# Patient Record
Sex: MALE | Race: WHITE | Employment: OTHER | URBAN - NONMETROPOLITAN AREA
[De-identification: names, ages, dates, MRNs, and addresses within clinical notes are randomized per-mention and may not be internally consistent; named-entity substitution may affect disease eponyms.]

---

## 2018-03-13 ENCOUNTER — HOSPITAL ENCOUNTER (OUTPATIENT)
Dept: OCCUPATIONAL THERAPY | Age: 63
Setting detail: THERAPIES SERIES
Discharge: HOME | End: 2018-03-13
Payer: COMMERCIAL

## 2018-03-13 PROCEDURE — 97165 OT EVAL LOW COMPLEX 30 MIN: CPT

## 2018-03-13 ASSESSMENT — PAIN DESCRIPTION - LOCATION: LOCATION: SHOULDER

## 2018-03-13 ASSESSMENT — PAIN DESCRIPTION - ORIENTATION: ORIENTATION: LEFT

## 2018-03-13 ASSESSMENT — PAIN SCALES - GENERAL: PAINLEVEL_OUTOF10: 7

## 2018-03-13 ASSESSMENT — PAIN DESCRIPTION - PAIN TYPE: TYPE: CHRONIC PAIN

## 2018-03-13 NOTE — PROGRESS NOTES
I certify that I have examined the patient below and determined that Physical Medicine and Rehabilitation service is necessary; that the secondary diagnosis for the provision of rehabilitation services is consistent with identified needs; that service will be furnished on an outpatient basis while the patient is in my care; that I approve the above plan of care for up to 90 days or as specifically noted above and will review it within that time frame or more often if the patients condition requires. Attestation, signature or co-signature of physician indicates approval of certification requirements.    ________________________ ____________ __________  Physician Signature   Date   Time        Time In: 0740  Time Out: 0840  Minutes: 60  Timed Code Treatment Minutes: 0 Minutes     Date: 3/13/2018  Patient Name: Keren Shaw        CSN: [id number]     : 1955  (65 y.o.)  Gender: male   Referring Practitioner: Dr. John Sharif  Diagnosis: Left Shoulder Pain  Treatment Diagnosis: Left shoulder pain  Additional Pertinent Hx: Pt. states he's been having pain in left shoulder since 2017. States there was no injury. Points to pain in anterior shoulder with pain radiating down arm. States the pain is interrupting his sleep. Pt. states he had x-rays but it just showed arthritis. Pt. states he has not had any injections or medications for the shoulder. PMH includes HBP, vertigo, DM, OA, Chiarri Malformation. Pt. had surgery for Chiarri Malformation in     See Medical History Questionnaire for information related to allergies and medications. General:  OT Visit Information  Onset Date: 18  OT Insurance Information: [place] - unlimited visits based on medical necessity. Modalities are covered.  Precert required after eval  Total # of Visits Approved: 1  Total # of Visits to Date: 1  Certification by 4 weeks  Short term goal 1: Pt. will demo independence with HEP for LUE for decreased pain and improved ROM and strength for work and leisure activities  Short term goal 2: Pt. will demo improved PROM left shoulder to flexion= 160, Abduction= 160 and ER= 80 for increased ease with dressing   Short term goal 3: Pt. will demo improved AROM left shoulder to flexion= 145, abduction= 135, IR to thumb tip 4.5 inches above waistline, and ER= 75 for ease with reaching into cupboards and donning shirt  Short term goal 4: Pt. 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 will be able to reach and grasp dishes out of cupboard with LUE with minimal to no pain    OT G-codes  Functional Assessment Tool Used: UE functional scale  Score: 44       Evaluation Complexity: Based on the findings of patient history, examination, clinical presentation, and decision making during this evaluation, this patient is of low complexity.     Ralph Marcano, OTR/L [id number]

## 2018-03-15 ENCOUNTER — HOSPITAL ENCOUNTER (OUTPATIENT)
Dept: OCCUPATIONAL THERAPY | Age: 63
Setting detail: THERAPIES SERIES
Discharge: HOME | End: 2018-03-15
Payer: COMMERCIAL

## 2018-03-15 PROCEDURE — 97035 APP MDLTY 1+ULTRASOUND EA 15: CPT

## 2018-03-15 PROCEDURE — 97140 MANUAL THERAPY 1/> REGIONS: CPT

## 2018-03-15 PROCEDURE — 97110 THERAPEUTIC EXERCISES: CPT

## 2018-03-15 ASSESSMENT — PAIN SCALES - GENERAL: PAINLEVEL_OUTOF10: 6

## 2018-03-15 ASSESSMENT — PAIN DESCRIPTION - ORIENTATION: ORIENTATION: LEFT

## 2018-03-15 ASSESSMENT — PAIN DESCRIPTION - LOCATION: LOCATION: SHOULDER

## 2018-03-20 ENCOUNTER — APPOINTMENT (OUTPATIENT)
Dept: OCCUPATIONAL THERAPY | Age: 63
End: 2018-03-20
Payer: COMMERCIAL

## 2018-03-23 ENCOUNTER — APPOINTMENT (OUTPATIENT)
Dept: OCCUPATIONAL THERAPY | Age: 63
End: 2018-03-23
Payer: COMMERCIAL

## 2018-03-27 ENCOUNTER — HOSPITAL ENCOUNTER (OUTPATIENT)
Dept: OCCUPATIONAL THERAPY | Age: 63
Setting detail: THERAPIES SERIES
Discharge: HOME | End: 2018-03-27
Payer: COMMERCIAL

## 2018-03-29 ENCOUNTER — HOSPITAL ENCOUNTER (OUTPATIENT)
Dept: OCCUPATIONAL THERAPY | Age: 63
Setting detail: THERAPIES SERIES
Discharge: HOME | End: 2018-03-29
Payer: COMMERCIAL

## 2018-03-29 PROCEDURE — 97035 APP MDLTY 1+ULTRASOUND EA 15: CPT

## 2018-03-29 PROCEDURE — 97140 MANUAL THERAPY 1/> REGIONS: CPT

## 2018-03-29 PROCEDURE — 97110 THERAPEUTIC EXERCISES: CPT

## 2018-03-29 ASSESSMENT — PAIN DESCRIPTION - LOCATION: LOCATION: SHOULDER

## 2018-03-29 ASSESSMENT — PAIN DESCRIPTION - ORIENTATION: ORIENTATION: LEFT

## 2018-03-29 ASSESSMENT — PAIN SCALES - GENERAL: PAINLEVEL_OUTOF10: 6

## 2018-03-29 ASSESSMENT — PAIN DESCRIPTION - PAIN TYPE: TYPE: CHRONIC PAIN

## 2018-03-29 NOTE — PROGRESS NOTES
[facility]  OUTPATIENT OCCUPATIONAL THERAPY  Daily Note  [address]    Time In: 0815  Time Out: 0900  Minutes: 45  Timed Code Treatment Minutes: 45 Minutes     Date: 3/29/2018  Patient Name: Emmett Lou        CSN: [id number]   : 1955  (65 y.o.)  Gender: male   Referring Practitioner: Dr. Lizzie Limon  Diagnosis: Left Shoulder Pain  Treatment Diagnosis: Left shoulder pain       General:  OT Visit Information  Onset Date: 18  OT Insurance Information: [place] - unlimited visits based on medical necessity. Modalities are covered. Precert required after eval-Received auth for 6 visits through 18  Total # of Visits Approved: 7  Total # of Visits to Date: 3  Certification Period Expiration Date: 18  Progress Note Counter: 3/7  Comments: No scheduled follow up with doctor       Restrictions/Precautions:  Restrictions/Precautions: General Precautions              Subjective:  Subjective: Pt. states left shoulder remains painful at 6/10 today. Pt. did get script for physical therapy for his neck and back - pt. to schedule today          Pain:  Patient Currently in Pain: Yes  Pain Assessment: 0-10  Pain Level: 6  Pain Type: Chronic pain  Pain Location: Shoulder  Pain Orientation: Left       Objective:     Upper Extremity Function  UE AROM: scapular pinches x10  UE PROM: IASTM and manual soft tissue mobilization to anterior shoulder around supraspinatus insertion and then to upper trap with significant tightness and several knots felt.  Supine GH joint mobs, PROM to left shoulder all planes to tolerance while providing distraction to joint to prevent pinching  UE AAROM: supine dowel for chest press x10, ER x10  UE Stretching: sidelying sleeper stretch                                     Ultrasound  Ultrasound Location: left anteriolateral shoulder  Ultrasound Parameters: 1 MHz, 100%, .8 davis/cm2 x10 minutes  Ultrasound Goals: Pain          Activity

## 2018-04-03 ENCOUNTER — APPOINTMENT (OUTPATIENT)
Dept: OCCUPATIONAL THERAPY | Age: 63
End: 2018-04-03
Payer: MEDICARE

## 2018-04-05 ENCOUNTER — APPOINTMENT (OUTPATIENT)
Dept: PHYSICAL THERAPY | Age: 63
End: 2018-04-05
Payer: MEDICARE

## 2018-04-05 ENCOUNTER — APPOINTMENT (OUTPATIENT)
Dept: OCCUPATIONAL THERAPY | Age: 63
End: 2018-04-05
Payer: MEDICARE

## 2018-04-10 ENCOUNTER — HOSPITAL ENCOUNTER (OUTPATIENT)
Dept: PHYSICAL THERAPY | Age: 63
Setting detail: THERAPIES SERIES
Discharge: HOME | End: 2018-04-10
Payer: MEDICARE

## 2018-04-10 ENCOUNTER — HOSPITAL ENCOUNTER (OUTPATIENT)
Dept: OCCUPATIONAL THERAPY | Age: 63
Setting detail: THERAPIES SERIES
Discharge: HOME | End: 2018-04-10
Payer: MEDICARE

## 2018-04-10 PROCEDURE — 97162 PT EVAL MOD COMPLEX 30 MIN: CPT

## 2018-04-10 PROCEDURE — 97110 THERAPEUTIC EXERCISES: CPT

## 2018-04-10 PROCEDURE — G8991 OTHER PT/OT GOAL STATUS: HCPCS

## 2018-04-10 PROCEDURE — G8990 OTHER PT/OT CURRENT STATUS: HCPCS

## 2018-04-10 PROCEDURE — 97035 APP MDLTY 1+ULTRASOUND EA 15: CPT

## 2018-04-10 PROCEDURE — 97140 MANUAL THERAPY 1/> REGIONS: CPT

## 2018-04-10 ASSESSMENT — PAIN DESCRIPTION - LOCATION
LOCATION: SHOULDER
LOCATION: NECK;SHOULDER

## 2018-04-10 ASSESSMENT — PAIN DESCRIPTION - ORIENTATION
ORIENTATION: LEFT;RIGHT
ORIENTATION: LEFT

## 2018-04-10 ASSESSMENT — PAIN SCALES - GENERAL
PAINLEVEL_OUTOF10: 5
PAINLEVEL_OUTOF10: 6

## 2018-04-10 ASSESSMENT — PAIN DESCRIPTION - PAIN TYPE: TYPE: CHRONIC PAIN

## 2018-04-27 ENCOUNTER — HOSPITAL ENCOUNTER (OUTPATIENT)
Dept: PHYSICAL THERAPY | Age: 63
Setting detail: THERAPIES SERIES
Discharge: HOME | End: 2018-04-27
Payer: MEDICARE

## 2018-04-27 ENCOUNTER — HOSPITAL ENCOUNTER (OUTPATIENT)
Dept: OCCUPATIONAL THERAPY | Age: 63
Setting detail: THERAPIES SERIES
Discharge: HOME | End: 2018-04-27
Payer: MEDICARE

## 2018-04-27 PROCEDURE — 97110 THERAPEUTIC EXERCISES: CPT

## 2018-04-27 PROCEDURE — 97140 MANUAL THERAPY 1/> REGIONS: CPT

## 2018-04-27 ASSESSMENT — PAIN DESCRIPTION - ORIENTATION
ORIENTATION: LEFT
ORIENTATION: LEFT

## 2018-04-27 ASSESSMENT — PAIN DESCRIPTION - PAIN TYPE
TYPE: CHRONIC PAIN
TYPE: CHRONIC PAIN

## 2018-04-27 ASSESSMENT — PAIN SCALES - GENERAL
PAINLEVEL_OUTOF10: 7
PAINLEVEL_OUTOF10: 8

## 2018-04-27 ASSESSMENT — PAIN DESCRIPTION - LOCATION
LOCATION: SHOULDER
LOCATION: SHOULDER;NECK

## 2018-04-29 ENCOUNTER — HOSPITAL ENCOUNTER (EMERGENCY)
Age: 63
Discharge: HOME | End: 2018-04-30
Attending: FAMILY MEDICINE
Payer: MEDICARE

## 2018-04-29 DIAGNOSIS — G89.29 CHRONIC UPPER BACK PAIN: Primary | ICD-10-CM

## 2018-04-29 DIAGNOSIS — M54.9 CHRONIC UPPER BACK PAIN: Primary | ICD-10-CM

## 2018-04-29 LAB
EKG ATRIAL RATE: 70 BPM
EKG P AXIS: 35 DEGREES
EKG P-R INTERVAL: 172 MS
EKG Q-T INTERVAL: 408 MS
EKG QRS DURATION: 114 MS
EKG QTC CALCULATION (BAZETT): 440 MS
EKG R AXIS: -51 DEGREES
EKG T AXIS: 6 DEGREES
EKG VENTRICULAR RATE: 70 BPM

## 2018-04-29 PROCEDURE — 99284 EMERGENCY DEPT VISIT MOD MDM: CPT

## 2018-04-29 PROCEDURE — 93005 ELECTROCARDIOGRAM TRACING: CPT | Performed by: FAMILY MEDICINE

## 2018-04-29 ASSESSMENT — PAIN DESCRIPTION - ORIENTATION: ORIENTATION: LEFT

## 2018-04-29 ASSESSMENT — PAIN SCALES - GENERAL: PAINLEVEL_OUTOF10: 9

## 2018-04-29 ASSESSMENT — PAIN DESCRIPTION - LOCATION: LOCATION: BACK;CHEST;ARM

## 2018-04-29 ASSESSMENT — PAIN DESCRIPTION - DESCRIPTORS: DESCRIPTORS: CONSTANT

## 2018-04-29 ASSESSMENT — PAIN DESCRIPTION - PAIN TYPE: TYPE: ACUTE PAIN

## 2018-04-29 ASSESSMENT — PAIN DESCRIPTION - FREQUENCY: FREQUENCY: CONTINUOUS

## 2018-04-30 ENCOUNTER — APPOINTMENT (OUTPATIENT)
Dept: CT IMAGING | Age: 63
End: 2018-04-30
Payer: MEDICARE

## 2018-04-30 VITALS
TEMPERATURE: 98.5 F | HEART RATE: 66 BPM | DIASTOLIC BLOOD PRESSURE: 95 MMHG | SYSTOLIC BLOOD PRESSURE: 156 MMHG | OXYGEN SATURATION: 99 % | RESPIRATION RATE: 18 BRPM | WEIGHT: 210 LBS | BODY MASS INDEX: 29.29 KG/M2

## 2018-04-30 LAB
ALBUMIN SERPL-MCNC: 3.9 G/DL (ref 3.5–5.1)
ALP BLD-CCNC: 104 U/L (ref 38–126)
ALT SERPL-CCNC: 20 U/L (ref 11–66)
ANION GAP SERPL CALCULATED.3IONS-SCNC: 9 MEQ/L (ref 8–16)
APTT: 29.9 SECONDS (ref 22–38)
AST SERPL-CCNC: 17 U/L (ref 5–40)
BASOPHILS # BLD: 0.3 %
BASOPHILS ABSOLUTE: 0 THOU/MM3 (ref 0–0.1)
BILIRUB SERPL-MCNC: 0.4 MG/DL (ref 0.3–1.2)
BUN BLDV-MCNC: 16 MG/DL (ref 7–22)
CALCIUM SERPL-MCNC: 9 MG/DL (ref 8.5–10.5)
CHLORIDE BLD-SCNC: 99 MEQ/L (ref 98–111)
CO2: 29 MEQ/L (ref 23–33)
CREAT SERPL-MCNC: 1 MG/DL (ref 0.4–1.2)
EOSINOPHIL # BLD: 1.5 %
EOSINOPHILS ABSOLUTE: 0.1 THOU/MM3 (ref 0–0.4)
GFR SERPL CREATININE-BSD FRML MDRD: 76 ML/MIN/1.73M2
GLUCOSE BLD-MCNC: 173 MG/DL (ref 70–108)
HCT VFR BLD CALC: 43.4 % (ref 42–52)
HEMOGLOBIN: 14.9 GM/DL (ref 14–18)
LACTIC ACID: 1.1 MMOL/L (ref 0.5–2.2)
LIPASE: 192.8 U/L (ref 5.6–51.3)
LYMPHOCYTES # BLD: 31 %
LYMPHOCYTES ABSOLUTE: 2.5 THOU/MM3 (ref 1–4.8)
MCH RBC QN AUTO: 30.8 PG (ref 27–31)
MCHC RBC AUTO-ENTMCNC: 34.5 GM/DL (ref 33–37)
MCV RBC AUTO: 89.4 FL (ref 80–94)
MONOCYTES # BLD: 10.1 %
MONOCYTES ABSOLUTE: 0.8 THOU/MM3 (ref 0.4–1.3)
NUCLEATED RED BLOOD CELLS: 0 /100 WBC
OSMOLALITY CALCULATION: 279.1 MOSMOL/KG (ref 275–300)
PDW BLD-RTO: 14.2 % (ref 11.5–14.5)
PLATELET # BLD: 273 THOU/MM3 (ref 130–400)
PMV BLD AUTO: 7.3 FL (ref 7.4–10.4)
POTASSIUM REFLEX MAGNESIUM: 3.8 MEQ/L (ref 3.5–5.2)
PRO-BNP: 85.2 PG/ML (ref 0–900)
RBC # BLD: 4.85 MILL/MM3 (ref 4.7–6.1)
SEG NEUTROPHILS: 57.1 %
SEGMENTED NEUTROPHILS ABSOLUTE COUNT: 4.7 THOU/MM3 (ref 1.8–7.7)
SODIUM BLD-SCNC: 137 MEQ/L (ref 135–145)
TOTAL PROTEIN: 7.5 G/DL (ref 6.1–8)
TROPONIN T: < 0.01 NG/ML
WBC # BLD: 8.2 THOU/MM3 (ref 4.8–10.8)

## 2018-04-30 PROCEDURE — 80053 COMPREHEN METABOLIC PANEL: CPT

## 2018-04-30 PROCEDURE — 83880 ASSAY OF NATRIURETIC PEPTIDE: CPT

## 2018-04-30 PROCEDURE — 6360000002 HC RX W HCPCS: Performed by: FAMILY MEDICINE

## 2018-04-30 PROCEDURE — 2580000003 HC RX 258: Performed by: FAMILY MEDICINE

## 2018-04-30 PROCEDURE — 76376 3D RENDER W/INTRP POSTPROCES: CPT

## 2018-04-30 PROCEDURE — 96374 THER/PROPH/DIAG INJ IV PUSH: CPT

## 2018-04-30 PROCEDURE — 96376 TX/PRO/DX INJ SAME DRUG ADON: CPT

## 2018-04-30 PROCEDURE — 83605 ASSAY OF LACTIC ACID: CPT

## 2018-04-30 PROCEDURE — 84484 ASSAY OF TROPONIN QUANT: CPT

## 2018-04-30 PROCEDURE — 6360000004 HC RX CONTRAST MEDICATION: Performed by: FAMILY MEDICINE

## 2018-04-30 PROCEDURE — 85025 COMPLETE CBC W/AUTO DIFF WBC: CPT

## 2018-04-30 PROCEDURE — 83690 ASSAY OF LIPASE: CPT

## 2018-04-30 PROCEDURE — 71275 CT ANGIOGRAPHY CHEST: CPT

## 2018-04-30 PROCEDURE — 85730 THROMBOPLASTIN TIME PARTIAL: CPT

## 2018-04-30 PROCEDURE — 96375 TX/PRO/DX INJ NEW DRUG ADDON: CPT

## 2018-04-30 PROCEDURE — 36415 COLL VENOUS BLD VENIPUNCTURE: CPT

## 2018-04-30 RX ORDER — 0.9 % SODIUM CHLORIDE 0.9 %
1000 INTRAVENOUS SOLUTION INTRAVENOUS ONCE
Status: DISCONTINUED | OUTPATIENT
Start: 2018-04-30 | End: 2018-04-30 | Stop reason: HOSPADM

## 2018-04-30 RX ORDER — ONDANSETRON 2 MG/ML
4 INJECTION INTRAMUSCULAR; INTRAVENOUS EVERY 30 MIN PRN
Status: COMPLETED | OUTPATIENT
Start: 2018-04-30 | End: 2018-04-30

## 2018-04-30 RX ORDER — FENTANYL CITRATE 50 UG/ML
100 INJECTION, SOLUTION INTRAMUSCULAR; INTRAVENOUS
Status: DISCONTINUED | OUTPATIENT
Start: 2018-04-30 | End: 2018-04-30 | Stop reason: HOSPADM

## 2018-04-30 RX ORDER — OXYCODONE HYDROCHLORIDE AND ACETAMINOPHEN 5; 325 MG/1; MG/1
1-2 TABLET ORAL EVERY 6 HOURS PRN
Qty: 10 TABLET | Refills: 0 | Status: SHIPPED | OUTPATIENT
Start: 2018-04-30 | End: 2018-05-07

## 2018-04-30 RX ORDER — 0.9 % SODIUM CHLORIDE 0.9 %
1000 INTRAVENOUS SOLUTION INTRAVENOUS ONCE
Status: COMPLETED | OUTPATIENT
Start: 2018-04-30 | End: 2018-04-30

## 2018-04-30 RX ORDER — NAPROXEN 500 MG/1
500 TABLET ORAL 2 TIMES DAILY
Qty: 60 TABLET | Refills: 0 | Status: ON HOLD | OUTPATIENT
Start: 2018-04-30 | End: 2018-11-01 | Stop reason: HOSPADM

## 2018-04-30 RX ORDER — CYCLOBENZAPRINE HCL 10 MG
10 TABLET ORAL 3 TIMES DAILY PRN
Qty: 30 TABLET | Refills: 0 | Status: SHIPPED | OUTPATIENT
Start: 2018-04-30 | End: 2018-05-10

## 2018-04-30 RX ADMIN — SODIUM CHLORIDE 1000 ML: 9 INJECTION, SOLUTION INTRAVENOUS at 00:47

## 2018-04-30 RX ADMIN — ONDANSETRON 4 MG: 2 INJECTION INTRAMUSCULAR; INTRAVENOUS at 01:54

## 2018-04-30 RX ADMIN — IOPAMIDOL 80 ML: 755 INJECTION, SOLUTION INTRAVENOUS at 01:25

## 2018-04-30 RX ADMIN — FENTANYL CITRATE 100 MCG: 50 INJECTION, SOLUTION INTRAMUSCULAR; INTRAVENOUS at 00:46

## 2018-04-30 RX ADMIN — ONDANSETRON 4 MG: 2 INJECTION INTRAMUSCULAR; INTRAVENOUS at 00:46

## 2018-04-30 ASSESSMENT — ENCOUNTER SYMPTOMS
BACK PAIN: 1
EYE REDNESS: 0
ABDOMINAL PAIN: 0
SHORTNESS OF BREATH: 0
RHINORRHEA: 0
VOMITING: 0
WHEEZING: 0
EYE DISCHARGE: 0
SORE THROAT: 0
DIARRHEA: 0
COUGH: 0
NAUSEA: 0

## 2018-04-30 ASSESSMENT — PAIN SCALES - GENERAL
PAINLEVEL_OUTOF10: 9
PAINLEVEL_OUTOF10: 3

## 2018-05-10 ENCOUNTER — HOSPITAL ENCOUNTER (OUTPATIENT)
Dept: PHYSICAL THERAPY | Age: 63
Setting detail: THERAPIES SERIES
Discharge: HOME | End: 2018-05-10
Payer: COMMERCIAL

## 2018-05-24 ENCOUNTER — HOSPITAL ENCOUNTER (OUTPATIENT)
Dept: PHYSICAL THERAPY | Age: 63
Setting detail: THERAPIES SERIES
Discharge: HOME | End: 2018-05-24
Payer: COMMERCIAL

## 2018-06-18 ENCOUNTER — HOSPITAL ENCOUNTER (OUTPATIENT)
Age: 63
Discharge: HOME | End: 2018-06-18
Payer: COMMERCIAL

## 2018-06-18 LAB
AVERAGE GLUCOSE: 195 MG/DL (ref 70–126)
CREATININE, URINE: 130.2 MG/DL
HBA1C MFR BLD: 8.5 % (ref 4.4–6.4)
MICROALBUMIN UR-MCNC: 55.85 MG/DL
MICROALBUMIN/CREAT UR-RTO: 429 MG/G (ref 0–30)

## 2018-06-18 PROCEDURE — 36415 COLL VENOUS BLD VENIPUNCTURE: CPT

## 2018-06-18 PROCEDURE — 82043 UR ALBUMIN QUANTITATIVE: CPT

## 2018-06-18 PROCEDURE — 83036 HEMOGLOBIN GLYCOSYLATED A1C: CPT

## 2018-07-09 ENCOUNTER — HOSPITAL ENCOUNTER (OUTPATIENT)
Dept: MRI IMAGING | Age: 63
Discharge: HOME | End: 2018-07-09
Payer: MEDICARE

## 2018-07-09 DIAGNOSIS — M25.512 LEFT SHOULDER PAIN, UNSPECIFIED CHRONICITY: ICD-10-CM

## 2018-07-09 PROCEDURE — 73221 MRI JOINT UPR EXTREM W/O DYE: CPT

## 2018-10-29 ENCOUNTER — APPOINTMENT (OUTPATIENT)
Dept: CT IMAGING | Age: 63
DRG: 176 | End: 2018-10-29
Payer: MEDICARE

## 2018-10-29 ENCOUNTER — HOSPITAL ENCOUNTER (INPATIENT)
Age: 63
LOS: 3 days | Discharge: HOME | DRG: 176 | End: 2018-11-01
Attending: FAMILY MEDICINE | Admitting: HOSPITALIST
Payer: MEDICARE

## 2018-10-29 DIAGNOSIS — I26.92 ACUTE SADDLE PULMONARY EMBOLISM WITHOUT ACUTE COR PULMONALE (HCC): Primary | ICD-10-CM

## 2018-10-29 DIAGNOSIS — I26.99 BILATERAL PULMONARY EMBOLISM (HCC): ICD-10-CM

## 2018-10-29 DIAGNOSIS — I82.4Z2 DVT, LOWER EXTREMITY, DISTAL, ACUTE, LEFT (HCC): ICD-10-CM

## 2018-10-29 PROBLEM — I26.02 ACUTE SADDLE PULMONARY EMBOLISM WITH ACUTE COR PULMONALE (HCC): Status: ACTIVE | Noted: 2018-10-29

## 2018-10-29 LAB
ALBUMIN SERPL-MCNC: 3.6 G/DL (ref 3.5–5.1)
ALP BLD-CCNC: 157 U/L (ref 38–126)
ALT SERPL-CCNC: 28 U/L (ref 11–66)
ANION GAP SERPL CALCULATED.3IONS-SCNC: 19 MEQ/L (ref 8–16)
APTT: 32.2 SECONDS (ref 22–38)
AST SERPL-CCNC: 25 U/L (ref 5–40)
BASOPHILS # BLD: 0.2 %
BASOPHILS ABSOLUTE: 0 THOU/MM3 (ref 0–0.1)
BILIRUB SERPL-MCNC: 0.6 MG/DL (ref 0.3–1.2)
BUN BLDV-MCNC: 14 MG/DL (ref 7–22)
CALCIUM SERPL-MCNC: 9.3 MG/DL (ref 8.5–10.5)
CHLORIDE BLD-SCNC: 96 MEQ/L (ref 98–111)
CO2: 20 MEQ/L (ref 23–33)
CREAT SERPL-MCNC: 0.9 MG/DL (ref 0.4–1.2)
EKG ATRIAL RATE: 118 BPM
EKG P AXIS: 34 DEGREES
EKG P-R INTERVAL: 150 MS
EKG Q-T INTERVAL: 348 MS
EKG QRS DURATION: 100 MS
EKG QTC CALCULATION (BAZETT): 487 MS
EKG R AXIS: -69 DEGREES
EKG T AXIS: 22 DEGREES
EKG VENTRICULAR RATE: 118 BPM
EOSINOPHIL # BLD: 0.1 %
EOSINOPHILS ABSOLUTE: 0 THOU/MM3 (ref 0–0.4)
ERYTHROCYTE [DISTWIDTH] IN BLOOD BY AUTOMATED COUNT: 12.8 % (ref 11.5–14.5)
ERYTHROCYTE [DISTWIDTH] IN BLOOD BY AUTOMATED COUNT: 40.5 FL (ref 35–45)
GFR SERPL CREATININE-BSD FRML MDRD: 85 ML/MIN/1.73M2
GLUCOSE BLD-MCNC: 311 MG/DL (ref 70–108)
HCT VFR BLD CALC: 44.2 % (ref 42–52)
HEMOGLOBIN: 15.3 GM/DL (ref 14–18)
IMMATURE GRANS (ABS): 0.07 THOU/MM3 (ref 0–0.07)
IMMATURE GRANULOCYTES: 0.6 %
INR BLD: 1.06 (ref 0.85–1.13)
LYMPHOCYTES # BLD: 14.9 %
LYMPHOCYTES ABSOLUTE: 1.8 THOU/MM3 (ref 1–4.8)
MCH RBC QN AUTO: 30.2 PG (ref 26–33)
MCHC RBC AUTO-ENTMCNC: 34.6 GM/DL (ref 32.2–35.5)
MCV RBC AUTO: 87.4 FL (ref 80–94)
MONOCYTES # BLD: 6.7 %
MONOCYTES ABSOLUTE: 0.8 THOU/MM3 (ref 0.4–1.3)
NUCLEATED RED BLOOD CELLS: 0 /100 WBC
OSMOLALITY CALCULATION: 282.4 MOSMOL/KG (ref 275–300)
PLATELET # BLD: 219 THOU/MM3 (ref 130–400)
PMV BLD AUTO: 9.6 FL (ref 9.4–12.4)
POTASSIUM REFLEX MAGNESIUM: 4 MEQ/L (ref 3.5–5.2)
PRO-BNP: 972.2 PG/ML (ref 0–900)
RBC # BLD: 5.06 MILL/MM3 (ref 4.7–6.1)
SEG NEUTROPHILS: 77.5 %
SEGMENTED NEUTROPHILS ABSOLUTE COUNT: 9.5 THOU/MM3 (ref 1.8–7.7)
SODIUM BLD-SCNC: 135 MEQ/L (ref 135–145)
TOTAL PROTEIN: 8.3 G/DL (ref 6.1–8)
TROPONIN T: 0.28 NG/ML
TSH SERPL DL<=0.05 MIU/L-ACNC: 1.02 UIU/ML (ref 0.4–4.2)
WBC # BLD: 12.3 THOU/MM3 (ref 4.8–10.8)

## 2018-10-29 PROCEDURE — 99223 1ST HOSP IP/OBS HIGH 75: CPT | Performed by: HOSPITALIST

## 2018-10-29 PROCEDURE — 85730 THROMBOPLASTIN TIME PARTIAL: CPT

## 2018-10-29 PROCEDURE — 6360000004 HC RX CONTRAST MEDICATION: Performed by: FAMILY MEDICINE

## 2018-10-29 PROCEDURE — 93010 ELECTROCARDIOGRAM REPORT: CPT | Performed by: INTERNAL MEDICINE

## 2018-10-29 PROCEDURE — 2709999900 HC NON-CHARGEABLE SUPPLY

## 2018-10-29 PROCEDURE — 36415 COLL VENOUS BLD VENIPUNCTURE: CPT

## 2018-10-29 PROCEDURE — 99285 EMERGENCY DEPT VISIT HI MDM: CPT

## 2018-10-29 PROCEDURE — 96374 THER/PROPH/DIAG INJ IV PUSH: CPT

## 2018-10-29 PROCEDURE — 2580000003 HC RX 258: Performed by: FAMILY MEDICINE

## 2018-10-29 PROCEDURE — 2060000000 HC ICU INTERMEDIATE R&B

## 2018-10-29 PROCEDURE — 71275 CT ANGIOGRAPHY CHEST: CPT

## 2018-10-29 PROCEDURE — 93005 ELECTROCARDIOGRAM TRACING: CPT | Performed by: FAMILY MEDICINE

## 2018-10-29 PROCEDURE — 94760 N-INVAS EAR/PLS OXIMETRY 1: CPT

## 2018-10-29 PROCEDURE — 6360000002 HC RX W HCPCS: Performed by: FAMILY MEDICINE

## 2018-10-29 PROCEDURE — 85610 PROTHROMBIN TIME: CPT

## 2018-10-29 PROCEDURE — 84443 ASSAY THYROID STIM HORMONE: CPT

## 2018-10-29 PROCEDURE — 85025 COMPLETE CBC W/AUTO DIFF WBC: CPT

## 2018-10-29 PROCEDURE — 96375 TX/PRO/DX INJ NEW DRUG ADDON: CPT

## 2018-10-29 PROCEDURE — 6370000000 HC RX 637 (ALT 250 FOR IP): Performed by: FAMILY MEDICINE

## 2018-10-29 PROCEDURE — 84484 ASSAY OF TROPONIN QUANT: CPT

## 2018-10-29 PROCEDURE — 80053 COMPREHEN METABOLIC PANEL: CPT

## 2018-10-29 PROCEDURE — 83880 ASSAY OF NATRIURETIC PEPTIDE: CPT

## 2018-10-29 PROCEDURE — 2580000003 HC RX 258: Performed by: HOSPITALIST

## 2018-10-29 PROCEDURE — 94640 AIRWAY INHALATION TREATMENT: CPT

## 2018-10-29 RX ORDER — 0.9 % SODIUM CHLORIDE 0.9 %
1000 INTRAVENOUS SOLUTION INTRAVENOUS ONCE
Status: COMPLETED | OUTPATIENT
Start: 2018-10-29 | End: 2018-10-29

## 2018-10-29 RX ORDER — HEPARIN SODIUM 1000 [USP'U]/ML
80 INJECTION, SOLUTION INTRAVENOUS; SUBCUTANEOUS PRN
Status: DISCONTINUED | OUTPATIENT
Start: 2018-10-29 | End: 2018-10-31 | Stop reason: ALTCHOICE

## 2018-10-29 RX ORDER — SODIUM CHLORIDE 0.9 % (FLUSH) 0.9 %
10 SYRINGE (ML) INJECTION PRN
Status: DISCONTINUED | OUTPATIENT
Start: 2018-10-29 | End: 2018-11-01 | Stop reason: HOSPADM

## 2018-10-29 RX ORDER — POTASSIUM CHLORIDE 7.45 MG/ML
10 INJECTION INTRAVENOUS PRN
Status: DISCONTINUED | OUTPATIENT
Start: 2018-10-29 | End: 2018-11-01 | Stop reason: HOSPADM

## 2018-10-29 RX ORDER — INSULIN GLARGINE 100 [IU]/ML
10 INJECTION, SOLUTION SUBCUTANEOUS 2 TIMES DAILY
Status: DISCONTINUED | OUTPATIENT
Start: 2018-10-30 | End: 2018-10-30

## 2018-10-29 RX ORDER — POTASSIUM CHLORIDE 20MEQ/15ML
40 LIQUID (ML) ORAL PRN
Status: DISCONTINUED | OUTPATIENT
Start: 2018-10-29 | End: 2018-11-01 | Stop reason: HOSPADM

## 2018-10-29 RX ORDER — HEPARIN SODIUM 10000 [USP'U]/100ML
18 INJECTION, SOLUTION INTRAVENOUS CONTINUOUS
Status: DISCONTINUED | OUTPATIENT
Start: 2018-10-29 | End: 2018-10-31

## 2018-10-29 RX ORDER — PANTOPRAZOLE SODIUM 40 MG/1
40 TABLET, DELAYED RELEASE ORAL
Status: DISCONTINUED | OUTPATIENT
Start: 2018-10-30 | End: 2018-11-01 | Stop reason: HOSPADM

## 2018-10-29 RX ORDER — LISINOPRIL 5 MG/1
5 TABLET ORAL DAILY
Status: DISCONTINUED | OUTPATIENT
Start: 2018-10-30 | End: 2018-11-01 | Stop reason: HOSPADM

## 2018-10-29 RX ORDER — PRAVASTATIN SODIUM 80 MG/1
80 TABLET ORAL DAILY
Status: DISCONTINUED | OUTPATIENT
Start: 2018-10-30 | End: 2018-11-01 | Stop reason: HOSPADM

## 2018-10-29 RX ORDER — ONDANSETRON 2 MG/ML
4 INJECTION INTRAMUSCULAR; INTRAVENOUS EVERY 6 HOURS PRN
Status: DISCONTINUED | OUTPATIENT
Start: 2018-10-29 | End: 2018-11-01 | Stop reason: HOSPADM

## 2018-10-29 RX ORDER — SODIUM CHLORIDE 9 MG/ML
INJECTION, SOLUTION INTRAVENOUS CONTINUOUS
Status: DISCONTINUED | OUTPATIENT
Start: 2018-10-29 | End: 2018-10-29 | Stop reason: ALTCHOICE

## 2018-10-29 RX ORDER — SODIUM CHLORIDE 9 MG/ML
INJECTION, SOLUTION INTRAVENOUS CONTINUOUS
Status: DISCONTINUED | OUTPATIENT
Start: 2018-10-29 | End: 2018-11-01

## 2018-10-29 RX ORDER — IPRATROPIUM BROMIDE AND ALBUTEROL SULFATE 2.5; .5 MG/3ML; MG/3ML
1 SOLUTION RESPIRATORY (INHALATION) ONCE
Status: COMPLETED | OUTPATIENT
Start: 2018-10-29 | End: 2018-10-29

## 2018-10-29 RX ORDER — POTASSIUM CHLORIDE 20 MEQ/1
40 TABLET, EXTENDED RELEASE ORAL PRN
Status: DISCONTINUED | OUTPATIENT
Start: 2018-10-29 | End: 2018-11-01 | Stop reason: HOSPADM

## 2018-10-29 RX ORDER — ASPIRIN 81 MG/1
324 TABLET, CHEWABLE ORAL ONCE
Status: COMPLETED | OUTPATIENT
Start: 2018-10-29 | End: 2018-10-29

## 2018-10-29 RX ORDER — CYCLOBENZAPRINE HCL 10 MG
5 TABLET ORAL 3 TIMES DAILY PRN
Status: DISCONTINUED | OUTPATIENT
Start: 2018-10-29 | End: 2018-11-01 | Stop reason: HOSPADM

## 2018-10-29 RX ORDER — HEPARIN SODIUM 1000 [USP'U]/ML
40 INJECTION, SOLUTION INTRAVENOUS; SUBCUTANEOUS PRN
Status: DISCONTINUED | OUTPATIENT
Start: 2018-10-29 | End: 2018-10-31 | Stop reason: ALTCHOICE

## 2018-10-29 RX ORDER — HEPARIN SODIUM 1000 [USP'U]/ML
80 INJECTION, SOLUTION INTRAVENOUS; SUBCUTANEOUS ONCE
Status: COMPLETED | OUTPATIENT
Start: 2018-10-29 | End: 2018-10-29

## 2018-10-29 RX ORDER — NITROGLYCERIN 0.4 MG/1
0.4 TABLET SUBLINGUAL EVERY 5 MIN PRN
Status: DISCONTINUED | OUTPATIENT
Start: 2018-10-29 | End: 2018-11-01 | Stop reason: HOSPADM

## 2018-10-29 RX ORDER — SODIUM CHLORIDE 0.9 % (FLUSH) 0.9 %
10 SYRINGE (ML) INJECTION EVERY 12 HOURS SCHEDULED
Status: DISCONTINUED | OUTPATIENT
Start: 2018-10-29 | End: 2018-11-01 | Stop reason: HOSPADM

## 2018-10-29 RX ORDER — METHYLPREDNISOLONE SODIUM SUCCINATE 125 MG/2ML
125 INJECTION, POWDER, LYOPHILIZED, FOR SOLUTION INTRAMUSCULAR; INTRAVENOUS ONCE
Status: COMPLETED | OUTPATIENT
Start: 2018-10-29 | End: 2018-10-29

## 2018-10-29 RX ORDER — METOPROLOL SUCCINATE 25 MG/1
25 TABLET, EXTENDED RELEASE ORAL DAILY
Status: DISCONTINUED | OUTPATIENT
Start: 2018-10-30 | End: 2018-11-01 | Stop reason: HOSPADM

## 2018-10-29 RX ORDER — ASPIRIN 81 MG/1
81 TABLET, CHEWABLE ORAL DAILY
Status: DISCONTINUED | OUTPATIENT
Start: 2018-10-30 | End: 2018-11-01 | Stop reason: HOSPADM

## 2018-10-29 RX ADMIN — IPRATROPIUM BROMIDE AND ALBUTEROL SULFATE 1 AMPULE: .5; 3 SOLUTION RESPIRATORY (INHALATION) at 20:47

## 2018-10-29 RX ADMIN — SODIUM CHLORIDE 1000 ML: 9 INJECTION, SOLUTION INTRAVENOUS at 20:34

## 2018-10-29 RX ADMIN — IOPAMIDOL 80 ML: 755 INJECTION, SOLUTION INTRAVENOUS at 21:23

## 2018-10-29 RX ADMIN — HEPARIN SODIUM 7700 UNITS: 1000 INJECTION INTRAVENOUS; SUBCUTANEOUS at 22:18

## 2018-10-29 RX ADMIN — SODIUM CHLORIDE: 9 INJECTION, SOLUTION INTRAVENOUS at 22:24

## 2018-10-29 RX ADMIN — METHYLPREDNISOLONE SODIUM SUCCINATE 125 MG: 125 INJECTION, POWDER, FOR SOLUTION INTRAMUSCULAR; INTRAVENOUS at 20:34

## 2018-10-29 RX ADMIN — HEPARIN SODIUM 18 UNITS/KG/HR: 10000 INJECTION, SOLUTION INTRAVENOUS at 22:18

## 2018-10-29 RX ADMIN — ASPIRIN 81 MG 324 MG: 81 TABLET ORAL at 20:34

## 2018-10-29 RX ADMIN — SODIUM CHLORIDE: 9 INJECTION, SOLUTION INTRAVENOUS at 23:27

## 2018-10-29 ASSESSMENT — ENCOUNTER SYMPTOMS
COUGH: 0
SHORTNESS OF BREATH: 1
VOMITING: 0
GASTROINTESTINAL NEGATIVE: 1
VOICE CHANGE: 0
ABDOMINAL PAIN: 0
CHEST TIGHTNESS: 1
WHEEZING: 0
NAUSEA: 0
TROUBLE SWALLOWING: 0

## 2018-10-29 ASSESSMENT — PAIN DESCRIPTION - LOCATION
LOCATION: CHEST
LOCATION: CHEST

## 2018-10-29 ASSESSMENT — PAIN DESCRIPTION - PAIN TYPE
TYPE: ACUTE PAIN
TYPE: ACUTE PAIN

## 2018-10-29 ASSESSMENT — PAIN DESCRIPTION - PROGRESSION: CLINICAL_PROGRESSION: NOT CHANGED

## 2018-10-29 ASSESSMENT — PAIN DESCRIPTION - DESCRIPTORS
DESCRIPTORS: HEAVINESS
DESCRIPTORS: ACHING

## 2018-10-29 ASSESSMENT — PAIN SCALES - GENERAL
PAINLEVEL_OUTOF10: 5
PAINLEVEL_OUTOF10: 4

## 2018-10-29 ASSESSMENT — PAIN DESCRIPTION - FREQUENCY: FREQUENCY: CONTINUOUS

## 2018-10-29 ASSESSMENT — PAIN DESCRIPTION - ONSET: ONSET: ON-GOING

## 2018-10-29 ASSESSMENT — PAIN DESCRIPTION - ORIENTATION: ORIENTATION: MID

## 2018-10-30 ENCOUNTER — APPOINTMENT (OUTPATIENT)
Dept: INTERVENTIONAL RADIOLOGY/VASCULAR | Age: 63
DRG: 176 | End: 2018-10-30
Payer: MEDICARE

## 2018-10-30 PROBLEM — I26.99 BILATERAL PULMONARY EMBOLISM (HCC): Status: ACTIVE | Noted: 2018-10-29

## 2018-10-30 LAB
ALBUMIN SERPL-MCNC: 3.1 G/DL (ref 3.5–5.1)
ALP BLD-CCNC: 134 U/L (ref 38–126)
ALT SERPL-CCNC: 25 U/L (ref 11–66)
ANION GAP SERPL CALCULATED.3IONS-SCNC: 17 MEQ/L (ref 8–16)
APTT: 135.9 SECONDS (ref 22–38)
APTT: 191.6 SECONDS (ref 22–38)
APTT: 93 SECONDS (ref 22–38)
AST SERPL-CCNC: 20 U/L (ref 5–40)
BILIRUB SERPL-MCNC: 0.5 MG/DL (ref 0.3–1.2)
BUN BLDV-MCNC: 14 MG/DL (ref 7–22)
CALCIUM SERPL-MCNC: 8.8 MG/DL (ref 8.5–10.5)
CHLORIDE BLD-SCNC: 98 MEQ/L (ref 98–111)
CO2: 19 MEQ/L (ref 23–33)
CREAT SERPL-MCNC: 0.9 MG/DL (ref 0.4–1.2)
EKG ATRIAL RATE: 78 BPM
EKG P AXIS: 23 DEGREES
EKG P-R INTERVAL: 164 MS
EKG Q-T INTERVAL: 412 MS
EKG QRS DURATION: 112 MS
EKG QTC CALCULATION (BAZETT): 469 MS
EKG R AXIS: -43 DEGREES
EKG T AXIS: -21 DEGREES
EKG VENTRICULAR RATE: 78 BPM
ERYTHROCYTE [DISTWIDTH] IN BLOOD BY AUTOMATED COUNT: 12.9 % (ref 11.5–14.5)
ERYTHROCYTE [DISTWIDTH] IN BLOOD BY AUTOMATED COUNT: 39.9 FL (ref 35–45)
GFR SERPL CREATININE-BSD FRML MDRD: 85 ML/MIN/1.73M2
GLUCOSE BLD-MCNC: 226 MG/DL (ref 70–108)
GLUCOSE BLD-MCNC: 277 MG/DL (ref 70–108)
GLUCOSE BLD-MCNC: 340 MG/DL (ref 70–108)
GLUCOSE BLD-MCNC: 394 MG/DL (ref 70–108)
GLUCOSE BLD-MCNC: 434 MG/DL (ref 70–108)
GLUCOSE BLD-MCNC: 464 MG/DL (ref 70–108)
HCT VFR BLD CALC: 39.9 % (ref 42–52)
HEMOGLOBIN: 14 GM/DL (ref 14–18)
LV EF: 58 %
LVEF MODALITY: NORMAL
MCH RBC QN AUTO: 30.4 PG (ref 26–33)
MCHC RBC AUTO-ENTMCNC: 35.1 GM/DL (ref 32.2–35.5)
MCV RBC AUTO: 86.6 FL (ref 80–94)
MRSA SCREEN RT-PCR: NEGATIVE
PLATELET # BLD: 194 THOU/MM3 (ref 130–400)
PMV BLD AUTO: 9.8 FL (ref 9.4–12.4)
POTASSIUM REFLEX MAGNESIUM: 4.3 MEQ/L (ref 3.5–5.2)
RBC # BLD: 4.61 MILL/MM3 (ref 4.7–6.1)
SODIUM BLD-SCNC: 134 MEQ/L (ref 135–145)
TOTAL PROTEIN: 7.3 G/DL (ref 6.1–8)
TROPONIN T: 0.05 NG/ML
WBC # BLD: 9.5 THOU/MM3 (ref 4.8–10.8)

## 2018-10-30 PROCEDURE — 6370000000 HC RX 637 (ALT 250 FOR IP): Performed by: HOSPITALIST

## 2018-10-30 PROCEDURE — 2000000000 HC ICU R&B

## 2018-10-30 PROCEDURE — 6370000000 HC RX 637 (ALT 250 FOR IP): Performed by: FAMILY MEDICINE

## 2018-10-30 PROCEDURE — 2580000003 HC RX 258: Performed by: HOSPITALIST

## 2018-10-30 PROCEDURE — 82948 REAGENT STRIP/BLOOD GLUCOSE: CPT

## 2018-10-30 PROCEDURE — 2709999900 HC NON-CHARGEABLE SUPPLY

## 2018-10-30 PROCEDURE — 99233 SBSQ HOSP IP/OBS HIGH 50: CPT | Performed by: FAMILY MEDICINE

## 2018-10-30 PROCEDURE — 36415 COLL VENOUS BLD VENIPUNCTURE: CPT

## 2018-10-30 PROCEDURE — 85027 COMPLETE CBC AUTOMATED: CPT

## 2018-10-30 PROCEDURE — 6360000002 HC RX W HCPCS: Performed by: INTERNAL MEDICINE

## 2018-10-30 PROCEDURE — 87641 MR-STAPH DNA AMP PROBE: CPT

## 2018-10-30 PROCEDURE — 93970 EXTREMITY STUDY: CPT

## 2018-10-30 PROCEDURE — 99232 SBSQ HOSP IP/OBS MODERATE 35: CPT | Performed by: INTERNAL MEDICINE

## 2018-10-30 PROCEDURE — 3E03317 INTRODUCTION OF OTHER THROMBOLYTIC INTO PERIPHERAL VEIN, PERCUTANEOUS APPROACH: ICD-10-PCS | Performed by: HOSPITALIST

## 2018-10-30 PROCEDURE — 93306 TTE W/DOPPLER COMPLETE: CPT

## 2018-10-30 PROCEDURE — 87081 CULTURE SCREEN ONLY: CPT

## 2018-10-30 PROCEDURE — 87147 CULTURE TYPE IMMUNOLOGIC: CPT

## 2018-10-30 PROCEDURE — 93010 ELECTROCARDIOGRAM REPORT: CPT | Performed by: INTERNAL MEDICINE

## 2018-10-30 PROCEDURE — 93005 ELECTROCARDIOGRAM TRACING: CPT | Performed by: FAMILY MEDICINE

## 2018-10-30 PROCEDURE — 80053 COMPREHEN METABOLIC PANEL: CPT

## 2018-10-30 PROCEDURE — 84484 ASSAY OF TROPONIN QUANT: CPT

## 2018-10-30 PROCEDURE — 85730 THROMBOPLASTIN TIME PARTIAL: CPT

## 2018-10-30 RX ORDER — NICOTINE POLACRILEX 4 MG
15 LOZENGE BUCCAL PRN
Status: DISCONTINUED | OUTPATIENT
Start: 2018-10-30 | End: 2018-11-01 | Stop reason: HOSPADM

## 2018-10-30 RX ORDER — INSULIN GLARGINE 100 [IU]/ML
20 INJECTION, SOLUTION SUBCUTANEOUS 2 TIMES DAILY
Status: DISCONTINUED | OUTPATIENT
Start: 2018-10-30 | End: 2018-11-01 | Stop reason: HOSPADM

## 2018-10-30 RX ORDER — DEXTROSE MONOHYDRATE 25 G/50ML
12.5 INJECTION, SOLUTION INTRAVENOUS PRN
Status: DISCONTINUED | OUTPATIENT
Start: 2018-10-30 | End: 2018-11-01 | Stop reason: HOSPADM

## 2018-10-30 RX ORDER — DEXTROSE MONOHYDRATE 50 MG/ML
100 INJECTION, SOLUTION INTRAVENOUS PRN
Status: DISCONTINUED | OUTPATIENT
Start: 2018-10-30 | End: 2018-11-01 | Stop reason: HOSPADM

## 2018-10-30 RX ADMIN — ALTEPLASE 100 MG: KIT at 21:09

## 2018-10-30 RX ADMIN — INSULIN LISPRO 6 UNITS: 100 INJECTION, SOLUTION INTRAVENOUS; SUBCUTANEOUS at 18:23

## 2018-10-30 RX ADMIN — Medication 10 UNITS: at 09:12

## 2018-10-30 RX ADMIN — METOPROLOL SUCCINATE 25 MG: 25 TABLET, FILM COATED, EXTENDED RELEASE ORAL at 09:15

## 2018-10-30 RX ADMIN — PANTOPRAZOLE SODIUM 40 MG: 40 TABLET, DELAYED RELEASE ORAL at 06:14

## 2018-10-30 RX ADMIN — SODIUM CHLORIDE: 9 INJECTION, SOLUTION INTRAVENOUS at 23:59

## 2018-10-30 RX ADMIN — INSULIN GLARGINE 10 UNITS: 100 INJECTION, SOLUTION SUBCUTANEOUS at 00:15

## 2018-10-30 RX ADMIN — PRAVASTATIN SODIUM 80 MG: 80 TABLET ORAL at 21:02

## 2018-10-30 RX ADMIN — ASPIRIN 81 MG CHEWABLE TABLET 81 MG: 81 TABLET CHEWABLE at 09:11

## 2018-10-30 RX ADMIN — INSULIN GLARGINE 20 UNITS: 100 INJECTION, SOLUTION SUBCUTANEOUS at 21:02

## 2018-10-30 RX ADMIN — Medication 10 ML: at 09:17

## 2018-10-30 RX ADMIN — LISINOPRIL 5 MG: 5 TABLET ORAL at 09:10

## 2018-10-30 RX ADMIN — INSULIN LISPRO 5 UNITS: 100 INJECTION, SOLUTION INTRAVENOUS; SUBCUTANEOUS at 20:59

## 2018-10-30 RX ADMIN — SODIUM CHLORIDE: 9 INJECTION, SOLUTION INTRAVENOUS at 12:23

## 2018-10-30 RX ADMIN — Medication 10 ML: at 20:50

## 2018-10-30 RX ADMIN — INSULIN GLARGINE 10 UNITS: 100 INJECTION, SOLUTION SUBCUTANEOUS at 09:12

## 2018-10-30 RX ADMIN — Medication 12 UNITS: at 12:52

## 2018-10-30 ASSESSMENT — PAIN DESCRIPTION - FREQUENCY
FREQUENCY: CONTINUOUS

## 2018-10-30 ASSESSMENT — PAIN DESCRIPTION - PAIN TYPE
TYPE: ACUTE PAIN

## 2018-10-30 ASSESSMENT — PAIN SCALES - GENERAL
PAINLEVEL_OUTOF10: 4
PAINLEVEL_OUTOF10: 4
PAINLEVEL_OUTOF10: 0
PAINLEVEL_OUTOF10: 4
PAINLEVEL_OUTOF10: 4

## 2018-10-30 ASSESSMENT — PAIN DESCRIPTION - DESCRIPTORS
DESCRIPTORS: HEAVINESS

## 2018-10-30 ASSESSMENT — PAIN DESCRIPTION - LOCATION
LOCATION: CHEST

## 2018-10-30 ASSESSMENT — PAIN DESCRIPTION - ONSET
ONSET: ON-GOING

## 2018-10-30 ASSESSMENT — PAIN DESCRIPTION - PROGRESSION: CLINICAL_PROGRESSION: NOT CHANGED

## 2018-10-30 NOTE — CONSULTS
wheezes. No rales. Cardiovascular - Heart sounds are normal.  Regular rhythm normal rate without murmur, gallop or rub. Abdomen - Soft, nontender, nondistended, no masses or organomegaly  Neurologic - Awake, alert, oriented. There are no focal motor or sensory deficits  Extremities - No cyanosis, clubbing or edema. Musculoskeletal: Normal range of motion. Patient exhibits no tenderness. Lymphadenopathy:  No cervical adenopathy. Psychiatric: Patient  has a normal mood and affect. Skin - No bruising or bleeding. Labs  - Old records and notes have been reviewed in [facility]   ABG  No results found for: PH, PO2, PCO2, HCO3, O2SAT  No results found for: IFIO2, MODE, SETTIDVOL, SETPEEP  CBC  Recent Labs      10/29/18   2020  10/30/18   0445   WBC  12.3*  9.5   RBC  5.06  4.61*   HGB  15.3  14.0   HCT  44.2  39.9*   MCV  87.4  86.6   MCH  30.2  30.4   MCHC  34.6  35.1   PLT  219  194   MPV  9.6  9.8      BMP  Recent Labs      10/29/18   2020  10/30/18   0446   NA  135  134*   K  4.0  4.3   CL  96*  98   CO2  20*  19*   BUN  14  14   CREATININE  0.9  0.9   GLUCOSE  311*  434*   CALCIUM  9.3  8.8     LFT  Recent Labs      10/29/18   2020  10/30/18   0446   AST  25  20   ALT  28  25   BILITOT  0.6  0.5   ALKPHOS  157*  134*     TROP  Lab Results   Component Value Date    TROPONINT 0.276 10/29/2018    TROPONINT < 0.010 04/30/2018    TROPONINT < 0.010 05/20/2016     BNP  No results for input(s): BNP in the last 72 hours. Lactic Acid  No results for input(s): LACTA in the last 72 hours.   INR  Recent Labs      10/29/18   2020   INR  1.06     PTT  Recent Labs      10/29/18   2020  10/30/18   0445  10/30/18   1103   APTT  32.2  191.6*  135.9*     Glucose  Recent Labs      10/30/18   0013  10/30/18   0624  10/30/18   1141   POCGLU  340*  394*  464*     UA No results for input(s): SPECGRAV, PHUR, COLORU, CLARITYU, MUCUS, PROTEINU, BLOODU, RBCUA, WBCUA, BACTERIA, NITRU, GLUCOSEU, BILIRUBINUR, UROBILINOGEN, KETUA, LABCAST, LABNATALIE, WILLIAMOS in the last 72 hours. Invalid input(s): CRYSTALS. PFTs   No old studies in Epic. Sleep studies   Hx of sleep apnea. He used to be on BiPAP  Cultures    No old studies in Epic. EKG     Echocardiogram   Right Ventricle   Severly dilated right ventricle. Right ventricle global systolic function is mildly reduced . Right ventricular septum flattened in systole consistent with RV pressure   overload. Left Ventricle   Left ventricle size and systolic function is normal.   Normal left ventricular wall thickness. Ejection fraction is visually estimated at 55-60%. 10/30/2018  Transthoracic Echocardiography Report (TTE)  Conclusions      Summary   Left ventricle size and systolic function is normal.   Normal left ventricular wall thickness. Ejection fraction is visually estimated at 55-60%. Severly dilated right ventricle. Right ventricle global systolic function is mildly reduced . Right ventricular septum flattened in systole consistent with RV pressure   overload. Mild mitral regurgitation is present. Mild tricuspid regurgitation. Pulmonary artery systolic pressure calculated from tricuspid regurgitation   jet is 35-40 mm Hg . IVC is normal in size with <50% inspiratory variation. Signature      ----------------------------------------------------------------   Electronically signed by Carlos Bruce MD (Interpreting   physician) on 10/30/2018 at 05:02 PM   ----------------------------------------------------------------      Radiology      CTA of chest Scans  (See actual reports for details)  10/29/2018  PROCEDURE: CTA CHEST W WO CONTRAST  Pulmonary vasculature: There is adequate opacification of the pulmonary arteries.  There is a partial saddle pulmonary embolism with thrombus straddling the main pulmonary bifurcation and extending into the proximal right main pulmonary artery and more significantly into the left main pulmonary artery and extending into the left

## 2018-10-30 NOTE — H&P
recorded. Radiology:     EKG:  I have reviewed the EKG with the following interpretation: Sinus Tachycardia. No acute ischemic changes    CTA CHEST W WO CONTRAST   Final Result      Bilateral multilobar acute pulmonary emboli with a fairly large clot burden, including a saddle embolism extending into the left main pulmonary artery and left upper and lower lobe pulmonary arteries. The right-sided extension only extends just beyond    the main pulmonary artery bifurcation however there is a second large clot burden in the distal main right pulmonary artery extending into the right upper, middle, and lower lobe segmental pulmonary arteries. No acute pneumonia. Fatty liver with possible cirrhosis. Correlate clinically. Results discussed with Dr. Cindy Sandy on 10/29/2018 at 9:57 PM.      **This report has been created using voice recognition software. It may contain minor errors which are inherent in voice recognition technology. **      Final report electronically signed by Dr. Maldonado Hastings on 10/29/2018 9:59 PM               DVT prophylaxis: [] Lovenox                                 [] SCDs                                 [] SQ Heparin                                 [] Encourage ambulation           [x] Already on Anticoagulation    Code Status: Full Code      Disposition:    [x] Home       [] TCU       [] Rehab       [] Psych       [] SNF       [] [place]       [] Other-    ASSESSMENT:    C/Suman Guadalupe [address] Problems    Diagnosis Date Noted    Acute saddle pulmonary embolism with acute cor pulmonale ([facility] [address] [I26.02] 10/29/2018     Priority: High    GERD with stricture [K21.9, K22.2] 05/21/2016     Priority: High    Type 2 diabetes mellitus ([facility] [address] [E11.9] 05/21/2016     Priority: High    Status post dilatation of esophageal stricture [Z98.890, Z87.19] 08/10/2016       PLAN:    1. Saddle pulmonary embolism - continue heparin drip. IR contacted by ER.   Probable IR intervention in AM.  BP and O2

## 2018-10-30 NOTE — ED PROVIDER NOTES
[facility] EMERGENCY DEPT      CHIEF COMPLAINT       Chief Complaint   Patient presents with    Shortness of Breath    Chest Pain       Nurses Notes reviewed and I agree except as noted in the HPI. HISTORY OF PRESENT ILLNESS    Gerrit Cranker a 59 y.o. male who presents  To the emergency department with complaints of shortness of breath since last night. Patient said that he had to sleep on the couch propped up because anytime  he lays down he feels short of breath. This morning he got worse and through the day it has just progressively gotten worse and not gotten any better. He feels as though his chest is heavy but he doesn't have any chest pain. He doesn't have any associated leg swelling at this time. He has multiple cardiac risk factors including hypertension, hyperlipidemia, diabetes, among others. He denies dizziness palpitations syncope he has no diaphoresis. He has orthopnea. And exertional dyspnea with even slight exertion    At this time he doesn't have any chest pain but he has  chest heaviness. He is saturating well on room air he is not in clinical or respiratory distress. He has no cough or congestion he has no fevers. He has no clinical features of upper respiratory tract infection. He denies headache neck pain or back pain. He has no GI or  symptoms at this time. No neurologic or musculoskeletal or dermatologic symptoms at this time. Clinically looks well in no acute distress but he is slightly tachycardic with an otherwise normal exam.      REVIEW OF SYSTEMS     Review of Systems   Constitutional: Negative. Negative for chills and fever. HENT: Negative. Negative for congestion, trouble swallowing and voice change. Respiratory: Positive for chest tightness and shortness of breath. Negative for cough and wheezing. Cardiovascular: Negative for chest pain, palpitations and leg swelling. Gastrointestinal: Negative.   Negative for abdominal pain, nausea and vomiting. Genitourinary: Negative. Musculoskeletal: Negative. Skin: Negative. Negative for pallor and rash. Neurological: Negative for dizziness and light-headedness. Hematological: Negative. All other systems reviewed and are negative. PASTMEDICAL HISTORY   [unfilled]    SURGICAL HISTORY      has a past surgical history that includes brain surgery; Tonsillectomy; Cholecystectomy; Cardiac surgery; back surgery; Colonoscopy; Endoscopy, colon, diagnostic; fracture surgery; and vascular surgery. CURRENT MEDICATIONS       Previous Medications    ASPIRIN 81 MG TABLET    Take 81 mg by mouth daily    CYCLOBENZAPRINE (FLEXERIL) 10 MG TABLET    Take 0.5 tablets by mouth 3 times daily as needed for Muscle spasms    GLYBURIDE (DIABETA) 5 MG TABLET    Take 5 mg by mouth daily (with breakfast)     INSULIN GLARGINE (LANTUS) 100 UNIT/ML INJECTION VIAL    Inject 10 Units into the skin 2 times daily     LISINOPRIL (PRINIVIL;ZESTRIL) 5 MG TABLET    Take 5 mg by mouth daily    METOPROLOL (TOPROL XL) 25 MG XL TABLET    Take 1 tablet by mouth daily    NAPROXEN (NAPROSYN) 500 MG TABLET    Take 1 tablet by mouth 2 times daily    NITROGLYCERIN (NITROSTAT) 0.4 MG SL TABLET    Place 1 tablet under the tongue every 5 minutes as needed for Chest pain    OMEPRAZOLE (PRILOSEC) 20 MG CAPSULE    Take 40 mg by mouth daily    ONDANSETRON (ZOFRAN) 4 MG TABLET    Take 1 tablet by mouth 2 times daily    PRAVASTATIN (PRAVACHOL) 80 MG TABLET    Take 80 mg by mouth daily    VITAMIN D 1000 UNITS CAPS    Take by mouth       ALLERGIES     is allergic to latex. FAMILY HISTORY     indicated that his mother is . He indicated that his father is . He indicated that his sister is . He indicated that his brother is . family history includes Cancer in his father; Diabetes in his brother, father, mother, and sister;  Heart Disease in his mother; High Blood Pressure in his brother, father, mother, and sister;

## 2018-10-30 NOTE — PROGRESS NOTES
recognition software. It may contain minor errors which are inherent in voice recognition technology. **      Final report electronically signed by Dr. Micki Bahena on 10/29/2018 9:59 PM          Diet: DIET GENERAL; No Added Salt (3-4 GM)    DVT prophylaxis: [] Lovenox                                 [] SCDs                                 [] SQ Heparin                                 [] Encourage ambulation           [x] Already on Anticoagulation     Disposition:    [x] Home       [] TCU       [] Rehab       [] Psych       [] SNF       [] [place]       [] Other-    Code Status: Full Code      Assessment/Plan:    Anticipated Discharge in : 2 days    Active Hospital Problems    Diagnosis Date Noted    GERD with stricture [K21.9, K22.2] 05/21/2016     Priority: High    Type 2 diabetes mellitus ([facility] [address] [E11.9] 05/21/2016     Priority: High    Acute saddle pulmonary embolism with acute cor pulmonale ([facility] [address] [I26.02] 10/29/2018    Status post dilatation of esophageal stricture [F28.230, Z87.19] 08/10/2016     Acute bilateral PE with Saddle embolism   - hemodynamically stable  - on heparin drip  - IR said no indication for intervention  - I ordered echo to see for any cardiac compromise, showed severely dilated RV  - Pulm consulted, TPa per protocol  - BP and O2 saturation stable  - transfer to ICU    - venous duplex scan, bilateral LE    CAD/HTN  - continue metoprolol  - nitro prn  - ASA    DM2   - sliding scale insulin and Lantus    GERD   - PPI            Electronically signed by Clarissa Gutiérrez MD on 10/30/2018 at 2:28 PM

## 2018-10-31 LAB
ANION GAP SERPL CALCULATED.3IONS-SCNC: 12 MEQ/L (ref 8–16)
APTT: 31.5 SECONDS (ref 22–38)
APTT: 37.7 SECONDS (ref 22–38)
APTT: 42.2 SECONDS (ref 22–38)
AVERAGE GLUCOSE: 237 MG/DL (ref 70–126)
BUN BLDV-MCNC: 24 MG/DL (ref 7–22)
CALCIUM SERPL-MCNC: 8.4 MG/DL (ref 8.5–10.5)
CHLORIDE BLD-SCNC: 104 MEQ/L (ref 98–111)
CO2: 23 MEQ/L (ref 23–33)
CREAT SERPL-MCNC: 1.1 MG/DL (ref 0.4–1.2)
EKG ATRIAL RATE: 68 BPM
EKG P AXIS: 78 DEGREES
EKG P-R INTERVAL: 164 MS
EKG Q-T INTERVAL: 430 MS
EKG QRS DURATION: 112 MS
EKG QTC CALCULATION (BAZETT): 457 MS
EKG R AXIS: -41 DEGREES
EKG T AXIS: -21 DEGREES
EKG VENTRICULAR RATE: 68 BPM
ERYTHROCYTE [DISTWIDTH] IN BLOOD BY AUTOMATED COUNT: 13.2 % (ref 11.5–14.5)
ERYTHROCYTE [DISTWIDTH] IN BLOOD BY AUTOMATED COUNT: 42.1 FL (ref 35–45)
GFR SERPL CREATININE-BSD FRML MDRD: 68 ML/MIN/1.73M2
GLUCOSE BLD-MCNC: 119 MG/DL (ref 70–108)
GLUCOSE BLD-MCNC: 129 MG/DL (ref 70–108)
GLUCOSE BLD-MCNC: 144 MG/DL (ref 70–108)
GLUCOSE BLD-MCNC: 217 MG/DL (ref 70–108)
GLUCOSE BLD-MCNC: 222 MG/DL (ref 70–108)
HBA1C MFR BLD: 9.9 % (ref 4.4–6.4)
HCT VFR BLD CALC: 36.6 % (ref 42–52)
HEMOGLOBIN: 12.6 GM/DL (ref 14–18)
MCH RBC QN AUTO: 30.1 PG (ref 26–33)
MCHC RBC AUTO-ENTMCNC: 34.4 GM/DL (ref 32.2–35.5)
MCV RBC AUTO: 87.4 FL (ref 80–94)
PLATELET # BLD: 163 THOU/MM3 (ref 130–400)
PMV BLD AUTO: 9.3 FL (ref 9.4–12.4)
POTASSIUM SERPL-SCNC: 3.5 MEQ/L (ref 3.5–5.2)
RBC # BLD: 4.19 MILL/MM3 (ref 4.7–6.1)
SODIUM BLD-SCNC: 139 MEQ/L (ref 135–145)
TROPONIN T: 0.06 NG/ML
WBC # BLD: 16.3 THOU/MM3 (ref 4.8–10.8)

## 2018-10-31 PROCEDURE — 99233 SBSQ HOSP IP/OBS HIGH 50: CPT | Performed by: INTERNAL MEDICINE

## 2018-10-31 PROCEDURE — 6370000000 HC RX 637 (ALT 250 FOR IP): Performed by: INTERNAL MEDICINE

## 2018-10-31 PROCEDURE — 83036 HEMOGLOBIN GLYCOSYLATED A1C: CPT

## 2018-10-31 PROCEDURE — 6370000000 HC RX 637 (ALT 250 FOR IP): Performed by: HOSPITALIST

## 2018-10-31 PROCEDURE — 82948 REAGENT STRIP/BLOOD GLUCOSE: CPT

## 2018-10-31 PROCEDURE — 6370000000 HC RX 637 (ALT 250 FOR IP): Performed by: FAMILY MEDICINE

## 2018-10-31 PROCEDURE — 36415 COLL VENOUS BLD VENIPUNCTURE: CPT

## 2018-10-31 PROCEDURE — 1200000003 HC TELEMETRY R&B

## 2018-10-31 PROCEDURE — 2700000000 HC OXYGEN THERAPY PER DAY

## 2018-10-31 PROCEDURE — 85027 COMPLETE CBC AUTOMATED: CPT

## 2018-10-31 PROCEDURE — 80048 BASIC METABOLIC PNL TOTAL CA: CPT

## 2018-10-31 PROCEDURE — 2709999900 HC NON-CHARGEABLE SUPPLY

## 2018-10-31 PROCEDURE — 93005 ELECTROCARDIOGRAM TRACING: CPT | Performed by: HOSPITALIST

## 2018-10-31 PROCEDURE — 85730 THROMBOPLASTIN TIME PARTIAL: CPT

## 2018-10-31 PROCEDURE — 84484 ASSAY OF TROPONIN QUANT: CPT

## 2018-10-31 PROCEDURE — 93010 ELECTROCARDIOGRAM REPORT: CPT | Performed by: INTERNAL MEDICINE

## 2018-10-31 RX ADMIN — APIXABAN 10 MG: 5 TABLET, FILM COATED ORAL at 21:59

## 2018-10-31 RX ADMIN — METOPROLOL SUCCINATE 25 MG: 25 TABLET, FILM COATED, EXTENDED RELEASE ORAL at 09:39

## 2018-10-31 RX ADMIN — APIXABAN 10 MG: 5 TABLET, FILM COATED ORAL at 09:39

## 2018-10-31 RX ADMIN — PRAVASTATIN SODIUM 80 MG: 80 TABLET ORAL at 21:59

## 2018-10-31 RX ADMIN — LISINOPRIL 5 MG: 5 TABLET ORAL at 12:02

## 2018-10-31 RX ADMIN — INSULIN GLARGINE 20 UNITS: 100 INJECTION, SOLUTION SUBCUTANEOUS at 21:59

## 2018-10-31 RX ADMIN — INSULIN LISPRO 6 UNITS: 100 INJECTION, SOLUTION INTRAVENOUS; SUBCUTANEOUS at 18:39

## 2018-10-31 RX ADMIN — PANTOPRAZOLE SODIUM 40 MG: 40 TABLET, DELAYED RELEASE ORAL at 06:09

## 2018-10-31 RX ADMIN — INSULIN GLARGINE 20 UNITS: 100 INJECTION, SOLUTION SUBCUTANEOUS at 09:58

## 2018-10-31 RX ADMIN — INSULIN LISPRO 6 UNITS: 100 INJECTION, SOLUTION INTRAVENOUS; SUBCUTANEOUS at 12:03

## 2018-10-31 RX ADMIN — ASPIRIN 81 MG CHEWABLE TABLET 81 MG: 81 TABLET CHEWABLE at 09:38

## 2018-10-31 RX ADMIN — POTASSIUM CHLORIDE 40 MEQ: 20 TABLET, EXTENDED RELEASE ORAL at 06:09

## 2018-10-31 ASSESSMENT — PAIN SCALES - GENERAL
PAINLEVEL_OUTOF10: 0
PAINLEVEL_OUTOF10: 2

## 2018-10-31 NOTE — PROGRESS NOTES
Assessment and Plan:        1. Saddle pulmonary embolism: Status post TPA 10/30/18. Now starting on Eliquis 10 mg twice a day for 7 days then 5 mg twice a day thereafter. 2. Left lower extremity DVT: Status post TPA and now on Eliquis. 3. Elevated cardiac enzymes: Secondary to right ventricular strain and subsequent secondary enzyme leak. Not a primary myocardial infarction. 4. Diabetes mellitus: Very poorly controlled. Subcutaneous insulin. Dietary modification. 5. Hyperlipidemia: Statin therapy. 6. Hypertension: On lisinopril and metoprolol. CC:  Saddle pulmonary emboli  HPI: Patient is a 29-year-old  male with history of Chiari malformation with surgical repair in 2008. He has poorly controlled diabetes mellitus, coronary artery disease, hyperlipidemia, and hypertension. Patient was admitted on 10/29/18 with a one-day history of increasing shortness of breath associated with vague chest discomfort. CT scan chest showed saddle pulmonary embolism. He had elevated troponin associated with pulmonary embolism in addition to right ventricular strain. He initially was treated with heparin. He was transferred to the intensive care unit on 10/30/18 and administered TPA given the severity of the pulmonary embolism. TPA was administered without difficulty, and patient was started on Eliquis 10/31/18. ROS: Patient denies shortness of breath fever or chest pain. No pleurisy. PMH:  Per HPI  SHX:  Lifetime nonsmoker  FHX: History of hypertension heart disease and diabetes. Allergies: No known drug allergies.   Medications:     dextrose      sodium chloride 75 mL/hr at 10/30/18 2978      apixaban  10 mg Oral BID    [START ON 11/7/2018] apixaban  5 mg Oral BID    insulin glargine  20 Units Subcutaneous BID    insulin lispro  0-18 Units Subcutaneous TID     insulin lispro  0-9 Units Subcutaneous Nightly    aspirin  81 mg Oral Daily    lisinopril  5 mg Oral Daily    metoprolol succinate

## 2018-10-31 NOTE — CARE COORDINATION
Plan of care: moved to ICU; updated [facility]   10/31/18  7:00 AM
Appointment: Best Day/ Time: Monday AM (any day, any time)    Discharge Plan: met with client; denied needs as plans home with spouse independently as PTA, has BIPAP but does not always use     Evaluation: no

## 2018-10-31 NOTE — PLAN OF CARE
Problem: Pain:  Goal: Pain level will decrease  Pain level will decrease   Outcome: Ongoing  Pt has denied pain thus far in shift. Will continue to assess and intervene as needed. Problem: Cardiovascular  Goal: No DVT, peripheral vascular complications  Outcome: Ongoing  No evidence of a DVT at this time. Goal: Hemodynamic stability  Outcome: Ongoing  Pt remains hemodynamically stable. Problem: Respiratory  Goal: No pulmonary complications  Outcome: Ongoing  Pt has saddle PE. Received TPA peripherally. Lung sounds clear throughout. No complaint of SOB a this time. Problem: Skin Integrity/Risk  Goal: No skin breakdown during hospitalization  Outcome: Ongoing  Pt has no evidence of skin breakdown thus far in shift. Pt turns self and is encouraged to turn Q 2 hr and prn. Pt has preventative sacral dressing in place. Heels and arms elevated off bed. Problem: Musculor/Skeletal Functional Status  Goal: Absence of falls  Outcome: Ongoing  Pt remains free from falls thus far in shift. Pt bed in lowest position, call light and bedside table within reach. Bed alarm on. Will continue to hourly round. Problem: Discharge Planning:  Goal: Patients continuum of care needs are met  Patients continuum of care needs are met   Outcome: Ongoing  Pt continues to need ICU care. No plan for transfer at this time. Problem: Airway Clearance - Ineffective:  Goal: Ability to maintain a clear airway will improve  Ability to maintain a clear airway will improve  Outcome: Ongoing  Pt on 2L oxygen NC. Pt wears bipap at home at night. No complaints of SOB at this time. Problem: Serum Glucose Level - Abnormal:  Goal: Ability to maintain appropriate glucose levels will improve to within specified parameters  Ability to maintain appropriate glucose levels will improve to within specified parameters  Outcome: Ongoing  Pt has elevated glucose levels being controled with Humalog sliding scale and Lantus.      Comments: Care plan reviewed with patient and daughter. Patient and daughter verbalize understanding of the plan of care and contribute to goal setting.

## 2018-11-01 VITALS
BODY MASS INDEX: 29.75 KG/M2 | DIASTOLIC BLOOD PRESSURE: 73 MMHG | WEIGHT: 212.5 LBS | RESPIRATION RATE: 18 BRPM | OXYGEN SATURATION: 94 % | TEMPERATURE: 98 F | SYSTOLIC BLOOD PRESSURE: 125 MMHG | HEIGHT: 71 IN | HEART RATE: 66 BPM

## 2018-11-01 LAB
APTT: 32.3 SECONDS (ref 22–38)
BASOPHILS # BLD: 0.2 %
BASOPHILS ABSOLUTE: 0 THOU/MM3 (ref 0–0.1)
EOSINOPHIL # BLD: 0.9 %
EOSINOPHILS ABSOLUTE: 0.1 THOU/MM3 (ref 0–0.4)
ERYTHROCYTE [DISTWIDTH] IN BLOOD BY AUTOMATED COUNT: 13.1 % (ref 11.5–14.5)
ERYTHROCYTE [DISTWIDTH] IN BLOOD BY AUTOMATED COUNT: 42.5 FL (ref 35–45)
GLUCOSE BLD-MCNC: 93 MG/DL (ref 70–108)
HCT VFR BLD CALC: 39.3 % (ref 42–52)
HEMOGLOBIN: 13.3 GM/DL (ref 14–18)
IMMATURE GRANS (ABS): 0.05 THOU/MM3 (ref 0–0.07)
IMMATURE GRANULOCYTES: 0.5 %
LYMPHOCYTES # BLD: 28.1 %
LYMPHOCYTES ABSOLUTE: 2.8 THOU/MM3 (ref 1–4.8)
MCH RBC QN AUTO: 30.2 PG (ref 26–33)
MCHC RBC AUTO-ENTMCNC: 33.8 GM/DL (ref 32.2–35.5)
MCV RBC AUTO: 89.1 FL (ref 80–94)
MONOCYTES # BLD: 7.8 %
MONOCYTES ABSOLUTE: 0.8 THOU/MM3 (ref 0.4–1.3)
NUCLEATED RED BLOOD CELLS: 0 /100 WBC
PLATELET # BLD: 191 THOU/MM3 (ref 130–400)
PMV BLD AUTO: 9.5 FL (ref 9.4–12.4)
RBC # BLD: 4.41 MILL/MM3 (ref 4.7–6.1)
SEG NEUTROPHILS: 62.5 %
SEGMENTED NEUTROPHILS ABSOLUTE COUNT: 6.2 THOU/MM3 (ref 1.8–7.7)
VRE CULTURE: NORMAL
WBC # BLD: 9.9 THOU/MM3 (ref 4.8–10.8)

## 2018-11-01 PROCEDURE — 6370000000 HC RX 637 (ALT 250 FOR IP): Performed by: FAMILY MEDICINE

## 2018-11-01 PROCEDURE — 85025 COMPLETE CBC W/AUTO DIFF WBC: CPT

## 2018-11-01 PROCEDURE — 99232 SBSQ HOSP IP/OBS MODERATE 35: CPT | Performed by: INTERNAL MEDICINE

## 2018-11-01 PROCEDURE — 6370000000 HC RX 637 (ALT 250 FOR IP): Performed by: INTERNAL MEDICINE

## 2018-11-01 PROCEDURE — 99238 HOSP IP/OBS DSCHRG MGMT 30/<: CPT | Performed by: FAMILY MEDICINE

## 2018-11-01 PROCEDURE — 6370000000 HC RX 637 (ALT 250 FOR IP): Performed by: HOSPITALIST

## 2018-11-01 PROCEDURE — 85730 THROMBOPLASTIN TIME PARTIAL: CPT

## 2018-11-01 PROCEDURE — 82948 REAGENT STRIP/BLOOD GLUCOSE: CPT

## 2018-11-01 PROCEDURE — 36415 COLL VENOUS BLD VENIPUNCTURE: CPT

## 2018-11-01 RX ADMIN — APIXABAN 10 MG: 5 TABLET, FILM COATED ORAL at 10:04

## 2018-11-01 RX ADMIN — LISINOPRIL 5 MG: 5 TABLET ORAL at 10:04

## 2018-11-01 RX ADMIN — PANTOPRAZOLE SODIUM 40 MG: 40 TABLET, DELAYED RELEASE ORAL at 05:42

## 2018-11-01 RX ADMIN — METOPROLOL SUCCINATE 25 MG: 25 TABLET, FILM COATED, EXTENDED RELEASE ORAL at 10:04

## 2018-11-01 RX ADMIN — INSULIN GLARGINE 20 UNITS: 100 INJECTION, SOLUTION SUBCUTANEOUS at 10:05

## 2018-11-01 RX ADMIN — ASPIRIN 81 MG CHEWABLE TABLET 81 MG: 81 TABLET CHEWABLE at 10:04

## 2018-11-01 ASSESSMENT — PAIN SCALES - GENERAL: PAINLEVEL_OUTOF10: 3

## 2018-11-01 NOTE — PLAN OF CARE
parameters  Ability to maintain appropriate glucose levels will improve to within specified parameters   Outcome: Ongoing  Glucose elevted at times.  Continue to monitor and cover with sliding scale as needed

## 2018-11-01 NOTE — PROGRESS NOTES
[facility]    Patient - Sam Cho   MRN -  [id number]   [place] # - [de-identified]   - 1955      Date of Admission -  10/29/2018  8:11 PM  Date of evaluation -  2018  Room - [address] [place] [address] Day - 3  Consulting - No att. providers found Primary Care Physician - Rosalina Orellana MD     Problem List      Active Hospital Problems    Diagnosis Date Noted    GERD with stricture [K21.9, K22.2] 2016     Priority: High    Type 2 diabetes mellitus ([facility] [E11.9] 2016     Priority: High    Bilateral pulmonary embolism ([facility] [I26.99] 10/29/2018    Status post dilatation of esophageal stricture [Z98.890, Z87.19] 08/10/2016     Reason for Consult    For management of pulmonary embolism  HPI   History Obtained From: Patient and electronic medical record. Sam Cho is a 59 y.o. male  was initially admitted under hospitalist service. Pulmonary medicine was consulted for further management of pulmonary embolism. The patient is a 59 y.o. male who presented to the Emergency room at [facility] with worsening of shortness of breath since yesterday morning. His shortness of breath is associated with vague chest pain especially in the substernal area. He underwent CTA ( CT angiogram of chest) as a part of evaluation of shortness of breath and found to have bilateral pulmonary embolism. He was started on Heparin drip per protocol at therapeutic dose. He denies any recent travel history to long distances by road or flight. He denies any history of pulmonary embolism or DVT in the past . He admits to family history of pulmonary embolism in his 2 sisters and brother with no premature deaths. He admits to history of tobacco smoking for 3 years with 1.5PPD. He denies history of hormonal supplementation. He underwent left shoulder rotator cuff surgery on 18.     He denies any recent history of hemoptysis, hematemesis, hematuria, hematochezia or Fatty liver with possible cirrhosis. Correlate clinically. Results discussed with Dr. Markus Greene on 10/29/2018 at 9:57 PM.           4/30/2018  PROCEDURE: CTA CHEST W WO CONTRAST  1. There is a normal appearance of the thoracic and upper abdominal aorta. 2. This exam is inadequate to evaluate for pulmonary arterial emboli. 3. Platelike atelectasis in both lung bases left side more affected than right. The lungs are otherwise normal-appearing. Venous duplex scan: Oct 31, 2018  PROCEDURE: VL DUP LOWER EXTREMITY VENOUS BILATERAL    Acute occlusive DVT in the left popliteal vein. Remainder of the left lower extremity deep veins are patent and compressible. No evidence of right lower extremity DVT. Assessment   -Acute pulmonary embolism on bilateral side. He had a significant clot burden on CTA of chest. He was found to have RV dysfunction on Echocardiogram S/p tPA. -CAD (coronary artery disease). -Depression. -GERD (gastroesophageal reflux disease). -History of Chiari malformation.  -Central/Obstructive sleep apnea on BiPAP at home  -Hypertension.  -Diabetes mellitus ([address]    Plan   -Will continue Eliquis for a period of minimum 3months.  -Titrate Oxygen to keep Spo2 >90% by O2 supplemention.  -Follow with my ( Dr. Lyric Calero) clinic at [facility] in 3months with chest CTA and Left lower extremity duplex scan to follow DVT to be done  2days before clinic visit to follow pulmonary embolism.  -Schedule follow with Ms. Rehana Cordova, PAC/( Dr. Lyric Calero) sleep clinic at [place] for pulmonary medicine with latest CPAP/BiPAP down load to follow his sleep apnea in 2 to 3 weeks after discharge from hospital.  -Sandie fish educated about my impression and plan. He verbalizes understanding.       Electronically signed by   Richie Eduardo MD on 11/1/2018 at 6:21 PM

## 2018-11-01 NOTE — FLOWSHEET NOTE
10/31/18 1543   Provider Notification   Reason for Communication Evaluate   Provider Name Dr. Ariel Rock   Provider Notification Physician   Method of Communication Secure Message   Response See orders   Notification Time [phone]     3:43 pm  [phone] From: Maury Marx RN [facility] [address] RE: Denise Garcia [facility] - Patient has order for APTT , is no longer receiving Heparin drip, now on Eliquis. Do you want to continue lab draw?   Read 3:46 pm

## 2018-11-01 NOTE — PROGRESS NOTES
Rales/Wheezes/Rhonchi. Cardiovascular: Regular rate and rhythm with normal S1/S2 without murmurs, rubs or gallops. Abdomen: Soft, non-tender, non-distended with normal bowel sounds. Musculoskeletal: passive and active ROM x 4 extremities. Skin: Skin color, texture, turgor normal.  No rashes or lesions. Neurologic:  Neurovascularly intact without any focal sensory/motor deficits. Cranial nerves: II-XII intact, grossly non-focal.  Psychiatric: Alert and oriented, thought content appropriate, normal insight  Capillary Refill: Brisk,< 3 seconds   Peripheral Pulses: +2 palpable, equal bilaterally       Labs:   Recent Labs      10/30/18   0445  10/31/18   0315  11/01/18   0421   WBC  9.5  16.3*  9.9   HGB  14.0  12.6*  13.3*   HCT  39.9*  36.6*  39.3*   PLT  194  163  191     Recent Labs      10/29/18   2020  10/30/18   0446  10/31/18   0315   NA  135  134*  139   K  4.0  4.3  3.5   CL  96*  98  104   CO2  20*  19*  23   BUN  14  14  24*   CREATININE  0.9  0.9  1.1   CALCIUM  9.3  8.8  8.4*     Recent Labs      10/29/18   2020  10/30/18   0446   AST  25  20   ALT  28  25   BILITOT  0.6  0.5   ALKPHOS  157*  134*     Recent Labs      10/29/18   2020   INR  1.06     No results for input(s): Celestine Thomas in the last 72 hours. Urinalysis:      Lab Results   Component Value Date    NITRU NEGATIVE 05/20/2016    WBCUA 0-2 05/20/2016    BACTERIA NONE 05/20/2016    RBCUA 0-2 05/20/2016    BLOODU NEGATIVE 05/20/2016    SPECGRAV 1.025 05/20/2016    GLUCOSEU 500 05/14/2015       Radiology:  VL DUP LOWER EXTREMITY VENOUS BILATERAL   Final Result   Acute occlusive DVT in the left popliteal vein. Remainder of the left lower extremity deep veins are patent and compressible. No evidence of right lower extremity DVT. **This report has been created using voice recognition software. It may contain minor errors which are inherent in voice recognition technology. **       Final report electronically signed by Dr. Andrzej Peraza DVT  - post TPA and on Eliquis    CAD/HTN  - continue metoprolol and Lisinopril  - nitro prn  - ASA    Elevated troponins   - due R ventricular strain and enzyme leak.  Not MI    DM2   - poorly controlled, HgbA1C 9.9  - sliding scale insulin and Lantus    GERD   - PPI    HLD  - statin      Disposition  Likely to be discharged today  Monitor O2 sat on ambulation  Follow-up with primary physician and Pulm in 1-2 weeks        Electronically signed by aRnjan Talavera MD on 11/1/2018 at 7:28 AM

## 2018-11-02 LAB — MRSA SCREEN: NORMAL

## 2018-11-04 NOTE — DISCHARGE SUMMARY
evidence of right heart strain.    IR recommended heparin drip for without acute intervention.    Echo showed  Severly dilated right ventricle.   Right ventricle global systolic function is mildly reduced .   Right ventricular septum flattened in systole consistent with RV pressure   overload.    Pulmonary artery systolic pressure calculated from tricuspid regurgitation   jet is 35-40 mm Hg .     Pulm consulted, TPa protocol started, administered without diffciculty in ICU. He was transferred to a regular floor on 10/31. He was started with Eliquis. Patient noted improvement and denied any new symptoms. He was discharged stable on the third hospital day. Exam:     Vitals:  Vitals:    10/31/18 2201 10/31/18 2313 11/01/18 0400 11/01/18 0945   BP: 131/73 (!) 140/67 (!) 156/79 125/73   Pulse: 65 67 63 66   Resp: 16 16 18 18   Temp: 98.3 °F (36.8 °C) 98.2 °F (36.8 °C) 98.4 °F (36.9 °C) 98 °F (36.7 °C)   TempSrc: Oral Oral Oral Oral   SpO2: 94% 93% 96% 94%   Weight:   212 lb 8 oz (96.4 kg)    Height:         Weight: Weight: 212 lb 8 oz (96.4 kg)     24 hour intake/output:No intake or output data in the 24 hours ending 11/04/18 0001      General appearance: No apparent distress, appears stated age and cooperative. HEENT: Pupils equal, round, and reactive to light. Conjunctivae/corneas clear. Neck: Supple, with full range of motion. No jugular venous distention. Trachea midline. Respiratory:  Normal respiratory effort. Clear to auscultation, bilaterally without Rales/Wheezes/Rhonchi. Cardiovascular: Regular rate and rhythm with normal S1/S2 without murmurs, rubs or gallops. Abdomen: Soft, non-tender, non-distended with normal bowel sounds. Musculoskeletal: passive and active ROM x 4 extremities. Skin: Skin color, texture, turgor normal.  No rashes or lesions. Neurologic:  Neurovascularly intact without any focal sensory/motor deficits.  Cranial nerves: II-XII intact, grossly non-focal.  Psychiatric: Alert and

## 2018-11-05 ENCOUNTER — HOSPITAL ENCOUNTER (OUTPATIENT)
Dept: OCCUPATIONAL THERAPY | Age: 63
Setting detail: THERAPIES SERIES
Discharge: HOME | End: 2018-11-05
Payer: MEDICARE

## 2018-11-05 ENCOUNTER — HOSPITAL ENCOUNTER (OUTPATIENT)
Dept: PHYSICAL THERAPY | Age: 63
Setting detail: THERAPIES SERIES
Discharge: HOME | End: 2018-11-05
Payer: MEDICARE

## 2018-11-05 PROCEDURE — G8985 CARRY GOAL STATUS: HCPCS

## 2018-11-05 PROCEDURE — G8990 OTHER PT/OT CURRENT STATUS: HCPCS

## 2018-11-05 PROCEDURE — 97165 OT EVAL LOW COMPLEX 30 MIN: CPT

## 2018-11-05 PROCEDURE — G8991 OTHER PT/OT GOAL STATUS: HCPCS

## 2018-11-05 PROCEDURE — 97162 PT EVAL MOD COMPLEX 30 MIN: CPT

## 2018-11-05 PROCEDURE — G8984 CARRY CURRENT STATUS: HCPCS

## 2018-11-05 ASSESSMENT — PAIN SCALES - GENERAL
PAINLEVEL_OUTOF10: 5
PAINLEVEL_OUTOF10: 5

## 2018-11-05 ASSESSMENT — PAIN DESCRIPTION - PAIN TYPE
TYPE: ACUTE PAIN
TYPE: CHRONIC PAIN

## 2018-11-05 ASSESSMENT — PAIN DESCRIPTION - LOCATION
LOCATION: SHOULDER
LOCATION: NECK

## 2018-11-05 ASSESSMENT — PAIN DESCRIPTION - ORIENTATION
ORIENTATION: LEFT
ORIENTATION: RIGHT;LEFT

## 2018-11-05 NOTE — FLOWSHEET NOTE
follow up with doctor. Subjective:  has been having pain in neck pain for the last 1.5 years but has had back pain prior to 2010.  was in therapy for his neck within the last year but had some issues attending therapy and was discharged.  the insurance company is refusing an MRI of the neck until paatient goes through therapy.  still following with heart doctor and has no restrictions.  also had recent RC surgery on left and has issues with right shoulder.  needs to try something for his neck pain. Reports insurance company and himself are not happy with the doctor as to why is taking so long to get things taken care of in regards to his spine. Pain:  Patient Currently in Pain: Yes  Pain Assessment: 0-10  Pain Level: 5 (Can increase to 7/10)  Pain Type: Chronic pain  Pain Location: Neck  Pain Orientation: Right, Left  Pain Radiating Towards: radiating pain to sides of neck. States headaches in temples bilat. Denies arm symptoms. 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     Objective  Overall Orientation Status: Within Normal Limits      Observation/Palpation  Posture: Fair  Palpation: Mild to moderate tenderness throughout occipital line, neck and out to shoulders/down to scapula  Observation: L shoulder slightly elevated compared to R, forward head and rounded shoulders  Body Mechanics: hiking with attempts at active flexion and abduction  Edema: None noted  Scar: nicely healed incisions, no tenderness        Cervical: Flex 30 deg, Ext 53 deg, SB L 28 deg, SB R 35 deg, Rot L 60 deg, Rot R 90 deg. ROM RUE: Right arm WFL-WNL throughout  ROM LUE: No testing for left arm due to recent RC surgery              Comment: No strength testing for left arm due to recent RC surgery    Comment: Right hand and elbiw 5/5 then shoulder flex 4/5, abd 4-/5 and scaption 4/5                       Overall Sensation Status: WFL           Exercises  Exercise 1: No treatment this date per insurance. Patient was educated on stretches for neck can do at home. Trialed cervical retractions and had no pain with so educated on doing at home but making sure chin staying level and no production of pain or symptoms. Activity Tolerance:  Activity Tolerance: Patient Tolerated treatment well    Assessment: Body structures, Functions, Activity limitations: Decreased functional mobility , Decreased ADL status, Decreased ROM, Decreased strength  Assessment: Patient with several factors mentioned above that can be addressed by therapy over 8 weeks. Patient will be limited with strength progression due to unable to do strengthening with left arm due to recent surgery. Does have tenderness and tightness throughout neck that can be addressed. Prognosis: Fair  Discharge Recommendations: Continue to assess pending progress    Patient Education:  Patient Education: POC and HEP                   Plan:  Times per week: 2x/week  Plan weeks: 8 weeks  Specific instructions for Next Treatment: DO NOT MOVE LEFT ARM. manual therapy to neck, US or E-stim and heat to neck out to shoulders, stretches, cervical isometrics and strengthening but be careful of left arm. Trialed manual traction and occipital release but caused pain so stopped.   Current Treatment Recommendations: Strengthening, ROM, Functional Mobility Training, Manual Therapy - Soft Tissue Mobilization, Home Exercise Program, Modalities, Pain

## 2018-11-05 NOTE — FLOWSHEET NOTE
** PLEASE SIGN, DATE AND TIME CERTIFICATION BELOW AND RETURN TO [facility] OUTPATIENT REHABILITATION (FAX #: [phone]). ATTEST/CO-SIGN IF ACCESSING VIA INTouch-Writer. THANK YOU.**    I certify that I have examined the patient below and determined that Physical Medicine and Rehabilitation service is necessary and that I approve the established plan of care for up to 90 days or as specifically noted. Attestation, signature or co-signature of physician indicates approval of certification requirements.    ________________________ ____________ __________  Physician Signature   Date   Time        Time In: 1010  Time Out: 1050  Minutes: 40  Timed Code Treatment Minutes: 0 Minutes     Date: 2018  Patient Name: Lakeisha Varma        CSN: [id number]     : 1955  (59 y.o.)  Gender: male   Referring Practitioner: Dr. Sriram Bojorquez. Diagnosis: s/p L shoulder RCR  Treatment Diagnosis: M75.102       Lakeisha Varma  has a past medical history of CAD (coronary artery disease); Depression; Diabetes mellitus ([address] Gastroparesis; GERD (gastroesophageal reflux disease); History of Chiari malformation; Hyperlipidemia; Hypertension; Kidney stone; Migraine; and Movement disorder. Lakeisha Varma  has a past surgical history that includes brain surgery; Tonsillectomy; Cholecystectomy; Cardiac surgery; back surgery; Colonoscopy; Endoscopy, colon, diagnostic; fracture surgery; and vascular surgery. See Medical History Questionnaire for information related to allergies and medications.     General:  OT Visit Information  Onset Date: 18  OT Insurance Information: [employer] Medicare - evaluation only approved  Total # of Visits to Date: 1  Certification Period Expiration Date: 19  Progress Note Counter: 1/10  Comments: back to MD probably early December, patient is not sure  Chart Reviewed: Yes  Patient assessed for Extremity Functional Index  Score: 41  Functional Limitation: Carrying, moving and handling objects  Carrying, Moving and Handling Objects Current Status (): At least 40 percent but less than 60 percent impaired, limited or restricted  Carrying, Moving and Handling Objects Goal Status (): At least 1 percent but less than 20 percent impaired, limited or restricted       Evaluation Complexity: Based on the findings of patient history, examination, clinical presentation, and decision making during this evaluation, this patient is of low complexity.     Randal Sandy OTR/L, [address] [place] [address] [id number]

## 2018-11-15 ENCOUNTER — HOSPITAL ENCOUNTER (OUTPATIENT)
Dept: PHYSICAL THERAPY | Age: 63
Setting detail: THERAPIES SERIES
Discharge: HOME | End: 2018-11-15
Payer: MEDICARE

## 2018-11-15 ENCOUNTER — HOSPITAL ENCOUNTER (OUTPATIENT)
Dept: OCCUPATIONAL THERAPY | Age: 63
Setting detail: THERAPIES SERIES
Discharge: HOME | End: 2018-11-15
Payer: MEDICARE

## 2018-11-15 PROCEDURE — G0283 ELEC STIM OTHER THAN WOUND: HCPCS

## 2018-11-15 PROCEDURE — 97110 THERAPEUTIC EXERCISES: CPT

## 2018-11-15 ASSESSMENT — PAIN DESCRIPTION - PAIN TYPE: TYPE: CHRONIC PAIN

## 2018-11-15 ASSESSMENT — PAIN DESCRIPTION - LOCATION
LOCATION: SHOULDER
LOCATION: NECK

## 2018-11-15 ASSESSMENT — PAIN DESCRIPTION - ORIENTATION
ORIENTATION: LEFT
ORIENTATION: LEFT

## 2018-11-15 ASSESSMENT — PAIN SCALES - GENERAL
PAINLEVEL_OUTOF10: 4
PAINLEVEL_OUTOF10: 4

## 2018-11-20 ENCOUNTER — HOSPITAL ENCOUNTER (OUTPATIENT)
Dept: OCCUPATIONAL THERAPY | Age: 63
Setting detail: THERAPIES SERIES
Discharge: HOME | End: 2018-11-20
Payer: MEDICARE

## 2018-11-20 ENCOUNTER — HOSPITAL ENCOUNTER (OUTPATIENT)
Dept: PHYSICAL THERAPY | Age: 63
Setting detail: THERAPIES SERIES
Discharge: HOME | End: 2018-11-20
Payer: MEDICARE

## 2018-11-20 PROCEDURE — 97110 THERAPEUTIC EXERCISES: CPT

## 2018-11-20 PROCEDURE — G0283 ELEC STIM OTHER THAN WOUND: HCPCS

## 2018-11-20 ASSESSMENT — PAIN DESCRIPTION - ORIENTATION
ORIENTATION: RIGHT
ORIENTATION: LEFT

## 2018-11-20 ASSESSMENT — PAIN DESCRIPTION - PAIN TYPE: TYPE: CHRONIC PAIN

## 2018-11-20 ASSESSMENT — PAIN SCALES - GENERAL
PAINLEVEL_OUTOF10: 3
PAINLEVEL_OUTOF10: 3

## 2018-11-20 ASSESSMENT — PAIN DESCRIPTION - LOCATION
LOCATION: SHOULDER
LOCATION: NECK

## 2018-11-20 NOTE — PROGRESS NOTES
[facility]  OUTPATIENT OCCUPATIONAL THERAPY  Daily Note  [address]    Time In: 0745  Time Out: 0830  Minutes: 45  Timed Code Treatment Minutes: 45 Minutes     Date: 2018  Patient Name: Sarthak Rascon        CSN: [id number]   : 1955  (59 y.o.)  Gender: male   Referring Practitioner: KANA Tompkins Diagnosis: s/p L shoulder RCR          General:  OT Visit Information  Onset Date: 18  OT Insurance Information: [employer] Medicare - evaluation only approved  Total # of Visits to Date: 3  Certification Period Expiration Date: 19  Progress Note Counter: 3/10 for PN  Comments: back to MD probably early December, patient is not sure       Restrictions/Precautions:  Restrictions/Precautions: General Precautions    Position Activity Restriction  Other position/activity restrictions: Had surgery 18. Orders for gentle ROM         Subjective:  Subjective: Patient reports he tolerated his last session well. Pain:  Patient Currently in Pain: Yes  Pain Assessment: 0-10  Pain Level: 3  Pain Location: Shoulder (anterior shoulder pt points to)  Pain Orientation: Left       Objective:     Upper Extremity Function  UE AROM: Seated scapular pinches x10 reps, shoulder depressors x10 reps. UE PROM: Supine ; pendulums ceiling circles both directions to warm up, supine gentle PROM to L shoulder all motions to patient's tolerance. Manual massage to L upper trap and cross friction over anterior shoulder and into bicep to decrease muscle tightness. UE AAROM: Supine dowel joey chest press x15 reps, dowl joey flexion 90 degrees to over head x10 reps, standing dowel IR up the back x10 reps. UE Stretching: Sidelying posterior capsule stretch 10 second hold x5 reps, pulleys to 90 degrees flexion x10 reps. Applied kinesotape Y pattern for left upper trap inhibition . Activity Tolerance:   Additional Comments: Patient tolerated treatment well    Assessment:  Assessment: Patient is progressing towards goals. Patient Education:  Patient Education: Reviewed use and care of kinesotape for left upper trap tightness . Plan:  Times per week: 2x  Plan weeks: 12 weeks  Plan Comment: Continue per established POC.                        Nanda MODI/CLAUDE [place]

## 2018-11-30 ENCOUNTER — HOSPITAL ENCOUNTER (OUTPATIENT)
Dept: OCCUPATIONAL THERAPY | Age: 63
Setting detail: THERAPIES SERIES
Discharge: HOME | End: 2018-11-30
Payer: MEDICARE

## 2018-11-30 ENCOUNTER — HOSPITAL ENCOUNTER (OUTPATIENT)
Dept: PHYSICAL THERAPY | Age: 63
Setting detail: THERAPIES SERIES
Discharge: HOME | End: 2018-11-30
Payer: MEDICARE

## 2018-11-30 PROCEDURE — 97140 MANUAL THERAPY 1/> REGIONS: CPT

## 2018-11-30 PROCEDURE — 97110 THERAPEUTIC EXERCISES: CPT

## 2018-11-30 PROCEDURE — G0283 ELEC STIM OTHER THAN WOUND: HCPCS

## 2018-11-30 ASSESSMENT — PAIN DESCRIPTION - PAIN TYPE: TYPE: CHRONIC PAIN

## 2018-11-30 ASSESSMENT — PAIN DESCRIPTION - LOCATION
LOCATION: SHOULDER
LOCATION: NECK

## 2018-11-30 ASSESSMENT — PAIN DESCRIPTION - ORIENTATION
ORIENTATION: RIGHT;LEFT
ORIENTATION: LEFT

## 2018-11-30 ASSESSMENT — PAIN SCALES - GENERAL
PAINLEVEL_OUTOF10: 4
PAINLEVEL_OUTOF10: 4

## 2018-12-04 ENCOUNTER — HOSPITAL ENCOUNTER (OUTPATIENT)
Dept: PHYSICAL THERAPY | Age: 63
Setting detail: THERAPIES SERIES
Discharge: HOME | End: 2018-12-04
Payer: MEDICARE

## 2018-12-04 ENCOUNTER — HOSPITAL ENCOUNTER (OUTPATIENT)
Dept: OCCUPATIONAL THERAPY | Age: 63
Setting detail: THERAPIES SERIES
Discharge: HOME | End: 2018-12-04
Payer: MEDICARE

## 2018-12-04 PROCEDURE — G0283 ELEC STIM OTHER THAN WOUND: HCPCS

## 2018-12-04 PROCEDURE — 97110 THERAPEUTIC EXERCISES: CPT

## 2018-12-04 PROCEDURE — G8991 OTHER PT/OT GOAL STATUS: HCPCS

## 2018-12-04 PROCEDURE — G8990 OTHER PT/OT CURRENT STATUS: HCPCS

## 2018-12-04 ASSESSMENT — PAIN SCALES - GENERAL
PAINLEVEL_OUTOF10: 3
PAINLEVEL_OUTOF10: 4

## 2018-12-04 ASSESSMENT — PAIN DESCRIPTION - ORIENTATION
ORIENTATION: LEFT
ORIENTATION: LEFT

## 2018-12-04 ASSESSMENT — PAIN DESCRIPTION - LOCATION
LOCATION: NECK
LOCATION: SHOULDER

## 2018-12-04 ASSESSMENT — PAIN DESCRIPTION - PAIN TYPE: TYPE: CHRONIC PAIN

## 2018-12-12 ENCOUNTER — OFFICE VISIT (OUTPATIENT)
Dept: FAMILY MEDICINE CLINIC | Age: 63
End: 2018-12-12
Payer: MEDICARE

## 2018-12-12 VITALS
BODY MASS INDEX: 29.99 KG/M2 | WEIGHT: 214.2 LBS | TEMPERATURE: 97.9 F | HEIGHT: 71 IN | DIASTOLIC BLOOD PRESSURE: 76 MMHG | RESPIRATION RATE: 20 BRPM | SYSTOLIC BLOOD PRESSURE: 122 MMHG | HEART RATE: 64 BPM

## 2018-12-12 DIAGNOSIS — Z98.890 STATUS POST DILATATION OF ESOPHAGEAL STRICTURE: ICD-10-CM

## 2018-12-12 DIAGNOSIS — I26.02 ACUTE SADDLE PULMONARY EMBOLISM WITH ACUTE COR PULMONALE (HCC): ICD-10-CM

## 2018-12-12 DIAGNOSIS — K31.84 GASTROPARESIS: ICD-10-CM

## 2018-12-12 DIAGNOSIS — Z87.19 STATUS POST DILATATION OF ESOPHAGEAL STRICTURE: ICD-10-CM

## 2018-12-12 DIAGNOSIS — E11.65 TYPE 2 DIABETES MELLITUS WITH HYPERGLYCEMIA, WITH LONG-TERM CURRENT USE OF INSULIN (HCC): Primary | ICD-10-CM

## 2018-12-12 DIAGNOSIS — Z51.81 MEDICATION MONITORING ENCOUNTER: ICD-10-CM

## 2018-12-12 DIAGNOSIS — E78.2 MIXED HYPERLIPIDEMIA: ICD-10-CM

## 2018-12-12 DIAGNOSIS — Z79.4 TYPE 2 DIABETES MELLITUS WITH HYPERGLYCEMIA, WITH LONG-TERM CURRENT USE OF INSULIN (HCC): Primary | ICD-10-CM

## 2018-12-12 LAB — HBA1C MFR BLD: 10.1 %

## 2018-12-12 PROCEDURE — 99213 OFFICE O/P EST LOW 20 MIN: CPT | Performed by: FAMILY MEDICINE

## 2018-12-12 PROCEDURE — 83036 HEMOGLOBIN GLYCOSYLATED A1C: CPT | Performed by: FAMILY MEDICINE

## 2018-12-12 RX ORDER — HYDROCODONE BITARTRATE AND ACETAMINOPHEN 5; 325 MG/1; MG/1
1 TABLET ORAL EVERY 6 HOURS PRN
COMMUNITY
End: 2019-03-13 | Stop reason: ALTCHOICE

## 2018-12-12 ASSESSMENT — PATIENT HEALTH QUESTIONNAIRE - PHQ9
2. FEELING DOWN, DEPRESSED OR HOPELESS: 1
SUM OF ALL RESPONSES TO PHQ9 QUESTIONS 1 & 2: 2
SUM OF ALL RESPONSES TO PHQ QUESTIONS 1-9: 2
SUM OF ALL RESPONSES TO PHQ QUESTIONS 1-9: 2
1. LITTLE INTEREST OR PLEASURE IN DOING THINGS: 1

## 2018-12-12 ASSESSMENT — ENCOUNTER SYMPTOMS
COUGH: 0
RESPIRATORY NEGATIVE: 1
ABDOMINAL PAIN: 0
ALLERGIC/IMMUNOLOGIC NEGATIVE: 1
GASTROINTESTINAL NEGATIVE: 1

## 2018-12-12 NOTE — PROGRESS NOTES
 AMB REFERRAL TO DIABETES CLINIC     Referral Priority:   Routine     Referral Type:   Consult for Advice and Opinion     Referral Reason:   Specialty Services Required     Number of Visits Requested:   1    POCT glycosylated hemoglobin (Hb A1C)     Medications Discontinued During This Encounter   Medication Reason    ondansetron (ZOFRAN) 4 MG tablet LIST CLEANUP     Current Outpatient Prescriptions   Medication Sig Dispense Refill    HYDROcodone-acetaminophen (NORCO) 5-325 MG per tablet Take 1 tablet by mouth every 6 hours as needed for Pain. Britta Eaton  apixaban (ELIQUIS) 5 MG TABS tablet Take 1 tablet by mouth 2 times daily 60 tablet 2    nitroGLYCERIN (NITROSTAT) 0.4 MG SL tablet Place 1 tablet under the tongue every 5 minutes as needed for Chest pain 25 tablet 3    metoprolol (TOPROL XL) 25 MG XL tablet Take 1 tablet by mouth daily 30 tablet 3    glyBURIDE (DIABETA) 5 MG tablet Take 5 mg by mouth daily (with breakfast)       aspirin 81 MG tablet Take 81 mg by mouth daily      vitamin D 1000 UNITS CAPS Take by mouth      insulin glargine (LANTUS) 100 UNIT/ML injection vial Inject 10 Units into the skin 2 times daily       lisinopril (PRINIVIL;ZESTRIL) 5 MG tablet Take 5 mg by mouth daily      omeprazole (PRILOSEC) 20 MG capsule Take 40 mg by mouth daily      pravastatin (PRAVACHOL) 80 MG tablet Take 80 mg by mouth daily      cyclobenzaprine (FLEXERIL) 10 MG tablet Take 0.5 tablets by mouth 3 times daily as needed for Muscle spasms 30 tablet 0     No current facility-administered medications for this visit. Referral to diabetic clinic for control of type 2 insulin-dependent diabetes. Continue with current referral patterns in [place] for follow-up of pulmonary embolus and sleep study next year. Continue current dose of omeprazole for treatment of reflux disease and esophagitis. Continue current dose of pravastatin 80 mg nightly for treatment of hyperlipidemia.     Discussed use, benefit, and side

## 2018-12-13 ENCOUNTER — HOSPITAL ENCOUNTER (OUTPATIENT)
Dept: OCCUPATIONAL THERAPY | Age: 63
Setting detail: THERAPIES SERIES
Discharge: HOME | End: 2018-12-13
Payer: MEDICARE

## 2018-12-13 ENCOUNTER — HOSPITAL ENCOUNTER (OUTPATIENT)
Dept: PHYSICAL THERAPY | Age: 63
Setting detail: THERAPIES SERIES
Discharge: HOME | End: 2018-12-13
Payer: MEDICARE

## 2018-12-13 PROCEDURE — G0283 ELEC STIM OTHER THAN WOUND: HCPCS

## 2018-12-13 PROCEDURE — 97110 THERAPEUTIC EXERCISES: CPT

## 2018-12-13 PROCEDURE — 97140 MANUAL THERAPY 1/> REGIONS: CPT

## 2018-12-13 ASSESSMENT — PAIN DESCRIPTION - ORIENTATION
ORIENTATION: LEFT
ORIENTATION: LEFT

## 2018-12-13 ASSESSMENT — PAIN DESCRIPTION - PAIN TYPE: TYPE: CHRONIC PAIN

## 2018-12-13 ASSESSMENT — PAIN SCALES - GENERAL
PAINLEVEL_OUTOF10: 6
PAINLEVEL_OUTOF10: 5

## 2018-12-13 ASSESSMENT — PAIN DESCRIPTION - LOCATION
LOCATION: NECK;SHOULDER
LOCATION: SHOULDER

## 2018-12-13 NOTE — PROGRESS NOTES
[address] [address]  OUTPATIENT OCCUPATIONAL THERAPY  Daily Note  [address]    Time In: 0800  Time Out: 0845  Minutes: 45  Timed Code Treatment Minutes: 45 Minutes     Date: 2018  Patient Name: Isaac Love        CSN: [id number]   : 1955  (59 y.o.)  Gender: male   Referring Practitioner: KANA An Diagnosis: s/p L shoulder RCR          General:  OT Visit Information  Onset Date: 18  OT Insurance Information: [employer] - evaluation only approved; Approval recieved for 12 visits 18 to 18 (pt. only coming 1x/week and scheduling 1 visit at a time due to co-pay)  Total # of Visits Approved: 13  Total # of Visits to Date: 6  Certification Period Expiration Date: 19  Progress Note Counter: 6/10 for PN  Comments: Follow up with Dr. Zack Leal on         Restrictions/Precautions:  Restrictions/Precautions: General Precautions    Position Activity Restriction  Other position/activity restrictions: Had surgery 18. Orders for gentle ROM         Subjective:  Subjective: Patient reports his pain is up today, tolerated last session well. Pain:  Patient Currently in Pain: Yes  Pain Assessment: 0-10  Pain Level: 6  Pain Location: Shoulder  Pain Orientation: Left       Objective:     Upper Extremity Function  UE AROM: Seated scapular pinches x15 reps, backward shoulder circles x15 reps for warm up, supine LUE chest press x10 reps. UE PROM: Pulleys for shoulder flexion to tolerance with LUE supinated 5 second hold x10 reps for warm up, gentle manual massage to L upper trap, anterior shoulder, deltoid, and bicep region - patient relates he did not tolerate hawk  tools when they were used, supine PROM to L shoulder all motions to tolerance with tightness noted in end ranges. UE AAROM: Supine dowel joey flexion full arc of motion x10 reps, supine dowel ER with elbow at side x10 reps, standing IR dowel joey up the back x10 reps.  Lilia Mackey

## 2018-12-21 ENCOUNTER — HOSPITAL ENCOUNTER (OUTPATIENT)
Dept: OCCUPATIONAL THERAPY | Age: 63
Setting detail: THERAPIES SERIES
Discharge: HOME | End: 2018-12-21
Payer: MEDICARE

## 2018-12-21 ENCOUNTER — HOSPITAL ENCOUNTER (OUTPATIENT)
Dept: PHYSICAL THERAPY | Age: 63
Setting detail: THERAPIES SERIES
Discharge: HOME | End: 2018-12-21
Payer: MEDICARE

## 2018-12-21 PROCEDURE — G0283 ELEC STIM OTHER THAN WOUND: HCPCS

## 2018-12-21 PROCEDURE — G8992 OTHER PT/OT  D/C STATUS: HCPCS

## 2018-12-21 PROCEDURE — G8991 OTHER PT/OT GOAL STATUS: HCPCS

## 2018-12-21 PROCEDURE — 97110 THERAPEUTIC EXERCISES: CPT

## 2018-12-21 ASSESSMENT — PAIN SCALES - GENERAL: PAINLEVEL_OUTOF10: 7

## 2018-12-21 ASSESSMENT — PAIN DESCRIPTION - LOCATION: LOCATION: NECK;SHOULDER

## 2018-12-21 ASSESSMENT — PAIN DESCRIPTION - ORIENTATION: ORIENTATION: RIGHT

## 2018-12-21 ASSESSMENT — PAIN DESCRIPTION - PAIN TYPE: TYPE: CHRONIC PAIN

## 2018-12-21 NOTE — PROGRESS NOTES
[id number] THERAPY  Update note  [address]             Date: 2018  Patient Name: Sam Cho        CSN: [id number]   : 1955  (59 y.o.)  Gender: male       Patient authorized visits  18. Requested date extension but have not yet been notified of extension. Patient reports that his right shoulder is hurting pretty bad, so he is not to upset that he cannot see therapy today. Reports that he was considering canceling anyway. Plan to contact patient when auth date extended.       Marzena Borja, MOT, OTR/L [id number]

## 2019-01-04 ENCOUNTER — APPOINTMENT (OUTPATIENT)
Dept: OCCUPATIONAL THERAPY | Age: 64
End: 2019-01-04
Payer: MEDICARE

## 2019-01-07 ENCOUNTER — HOSPITAL ENCOUNTER (OUTPATIENT)
Dept: OCCUPATIONAL THERAPY | Age: 64
Setting detail: THERAPIES SERIES
Discharge: HOME | End: 2019-01-07
Payer: MEDICARE

## 2019-01-07 PROCEDURE — 97110 THERAPEUTIC EXERCISES: CPT

## 2019-01-07 ASSESSMENT — PAIN DESCRIPTION - ORIENTATION: ORIENTATION: LEFT

## 2019-01-07 ASSESSMENT — PAIN DESCRIPTION - LOCATION: LOCATION: SHOULDER

## 2019-01-07 ASSESSMENT — PAIN SCALES - GENERAL: PAINLEVEL_OUTOF10: 3

## 2019-01-09 ENCOUNTER — HOSPITAL ENCOUNTER (OUTPATIENT)
Dept: OCCUPATIONAL THERAPY | Age: 64
Setting detail: THERAPIES SERIES
Discharge: HOME | End: 2019-01-09
Payer: MEDICARE

## 2019-01-09 PROCEDURE — 97110 THERAPEUTIC EXERCISES: CPT

## 2019-01-16 ENCOUNTER — HOSPITAL ENCOUNTER (OUTPATIENT)
Dept: OCCUPATIONAL THERAPY | Age: 64
Setting detail: THERAPIES SERIES
Discharge: HOME | End: 2019-01-16
Payer: MEDICARE

## 2019-01-16 PROCEDURE — 97110 THERAPEUTIC EXERCISES: CPT

## 2019-01-16 PROCEDURE — 97140 MANUAL THERAPY 1/> REGIONS: CPT

## 2019-01-18 ENCOUNTER — HOSPITAL ENCOUNTER (OUTPATIENT)
Dept: OCCUPATIONAL THERAPY | Age: 64
Setting detail: THERAPIES SERIES
Discharge: HOME | End: 2019-01-18
Payer: MEDICARE

## 2019-01-18 PROCEDURE — 97110 THERAPEUTIC EXERCISES: CPT

## 2019-01-18 ASSESSMENT — PAIN DESCRIPTION - ORIENTATION: ORIENTATION: LEFT

## 2019-01-18 ASSESSMENT — PAIN SCALES - GENERAL: PAINLEVEL_OUTOF10: 3

## 2019-01-18 ASSESSMENT — PAIN DESCRIPTION - LOCATION: LOCATION: SHOULDER

## 2019-02-05 ENCOUNTER — HOSPITAL ENCOUNTER (OUTPATIENT)
Dept: INTERVENTIONAL RADIOLOGY/VASCULAR | Age: 64
Discharge: HOME | End: 2019-02-05
Payer: MEDICARE

## 2019-02-05 ENCOUNTER — HOSPITAL ENCOUNTER (OUTPATIENT)
Dept: CT IMAGING | Age: 64
Discharge: HOME | End: 2019-02-05
Payer: MEDICARE

## 2019-02-05 DIAGNOSIS — I82.4Z2 DVT, LOWER EXTREMITY, DISTAL, ACUTE, LEFT (HCC): ICD-10-CM

## 2019-02-05 DIAGNOSIS — I26.99 BILATERAL PULMONARY EMBOLISM (HCC): ICD-10-CM

## 2019-02-05 LAB — POC CREATININE WHOLE BLOOD: 0.9 MG/DL (ref 0.5–1.2)

## 2019-02-05 PROCEDURE — 6360000004 HC RX CONTRAST MEDICATION: Performed by: INTERNAL MEDICINE

## 2019-02-05 PROCEDURE — 93971 EXTREMITY STUDY: CPT

## 2019-02-05 PROCEDURE — 82565 ASSAY OF CREATININE: CPT

## 2019-02-05 PROCEDURE — 71275 CT ANGIOGRAPHY CHEST: CPT

## 2019-02-05 RX ADMIN — IOPAMIDOL 85 ML: 755 INJECTION, SOLUTION INTRAVENOUS at 07:37

## 2019-02-06 ENCOUNTER — OFFICE VISIT (OUTPATIENT)
Dept: PULMONOLOGY | Age: 64
End: 2019-02-06
Payer: MEDICARE

## 2019-02-06 VITALS
WEIGHT: 218.2 LBS | OXYGEN SATURATION: 96 % | TEMPERATURE: 98.8 F | HEIGHT: 71 IN | DIASTOLIC BLOOD PRESSURE: 76 MMHG | SYSTOLIC BLOOD PRESSURE: 134 MMHG | BODY MASS INDEX: 30.55 KG/M2 | HEART RATE: 84 BPM

## 2019-02-06 DIAGNOSIS — Z86.711 PERSONAL HISTORY OF PE (PULMONARY EMBOLISM): ICD-10-CM

## 2019-02-06 DIAGNOSIS — Z82.49 FAMILY HISTORY OF PULMONARY EMBOLISM: ICD-10-CM

## 2019-02-06 DIAGNOSIS — I26.02 ACUTE SADDLE PULMONARY EMBOLISM WITH ACUTE COR PULMONALE (HCC): Primary | ICD-10-CM

## 2019-02-06 DIAGNOSIS — Z86.718 HISTORY OF DEEP VENOUS THROMBOSIS (DVT) OF DISTAL VEIN OF LEFT LOWER EXTREMITY: ICD-10-CM

## 2019-02-06 PROCEDURE — 99214 OFFICE O/P EST MOD 30 MIN: CPT | Performed by: NURSE PRACTITIONER

## 2019-02-06 ASSESSMENT — ENCOUNTER SYMPTOMS
EYES NEGATIVE: 1
DIARRHEA: 0
BACK PAIN: 0
NAUSEA: 0
STRIDOR: 0
SHORTNESS OF BREATH: 0
WHEEZING: 0
CHEST TIGHTNESS: 0
COUGH: 0
ALLERGIC/IMMUNOLOGIC NEGATIVE: 1

## 2019-02-15 RX ORDER — LISINOPRIL 5 MG/1
5 TABLET ORAL DAILY
Qty: 90 TABLET | Refills: 2 | Status: SHIPPED | OUTPATIENT
Start: 2019-02-15 | End: 2020-07-13

## 2019-02-20 ENCOUNTER — HOSPITAL ENCOUNTER (OUTPATIENT)
Dept: PHYSICAL THERAPY | Age: 64
Setting detail: THERAPIES SERIES
Discharge: HOME | End: 2019-02-20
Payer: MEDICARE

## 2019-03-05 ENCOUNTER — HOSPITAL ENCOUNTER (OUTPATIENT)
Age: 64
Discharge: HOME | End: 2019-03-05
Payer: MEDICARE

## 2019-03-05 DIAGNOSIS — Z82.49 FAMILY HISTORY OF PULMONARY EMBOLISM: ICD-10-CM

## 2019-03-05 DIAGNOSIS — I26.02 ACUTE SADDLE PULMONARY EMBOLISM WITH ACUTE COR PULMONALE (HCC): ICD-10-CM

## 2019-03-05 PROCEDURE — 86147 CARDIOLIPIN ANTIBODY EA IG: CPT

## 2019-03-05 PROCEDURE — 81241 F5 GENE: CPT

## 2019-03-05 PROCEDURE — 85300 ANTITHROMBIN III ACTIVITY: CPT

## 2019-03-05 PROCEDURE — 83090 ASSAY OF HOMOCYSTEINE: CPT

## 2019-03-05 PROCEDURE — 85303 CLOT INHIBIT PROT C ACTIVITY: CPT

## 2019-03-05 PROCEDURE — 85306 CLOT INHIBIT PROT S FREE: CPT

## 2019-03-05 PROCEDURE — 81240 F2 GENE: CPT

## 2019-03-05 PROCEDURE — 36415 COLL VENOUS BLD VENIPUNCTURE: CPT

## 2019-03-07 ENCOUNTER — INITIAL CONSULT (OUTPATIENT)
Dept: PULMONOLOGY | Age: 64
End: 2019-03-07
Payer: MEDICARE

## 2019-03-07 VITALS
HEIGHT: 71 IN | BODY MASS INDEX: 31.05 KG/M2 | SYSTOLIC BLOOD PRESSURE: 108 MMHG | DIASTOLIC BLOOD PRESSURE: 78 MMHG | OXYGEN SATURATION: 99 % | WEIGHT: 221.8 LBS | HEART RATE: 80 BPM

## 2019-03-07 DIAGNOSIS — I26.02 ACUTE SADDLE PULMONARY EMBOLISM WITH ACUTE COR PULMONALE (HCC): ICD-10-CM

## 2019-03-07 DIAGNOSIS — E66.9 OBESITY (BMI 30-39.9): ICD-10-CM

## 2019-03-07 DIAGNOSIS — G47.33 OSA (OBSTRUCTIVE SLEEP APNEA): Primary | ICD-10-CM

## 2019-03-07 LAB — HOMOCYSTEINE, TOTAL: 9 UMOL/L

## 2019-03-07 PROCEDURE — 99214 OFFICE O/P EST MOD 30 MIN: CPT | Performed by: PHYSICIAN ASSISTANT

## 2019-03-07 ASSESSMENT — ENCOUNTER SYMPTOMS
BACK PAIN: 0
NAUSEA: 0
SHORTNESS OF BREATH: 0
ALLERGIC/IMMUNOLOGIC NEGATIVE: 1
WHEEZING: 0
CHEST TIGHTNESS: 0
COUGH: 0
EYES NEGATIVE: 1
STRIDOR: 0
DIARRHEA: 0

## 2019-03-08 LAB
ANTITHROMBIN ACTIVITY: 85 % (ref 76–128)
CARDIOLIPIN AB IGM: 5 MPL (ref 0–12)
CARDIOLIPIN ANTIBODY, IGG: 3 GPL (ref 0–14)
PROTEIN C FUNCTIONAL: 126 % (ref 83–168)
PROTEIN S, FUNCTIONAL: 48 % (ref 66–143)

## 2019-03-12 ENCOUNTER — OFFICE VISIT (OUTPATIENT)
Dept: PULMONOLOGY | Age: 64
End: 2019-03-12
Payer: MEDICARE

## 2019-03-12 VITALS
WEIGHT: 216 LBS | RESPIRATION RATE: 17 BRPM | HEIGHT: 71 IN | DIASTOLIC BLOOD PRESSURE: 84 MMHG | OXYGEN SATURATION: 98 % | HEART RATE: 83 BPM | SYSTOLIC BLOOD PRESSURE: 128 MMHG | BODY MASS INDEX: 30.24 KG/M2

## 2019-03-12 DIAGNOSIS — D68.59 PROTEIN S DEFICIENCY (HCC): Primary | ICD-10-CM

## 2019-03-12 DIAGNOSIS — Z86.718 HISTORY OF DEEP VEIN THROMBOSIS (DVT) OF LOWER EXTREMITY: ICD-10-CM

## 2019-03-12 DIAGNOSIS — Z86.711 HISTORY OF PULMONARY EMBOLUS (PE): ICD-10-CM

## 2019-03-12 PROCEDURE — 99214 OFFICE O/P EST MOD 30 MIN: CPT | Performed by: NURSE PRACTITIONER

## 2019-03-12 ASSESSMENT — ENCOUNTER SYMPTOMS
DIARRHEA: 0
ALLERGIC/IMMUNOLOGIC NEGATIVE: 1
SHORTNESS OF BREATH: 0
NAUSEA: 0
STRIDOR: 0
WHEEZING: 0
CHEST TIGHTNESS: 0
COUGH: 0
EYES NEGATIVE: 1
BACK PAIN: 0

## 2019-03-13 ENCOUNTER — OFFICE VISIT (OUTPATIENT)
Dept: FAMILY MEDICINE CLINIC | Age: 64
End: 2019-03-13
Payer: MEDICARE

## 2019-03-13 VITALS
SYSTOLIC BLOOD PRESSURE: 122 MMHG | OXYGEN SATURATION: 98 % | HEART RATE: 88 BPM | BODY MASS INDEX: 30.1 KG/M2 | WEIGHT: 215.8 LBS | DIASTOLIC BLOOD PRESSURE: 62 MMHG | RESPIRATION RATE: 20 BRPM

## 2019-03-13 DIAGNOSIS — E11.65 TYPE 2 DIABETES MELLITUS WITH HYPERGLYCEMIA, WITHOUT LONG-TERM CURRENT USE OF INSULIN (HCC): Primary | ICD-10-CM

## 2019-03-13 DIAGNOSIS — K11.1 SUBMANDIBULAR GLAND HYPERTROPHY: ICD-10-CM

## 2019-03-13 DIAGNOSIS — K44.9 HIATAL HERNIA WITH GERD AND ESOPHAGITIS: ICD-10-CM

## 2019-03-13 DIAGNOSIS — I26.02 ACUTE SADDLE PULMONARY EMBOLISM WITH ACUTE COR PULMONALE (HCC): ICD-10-CM

## 2019-03-13 DIAGNOSIS — K21.00 HIATAL HERNIA WITH GERD AND ESOPHAGITIS: ICD-10-CM

## 2019-03-13 DIAGNOSIS — K31.84 GASTROPARESIS: ICD-10-CM

## 2019-03-13 DIAGNOSIS — Z98.890 STATUS POST DILATATION OF ESOPHAGEAL STRICTURE: ICD-10-CM

## 2019-03-13 DIAGNOSIS — E78.2 MIXED HYPERLIPIDEMIA: ICD-10-CM

## 2019-03-13 DIAGNOSIS — Z51.81 MEDICATION MONITORING ENCOUNTER: ICD-10-CM

## 2019-03-13 DIAGNOSIS — Z87.19 STATUS POST DILATATION OF ESOPHAGEAL STRICTURE: ICD-10-CM

## 2019-03-13 LAB
FACTOR V LEIDEN MUTATION: NORMAL
HBA1C MFR BLD: 9.7 %
PROTHROMBIN G20210A MUTATION: NORMAL

## 2019-03-13 PROCEDURE — 99214 OFFICE O/P EST MOD 30 MIN: CPT | Performed by: FAMILY MEDICINE

## 2019-03-13 PROCEDURE — 83036 HEMOGLOBIN GLYCOSYLATED A1C: CPT | Performed by: FAMILY MEDICINE

## 2019-03-13 RX ORDER — INSULIN GLARGINE 100 [IU]/ML
10 INJECTION, SOLUTION SUBCUTANEOUS 2 TIMES DAILY
Qty: 1 VIAL | Status: CANCELLED | OUTPATIENT
Start: 2019-03-13

## 2019-03-13 RX ORDER — OMEPRAZOLE 40 MG/1
40 CAPSULE, DELAYED RELEASE ORAL DAILY
Qty: 30 CAPSULE | Refills: 5 | Status: SHIPPED
Start: 2019-03-13 | End: 2019-04-01 | Stop reason: SDUPTHER

## 2019-03-13 RX ORDER — SERTRALINE HYDROCHLORIDE 25 MG/1
100 TABLET, FILM COATED ORAL DAILY
COMMUNITY
End: 2021-07-07

## 2019-03-13 ASSESSMENT — PATIENT HEALTH QUESTIONNAIRE - PHQ9
SUM OF ALL RESPONSES TO PHQ QUESTIONS 1-9: 2
SUM OF ALL RESPONSES TO PHQ QUESTIONS 1-9: 2
SUM OF ALL RESPONSES TO PHQ9 QUESTIONS 1 & 2: 2
1. LITTLE INTEREST OR PLEASURE IN DOING THINGS: 1
2. FEELING DOWN, DEPRESSED OR HOPELESS: 1

## 2019-03-13 ASSESSMENT — ENCOUNTER SYMPTOMS
RESPIRATORY NEGATIVE: 1
COUGH: 0
SHORTNESS OF BREATH: 0
WHEEZING: 0

## 2019-03-22 ENCOUNTER — HOSPITAL ENCOUNTER (OUTPATIENT)
Dept: MRI IMAGING | Age: 64
Discharge: HOME | End: 2019-03-22
Payer: MEDICARE

## 2019-03-22 DIAGNOSIS — K11.1 SUBMANDIBULAR GLAND HYPERTROPHY: ICD-10-CM

## 2019-03-22 PROCEDURE — A9579 GAD-BASE MR CONTRAST NOS,1ML: HCPCS | Performed by: FAMILY MEDICINE

## 2019-03-22 PROCEDURE — 6360000004 HC RX CONTRAST MEDICATION: Performed by: FAMILY MEDICINE

## 2019-03-22 PROCEDURE — 70543 MRI ORBT/FAC/NCK W/O &W/DYE: CPT

## 2019-03-22 RX ADMIN — GADOTERIDOL 20 ML: 279.3 INJECTION, SOLUTION INTRAVENOUS at 17:50

## 2019-03-24 DIAGNOSIS — K11.8 MASS OF PAROTID GLAND: Primary | ICD-10-CM

## 2019-03-25 ENCOUNTER — HOSPITAL ENCOUNTER (OUTPATIENT)
Age: 64
Discharge: HOME | End: 2019-03-25
Payer: MEDICARE

## 2019-03-25 DIAGNOSIS — E11.65 TYPE 2 DIABETES MELLITUS WITH HYPERGLYCEMIA, WITHOUT LONG-TERM CURRENT USE OF INSULIN (HCC): ICD-10-CM

## 2019-03-25 DIAGNOSIS — Z51.81 MEDICATION MONITORING ENCOUNTER: ICD-10-CM

## 2019-03-25 DIAGNOSIS — K11.1 SUBMANDIBULAR GLAND HYPERTROPHY: ICD-10-CM

## 2019-03-25 LAB
ANION GAP SERPL CALCULATED.3IONS-SCNC: 13 MEQ/L (ref 8–16)
BUN BLDV-MCNC: 18 MG/DL (ref 7–22)
CALCIUM SERPL-MCNC: 9.1 MG/DL (ref 8.5–10.5)
CHLORIDE BLD-SCNC: 97 MEQ/L (ref 98–111)
CO2: 27 MEQ/L (ref 23–33)
CREAT SERPL-MCNC: 1.1 MG/DL (ref 0.4–1.2)
GFR SERPL CREATININE-BSD FRML MDRD: 68 ML/MIN/1.73M2
GLUCOSE BLD-MCNC: 367 MG/DL (ref 70–108)
POTASSIUM SERPL-SCNC: 4.6 MEQ/L (ref 3.5–5.2)
SODIUM BLD-SCNC: 137 MEQ/L (ref 135–145)

## 2019-03-25 PROCEDURE — 36415 COLL VENOUS BLD VENIPUNCTURE: CPT

## 2019-03-25 PROCEDURE — 80048 BASIC METABOLIC PNL TOTAL CA: CPT

## 2019-03-29 ENCOUNTER — TELEPHONE (OUTPATIENT)
Dept: SLEEP CENTER | Age: 64
End: 2019-03-29

## 2019-03-29 DIAGNOSIS — G47.33 OBSTRUCTIVE SLEEP APNEA: Primary | ICD-10-CM

## 2019-03-29 NOTE — TELEPHONE ENCOUNTER
Zachary Carrasco has been denied by [employer] for the in-lab titration. They recommend APAP. If you are in agreement, please place an order for this. Thanks,    . Migel Poole

## 2019-03-30 ENCOUNTER — APPOINTMENT (OUTPATIENT)
Dept: GENERAL RADIOLOGY | Age: 64
End: 2019-03-30
Payer: MEDICARE

## 2019-03-30 ENCOUNTER — HOSPITAL ENCOUNTER (EMERGENCY)
Age: 64
Discharge: HOME | End: 2019-03-31
Attending: EMERGENCY MEDICINE
Payer: MEDICARE

## 2019-03-30 ENCOUNTER — APPOINTMENT (OUTPATIENT)
Dept: CT IMAGING | Age: 64
End: 2019-03-30
Payer: MEDICARE

## 2019-03-30 DIAGNOSIS — R07.89 ATYPICAL CHEST PAIN: Primary | ICD-10-CM

## 2019-03-30 LAB
ANION GAP SERPL CALCULATED.3IONS-SCNC: 14 MEQ/L (ref 8–16)
APTT: 36.9 SECONDS (ref 22–38)
BASOPHILS # BLD: 0.2 %
BASOPHILS ABSOLUTE: 0 THOU/MM3 (ref 0–0.1)
BUN BLDV-MCNC: 15 MG/DL (ref 7–22)
CALCIUM SERPL-MCNC: 9.2 MG/DL (ref 8.5–10.5)
CHLORIDE BLD-SCNC: 101 MEQ/L (ref 98–111)
CO2: 23 MEQ/L (ref 23–33)
CREAT SERPL-MCNC: 1 MG/DL (ref 0.4–1.2)
EKG ATRIAL RATE: 64 BPM
EKG P AXIS: 25 DEGREES
EKG P-R INTERVAL: 148 MS
EKG Q-T INTERVAL: 426 MS
EKG QRS DURATION: 118 MS
EKG QTC CALCULATION (BAZETT): 439 MS
EKG R AXIS: -59 DEGREES
EKG T AXIS: 23 DEGREES
EKG VENTRICULAR RATE: 64 BPM
EOSINOPHIL # BLD: 0.7 %
EOSINOPHILS ABSOLUTE: 0.1 THOU/MM3 (ref 0–0.4)
ERYTHROCYTE [DISTWIDTH] IN BLOOD BY AUTOMATED COUNT: 13.6 % (ref 11.5–14.5)
ERYTHROCYTE [DISTWIDTH] IN BLOOD BY AUTOMATED COUNT: 44 FL (ref 35–45)
GFR SERPL CREATININE-BSD FRML MDRD: 75 ML/MIN/1.73M2
GLUCOSE BLD-MCNC: 146 MG/DL (ref 70–108)
HCT VFR BLD CALC: 45 % (ref 42–52)
HEMOGLOBIN: 15.6 GM/DL (ref 14–18)
IMMATURE GRANS (ABS): 0.02 THOU/MM3 (ref 0–0.07)
IMMATURE GRANULOCYTES: 0.2 %
INR BLD: 1.24 (ref 0.85–1.13)
LYMPHOCYTES # BLD: 32.3 %
LYMPHOCYTES ABSOLUTE: 2.8 THOU/MM3 (ref 1–4.8)
MCH RBC QN AUTO: 30.6 PG (ref 26–33)
MCHC RBC AUTO-ENTMCNC: 34.7 GM/DL (ref 32.2–35.5)
MCV RBC AUTO: 88.4 FL (ref 80–94)
MONOCYTES # BLD: 9.1 %
MONOCYTES ABSOLUTE: 0.8 THOU/MM3 (ref 0.4–1.3)
NUCLEATED RED BLOOD CELLS: 0 /100 WBC
OSMOLALITY CALCULATION: 279.1 MOSMOL/KG (ref 275–300)
PLATELET # BLD: 278 THOU/MM3 (ref 130–400)
PMV BLD AUTO: 9.2 FL (ref 9.4–12.4)
POTASSIUM REFLEX MAGNESIUM: 3.7 MEQ/L (ref 3.5–5.2)
PRO-BNP: 86.2 PG/ML (ref 0–900)
RBC # BLD: 5.09 MILL/MM3 (ref 4.7–6.1)
SEG NEUTROPHILS: 57.5 %
SEGMENTED NEUTROPHILS ABSOLUTE COUNT: 5 THOU/MM3 (ref 1.8–7.7)
SODIUM BLD-SCNC: 138 MEQ/L (ref 135–145)
TROPONIN T: < 0.01 NG/ML
WBC # BLD: 8.7 THOU/MM3 (ref 4.8–10.8)

## 2019-03-30 PROCEDURE — 84484 ASSAY OF TROPONIN QUANT: CPT

## 2019-03-30 PROCEDURE — 71275 CT ANGIOGRAPHY CHEST: CPT

## 2019-03-30 PROCEDURE — 83880 ASSAY OF NATRIURETIC PEPTIDE: CPT

## 2019-03-30 PROCEDURE — 36415 COLL VENOUS BLD VENIPUNCTURE: CPT

## 2019-03-30 PROCEDURE — 93005 ELECTROCARDIOGRAM TRACING: CPT | Performed by: EMERGENCY MEDICINE

## 2019-03-30 PROCEDURE — 93010 ELECTROCARDIOGRAM REPORT: CPT | Performed by: INTERNAL MEDICINE

## 2019-03-30 PROCEDURE — 85610 PROTHROMBIN TIME: CPT

## 2019-03-30 PROCEDURE — 80048 BASIC METABOLIC PNL TOTAL CA: CPT

## 2019-03-30 PROCEDURE — 85730 THROMBOPLASTIN TIME PARTIAL: CPT

## 2019-03-30 PROCEDURE — 85025 COMPLETE CBC W/AUTO DIFF WBC: CPT

## 2019-03-30 PROCEDURE — 6360000004 HC RX CONTRAST MEDICATION: Performed by: EMERGENCY MEDICINE

## 2019-03-30 PROCEDURE — 71045 X-RAY EXAM CHEST 1 VIEW: CPT

## 2019-03-30 PROCEDURE — 99285 EMERGENCY DEPT VISIT HI MDM: CPT

## 2019-03-30 RX ADMIN — IOPAMIDOL 80 ML: 755 INJECTION, SOLUTION INTRAVENOUS at 22:34

## 2019-03-30 ASSESSMENT — ENCOUNTER SYMPTOMS
CHEST TIGHTNESS: 1
SORE THROAT: 0
NAUSEA: 1
ABDOMINAL PAIN: 0
SHORTNESS OF BREATH: 1
BACK PAIN: 0
VOMITING: 0
DIARRHEA: 0
COUGH: 0
BLOOD IN STOOL: 0
WHEEZING: 0

## 2019-03-30 ASSESSMENT — PAIN DESCRIPTION - LOCATION: LOCATION: CHEST;SHOULDER;NECK

## 2019-03-30 ASSESSMENT — PAIN DESCRIPTION - DESCRIPTORS: DESCRIPTORS: PRESSURE;SHARP

## 2019-03-30 ASSESSMENT — PAIN SCALES - GENERAL
PAINLEVEL_OUTOF10: 8
PAINLEVEL_OUTOF10: 8

## 2019-03-30 ASSESSMENT — PAIN DESCRIPTION - PAIN TYPE: TYPE: ACUTE PAIN

## 2019-03-30 ASSESSMENT — PAIN DESCRIPTION - FREQUENCY: FREQUENCY: CONTINUOUS

## 2019-03-31 VITALS
OXYGEN SATURATION: 98 % | TEMPERATURE: 98 F | WEIGHT: 210 LBS | BODY MASS INDEX: 29.29 KG/M2 | DIASTOLIC BLOOD PRESSURE: 77 MMHG | SYSTOLIC BLOOD PRESSURE: 127 MMHG | RESPIRATION RATE: 17 BRPM | HEART RATE: 62 BPM

## 2019-03-31 ASSESSMENT — HEART SCORE: ECG: 0

## 2019-03-31 NOTE — ED PROVIDER NOTES
[facility]     eMERGENCY dEPARTMENT eNCOUnter         Pt Name: Rosalina Lundberg  MRN: [id number]  [place] 1955  Date of evaluation: 3/30/2019  Provider: Juan Us MD    CHIEF COMPLAINT       Chief Complaint   Patient presents with    Shortness of Breath    Chest Pain       Nurses Notes reviewed and I agree except as noted in the HPI. HISTORY OF PRESENT ILLNESS    Rosalina Lundberg is a 59 y.o. male who presents for evaluation of left-sided chest pain. The pain is sharp aand nonradiating. The pain is 8 on a scale 0-10. He is concerned because he has a history of pulmonary embolus and is on Xarelto. No vomiting and no weakness or sweats. He says he feels a little short of breath and nauseated at times. He says the pain is worse when he presses on it and touches that which she used to relates to musculoskeletal pain. He's had no syncope or palpitations and denies any leg pain or swelling. This HPI was provided by the patient. REVIEW OF SYSTEMS     Review of Systems   Constitutional: Positive for chills. Negative for fever and unexpected weight change. HENT: Negative for congestion, ear pain and sore throat. Eyes: Negative for visual disturbance. Respiratory: Positive for chest tightness and shortness of breath. Negative for cough and wheezing. Cardiovascular: Negative for chest pain, palpitations and leg swelling. Gastrointestinal: Positive for nausea. Negative for abdominal pain, blood in stool, diarrhea and vomiting. Genitourinary: Negative for dysuria and hematuria. Musculoskeletal: Negative for back pain, joint swelling and neck pain. Skin: Negative for rash. Allergic/Immunologic: Negative for environmental allergies. Neurological: Positive for light-headedness. Negative for dizziness, syncope, weakness and headaches. Hematological: Bruises/bleeds easily. Psychiatric/Behavioral: Positive for decreased concentration.  Negative for confusion and dysphoric mood. The patient is not nervous/anxious. All other systems reviewed and are negative. PAST MEDICAL HISTORY    has a past medical history of CAD (coronary artery disease), Depression, Diabetes mellitus ([address] Gastroparesis, GERD (gastroesophageal reflux disease), History of Chiari malformation, Hyperlipidemia, Hypertension, Kidney stone, Migraine, Movement disorder, and Pulmonary emboli ([address] SURGICAL HISTORY      has a past surgical history that includes Tonsillectomy (); back surgery (); Colonoscopy (); Endoscopy, colon, diagnostic (); vascular surgery (); brain surgery (); Cholecystectomy (); fracture surgery (); Cardiac surgery (); and shoulder surgery (Left, 2018). CURRENT MEDICATIONS       Previous Medications    ASPIRIN 81 MG TABLET    Take 81 mg by mouth daily    CYCLOBENZAPRINE (FLEXERIL) 10 MG TABLET    Take 0.5 tablets by mouth 3 times daily as needed for Muscle spasms    GLYBURIDE (DIABETA) 5 MG TABLET    Take 5 mg by mouth daily (with breakfast)     INSULIN GLARGINE (LANTUS SOLOSTAR) 100 UNIT/ML INJECTION PEN    Inject 15 Units into the skin 2 times daily    LISINOPRIL (PRINIVIL;ZESTRIL) 5 MG TABLET    Take 1 tablet by mouth daily    METOPROLOL (TOPROL XL) 25 MG XL TABLET    Take 1 tablet by mouth daily    NITROGLYCERIN (NITROSTAT) 0.4 MG SL TABLET    Place 1 tablet under the tongue every 5 minutes as needed for Chest pain    OMEPRAZOLE (PRILOSEC) 40 MG DELAYED RELEASE CAPSULE    Take 1 capsule by mouth Daily    PRAVASTATIN (PRAVACHOL) 80 MG TABLET    Take 80 mg by mouth daily    RIVAROXABAN (XARELTO) 10 MG TABS TABLET    Take 1 tablet by mouth daily (with breakfast)    SERTRALINE (ZOLOFT) 25 MG TABLET    Take 25 mg by mouth daily    VITAMIN D 1000 UNITS CAPS    Take by mouth       ALLERGIES     is allergic to latex. FAMILY HISTORY     indicated that his mother is . He indicated that his father is .  He indicated that his sister is . He indicated that his brother is . family history includes Cancer in his father; Diabetes in his brother, father, mother, and sister; Heart Disease in his mother; High Blood Pressure in his brother, father, mother, and sister; Kidney Disease in his mother. SOCIAL HISTORY      reports that he quit smoking about 39 years ago. His smoking use included cigarettes. He started smoking about 42 years ago. He has a 4.50 pack-year smoking history. He has never used smokeless tobacco. He reports that he does not drink alcohol or use drugs. PHYSICAL EXAM       ED Triage Vitals [19]   BP Temp Temp Source Pulse Resp SpO2 Height Weight   (!) 159/94 98 °F (36.7 °C) Oral 67 16 97 % -- 210 lb (95.3 kg)      Physical Exam   Constitutional: He is oriented to person, place, and time. Vital signs are normal. He appears well-developed and well-nourished. He is cooperative. Non-toxic appearance. He does not appear ill. HENT:   Head: Normocephalic. Right Ear: External ear normal. No drainage. Left Ear: External ear normal. No drainage. Nose: Nose normal. No epistaxis. Mouth/Throat: Mucous membranes are not dry and not cyanotic. Eyes: Conjunctivae and EOM are normal. Right eye exhibits no discharge. Left eye exhibits no discharge. No scleral icterus. Neck: Trachea normal, normal range of motion and phonation normal. Neck supple. No tracheal deviation present. The patient has a fullness under the left angle of the mandible which is being evaluated by ENT and oncology. Cardiovascular: Normal rate, regular rhythm, normal heart sounds and intact distal pulses. Exam reveals no gallop and no friction rub. No murmur heard. Pulses:       Radial pulses are 2+ on the right side. Pulmonary/Chest: Effort normal and breath sounds normal. No stridor. No respiratory distress. He has no wheezes. He has no rales. He exhibits no tenderness. Abdominal: Soft.  Bowel sounds are normal. He exhibits no distension and no pulsatile midline mass. There is no tenderness. There is no rebound and no guarding. Musculoskeletal: Normal range of motion. He exhibits no edema or tenderness (No calf pain or tenderness. No evidence of DVT). Neurological: He is alert and oriented to person, place, and time. He has normal strength. He displays no tremor. He displays no seizure activity. Coordination normal. GCS eye subscore is 4. GCS verbal subscore is 5. GCS motor subscore is 6. Skin: Skin is warm and dry. Bruising noted. No rash (on exposed surfaced) noted. He is not diaphoretic. No cyanosis or erythema. No pallor. Psychiatric: He has a normal mood and affect. His speech is normal and behavior is normal.   Nursing note and vitals reviewed. DIFFERENTIAL DIAGNOSIS:   Atypical chest pain, chest wall pain, rule out recurrent pulmonary embolus, congestive heart failure, acute coronary syndrome, pleurisy and atelectasis. DIAGNOSTIC RESULTS     EKG: All EKG's are interpreted by the Emergency Department Physician who either signs or Co-signs this chart in the absence of a cardiologist.    EKG interpreted by me shows a normal sinus rhythm with a ventricular rate of 64 bpm.  The VT interval is 148 ms. The QRS duration is on 118 ms. The R axis is -59. No ST elevation MI.    RADIOLOGY: non-plain film images(s) such as CT, Ultrasound and MRI are read by the radiologist.    CTA Chest W WO Contrast   Final Result   1. Negative exam for pulmonary embolus or acute vascular pathology of the thorax. 2. Borderline cardiomegaly. **This report has been created using voice recognition software. It may contain minor errors which are inherent in voice recognition technology. **      Final report electronically signed by Dr. Ariella Kohler on 3/30/2019 11:00 PM      XR CHEST PORTABLE   Final Result   1. Slightly decreased inspiratory volumes compared to prior study.  No active pathology otherwise suspected **This report has been created using voice recognition software. It may contain minor errors which are inherent in voice recognition technology. **      Final report electronically signed by Dr. Juliet Bonilla on 3/30/2019 10:00 PM          [x] Visualized and interpreted by me   [x] Radiologist's Wet Read Report Reviewed   [] Discussed with Radiologist.    Rosa Orosco:   Results for orders placed or performed during the hospital encounter of 36/96/68   Basic Metabolic Panel w/ Reflex to MG   Result Value Ref Range    Sodium 138 135 - 145 meq/L    Potassium reflex Magnesium 3.7 3.5 - 5.2 meq/L    Chloride 101 98 - 111 meq/L    CO2 23 23 - 33 meq/L    Glucose 146 (H) 70 - 108 mg/dL    BUN 15 7 - 22 mg/dL    CREATININE 1.0 0.4 - 1.2 mg/dL    Calcium 9.2 8.5 - 10.5 mg/dL   Brain Natriuretic Peptide   Result Value Ref Range    Pro-BNP 86.2 0.0 - 900.0 pg/mL   CBC Auto Differential   Result Value Ref Range    WBC 8.7 4.8 - 10.8 thou/mm3    RBC 5.09 4.70 - 6.10 mill/mm3    Hemoglobin 15.6 14.0 - 18.0 gm/dl    Hematocrit 45.0 42.0 - 52.0 %    MCV 88.4 80.0 - 94.0 fL    MCH 30.6 26.0 - 33.0 pg    MCHC 34.7 32.2 - 35.5 gm/dl    RDW-CV 13.6 11.5 - 14.5 %    RDW-SD 44.0 35.0 - 45.0 fL    Platelets 238 741 - 405 thou/mm3    MPV 9.2 (L) 9.4 - 12.4 fL    Seg Neutrophils 57.5 %    Lymphocytes 32.3 %    Monocytes 9.1 %    Eosinophils 0.7 %    Basophils 0.2 %    Immature Granulocytes 0.2 %    Segs Absolute 5.0 1.8 - 7.7 thou/mm3    Lymphocytes # 2.8 1.0 - 4.8 thou/mm3    Monocytes # 0.8 0.4 - 1.3 thou/mm3    Eosinophils # 0.1 0.0 - 0.4 thou/mm3    Basophils # 0.0 0.0 - 0.1 thou/mm3    Immature Grans (Abs) 0.02 0.00 - 0.07 thou/mm3    nRBC 0 /100 wbc   Troponin   Result Value Ref Range    Troponin T < 0.010 ng/ml   APTT   Result Value Ref Range    aPTT 36.9 22.0 - 38.0 seconds   Protime-INR   Result Value Ref Range    INR 1.24 (H) 0.85 - 1.13   Anion Gap   Result Value Ref Range    Anion Gap 14.0 8.0 - 16.0 meq/L   Glomerular Filtration Rate, Estimated   Result Value Ref Range    Est, Glom Filt Rate 75 (A) ml/min/1.73m2   Osmolality   Result Value Ref Range    Osmolality Calc 279.1 275.0 - 300 mOsmol/kg   EKG 12 Lead   Result Value Ref Range    Ventricular Rate 64 BPM    Atrial Rate 64 BPM    P-R Interval 148 ms    QRS Duration 118 ms    Q-T Interval 426 ms    QTc Calculation (Bazett) 439 ms    P Axis 25 degrees    R Axis -59 degrees    T Axis 23 degrees       EMERGENCY DEPARTMENT COURSE:   Vitals:    Vitals:    03/30/19 2115 03/30/19 2212   BP: (!) 159/94 127/77   Pulse: 67 59   Resp: 16 16   Temp: 98 °F (36.7 °C)    TempSrc: Oral    SpO2: 97% 96%   Weight: 210 lb (95.3 kg)      Heart Score for chest pain patients  History: Slightly Suspicious  ECG: Normal  Patient Age: > 45 and < 65 years  Risk Factors: > 3 Risk factors or history of atherosclerotic disease*  Troponin: < 1X normal limit  Heart Score Total: 3      Orders Placed This Encounter   Medications    iopamidol (ISOVUE-370) 76 % injection 80 mL       Patient was seen and evaluated emergency department. History physical were completed. Diagnostic labs and imaging studies reviewed. Workup demonstrates no evidence of pulmonary embolus. Labs are reassuring and troponin is negligible. The patient is reevaluated serially and is doing well without further signs or symptoms of concern. His heart score is 3 based on risk factors and age and sex. Discussed options with him and he is comfortable for discharge. Advised him to follow-up closely with Dr. Fatuma Mccarthy and return for any uncontrolled pain shortness of breath/nausea vomiting weakness or any new symptoms that occur. FINAL IMPRESSION      1. Atypical chest pain          DISPOSITION/PLAN   DISPOSITION Decision To Discharge 03/31/2019 12:19:21 AM    I estimate there is LOW risk for PULMONARY EMBOLISM, ACUTE CORONARY SYNDROME, OR THORACIC AORTIC DISSECTION, thus I consider the discharge disposition reasonable.  The patient and/or family

## 2019-03-31 NOTE — ED NOTES
Pt returned from CT in stable condition. Pt resting with eyes closed and lights dimmed for comfort. Call light within reach.      Bart Patel RN  03/31/19 5529

## 2019-03-31 NOTE — ED NOTES
Pt resting on cot with family at bedside. Pt breathing easy and unlabored.       Harper العراقي RN  03/31/19 2616

## 2019-04-01 ENCOUNTER — TELEPHONE (OUTPATIENT)
Dept: FAMILY MEDICINE CLINIC | Age: 64
End: 2019-04-01

## 2019-04-01 RX ORDER — OMEPRAZOLE 40 MG/1
40 CAPSULE, DELAYED RELEASE ORAL DAILY
Qty: 30 CAPSULE | Refills: 5 | Status: SHIPPED | OUTPATIENT
Start: 2019-04-01 | End: 2020-07-24

## 2019-04-02 ENCOUNTER — TELEPHONE (OUTPATIENT)
Dept: SLEEP CENTER | Age: 64
End: 2019-04-02

## 2019-04-02 NOTE — TELEPHONE ENCOUNTER
Spoke with Milton Chow regarding insurance denial of an in lab and approval of an APAP.   Referral made to [facility] per Michael Tolentino request.

## 2019-04-04 ENCOUNTER — HOSPITAL ENCOUNTER (OUTPATIENT)
Dept: INFUSION THERAPY | Age: 64
Discharge: HOME | End: 2019-04-04
Payer: MEDICARE

## 2019-04-04 ENCOUNTER — OFFICE VISIT (OUTPATIENT)
Dept: ONCOLOGY | Age: 64
End: 2019-04-04
Payer: MEDICARE

## 2019-04-04 VITALS
HEIGHT: 71 IN | WEIGHT: 218.6 LBS | OXYGEN SATURATION: 96 % | RESPIRATION RATE: 16 BRPM | SYSTOLIC BLOOD PRESSURE: 123 MMHG | DIASTOLIC BLOOD PRESSURE: 73 MMHG | BODY MASS INDEX: 30.6 KG/M2 | HEART RATE: 64 BPM | TEMPERATURE: 97.4 F

## 2019-04-04 DIAGNOSIS — D68.59 PROTEIN S DEFICIENCY (HCC): ICD-10-CM

## 2019-04-04 DIAGNOSIS — I26.02 ACUTE SADDLE PULMONARY EMBOLISM WITH ACUTE COR PULMONALE (HCC): Primary | ICD-10-CM

## 2019-04-04 PROCEDURE — 99204 OFFICE O/P NEW MOD 45 MIN: CPT | Performed by: INTERNAL MEDICINE

## 2019-04-04 PROCEDURE — 99211 OFF/OP EST MAY X REQ PHY/QHP: CPT

## 2019-04-04 NOTE — PROGRESS NOTES
10/29/2018      Past Surgical History:   Procedure Laterality Date    BACK SURGERY  2008    C1 oates & decompression    BRAIN SURGERY  2008    chiari    CARDIAC SURGERY  2007    heart cath    CHOLECYSTECTOMY  2009    COLONOSCOPY  2017    ENDOSCOPY, COLON, DIAGNOSTIC  2007    FRACTURE SURGERY  1964    right elbow    NASAL SEPTUM SURGERY  2006    SHOULDER SURGERY Left 2018    TONSILLECTOMY  1964    VASCULAR SURGERY  2015    right middle finger artery      Family History   Problem Relation Age of Onset    Kidney Disease Mother     Diabetes Mother     Heart Disease Mother     High Blood Pressure Mother     Diabetes Father     Cancer Father         lung--smoker    High Blood Pressure Father     Diabetes Sister     High Blood Pressure Sister     Diabetes Brother     High Blood Pressure Brother       Social History     Tobacco Use    Smoking status: Former Smoker     Packs/day: 1.50     Years: 3.00     Pack years: 4.50     Types: Cigarettes     Start date: 1976     Last attempt to quit: 10/12/1979     Years since quittin.6    Smokeless tobacco: Never Used   Substance Use Topics    Alcohol use: No      Current Outpatient Medications   Medication Sig Dispense Refill    omeprazole (PRILOSEC) 40 MG delayed release capsule Take 1 capsule by mouth Daily 30 capsule 5    sertraline (ZOLOFT) 25 MG tablet Take 100 mg by mouth daily       insulin glargine (LANTUS SOLOSTAR) 100 UNIT/ML injection pen Inject 15 Units into the skin 2 times daily 10 pen 3    rivaroxaban (XARELTO) 10 MG TABS tablet Take 1 tablet by mouth daily (with breakfast) 30 tablet 5    lisinopril (PRINIVIL;ZESTRIL) 5 MG tablet Take 1 tablet by mouth daily 90 tablet 2    nitroGLYCERIN (NITROSTAT) 0.4 MG SL tablet Place 1 tablet under the tongue every 5 minutes as needed for Chest pain 25 tablet 3    metoprolol (TOPROL XL) 25 MG XL tablet Take 1 tablet by mouth daily 30 tablet 3    glyBURIDE (DIABETA) 5 MG tablet Take 5 mg by mouth daily (with breakfast)       aspirin 81 MG tablet Take 81 mg by mouth daily      vitamin D 1000 UNITS CAPS Take by mouth      pravastatin (PRAVACHOL) 80 MG tablet Take 80 mg by mouth daily      cyclobenzaprine (FLEXERIL) 10 MG tablet Take 0.5 tablets by mouth 3 times daily as needed for Muscle spasms 30 tablet 0     No current facility-administered medications for this visit. Allergies   Allergen Reactions    Latex Rash      Health Maintenance   Topic Date Due    Hepatitis C screen  1955    Diabetic retinal exam  05/18/1966    HIV screen  05/18/1971    DTaP/Tdap/Td vaccine (1 - Tdap) 05/18/1975    Shingles Vaccine (1 of 2) 05/18/2006    Colon cancer screen colonoscopy  05/18/2006    Lipid screen  05/21/2017    A1C test (Diabetic or Prediabetic)  06/13/2019    Diabetic microalbuminuria test  06/18/2019    Flu vaccine (Season Ended) 09/01/2019    Potassium monitoring  03/30/2020    Creatinine monitoring  03/30/2020    Diabetic foot exam  04/24/2020    Pneumococcal 0-64 years Vaccine  Completed        Subjective:   Review of Systems   Constitutional: Positive for activity change and fatigue. Negative for appetite change and fever. HENT: Negative for congestion, dental problem, facial swelling, hearing loss, mouth sores, nosebleeds, sore throat, tinnitus and trouble swallowing. Eyes: Negative for discharge and visual disturbance. Respiratory: Positive for cough, chest tightness and shortness of breath. Negative for wheezing. Cardiovascular: Negative for chest pain, palpitations and leg swelling. Gastrointestinal: Positive for abdominal pain and diarrhea. Negative for abdominal distention, blood in stool, constipation, nausea, rectal pain and vomiting. Endocrine: Negative for cold intolerance, polydipsia and polyuria. Genitourinary: Negative for decreased urine volume, difficulty urinating, dysuria, flank pain, hematuria and urgency.    Musculoskeletal: Positive for arthralgias and back pain. Negative for gait problem, joint swelling, myalgias and neck stiffness. Skin: Negative for color change, rash and wound. Neurological: Positive for weakness. Negative for dizziness, tremors, seizures, speech difficulty, light-headedness, numbness and headaches. Hematological: Negative for adenopathy. Does not bruise/bleed easily. Psychiatric/Behavioral: Negative for confusion and sleep disturbance. The patient is nervous/anxious. Objective:   Physical Exam   Constitutional: He is oriented to person, place, and time. He appears well-developed and well-nourished. No distress. HENT:   Head: Normocephalic. Mouth/Throat: Oropharynx is clear and moist. No oropharyngeal exudate. Eyes: Pupils are equal, round, and reactive to light. EOM are normal. Right eye exhibits no discharge. Left eye exhibits no discharge. No scleral icterus. Neck: Normal range of motion. Neck supple. No JVD present. No tracheal deviation present. No thyromegaly present. Cardiovascular: Normal rate and normal heart sounds. Exam reveals no gallop and no friction rub. No murmur heard. Pulmonary/Chest: Effort normal and breath sounds normal. No stridor. No respiratory distress. He has no wheezes. He has no rales. He exhibits no tenderness. Abdominal: Soft. Bowel sounds are normal. He exhibits no distension and no mass. There is no tenderness. There is no rebound. Musculoskeletal: Normal range of motion. He exhibits no edema. Good range of motion in all four extremities. Lymphadenopathy:     He has no cervical adenopathy. Neurological: He is alert and oriented to person, place, and time. He has normal reflexes. No cranial nerve deficit. He exhibits normal muscle tone. Skin: Skin is warm. No rash noted. No erythema. Psychiatric: He has a normal mood and affect. His behavior is normal. Judgment and thought content normal.   Vitals reviewed.       /73 (Site: Right Upper Arm, Position: Sitting, Cuff Size: Large Adult)   Pulse 64   Temp 97.4 °F (36.3 °C) (Tympanic)   Resp 16   Ht 5' 11\" (1.803 m)   Wt 218 lb 9.6 oz (99.2 kg)   SpO2 96%   BMI 30.49 kg/m²      ECOG status is 0. Imaging studies and labs:   Cta Chest W Wo Contrast  Result Date: 3/30/2019  . Negative exam for pulmonary embolus or acute vascular pathology of the thorax. 2. Borderline cardiomegaly. *    Mri Neck Soft Tissue W Wo Contrast  Result Date: 3/23/2019  PROCEDURE: MRI NECK SOFT TISSUE W WO CONTRAST CLINICAL   There is a heterogeneously enhancing mass extending from the inferior left parotid gland. This is in the area of palpable interest. This is suspicious for a benign or malignant mass. Lab Results   Component Value Date    WBC 8.7 03/30/2019    HGB 15.6 03/30/2019    HCT 45.0 03/30/2019    MCV 88.4 03/30/2019     03/30/2019       Chemistry        Component Value Date/Time     03/30/2019 2127    K 3.7 03/30/2019 2127     03/30/2019 2127    CO2 23 03/30/2019 2127    BUN 15 03/30/2019 2127    CREATININE 1.0 03/30/2019 2127        Component Value Date/Time    CALCIUM 9.2 03/30/2019 2127    ALKPHOS 134 (H) 10/30/2018 0446    AST 20 10/30/2018 0446    ALT 25 10/30/2018 0446    BILITOT 0.5 10/30/2018 0446          Assessment/Plan:   1. History of saddle embolism extending into the left main pulmonary artery associated with right heart strain. The patient was treated with TPA. Currently on Xarelto. The decision to anticoagulate indefinitely is based upon an estimate of the risk of recurrence and bleeding in the context of the patient's values and preferences. The initial assessment should evaluate the baseline risk of recurrence that is associated with the clinical nature of the event provoked or unprovoked, and the presence of risk factors. The patient had unprovoked and large embolism extending into the left main pulmonary artery associated with right heart strain.   His coagulopathy workup revealed that he has  protein S deficiency. Due to unprovoked PE with patient's clinical presentation and protein S deficiency my recommendation is to proceed with an lifetime anticoagulation. Diagnosis Orders   1. Acute saddle pulmonary embolism with acute cor pulmonale (HCC)     2. Protein S deficiency ([address]          Plan:   Return if symptoms worsen or fail to improve. Orders Placed:   No orders of the defined types were placed in this encounter. Medications Prescribed:   No orders of the defined types were placed in this encounter. Discussed use, benefit, and side effectsof prescribed medications. All patient questions answered. Pt voiced understanding. Instructed to continue current medications, diet and exercise. Patient agreed with treatment plan. Follow up as directed.     Electronically signed by Bryce Álvarez MD on 4/4/19 at 2:28 PM

## 2019-04-05 ENCOUNTER — OFFICE VISIT (OUTPATIENT)
Dept: ENT CLINIC | Age: 64
End: 2019-04-05
Payer: MEDICARE

## 2019-04-05 VITALS
BODY MASS INDEX: 30.91 KG/M2 | SYSTOLIC BLOOD PRESSURE: 128 MMHG | RESPIRATION RATE: 14 BRPM | WEIGHT: 220.8 LBS | DIASTOLIC BLOOD PRESSURE: 78 MMHG | HEART RATE: 72 BPM | TEMPERATURE: 97.9 F | HEIGHT: 71 IN

## 2019-04-05 DIAGNOSIS — K11.8 PAROTID MASS: Primary | ICD-10-CM

## 2019-04-05 PROCEDURE — 99203 OFFICE O/P NEW LOW 30 MIN: CPT | Performed by: OTOLARYNGOLOGY

## 2019-04-05 ASSESSMENT — ENCOUNTER SYMPTOMS
SINUS PRESSURE: 0
NAUSEA: 0
SORE THROAT: 0
VOMITING: 0
RHINORRHEA: 0
TROUBLE SWALLOWING: 0
FACIAL SWELLING: 0
VOICE CHANGE: 0
STRIDOR: 0
SINUS PAIN: 0
COUGH: 0
SHORTNESS OF BREATH: 0

## 2019-04-05 NOTE — PROGRESS NOTES
[facility], NOSE AND THROAT  [address] [address]  Dept: [phone]  Dept Fax: [phone]  Loc: [phone]    Kole Spann is a 59 y.o. male who was referred byJuan Stephenson MD for:  Chief Complaint   Patient presents with   [address] [facility] patient here for left parotid mass, referred by Dr. Chino Strong   . HPI:     Kole Spann is a 59 y.o. male who presents today for evaluation of a left parotid mass which he has noted since July 2018. He felt it progressively enlarged but then has stabilized in size for several months. This area has never been infected or tender to palpation. He does have a history of musculoskeletal issues as well as a posterior fossa craniotomy and C1 laminectomy for Arnold-Chiari malformation. He also reports having been diagnosed with a saddle pulmonary embolism 11/2018. He is also diabetic and is on insulin for this. He has a family history of a father with lung cancer. No other lumps or bumps of his neck. No unintentional weight loss. No fevers chills or nausea or vomiting. He had an MRI performed which showed the left parotid mass but no other cervical lymphadenopathy. History:      Allergies   Allergen Reactions    Latex Rash     Current Outpatient Medications   Medication Sig Dispense Refill    omeprazole (PRILOSEC) 40 MG delayed release capsule Take 1 capsule by mouth Daily 30 capsule 5    sertraline (ZOLOFT) 25 MG tablet Take 25 mg by mouth daily      insulin glargine (LANTUS SOLOSTAR) 100 UNIT/ML injection pen Inject 15 Units into the skin 2 times daily 10 pen 3    rivaroxaban (XARELTO) 10 MG TABS tablet Take 1 tablet by mouth daily (with breakfast) 30 tablet 5    lisinopril (PRINIVIL;ZESTRIL) 5 MG tablet Take 1 tablet by mouth daily 90 tablet 2    nitroGLYCERIN (NITROSTAT) 0.4 MG SL tablet Place 1 tablet under the tongue every 5 minutes as needed for Chest pain 25 tablet 3    metoprolol (TOPROL XL) 25 MG XL tablet Take 1 tablet by mouth daily 30 tablet 3    glyBURIDE (DIABETA) 5 MG tablet Take 5 mg by mouth daily (with breakfast)       aspirin 81 MG tablet Take 81 mg by mouth daily      vitamin D 1000 UNITS CAPS Take by mouth      pravastatin (PRAVACHOL) 80 MG tablet Take 80 mg by mouth daily      cyclobenzaprine (FLEXERIL) 10 MG tablet Take 0.5 tablets by mouth 3 times daily as needed for Muscle spasms 30 tablet 0     No current facility-administered medications for this visit.       Past Medical History:   Diagnosis Date    CAD (coronary artery disease)     Depression     Diabetes mellitus ([facility]     Gastroparesis     GERD (gastroesophageal reflux disease)     History of Chiari malformation     Hyperlipidemia     Hypertension     Kidney stone     Migraine     Movement disorder     DDD    Pulmonary emboli ([facility] 10/29/2018      Past Surgical History:   Procedure Laterality Date    BACK SURGERY  2008    C1 oates & decompression    BRAIN SURGERY      chiari    CARDIAC SURGERY      heart cath    CHOLECYSTECTOMY  2009    COLONOSCOPY  2017    ENDOSCOPY, COLON, DIAGNOSTIC     [address]    right elbow    NASAL SEPTUM SURGERY  2006    SHOULDER SURGERY Left 2018    TONSILLECTOMY  1964    VASCULAR SURGERY  2015    right middle finger artery     Family History   Problem Relation Age of Onset    Kidney Disease Mother     Diabetes Mother     Heart Disease Mother     High Blood Pressure Mother     Diabetes Father     Cancer Father         lung--smoker    High Blood Pressure Father     Diabetes Sister     High Blood Pressure Sister     Diabetes Brother     High Blood Pressure Brother      Social History     Tobacco Use    Smoking status: Former Smoker     Packs/day: 1.50     Years: 3.00     Pack years: 4.50     Types: Cigarettes     Start date: 1976     Last attempt to quit: 10/12/1979     Years since quittin.5    Smokeless tobacco: Never Used   Substance Use Topics    Alcohol use: No       Subjective:      Review of Systems   Constitutional: Negative. HENT: Negative for congestion, ear discharge, ear pain, facial swelling, hearing loss, mouth sores, nosebleeds, postnasal drip, rhinorrhea, sinus pressure, sinus pain, sneezing, sore throat, tinnitus, trouble swallowing and voice change. Eyes: Negative for visual disturbance. Respiratory: Negative for cough, shortness of breath and stridor. Cardiovascular: Negative for chest pain. Gastrointestinal: Negative for nausea and vomiting. Musculoskeletal: Negative for neck pain. Skin: Negative for wound. Allergic/Immunologic: Negative for environmental allergies. Neurological: Positive for weakness. Negative for dizziness. All other systems reviewed and are negative. Rest of review of systems are negative, except as noted in HPI. Objective:   /78 (Site: Right Upper Arm)   Pulse 72   Temp 97.9 °F (36.6 °C) (Oral)   Resp 14   Ht 5' 11\" (1.803 m)   Wt 220 lb 12.8 oz (100.2 kg)   BMI 30.80 kg/m²     PHYSICAL EXAM  Constitutional: He is oriented to person, place, and time. He appears well-developed and well-nourished. No distress. HENT:   Head: Normocephalic and atraumatic. Right Ear: External ear normal. Ear canal clear. Tympanic membrane intact. Middle ear aerated. Left Ear: External ear normal. Ear canal clear. Tympanic membrane intact. Middle ear aerated. Nose: External nose normal.  Nasal mucosa normal.  No lesions noted. Mouth/Throat: Fair dentition. Oral cavity mucosa normal, no masses or lesions noted. Oropharynx is clear and moist.   Eyes: Pupils are equal, round, and reactive to light. Conjunctivae and EOM are normal.   Neck: Normal range of motion. Neck supple. No JVD present. No tracheal deviation present. No thyromegaly present. No cervical lymphadenopathy noted.   There is a palpable 2x2cm, non-tender, mobile mass noted in the inferior portion of the parotid. Cardiovascular: Normal rate. Pulmonary/Chest: Effort normal. No stridor or stertor. No respiratory distress. Musculoskeletal: Normal range of motion. He exhibits no edema or Lymphadenopathy. Neurological: He is alert and oriented to person, place, and time. Cranial nerve II-XII grossly intact. Skin: Skin is warm. No erythema. Psychiatric: Normal mood and affect. Behavior is normal.   Vitals reviewed. Data:  All of the past medical history, past surgical history, family history,social history, allergies and current medications were reviewed with the patient. 3/22/19:  MRI  \"A heterogeneously enhancing mass is present extending from the inferior left parotid gland. It measures approximately 2.1 x 2.3 x 2.3 cm. .no LAD noted. \"    Assessment & Plan   Diagnoses and all orders for this visit:     Diagnosis Orders   1. Parotid mass  US Fine Needle Aspiration     78-year-old male with a left inferior parotid mass which she has noted for at least 9 months. We will obtain FNA of this and I will call him with results and see him back in 1 month to discuss further instructions. The findings were explained and his questions were answered. Return in about 1 month (around 5/5/2019) for Discuss results of parotid FNA. Andre Hooper MD    **This report has been created using voice recognition software. It may contain minor errors which are inherent in voice recognition technology. **

## 2019-04-09 ENCOUNTER — OFFICE VISIT (OUTPATIENT)
Dept: FAMILY MEDICINE CLINIC | Age: 64
End: 2019-04-09
Payer: MEDICARE

## 2019-04-09 ENCOUNTER — TELEPHONE (OUTPATIENT)
Dept: PULMONOLOGY | Age: 64
End: 2019-04-09

## 2019-04-09 VITALS
RESPIRATION RATE: 16 BRPM | WEIGHT: 223.2 LBS | SYSTOLIC BLOOD PRESSURE: 126 MMHG | HEIGHT: 71 IN | BODY MASS INDEX: 31.25 KG/M2 | DIASTOLIC BLOOD PRESSURE: 70 MMHG | HEART RATE: 72 BPM

## 2019-04-09 DIAGNOSIS — Z79.4 TYPE 2 DIABETES MELLITUS WITH HYPERGLYCEMIA, WITH LONG-TERM CURRENT USE OF INSULIN (HCC): ICD-10-CM

## 2019-04-09 DIAGNOSIS — E11.65 TYPE 2 DIABETES MELLITUS WITH HYPERGLYCEMIA, WITH LONG-TERM CURRENT USE OF INSULIN (HCC): ICD-10-CM

## 2019-04-09 DIAGNOSIS — Z09 HOSPITAL DISCHARGE FOLLOW-UP: Primary | ICD-10-CM

## 2019-04-09 DIAGNOSIS — K11.8 PAROTID MASS: ICD-10-CM

## 2019-04-09 PROCEDURE — 99214 OFFICE O/P EST MOD 30 MIN: CPT | Performed by: FAMILY MEDICINE

## 2019-04-09 ASSESSMENT — ENCOUNTER SYMPTOMS
TROUBLE SWALLOWING: 0
RESPIRATORY NEGATIVE: 1
GASTROINTESTINAL NEGATIVE: 1
SHORTNESS OF BREATH: 0
COUGH: 0

## 2019-04-09 NOTE — PROGRESS NOTES
Subjective:      Patient ID: Kelly Carrasco is a 59 y.o. male. HPI  Encounter Diagnoses   Name Primary?  Parotid mass    [facility] discharge follow-up Yes    Type 2 diabetes mellitus with hyperglycemia, with long-term current use of insulin ([facility]      In the emergency department for evaluation of chest pain. MI as well as pulmonary embolus were both ruled out and the discomfort has subsided. He has remained physically active and has not been able to duplicate onset of this discomfort. He continues to take his medications as prescribed without difficulty. He is going to be scheduled for a needle biopsy of the left parotid mass next week if it clears with insurance. He states that the ear nose and throat physician is most likely going to do an upper airway exam at the same time and once biopsy results are obtained, removal of the mass will be scheduled. The mass is not interfering with his swallowing or breathing. DM2 is stable without any symptoms of hypoglycemia. He is tracking his home blood pressures and blood sugars and states that they are starting to come down. I will be seeing him again in a week and a half and he is to bring those blood sugars with him for my review and medication adjustment if needed. Lab Results   Component Value Date    LABA1C 9.7 03/13/2019    LABA1C 10.1 12/12/2018    LABA1C 9.9 (H) 10/31/2018     Lab Results   Component Value Date    LABMICR 55.85 06/18/2018    LDLCALC 88 05/21/2016    CREATININE 1.0 03/30/2019     The rest of this patient's conditions are stable. Past medical and surgical hx reviewed.   Past Medical History:   Diagnosis Date    CAD (coronary artery disease)     Depression     Diabetes mellitus ([address]     Gastroparesis     GERD (gastroesophageal reflux disease)     History of Chiari malformation     Hyperlipidemia     Hypertension     Kidney stone     Migraine     Movement disorder     DDD    Pulmonary emboli ([address] 10/29/2018     Past Surgical History:   Procedure Laterality Date    BACK SURGERY  2008    C1 oates & decompression    BRAIN SURGERY  2008    chiari    CARDIAC SURGERY  2007    heart cath    CHOLECYSTECTOMY  2009    COLONOSCOPY  2017    ENDOSCOPY, COLON, DIAGNOSTIC  2007    FRACTURE SURGERY  1964    right elbow    NASAL SEPTUM SURGERY  2006    SHOULDER SURGERY Left 08/16/2018    TONSILLECTOMY  1964    VASCULAR SURGERY  2015    right middle finger artery     Portions of this note were completed with a voice recording program.  Efforts were made to edit the dictations but occasionally words are mis-transcribed. Review of Systems   Constitutional: Negative. HENT: Negative for trouble swallowing. See HPI. Respiratory: Negative. Negative for cough and shortness of breath. Cardiovascular: Negative. Negative for chest pain, palpitations and leg swelling. Gastrointestinal: Negative. Endocrine: Negative for polydipsia, polyphagia and polyuria. Genitourinary: Negative. Neurological: Negative for facial asymmetry. Hematological: Negative. All other systems reviewed and are negative. Objective:   Physical Exam   Constitutional: He is oriented to person, place, and time. He appears well-developed and well-nourished. HENT:   Right Ear: Tympanic membrane, external ear and ear canal normal.   Left Ear: Tympanic membrane, external ear and ear canal normal.   Nose: Nose normal.   Mouth/Throat: Oropharynx is clear and moist.   Neck: Trachea normal. Neck supple. Carotid bruit is not present. No thyroid mass and no thyromegaly present. Cardiovascular: Normal rate, regular rhythm, normal heart sounds and intact distal pulses. Pulmonary/Chest: Effort normal and breath sounds normal.   Musculoskeletal: He exhibits no edema. Neurological: He is alert and oriented to person, place, and time. No cranial nerve deficit. Skin: Skin is warm. Capillary refill takes 2 to 3 seconds.    Psychiatric: He has a normal mood and affect. Nursing note and vitals reviewed. Assessment:       Diagnosis Orders   1. Hospital discharge follow-up     2. Parotid mass     3. Type 2 diabetes mellitus with hyperglycemia, with long-term current use of insulin ([address]             Plan:      No orders of the defined types were placed in this encounter. There are no discontinued medications. Current Outpatient Medications   Medication Sig Dispense Refill    omeprazole (PRILOSEC) 40 MG delayed release capsule Take 1 capsule by mouth Daily 30 capsule 5    sertraline (ZOLOFT) 25 MG tablet Take 25 mg by mouth daily      insulin glargine (LANTUS SOLOSTAR) 100 UNIT/ML injection pen Inject 15 Units into the skin 2 times daily 10 pen 3    rivaroxaban (XARELTO) 10 MG TABS tablet Take 1 tablet by mouth daily (with breakfast) 30 tablet 5    lisinopril (PRINIVIL;ZESTRIL) 5 MG tablet Take 1 tablet by mouth daily 90 tablet 2    nitroGLYCERIN (NITROSTAT) 0.4 MG SL tablet Place 1 tablet under the tongue every 5 minutes as needed for Chest pain 25 tablet 3    metoprolol (TOPROL XL) 25 MG XL tablet Take 1 tablet by mouth daily 30 tablet 3    glyBURIDE (DIABETA) 5 MG tablet Take 5 mg by mouth daily (with breakfast)       aspirin 81 MG tablet Take 81 mg by mouth daily      vitamin D 1000 UNITS CAPS Take by mouth      pravastatin (PRAVACHOL) 80 MG tablet Take 80 mg by mouth daily      cyclobenzaprine (FLEXERIL) 10 MG tablet Take 0.5 tablets by mouth 3 times daily as needed for Muscle spasms 30 tablet 0     No current facility-administered medications for this visit. Continue present medications. Follow thru with ENT for needle bx of left parotid mass. Keep appt with me 4/24/19.           Ryne Gonzalez MD

## 2019-04-09 NOTE — TELEPHONE ENCOUNTER
Patient called the office this afternoon , states he Is scheduled for a procedure April 18th, 2019 and needs clearance for his blood thinners. They have asked the patient to be off of the blood thinners for 5 days prior to the procedure. (US Fine Needle Aspiration)  Please advise. Thank you!

## 2019-04-09 NOTE — PROGRESS NOTES
Visit Information    Have you changed or started any medications since your last visit including any over-the-counter medicines, vitamins, or herbal medicines? no   Are you having any side effects from any of your medications? -  no  Have you stopped taking any of your medications? Is so, why? -  no    Have you seen any other physician or provider since your last visit? Yes - Records Obtained  Have you had any other diagnostic tests since your last visit? Yes - Records Obtained  Have you been seen in the emergency room and/or had an admission to a hospital since we last saw you? Yes - Records Obtained  Have you had your routine dental cleaning in the past 6 months? n/a    Have you activated your [employer] account? If not, what are your barriers?  Yes     Patient Care Team:  Kailee Bull MD as PCP - General (Family Medicine)  Kadie Vance MD as Consulting Physician (Otolaryngology)  Adrian Scherer MD as Consulting Physician (Pulmonology)    Medical History Review  Past Medical, Family, and Social History reviewed and does contribute to the patient presenting condition    Health Maintenance   Topic Date Due    Hepatitis C screen  1955    Diabetic foot exam  05/18/1966    Diabetic retinal exam  05/18/1966    HIV screen  05/18/1971    DTaP/Tdap/Td vaccine (1 - Tdap) 05/18/1975    Shingles Vaccine (1 of 2) 05/18/2006    Colon cancer screen colonoscopy  05/18/2006    Lipid screen  05/21/2017    A1C test (Diabetic or Prediabetic)  06/13/2019    Diabetic microalbuminuria test  06/18/2019    Flu vaccine (Season Ended) 09/01/2019    Potassium monitoring  03/30/2020    Creatinine monitoring  03/30/2020    Pneumococcal 0-64 years Vaccine  Completed

## 2019-04-18 ENCOUNTER — HOSPITAL ENCOUNTER (OUTPATIENT)
Dept: ULTRASOUND IMAGING | Age: 64
Discharge: HOME | End: 2019-04-18
Payer: MEDICARE

## 2019-04-18 DIAGNOSIS — K11.8 PAROTID MASS: ICD-10-CM

## 2019-04-18 PROCEDURE — 88184 FLOWCYTOMETRY/ TC 1 MARKER: CPT

## 2019-04-18 PROCEDURE — 10005 FNA BX W/US GDN 1ST LES: CPT

## 2019-04-18 PROCEDURE — 88177 CYTP FNA EVAL EA ADDL: CPT

## 2019-04-18 PROCEDURE — 88185 FLOWCYTOMETRY/TC ADD-ON: CPT

## 2019-04-18 PROCEDURE — 88172 CYTP DX EVAL FNA 1ST EA SITE: CPT

## 2019-04-18 PROCEDURE — 88173 CYTOPATH EVAL FNA REPORT: CPT

## 2019-04-18 NOTE — PROGRESS NOTES
A US guided FNA left parotid mass was performed without complication. Please see PACS for full report.

## 2019-04-21 LAB — LEUKEMIA/LYMPHOMA PHENOTYPING MISC: NORMAL

## 2019-04-24 ENCOUNTER — OFFICE VISIT (OUTPATIENT)
Dept: FAMILY MEDICINE CLINIC | Age: 64
End: 2019-04-24
Payer: MEDICARE

## 2019-04-24 ENCOUNTER — OFFICE VISIT (OUTPATIENT)
Dept: ENT CLINIC | Age: 64
End: 2019-04-24
Payer: MEDICARE

## 2019-04-24 VITALS
RESPIRATION RATE: 16 BRPM | BODY MASS INDEX: 31.04 KG/M2 | TEMPERATURE: 97.9 F | DIASTOLIC BLOOD PRESSURE: 70 MMHG | HEIGHT: 71 IN | WEIGHT: 221.7 LBS | SYSTOLIC BLOOD PRESSURE: 116 MMHG | HEART RATE: 64 BPM

## 2019-04-24 VITALS
OXYGEN SATURATION: 98 % | SYSTOLIC BLOOD PRESSURE: 118 MMHG | RESPIRATION RATE: 14 BRPM | WEIGHT: 222.2 LBS | HEART RATE: 76 BPM | BODY MASS INDEX: 30.99 KG/M2 | DIASTOLIC BLOOD PRESSURE: 76 MMHG

## 2019-04-24 DIAGNOSIS — K44.9 HIATAL HERNIA WITH GERD AND ESOPHAGITIS: ICD-10-CM

## 2019-04-24 DIAGNOSIS — E78.2 MIXED HYPERLIPIDEMIA: ICD-10-CM

## 2019-04-24 DIAGNOSIS — Z79.4 TYPE 2 DIABETES MELLITUS WITH HYPERGLYCEMIA, WITH LONG-TERM CURRENT USE OF INSULIN (HCC): Primary | ICD-10-CM

## 2019-04-24 DIAGNOSIS — Z51.81 MEDICATION MONITORING ENCOUNTER: ICD-10-CM

## 2019-04-24 DIAGNOSIS — K11.1 SUBMANDIBULAR GLAND HYPERTROPHY: ICD-10-CM

## 2019-04-24 DIAGNOSIS — I26.02 ACUTE SADDLE PULMONARY EMBOLISM WITH ACUTE COR PULMONALE (HCC): ICD-10-CM

## 2019-04-24 DIAGNOSIS — K11.8 PAROTID MASS: Primary | ICD-10-CM

## 2019-04-24 DIAGNOSIS — E11.65 TYPE 2 DIABETES MELLITUS WITH HYPERGLYCEMIA, WITH LONG-TERM CURRENT USE OF INSULIN (HCC): Primary | ICD-10-CM

## 2019-04-24 DIAGNOSIS — K21.00 HIATAL HERNIA WITH GERD AND ESOPHAGITIS: ICD-10-CM

## 2019-04-24 DIAGNOSIS — K11.8 MASS OF PAROTID GLAND: ICD-10-CM

## 2019-04-24 PROCEDURE — 99213 OFFICE O/P EST LOW 20 MIN: CPT | Performed by: OTOLARYNGOLOGY

## 2019-04-24 PROCEDURE — 99214 OFFICE O/P EST MOD 30 MIN: CPT | Performed by: FAMILY MEDICINE

## 2019-04-24 RX ORDER — CLINDAMYCIN HYDROCHLORIDE 300 MG/1
300 CAPSULE ORAL 4 TIMES DAILY
Qty: 28 CAPSULE | Refills: 0 | Status: SHIPPED | OUTPATIENT
Start: 2019-04-24 | End: 2019-05-01

## 2019-04-24 ASSESSMENT — ENCOUNTER SYMPTOMS
GASTROINTESTINAL NEGATIVE: 1
EYES NEGATIVE: 1
SINUS PRESSURE: 0
SHORTNESS OF BREATH: 0
COUGH: 0
TROUBLE SWALLOWING: 0
SINUS PAIN: 0
RHINORRHEA: 0
SHORTNESS OF BREATH: 0
VOMITING: 0
NAUSEA: 0
STRIDOR: 0
COUGH: 0
TROUBLE SWALLOWING: 1
FACIAL SWELLING: 1
WHEEZING: 0
SORE THROAT: 0
VOICE CHANGE: 0

## 2019-04-24 NOTE — PROGRESS NOTES
[address] [facility], NOSE AND THROAT  [address] [address]  Dept: [phone]  Dept Fax: [phone]  Loc: [phone]    Cherri Cleveland is a 59 y.o. male who was referred byNo ref. provider found for:  Chief Complaint   Patient presents with    Other     FU after fna, patient states he can not hear out of left ear and is experiencing pain   . HPI:   INITIAL HPI:  Cherri Cleveland is a 59 y.o. male who presents today for evaluation of a left parotid mass which he has noted since July 2018. He felt it progressively enlarged but then has stabilized in size for several months. This area has never been infected or tender to palpation. He does have a history of musculoskeletal issues as well as a posterior fossa craniotomy and C1 laminectomy for Arnold-Chiari malformation. He also reports having been diagnosed with a saddle pulmonary embolism 11/2018. He is also diabetic and is on insulin for this. He has a family history of a father with lung cancer. No other lumps or bumps of his neck. No unintentional weight loss. No fevers chills or nausea or vomiting. He had an MRI performed which showed the left parotid mass but no other cervical lymphadenopathy. INTERVAL HPI:  Since last visit he had an FNA of his left parotid mass. The cytology showed lymphoid tissue and therefore tissue was sent for flow cytometry and was negative for lymphoma. Since his FNA he reports further enlargement of his parotid mass as well he started to experience some tenderness and pain at that region. No other lumps or bumps of his neck. He has no family history of head and neck cancer or lymphoma. History:      Allergies   Allergen Reactions    Latex Rash     Current Outpatient Medications   Medication Sig Dispense Refill    clindamycin (CLEOCIN) 300 MG capsule Take 1 capsule by mouth 4 times daily for 7 days 28 capsule 0    omeprazole (PRILOSEC) 40 MG delayed release capsule Take 1 capsule by mouth Daily 30 capsule 5    sertraline (ZOLOFT) 25 MG tablet Take 100 mg by mouth daily       insulin glargine (LANTUS SOLOSTAR) 100 UNIT/ML injection pen Inject 15 Units into the skin 2 times daily 10 pen 3    rivaroxaban (XARELTO) 10 MG TABS tablet Take 1 tablet by mouth daily (with breakfast) 30 tablet 5    lisinopril (PRINIVIL;ZESTRIL) 5 MG tablet Take 1 tablet by mouth daily 90 tablet 2    nitroGLYCERIN (NITROSTAT) 0.4 MG SL tablet Place 1 tablet under the tongue every 5 minutes as needed for Chest pain 25 tablet 3    metoprolol (TOPROL XL) 25 MG XL tablet Take 1 tablet by mouth daily 30 tablet 3    glyBURIDE (DIABETA) 5 MG tablet Take 5 mg by mouth daily (with breakfast)       aspirin 81 MG tablet Take 81 mg by mouth daily      vitamin D 1000 UNITS CAPS Take by mouth      pravastatin (PRAVACHOL) 80 MG tablet Take 80 mg by mouth daily      cyclobenzaprine (FLEXERIL) 10 MG tablet Take 0.5 tablets by mouth 3 times daily as needed for Muscle spasms 30 tablet 0     No current facility-administered medications for this visit.       Past Medical History:   Diagnosis Date    CAD (coronary artery disease)     Depression     Gastroparesis     GERD (gastroesophageal reflux disease)     History of Chiari malformation     Hyperlipidemia     Hypertension     Kidney stone     Migraine     Movement disorder     DDD    Pulmonary emboli ([facility] 10/29/2018      Past Surgical History:   Procedure Laterality Date    BACK SURGERY  2008    C1 oates & decompression    BRAIN SURGERY  2008    chiari    CARDIAC SURGERY  2007    heart cath    CHOLECYSTECTOMY  2009    COLONOSCOPY  2017    ENDOSCOPY, COLON, DIAGNOSTIC  2007    FRACTURE SURGERY  1964    right elbow    NASAL SEPTUM SURGERY  2006    SHOULDER SURGERY Left 08/16/2018    TONSILLECTOMY  1964    VASCULAR SURGERY  2015    right middle finger artery     Family History   Problem Relation Age of Onset    Kidney Disease Mother     Diabetes Mother     Heart Disease Mother     High Blood Pressure Mother     Diabetes Father     Cancer Father         lung--smoker    High Blood Pressure Father     Diabetes Sister     High Blood Pressure Sister     Diabetes Brother     High Blood Pressure Brother      Social History     Tobacco Use    Smoking status: Former Smoker     Packs/day: 1.50     Years: 3.00     Pack years: 4.50     Types: Cigarettes     Start date: 1976     Last attempt to quit: 10/12/1979     Years since quittin.5    Smokeless tobacco: Never Used   Substance Use Topics    Alcohol use: No       Subjective:      Review of Systems   Constitutional: Negative. HENT: Positive for facial swelling. Negative for congestion, ear discharge, ear pain, hearing loss, mouth sores, nosebleeds, postnasal drip, rhinorrhea, sinus pressure, sinus pain, sneezing, sore throat, tinnitus, trouble swallowing and voice change. Eyes: Negative for visual disturbance. Respiratory: Negative for cough, shortness of breath and stridor. Cardiovascular: Negative for chest pain. Gastrointestinal: Negative for nausea and vomiting. Musculoskeletal: Negative for neck pain. Skin: Negative for wound. Allergic/Immunologic: Negative for environmental allergies. Neurological: Negative for dizziness. All other systems reviewed and are negative. Rest of review of systems are negative, except as noted in HPI. Objective:   /70 (Site: Right Upper Arm, Position: Sitting, Cuff Size: Medium Adult)   Pulse 64   Temp 97.9 °F (36.6 °C) (Oral)   Resp 16   Ht 5' 10.98\" (1.803 m)   Wt 221 lb 11.2 oz (100.6 kg)   BMI 30.93 kg/m²     PHYSICAL EXAM  Constitutional: He is oriented to person, place, and time. He appears well-developed and well-nourished. No distress. HENT:   Head: Normocephalic and atraumatic. Right Ear: External ear normal. Ear canal clear. Tympanic membrane intact. Middle ear aerated. Left Ear: External ear normal. Ear canal clear. Tympanic membrane intact. Middle ear aerated. Nose: External nose normal.  Nasal mucosa normal.  No lesions noted. Mouth/Throat: Fair dentition. Oral cavity mucosa normal, no masses or lesions noted. Oropharynx is clear and moist.   Eyes: Pupils are equal, round, and reactive to light. Conjunctivae and EOM are normal.   Neck: Normal range of motion. Neck supple. No JVD present. No tracheal deviation present. No thyromegaly present. There is a palpable 2x3cm, mildly tender, mobile mass noted in the inferior portion of the parotid. Compared to last visit, it does appear to be larger, and is now tender. Cardiovascular: Normal rate. Pulmonary/Chest: Effort normal. No stridor or stertor. No respiratory distress. Musculoskeletal: Normal range of motion. He exhibits no edema or Lymphadenopathy. Neurological: He is alert and oriented to person, place, and time. Cranial nerve II-XII grossly intact. Skin: Skin is warm. No erythema. Psychiatric: Normal mood and affect. Behavior is normal.   Vitals reviewed. Data:  All of the past medical history, past surgical history, family history,social history, allergies and current medications were reviewed with the patient. Assessment & Plan   Diagnoses and all orders for this visit:     Diagnosis Orders   1. Parotid mass       70-year-old male with left parotid mass which by FNA 4/18/19 was negative for malignancy (negative flow cytometry) and only showed benign lymph node. Since his FNA this area seemed to have increased in size and is now mildly tender. This could be due to infection/inflammation and I will give him clindamycin for this. I'm still concerned that this could be malignancy especially if this is increasing in size. I will hold off on giving him any steroids for now to prevent masking of the diagnosis for lymphoma.   I will discuss these results with this pathologist.  I will call him for further instructions. .  The findings were explained and his questions were answered. Return if symptoms worsen or fail to improve. Criselda Fung MD    **This report has been created using voice recognition software. It may contain minor errors which are inherent in voice recognition technology. **

## 2019-04-24 NOTE — PROGRESS NOTES
DTaP/Tdap/Td vaccine (1 - Tdap) 05/18/1975    Shingles Vaccine (1 of 2) 05/18/2006    Colon cancer screen colonoscopy  05/18/2006    Lipid screen  05/21/2017    A1C test (Diabetic or Prediabetic)  06/13/2019    Diabetic microalbuminuria test  06/18/2019    Flu vaccine (Season Ended) 09/01/2019    Potassium monitoring  03/30/2020    Creatinine monitoring  03/30/2020    Pneumococcal 0-64 years Vaccine  Completed

## 2019-04-24 NOTE — PROGRESS NOTES
Subjective:      Patient ID: Jeffrey Potts is a 59 y.o. male. HPI  Follow up of chronic conditions. Encounter Diagnoses   Name Primary?  Type 2 diabetes mellitus with hyperglycemia, with long-term current use of insulin (HCC) Yes    Submandibular gland hypertrophy     Mass of parotid gland     Mixed hyperlipidemia     Hiatal hernia with GERD and esophagitis     Acute saddle pulmonary embolism with acute cor pulmonale (HCC)     Medication monitoring encounter      DM2 is stable without any symptoms of hypoglycemia. Hemoglobin A1c will be repeated with his next appointment in 2 months. He has not made any adjustments in his insulin dosage and is still on 15 units morning and evening. Lab Results   Component Value Date    LABA1C 9.7 03/13/2019    LABA1C 10.1 12/12/2018    LABA1C 9.9 (H) 10/31/2018     Lab Results   Component Value Date    LABMICR 55.85 06/18/2018    LDLCALC 88 05/21/2016    CREATININE 1.0 03/30/2019     Bp readings are stable with current medications. BP Readings from Last 3 Encounters:   04/24/19 118/76   04/09/19 126/70   04/05/19 128/78     He continues to tolerate pravastatin 80 mg nightly. He has not developed myalgias or joint discomfort on the medication so far. Cholesterol, Total (mg/dl)   Date Value   05/21/2016 166     HDL (mg/dl)   Date Value   05/21/2016 35     Triglycerides (mg/dl)   Date Value   05/21/2016 215 (H)     Lab Results   Component Value Date    LDLCALC 88 05/21/2016     Left parotid gland has become more tender and firm. He is going to be seeing ENT later this afternoon. Definitive biopsy is going to be set up for him. His breathing is good without any effort. He is status post pulmonary embolus and continues on Xarelto 10 mg daily. He has had no bleeding issues with its use. The rest of this patient's conditions are stable. Past medical and surgical hx reviewed.   Past Medical History:   Diagnosis Date    CAD (coronary artery disease)     Depression     Gastroparesis     GERD (gastroesophageal reflux disease)     History of Chiari malformation     Hyperlipidemia     Hypertension     Kidney stone     Migraine     Movement disorder     DDD    Pulmonary emboli ([facility] [address] 10/29/2018     Past Surgical History:   Procedure Laterality Date    BACK SURGERY  2008    C1 oates & decompression    BRAIN SURGERY  2008    chiari    CARDIAC SURGERY  2007    heart cath    CHOLECYSTECTOMY  2009    COLONOSCOPY  2017    ENDOSCOPY, COLON, DIAGNOSTIC  2007    FRACTURE SURGERY  1964    right elbow    NASAL SEPTUM SURGERY  2006    SHOULDER SURGERY Left 08/16/2018    TONSILLECTOMY  1964    VASCULAR SURGERY  2015    right middle finger artery     Portions of this note were completed with a voice recording program.  Efforts were made to edit the dictations but occasionally words are mis-transcribed. Review of Systems   Constitutional: Negative. HENT: Positive for trouble swallowing. Eyes: Negative. Respiratory: Negative for cough, shortness of breath and wheezing. Cardiovascular: Negative for chest pain, palpitations and leg swelling. Gastrointestinal: Negative. Endocrine: Negative. Negative for polydipsia, polyphagia and polyuria. Genitourinary: Negative. Musculoskeletal: Positive for arthralgias. Allergic/Immunologic: Positive for environmental allergies. Neurological: Negative. Hematological: Negative. Psychiatric/Behavioral: Negative. All other systems reviewed and are negative. Objective:   Physical Exam   Constitutional: He is oriented to person, place, and time. He appears well-developed and well-nourished. HENT:   Right Ear: Tympanic membrane, external ear and ear canal normal.   Left Ear: Tympanic membrane, external ear and ear canal normal.   Nose: Nose normal.   Mouth/Throat: Oropharynx is clear and moist.   Eyes: Pupils are equal, round, and reactive to light.  Conjunctivae are normal.   Neck: No 25 MG tablet Take 100 mg by mouth daily       insulin glargine (LANTUS SOLOSTAR) 100 UNIT/ML injection pen Inject 15 Units into the skin 2 times daily 10 pen 3    rivaroxaban (XARELTO) 10 MG TABS tablet Take 1 tablet by mouth daily (with breakfast) 30 tablet 5    lisinopril (PRINIVIL;ZESTRIL) 5 MG tablet Take 1 tablet by mouth daily 90 tablet 2    nitroGLYCERIN (NITROSTAT) 0.4 MG SL tablet Place 1 tablet under the tongue every 5 minutes as needed for Chest pain 25 tablet 3    metoprolol (TOPROL XL) 25 MG XL tablet Take 1 tablet by mouth daily 30 tablet 3    glyBURIDE (DIABETA) 5 MG tablet Take 5 mg by mouth daily (with breakfast)       aspirin 81 MG tablet Take 81 mg by mouth daily      vitamin D 1000 UNITS CAPS Take by mouth      pravastatin (PRAVACHOL) 80 MG tablet Take 80 mg by mouth daily      cyclobenzaprine (FLEXERIL) 10 MG tablet Take 0.5 tablets by mouth 3 times daily as needed for Muscle spasms 30 tablet 0     No current facility-administered medications for this visit. Continue current medications. Recheck appointment in 2 months. Continue working on bringing blood sugars under better control and weight loss. Discussed use, benefit, and side effects of prescribed medications. Barriers to compliance discussed. All patient questions answered. Pt voiced understanding.           Lester Luz MD

## 2019-04-24 NOTE — PATIENT INSTRUCTIONS
You may receive a survey about your visit with us today. The feedback from our patients helps us identify what is working well and where the service to all patients can be enhanced. Thank you! Continue current medications. Recheck appointment in 2 months. Continue working on bringing blood sugars under better control and weight loss.

## 2019-05-01 ENCOUNTER — TELEPHONE (OUTPATIENT)
Dept: ENT CLINIC | Age: 64
End: 2019-05-01

## 2019-05-01 DIAGNOSIS — K11.8 PAROTID MASS: Primary | ICD-10-CM

## 2019-05-02 ENCOUNTER — TELEPHONE (OUTPATIENT)
Dept: ENT CLINIC | Age: 64
End: 2019-05-02

## 2019-05-02 ENCOUNTER — TELEPHONE (OUTPATIENT)
Dept: FAMILY MEDICINE CLINIC | Age: 64
End: 2019-05-02

## 2019-05-02 DIAGNOSIS — K11.8 MASS OF PAROTID GLAND: Primary | ICD-10-CM

## 2019-05-02 NOTE — TELEPHONE ENCOUNTER
Faxed referrak for Biopsy and Kathy Humphrey scheduled it. Called patient to schedule a follow up and he would not answer phone. After 3 trys the phone went into voice mail so I left message to call and schedule to get results. 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 I called patient on 5/9 and left a message, and called again on 5/10 (which he did not answer).

## 2019-05-02 NOTE — TELEPHONE ENCOUNTER
Pt called office left  stating we made a referral for him to ENT Dr. Bisi Ruff. He is not happy with that office and is requesting a different referral. Pt says several times that office has said if you do not hear from us, give us a call. \"That is typical\". Pt says he never hears from them, so he has to call that office. They fail to communicate with pt and after \"3 strikes\" with this, pt is done and would like a different referral. Please advise.

## 2019-05-02 NOTE — TELEPHONE ENCOUNTER
Referral made to Dr. Mert Pradhan [phone] Tuesday 5/14/19 at 10:45am. Per [facility], pt is scheduled for a core bx of the parotid gland on 5/7/19, they would like a copy of the bx result faxed to them when it comes back. Referral and records faxed to Dr. Mert Pradhan at [phone]. Pt was notified and voiced understanding. That office is located at [address] [place] [address].

## 2019-05-07 ENCOUNTER — HOSPITAL ENCOUNTER (OUTPATIENT)
Dept: ULTRASOUND IMAGING | Age: 64
Discharge: HOME | End: 2019-05-07
Payer: MEDICARE

## 2019-05-07 DIAGNOSIS — K11.8 PAROTID MASS: ICD-10-CM

## 2019-05-07 PROCEDURE — 87070 CULTURE OTHR SPECIMN AEROBIC: CPT

## 2019-05-07 PROCEDURE — 88333 PATH CONSLTJ SURG CYTO XM 1: CPT

## 2019-05-07 PROCEDURE — 2709999900 HC NON-CHARGEABLE SUPPLY

## 2019-05-07 PROCEDURE — 87075 CULTR BACTERIA EXCEPT BLOOD: CPT

## 2019-05-07 PROCEDURE — 88307 TISSUE EXAM BY PATHOLOGIST: CPT

## 2019-05-07 PROCEDURE — 76942 ECHO GUIDE FOR BIOPSY: CPT

## 2019-05-07 PROCEDURE — 87205 SMEAR GRAM STAIN: CPT

## 2019-05-07 PROCEDURE — 88334 PATH CONSLTJ SURG CYTO XM EA: CPT

## 2019-05-07 NOTE — TELEPHONE ENCOUNTER
Per Epic pt has arrived for his fine needle aspiration today. Will await bx results and fax to Dr. Cristina Gonzales office.

## 2019-05-07 NOTE — BRIEF OP NOTE
Brief Postoperative Note    Toya Brown  YOB: 1955  [id number]    Pre-operative Diagnosis: Neck mass    Post-operative Diagnosis: Same    Procedure: US biopsy    Anesthesia: Local    Surgeons/Assistants: JUSTIN Gold DO    Estimated Blood Loss: less than 50     Complications: None    Specimens: Was Obtained: 5 core biopsies    Findings: Full report to follow in PACS. Electronically signed by JUSTIN Sanchez DO on 5/7/2019 at 2:43 PM

## 2019-05-12 LAB
AEROBIC CULTURE: NORMAL
ANAEROBIC CULTURE: NORMAL
GRAM STAIN RESULT: NORMAL

## 2019-05-19 ASSESSMENT — ENCOUNTER SYMPTOMS
BACK PAIN: 1
SHORTNESS OF BREATH: 1
COUGH: 1
FACIAL SWELLING: 0
CONSTIPATION: 0
WHEEZING: 0
TROUBLE SWALLOWING: 0
ABDOMINAL DISTENTION: 0
CHEST TIGHTNESS: 1
BLOOD IN STOOL: 0
NAUSEA: 0
SORE THROAT: 0
ABDOMINAL PAIN: 1
VOMITING: 0
RECTAL PAIN: 0
EYE DISCHARGE: 0
COLOR CHANGE: 0
DIARRHEA: 1

## 2019-06-03 ENCOUNTER — CARE COORDINATION (OUTPATIENT)
Dept: CASE MANAGEMENT | Age: 64
End: 2019-06-03

## 2019-06-04 ENCOUNTER — TELEPHONE (OUTPATIENT)
Dept: PULMONOLOGY | Age: 64
End: 2019-06-04

## 2019-06-04 DIAGNOSIS — G47.33 OBSTRUCTIVE SLEEP APNEA: Primary | ICD-10-CM

## 2019-06-04 NOTE — TELEPHONE ENCOUNTER
Pt was called this morning to confirm 6-5-19 appointment, pt DID NOT cancel his CPAP Titration insurance denied his Titration an order for APAP was sent to [facility] pt states he had not heard anything from [facility] regarding the PAP Machine. I spoke with Ravindra Stover who stated they need the initial consult from 2008 from [place] or insurance will not cover his new machine those records are unattainable per Adalberto Haji she placed an order for a HST to rule out Sleep Apnea so insurance will cover pt's new machine. Pt was notified regarding this information and will wait for the sleep lab to contact him. I advised if he does not hear from them by tomorrow afternoon pt needs to contact us so we can get this rolling for the pt.

## 2019-06-05 ENCOUNTER — TELEPHONE (OUTPATIENT)
Dept: PULMONOLOGY | Age: 64
End: 2019-06-05

## 2019-06-07 ENCOUNTER — OFFICE VISIT (OUTPATIENT)
Dept: ENT CLINIC | Age: 64
End: 2019-06-07
Payer: MEDICARE

## 2019-06-07 VITALS
TEMPERATURE: 97.7 F | WEIGHT: 218 LBS | RESPIRATION RATE: 16 BRPM | SYSTOLIC BLOOD PRESSURE: 134 MMHG | DIASTOLIC BLOOD PRESSURE: 82 MMHG | HEIGHT: 71 IN | HEART RATE: 68 BPM | BODY MASS INDEX: 30.52 KG/M2

## 2019-06-07 DIAGNOSIS — K11.8 PAROTID MASS: Primary | ICD-10-CM

## 2019-06-07 DIAGNOSIS — D11.9 WARTHIN'S TUMOR: ICD-10-CM

## 2019-06-07 DIAGNOSIS — T46.4X5A ACE-INHIBITOR COUGH: ICD-10-CM

## 2019-06-07 DIAGNOSIS — R05.8 ACE-INHIBITOR COUGH: ICD-10-CM

## 2019-06-07 PROCEDURE — 99213 OFFICE O/P EST LOW 20 MIN: CPT | Performed by: OTOLARYNGOLOGY

## 2019-06-07 RX ORDER — AMOXICILLIN AND CLAVULANATE POTASSIUM 875; 125 MG/1; MG/1
1 TABLET, FILM COATED ORAL 2 TIMES DAILY
Qty: 28 TABLET | Refills: 0 | Status: SHIPPED | OUTPATIENT
Start: 2019-06-07 | End: 2019-06-20 | Stop reason: SINTOL

## 2019-06-07 RX ORDER — ACYCLOVIR 800 MG/1
800 TABLET ORAL PRN
COMMUNITY
Start: 2019-05-15 | End: 2019-11-29 | Stop reason: SDUPTHER

## 2019-06-07 ASSESSMENT — ENCOUNTER SYMPTOMS
COUGH: 0
FACIAL SWELLING: 0
CHOKING: 0
DIARRHEA: 0
SINUS PRESSURE: 0
STRIDOR: 0
RHINORRHEA: 0
VOICE CHANGE: 0
COLOR CHANGE: 0
TROUBLE SWALLOWING: 0
SORE THROAT: 0
ABDOMINAL PAIN: 0
CHEST TIGHTNESS: 0
NAUSEA: 0
WHEEZING: 0
SHORTNESS OF BREATH: 0
VOMITING: 0
APNEA: 0

## 2019-06-07 NOTE — PROGRESS NOTES
[address] [facility]  [address] [address]  Dept: [phone]  Dept Fax: [phone]  Loc: [phone]    Errol Angel is a 62 y.o. male who was referred byNo ref. provider found for:  Chief Complaint   Patient presents with    Follow-up     Patient here for Left Parotid Mass, FNA results, seen by Dr. Juhi Qureshi   . HPI:     Errol Angel is a 62 y.o. male who presents today for left parotid mass, FNA Results. He was seen initially by Dr. Juhi Qureshi. Cytology:  FINAL DIAGNOSIS:  Parotid gland, core needle biopsy:   Warthin's tumor. He has a  lump on his cheek about 4 cm. It is a little sore. He took Clindamycin that Dr. Juhi Qureshi had prescribed. He had some nausea with it and had a little diarrhea. He had this issues since this past November. He has 2 dogs. He is usually not around cats. His daughter has a cat and he does not really mess with it. He has not been bitten by a tick or any other bugs. He has not been around any dead rabbits. He has not been out of the country. He has never had MRSA     He had his tonsils and adenoids taken out. His throat has not been bothering him. He is having ear aches. He has balance issues as well. He had a pulmonary embolism recently. He also has Izabela's Syndrome. He had a laminectomy with Dr. Collin Gomez in the past.  He will be having another surgery due to the C4-C7 being open. He is a smoker. History:      Allergies   Allergen Reactions    Latex Rash     Current Outpatient Medications   Medication Sig Dispense Refill    acyclovir (ZOVIRAX) 800 MG tablet Take 800 mg by mouth as needed       amoxicillin-clavulanate (AUGMENTIN) 875-125 MG per tablet Take 1 tablet by mouth 2 times daily for 14 days 28 tablet 0    omeprazole (PRILOSEC) 40 MG delayed release capsule Take 1 capsule by mouth Daily 30 capsule 5    sertraline (ZOLOFT) 25 MG tablet Take 100 mg by mouth daily  insulin glargine (LANTUS SOLOSTAR) 100 UNIT/ML injection pen Inject 15 Units into the skin 2 times daily (Patient taking differently: Inject 20 Units into the skin 2 times daily ) 10 pen 3    rivaroxaban (XARELTO) 10 MG TABS tablet Take 1 tablet by mouth daily (with breakfast) 30 tablet 5    lisinopril (PRINIVIL;ZESTRIL) 5 MG tablet Take 1 tablet by mouth daily 90 tablet 2    nitroGLYCERIN (NITROSTAT) 0.4 MG SL tablet Place 1 tablet under the tongue every 5 minutes as needed for Chest pain 25 tablet 3    metoprolol (TOPROL XL) 25 MG XL tablet Take 1 tablet by mouth daily 30 tablet 3    glyBURIDE (DIABETA) 5 MG tablet Take 5 mg by mouth daily (with breakfast)       aspirin 81 MG tablet Take 81 mg by mouth daily      vitamin D 1000 UNITS CAPS Take by mouth daily       pravastatin (PRAVACHOL) 80 MG tablet Take 80 mg by mouth daily      cyclobenzaprine (FLEXERIL) 10 MG tablet Take 0.5 tablets by mouth 3 times daily as needed for Muscle spasms 30 tablet 0     No current facility-administered medications for this visit.       Past Medical History:   Diagnosis Date    CAD (coronary artery disease)     Depression     Gastroparesis     GERD (gastroesophageal reflux disease)     History of Chiari malformation     Hyperlipidemia     Hypertension     Kidney stone     Migraine     Movement disorder     DDD    Pulmonary emboli ([address] 10/29/2018      Past Surgical History:   Procedure Laterality Date    BACK SURGERY  2008    C1 oates & decompression    BRAIN SURGERY  2008    chiari    CARDIAC SURGERY  2007    heart cath    CHOLECYSTECTOMY  2009    COLONOSCOPY  2017    ENDOSCOPY, COLON, DIAGNOSTIC  2007    FRACTURE SURGERY  1964    right elbow    NASAL SEPTUM SURGERY  2006    SHOULDER SURGERY Left 08/16/2018    TONSILLECTOMY  1964    VASCULAR SURGERY  2015    right middle finger artery     Family History   Problem Relation Age of Onset    Kidney Disease Mother     Diabetes Mother     Heart Disease Mother     High Blood Pressure Mother     Diabetes Father     Cancer Father         lung--smoker    High Blood Pressure Father     Diabetes Sister     High Blood Pressure Sister     Diabetes Brother     High Blood Pressure Brother      Social History     Tobacco Use    Smoking status: Former Smoker     Packs/day: 1.50     Years: 3.00     Pack years: 4.50     Types: Cigarettes     Start date: 1976     Last attempt to quit: 10/12/1979     Years since quittin.6    Smokeless tobacco: Never Used   Substance Use Topics    Alcohol use: No       Subjective:       Review of Systems   Constitutional: Negative for activity change, appetite change, chills, diaphoresis, fatigue, fever and unexpected weight change. HENT: Negative for congestion, dental problem, ear discharge, ear pain, facial swelling, hearing loss, mouth sores, nosebleeds, postnasal drip, rhinorrhea, sinus pressure, sneezing, sore throat, tinnitus, trouble swallowing and voice change. Eyes: Negative for visual disturbance. Respiratory: Negative for apnea, cough, choking, chest tightness, shortness of breath, wheezing and stridor. Cardiovascular: Negative for chest pain, palpitations and leg swelling. Gastrointestinal: Negative for abdominal pain, diarrhea, nausea and vomiting. Endocrine: Negative for cold intolerance, heat intolerance, polydipsia and polyuria. Genitourinary: Negative for dysuria, enuresis and hematuria. Musculoskeletal: Negative for arthralgias, gait problem, neck pain and neck stiffness. Skin: Negative for color change and rash. Allergic/Immunologic: Negative for environmental allergies, food allergies and immunocompromised state. Neurological: Negative for dizziness, syncope, facial asymmetry, speech difficulty, light-headedness and headaches. Hematological: Negative for adenopathy. Does not bruise/bleed easily. Psychiatric/Behavioral: Negative for confusion and sleep disturbance.  The patient

## 2019-06-07 NOTE — LETTER
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 It was painful so I put him on Augmentin. I noticed comments regarding his cough on a recent visit. I hope he got almost all the Augmentin. It was not for his cough. Please see the recommendation I made below    Below are the relevant portions of my assessment and plan of care. Assessment & Plan   Diagnoses and all orders for this visit:     Diagnosis Orders   1. Parotid mass  amoxicillin-clavulanate (AUGMENTIN) 875-125 MG per tablet   2. Warthin's tumor  amoxicillin-clavulanate (AUGMENTIN) 875-125 MG per tablet       The findings were explained and his questions were answered. Options were discussed including ordering a different antibiotic since a large complement of the Warthin's tumor is lymphoid tissue. I will put him on Augmentin for 14 days. He will need to call if having any problems. He agreed. I will see him in a month. Addendum: Reviewing his notes later, when this note was finished, there was a comment about persistent cough. Clearly, being on lisinopril should be considered the most likely cause of the cough that has not responded to the usual measures. I will be able to identify the usual findings on fiberoptic laryngoscopy on his return visit, if he is if he is continued on lisinopril. I would suggest that he check with his primary care physician regarding being switched to something in a different category. Return in about 1 month (around 7/7/2019). If you have questions, please do not hesitate to call me. I look forward to following Jimmy guo along with you.     Sincerely,          Jacob Parker MD

## 2019-06-12 ENCOUNTER — CARE COORDINATION (OUTPATIENT)
Dept: CASE MANAGEMENT | Age: 64
End: 2019-06-12

## 2019-06-12 ENCOUNTER — OFFICE VISIT (OUTPATIENT)
Dept: PULMONOLOGY | Age: 64
End: 2019-06-12
Payer: MEDICARE

## 2019-06-12 VITALS
DIASTOLIC BLOOD PRESSURE: 80 MMHG | WEIGHT: 222 LBS | TEMPERATURE: 97.6 F | SYSTOLIC BLOOD PRESSURE: 118 MMHG | HEART RATE: 70 BPM | OXYGEN SATURATION: 96 % | BODY MASS INDEX: 31.08 KG/M2 | HEIGHT: 71 IN

## 2019-06-12 DIAGNOSIS — Z86.711 PERSONAL HISTORY OF PE (PULMONARY EMBOLISM): ICD-10-CM

## 2019-06-12 DIAGNOSIS — G47.33 OBSTRUCTIVE SLEEP APNEA: ICD-10-CM

## 2019-06-12 DIAGNOSIS — Z86.718 HISTORY OF DEEP VENOUS THROMBOSIS (DVT) OF DISTAL VEIN OF LEFT LOWER EXTREMITY: Primary | ICD-10-CM

## 2019-06-12 DIAGNOSIS — D68.59 PROTEIN S DEFICIENCY (HCC): ICD-10-CM

## 2019-06-12 DIAGNOSIS — Z87.891 PERSONAL HISTORY OF TOBACCO USE: ICD-10-CM

## 2019-06-12 DIAGNOSIS — Z86.69 HISTORY OF CHIARI MALFORMATION: ICD-10-CM

## 2019-06-12 PROBLEM — Z81.2 FAMILY HISTORY OF TOBACCO USE: Status: ACTIVE | Noted: 2019-06-12

## 2019-06-12 PROBLEM — Z81.2 FAMILY HISTORY OF TOBACCO USE: Status: RESOLVED | Noted: 2019-06-12 | Resolved: 2019-06-12

## 2019-06-12 PROCEDURE — 99214 OFFICE O/P EST MOD 30 MIN: CPT | Performed by: NURSE PRACTITIONER

## 2019-06-12 ASSESSMENT — ENCOUNTER SYMPTOMS
TROUBLE SWALLOWING: 0
NAUSEA: 0
DIARRHEA: 0
WHEEZING: 0
CHEST TIGHTNESS: 0
SHORTNESS OF BREATH: 1
VOICE CHANGE: 0
STRIDOR: 0
ALLERGIC/IMMUNOLOGIC NEGATIVE: 1
EYES NEGATIVE: 1
BACK PAIN: 0
COUGH: 0

## 2019-06-12 NOTE — PROGRESS NOTES
[facility]     Patient: Kirill Morrison, 62 y.o.   : 1955    Pt of Dr. Jennifer Nieves MD      Subjective     Chief Complaint   Patient presents with    Follow-up     3 month follow up Protein S Deficiency. Did not see someone for Hematology        HPI  Little Juarez is here for follow up for PE. Diagnosed with Left DVT and saddle PE extending into left main pulmonary artery with associated right heart strain in Oct 2018. Was treated with TPA protocol, has been on PO Xarelto since discharge. Denies any bleeding problems. Denies dark colored/ bloody stools. After 3 months of anticoagulation a coagulopathy workup was completed. This showed positive for Protein S deficiency. His level was 48% with normal ranges from %. His is not an active smoker, quit in , no family history of blood clotting disorders. This was thought to be an unprovoked PE. Saw Dr Susan Morejon MD with hematology- recommend lifetime anticoagulation. 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 \" I just sit and relax when it happens\"   Has hypopneas even during day due to his chiari malformation and inability to tolerate PAP therapy. Is following with Sonam Rodriguez in sleep clinic. History of sleep apnea- was on CPAP, now supposed to be on BiPAP. [place] study [place]  [place] and we can not locate those records. He is scheduled for HST 19.     Past Medical hx   PMH:  Past Medical History:   Diagnosis Date    CAD (coronary artery disease)     Depression     Gastroparesis     GERD (gastroesophageal reflux disease)     History of Chiari malformation     Hyperlipidemia     Hypertension     Kidney stone     Migraine     Movement disorder     DDD    Pulmonary emboli ([address] 10/29/2018 SURGICAL HISTORY:  Past Surgical History:   Procedure Laterality Date    BACK SURGERY  2008    C1 oates & decompression    BRAIN SURGERY  2008    chiari    CARDIAC SURGERY  2007    heart cath    CHOLECYSTECTOMY  2009    COLONOSCOPY  2017    ENDOSCOPY, COLON, DIAGNOSTIC  2007    FRACTURE SURGERY  1964    right elbow    NASAL SEPTUM SURGERY  2006    SHOULDER SURGERY Left 2018    TONSILLECTOMY  1964    VASCULAR SURGERY  2015    right middle finger artery     SOCIAL HISTORY:  Social History     Tobacco Use    Smoking status: Former Smoker     Packs/day: 1.50     Years: 3.00     Pack years: 4.50     Types: Cigarettes     Start date: 1976     Last attempt to quit: 10/12/1979     Years since quittin.6    Smokeless tobacco: Never Used   Substance Use Topics    Alcohol use: No    Drug use: No     ALLERGIES:  Allergies   Allergen Reactions    Latex Rash     FAMILY HISTORY:  Family History   Problem Relation Age of Onset    Kidney Disease Mother     Diabetes Mother     Heart Disease Mother     High Blood Pressure Mother     Diabetes Father     Cancer Father         lung--smoker    High Blood Pressure Father     Diabetes Sister     High Blood Pressure Sister     Diabetes Brother     High Blood Pressure Brother      CURRENT MEDICATIONS:  Current Outpatient Medications   Medication Sig Dispense Refill    acyclovir (ZOVIRAX) 800 MG tablet Take 800 mg by mouth as needed       omeprazole (PRILOSEC) 40 MG delayed release capsule Take 1 capsule by mouth Daily 30 capsule 5    sertraline (ZOLOFT) 25 MG tablet Take 100 mg by mouth daily       insulin glargine (LANTUS SOLOSTAR) 100 UNIT/ML injection pen Inject 15 Units into the skin 2 times daily (Patient taking differently: Inject 20 Units into the skin 2 times daily ) 10 pen 3    rivaroxaban (XARELTO) 10 MG TABS tablet Take 1 tablet by mouth daily (with breakfast) 30 tablet 5    lisinopril (PRINIVIL;ZESTRIL) 5 MG tablet Take 1 tablet by mouth daily 90 tablet 2    nitroGLYCERIN (NITROSTAT) 0.4 MG SL tablet Place 1 tablet under the tongue every 5 minutes as needed for Chest pain 25 tablet 3    metoprolol (TOPROL XL) 25 MG XL tablet Take 1 tablet by mouth daily 30 tablet 3    glyBURIDE (DIABETA) 5 MG tablet Take 5 mg by mouth daily (with breakfast)       aspirin 81 MG tablet Take 81 mg by mouth daily      vitamin D 1000 UNITS CAPS Take by mouth daily       pravastatin (PRAVACHOL) 80 MG tablet Take 80 mg by mouth daily      cyclobenzaprine (FLEXERIL) 10 MG tablet Take 0.5 tablets by mouth 3 times daily as needed for Muscle spasms 30 tablet 0    amoxicillin-clavulanate (AUGMENTIN) 875-125 MG per tablet Take 1 tablet by mouth 2 times daily for 14 days 28 tablet 0     No current facility-administered medications for this visit. ROS   Review of Systems   Constitutional: Positive for fatigue. Negative for activity change, appetite change, chills and fever. HENT: Negative for congestion, postnasal drip, trouble swallowing and voice change. Eyes: Negative. Respiratory: Positive for shortness of breath. Negative for cough, chest tightness, wheezing and stridor. Cardiovascular: Negative for chest pain and leg swelling. Gastrointestinal: Negative for diarrhea and nausea. Endocrine: Negative. Genitourinary: Negative. Musculoskeletal: Positive for arthralgias and neck pain. Negative for back pain. Skin: Negative. Allergic/Immunologic: Negative. Neurological: Positive for dizziness and headaches. Negative for light-headedness. Hematological: Negative. Does not bruise/bleed easily. Psychiatric/Behavioral: Positive for sleep disturbance. All other systems reviewed and are negative.        Physical exam   /80 (Site: Left Upper Arm, Position: Sitting, Cuff Size: Medium Adult)   Pulse 70   Temp 97.6 °F (36.4 °C) (Oral)   Ht 5' 11\" (1.803 m)   Wt 222 lb (100.7 kg)   SpO2 96% Comment: on RA  BMI 30.96 kg/m² Physical Exam   Constitutional: He is oriented to person, place, and time. He appears well-developed and well-nourished. No distress. HENT:   Mouth/Throat: No oropharyngeal exudate. Bilateral hearing aids    Eyes: Conjunctivae are normal. Right eye exhibits no discharge. No scleral icterus. Neck: Neck supple. No JVD present. Cardiovascular: Normal rate and regular rhythm. Exam reveals no friction rub. No murmur heard. Pulmonary/Chest: Effort normal and breath sounds normal. No respiratory distress. He has no wheezes. He has no rales. He exhibits no tenderness. Abdominal: Soft. Musculoskeletal: He exhibits no edema or tenderness. Lymphadenopathy:     He has no cervical adenopathy. Neurological: He is alert and oriented to person, place, and time. Skin: Skin is warm and dry. Capillary refill takes less than 2 seconds. Psychiatric: He has a normal mood and affect. His behavior is normal. Judgment and thought content normal.   Nursing note and vitals reviewed. Test results   Lung Nodule Screening     [] Qualifies    [x]Does not qualify   [] Declined    [] Completed        Assessment      Diagnosis Orders   1. History of deep venous thrombosis (DVT) of distal vein of left lower extremity     2. Personal history of PE (pulmonary embolism)      saddle PE with associated right heart strain OCt 2018- unprovoked    3. Protein S deficiency ([facility] [address]     4. Obstructive sleep apnea     5. History of Chiari malformation     6.  Personal history of tobacco use           Plan   Lifelong anticoagulation as recommended by hematology- will defer to PCP   Keep HST 6/20/19 and follow up with Domitila Fuentes after   Offered PFT to r/o COPD with c/o SOB- declines at this time    Will see Renata BARNETT     Electronically signed by RAFAEL Ruiz CNP on 6/12/2019 at 8:44 AM

## 2019-06-12 NOTE — CARE COORDINATION
Name: Morena Lemus    ### Patient Details  YOB: 1955  MRN: [id number]    ### Encounter Details  Encounter ID: [id number]  Arrival Date: N/A  Discharge Date: N/A    ### Related interaction  CHF-Diabetes High Touch UA (Welcome Call) ([email or web address])    ### Required Interventions and Feedback     [facility] Update         *Patient Status changed in [facility] to[de-identified]     Patient Declined (selected by W on 06/12/2019 12:58 PM EDT)     Call Status         *Call Status:     Other (Provide details below) (edited by W on 06/12/2019 12:56 PM EDT)    Additional Call Status Details[de-identified]     Attempted to contact patient regarding unreached welcome call.   Left message on voicemail with contact information and request for call back.   (edited by W on 06/12/2019 12:57 PM EDT)      Erin Boateng RN  Tele-Health Coordinator

## 2019-06-17 ENCOUNTER — TELEPHONE (OUTPATIENT)
Dept: SLEEP CENTER | Age: 64
End: 2019-06-17

## 2019-06-17 NOTE — TELEPHONE ENCOUNTER
Jerilyn Patterson Ins. Verification and Authorization Specialist sent an email stating that [facility] [employer]'s has denied Itz's HST. Per there Clinical Rationale:  [phone] or [phone]. Member ID # needed. Your doctor told us that you have a condition that courses short periods of not breathing while you sleep. Your doctor ordered a test to recheck your condition. This test is used to see if treatment is still needed if you have had a large weight loss since your last sleep test.  Your doctor did not tell us this is the case for you. For this reason, this test is not medically necessary for you at this time. We used Medicare Local Coverage Determination [id number] Polysomnography and Other Sleep Studies to make this decision.

## 2019-06-17 NOTE — TELEPHONE ENCOUNTER
A PSG was required by insurance in order to get new PAP machine. An APAP was denied because his original PSG was not available. A HST was ordered to re-qualify [facility] to place his PAP. It was also denied. What do they want ? ???

## 2019-06-18 NOTE — TELEPHONE ENCOUNTER
I spoke with peer to peer. They will resubmit the information for HST and likely approve it. Currently they closed the case but are reopening a new case.

## 2019-06-18 NOTE — TELEPHONE ENCOUNTER
Patient called in this morning very frustrated with the whole sleep issue. He stated he understands it is more his insurance and he stated he contacted his  and the \" insurance institution\" regarding his insurance denying everything. He stated that if he dies his family has all the information along with his  to file a wrongful death suit due to the delay in him getting the proper equipment that he needs. I advised him that this was all related to his insurance. He stated he understood it was not the office, but wanted us to know where he stands.

## 2019-06-20 ENCOUNTER — OFFICE VISIT (OUTPATIENT)
Dept: FAMILY MEDICINE CLINIC | Age: 64
End: 2019-06-20
Payer: MEDICARE

## 2019-06-20 VITALS
WEIGHT: 212.9 LBS | SYSTOLIC BLOOD PRESSURE: 130 MMHG | BODY MASS INDEX: 29.69 KG/M2 | DIASTOLIC BLOOD PRESSURE: 76 MMHG | HEART RATE: 100 BPM | TEMPERATURE: 98.3 F | OXYGEN SATURATION: 98 % | RESPIRATION RATE: 15 BRPM

## 2019-06-20 DIAGNOSIS — R05.9 COUGH IN ADULT: Primary | ICD-10-CM

## 2019-06-20 PROCEDURE — 99213 OFFICE O/P EST LOW 20 MIN: CPT | Performed by: FAMILY MEDICINE

## 2019-06-20 ASSESSMENT — ENCOUNTER SYMPTOMS
SHORTNESS OF BREATH: 0
COUGH: 1
WHEEZING: 0

## 2019-06-20 NOTE — PROGRESS NOTES
Subjective:      Patient ID: Joel Boo is a 62 y.o. male. HPI  Encounter Diagnosis   Name Primary?  Cough in adult Yes     Bright Fajardo is here for evaluation of a persisting dry cough for the last 7 to 10 days. He continues to have a lot of postnasal drainage which he believes is triggering most of his cough currently. Any sputum he brings up is clear-colored as well as nasal drainage remains clear. He is just finishing up a course of Augmentin prescribed by his ear nose and throat physician and that is not really changing the course of his current symptoms. Because of his heart issues, I will not have him use over-the-counter pseudoephedrine or phenylephrine. I will have him start using zinc lozenges made by [employer] and attempt to avoid further prescribed medications. Review of Systems   Constitutional: Negative for fatigue and fever. HENT: Positive for congestion and postnasal drip. Respiratory: Positive for cough. Negative for shortness of breath and wheezing. All other systems reviewed and are negative. Objective:   Physical Exam   Constitutional: He appears well-developed and well-nourished. HENT:   Right Ear: Tympanic membrane, external ear and ear canal normal.   Left Ear: Tympanic membrane, external ear and ear canal normal.   Nose: Mucosal edema and rhinorrhea present. Right sinus exhibits no maxillary sinus tenderness and no frontal sinus tenderness. Left sinus exhibits no frontal sinus tenderness. Mouth/Throat: Oropharynx is clear and moist.   Neck: No thyromegaly present. Cardiovascular: Normal rate, regular rhythm and normal heart sounds. Pulmonary/Chest: Effort normal and breath sounds normal. He has no wheezes. He has no rales. Lymphadenopathy:     He has no cervical adenopathy. Nursing note and vitals reviewed. Assessment:       Diagnosis Orders   1.  Cough in adult             Plan:      No orders of the defined types were placed in this

## 2019-07-17 ENCOUNTER — HOSPITAL ENCOUNTER (OUTPATIENT)
Dept: SLEEP CENTER | Age: 64
Discharge: HOME | End: 2019-07-19
Payer: MEDICARE

## 2019-07-17 DIAGNOSIS — G47.33 OBSTRUCTIVE SLEEP APNEA: ICD-10-CM

## 2019-07-17 PROCEDURE — 95806 SLEEP STUDY UNATT&RESP EFFT: CPT

## 2019-07-22 LAB — STATUS: NORMAL

## 2019-07-23 NOTE — PROGRESS NOTES
[address] [place] [address]                               SLEEP STUDY REPORT    PATIENT NAME: Johnathan Dempsey                      :        1955  MED REC NO:   [id number]                           ROOM:  ACCOUNT NO:   [de-identified]                           ADMIT DATE: 2019  PROVIDER:     Jorge Shaw. MD David    DATE OF STUDY:  2019    PORTABLE/HOME SLEEP STUDY REPORT    REFERRING PROVIDER:  Osbaldo Gibson PA-C    The patient's height is 71 inches, weight is 221 pounds with a BMI of  30.8. HISTORY:  The patient is a 71-year-old gentleman, currently following  with Maura Marino PA-C. The patient was evaluated by Maura Marino PA-C, on 2019. The patient was diagnosed with obstructive  sleep apnea in the past that is in  and not able to tolerate PAP  device. The patient was scheduled for portable/home sleep study to  check the current status of sleep apnea, to start therapy with APAP as  requested/recommended by the patient's medical insurance. METHODS:  The patient underwent Type III Portable Monitoring Sleep Study  including the simultaneous recording of oral-nasal airflow, rib and  abdominal respiratory effort, pulse rate, oxygen saturation, left and  right leg movement, and body position. Scoring criteria is consistent  with the current published AASM standards for scoring of apneas and  hypopneas 4A. INTERPRETATION:  This is a portable/home sleep study. The study was  performed on 2019. The study was started at 12:44 a.m. and was  terminated at 07:17 a.m. with a total recording time of 393.1 minutes. RESPIRATORY EVENT ANALYSIS:  Revealed the patient had a total of seven  apneas. Out of seven, five are obstructive and two are central in  nature. The patient also had total of 15 hypopneas, all of them are  obstructive in nature.   The total number of apneas and

## 2019-08-06 ENCOUNTER — TELEPHONE (OUTPATIENT)
Dept: PULMONOLOGY | Age: 64
End: 2019-08-06

## 2019-08-12 ENCOUNTER — OFFICE VISIT (OUTPATIENT)
Dept: PULMONOLOGY | Age: 64
End: 2019-08-12
Payer: MEDICARE

## 2019-08-12 VITALS
HEIGHT: 71 IN | SYSTOLIC BLOOD PRESSURE: 130 MMHG | WEIGHT: 217 LBS | HEART RATE: 64 BPM | BODY MASS INDEX: 30.38 KG/M2 | DIASTOLIC BLOOD PRESSURE: 84 MMHG | OXYGEN SATURATION: 97 %

## 2019-08-12 DIAGNOSIS — G47.10 HYPERSOMNIA: ICD-10-CM

## 2019-08-12 DIAGNOSIS — R06.81 WITNESSED APNEIC SPELLS: ICD-10-CM

## 2019-08-12 DIAGNOSIS — G47.8 POOR SLEEP PATTERN: ICD-10-CM

## 2019-08-12 DIAGNOSIS — G47.33 OBSTRUCTIVE SLEEP APNEA: Primary | ICD-10-CM

## 2019-08-12 DIAGNOSIS — M54.2 NECK PAIN: ICD-10-CM

## 2019-08-12 DIAGNOSIS — R06.83 SNORING: ICD-10-CM

## 2019-08-12 DIAGNOSIS — G47.8 NON-RESTORATIVE SLEEP: ICD-10-CM

## 2019-08-12 PROCEDURE — 99214 OFFICE O/P EST MOD 30 MIN: CPT | Performed by: PHYSICIAN ASSISTANT

## 2019-08-12 ASSESSMENT — ENCOUNTER SYMPTOMS
NAUSEA: 0
STRIDOR: 0
WHEEZING: 0
CHEST TIGHTNESS: 0
EYES NEGATIVE: 1
COUGH: 0
SHORTNESS OF BREATH: 0
DIARRHEA: 0
BACK PAIN: 0
ALLERGIC/IMMUNOLOGIC NEGATIVE: 1

## 2019-08-12 NOTE — PROGRESS NOTES
Social History     Tobacco Use    Smoking status: Former Smoker     Packs/day: 1.50     Years: 3.00     Pack years: 4.50     Types: Cigarettes     Start date: 1976     Last attempt to quit: 10/12/1979     Years since quittin.8    Smokeless tobacco: Never Used   Substance Use Topics    Alcohol use: No    Drug use: No     Family History   Problem Relation Age of Onset    Kidney Disease Mother     Diabetes Mother     Heart Disease Mother     High Blood Pressure Mother     Diabetes Father     Cancer Father         lung--smoker    High Blood Pressure Father     Diabetes Sister     High Blood Pressure Sister     Diabetes Brother     High Blood Pressure Brother      Allergies  Allergies   Allergen Reactions    Latex Rash     Meds  Current Outpatient Medications   Medication Sig Dispense Refill    acyclovir (ZOVIRAX) 800 MG tablet Take 800 mg by mouth as needed       omeprazole (PRILOSEC) 40 MG delayed release capsule Take 1 capsule by mouth Daily 30 capsule 5    sertraline (ZOLOFT) 25 MG tablet Take 100 mg by mouth daily       insulin glargine (LANTUS SOLOSTAR) 100 UNIT/ML injection pen Inject 15 Units into the skin 2 times daily (Patient taking differently: Inject 20 Units into the skin 2 times daily ) 10 pen 3    rivaroxaban (XARELTO) 10 MG TABS tablet Take 1 tablet by mouth daily (with breakfast) 30 tablet 5    lisinopril (PRINIVIL;ZESTRIL) 5 MG tablet Take 1 tablet by mouth daily 90 tablet 2    nitroGLYCERIN (NITROSTAT) 0.4 MG SL tablet Place 1 tablet under the tongue every 5 minutes as needed for Chest pain 25 tablet 3    metoprolol (TOPROL XL) 25 MG XL tablet Take 1 tablet by mouth daily 30 tablet 3    glyBURIDE (DIABETA) 5 MG tablet Take 5 mg by mouth daily (with breakfast)       aspirin 81 MG tablet Take 81 mg by mouth daily      vitamin D 1000 UNITS CAPS Take by mouth daily       pravastatin (PRAVACHOL) 80 MG tablet Take 80 mg by mouth daily      cyclobenzaprine

## 2019-08-20 ENCOUNTER — TELEPHONE (OUTPATIENT)
Dept: SLEEP CENTER | Age: 64
End: 2019-08-20

## 2019-08-26 ENCOUNTER — HOSPITAL ENCOUNTER (OUTPATIENT)
Dept: SLEEP CENTER | Age: 64
Discharge: HOME | End: 2019-08-28
Payer: MEDICARE

## 2019-08-26 DIAGNOSIS — G47.10 HYPERSOMNIA: ICD-10-CM

## 2019-08-26 PROCEDURE — 95806 SLEEP STUDY UNATT&RESP EFFT: CPT

## 2019-08-26 NOTE — PROGRESS NOTES
Dilshad Ortega presents today for a HST instruction and demonstration on unit # 230. Questions were asked and answers given. He was able to return demonstration and verbalized understanding. The sleep center control room phone number was provided incase questions arise during his study. Informed patient to call 911 in case of an emergency. He states he will return the unit tomorrow.

## 2019-08-28 LAB — STATUS: NORMAL

## 2019-09-10 ENCOUNTER — OFFICE VISIT (OUTPATIENT)
Dept: PULMONOLOGY | Age: 64
End: 2019-09-10
Payer: MEDICARE

## 2019-09-10 VITALS
HEIGHT: 71 IN | OXYGEN SATURATION: 97 % | DIASTOLIC BLOOD PRESSURE: 84 MMHG | WEIGHT: 221 LBS | SYSTOLIC BLOOD PRESSURE: 130 MMHG | HEART RATE: 71 BPM | BODY MASS INDEX: 30.94 KG/M2

## 2019-09-10 DIAGNOSIS — G47.31 COMPLEX SLEEP APNEA SYNDROME: Primary | ICD-10-CM

## 2019-09-10 DIAGNOSIS — G47.33 OBSTRUCTIVE SLEEP APNEA: ICD-10-CM

## 2019-09-10 DIAGNOSIS — R06.3 CHEYNE-STOKES BREATHING: ICD-10-CM

## 2019-09-10 PROCEDURE — 99214 OFFICE O/P EST MOD 30 MIN: CPT | Performed by: PHYSICIAN ASSISTANT

## 2019-09-10 ASSESSMENT — ENCOUNTER SYMPTOMS
CHEST TIGHTNESS: 0
NAUSEA: 0
BACK PAIN: 0
DIARRHEA: 0
EYES NEGATIVE: 1
SHORTNESS OF BREATH: 0
STRIDOR: 0
ALLERGIC/IMMUNOLOGIC NEGATIVE: 1
WHEEZING: 0
COUGH: 0

## 2019-10-08 ENCOUNTER — TELEPHONE (OUTPATIENT)
Dept: SLEEP CENTER | Age: 64
End: 2019-10-08

## 2019-10-31 ENCOUNTER — OFFICE VISIT (OUTPATIENT)
Dept: FAMILY MEDICINE CLINIC | Age: 64
End: 2019-10-31
Payer: MEDICARE

## 2019-10-31 VITALS
HEART RATE: 76 BPM | SYSTOLIC BLOOD PRESSURE: 124 MMHG | RESPIRATION RATE: 20 BRPM | WEIGHT: 215 LBS | DIASTOLIC BLOOD PRESSURE: 78 MMHG | BODY MASS INDEX: 30.1 KG/M2 | HEIGHT: 71 IN

## 2019-10-31 DIAGNOSIS — Z00.00 MEDICARE ANNUAL WELLNESS VISIT, INITIAL: Primary | ICD-10-CM

## 2019-10-31 DIAGNOSIS — E11.65 TYPE 2 DIABETES MELLITUS WITH HYPERGLYCEMIA, WITH LONG-TERM CURRENT USE OF INSULIN (HCC): ICD-10-CM

## 2019-10-31 DIAGNOSIS — Z79.4 TYPE 2 DIABETES MELLITUS WITH HYPERGLYCEMIA, WITH LONG-TERM CURRENT USE OF INSULIN (HCC): ICD-10-CM

## 2019-10-31 DIAGNOSIS — Z00.00 ROUTINE GENERAL MEDICAL EXAMINATION AT A HEALTH CARE FACILITY: ICD-10-CM

## 2019-10-31 DIAGNOSIS — Z23 NEED FOR INFLUENZA VACCINATION: ICD-10-CM

## 2019-10-31 PROCEDURE — 90686 IIV4 VACC NO PRSV 0.5 ML IM: CPT | Performed by: FAMILY MEDICINE

## 2019-10-31 PROCEDURE — G0438 PPPS, INITIAL VISIT: HCPCS | Performed by: FAMILY MEDICINE

## 2019-10-31 PROCEDURE — G0008 ADMIN INFLUENZA VIRUS VAC: HCPCS | Performed by: FAMILY MEDICINE

## 2019-10-31 RX ORDER — GLYBURIDE 5 MG/1
5 TABLET ORAL
Qty: 30 TABLET | Refills: 5 | Status: SHIPPED | OUTPATIENT
Start: 2019-10-31 | End: 2020-07-14

## 2019-10-31 ASSESSMENT — LIFESTYLE VARIABLES: HOW OFTEN DO YOU HAVE A DRINK CONTAINING ALCOHOL: 0

## 2019-10-31 ASSESSMENT — PATIENT HEALTH QUESTIONNAIRE - PHQ9
SUM OF ALL RESPONSES TO PHQ QUESTIONS 1-9: 0
SUM OF ALL RESPONSES TO PHQ QUESTIONS 1-9: 0

## 2019-11-01 ENCOUNTER — OFFICE VISIT (OUTPATIENT)
Dept: FAMILY MEDICINE CLINIC | Age: 64
End: 2019-11-01
Payer: MEDICARE

## 2019-11-01 VITALS
WEIGHT: 216.3 LBS | DIASTOLIC BLOOD PRESSURE: 74 MMHG | RESPIRATION RATE: 16 BRPM | HEART RATE: 72 BPM | BODY MASS INDEX: 30.17 KG/M2 | SYSTOLIC BLOOD PRESSURE: 122 MMHG

## 2019-11-01 DIAGNOSIS — E11.65 TYPE 2 DIABETES MELLITUS WITH HYPERGLYCEMIA, WITH LONG-TERM CURRENT USE OF INSULIN (HCC): ICD-10-CM

## 2019-11-01 DIAGNOSIS — I26.02 ACUTE SADDLE PULMONARY EMBOLISM WITH ACUTE COR PULMONALE (HCC): ICD-10-CM

## 2019-11-01 DIAGNOSIS — H81.10 BENIGN PAROXYSMAL POSITIONAL VERTIGO, UNSPECIFIED LATERALITY: ICD-10-CM

## 2019-11-01 DIAGNOSIS — R07.89 CHEST PRESSURE: Primary | ICD-10-CM

## 2019-11-01 DIAGNOSIS — Z79.4 TYPE 2 DIABETES MELLITUS WITH HYPERGLYCEMIA, WITH LONG-TERM CURRENT USE OF INSULIN (HCC): ICD-10-CM

## 2019-11-01 DIAGNOSIS — E78.2 MIXED HYPERLIPIDEMIA: ICD-10-CM

## 2019-11-01 PROCEDURE — 93000 ELECTROCARDIOGRAM COMPLETE: CPT | Performed by: FAMILY MEDICINE

## 2019-11-01 PROCEDURE — 99214 OFFICE O/P EST MOD 30 MIN: CPT | Performed by: FAMILY MEDICINE

## 2019-11-01 ASSESSMENT — ENCOUNTER SYMPTOMS
RESPIRATORY NEGATIVE: 1
SHORTNESS OF BREATH: 0
GASTROINTESTINAL NEGATIVE: 1

## 2019-11-06 ENCOUNTER — HOSPITAL ENCOUNTER (OUTPATIENT)
Dept: INTERVENTIONAL RADIOLOGY/VASCULAR | Age: 64
Discharge: HOME | End: 2019-11-06

## 2019-11-06 DIAGNOSIS — Z13.9 ENCOUNTER FOR SCREENING: ICD-10-CM

## 2019-11-06 PROCEDURE — 9900000021 US VASCULAR SCREENING

## 2019-11-12 ENCOUNTER — HOSPITAL ENCOUNTER (OUTPATIENT)
Dept: SLEEP CENTER | Age: 64
Discharge: HOME | End: 2019-11-14
Payer: MEDICARE

## 2019-11-12 DIAGNOSIS — R06.3 CHEYNE-STOKES BREATHING: ICD-10-CM

## 2019-11-12 DIAGNOSIS — G47.33 OBSTRUCTIVE SLEEP APNEA: ICD-10-CM

## 2019-11-12 DIAGNOSIS — G47.31 COMPLEX SLEEP APNEA SYNDROME: ICD-10-CM

## 2019-11-12 PROCEDURE — 95811 POLYSOM 6/>YRS CPAP 4/> PARM: CPT

## 2019-11-18 LAB — STATUS: NORMAL

## 2019-11-24 DIAGNOSIS — G47.33 OSA (OBSTRUCTIVE SLEEP APNEA): Primary | ICD-10-CM

## 2019-11-25 ENCOUNTER — TELEPHONE (OUTPATIENT)
Dept: SLEEP CENTER | Age: 64
End: 2019-11-25

## 2019-11-29 RX ORDER — ACYCLOVIR 800 MG/1
800 TABLET ORAL PRN
Qty: 30 TABLET | Refills: 1 | Status: SHIPPED | OUTPATIENT
Start: 2019-11-29 | End: 2019-12-29

## 2019-12-09 ENCOUNTER — TELEPHONE (OUTPATIENT)
Dept: PULMONOLOGY | Age: 64
End: 2019-12-09

## 2019-12-09 ENCOUNTER — TELEPHONE (OUTPATIENT)
Dept: FAMILY MEDICINE CLINIC | Age: 64
End: 2019-12-09

## 2019-12-09 DIAGNOSIS — G47.31 CSA (CENTRAL SLEEP APNEA): Primary | ICD-10-CM

## 2019-12-10 ENCOUNTER — TELEPHONE (OUTPATIENT)
Dept: PULMONOLOGY | Age: 64
End: 2019-12-10

## 2020-01-10 ENCOUNTER — OFFICE VISIT (OUTPATIENT)
Dept: FAMILY MEDICINE CLINIC | Age: 65
End: 2020-01-10
Payer: MEDICARE

## 2020-01-10 VITALS
OXYGEN SATURATION: 97 % | SYSTOLIC BLOOD PRESSURE: 118 MMHG | HEART RATE: 72 BPM | RESPIRATION RATE: 16 BRPM | BODY MASS INDEX: 30.31 KG/M2 | DIASTOLIC BLOOD PRESSURE: 70 MMHG | WEIGHT: 217.3 LBS

## 2020-01-10 LAB — HBA1C MFR BLD: 11.8 %

## 2020-01-10 PROCEDURE — 83036 HEMOGLOBIN GLYCOSYLATED A1C: CPT | Performed by: FAMILY MEDICINE

## 2020-01-10 PROCEDURE — 99214 OFFICE O/P EST MOD 30 MIN: CPT | Performed by: FAMILY MEDICINE

## 2020-01-10 RX ORDER — INSULIN LISPRO 100 [IU]/ML
5 INJECTION, SOLUTION INTRAVENOUS; SUBCUTANEOUS
Qty: 10 PEN | Refills: 3 | Status: SHIPPED | OUTPATIENT
Start: 2020-01-10 | End: 2020-07-22 | Stop reason: DRUGHIGH

## 2020-01-10 RX ORDER — ACYCLOVIR 800 MG/1
TABLET ORAL
COMMUNITY
Start: 2020-01-08 | End: 2021-04-01 | Stop reason: ALTCHOICE

## 2020-01-10 RX ORDER — MECLIZINE HYDROCHLORIDE 25 MG/1
25 TABLET ORAL 3 TIMES DAILY PRN
Qty: 90 TABLET | Refills: 1 | Status: SHIPPED | OUTPATIENT
Start: 2020-01-10 | End: 2020-03-10

## 2020-01-10 ASSESSMENT — ENCOUNTER SYMPTOMS
NAUSEA: 1
TROUBLE SWALLOWING: 1
SHORTNESS OF BREATH: 0
ALLERGIC/IMMUNOLOGIC NEGATIVE: 1
EYES NEGATIVE: 1
WHEEZING: 0
COUGH: 0

## 2020-01-10 ASSESSMENT — PATIENT HEALTH QUESTIONNAIRE - PHQ9
2. FEELING DOWN, DEPRESSED OR HOPELESS: 1
1. LITTLE INTEREST OR PLEASURE IN DOING THINGS: 1
SUM OF ALL RESPONSES TO PHQ9 QUESTIONS 1 & 2: 2
SUM OF ALL RESPONSES TO PHQ QUESTIONS 1-9: 2
SUM OF ALL RESPONSES TO PHQ QUESTIONS 1-9: 2

## 2020-01-10 NOTE — PATIENT INSTRUCTIONS
Get labs by the end of next week. Start Lantus 20 units twice daily. Start Humolog 5 units with each meal.  Test glucose prior to each meal.  Continue follow up for BIPAP - 1/15/20. Repeat A1c in 6 weeks.

## 2020-01-10 NOTE — PROGRESS NOTES
Subjective:      Patient ID: Travis Lu is a 62 y.o. male. HPI  Follow up of chronic conditions. Encounter Diagnoses   Name Primary?  Type 2 diabetes mellitus with hyperglycemia, with long-term current use of insulin (HCC) Yes    Mixed hyperlipidemia     Gastroparesis     Benign paroxysmal positional vertigo, unspecified laterality     Mass of parotid gland     Medication monitoring encounter     Fatigue, unspecified type     Class 1 obesity due to excess calories in adult, unspecified BMI, unspecified whether serious comorbidity present     Hyperplastic colonic polyp, unspecified part of colon      DM2 is poorly controlled. His current hemoglobin A1c is 11.8% but he is not routinely checking his blood sugars at home and admits that diet is not the best.  He has been taking 15 units of Lantus morning and evening where previously he was on 20 units twice daily. This will be resumed with breakfast and supper. I will also start him on him along 5 units with his meals also. Lab Results   Component Value Date    LABA1C 11.8 01/10/2020    LABA1C 9.7 03/13/2019    LABA1C 10.1 12/12/2018     Lab Results   Component Value Date    LABMICR 55.85 06/18/2018    LDLCALC 88 05/21/2016    CREATININE 1.0 03/30/2019     Parotid mass interferring with swallowing now mostly due to pain. He cannot undergo surgery for removal of the mass until issues with his BiPAP are settled. He is supposed to get his new BiPAP machine no later than next week and that will be 1 ongoing problem that has been resolved. He continues to have issues with his vertigo. He has not been on meclizine for a long period of time and is willing to resume its use in attempt to decrease the severity and frequency of the vertigo as well as avoiding episodes of nausea. When he gets up in the morning, he tends to \"veer to the right\" but then later in the day, assuming an upright position, he tends to veer to the left.   So far he has not fallen. He is also due for screening colonoscopy. To the best of his remembrance, he has been told he has had benign polyps therefore the assumption is that they are hyperplastic. Referral for screening colonoscopy will be made. He continues to deal with a lot of fatigue and is concerned that he may have a low testosterone level contributing to this sense of fatigue. He admits that some of the fatigue may be related to depression symptoms so I will check testosterone levels prior to adjusting the dose of his sertraline. He has not been on testosterone replacement therapy. The potential pros and cons of its use were discussed with him. There is been no change in his gastroparesis which is been something he is dealt with for a long period of time. He tries to watch his diet and times both his meals and the amount he eats to avoid nausea. Wt Readings from Last 3 Encounters:   01/10/20 217 lb 4.8 oz (98.6 kg)   11/01/19 216 lb 4.8 oz (98.1 kg)   10/31/19 215 lb (97.5 kg)   Body mass index is 30.31 kg/m². The rest of this patient's conditions are stable. Past medical and surgical hx reviewed.   Past Medical History:   Diagnosis Date    CAD (coronary artery disease)     Depression     Gastroparesis     GERD (gastroesophageal reflux disease)     History of Chiari malformation     Hyperlipidemia     Hypertension     Kidney stone     Migraine     Movement disorder     DDD    Pulmonary emboli ([address] 10/29/2018     Past Surgical History:   Procedure Laterality Date    BACK SURGERY  2008    C1 oates & decompression    BRAIN SURGERY  2008    chiari    CARDIAC SURGERY  2007    heart cath    CHOLECYSTECTOMY  2009    COLONOSCOPY  2017    ENDOSCOPY, COLON, DIAGNOSTIC  2007    FRACTURE SURGERY  1964    right elbow    NASAL SEPTUM SURGERY  2006    SHOULDER SURGERY Left 08/16/2018    TONSILLECTOMY  1964    VASCULAR SURGERY  2015    right middle finger artery     Portions of this note were completed with a voice recording program.  Efforts were made to edit the dictations but occasionally words are mis-transcribed. Review of Systems   Constitutional: Positive for fatigue. HENT: Positive for congestion and trouble swallowing. Eyes: Negative. Respiratory: Negative for cough, shortness of breath and wheezing. Cardiovascular: Negative for chest pain, palpitations and leg swelling. Gastrointestinal: Positive for nausea. Endocrine: Negative for polydipsia, polyphagia and polyuria. Genitourinary: Negative. Musculoskeletal: Positive for arthralgias. Skin: Negative. Allergic/Immunologic: Negative. Neurological: Positive for dizziness and light-headedness. Negative for tremors, syncope, weakness and headaches. Hematological: Negative. Psychiatric/Behavioral: Positive for dysphoric mood and sleep disturbance. All other systems reviewed and are negative. Objective:   Physical Exam  Vitals signs and nursing note reviewed. HENT:      Right Ear: Tympanic membrane, ear canal and external ear normal.      Left Ear: Tympanic membrane, ear canal and external ear normal.      Nose: No congestion or rhinorrhea. Mouth/Throat:      Mouth: Mucous membranes are moist.      Pharynx: Oropharynx is clear. Eyes:      Extraocular Movements:      Right eye: No nystagmus. Left eye: No nystagmus. Conjunctiva/sclera: Conjunctivae normal.   Neck:      Musculoskeletal: Muscular tenderness present. Thyroid: No thyroid mass. Cardiovascular:      Rate and Rhythm: Normal rate and regular rhythm. Pulses: Normal pulses. Heart sounds: Normal heart sounds. No murmur. No gallop. Pulmonary:      Effort: Pulmonary effort is normal.      Breath sounds: Normal breath sounds. No wheezing, rhonchi or rales. Abdominal:      General: Bowel sounds are normal. There is no distension. Tenderness: There is no tenderness.    Musculoskeletal:      Right lower leg: No pravastatin (PRAVACHOL) 80 MG tablet Take 80 mg by mouth daily      cyclobenzaprine (FLEXERIL) 10 MG tablet Take 0.5 tablets by mouth 3 times daily as needed for Muscle spasms 30 tablet 0     No current facility-administered medications for this visit. Get labs by the end of next week. Start Lantus 20 units twice daily. Start Humolog 5 units with each meal.  Test glucose prior to each meal.  Continue follow up for BIPAP - 1/15/20. Repeat A1c in 6 weeks. Discussed use, benefit, and side effects of prescribed medications. Barriers to compliance discussed. All patient questions answered. Pt voiced understanding.           Elfego Sanchez MD

## 2020-01-10 NOTE — PROGRESS NOTES
Visit Information    Have you changed or started any medications since your last visit including any over-the-counter medicines, vitamins, or herbal medicines? yes - list updated   Are you having any side effects from any of your medications? -  no  Have you stopped taking any of your medications? Is so, why? -  no    Have you seen any other physician or provider since your last visit? No  Have you had any other diagnostic tests since your last visit? No  Have you been seen in the emergency room and/or had an admission to a hospital since we last saw you? No  Have you had your routine dental cleaning in the past 6 months? no    Have you activated your [employer] account? If not, what are your barriers?  Yes     Patient Care Team:  Sunita Hernandez MD as PCP - General (Family Medicine)  Sunita Hernandez MD as PCP - [facility]  Pat Nieves MD as Consulting Physician (Otolaryngology)  Alyssa Guerrero MD as Consulting Physician (Pulmonology)  Natalia Romero RN as Ambulatory Care Manager    Medical History Review  Past Medical, Family, and Social History reviewed and does not contribute to the patient presenting condition    Health Maintenance   Topic Date Due    Hepatitis C screen  1955    Diabetic retinal exam  05/18/1966    HIV screen  05/18/1971    Colon cancer screen colonoscopy  05/18/2006    Shingles Vaccine (2 of 3) 01/23/2017    Lipid screen  05/21/2017    A1C test (Diabetic or Prediabetic)  06/13/2019    Diabetic microalbuminuria test  06/18/2019    Potassium monitoring  03/30/2020    Creatinine monitoring  03/30/2020    Diabetic foot exam  04/24/2020    Annual Wellness Visit (AWV)  10/30/2020    DTaP/Tdap/Td vaccine (2 - Td) 07/22/2025    Flu vaccine  Completed    Pneumococcal 0-64 years Vaccine  Completed

## 2020-01-16 ENCOUNTER — TELEPHONE (OUTPATIENT)
Dept: FAMILY MEDICINE CLINIC | Age: 65
End: 2020-01-16

## 2020-01-16 ENCOUNTER — OFFICE VISIT (OUTPATIENT)
Dept: FAMILY MEDICINE CLINIC | Age: 65
End: 2020-01-16
Payer: MEDICARE

## 2020-01-16 VITALS
RESPIRATION RATE: 20 BRPM | SYSTOLIC BLOOD PRESSURE: 120 MMHG | BODY MASS INDEX: 29.32 KG/M2 | HEART RATE: 71 BPM | DIASTOLIC BLOOD PRESSURE: 72 MMHG | WEIGHT: 210.2 LBS | TEMPERATURE: 98.7 F | OXYGEN SATURATION: 98 %

## 2020-01-16 PROCEDURE — 99213 OFFICE O/P EST LOW 20 MIN: CPT | Performed by: FAMILY MEDICINE

## 2020-01-16 RX ORDER — PROMETHAZINE HYDROCHLORIDE AND CODEINE PHOSPHATE 6.25; 1 MG/5ML; MG/5ML
5 SYRUP ORAL EVERY 4 HOURS PRN
Qty: 120 ML | Refills: 1 | Status: SHIPPED | OUTPATIENT
Start: 2020-01-16 | End: 2020-01-30

## 2020-01-16 RX ORDER — AZITHROMYCIN 250 MG/1
250 TABLET, FILM COATED ORAL SEE ADMIN INSTRUCTIONS
Qty: 6 TABLET | Refills: 1 | Status: SHIPPED | OUTPATIENT
Start: 2020-01-16 | End: 2020-01-21

## 2020-01-16 RX ORDER — HYDROCODONE POLISTIREX AND CHLORPHENIRAMINE POLISTIREX 10; 8 MG/5ML; MG/5ML
5 SUSPENSION, EXTENDED RELEASE ORAL EVERY 12 HOURS PRN
Qty: 115 ML | Refills: 0 | Status: SHIPPED | OUTPATIENT
Start: 2020-01-16 | End: 2020-01-16

## 2020-01-16 ASSESSMENT — ENCOUNTER SYMPTOMS
COUGH: 1
SHORTNESS OF BREATH: 0
SINUS PRESSURE: 1
WHEEZING: 0

## 2020-01-16 NOTE — PROGRESS NOTES
Assessment:       Diagnosis Orders   1. Tracheobronchitis  hydrocodone-chlorpheniramine (TUSSIONEX PENNKINETIC ER) 10-8 MG/5ML SUER    azithromycin (ZITHROMAX) 250 MG tablet           Plan:      No orders of the defined types were placed in this encounter. There are no discontinued medications. Current Outpatient Medications   Medication Sig Dispense Refill    hydrocodone-chlorpheniramine (TUSSIONEX PENNKINETIC ER) 10-8 MG/5ML SUER Take 5 mLs by mouth every 12 hours as needed (cough) for up to 14 days. 115 mL 0    azithromycin (ZITHROMAX) 250 MG tablet Take 1 tablet by mouth See Admin Instructions for 5 days 500mg on day 1 followed by 250mg on days 2 - 5 6 tablet 1    Ergocalciferol (VITAMIN D2 PO) Vitamin D2 1,250 mcg (50,000 unit) capsule   Take 1 capsule every week by oral route.       acyclovir (ZOVIRAX) 800 MG tablet TAKE 1 TABLET BY MOUTH AS NEEDED (HERPES LABIALIS)      meclizine (ANTIVERT) 25 MG tablet Take 1 tablet by mouth 3 times daily as needed for Dizziness or Nausea 90 tablet 1    insulin glargine (LANTUS SOLOSTAR) 100 UNIT/ML injection pen Inject 20 Units into the skin 2 times daily 15 pen 3    insulin lispro, 1 Unit Dial, (HUMALOG KWIKPEN) 100 UNIT/ML SOPN Inject 5 Units into the skin 3 times daily (before meals) 10 pen 3    glyBURIDE (DIABETA) 5 MG tablet Take 1 tablet by mouth daily (with breakfast) 30 tablet 5    omeprazole (PRILOSEC) 40 MG delayed release capsule Take 1 capsule by mouth Daily 30 capsule 5    sertraline (ZOLOFT) 25 MG tablet Take 100 mg by mouth daily       rivaroxaban (XARELTO) 10 MG TABS tablet Take 1 tablet by mouth daily (with breakfast) 30 tablet 5    lisinopril (PRINIVIL;ZESTRIL) 5 MG tablet Take 1 tablet by mouth daily 90 tablet 2    nitroGLYCERIN (NITROSTAT) 0.4 MG SL tablet Place 1 tablet under the tongue every 5 minutes as needed for Chest pain 25 tablet 3    metoprolol (TOPROL XL) 25 MG XL tablet Take 1 tablet by mouth daily 30 tablet 3    aspirin 81 MG tablet Take 81 mg by mouth daily      vitamin D 1000 UNITS CAPS Take by mouth daily       pravastatin (PRAVACHOL) 80 MG tablet Take 80 mg by mouth daily      cyclobenzaprine (FLEXERIL) 10 MG tablet Take 0.5 tablets by mouth 3 times daily as needed for Muscle spasms 30 tablet 0     No current facility-administered medications for this visit. Start medications. Push fluids. Discussed use, benefit, and side effects of prescribed medications. Barriers to compliance discussed. All patient questions answered. Pt voiced understanding.           Nani Freitas MD

## 2020-01-16 NOTE — PROGRESS NOTES
Visit Information    Have you changed or started any medications since your last visit including any over-the-counter medicines, vitamins, or herbal medicines? no   Are you having any side effects from any of your medications? -  no  Have you stopped taking any of your medications? Is so, why? -  no    Have you seen any other physician or provider since your last visit? No  Have you had any other diagnostic tests since your last visit? No  Have you been seen in the emergency room and/or had an admission to a hospital since we last saw you? No  Have you had your routine dental cleaning in the past 6 months? na    Have you activated your [employer] account? If not, what are your barriers?  Yes     Patient Care Team:  Violetta Perez MD as PCP - General (Family Medicine)  Violetta Perez MD as PCP - [facility]  Natalia Bolton MD as Consulting Physician (Otolaryngology)  Genet Duarte MD as Consulting Physician (Pulmonology)  Jess Forrester RN as Ambulatory Care Manager    Medical History Review  Past Medical, Family, and Social History reviewed and does not contribute to the patient presenting condition    Health Maintenance   Topic Date Due    Hepatitis C screen  1955    Diabetic retinal exam  05/18/1966    HIV screen  05/18/1971    Colon cancer screen colonoscopy  05/18/2006    Shingles Vaccine (2 of 3) 01/23/2017    Lipid screen  05/21/2017    Diabetic microalbuminuria test  06/18/2019    Potassium monitoring  03/30/2020    Creatinine monitoring  03/30/2020    A1C test (Diabetic or Prediabetic)  04/10/2020    Diabetic foot exam  04/24/2020    Annual Wellness Visit (AWV)  10/30/2020    DTaP/Tdap/Td vaccine (2 - Td) 07/22/2025    Flu vaccine  Completed    Pneumococcal 0-64 years Vaccine  Completed

## 2020-01-16 NOTE — TELEPHONE ENCOUNTER
Jayda RUGGIERO called Tussionex is not covered on insurance cost is $65.  Patient requesting medication change. Marcela Speak with [facility] would like a call back with advice.

## 2020-01-22 ENCOUNTER — TELEPHONE (OUTPATIENT)
Dept: PULMONOLOGY | Age: 65
End: 2020-01-22

## 2020-01-22 NOTE — TELEPHONE ENCOUNTER
I called ans spoke with him  He has a new bipap from [facility] and he was told it would be one price and now th bill is almost twice as high. He can not afford it. He is returning PAP to them. I suggested he use his old PAP and we will get a download off it. If he is compliant, he will be able to be cleared for his surgeries. Follow up with me in 4 weeks.

## 2020-01-22 NOTE — TELEPHONE ENCOUNTER
Lobo phoned in and explained that he does not wish to bother you any longer and that he is done with [facility] and is throwing in the towel. He does not want to move forward the machine. He is done.

## 2020-01-22 NOTE — TELEPHONE ENCOUNTER
Patient requested you call him regarding pap. He is unable to afford machine very unhappy with [facility]. He has to take machine back and is wondering about using his old machine. Please advise, thank you.

## 2020-02-12 ENCOUNTER — TELEPHONE (OUTPATIENT)
Dept: PULMONOLOGY | Age: 65
End: 2020-02-12

## 2020-02-12 ENCOUNTER — TELEPHONE (OUTPATIENT)
Dept: FAMILY MEDICINE CLINIC | Age: 65
End: 2020-02-12

## 2020-02-12 NOTE — TELEPHONE ENCOUNTER
I believe any examination by me for clearance will have to wait until we know whether pulmonary will take him off of blood thinners or not. If they are going to rely on insertion of a Andre filter as treatment for the DVT and pulmonary emboli, then appointment with me can be scheduled as soon as the insertion takes place.

## 2020-02-12 NOTE — TELEPHONE ENCOUNTER
I poke with him. He is hoping to have neck surgery but not sure when. He jsut wanted to drop off pre-op papers to fannie filled out when he is ready for surgery. He is going to bring them when he follows up for PAP set up follow up. He will call if he needs follow up sooner.

## 2020-02-12 NOTE — TELEPHONE ENCOUNTER
Buck Choi spoke with Patience Valladares. Patient has several clearances to get through PCP, Pulmonary and ENT. Slime states Pulmonary has patient on blood thinners for his hx of PE, he will also be having a green field filter placed. ENT Dr. Rebecca Mercado will be clearing him for a neck mass present for several years. Ortho asking for PCP to place pre-op testing. No surgery date scheduled yet maybe the end of March. Please review and advise.

## 2020-02-21 ENCOUNTER — CARE COORDINATION (OUTPATIENT)
Dept: CARE COORDINATION | Age: 65
End: 2020-02-21

## 2020-02-21 NOTE — CARE COORDINATION
[facility] care [facility]. No answer. Message left to return call.

## 2020-02-24 ENCOUNTER — CARE COORDINATION (OUTPATIENT)
Dept: CARE COORDINATION | Age: 65
End: 2020-02-24

## 2020-02-24 SDOH — SOCIAL STABILITY: SOCIAL NETWORK: HOW OFTEN DO YOU GET TOGETHER WITH FRIENDS OR RELATIVES?: THREE TIMES A WEEK

## 2020-02-24 SDOH — ECONOMIC STABILITY: FOOD INSECURITY: WITHIN THE PAST 12 MONTHS, YOU WORRIED THAT YOUR FOOD WOULD RUN OUT BEFORE YOU GOT MONEY TO BUY MORE.: NEVER TRUE

## 2020-02-24 SDOH — HEALTH STABILITY: PHYSICAL HEALTH: ON AVERAGE, HOW MANY MINUTES DO YOU ENGAGE IN EXERCISE AT THIS LEVEL?: 20 MIN

## 2020-02-24 SDOH — HEALTH STABILITY: PHYSICAL HEALTH: ON AVERAGE, HOW MANY DAYS PER WEEK DO YOU ENGAGE IN MODERATE TO STRENUOUS EXERCISE (LIKE A BRISK WALK)?: 2 DAYS

## 2020-02-24 SDOH — ECONOMIC STABILITY: FOOD INSECURITY: WITHIN THE PAST 12 MONTHS, THE FOOD YOU BOUGHT JUST DIDN'T LAST AND YOU DIDN'T HAVE MONEY TO GET MORE.: NEVER TRUE

## 2020-02-24 SDOH — SOCIAL STABILITY: SOCIAL NETWORK: ARE YOU MARRIED, WIDOWED, DIVORCED, SEPARATED, NEVER MARRIED, OR LIVING WITH A PARTNER?: LIVING WITH PARTNER

## 2020-02-24 SDOH — ECONOMIC STABILITY: INCOME INSECURITY: HOW HARD IS IT FOR YOU TO PAY FOR THE VERY BASICS LIKE FOOD, HOUSING, MEDICAL CARE, AND HEATING?: NOT VERY HARD

## 2020-02-24 SDOH — HEALTH STABILITY: MENTAL HEALTH
STRESS IS WHEN SOMEONE FEELS TENSE, NERVOUS, ANXIOUS, OR CAN'T SLEEP AT NIGHT BECAUSE THEIR MIND IS TROUBLED. HOW STRESSED ARE YOU?: RATHER MUCH

## 2020-02-24 SDOH — ECONOMIC STABILITY: TRANSPORTATION INSECURITY
IN THE PAST 12 MONTHS, HAS LACK OF TRANSPORTATION KEPT YOU FROM MEETINGS, WORK, OR FROM GETTING THINGS NEEDED FOR DAILY LIVING?: NO

## 2020-02-24 SDOH — SOCIAL STABILITY: SOCIAL NETWORK: IN A TYPICAL WEEK, HOW MANY TIMES DO YOU TALK ON THE PHONE WITH FAMILY, FRIENDS, OR NEIGHBORS?: THREE TIMES A WEEK

## 2020-02-24 SDOH — ECONOMIC STABILITY: TRANSPORTATION INSECURITY
IN THE PAST 12 MONTHS, HAS THE LACK OF TRANSPORTATION KEPT YOU FROM MEDICAL APPOINTMENTS OR FROM GETTING MEDICATIONS?: NO

## 2020-02-24 NOTE — LETTER
2/24/2020    [facility]  [address] [facility] [address]    Dear Peyman Rizo,    I enjoyed speaking with you and wanted to send some additional information. Sophia Wright MD and I will work together to ensure your needs are met and help you achieve your health goals. We are committed to walk with you on this journey and look forward to working with you. Please feel free to contact me with any questions or concerns. I am available by phone or for appointments at the office. You can reach me at [phone].       In good health,     Ric Lu RN

## 2020-02-24 NOTE — CARE COORDINATION
Ambulatory Care Coordination Note  2/24/2020  CM Risk Score: 3  Charlson 10 Year Mortality Risk Score: 23%     ACC: Anna High, RN    Summary Note: Siobhan Tovar was referred to care coordination by the [facility] report. Spoke with Siobhan Tovar today. Introduced self and role. Agreeable to f/u calls. Pt is a DM. Last A1C was 11.8. Pt states that he is aware his DM has not been very controlled over the last few mths. Reports having a lot of health problems making and seeing several specialists. Pt states \"I have let things get out of control and I'm working on getting them back down. \"    DM: checking BS's before meals. Denies episodes of hypoglycemia. Having BS's 200-300 range. Pt recently started exercising again. Has some exercise equip in the home that he has access to. Drinking Ra D at times. Encouraged to avoid sugary drinks unless treating a low BS. Reviewed and educated on DM zone mgmt. Tool sheet mailed. Encouraged to work on making some dietary changes and increasing activity. Pt denies h/o CHF. Has been having cervical issues. Pt reports that it causes him to be unsteady at times. No recent falls. Pt reports that he is very cautious. Following with ortho doctor in [place]. States that he needs to have C5,C6,C7 surgery. Hoping to have done in the next mth. I informed pt that his BS's need to be better controlled for surgery. Working to get cleared by PULM, ENT, and PCP. Waiting to hear back from ortho. Once he does he will contact myself or office to arrange a PCP appt. Plan of care: Welcome letter, right place, right time, right care and DM zone mgmt mailed to pt  Call if needing to arrange appt for preop clearance  Monitor BS's before meals. Increase activity  Has colonoscopy scheduled for next mth and has f/u with pulm for sleep apnea  Reports that he has been wearing his Bipap at least 4 hrs per night.    Diabetes Assessment    Medic Alert ID:  No  Meal Planning:  Avoidance of concentrated sweets   How often do you test your blood sugar?:  Meals   Do you have barriers with adherence to non-pharmacologic self-management interventions? (Nutrition/Exercise/Self-Monitoring):  No   Have you ever had to go to the ED for symptoms of low blood sugar?:  No       Do you have hyperglycemia symptoms?:  No   Do you have hypoglycemia symptoms?:  No   Last Blood Sugar Value:  255   Blood Sugar Monitoring Regimen:  Before Meals   Blood Sugar Trends:  No Change              Ambulatory Care Coordination Assessment    Care Coordination Protocol  Program Enrollment:  Complex Care  Referral from Primary Care Provider:  No  Week 1 - Initial Assessment     Do you have all of your prescriptions and are they filled?:  Yes  Barriers to medication adherence:  None  Are you able to afford your medications?:  Yes  How often do you have trouble taking your medications the way you have been told to take them?:  Sometimes I take them as prescribed. Do you have Home O2 Therapy?:  No      Ability to seek help/take action for Emergent Urgent situations i.e. fire, crime, inclement weather or health crisis. :  Independent  Ability to ambulate to restroom:  Independent  Ability handle personal hygeine needs (bathing/dressing/grooming): Independent  Ability to manage Medications: Independent  Ability to prepare Food Preparation:  Independent  Ability to maintain home (clean home, laundry): Independent  Ability to drive and/or has transportation:  Independent  Ability to do shopping:  Independent  Ability to manage finances:   Independent  Is patient able to live independently?:  Yes     Current Housing:  Private Residence        Per the Fall Risk Screening, did the patient have 2 or more falls or 1 fall with injury in the past year?:  No     Do you use rails/bars?:  No  Do you have a non-slip tub mat?:  Yes     Are you experiencing loss of meaning?:  No  Are you experiencing loss of hope and peace?:  No     Thinking about your patient's physical health needs, are there any symptoms or problems (risk indicators) you are unsure about that require further investigation?:  No identified areas of uncertainly or problems already being investigated   Are the patients physical health problems impacting on their mental well-being?:  No identified areas of concern   Are there any problems with your patients lifestyle behaviors (alcohol, drugs, diet, exercise) that are impacting on physical or mental well-being?:  No identified areas of concern   Do you have any other concerns about your patients mental well-being? How would you rate their severity and impact on the patient?:  No identified areas of concern   How would you rate their home environment in terms of safety and stability (including domestic violence, insecure housing, neighbor harassment)?:  Consistently safe, supportive, stable, no identified problems   How do daily activities impact on the patient's well-being? (include current or anticipated unemployment, work, caregiving, access to transportation or other):  No identified problems or perceived positive benefits   How would you rate their social network (family, work, friends)?:  Good participation with social networks   How would you rate their financial resources (including ability to afford all required medical care)?:  Financially secure, resources adequate, no identified problems   How wells does the patient now understand their health and well-being (symptoms, signs or risk factors) and what they need to do to manage their health?:  Reasonable to good understanding and already engages in managing health or is willing to undertake better management   How well do you think your patient can engage in healthcare discussions?  (Barriers include language, deafness, aphasia, alcohol or drug problems, learning difficulties, concentration):  Clear and open communication, no identified barriers   Do other services need to be tablet Take 1 tablet by mouth 3 times daily as needed for Dizziness or Nausea  Patient not taking: Reported on 2/5/2020 1/10/20 3/10/20  Sharon Oseguera MD   nitroGLYCERIN (NITROSTAT) 0.4 MG SL tablet Place 1 tablet under the tongue every 5 minutes as needed for Chest pain 5/21/16   Ramón Hester MD   cyclobenzaprine (FLEXERIL) 10 MG tablet Take 0.5 tablets by mouth 3 times daily as needed for Muscle spasms  Patient not taking: Reported on 2/24/2020 5/14/15   Danie Arias PA-C       Future Appointments   Date Time Provider Hailey Brown   3/18/2020  [address] [facility]   4/30/2020 11:15 AM Sharon Oseguera MD [address]

## 2020-03-03 ENCOUNTER — CARE COORDINATION (OUTPATIENT)
Dept: CARE COORDINATION | Age: 65
End: 2020-03-03

## 2020-03-12 ENCOUNTER — HOSPITAL ENCOUNTER (OUTPATIENT)
Age: 65
Setting detail: OUTPATIENT SURGERY
Discharge: HOME | End: 2020-03-12
Attending: INTERNAL MEDICINE | Admitting: INTERNAL MEDICINE
Payer: MEDICARE

## 2020-03-12 ENCOUNTER — ANESTHESIA (OUTPATIENT)
Dept: ENDOSCOPY | Age: 65
End: 2020-03-12
Payer: MEDICARE

## 2020-03-12 ENCOUNTER — ANESTHESIA EVENT (OUTPATIENT)
Dept: ENDOSCOPY | Age: 65
End: 2020-03-12
Payer: MEDICARE

## 2020-03-12 VITALS
DIASTOLIC BLOOD PRESSURE: 73 MMHG | OXYGEN SATURATION: 95 % | BODY MASS INDEX: 30.38 KG/M2 | WEIGHT: 217 LBS | TEMPERATURE: 97.8 F | SYSTOLIC BLOOD PRESSURE: 108 MMHG | RESPIRATION RATE: 14 BRPM | HEART RATE: 55 BPM | HEIGHT: 71 IN

## 2020-03-12 VITALS
OXYGEN SATURATION: 98 % | RESPIRATION RATE: 18 BRPM | SYSTOLIC BLOOD PRESSURE: 96 MMHG | DIASTOLIC BLOOD PRESSURE: 65 MMHG

## 2020-03-12 PROCEDURE — 2500000003 HC RX 250 WO HCPCS: Performed by: NURSE ANESTHETIST, CERTIFIED REGISTERED

## 2020-03-12 PROCEDURE — 7100000000 HC PACU RECOVERY - FIRST 15 MIN: Performed by: INTERNAL MEDICINE

## 2020-03-12 PROCEDURE — 88305 TISSUE EXAM BY PATHOLOGIST: CPT

## 2020-03-12 PROCEDURE — 6360000002 HC RX W HCPCS: Performed by: NURSE ANESTHETIST, CERTIFIED REGISTERED

## 2020-03-12 PROCEDURE — 2709999900 HC NON-CHARGEABLE SUPPLY: Performed by: INTERNAL MEDICINE

## 2020-03-12 PROCEDURE — 3700000000 HC ANESTHESIA ATTENDED CARE: Performed by: INTERNAL MEDICINE

## 2020-03-12 PROCEDURE — 3609012400 HC EGD TRANSORAL BIOPSY SINGLE/MULTIPLE: Performed by: INTERNAL MEDICINE

## 2020-03-12 PROCEDURE — 3609010600 HC COLONOSCOPY POLYPECTOMY SNARE/COLD BIOPSY: Performed by: INTERNAL MEDICINE

## 2020-03-12 PROCEDURE — 2580000003 HC RX 258: Performed by: INTERNAL MEDICINE

## 2020-03-12 PROCEDURE — 2720000010 HC SURG SUPPLY STERILE: Performed by: INTERNAL MEDICINE

## 2020-03-12 PROCEDURE — 3609017700 HC EGD DILATION GASTRIC/DUODENAL STRICTURE: Performed by: INTERNAL MEDICINE

## 2020-03-12 PROCEDURE — 3700000001 HC ADD 15 MINUTES (ANESTHESIA): Performed by: INTERNAL MEDICINE

## 2020-03-12 PROCEDURE — 7100000001 HC PACU RECOVERY - ADDTL 15 MIN: Performed by: INTERNAL MEDICINE

## 2020-03-12 RX ORDER — SODIUM CHLORIDE 450 MG/100ML
INJECTION, SOLUTION INTRAVENOUS CONTINUOUS
Status: DISCONTINUED | OUTPATIENT
Start: 2020-03-12 | End: 2020-03-12 | Stop reason: HOSPADM

## 2020-03-12 RX ORDER — PROPOFOL 10 MG/ML
INJECTION, EMULSION INTRAVENOUS PRN
Status: DISCONTINUED | OUTPATIENT
Start: 2020-03-12 | End: 2020-03-12 | Stop reason: SDUPTHER

## 2020-03-12 RX ORDER — LIDOCAINE HYDROCHLORIDE 20 MG/ML
INJECTION, SOLUTION EPIDURAL; INFILTRATION; INTRACAUDAL; PERINEURAL PRN
Status: DISCONTINUED | OUTPATIENT
Start: 2020-03-12 | End: 2020-03-12 | Stop reason: SDUPTHER

## 2020-03-12 RX ADMIN — PROPOFOL 50 MG: 10 INJECTION, EMULSION INTRAVENOUS at 09:20

## 2020-03-12 RX ADMIN — PROPOFOL 50 MG: 10 INJECTION, EMULSION INTRAVENOUS at 09:07

## 2020-03-12 RX ADMIN — PROPOFOL 50 MG: 10 INJECTION, EMULSION INTRAVENOUS at 09:17

## 2020-03-12 RX ADMIN — PROPOFOL 50 MG: 10 INJECTION, EMULSION INTRAVENOUS at 09:12

## 2020-03-12 RX ADMIN — SODIUM CHLORIDE: 4.5 INJECTION, SOLUTION INTRAVENOUS at 08:02

## 2020-03-12 RX ADMIN — PROPOFOL 50 MG: 10 INJECTION, EMULSION INTRAVENOUS at 09:08

## 2020-03-12 RX ADMIN — PROPOFOL 50 MG: 10 INJECTION, EMULSION INTRAVENOUS at 09:30

## 2020-03-12 RX ADMIN — LIDOCAINE HYDROCHLORIDE 100 MG: 20 INJECTION, SOLUTION EPIDURAL; INFILTRATION; INTRACAUDAL; PERINEURAL at 09:07

## 2020-03-12 RX ADMIN — PROPOFOL 50 MG: 10 INJECTION, EMULSION INTRAVENOUS at 09:24

## 2020-03-12 ASSESSMENT — PAIN SCALES - GENERAL: PAINLEVEL_OUTOF10: 0

## 2020-03-12 ASSESSMENT — PAIN - FUNCTIONAL ASSESSMENT: PAIN_FUNCTIONAL_ASSESSMENT: 0-10

## 2020-03-12 NOTE — ANESTHESIA POSTPROCEDURE EVALUATION
Department of Anesthesiology  Postprocedure Note    Patient: Jose Us  MRN: [id number]  YOB: 1955  Date of evaluation: 3/12/2020  Time:  9:37 AM     Procedure Summary     Date:  03/12/20 Room / Location:  [address] [place] [address]    Anesthesia Start:  0901 Anesthesia Stop:  8918    Procedures:       COLONOSCOPY POLYPECTOMY SNARE/COLD BIOPSY (N/A )      EGD ESOPHAGOGASTRODUODENOSCOPY DILATATION (Left Esophagus)      EGD BIOPSY (Left Esophagus) Diagnosis:  (HX OF COLON POLYPS, CHANGE IN BOWEL HABITS, IRREGULAR BOWEL HABITS, ALTERNATING CONSTIPATION AND DIARRHEA)    Surgeon:  Theresa Manning MD Responsible Provider:  Shirley Mao MD    Anesthesia Type:  MAC ASA Status:  3          Anesthesia Type: MAC    Raya Phase I: Raya Score: 10    Raya Phase II:      Last vitals: Reviewed and per EMR flowsheets.        Anesthesia Post Evaluation    Patient location during evaluation: bedside  Patient participation: complete - patient participated  Level of consciousness: awake and alert  Airway patency: patent  Nausea & Vomiting: no nausea and no vomiting  Complications: no  Cardiovascular status: hemodynamically stable  Respiratory status: acceptable, room air and spontaneous ventilation  Hydration status: euvolemic

## 2020-03-12 NOTE — BRIEF OP NOTE
Brief Postoperative Note  ______________________________________________________________    Patient: Dinah Hernandez  YOB: 1955  MRN: [id number]  Date of Procedure: 3/12/2020    Pre-Op Diagnosis: HX OF COLON POLYPS, CHANGE IN BOWEL HABITS, IRREGULAR BOWEL HABITS, ALTERNATING CONSTIPATION AND DIARRHEA and dysphagia .     Post-Op Diagnosis: GERD, esophageal dilation, gastritis , esophageal dilation , polyps and diverticulosis        Procedure(s):  COLONOSCOPY POLYPECTOMY SNARE/COLD BIOPSY  EGD ESOPHAGOGASTRODUODENOSCOPY DILATATION  EGD BIOPSY    Anesthesia: Monitor Anesthesia Care    Surgeon(s):  Joshua Goodman MD    Assistant: Kait Dai RN     Estimated Blood Loss (mL): none    Complications: None    Specimens:   ID Type Source Tests Collected by Time Destination   A : GASTRIC ANTRUM GASTRITIS Tissue Stomach SURGICAL PATHOLOGY Joshua Goodman MD 3/12/2020 0912    B : RECTAL POLYP Tissue Recto sigmoid SURGICAL PATHOLOGY Joshua Goodman MD 3/12/2020 9353        Implants:  * No implants in log *      Drains: * No LDAs found *    Findings:  GERD, esophageal dilation, gastritis , esophageal dilation , polyps and diverticulosis     Joshua Goodman MD  Date: 3/12/2020  Time: 9:37 AM

## 2020-03-12 NOTE — PROGRESS NOTES
4400) pt to PACU   0940) pt family at bedside   46) dr in speaking to family and pt about findings and POC   [place] 30) pt drinking   [address] pt iv d/c'd  1000) AVS printed and gone over with family and patient, questions answered pt verbalizes understanding   1005) pt getting dressed

## 2020-03-12 NOTE — PROGRESS NOTES
Colonoscopy complete. Photos taken. Biopsies taken by Hot snare sent to lab in 1 specimen jar. Patient tolerated well. Colon scope [id number] used.

## 2020-03-12 NOTE — ANESTHESIA PRE PROCEDURE
Department of Anesthesiology  Preprocedure Note       Name:  Isacc Davies   Age:  62 y.o.  :  1955                                          MRN:  [id number]         Date:  3/12/2020      Surgeon: Caridad Minaya):  Ayleen Perry MD    Procedure: COLONOSCOPY DIAGNOSTIC (N/A )  EGD ESOPHAGOGASTRODUODENOSCOPY DILATATION (Left Esophagus)    Medications prior to admission:   Prior to Admission medications    Medication Sig Start Date End Date Taking? Authorizing Provider   Ergocalciferol (VITAMIN D2 PO) Vitamin D2 1,250 mcg (50,000 unit) capsule   Take 1 capsule every week by oral route.     Historical Provider, MD   acyclovir (ZOVIRAX) 800 MG tablet TAKE 1 TABLET BY MOUTH AS NEEDED (HERPES LABIALIS) 20   Historical Provider, MD   insulin glargine (LANTUS SOLOSTAR) 100 UNIT/ML injection pen Inject 20 Units into the skin 2 times daily 1/10/20   Richie Magana MD   insulin lispro, 1 Unit Dial, ([facility]) 100 UNIT/ML SOPN Inject 5 Units into the skin 3 times daily (before meals) 1/10/20 1/4/21  Richie Magana MD   glyBURIDE (DIABETA) 5 MG tablet Take 1 tablet by mouth daily (with breakfast) 10/31/19 4/28/20  Richie Magana MD   omeprazole (PRILOSEC) 40 MG delayed release capsule Take 1 capsule by mouth Daily 19   Richie Magana MD   sertraline (ZOLOFT) 25 MG tablet Take 100 mg by mouth daily     Historical Provider, MD   rivaroxaban (XARELTO) 10 MG TABS tablet Take 1 tablet by mouth daily (with breakfast) 3/12/19   RAFAEL Busby - CNP   lisinopril (PRINIVIL;ZESTRIL) 5 MG tablet Take 1 tablet by mouth daily 2/15/19   Richie Magana MD   nitroGLYCERIN (NITROSTAT) 0.4 MG SL tablet Place 1 tablet under the tongue every 5 minutes as needed for Chest pain 16   Sofie Rogers MD   metoprolol (TOPROL XL) 25 MG XL tablet Take 1 tablet by mouth daily 16   Sofie Rogers MD   aspirin 81 MG tablet Take 81 mg by mouth daily    Historical Provider, MD   vitamin D 1000 UNITS CAPS Take by mouth daily Historical Provider, MD   pravastatin (PRAVACHOL) 80 MG tablet Take 80 mg by mouth daily    Historical Provider, MD   cyclobenzaprine (FLEXERIL) 10 MG tablet Take 0.5 tablets by mouth 3 times daily as needed for Muscle spasms  Patient not taking: Reported on 2/24/2020 5/14/15   Lazara Carpenter PA-C       Current medications:    Current Facility-Administered Medications   Medication Dose Route Frequency Provider Last Rate Last Dose    0.45 % sodium chloride infusion   Intravenous Continuous Rosa Elena Moyer MD 75 mL/hr at 03/12/20 0802         Allergies: Allergies   Allergen Reactions    Latex Rash       Problem List:    Patient Active Problem List   Diagnosis Code    Chest pain at rest R07.9    Type 2 diabetes mellitus (HCC) E11.9    GERD with stricture K21.9, K22.2    Back pain at L4-L5 level M54.5    Gastroparesis K31.84    Status post dilatation of esophageal stricture Z98.890, Z87.19    History of colonic polyps Z86.010    Acute saddle pulmonary embolism with acute cor pulmonale (HCC) I26.02    Medication monitoring encounter Z51.81    Mixed hyperlipidemia E78.2    SHAHRAM (obstructive sleep apnea) G47.33    Obesity (BMI 30-39. 9) E66.9    Personal history of tobacco use Z87.891    Hypersomnia G47.10       Past Medical History:        Diagnosis Date    Anxiety     CAD (coronary artery disease)     Depression     Frequent headaches     Gastroparesis     GERD (gastroesophageal reflux disease)     History of Chiari malformation     Hyperlipidemia     Hypertension     Kidney stone     Kidney stone     Migraine     Movement disorder     DDD    Pulmonary emboli ([address] 10/29/2018    Sleep apnea     Tattoos        Past Surgical History:        Procedure Laterality Date    BACK SURGERY  2008    C1 oates & decompression    BRAIN SURGERY  2008    chiari    CARDIAC SURGERY  2007    heart cath    CHOLECYSTECTOMY  2009    COLONOSCOPY  2017    ENDOSCOPY, COLON, DIAGNOSTIC  2007    FRACTURE SURGERY 1964    right elbow    NASAL SEPTUM SURGERY  2006    SHOULDER SURGERY Left 2018    TONSILLECTOMY  1964    VASCULAR SURGERY  2015    right middle finger artery       Social History:    Social History     Tobacco Use    Smoking status: Former Smoker     Packs/day: 1.50     Years: 3.00     Pack years: 4.50     Types: Cigarettes     Start date: 1976     Last attempt to quit: 10/12/1979     Years since quittin.4    Smokeless tobacco: Never Used   Substance Use Topics    Alcohol use: No                                Counseling given: Not Answered      Vital Signs (Current):   Vitals:    20 0749   BP: 126/73   Pulse: 70   Resp: 14   Temp: 36.6 °C (97.9 °F)   TempSrc: Temporal   SpO2: 97%   Weight: 217 lb (98.4 kg)   Height: 5' 11\" (1.803 m)                                              BP Readings from Last 3 Encounters:   20 126/73   20 (!) 140/83   20 120/72       NPO Status: Time of last liquid consumption:                         Time of last solid consumption:                         Date of last liquid consumption: 20                        Date of last solid food consumption: 03/10/20    BMI:   Wt Readings from Last 3 Encounters:   20 217 lb (98.4 kg)   20 214 lb 3.2 oz (97.2 kg)   20 210 lb 3.2 oz (95.3 kg)     Body mass index is 30.27 kg/m².     CBC:   Lab Results   Component Value Date    WBC 8.7 2019    RBC 5.09 2019    HGB 15.6 2019    HCT 45.0 2019    MCV 88.4 2019    RDW 14.2 2018     2019       CMP:   Lab Results   Component Value Date     2019    K 3.7 2019     2019    CO2 23 2019    BUN 15 2019    CREATININE 1.0 2019    LABGLOM 75 2019    GLUCOSE 146 2019    PROT 7.3 10/30/2018    CALCIUM 9.2 2019    BILITOT 0.5 10/30/2018    ALKPHOS 134 10/30/2018    AST 20 10/30/2018    ALT 25 10/30/2018       POC Tests: No

## 2020-03-12 NOTE — H&P
Sedation/Analgesia History & Physical    Patient: Paula Schaeffer : [age]  [facility] Rec#: [id number] Acc#: [id number]   Provider Performing Procedure: Leisa Monroe  Primary Care Physician: Elfego Sanchez MD    PRE-PROCEDURE   Full CODE [x]Yes  DNR-CCA/DNR-CC []Yes   Brief History/Pre-Procedure Diagnosis:history of polyps, melena , GERD and dysphagai           MEDICAL HISTORY    []Additional information:       has a past medical history of Anxiety, CAD (coronary artery disease), Depression, Frequent headaches, Gastroparesis, GERD (gastroesophageal reflux disease), History of Chiari malformation, Hyperlipidemia, Hypertension, Kidney stone, Kidney stone, Migraine, Movement disorder, Pulmonary emboli ([address] Sleep apnea, and Tattoos. SOCIAL HISTORY  Social History     Tobacco History     Smoking Status  Former Smoker Smoking Start Date  1976 Quit date  10/12/1979 Smoking Frequency  1.5 packs/day for 3 years (4.5 pk yrs)    Smoking Tobacco Type  Cigarettes    Smokeless Tobacco Use  Never Used          Alcohol History     Alcohol Use Status  No          Drug Use     Drug Use Status  No          Sexual Activity     Sexually Active  Yes Partners  Female                FAMILY HISTORY     Family History   Problem Relation Age of Onset    Kidney Disease Mother     Diabetes Mother     Heart Disease Mother     High Blood Pressure Mother     Diabetes Father     Cancer Father         lung--smoker    High Blood Pressure Father     Diabetes Sister     High Blood Pressure Sister     Diabetes Brother     High Blood Pressure Brother        SURGICAL HISTORY   has a past surgical history that includes Tonsillectomy (); back surgery (); Colonoscopy (); Endoscopy, colon, diagnostic (); vascular surgery (); brain surgery (); Cholecystectomy (); fracture surgery (); Cardiac surgery (); shoulder surgery (Left, 2018); and Nasal septum surgery ().   Additional expectations with patient and/or responsible adult completed. [x]Patient examined immediately prior to the procedure.  (Refer to nursing sedation/analgesia documentation record)    Hien Toro MD   Electronically signed 3/12/2020 at 9:00 AM

## 2020-03-13 ENCOUNTER — CARE COORDINATION (OUTPATIENT)
Dept: CARE COORDINATION | Age: 65
End: 2020-03-13

## 2020-03-13 NOTE — OP NOTE
[address] [place] [address]                                OPERATIVE REPORT    PATIENT NAME: Dale Ray                      :        1955  MED REC NO:   [id number]                           ROOM:  ACCOUNT NO:   [de-identified]                           ADMIT DATE: 2020  PROVIDER:     Tiffanie Kingston M.D.    Ibeth Nuñez OF PROCEDURE:  2020    INDICATIONS:  The patient with a history of dysphagia, gastric reflux,  abdominal discomfort, history of polyps in the past, change in bowel  habits. Plan today for upper endoscopy evaluation with possible  dilation as well as colonoscopy. SURGEON:  Tiffanie Kingston MD    ASA CLASSIFICATION:  III. Estimated blood loss in none    DESCRIPTION OF PROCEDURE:  The patient was brought to the GI lab. Consent was obtained. Risks involved with the procedure were explained  to the patient. Informed consent was obtained. The patient was  monitored during the procedure with pulse oximetry, blood pressure  monitoring, and oxygen by nasal cannula. Sedation by incremental doses  of IV propofol given by the anesthesia service to achieve total IV  anesthesia. For ASA classification and medication given during the  procedure, please see Anesthesia note. PROCEDURE PERFORMED:  EGD with dilation as well as biopsy. Standard video 190 Olympus upper scope was advanced under direct vision  from the oral cavity up to the duodenum. Esophagus featured mild acid  reflux. No well-defined stricture seen. I elected to dilate the  patient as the patient complained of dysphagia. Scope was advanced to  the stomach. Retroflex examination of the cardia revealed small hiatus  hernia. Patchy erythema, mild gastritis seen in the distal part of the  body of the antrum. Duodenum appeared normal.  Scope was withdrawn  through the antrum. Guidewire was introduced. Scope was withdrawn.   An  American dilator starting from size 48-French until 54-French introduced  over the guidewire, led to dilation of the esophagus. Dilator was  withdrawn. Scope was advanced again. Inspected the site of the  dilation. Seen no bleeding, no perforations. Biopsies from the antrum  to evaluate for gastritis and procedure terminated with no immediate  complications. IMPRESSION:  1. Dysphagia. 2.  Successful dilation until size 54-French. 3.  Gastritis. 4.  Acid reflux. PLAN:  1. Follow up with biopsy results in GI or evaluation. More  recommendation after reviewing the biopsy results. 2.  Repeat upper endoscopy with dilation as needed for dysphagia. PROCEDURE PERFORMED:  Colonoscopy with polypectomy using snare. Digital examination revealed normal rectum. Standard colonoscope was  advanced under direct vision from the rectum up to the cecum. Prep was  good and the patient tolerated the procedure well. Cecum intubation was  confirmed by appendiceal orifice. Scope was withdrawn. Mild  nonclinically significant diverticulosis seen. No diverticulitis. No  colitis seen. Polyps seen in the rectum, measured 0.3 x _____ cm,  sessile, excised with a snare, tissue retrieved. Seen also grade 2  internal hemorrhoids, not actively bleeding on withdrawing the scope. Scope was withdrawn with no immediate complication. IMPRESSION:  1. Rectal polyp excised with a snare. 2.  Internal hemorrhoids. 3.  Very mild, not clinically significant diverticulosis. PLAN:  1. High-fiber diet. 2.  Follow up with biopsy results at the GI clinic for evaluation. 3.  Recall colonoscopy depends on the results of the biopsy. We will  discuss that in the office.         Butch Lincoln M.D.    D: 03/12/2020 9:54:39       T: 03/12/2020 12:10:11     [email or web address]  Job#: [id number]     Doc#: [id number]    CC:  Amber Price MD

## 2020-03-25 ENCOUNTER — CARE COORDINATION (OUTPATIENT)
Dept: CARE COORDINATION | Age: 65
End: 2020-03-25

## 2020-03-25 NOTE — CARE COORDINATION
Ambulatory Care Coordination Note  3/25/2020  CM Risk Score: 3  Charlson 10 Year Mortality Risk Score: 23%     ACC: Percy Lim, RN    Summary Note: Gerardo Mauricio is being followed by care coordination for education and assistance in managing his DM. Spoke with Gerardo Mauricio today for f/u. Pt reports that he is doing well. DM: reports BS's are slowly improving. Majority of readings have been in mid to upper 100 range. Denies hypoglycemia episodes. Has made dietary changes which has helped to improve BS's. Pt recently had colonoscopy and stretching of the esophagus. States that ever since that procedure on 3/12 he has been eating smaller portions. Pt states that he will start to get slight nausea and pressure in abd causing him to stop eating. Denies vomiting. Reports bowels moving regularly. Has occasional diarrhea but reports that this is his \"normal.\"  biopsy was taken but pt has not heard back re: results. Encouraged him to f/u with Dr. Prince Lr office re: symptoms as well as results. Verbalizes understanding. Encouraged small frequent meals. Plan of care:  Continue monitoring BS's closely. Has not checked BS yet today. Stressed the need to do so. Pt will check after getting off of the phone  Encouraged to eat small frequent meals  F/u with Dr. Prince Lr office re: GI symptoms and biopsy results. Continue working on keeping BS's under control. Call with new questions or concerns  Patient contacted regarding recent discharge and COVID-19 risk   Care Transition Nurse/ Ambulatory Care Manager contacted the patient by telephone to perform post discharge assessment. Verified name and  with patient as identifiers. Patient has following risk factors of: DM. CTN/ACM reviewed discharge instructions, medical action plan and red flags related to discharge diagnosis. Reviewed and educated them on any new and changed medications related to discharge diagnosis.   Advised obtaining a 90-day supply of all daily and as-needed medications. Education provided regarding infection prevention, and signs and symptoms of COVID-19 and when to seek medical attention with patient who verbalized understanding. Discussed exposure protocols and quarantine from [address] you at higher risk for severe illness 2019 and given an opportunity for questions and concerns. The patient agrees to contact the COVID-19 hotline [phone] or PCP office for questions related to their healthcare. CTN/ACM provided contact information for future reference. From CDC: Are you at higher risk for severe illness?  Wash your hands often.  Avoid close contact (6 feet, which is about two arm lengths) with people who are sick.  Put distance between yourself and other people if COVID-19 is spreading in your community.  Clean and disinfect frequently touched surfaces.  Avoid all cruise travel and non-essential air travel.  Call your healthcare professional if you have concerns about COVID-19 and your underlying condition or if you are sick. For more information on steps you can take to protect yourself, see CDC's How to Protect Yourself    Diabetes Assessment    Medic Alert ID:  No  Meal Planning:  Avoidance of concentrated sweets   How often do you test your blood sugar?:  Meals   Do you have barriers with adherence to non-pharmacologic self-management interventions?  (Nutrition/Exercise/Self-Monitoring):  No   Have you ever had to go to the ED for symptoms of low blood sugar?:  No       Do you have hyperglycemia symptoms?:  No   Do you have hypoglycemia symptoms?:  No   Blood Sugar Monitoring Regimen:  Before Meals                Care Coordination Interventions    Program Enrollment:  Complex Care  Referral from Primary Care Provider:  No  Suggested Interventions and Community Resources  Zone Management Tools:  Completed         Goals Addressed                 This Visit's Progress     Conditions and Symptoms   Improving I will schedule office visits, as directed by my provider. I will keep my appointment or reschedule if I have to cancel. I will notify my provider of any barriers to my plan of care. I will follow my Zone Management tool to seek urgent or emergent care. I will notify my provider of any symptoms that indicate a worsening of my condition. DM    Barriers: overwhelmed by complexity of regimen, stress and lack of education  Plan for overcoming my barriers: educate on symptom mgmt and zone sheet. Support from [facility], PCP. Confidence: 8/10  Anticipated Goal Completion Date: 5/24/20              Prior to Admission medications    Medication Sig Start Date End Date Taking? Authorizing Provider   Ergocalciferol (VITAMIN D2 PO) Vitamin D2 1,250 mcg (50,000 unit) capsule   Take 1 capsule every week by oral route.     Historical Provider, MD   acyclovir (ZOVIRAX) 800 MG tablet TAKE 1 TABLET BY MOUTH AS NEEDED (HERPES LABIALIS) 1/8/20   Historical Provider, MD   insulin glargine (LANTUS SOLOSTAR) 100 UNIT/ML injection pen Inject 20 Units into the skin 2 times daily 1/10/20   Arturo Jin MD   insulin lispro, 1 Unit Dial, ([facility]) 100 UNIT/ML SOPN Inject 5 Units into the skin 3 times daily (before meals) 1/10/20 1/4/21  Arturo Jin MD   glyBURIDE (DIABETA) 5 MG tablet Take 1 tablet by mouth daily (with breakfast) 10/31/19 4/28/20  Arturo Jin MD   omeprazole (PRILOSEC) 40 MG delayed release capsule Take 1 capsule by mouth Daily 4/1/19   Arturo Jin MD   sertraline (ZOLOFT) 25 MG tablet Take 100 mg by mouth daily     Historical Provider, MD   rivaroxaban (XARELTO) 10 MG TABS tablet Take 1 tablet by mouth daily (with breakfast) 3/12/19   RAFAEL Bell CNP   lisinopril (PRINIVIL;ZESTRIL) 5 MG tablet Take 1 tablet by mouth daily 2/15/19   Arturo Jin MD   nitroGLYCERIN (NITROSTAT) 0.4 MG SL tablet Place 1 tablet under the tongue every 5 minutes as needed for Chest pain 5/21/16   Yamileth Giordano MD

## 2020-04-03 ENCOUNTER — TELEPHONE (OUTPATIENT)
Dept: FAMILY MEDICINE CLINIC | Age: 65
End: 2020-04-03

## 2020-04-03 RX ORDER — GLUCOSAMINE HCL/CHONDROITIN SU 500-400 MG
CAPSULE ORAL
Qty: 300 STRIP | Refills: 5 | Status: SHIPPED | OUTPATIENT
Start: 2020-04-03 | End: 2022-01-05

## 2020-04-17 ENCOUNTER — CARE COORDINATION (OUTPATIENT)
Dept: CARE COORDINATION | Age: 65
End: 2020-04-17

## 2020-04-27 ENCOUNTER — TELEMEDICINE (OUTPATIENT)
Dept: PULMONOLOGY | Age: 65
End: 2020-04-27
Payer: MEDICARE

## 2020-04-27 PROCEDURE — 99213 OFFICE O/P EST LOW 20 MIN: CPT | Performed by: PHYSICIAN ASSISTANT

## 2020-04-27 ASSESSMENT — ENCOUNTER SYMPTOMS
EYES NEGATIVE: 1
NAUSEA: 0
CHEST TIGHTNESS: 0
BACK PAIN: 0
SHORTNESS OF BREATH: 0
COUGH: 0
ALLERGIC/IMMUNOLOGIC NEGATIVE: 1
WHEEZING: 0
DIARRHEA: 0
STRIDOR: 0

## 2020-04-27 NOTE — PROGRESS NOTES
Inject 5 Units into the skin 3 times daily (before meals) 10 pen 3    glyBURIDE (DIABETA) 5 MG tablet Take 1 tablet by mouth daily (with breakfast) 30 tablet 5    omeprazole (PRILOSEC) 40 MG delayed release capsule Take 1 capsule by mouth Daily 30 capsule 5    sertraline (ZOLOFT) 25 MG tablet Take 100 mg by mouth daily       rivaroxaban (XARELTO) 10 MG TABS tablet Take 1 tablet by mouth daily (with breakfast) 30 tablet 5    lisinopril (PRINIVIL;ZESTRIL) 5 MG tablet Take 1 tablet by mouth daily 90 tablet 2    nitroGLYCERIN (NITROSTAT) 0.4 MG SL tablet Place 1 tablet under the tongue every 5 minutes as needed for Chest pain 25 tablet 3    metoprolol (TOPROL XL) 25 MG XL tablet Take 1 tablet by mouth daily 30 tablet 3    aspirin 81 MG tablet Take 81 mg by mouth daily      vitamin D 1000 UNITS CAPS Take by mouth daily       pravastatin (PRAVACHOL) 80 MG tablet Take 80 mg by mouth daily      cyclobenzaprine (FLEXERIL) 10 MG tablet Take 0.5 tablets by mouth 3 times daily as needed for Muscle spasms (Patient not taking: Reported on 2/24/2020) 30 tablet 0     No current facility-administered medications for this visit. Family History   Problem Relation Age of Onset    Kidney Disease Mother     Diabetes Mother     Heart Disease Mother     High Blood Pressure Mother     Diabetes Father     Cancer Father         lung--smoker    High Blood Pressure Father     Diabetes Sister     High Blood Pressure Sister     Diabetes Brother     High Blood Pressure Brother         Review of Systems -   Review of Systems   Constitutional: Negative for activity change, appetite change, chills and fever. HENT: Negative for congestion and postnasal drip. Eyes: Negative. Respiratory: Negative for cough, chest tightness, shortness of breath, wheezing and stridor. Cardiovascular: Negative for chest pain and leg swelling. Gastrointestinal: Negative for diarrhea and nausea.         Bloating   Endocrine: start of the visit. Total time spent on this encounter was   20 min     JOSIAH Swift PA-C  4/27/2020      An electronic signature was used to authenticate this note.

## 2020-04-28 ENCOUNTER — TELEMEDICINE (OUTPATIENT)
Dept: FAMILY MEDICINE CLINIC | Age: 65
End: 2020-04-28
Payer: MEDICARE

## 2020-04-28 PROCEDURE — 99214 OFFICE O/P EST MOD 30 MIN: CPT | Performed by: FAMILY MEDICINE

## 2020-04-28 ASSESSMENT — ENCOUNTER SYMPTOMS
ABDOMINAL DISTENTION: 1
EYES NEGATIVE: 1

## 2020-04-28 NOTE — PROGRESS NOTES
2020    TELEHEALTH EVALUATION -- Audio/Visual (During [facility]- public health emergency)    HPI:    Chandler Hayden (:  1955) has requested an audio/video evaluation for the following concern(s):    Encounter Diagnoses   Name Primary?  Type 2 diabetes mellitus with hyperglycemia, with long-term current use of insulin (HCC) Yes    SHAHRAM (obstructive sleep apnea)     CSA (central sleep apnea)     Class 1 obesity due to excess calories in adult, unspecified BMI, unspecified whether serious comorbidity present     DDD (degenerative disc disease), cervical     [address]ari mal[address] type I ([facility] [address] [facility]     Medication monitoring encounter     Warthitierney's tumor      DM2 is starting to come under control. His morning blood sugar this a.m. was 156 and he rarely sees a blood sugar in the morning over 200. Several months ago, he was typically seeing readings in the 200s and at times above 300. This is what accounted for his last hemoglobin A1c of 11.8%. Hopefully will be able to get another A1c shortly to document his improved control. Lab Results   Component Value Date    LABA1C 11.8 01/10/2020    LABA1C 9.7 2019    LABA1C 10.1 2018     Lab Results   Component Value Date    LABMICR 55.85 2018    LDLCALC 88 2016    CREATININE 1.0 2019     His weight has stabilized and at home he is typically seeing his weight around 215 pounds with very little fluctuation. Since he has been staying at home due to COVID-19 issues, he is concentrated on trying to get health parameters back into normal ranges anticipating the possibility of some upcoming surgical procedures to be done when the state lockdown is reversed. Most likely the first procedure to be done will be his neurosurgical cervical disc disease discectomy and fusion by his surgeon in [place]. He is looking at probable C4-7 fusion. Her symptoms in the upper extremities as a consequence of his cervical disease.     He is finally been set 2007    FRACTURE SURGERY  1964    right elbow    NASAL SEPTUM SURGERY  2006    SHOULDER SURGERY Left 08/16/2018    TONSILLECTOMY  1964    UPPER GASTROINTESTINAL ENDOSCOPY Left 3/12/2020    EGD ESOPHAGOGASTRODUODENOSCOPY DILATATION performed by Long Hernandez MD at [address] [place] [address] Left 3/12/2020    EGD BIOPSY performed by Long Hernandez MD at [address] [place] [address]  2015    right middle finger artery     Portions of this note were completed with a voice recording program.  Efforts were made to edit the dictations but occasionally words are mis-transcribed. Review of Systems   Constitutional: Negative. Negative for unexpected weight change. HENT: Negative. Eyes: Negative. Respiratory:        See HPI. Cardiovascular: Negative. Negative for chest pain, palpitations and leg swelling. Gastrointestinal: Positive for abdominal distention. Mild abdominal bloating from his current CPAP settings which will be adjusted by his pulmonary nurse practitioner. Endocrine: Negative. Negative for polydipsia, polyphagia and polyuria. Genitourinary: Negative. Musculoskeletal: Positive for arthralgias, neck pain and neck stiffness. Knee pain, see HPI. Skin: Negative. Allergic/Immunologic: Positive for environmental allergies. Neurological: Positive for weakness and numbness. Psychiatric/Behavioral: Negative. All other systems reviewed and are negative. Prior to Visit Medications    Medication Sig Taking? Authorizing Provider   CPAP Machine MISC by Does not apply route Please change BIPAP pressure to IPAP max 15 and EPAP min 6 and PS 4cm H20. Add EPR 1-3 for comfort  Vern Giordano PA-C   blood glucose monitor strips One Touch Ultra Test Strips. Test 2 times a day and as needed for diabetic symptoms.  DX: N33.86  Camilla Maynard MD   Ergocalciferol (VITAMIN D2 PO) Vitamin D2 1,250 mcg (50,000 unit) capsule   Take 1 capsule every individual homes. --Kain Sullivan MD on 4/28/2020 at 9:01 AM    An electronic signature was used to authenticate this note.

## 2020-05-07 ENCOUNTER — CARE COORDINATION (OUTPATIENT)
Dept: CARE COORDINATION | Age: 65
End: 2020-05-07

## 2020-06-10 ENCOUNTER — CARE COORDINATION (OUTPATIENT)
Dept: CARE COORDINATION | Age: 65
End: 2020-06-10

## 2020-06-12 ENCOUNTER — TELEPHONE (OUTPATIENT)
Dept: FAMILY MEDICINE CLINIC | Age: 65
End: 2020-06-12

## 2020-06-12 NOTE — TELEPHONE ENCOUNTER
Pt is scheduled with Dr Rayna Blevins 6/25/20 for a half dollar size abscess near his belt line. He called today asking if there were any cancellations because the abscess is worsening. I offered a different provider so he can be seen sooner. He wants Dr Rayna Blevins only.

## 2020-06-12 NOTE — TELEPHONE ENCOUNTER
Patient notified and will go to [facility] for evaluation  Appt on 6/25 cancelled

## 2020-06-16 ENCOUNTER — CARE COORDINATION (OUTPATIENT)
Dept: CARE COORDINATION | Age: 65
End: 2020-06-16

## 2020-06-16 ENCOUNTER — TELEPHONE (OUTPATIENT)
Dept: PULMONOLOGY | Age: 65
End: 2020-06-16

## 2020-06-16 NOTE — CARE COORDINATION
I have spoken with Vicky Guillermo this afternoon. He is agreeable with proceeding with his cervical surgery if clearance can be obtained. I  Have been in contact with [facility] at Dr. Katerina Abdalla office -ortho surgeon. They do not have specific testing that needs to be completed prior to surgery just whatever you feel appropriate to clear him for surgery. Requested new clearance form to be faxed directly to office for completion. Please advise. Ortho office will be coordinating with their vascular surgery dept for pt to get an IVC filter placed prior to cervical surgery. -ortho office is also sending new clearance form to [facility] as well. Pt has not had A1C completed since Jan and A1C at that time was 11.8. Pt aware that his A1C needs to be lower but is concerned that it isn't going to be as his numbers fluctuate from 150's to 300's.

## 2020-06-17 ENCOUNTER — TELEPHONE (OUTPATIENT)
Dept: INTERNAL MEDICINE CLINIC | Age: 65
End: 2020-06-17

## 2020-06-17 ENCOUNTER — TELEPHONE (OUTPATIENT)
Dept: FAMILY MEDICINE CLINIC | Age: 65
End: 2020-06-17

## 2020-06-17 NOTE — TELEPHONE ENCOUNTER
Spoke with [facility] at DM clinic. Informed her that referral was entered in by PCP. She will f/u on this and reach out to pt to arrange appt for next wk.

## 2020-06-17 NOTE — TELEPHONE ENCOUNTER
Son Montes De Oca I received a pre-op clearance request from Dr Jalyn Guo. I went ahead and sent your VV to them today, even though he called yesterday. I faxed this through his chart?

## 2020-06-18 ENCOUNTER — NURSE ONLY (OUTPATIENT)
Dept: LAB | Age: 65
End: 2020-06-18

## 2020-06-18 LAB
AVERAGE GLUCOSE: 249 MG/DL (ref 70–126)
BASOPHILS # BLD: 0.3 %
BASOPHILS ABSOLUTE: 0 THOU/MM3 (ref 0–0.1)
EOSINOPHIL # BLD: 1.5 %
EOSINOPHILS ABSOLUTE: 0.1 THOU/MM3 (ref 0–0.4)
ERYTHROCYTE [DISTWIDTH] IN BLOOD BY AUTOMATED COUNT: 13.1 % (ref 11.5–14.5)
ERYTHROCYTE [DISTWIDTH] IN BLOOD BY AUTOMATED COUNT: 43.2 FL (ref 35–45)
HBA1C MFR BLD: 10.3 % (ref 4.4–6.4)
HCT VFR BLD CALC: 44.2 % (ref 42–52)
HEMOGLOBIN: 15 GM/DL (ref 14–18)
IMMATURE GRANS (ABS): 0.01 THOU/MM3 (ref 0–0.07)
IMMATURE GRANULOCYTES: 0.1 %
LYMPHOCYTES # BLD: 37.6 %
LYMPHOCYTES ABSOLUTE: 2.8 THOU/MM3 (ref 1–4.8)
MCH RBC QN AUTO: 30.8 PG (ref 26–33)
MCHC RBC AUTO-ENTMCNC: 33.9 GM/DL (ref 32.2–35.5)
MCV RBC AUTO: 90.8 FL (ref 80–94)
MONOCYTES # BLD: 10 %
MONOCYTES ABSOLUTE: 0.7 THOU/MM3 (ref 0.4–1.3)
NUCLEATED RED BLOOD CELLS: 0 /100 WBC
PLATELET # BLD: 283 THOU/MM3 (ref 130–400)
PMV BLD AUTO: 9.6 FL (ref 9.4–12.4)
RBC # BLD: 4.87 MILL/MM3 (ref 4.7–6.1)
SEG NEUTROPHILS: 50.5 %
SEGMENTED NEUTROPHILS ABSOLUTE COUNT: 3.7 THOU/MM3 (ref 1.8–7.7)
WBC # BLD: 7.4 THOU/MM3 (ref 4.8–10.8)

## 2020-06-18 NOTE — TELEPHONE ENCOUNTER
Preop orders are in epic. I will need to see him for preoperative clearance 2 weeks prior to his pending surgery date.

## 2020-06-19 NOTE — RESULT ENCOUNTER NOTE
Hemoglobin A1c is 10.3 which is a average sugar of 249. This will need to be repeated 2 weeks prior to a confirmed surgery date for surgical clearance. This is an improvement from 5 months ago of 11.8%. To be clear for surgery, I would like to see it around 8% or lower if possible.

## 2020-06-23 ENCOUNTER — OFFICE VISIT (OUTPATIENT)
Dept: INTERNAL MEDICINE CLINIC | Age: 65
End: 2020-06-23
Payer: MEDICARE

## 2020-06-23 PROCEDURE — G0108 DIAB MANAGE TRN  PER INDIV: HCPCS | Performed by: INTERNAL MEDICINE

## 2020-06-23 NOTE — PROGRESS NOTES
Diabetes Mellitus Type II, Initial Visit: Patient here for an initial evaluation of Type 2 diabetes mellitus. Current symptoms/problems include none. The patient was initially diagnosed with Type 2 diabetes mellitus based on the following criteria:  1998. Known diabetic complications: autonomic neuropathy and peripheral neuropathy  Cardiovascular risk factors: diabetes mellitus, family history of premature cardiovascular disease, male gender, sedentary lifestyle and smoking/ tobacco exposure  Current diabetic medications include oral agent (monotherapy): glyburide (Diabeta). 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 bike/ stepper/ treadmill -picks 1 most morning 30 minutes    Current monitoring regimen: home blood tests - 4 times daily  Home blood sugar records: fasting range: 204-261 Dinner 336  Any episodes of hypoglycemia? no    Is He on ACE inhibitor or angiotensin II receptor blocker? Yes   lisinopril (Prinivil)    Focus:  Initial visit Diabetes education. Recent A1C 10.3%. He is taking basal and bolus insulin, as well as sulfonylurea. He is waiting for cervical spine surgery and needs to get blood sugars under better control. He has a history of Metformin use and denies issues with side effects. Lab work does not indicate contraindication for Metformin. Will discuss with PCP. Also, with basal/ bolus insulin, Glyburide impact is likely minimal and could consider stopping. He agrees to testing 4 times daily and report BS's in 5 days. He needs a new meter-will request script.  Follow up 3 weeks with DEVYN.    Plan:  Reviewed insulin actions/ medication actions; carb; exercise; treating low BS's  Check blood sugars waking up, before lunch, before dinner and bedtime          Goal for blood sugars   Send blood sugars to the Diabetes clinic next Monday 6/29/20 and weekly             --frederic Nichols RN             --[email or web address]             --[phone]  We will Dr. Rowan Head about new meter, about Metformin and about Glyburide  Lacie Krishnamurthy voices understanding of above instructions via teach back and willingness to participate in the above plan of care. Time spent in direct contact with patient 60 minutes.

## 2020-06-25 VITALS
WEIGHT: 218.4 LBS | TEMPERATURE: 98.4 F | HEIGHT: 71 IN | SYSTOLIC BLOOD PRESSURE: 136 MMHG | BODY MASS INDEX: 30.57 KG/M2 | HEART RATE: 65 BPM | DIASTOLIC BLOOD PRESSURE: 72 MMHG

## 2020-06-25 RX ORDER — BLOOD-GLUCOSE METER
1 EACH MISCELLANEOUS DAILY
Qty: 1 KIT | Refills: 0 | Status: SHIPPED | OUTPATIENT
Start: 2020-06-25 | End: 2022-01-05

## 2020-06-25 NOTE — TELEPHONE ENCOUNTER
Zaheer Brothers is requesting a meter script. He is currently using his sister's meter. Steve Hernandez reports a history of Metformin therapy that he did not have issues with. Lab results do not indicate any C/I for Metformin. Dr. Charu Stein,        Would you consider resuming Metformin therapy to facilitate BS control? If you agree, please note and return.

## 2020-06-26 ENCOUNTER — TELEPHONE (OUTPATIENT)
Dept: FAMILY MEDICINE CLINIC | Age: 65
End: 2020-06-26

## 2020-06-29 ENCOUNTER — CARE COORDINATION (OUTPATIENT)
Dept: CARE COORDINATION | Age: 65
End: 2020-06-29

## 2020-06-29 NOTE — CARE COORDINATION
Ambulatory Care Coordination Note  6/29/2020  CM Risk Score: 3  Charlson 10 Year Mortality Risk Score: 23%     ACC: Shreya Sharp, RN    Summary Note: Elmo Álvarez is being followed by care coordination  For education and assistance in managing his DM, CHF. Spoke with Elmo Álvarez today for f/u. DM: pt recently referred to DM clinic. Had initial appt last wk. Has been started on metformin twice daily in addition to other medications. Will be seeing dietician and CNP for DM in upcoming wks. Pt denies problems with metformin at this time. Has seen reduction in BS readings. Pt checking 4 x per day. Checking before meals and Post prandial at times. Has had some fasting readings in low 100's. Pt will report BS readings in a couple of days back to DM clinic. Denies episodes of hypoglycemia. Will be obtaining new glucometer. CGM and insulin pump Was discussed at DM clinic. Pt is interested in trying out if it's recommended. Current A1C is 10.3. Pt needs to get down more before cervical surgery will be approved. CHF: denies edema to ext's. Denies increased SOB. Ortho did receive pulmonary clearance. Pt working with ortho to obtain soft cervical brace for support. Pt working with RT re: bloating that he is experiencing with his Bipap. Reports setting was adjusted and has noticed some improvement. Plan of care:  Continue working with DM clinic. Work to keep majority of BS readings below 150  F/u with Dietician and CNP on 7/17  Monitor wts  Monitor for s/s of increased swelling and SOB  Call with new concerns  Obtain cervical collar once in  Obtain new meter to monitor BS's.   Call in BS readings to DM clinic in a couple of days  Call with new concerns  Continue following COVID precautions  Diabetes Assessment    Medic Alert ID:  No  Meal Planning:  Avoidance of concentrated sweets   How often do you test your blood sugar?:  Meals   Do you have barriers with adherence to non-pharmacologic E11.22 4/3/20   Elli Tovar MD   Ergocalciferol (VITAMIN D2 PO) Vitamin D2 1,250 mcg (50,000 unit) capsule   Take 1 capsule every week by oral route.     Historical Provider, MD   acyclovir (ZOVIRAX) 800 MG tablet TAKE 1 TABLET BY MOUTH AS NEEDED (HERPES LABIALIS) 1/8/20   Historical Provider, MD   insulin glargine (LANTUS SOLOSTAR) 100 UNIT/ML injection pen Inject 20 Units into the skin 2 times daily 1/10/20   Elli Tovar MD   insulin lispro, 1 Unit Dial, (HUMALOG [facility]) 100 UNIT/ML SOPN Inject 5 Units into the skin 3 times daily (before meals) 1/10/20 1/4/21  Elli Tovar MD   glyBURIDE (DIABETA) 5 MG tablet Take 1 tablet by mouth daily (with breakfast) 10/31/19 4/28/20  Elli Tovar MD   omeprazole (PRILOSEC) 40 MG delayed release capsule Take 1 capsule by mouth Daily 4/1/19   Elli Tovar MD   sertraline (ZOLOFT) 25 MG tablet Take 100 mg by mouth daily     Historical Provider, MD   rivaroxaban (XARELTO) 10 MG TABS tablet Take 1 tablet by mouth daily (with breakfast) 3/12/19   RAFAEL Dean - CNP   lisinopril (PRINIVIL;ZESTRIL) 5 MG tablet Take 1 tablet by mouth daily 2/15/19   Elli Tovar MD   nitroGLYCERIN (NITROSTAT) 0.4 MG SL tablet Place 1 tablet under the tongue every 5 minutes as needed for Chest pain 5/21/16   Migdalia Varela MD   metoprolol (TOPROL XL) 25 MG XL tablet Take 1 tablet by mouth daily 5/21/16   Migdalia Varela MD   aspirin 81 MG tablet Take 81 mg by mouth daily    Historical Provider, MD   vitamin D 1000 UNITS CAPS Take by mouth daily     Historical Provider, MD   pravastatin (PRAVACHOL) 80 MG tablet Take 80 mg by mouth daily    Historical Provider, MD   cyclobenzaprine (FLEXERIL) 10 MG tablet Take 0.5 tablets by mouth 3 times daily as needed for Muscle spasms 5/14/15   Everett Alcaraz PA-C       Future Appointments   Date Time Provider Hailey Díazi   7/14/2020  8:30 AM Izabella Mcmanus RD, LD [facility] - JOSE TORRES II.PATRICIA   7/14/2020 10:20 AM RAFAEL Connor - CNP [address]

## 2020-07-08 ENCOUNTER — TELEPHONE (OUTPATIENT)
Dept: PHARMACY | Facility: CLINIC | Age: 65
End: 2020-07-08

## 2020-07-13 RX ORDER — RIVAROXABAN 10 MG/1
TABLET, FILM COATED ORAL
Qty: 30 TABLET | Refills: 0 | Status: SHIPPED | OUTPATIENT
Start: 2020-07-13 | End: 2020-10-09

## 2020-07-14 ENCOUNTER — OFFICE VISIT (OUTPATIENT)
Dept: INTERNAL MEDICINE CLINIC | Age: 65
End: 2020-07-14
Payer: MEDICARE

## 2020-07-14 VITALS — TEMPERATURE: 97.6 F | WEIGHT: 223 LBS | BODY MASS INDEX: 31.22 KG/M2 | HEIGHT: 71 IN

## 2020-07-14 VITALS
TEMPERATURE: 97.2 F | DIASTOLIC BLOOD PRESSURE: 67 MMHG | HEIGHT: 71 IN | HEART RATE: 72 BPM | WEIGHT: 222 LBS | SYSTOLIC BLOOD PRESSURE: 139 MMHG | BODY MASS INDEX: 31.08 KG/M2

## 2020-07-14 PROCEDURE — 99214 OFFICE O/P EST MOD 30 MIN: CPT | Performed by: NURSE PRACTITIONER

## 2020-07-14 PROCEDURE — 97802 MEDICAL NUTRITION INDIV IN: CPT | Performed by: DIETITIAN, REGISTERED

## 2020-07-14 NOTE — PROGRESS NOTES
[address] [place] [address]. [address] [place] [address] Ricki., [facility], [address]  [phone] (phone)  [phone] (fax)    Patient Name: Christie Tavera. Date of Birth: [birth year]. MRN: [id number]      Assessment: Patient is a 60 y.o. male seen for Initial MNT visit for Type 2 DB and Gastroparesis. -Nutritionally relevant labs:   Lab Results   Component Value Date/Time    LABA1C 10.3 (H) 06/18/2020 02:45 PM    LABA1C 11.8 01/10/2020 09:30 AM    LABA1C 9.7 03/13/2019 08:48 AM    GLUCOSE 146 (H) 03/30/2019 09:27 PM    GLUCOSE 367 (H) 03/25/2019 01:20 PM    CHOL 166 05/21/2016 06:22 AM    HDL 35 05/21/2016 06:22 AM    LDLCALC 88 05/21/2016 06:22 AM    TRIG 215 (H) 05/21/2016 06:22 AM     -Blood sugar trends: Checks 4x/day. Did not bring meter. FBS:  106. Today 177 today (Pt attributes high FBS d/t his supper the night before - meatballs and rice in BBQ sauce @ 730 - 8 pm)  Lunchtime:  Not checking. Skips this meal.  Dinner:  ~140 - 150's    +gastroparesis - Tried to eat 6x/day, but found this difficult. Dx with Gastroparesis since ~2007. Pt states his symptoms - GERD (bile) and nausea  2x/day. Has had his GB removed in the past.  Has recently had esophageal dilitation. Pt with SO at home. 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 Sleeping habits vary - usually up later and to bed later. Sometimes goes to bed @ 2 am.  -Food recall:   Breakfast: 10 - 11 am. Sc eggs, sausage link, 1 sl toast, coffee - cream and 2 tsps sugar or crystal lite. Avoids orange juice and Ra D. Lunch: skips. Snack - jello with fruit cup  Dinner: last night as stated above. Snacks: no evening snack. occ pop secret popcorn with lite butter    Eating out - 3x/week. [employer], m[employer]ds, cracker barrel    -Main Beverages: water and crystal lite. Pt states he tolerates whole milk better than 2%. Exercise:  1/2 hr 3x/week - 20 minutes on treadmill and 10 min on the bike. exercises at home. -Impression of Dietary Intake: on average, 2 meals per day, on average, 3 dining out or fast food meals per week. Current Outpatient Medications on File Prior to Visit   Medication Sig Dispense Refill    XARELTO 10 MG TABS tablet Take 1 tablet by mouth once daily with breakfast 30 tablet 0    lisinopril (PRINIVIL;ZESTRIL) 5 MG tablet Take 1 tablet by mouth once daily 90 tablet 3    Blood Glucose Monitoring Suppl (ONE TOUCH ULTRA 2) w/Device KIT 1 kit by Does not apply route daily 1 kit 0    metFORMIN (GLUCOPHAGE) 500 MG tablet Take 1 tablet by mouth 2 times daily (with meals) 60 tablet 5    CPAP Machine MISC by Does not apply route Please change BIPAP pressure to IPAP max 15 and EPAP min 6 and PS 4cm H20. Add EPR 1-3 for comfort 1 each 0    blood glucose monitor strips One Touch Ultra Test Strips. Test 2 times a day and as needed for diabetic symptoms. DX: E11.22 300 strip 5    Ergocalciferol (VITAMIN D2 PO) Vitamin D2 1,250 mcg (50,000 unit) capsule   Take 1 capsule every week by oral route.       acyclovir (ZOVIRAX) 800 MG tablet TAKE 1 TABLET BY MOUTH AS NEEDED (HERPES LABIALIS)      insulin glargine (LANTUS SOLOSTAR) 100 UNIT/ML injection pen Inject 20 Units into the skin 2 times daily 15 pen 3    insulin lispro, 1 Unit Dial, (HUMALOG KWIKPEN) 100 UNIT/ML SOPN Inject 5 Units into the skin 3 times daily (before meals) 10 pen 3    omeprazole (PRILOSEC) 40 MG delayed release capsule Take 1 capsule by mouth Daily 30 capsule 5    sertraline (ZOLOFT) 25 MG tablet Take 100 mg by mouth daily       nitroGLYCERIN (NITROSTAT) 0.4 MG SL tablet Place 1 tablet under the tongue every 5 minutes as needed for Chest pain 25 tablet 3    metoprolol (TOPROL XL) 25 MG XL tablet Take 1 tablet by mouth daily 30 tablet 3    aspirin 81 MG tablet Take 81 mg by mouth daily      vitamin D 1000 UNITS CAPS Take by mouth daily       pravastatin (PRAVACHOL) 80 MG tablet Take 80 mg by mouth daily      cyclobenzaprine (FLEXERIL) 10 MG tablet Take 0.5 tablets by mouth 3 times daily as needed for Muscle spasms 30 tablet 0    glyBURIDE (DIABETA) 5 MG tablet Take 1 tablet by mouth daily (with breakfast) 30 tablet 5     No current facility-administered medications on file prior to visit. Vitals from current and previous visits:  Temp 97.6 °F (36.4 °C)   Ht 5' 11\" (1.803 m)   Wt 223 lb (101.2 kg)   BMI 31.10 kg/m²     -Body mass index is 31.1 kg/m². 30-34.9 - Obesity Grade I.   -Weight goal: lose weight. Nutrition Diagnosis:   Food and nutrition-related knowledge deficit related to Lack of previous MNT/currently undergoing MNT as evidenced by Conditions associated with a diagnosis or treatment: Type 2 DB and Patient report of Gastroparesis. Intervention:  Impression:  Pt participated in session with pertinent questions.      -Instructed the patient on: Carbohydrate Counting, Consistent Carbohydrate Intake, Food Label Reading and gastroparesis guidelines. -Handouts given for: carbohydrate counting, food logging and gastroparesis guidelines. Patient Instructions   1.)  Eat 3 meals/day + 1 snack.  - follow the Step 3 Gastroparesis guidelines and choose the carb food choices from this list.  - Eventually cut down the 3 meals/day into smaller meals and eat 5-6 small meals/day.    2.)  Measure foods for accuracy in carb counting.    3.)  Read the nutrition facts label for serving size and total carbohydrates - without a label refer to your carb counting booklet. 4.)  Bring your meter every time you come to the [facility] Center for an appointment. Thanks    5.)  Bring a 1 week food log to next dietitian appt. Thanks.    -Nutrition prescription: 1600 - 1700 calories/day, 180 g carbs/day. Meals:  45-60/45/60. Snack: 15-20    Comprehension verified using teachback method.     Monitoring/Evaluation:   -Followup visit: 8 weeks with dietitian.   -Receptiveness to education/goals: Agreeable.  -Evaluation of education: Needs reinforcement.  -Readiness to change: precontemplative- eating 3 meals/day, counting carbohydrates and measuring portions when at home. -Expected compliance: good. Thank you for your referral of this patient. Total time involved in direct patient education: 60 minutes for initial MNT visit.

## 2020-07-14 NOTE — PROGRESS NOTES
[facility]  [address] [facility] [address]  Dept: [phone]  Dept Fax: [phone]  Loc: [phone]     Visit Date:  7/14/2020    Patient:  Maxi Stern  YOB: 1955    HPI:     Chief Complaint   Patient presents with    Diabetes       Pt of Dr. Tish Bryson. On insulin [id number]. DM since 1996. A1C 10.3% 6/18/2020. On Metformin 500 mg BID. Lantus 20 BID, Humalog 5 mg TID. No low glucose. He is checking his glucose routinely AC/HS and is not missing any insulin injections. On Lisinopril, aspirin. HLD - On Pravachol 80 mg. Had meatballs and rice last night, glucose was 349. Breakfast - scr eggs, sausage link. History chiari decompression, DVT/PE on Xarelto, CAD heart cath no stents. Needs neck surgery, will get filter placed prior to that surgery. Needs DM better controlled. Medications    Current Outpatient Medications:     XARELTO 10 MG TABS tablet, Take 1 tablet by mouth once daily with breakfast, Disp: 30 tablet, Rfl: 0    lisinopril (PRINIVIL;ZESTRIL) 5 MG tablet, Take 1 tablet by mouth once daily, Disp: 90 tablet, Rfl: 3    Blood Glucose Monitoring Suppl (ONE TOUCH ULTRA 2) w/Device KIT, 1 kit by Does not apply route daily, Disp: 1 kit, Rfl: 0    metFORMIN (GLUCOPHAGE) 500 MG tablet, Take 1 tablet by mouth 2 times daily (with meals), Disp: 60 tablet, Rfl: 5    CPAP Machine MISC, by Does not apply route Please change BIPAP pressure to IPAP max 15 and EPAP min 6 and PS 4cm H20. Add EPR 1-3 for comfort, Disp: 1 each, Rfl: 0    blood glucose monitor strips, One Touch Ultra Test Strips. Test 2 times a day and as needed for diabetic symptoms.  DX: E11.22, Disp: 300 strip, Rfl: 5    Ergocalciferol (VITAMIN D2 PO), Vitamin D2 1,250 mcg (50,000 unit) capsule  Take 1 capsule every week by oral route., Disp: , Rfl:     acyclovir (ZOVIRAX) 800 MG tablet, TAKE 1 TABLET BY (N/A, 3/12/2020); Upper gastrointestinal endoscopy (Left, 3/12/2020); and Upper gastrointestinal endoscopy (Left, 3/12/2020). Family History  This patient's family history includes Cancer in his father; Diabetes in his brother, father, mother, and sister; Heart Disease in his mother; High Blood Pressure in his brother, father, mother, and sister; Kidney Disease in his mother. Social History  Marybeth Reyez  reports that he quit smoking about 40 years ago. His smoking use included cigarettes. He started smoking about 43 years ago. He has a 4.50 pack-year smoking history. He has never used smokeless tobacco. He reports that he does not drink alcohol or use drugs. Health Maintenance:    Health Maintenance   Topic Date Due    Hepatitis C screen  1955    Diabetic retinal exam  05/18/1966    HIV screen  05/18/1971    Shingles Vaccine (2 of 3) 01/23/2017    Flu vaccine (1) 09/01/2020    A1C test (Diabetic or Prediabetic)  09/18/2020    Annual Wellness Visit (AWV)  10/31/2020    Diabetic foot exam  07/14/2021    Diabetic microalbuminuria test  07/22/2021    Lipid screen  07/22/2021    Potassium monitoring  07/22/2021    Creatinine monitoring  07/22/2021    DTaP/Tdap/Td vaccine (2 - Td) 07/22/2025    Colon cancer screen colonoscopy  03/12/2030    Pneumococcal 0-64 years Vaccine  Completed    Hepatitis A vaccine  Aged Out    Hib vaccine  Aged Out    Meningococcal (ACWY) vaccine  Aged Out       Subjective:      Review of Systems   Constitutional: Negative for chills, fatigue and fever. HENT: Negative for sore throat and trouble swallowing. Eyes: Negative for itching and visual disturbance. Respiratory: Negative for cough, choking and shortness of breath. Cardiovascular: Negative for chest pain and leg swelling. Gastrointestinal: Negative for abdominal pain, constipation, diarrhea, nausea and vomiting. Endocrine: Negative for cold intolerance and heat intolerance.    Genitourinary: Negative for difficulty urinating and dysuria. Musculoskeletal: Negative for arthralgias and myalgias. Skin: Negative for rash and wound. Neurological: Negative for dizziness, tremors, weakness, light-headedness, numbness and headaches. Psychiatric/Behavioral: Negative for agitation, dysphoric mood, sleep disturbance and suicidal ideas. The patient is not nervous/anxious. Objective:     /67 (Site: Right Upper Arm, Position: Sitting, Cuff Size: Medium Adult)   Pulse 72   Temp 97.2 °F (36.2 °C) (Temporal)   Ht 5' 11\" (1.803 m)   Wt 222 lb (100.7 kg)   BMI 30.96 kg/m²     Physical Exam  Vitals signs reviewed. Constitutional:       General: He is not in acute distress. Appearance: He is well-developed. He is not diaphoretic. HENT:      Head: Normocephalic and atraumatic. Mouth/Throat:      Pharynx: No oropharyngeal exudate. Eyes:      General: No scleral icterus. Right eye: No discharge. Left eye: No discharge. Pupils: Pupils are equal, round, and reactive to light. Neck:      Musculoskeletal: Normal range of motion and neck supple. Thyroid: No thyromegaly. Cardiovascular:      Rate and Rhythm: Normal rate and regular rhythm. Heart sounds: Normal heart sounds. No murmur. No friction rub. No gallop. Pulmonary:      Effort: Pulmonary effort is normal. No respiratory distress. Breath sounds: Normal breath sounds. No wheezing or rales. Abdominal:      General: There is no distension. Palpations: Abdomen is soft. Tenderness: There is no abdominal tenderness. Musculoskeletal: Normal range of motion. General: No tenderness or deformity. Lymphadenopathy:      Cervical: No cervical adenopathy. Skin:     General: Skin is warm and dry. Coloration: Skin is not pale. Findings: No erythema or rash. Neurological:      Mental Status: He is alert and oriented to person, place, and time.       Cranial Nerves: No cranial nerve deficit. Labs Reviewed 7/14/2020:    Lab Results   Component Value Date    WBC 7.4 06/18/2020    HGB 15.0 06/18/2020    HCT 44.2 06/18/2020     06/18/2020    CHOL 166 05/21/2016    TRIG 215 (H) 05/21/2016    HDL 44 07/22/2020    ALT 27 07/22/2020    AST 31 07/22/2020     07/22/2020    K 4.4 07/22/2020     07/22/2020    CREATININE 1.0 07/22/2020    BUN 14 07/22/2020    CO2 24 07/22/2020    TSH 1.020 10/29/2018    INR 1.24 (H) 03/30/2019    GLUF 150 (H) 07/22/2020    LABA1C 10.3 (H) 06/18/2020    LABMICR 250.22 07/22/2020     DIABETIC FOOT EXAM  Visual inspection:  Deformity/amputation: absent  Skin lesions/pre-ulcerative calluses: absent  Edema: right- negative, left- negative    Sensory exam:  Monofilament sensation: normal  (minimum of 5 random plantar locations tested, avoiding callused areas - > 1 area with absence of sensation is + for neuropathy)    Plus at least one of the following:  Pulses: normal,   Pinprick: N/A  Proprioception: N/A  Vibration (128 Hz): N/A    Assessment/Plan      1. Type 2 diabetes mellitus with hyperglycemia, with long-term current use of insulin (HCC)  On Lantus 20 units plus Humalog 7 units with meals. A1C 10.3%, ,  Attention to diet, exericse. Increase Humalog to 7 units with meals. Glucose logs in 1-2 weeks. Consider increase in Metformin to 1000 mg twice daily in two weeks if tolerating well. Consider CGM such as Dexcom if approved through insurance. Pt has demonstrated continued compliance with plan, he is routinely checking his glucose four times daily and takes four injections of insulin daily, as evidenced by review of glucose logs. Repeat labs in a month. Yearly dilated eye exams  Regular foot care. -  DIABETES FOOT EXAM  - Microalbumin / Creatinine Urine Ratio; Future    2. Mixed hyperlipidemia  - Comprehensive Metabolic Panel, Fasting; Future  - Lipid, Fasting; Future    3.  Obesity (BMI 30-39.9)  1800 calories diet rich in non starchy vegetables, lean protein and portion controlled carbohydates combined with 30 minutes continuous activity 5 or more days per week will lead to modest weight loss. Return in about 4 months (around 11/14/2020) for Diabetes. Patient given educational materials - see patient instructions. Discussed use, benefit, and side effects of prescribed medications. All patient questions answered. Pt voiced understanding.        Electronically signed RAFAEL Ceja CNP on 7/14/20 at 10:56 AM EDT

## 2020-07-14 NOTE — PATIENT INSTRUCTIONS
1. )  Eat 3 meals/day + 1 snack.  - follow the Step 3 Gastroparesis guidelines and choose the carb food choices from this list.  - Eventually cut down the 3 meals/day into smaller meals and eat 5-6 small meals/day.    2.)  Measure foods for accuracy in carb counting.    3.)  Read the nutrition facts label for serving size and total carbohydrates - without a label refer to your carb counting booklet. 4.)  Bring your meter every time you come to the [facility] Center for an appointment. Thanks    5.)  Bring a 1 week food log to next dietitian appt. Thanks.

## 2020-07-14 NOTE — PATIENT INSTRUCTIONS
Increase Humalog to 7 units with meals. Glucose logs in 1-2 weeks. Consider increase in Metformin to 1000 mg twice daily in two weeks if tolerating well. Repeat labs in a month.

## 2020-07-20 NOTE — TELEPHONE ENCOUNTER
CLINICAL PHARMACY: STATIN REVIEW    SUBJECTIVE:   Identified as DM care gap for [facility]: statin therapy. OBJECTIVE:  Allergies   Allergen Reactions    Latex Rash     Medications per current medication list:  Current Outpatient Medications   Medication Sig Dispense Refill    Blood Glucose Monitoring Suppl (ONE TOUCH ULTRA 2) w/Device KIT 1 kit by Does not apply route daily 1 kit 0    metFORMIN (GLUCOPHAGE) 500 MG tablet Take 1 tablet by mouth 2 times daily (with meals) 60 tablet 5    CPAP Machine MISC by Does not apply route Please change BIPAP pressure to IPAP max 15 and EPAP min 6 and PS 4cm H20. Add EPR 1-3 for comfort 1 each 0    blood glucose monitor strips One Touch Ultra Test Strips. Test 2 times a day and as needed for diabetic symptoms. DX: E11.22 300 strip 5    Ergocalciferol (VITAMIN D2 PO) Vitamin D2 1,250 mcg (50,000 unit) capsule   Take 1 capsule every week by oral route.       acyclovir (ZOVIRAX) 800 MG tablet TAKE 1 TABLET BY MOUTH AS NEEDED (HERPES LABIALIS)      insulin glargine (LANTUS SOLOSTAR) 100 UNIT/ML injection pen Inject 20 Units into the skin 2 times daily 15 pen 3    insulin lispro, 1 Unit Dial, (HUMALOG KWIKPEN) 100 UNIT/ML SOPN Inject 5 Units into the skin 3 times daily (before meals) 10 pen 3    glyBURIDE (DIABETA) 5 MG tablet Take 1 tablet by mouth daily (with breakfast) 30 tablet 5    omeprazole (PRILOSEC) 40 MG delayed release capsule Take 1 capsule by mouth Daily 30 capsule 5    sertraline (ZOLOFT) 25 MG tablet Take 100 mg by mouth daily       rivaroxaban (XARELTO) 10 MG TABS tablet Take 1 tablet by mouth daily (with breakfast) 30 tablet 5    lisinopril (PRINIVIL;ZESTRIL) 5 MG tablet Take 1 tablet by mouth daily 90 tablet 2    nitroGLYCERIN (NITROSTAT) 0.4 MG SL tablet Place 1 tablet under the tongue every 5 minutes as needed for Chest pain 25 tablet 3    metoprolol (TOPROL XL) 25 MG XL tablet Take 1 tablet by mouth daily 30 tablet 3    aspirin 81 MG tablet Take 81 mg by mouth daily      vitamin D 1000 UNITS CAPS Take by mouth daily       pravastatin (PRAVACHOL) 80 MG tablet Take 80 mg by mouth daily      cyclobenzaprine (FLEXERIL) 10 MG tablet Take 0.5 tablets by mouth 3 times daily as needed for Muscle spasms 30 tablet 0     No current facility-administered medications for this visit. Labs:  Lab Results   Component Value Date    CHOL 166 05/21/2016     Lab Results   Component Value Date    TRIG 215 (H) 05/21/2016     Lab Results   Component Value Date    HDL 35 05/21/2016     Lab Results   Component Value Date    LDLCALC 88 05/21/2016     No results found for: LABVLDL, VLDL  No results found for: [facility]  Lab Results   Component Value Date    ALT 25 10/30/2018        The ASCVD Risk score (Maria Ines Monson, et al., 2013) failed to calculate for the following reasons:    Cannot find a previous HDL lab    Cannot find a previous total cholesterol lab  Calculated ASCVD risk 29.1%    ASSESSMENT:  Hyperlipidemia Goal: Patient is prescribed moderate-intensity statin therapy. 2019 ADA Guidelines Age:   Skinner >/= 36years old:   o History of ASCVD or 10-year ASCVD risk > 20% - high-intensity statin is recommended. Patient last filled pravastatin 80 mg tablets no record of at [address] [facility]. PLAN:  Left message for patient to return call to [phone]. Will prepare and send letter. No further outreach planned at this time.     Thank you,  Stacey Mclaughlin, PharmD, [facility]  Phone: [phone], or toll free [phone], option 7    CLINICAL PHARMACY CONSULT: MED RECONCILIATION/REVIEW ADDENDUM  For Pharmacy Admin Tracking Only  PHSO: Yes  Total # of Interventions Recommended: 0  Recommended intervention potential cost savings: 0  Total Interventions Accepted: 0  Time Spent (min): 15

## 2020-07-21 ENCOUNTER — TELEPHONE (OUTPATIENT)
Dept: FAMILY MEDICINE CLINIC | Age: 65
End: 2020-07-21

## 2020-07-21 ENCOUNTER — CARE COORDINATION (OUTPATIENT)
Dept: CARE COORDINATION | Age: 65
End: 2020-07-21

## 2020-07-21 NOTE — CARE COORDINATION
Ambulatory Care Coordination Note  7/21/2020  CM Risk Score: 3  Charlson 10 Year Mortality Risk Score: 23%     ACC: Bonnee Bence, RN    Summary Note: Nancy Hook is being followed by care coordination for education and assistance in managing his chronic conditions. Pt is needing to have cervical surgery but d/t uncontrolled DM he has been referred to DM clinic. Pt continues to work with DM clinic to get his BS's down. Seeing provider, CDE, and dietician. Pt reports that he has tried increasing activity but d/t his physical limitations from other health conditions it has been a challenge. Pt reports that anytime he tries to increase activity that flares his pain up. Pt also reports knees giving out at times making him hesitant on walking any distance. Does not currently have cane or walker. Discussed obtaining one  For safety. Pt has appt with PCP in 2 days. Notes placed in appt desk for script needed. BS's overall are improving. Pt has been monitoring 3-4x per day. Will be dropping off meter for download tomorrow. Fasting BS's have been 100-119. Pt has some occasional BS's in 200-300's. States \"that when I am stupid and treat myself with something I shouldn't have. \"  Pt has gastroparesis making it difficult for him to meal plan. Reports that DM clinic is working on getting pt Dexcom meter and possibly insulin pump for better mgmt of BSs. Denies episodes of hypoglycemia. Still hoping to get DM under enough control to proceed with cervical surgery. Pt reports large amts of pain. Pt tearful at times on phone d/t the fact that he has had to quit doing so many things he enjoys b/c he is in too much pain. Denies thoughts of harming self in any way but admits that it gets him down a lot. Taking zoloft. Pt admits to smoking marijuana for some pain relief as he is currently not on any prescribed pain meds. Pt reports this helps his nausea and pain.   Note placed in desk to evaluate and discuss mgmt of pain  at appt with PCP. Sleep apnea-continues to wear Bipap at night. Working with RT at [facility] for setting adjustment. Reports setting adjusted 1.5 wks ago d/t bloating symptoms. Still having them but reports mild improvement. Plan of care:  Reviewed safety precautions. Pt to obtain pre op blood work tomorrow and f/u with PCP on [facility]. Drop meter off for download at DM clinic   Continue working with DM clinic for assistance in getting Dexcom. Pt reports abd bruised and sore from giving inj despite rotating sites. Informed pt that he can also try injecting outer thigh area. Aware can give under arms as well too. Continue monitoring BS's 4 x per day  Continue working with RT at [facility] for BIpap setting adjustments  Discuss pain control with PCP at appt on Thurs  Obtain script for cane or walker d/t unsteady gait. Small frequent meals. Watch portion sizes. Diabetes Assessment    Medic Alert ID:  No  Meal Planning:  Avoidance of concentrated sweets   How often do you test your blood sugar?:  Meals   Do you have barriers with adherence to non-pharmacologic self-management interventions?  (Nutrition/Exercise/Self-Monitoring):  No   Have you ever had to go to the ED for symptoms of low blood sugar?:  No       Do you have hyperglycemia symptoms?:  No   Do you have hypoglycemia symptoms?:  No   Last Blood Sugar Value:  117   Blood Sugar Monitoring Regimen:  Before Meals, At Bedtime       and   Congestive Heart Failure Assessment    Are you currently restricting fluids?:  No Restriction  Do you understand a low sodium diet?:  Yes  Do you understand how to read food labels?:  Yes  How many restaurant meals do you eat per week?:  1-2  Do you salt your food before tasting it?:  No         Symptoms:   CHF associated angina: Neg, CHF associated dyspnea on exertion: Pos, CHF associated fatigue: Neg, CHF associated leg swelling: Neg, CHF associated orthostatic hypotension: Neg, CHF associated PND: Neg, CHF associated times a day and as needed for diabetic symptoms. DX: E11.22 4/3/20   Matias Calderon MD   Ergocalciferol (VITAMIN D2 PO) Vitamin D2 1,250 mcg (50,000 unit) capsule   Take 1 capsule every week by oral route.     Historical Provider, MD   acyclovir (ZOVIRAX) 800 MG tablet TAKE 1 TABLET BY MOUTH AS NEEDED (HERPES LABIALIS) 1/8/20   Historical Provider, MD   insulin glargine (LANTUS SOLOSTAR) 100 UNIT/ML injection pen Inject 20 Units into the skin 2 times daily 1/10/20   Matias Calderon MD   insulin lispro, 1 Unit Dial, ([facility]) 100 UNIT/ML SOPN Inject 5 Units into the skin 3 times daily (before meals) 1/10/20 1/4/21  Matias Calderon MD   omeprazole (PRILOSEC) 40 MG delayed release capsule Take 1 capsule by mouth Daily 4/1/19   Matias Claderon MD   sertraline (ZOLOFT) 25 MG tablet Take 100 mg by mouth daily     Historical Provider, MD   nitroGLYCERIN (NITROSTAT) 0.4 MG SL tablet Place 1 tablet under the tongue every 5 minutes as needed for Chest pain 5/21/16   Say Ovalles MD   metoprolol (TOPROL XL) 25 MG XL tablet Take 1 tablet by mouth daily 5/21/16   Elena Luevano MD   aspirin 81 MG tablet Take 81 mg by mouth daily    Historical Provider, MD   vitamin D 1000 UNITS CAPS Take by mouth daily     Historical Provider, MD   pravastatin (PRAVACHOL) 80 MG tablet Take 80 mg by mouth daily    Historical Provider, MD   cyclobenzaprine (FLEXERIL) 10 MG tablet Take 0.5 tablets by mouth 3 times daily as needed for Muscle spasms 5/14/15   Skinny White PA-C       Future Appointments   Date Time Provider Hailey Stephanie   7/23/2020  2:15 PM Matias Calderon MD [facility]   8/25/2020  9:00 AM Lissette Baker RD, LD [facility]   9/15/2020  8:00 AM Gianni Cornell RN [facility] [place]   10/27/2020 11:45 AM [facility] [address] [place] [address]   11/17/2020 10:20 AM Nathanael Bernheim, APRN - CNP [facility]

## 2020-07-21 NOTE — CARE COORDINATION
Pt has appt on 7/23. Will be obtaining lab work tomorrow. Spoke with pt today. Pt reported unsteady gait. Knees wanting to give out despite wearing brace. Not using any assistive device with ambulation. I placed notes in appt desk to address as I feel pt could benefit from cane or walker. Pt also having large amts of pain in neck and low back. 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 Not taking any prescribed medications at this time. Notes placed in appt desk re: this as well.

## 2020-07-21 NOTE — TELEPHONE ENCOUNTER
Spoke with patient regarding his pre-op testing for a surgery date pending ACDF C5-7 he did NOT have this done. Patient will complete testing by Wednesday prior to his 7/23/2020 appointment with Dr. Rhodia Mohs.

## 2020-07-22 ENCOUNTER — NURSE ONLY (OUTPATIENT)
Dept: LAB | Age: 65
End: 2020-07-22

## 2020-07-22 ENCOUNTER — TELEPHONE (OUTPATIENT)
Dept: INTERNAL MEDICINE CLINIC | Age: 65
End: 2020-07-22

## 2020-07-22 LAB
ALBUMIN SERPL-MCNC: 3.9 G/DL (ref 3.5–5.1)
ALP BLD-CCNC: 119 U/L (ref 38–126)
ALT SERPL-CCNC: 27 U/L (ref 11–66)
ANION GAP SERPL CALCULATED.3IONS-SCNC: 12 MEQ/L (ref 8–16)
AST SERPL-CCNC: 31 U/L (ref 5–40)
BILIRUB SERPL-MCNC: 0.5 MG/DL (ref 0.3–1.2)
BUN BLDV-MCNC: 14 MG/DL (ref 7–22)
CALCIUM SERPL-MCNC: 9.3 MG/DL (ref 8.5–10.5)
CHLORIDE BLD-SCNC: 101 MEQ/L (ref 98–111)
CHOLESTEROL, FASTING: 250 MG/DL (ref 100–199)
CO2: 24 MEQ/L (ref 23–33)
CREAT SERPL-MCNC: 1 MG/DL (ref 0.4–1.2)
CREATININE, URINE: 451.6 MG/DL
GFR SERPL CREATININE-BSD FRML MDRD: 75 ML/MIN/1.73M2
GLUCOSE FASTING: 150 MG/DL (ref 70–108)
HDLC SERPL-MCNC: 44 MG/DL
LDL CHOLESTEROL CALCULATED: 169 MG/DL
MICROALBUMIN UR-MCNC: 250.22 MG/DL
MICROALBUMIN/CREAT UR-RTO: 554 MG/G (ref 0–30)
POTASSIUM SERPL-SCNC: 4.4 MEQ/L (ref 3.5–5.2)
SODIUM BLD-SCNC: 137 MEQ/L (ref 135–145)
TOTAL PROTEIN: 8.1 G/DL (ref 6.1–8)
TRIGLYCERIDE, FASTING: 186 MG/DL (ref 0–199)

## 2020-07-22 RX ORDER — INSULIN LISPRO 100 [IU]/ML
7 INJECTION, SOLUTION INTRAVENOUS; SUBCUTANEOUS
Qty: 10 PEN | Refills: 3 | Status: SHIPPED
Start: 2020-07-22 | End: 2021-03-12 | Stop reason: SDUPTHER

## 2020-07-22 NOTE — TELEPHONE ENCOUNTER
Diabetes education  Glucose meter downloaded. Noting elevated after meal glucose levels. Spoke with Emily. He is taking Humalog 6 units with each meal. The most recent order was for 7 units with each meal (brkfst and dinner). He is agreeable to increasing Humalog to 7 units when eating as directed. Understanding voiced via teach back.

## 2020-07-23 ENCOUNTER — OFFICE VISIT (OUTPATIENT)
Dept: FAMILY MEDICINE CLINIC | Age: 65
End: 2020-07-23
Payer: MEDICARE

## 2020-07-23 VITALS
DIASTOLIC BLOOD PRESSURE: 74 MMHG | HEART RATE: 76 BPM | WEIGHT: 216.1 LBS | TEMPERATURE: 99 F | RESPIRATION RATE: 16 BRPM | SYSTOLIC BLOOD PRESSURE: 120 MMHG | BODY MASS INDEX: 30.14 KG/M2

## 2020-07-23 PROCEDURE — 99214 OFFICE O/P EST MOD 30 MIN: CPT | Performed by: FAMILY MEDICINE

## 2020-07-23 NOTE — PROGRESS NOTES
Subjective:      Patient ID: Mariann Zayas is a 60 y.o. male. HPI  Follow up of chronic conditions. Encounter Diagnoses   Name Primary?  Preop examination Yes    Type 2 diabetes mellitus with hyperglycemia, with long-term current use of insulin (HCC)     DDD (degenerative disc disease), cervical     Back pain at L4-L5 level     GERD with stricture     Chronic right-sided low back pain with right-sided sciatica      Gilberto Sherwood is here for preop evaluation prior to being scheduled for cervical surgery. His surgeon is up in [place] and he has yet to get his EKG and chest x-ray obtained. Labs were reviewed with him and they do show some microalbuminuria but normal renal function. His CBC is intact and shows no signs of anemia. He is on Xarelto so  he will be fitted with a [place] filter in the inferior vena cava to avoid having him on additional blood thinners. He continues to have significant pain from his degenerative disc disease in the lumbar spine. He gets episodes of right-sided sciatica which can many times \"almost dropping him\". He also will have episodes of his right knee giving way or locking so for this reason, a prescription for a rolling walker will be issued. DM2 is coming under increasing control as he is working with the diabetic clinic to prepare himself for surgical intervention. He is going to be fitted with a Dexcom continuous glucose monitoring unit as well as starting on an insulin pump. This will be supervised by diabetic management clinic and the pharmacist.  Lab Results   Component Value Date    LABA1C 10.3 (H) 06/18/2020    LABA1C 11.8 01/10/2020    LABA1C 9.7 03/13/2019     Lab Results   Component Value Date    GLUF 150 (H) 07/22/2020    LABMICR 250.22 07/22/2020    LDLCALC 169 07/22/2020    CREATININE 1.0 07/22/2020     Gastroesophageal reflux disease remains stable and responsive to Prilosec 40 mg nightly. The rest of this patient's conditions are stable.   Past medical and surgical hx reviewed. Past Medical History:   Diagnosis Date    Anxiety     CAD (coronary artery disease)     Depression     Frequent headaches     Gastroparesis     GERD (gastroesophageal reflux disease)     History of Chiari malformation     Hyperlipidemia     Hypertension     Kidney stone     Kidney stone     Migraine     Movement disorder     DDD    Pulmonary emboli ([address] 10/29/2018    Sleep apnea     Tattoos      Past Surgical History:   Procedure Laterality Date    BACK SURGERY  2008    C1 oates & decompression    BRAIN SURGERY  2008    chiari    CARDIAC SURGERY  2007    heart cath    CHOLECYSTECTOMY  2009    COLONOSCOPY  2017    COLONOSCOPY N/A 3/12/2020    COLONOSCOPY POLYPECTOMY SNARE/COLD BIOPSY performed by Saul Ramirez MD at [facility], COLON, DIAGNOSTIC  2007   [address]    right elbow    NASAL SEPTUM SURGERY  2006    SHOULDER SURGERY Left 08/16/2018    TONSILLECTOMY  1964    UPPER GASTROINTESTINAL ENDOSCOPY Left 3/12/2020    EGD ESOPHAGOGASTRODUODENOSCOPY DILATATION performed by Saul Ramirez MD at [facility] ENDOSCOPY Left 3/12/2020    EGD BIOPSY performed by Saul Ramirez MD at [address] [place] [address]  2015    right middle finger artery     Portions of this note were completed with a voice recording program.  Efforts were made to edit the dictations but occasionally words are mis-transcribed. Review of systems:  HEENT are within normal limits. Sinus congestion continues to be responsive to nasal inhaler. Lungs: He has no complaints of congestion or shortness of breath currently. Heart: No palpitations or exertional chest pain or shortness of breath. Abdomen: No abdominal pain, diarrhea, or constipation. Extremities: Back pain is present along with intermittent breakaway discomfort and instability of his right knee.   His left knee also has episodes of pain depending on his level of physical activity. Skin: No rashes or bruising. Psych: He admits to having some dysthymia and feeling down and discouraged because of his physical problems. Not suicidal or homicidal.        Objective:   Physical Exam  Vitals signs and nursing note reviewed. HENT:      Head: Normocephalic. Right Ear: Tympanic membrane, ear canal and external ear normal.      Left Ear: Tympanic membrane, ear canal and external ear normal.      Nose: Nose normal.      Mouth/Throat:      Mouth: Mucous membranes are moist.      Pharynx: Oropharynx is clear. Eyes:      Extraocular Movements: Extraocular movements intact. Conjunctiva/sclera: Conjunctivae normal.      Pupils: Pupils are equal, round, and reactive to light. Neck:      Musculoskeletal: Decreased range of motion. Crepitus, pain with movement and muscular tenderness present. Thyroid: No thyroid mass or thyroid tenderness. Vascular: No carotid bruit. Cardiovascular:      Rate and Rhythm: Normal rate and regular rhythm. Pulses: Normal pulses. Heart sounds: Normal heart sounds. No murmur. No gallop. Pulmonary:      Effort: Pulmonary effort is normal.      Breath sounds: Rhonchi present. No wheezing or rales. Abdominal:      General: Bowel sounds are normal.   Musculoskeletal:         General: Tenderness present. Cervical back: He exhibits decreased range of motion, tenderness and pain. He exhibits no spasm. Lumbar back: He exhibits decreased range of motion, tenderness and pain. He exhibits no spasm. Right lower leg: No edema. Left lower leg: No edema. Lymphadenopathy:      Cervical: No cervical adenopathy. Skin:     General: Skin is warm and dry. Neurological:      General: No focal deficit present. Mental Status: He is alert and oriented to person, place, and time. Gait: Gait abnormal.   Psychiatric:         Mood and Affect: Mood normal.         Assessment:       Diagnosis Orders   1.  Preop examination  Hemoglobin A1C   2. Type 2 diabetes mellitus with hyperglycemia, with long-term current use of insulin (HCC)  Hemoglobin A1C   3. DDD (degenerative disc disease), cervical     4. Back pain at L4-L5 level  Walker rolling   5. GERD with stricture     6. Chronic right-sided low back pain with right-sided sciatica  Walker rolling           Plan:      Orders Placed This Encounter   Procedures    Hemoglobin A1C     Standing Status:   Future     Standing Expiration Date:   7/23/2021   Gladystine Mower Walker rolling     Standing Status:   Future     Standing Expiration Date:   7/23/2021     There are no discontinued medications. Current Outpatient Medications   Medication Sig Dispense Refill    insulin lispro, 1 Unit Dial, (HUMALOG KWIKPEN) 100 UNIT/ML SOPN Inject 7 Units into the skin 3 times daily (before meals) 10 pen 3    XARELTO 10 MG TABS tablet Take 1 tablet by mouth once daily with breakfast 30 tablet 0    lisinopril (PRINIVIL;ZESTRIL) 5 MG tablet Take 1 tablet by mouth once daily 90 tablet 3    Blood Glucose Monitoring Suppl (ONE TOUCH ULTRA 2) w/Device KIT 1 kit by Does not apply route daily 1 kit 0    metFORMIN (GLUCOPHAGE) 500 MG tablet Take 1 tablet by mouth 2 times daily (with meals) 60 tablet 5    CPAP Machine MISC by Does not apply route Please change BIPAP pressure to IPAP max 15 and EPAP min 6 and PS 4cm H20. Add EPR 1-3 for comfort 1 each 0    blood glucose monitor strips One Touch Ultra Test Strips. Test 2 times a day and as needed for diabetic symptoms. DX: E11.22 300 strip 5    Ergocalciferol (VITAMIN D2 PO) Vitamin D2 1,250 mcg (50,000 unit) capsule   Take 1 capsule every week by oral route.       insulin glargine (LANTUS SOLOSTAR) 100 UNIT/ML injection pen Inject 20 Units into the skin 2 times daily 15 pen 3    omeprazole (PRILOSEC) 40 MG delayed release capsule Take 1 capsule by mouth Daily 30 capsule 5    nitroGLYCERIN (NITROSTAT) 0.4 MG SL tablet Place 1 tablet under the tongue every 5 minutes as needed for Chest pain 25 tablet 3    metoprolol (TOPROL XL) 25 MG XL tablet Take 1 tablet by mouth daily 30 tablet 3    aspirin 81 MG tablet Take 81 mg by mouth daily      vitamin D 1000 UNITS CAPS Take by mouth daily       pravastatin (PRAVACHOL) 80 MG tablet Take 80 mg by mouth daily      acyclovir (ZOVIRAX) 800 MG tablet TAKE 1 TABLET BY MOUTH AS NEEDED (HERPES LABIALIS)      sertraline (ZOLOFT) 25 MG tablet Take 100 mg by mouth daily       cyclobenzaprine (FLEXERIL) 10 MG tablet Take 0.5 tablets by mouth 3 times daily as needed for Muscle spasms (Patient not taking: Reported on 7/23/2020) 30 tablet 0     No current facility-administered medications for this visit. Get A1c the end of this month. Get preop EKG and CXR at [facility] next week. Once results obtained, release for surgery will be sent to [place]. Discussed use, benefit, and side effects of prescribed medications. Barriers to compliance discussed. All patient questions answered. Pt voiced understanding. Rachell Tapia was evaluated today and a DME order was entered for a wheeled walker with seat because he requires this to successfully complete daily living tasks of bathing, toileting, ambulating, grooming and dressing upper body. A wheeled walker with seat is necessary due to the patient's unsteady gait, upper body weakness, inability to  and ambulation device, ambulating only short distances by pushing a walker, and the need to sit for a short time before resuming ambulation. These tasks cannot be completed with a lesser ambulation device such as a cane, crutch, or standard walker. The need for this equipment was discussed with the patient and he understands and is in agreement.             Promise Petersen MD

## 2020-07-23 NOTE — PATIENT INSTRUCTIONS
You may receive a survey about your visit with us today. The feedback from our patients helps us identify what is working well and where the service to all patients can be enhanced. Thank you! Get A1c the end of this month. Get preop EKG and CXR at [facility] next week. Once results obtained, release for surgery will be sent to [place].

## 2020-07-23 NOTE — PROGRESS NOTES
Visit Information    Have you changed or started any medications since your last visit including any over-the-counter medicines, vitamins, or herbal medicines? no   Are you having any side effects from any of your medications? -  no  Have you stopped taking any of your medications? Is so, why? -  no    Have you seen any other physician or provider since your last visit? Yes - Records Obtained  Have you had any other diagnostic tests since your last visit? Yes - Records Obtained  Have you been seen in the emergency room and/or had an admission to a hospital since we last saw you? No  Have you had your routine dental cleaning in the past 6 months? n/a    Have you activated your [employer] account? If not, what are your barriers?  Yes     Patient Care Team:  Owen Calle MD as PCP - General (Family Medicine)  Owen Calle MD as PCP - [facility] Provider  Edith Mccoy MD as Consulting Physician (Otolaryngology)  Gene Conner MD as Consulting Physician (Pulmonology)  Luis Enrique Marquez RN as Ambulatory Care Manager    Medical History Review  Past Medical, Family, and Social History reviewed and does contribute to the patient presenting condition    Health Maintenance   Topic Date Due    Hepatitis C screen  1955    Diabetic retinal exam  05/18/1966    HIV screen  05/18/1971    Shingles Vaccine (2 of 3) 01/23/2017    Lipid screen  05/21/2017    Diabetic microalbuminuria test  06/18/2019    Potassium monitoring  03/30/2020    Creatinine monitoring  03/30/2020    Flu vaccine (1) 09/01/2020    A1C test (Diabetic or Prediabetic)  09/18/2020    Annual Wellness Visit (AWV)  10/31/2020    Diabetic foot exam  07/14/2021    DTaP/Tdap/Td vaccine (2 - Td) 07/22/2025    Colon cancer screen colonoscopy  03/12/2030    Pneumococcal 0-64 years Vaccine  Completed    Hepatitis A vaccine  Aged Out    Hib vaccine  Aged Out    Meningococcal (ACWY) vaccine  Aged Out

## 2020-07-24 ENCOUNTER — TELEPHONE (OUTPATIENT)
Dept: INTERNAL MEDICINE CLINIC | Age: 65
End: 2020-07-24

## 2020-07-27 ENCOUNTER — HOSPITAL ENCOUNTER (OUTPATIENT)
Age: 65
Discharge: HOME | End: 2020-07-27
Payer: MEDICARE

## 2020-07-27 ENCOUNTER — HOSPITAL ENCOUNTER (OUTPATIENT)
Dept: GENERAL RADIOLOGY | Age: 65
Discharge: HOME | End: 2020-07-27
Payer: MEDICARE

## 2020-07-27 LAB
EKG ATRIAL RATE: 63 BPM
EKG P AXIS: 34 DEGREES
EKG P-R INTERVAL: 164 MS
EKG Q-T INTERVAL: 426 MS
EKG QRS DURATION: 112 MS
EKG QTC CALCULATION (BAZETT): 435 MS
EKG R AXIS: -48 DEGREES
EKG T AXIS: -7 DEGREES
EKG VENTRICULAR RATE: 63 BPM

## 2020-07-27 PROCEDURE — 71046 X-RAY EXAM CHEST 2 VIEWS: CPT

## 2020-07-27 PROCEDURE — 93005 ELECTROCARDIOGRAM TRACING: CPT

## 2020-07-27 ASSESSMENT — ENCOUNTER SYMPTOMS
EYE ITCHING: 0
CONSTIPATION: 0
SHORTNESS OF BREATH: 0
COUGH: 0
TROUBLE SWALLOWING: 0
ABDOMINAL PAIN: 0
SORE THROAT: 0
NAUSEA: 0
VOMITING: 0
DIARRHEA: 0
CHOKING: 0

## 2020-07-28 ENCOUNTER — NURSE ONLY (OUTPATIENT)
Dept: LAB | Age: 65
End: 2020-07-28

## 2020-07-28 ENCOUNTER — TELEPHONE (OUTPATIENT)
Dept: FAMILY MEDICINE CLINIC | Age: 65
End: 2020-07-28

## 2020-07-28 ENCOUNTER — CARE COORDINATION (OUTPATIENT)
Dept: CARE COORDINATION | Age: 65
End: 2020-07-28

## 2020-07-28 LAB
AVERAGE GLUCOSE: 183 MG/DL (ref 70–126)
HBA1C MFR BLD: 8.1 % (ref 4.4–6.4)

## 2020-07-28 RX ORDER — CALCIUM CARBONATE 160(400)MG
TABLET,CHEWABLE ORAL
Qty: 1 EACH | Refills: 0 | Status: SHIPPED | OUTPATIENT
Start: 2020-07-28

## 2020-07-28 NOTE — TELEPHONE ENCOUNTER
Order is in epic.   Paper prescription will be signed for fax to [facility].

## 2020-07-28 NOTE — TELEPHONE ENCOUNTER
Pt needs a new order placed for the rollator, then fax to [facility]. Please advise.

## 2020-07-28 NOTE — CARE COORDINATION
Attempted to reach [facility] today for f/u. No answer. Message left to return call.

## 2020-07-28 NOTE — TELEPHONE ENCOUNTER
Pt called office left  stating he spoke with the medical supply place regarding the rolling walker order, but they need to clarify if this is a rolling walker or a rollator. Pt says you had discussed this walker would have 4wheels, brakes and \"such\". Please advise.

## 2020-07-31 ENCOUNTER — NURSE ONLY (OUTPATIENT)
Dept: LAB | Age: 65
End: 2020-07-31

## 2020-07-31 LAB
CREATININE URINE: 241.1 MG/DL
PROT/CREAT RATIO, UR: 0.8
PROTEIN, URINE: 192.4 MG/DL

## 2020-07-31 RX ORDER — LISINOPRIL 10 MG/1
TABLET ORAL
Qty: 30 TABLET | Refills: 2
Start: 2020-07-31 | End: 2021-01-25 | Stop reason: DRUGHIGH

## 2020-07-31 NOTE — TELEPHONE ENCOUNTER
Spoke with the patient and let him know that he needs to get a urine test done as soon a possible and that he is to increase his lisinopril to 10 mg. Pt voiced he understood recommendation.

## 2020-08-03 ENCOUNTER — TELEPHONE (OUTPATIENT)
Dept: INTERNAL MEDICINE CLINIC | Age: 65
End: 2020-08-03

## 2020-08-03 NOTE — TELEPHONE ENCOUNTER
----- Message from RAFAEL Otero CNP sent at 8/3/2020  8:12 AM EDT -----  Continue increased Lisinopril, will recheck urine protein/creatinine ratio and BMP in 3 months

## 2020-08-04 ENCOUNTER — TELEPHONE (OUTPATIENT)
Dept: FAMILY MEDICINE CLINIC | Age: 65
End: 2020-08-04

## 2020-08-04 RX ORDER — ERGOCALCIFEROL 1.25 MG/1
50000 CAPSULE ORAL WEEKLY
Qty: 12 CAPSULE | Refills: 1 | Status: SHIPPED | OUTPATIENT
Start: 2020-08-04 | End: 2021-03-10 | Stop reason: ALTCHOICE

## 2020-08-04 RX ORDER — PRAVASTATIN SODIUM 80 MG/1
80 TABLET ORAL DAILY
Qty: 90 TABLET | Refills: 1 | Status: ON HOLD | OUTPATIENT
Start: 2020-08-04 | End: 2021-10-21

## 2020-08-04 NOTE — TELEPHONE ENCOUNTER
Eddie Sweeney MD    I spoke with the patient about statin therapy. Patient stated that he is almost out of his pravastatin and does not have any refills remaining on it. I have a pended a prescription to be sent to [facility] for BrandeeKrystaDelfino. While talking patient asked if you are wanting him to continue his vitamin D supplementation. He is currently taking 50,000 units weekly and 1,000 units daily. This was prescribed by his previous doctor in [place]. If you wish for him to continue vitamin D 50,000, he will need a new prescription. For more information, see pharmacy note from 7/8/2020. Let me know if there is anything else I can do to help.     Thank you,  Sharyle Redo, PharmD, [address] [place] [address]  Phone: [phone], or toll free [phone], option 7

## 2020-08-04 NOTE — TELEPHONE ENCOUNTER
Alvaro Maradiaga MD    Thank you for your quick response. Patient aware of prescriptions sent to [facility] . No further outreach planned at this time. Thanks,  Chilo Aleman, PharmD, Jonny [facility]monds  Phone: [phone], or toll free [phone], option 7    CLINICAL PHARMACY CONSULT: MED RECONCILIATION/REVIEW ADDENDUM  For Pharmacy Admin Tracking Only  PHSO: Yes  Total # of Interventions Recommended: 2  - New Order #: 2 New Medication Order Reason(s):  Adherence  Recommended intervention potential cost savings: 1  Total Interventions Accepted: 2  Time Spent (min): 30

## 2020-08-04 NOTE — TELEPHONE ENCOUNTER
CLINICAL PHARMACY: STATIN REVIEW    SUBJECTIVE:   Identified as DM care gap for [place]: statin therapy. OBJECTIVE:  Allergies   Allergen Reactions    Latex Rash     Medications per current medication list:  Current Outpatient Medications   Medication Sig Dispense Refill    Blood Glucose Monitoring Suppl (ONE TOUCH ULTRA 2) w/Device KIT 1 kit by Does not apply route daily 1 kit 0    metFORMIN (GLUCOPHAGE) 500 MG tablet Take 1 tablet by mouth 2 times daily (with meals) 60 tablet 5    CPAP Machine MISC by Does not apply route Please change BIPAP pressure to IPAP max 15 and EPAP min 6 and PS 4cm H20. Add EPR 1-3 for comfort 1 each 0    blood glucose monitor strips One Touch Ultra Test Strips. Test 2 times a day and as needed for diabetic symptoms. DX: E11.22 300 strip 5    Ergocalciferol (VITAMIN D2 PO) Vitamin D2 1,250 mcg (50,000 unit) capsule   Take 1 capsule every week by oral route.       acyclovir (ZOVIRAX) 800 MG tablet TAKE 1 TABLET BY MOUTH AS NEEDED (HERPES LABIALIS)      insulin glargine (LANTUS SOLOSTAR) 100 UNIT/ML injection pen Inject 20 Units into the skin 2 times daily 15 pen 3    insulin lispro, 1 Unit Dial, (HUMALOG KWIKPEN) 100 UNIT/ML SOPN Inject 5 Units into the skin 3 times daily (before meals) 10 pen 3    glyBURIDE (DIABETA) 5 MG tablet Take 1 tablet by mouth daily (with breakfast) 30 tablet 5    omeprazole (PRILOSEC) 40 MG delayed release capsule Take 1 capsule by mouth Daily 30 capsule 5    sertraline (ZOLOFT) 25 MG tablet Take 100 mg by mouth daily       rivaroxaban (XARELTO) 10 MG TABS tablet Take 1 tablet by mouth daily (with breakfast) 30 tablet 5    lisinopril (PRINIVIL;ZESTRIL) 5 MG tablet Take 1 tablet by mouth daily 90 tablet 2    nitroGLYCERIN (NITROSTAT) 0.4 MG SL tablet Place 1 tablet under the tongue every 5 minutes as needed for Chest pain 25 tablet 3    metoprolol (TOPROL XL) 25 MG XL tablet Take 1 tablet by mouth daily 30 tablet 3    aspirin 81 MG tablet Take 81 mg by mouth daily      vitamin D 1000 UNITS CAPS Take by mouth daily       pravastatin (PRAVACHOL) 80 MG tablet Take 80 mg by mouth daily      cyclobenzaprine (FLEXERIL) 10 MG tablet Take 0.5 tablets by mouth 3 times daily as needed for Muscle spasms 30 tablet 0     No current facility-administered medications for this visit. Labs:  Lab Results   Component Value Date    CHOL 166 05/21/2016     Lab Results   Component Value Date    TRIG 215 (H) 05/21/2016     Lab Results   Component Value Date    HDL 35 05/21/2016     Lab Results   Component Value Date    LDLCALC 88 05/21/2016     No results found for: LABVLDL, VLDL  No results found for: [facility]  Lab Results   Component Value Date    ALT 25 10/30/2018        The ASCVD Risk score (Jarrod Post., et al., 2013) failed to calculate for the following reasons:    Cannot find a previous HDL lab    Cannot find a previous total cholesterol lab  Calculated ASCVD risk 29.1%    ASSESSMENT:  Hyperlipidemia Goal: Patient is prescribed moderate-intensity statin therapy. 2019 ADA Guidelines Age:   Cecily French >/= 36years old:   o History of ASCVD or 10-year ASCVD risk > 20% - high-intensity statin is recommended. Patient last filled pravastatin 80 mg tablets no record of at [address]. PLAN:  Patient returned call. Patient states that he has been on and off medications due to nausea and vomiting. Patient states he is now taking his pravastatin again and will need a refill. Patient also wanting to know if PCP wants him taking vitamin d 50,000 weekly and 1,000 daily. Will route to PCP.     Thank you,  Shahla Sultana, PharmD, [address] [place] [address]  Phone: [phone], or toll free [phone], option 7

## 2020-08-14 ENCOUNTER — CARE COORDINATION (OUTPATIENT)
Dept: CARE COORDINATION | Age: 65
End: 2020-08-14

## 2020-08-14 NOTE — CARE COORDINATION
over a 1 month period. I will notify my provider if I have any blood sugar readings less than 70 more than 2 times a month. None Recently Recorded    Barriers: overwhelmed with complexity of regimen  Plan for overcoming my barriers: reminders, keeping log sheet, set alarms. Confidence: 8/10  Anticipated Goal Completion Date: 8/29/20              Prior to Admission medications    Medication Sig Start Date End Date Taking? Authorizing Provider   pravastatin (PRAVACHOL) 80 MG tablet Take 1 tablet by mouth daily 8/4/20   Tyra Kirby MD   vitamin D (ERGOCALCIFEROL) 1.25 MG (56155 UT) CAPS capsule Take 1 capsule by mouth once a week 8/4/20   Tyra Kirby MD   lisinopril (PRINIVIL;ZESTRIL) 10 MG tablet Take 1 tablet by mouth once daily 7/31/20   RAFAEL Mcduffie CNP   [facility]. Devices (ROLLATOR ULTRA-LIGHT) MISC Use Rollator for stability with ambulation. 7/28/20   Tyra Kirby MD   omeprazole (PRILOSEC) 40 MG delayed release capsule Take 1 capsule by mouth once daily 7/24/20   Tyra Kirby MD   insulin lispro, 1 Unit Dial, ([facility]) 100 UNIT/ML SOPN Inject 7 Units into the skin 3 times daily (before meals) 7/22/20 7/17/21  RAFAEL Mcduffie CNP   XARELTO 10 MG TABS tablet Take 1 tablet by mouth once daily with breakfast 7/13/20   RAFAEL Muñoz CNP   Blood Glucose Monitoring Suppl (ONE TOUCH ULTRA 2) w/Device KIT 1 kit by Does not apply route daily 6/25/20   Tyra Kirby MD   metFORMIN (GLUCOPHAGE) 500 MG tablet Take 1 tablet by mouth 2 times daily (with meals) 6/25/20 12/22/20  Tyra Kirby MD   CPAP Machine MISC by Does not apply route Please change BIPAP pressure to IPAP max 15 and EPAP min 6 and PS 4cm H20. Add EPR 1-3 for comfort 4/27/20   [employer], [place]   blood glucose monitor strips One Touch Ultra Test Strips. Test 2 times a day and as needed for diabetic symptoms.  DX: E11.22 4/3/20   Tyra Kirby MD   acyclovir (ZOVIRAX) 800 MG tablet TAKE 1 TABLET BY MOUTH AS NEEDED (HERPES LABIALIS) 1/8/20   Historical Provider, MD   insulin glargine (LANTUS SOLOSTAR) 100 UNIT/ML injection pen Inject 20 Units into the skin 2 times daily 1/10/20   Duane Rancher, MD   sertraline (ZOLOFT) 25 MG tablet Take 100 mg by mouth daily     Historical Provider, MD   nitroGLYCERIN (NITROSTAT) 0.4 MG SL tablet Place 1 tablet under the tongue every 5 minutes as needed for Chest pain 5/21/16   Javier Parada MD   metoprolol (TOPROL XL) 25 MG XL tablet Take 1 tablet by mouth daily 5/21/16   Elena Jorge MD   aspirin 81 MG tablet Take 81 mg by mouth daily    Historical Provider, MD   vitamin D 1000 UNITS CAPS Take by mouth daily     Historical Provider, MD   cyclobenzaprine (FLEXERIL) 10 MG tablet Take 0.5 tablets by mouth 3 times daily as needed for Muscle spasms  Patient not taking: Reported on 7/23/2020 5/14/15   Janina Valdivia PA-C       Future Appointments   Date Time Provider Hailey Díazi   8/25/2020  9:00 AM Caty Warren RD, LD [facility] [place]   9/15/2020  8:00 AM Nicho Lennon RN [facility] [place]   10/23/2020  8:45 AM Duane Rancher, [address]   10/27/2020 11:45 AM Fatuma Francis PA-C Pulm Med [address]   11/17/2020 10:20 AM RAFAEL Chakraborty - CNP [facility] Physic [address]

## 2020-08-21 ENCOUNTER — TELEPHONE (OUTPATIENT)
Dept: PULMONOLOGY | Age: 65
End: 2020-08-21

## 2020-08-21 NOTE — TELEPHONE ENCOUNTER
Slime from [facility] called regarding stopping Xarelto. They need it in writing when patient is to stop Xarelto prior to surgery. Surgery is on 08/25/2020. Fax number is [phone] and [phone].

## 2020-08-24 ENCOUNTER — CARE COORDINATION (OUTPATIENT)
Dept: CARE COORDINATION | Age: 65
End: 2020-08-24

## 2020-08-24 ENCOUNTER — TELEPHONE (OUTPATIENT)
Dept: PULMONOLOGY | Age: 65
End: 2020-08-24

## 2020-08-24 ENCOUNTER — TELEPHONE (OUTPATIENT)
Dept: ONCOLOGY | Age: 65
End: 2020-08-24

## 2020-08-24 NOTE — TELEPHONE ENCOUNTER
Sandro Johnson from Pulmonary called and states patient is to have surgery tomorrow. He needs in writing OK to be off Xarelto for surgery. You seen him on 4/4/19 and follow up is PRN.

## 2020-08-24 NOTE — CARE COORDINATION
Received message from Josue Ibarra today. Pt is very frustrated as he is unsure who he needs to get in contact with to get an order to hold his xarelto prior to his surgery. Pt had Andre Filter placed on 8/17 by vascular in [place]. This was arranged by ortho. Pt took one dose of xarelto on 8/18 and reports that he has held it ever since in preparation for his cervical surgery that is scheduled for tomorrow. Ortho is needing an order to state that it is ok to hold Xarelto for his procedure. Pt and ortho tried contacting [facility] office since that is who prescribes his Xarelto. Pul suggests oncology give the order. Pt has only seen oncology 1x  Back in April 2019 and it was recommended that he take medication for lifetime. Pt is ok if needing to resume medication after his surgery. Contacted Itz at oncology office. He received call from Trina at ortho office. He is concerned that since Dr. Maritza Tierney has only seen pt 1x and it was back in April of 2019 that she will not give the order. Informed him that I would talk with PUL. Attempted to reach PULM office. Message left on nurse line. Received call back from [place]. She reports that they have messages out to Slime at ortho office and oncology office. Prefer Dr. Maritza Tierney gives recommendations d/t pt having Protein S deficiency. I contacted Zeus Hdz at oncology. He reports that he has sent Dr. Maritza Tierney a message but will send her an additional info providing her with information that pt had filter placed on 8/17 and has not taken his xarelto since 8/18. Received call back from Trina at ortho office. She informs me that she has gotten this all straightened out with pre op nurse that was requesting the order. Ortho has all ready addressed this and coordinated with vascular in [place] so no further orders are needed at this time. Pt will resume his xarelto following surgery tomorrow per ortho.   Trina reports that she left message with Joseu Ibarra

## 2020-08-24 NOTE — TELEPHONE ENCOUNTER
Patient phoned in very upset, has not heard anything back yet. I updated patient that he was to contact Dr. Edward Nicole office. Patient surgery scheduled for 8.25.20. Phoned Slime and left another message in regards to this. Phoned Dr. Edward Nicole office and they are looking into patient chart.

## 2020-08-24 NOTE — TELEPHONE ENCOUNTER
Slime from [facility] called and they have everything that they need for Stone Lowery. Patient stopped Xarelto on his own. They cleared him and they do not need a form from you. I notified Dr Edward Nicole and Dr Elizabeth Cobian office. Slime left a message with Stone Lowery.

## 2020-08-24 NOTE — TELEPHONE ENCOUNTER
Had [place] filter placed 8/17/20. Patient stopped Xarelto on his own on 8/18/20. He is having ACDS on C5-C7. They wanted in witting to hold Xarelto due to his Protein S deficiency.

## 2020-08-24 NOTE — TELEPHONE ENCOUNTER
I looked at his notes, I do not see any comments about lung surgery. What surgery he is going to have tomorrow?

## 2020-08-27 ENCOUNTER — TELEPHONE (OUTPATIENT)
Dept: FAMILY MEDICINE CLINIC | Age: 65
End: 2020-08-27

## 2020-08-27 NOTE — TELEPHONE ENCOUNTER
Pt called office stating he received a call from [facility] regarding his glucose meter. He was told he did not qualify because he is only using the long acting Lantus BID. Please advise.

## 2020-08-27 NOTE — TELEPHONE ENCOUNTER
I did not order the Dexcom. I have only ordered routine glucose meters and test strips for Itz. Diabetic clinic must of ordered the Dexcom glucose meter. See if they will address this issue with his insurance company.

## 2020-09-01 ENCOUNTER — CARE COORDINATION (OUTPATIENT)
Dept: CARE COORDINATION | Age: 65
End: 2020-09-01

## 2020-09-03 ENCOUNTER — OFFICE VISIT (OUTPATIENT)
Dept: INTERNAL MEDICINE CLINIC | Age: 65
End: 2020-09-03
Payer: MEDICARE

## 2020-09-03 ENCOUNTER — CARE COORDINATION (OUTPATIENT)
Dept: CARE COORDINATION | Age: 65
End: 2020-09-03

## 2020-09-03 ENCOUNTER — TELEPHONE (OUTPATIENT)
Dept: INTERNAL MEDICINE CLINIC | Age: 65
End: 2020-09-03

## 2020-09-03 VITALS — HEIGHT: 71 IN | WEIGHT: 204.8 LBS | TEMPERATURE: 97.8 F | BODY MASS INDEX: 28.67 KG/M2

## 2020-09-03 PROCEDURE — 97803 MED NUTRITION INDIV SUBSEQ: CPT | Performed by: DIETITIAN, REGISTERED

## 2020-09-03 RX ORDER — DOCUSATE SODIUM 100 MG/1
100 CAPSULE, LIQUID FILLED ORAL 2 TIMES DAILY
COMMUNITY
End: 2020-09-15

## 2020-09-03 NOTE — PROGRESS NOTES
[address] [place] [address]. [address] [place] [address] Rd., [place], [address]  [phone] (phone)  [phone] (fax)    Patient Name: Kiel Foster. Date of Birth: [id number]. MRN: [id number]      Assessment: Patient is a 60 y.o. male seen for follow-up MNT visit for Type 2 DB and Gastroparesis. -Nutritionally relevant labs:   Lab Results   Component Value Date/Time    LABA1C 8.1 (H) 07/28/2020 02:50 PM    LABA1C 10.3 (H) 06/18/2020 02:45 PM    LABA1C 11.8 01/10/2020 09:30 AM    GLUCOSE 135 (H) 08/26/2020 06:12 AM    GLUCOSE 240 (H) 08/19/2020 11:20 AM    CHOL 166 05/21/2016 06:22 AM    HDL 44 07/22/2020 09:06 AM    LDLCALC 169 07/22/2020 09:06 AM    TRIG 215 (H) 05/21/2016 06:22 AM     Pt discussed his Dexcom conversations with the company and they stated he does not qualify for Dexcom CGM. Callback to patient later informing him that our DB center personnel will call the company next week. -Blood sugar trends: Unable to download meter d/t dead battery. Pt presented with written BS documentation on his food logs. Per pt FBS Today 154  Pt had recent neck/spine surgery (August 25th) and walking in with a rolling walker and neck brace. Pt explained difficulty swallowing since surgery. Has been eating eggs and cream of chicken soup most of the time.    -Food recall/food log:   Breakfast: 9-10 am - sausage hardy, sc. Egg, 1 slice toast with butter. occ boiled rice with cinn and sugar sub  Drinks - crystal lite. Since surgery hot tea - sugar sub  Eating slowly takes 1 1/2 hr to complete brkf. Lunch: skips. Dinner: 7 pm - cream of chicken soup 1 - 2 cups. Other food choices since surgery -  Jellos, soups and scram eggs. Pt states he does not snack in the evening or during the day, but only occasionally.   Last night had a cherry popscile  Pt likes fruit and lately eating mandarine orange slices or banana  Pt likes the following vegetables but having difficult Inject 20 Units into the skin 2 times daily 15 pen 3    sertraline (ZOLOFT) 25 MG tablet Take 100 mg by mouth daily       nitroGLYCERIN (NITROSTAT) 0.4 MG SL tablet Place 1 tablet under the tongue every 5 minutes as needed for Chest pain 25 tablet 3    metoprolol (TOPROL XL) 25 MG XL tablet Take 1 tablet by mouth daily 30 tablet 3    aspirin 81 MG tablet Take 81 mg by mouth daily      vitamin D 1000 UNITS CAPS Take by mouth daily       cyclobenzaprine (FLEXERIL) 10 MG tablet Take 0.5 tablets by mouth 3 times daily as needed for Muscle spasms 30 tablet 0     No current facility-administered medications on file prior to visit. Vitals from current and previous visits:  Temp 97.8 °F (36.6 °C)   Ht 5' 11\" (1.803 m)   Wt 204 lb 12.8 oz (92.9 kg)   BMI 28.56 kg/m²     -Body mass index is 28.56 kg/m². 25-29.9 - Overweight. - Patient lost and 18 pounds over the last 7 weeks. .  -Weight goal: lose weight. Nutrition Diagnosis:   Limited adherence to nutrition-related recommendations related to currently undergoing MNT as evidenced by Diagnosis of Gastroparesis and Type 2 DB and patient report of difficulty swallowing and tolerating only soft foods and pt report only eats 2x/day. Intervention:  -Impression: Pt does not eat at least 4x/day which would be better for his Gastroparesis.      -Instructed the patient on: carbohydrates counting his soft foods selections for his swallowing difficulties and gastroparesis. -Handouts given for: Gastroparesis guidelines and sample menu, other handout for DB gastroparesis guidelines with sample manu, Carb counting booklet with highlighted soft foods and combination foods and their carb counts. Glucerna samples and coupons. Patient Instructions   1.)  Other soft foods to plan in your meal planning and count the carbohydrates.     2.)  Refer to gastroparesis sample menu for meal and snack ideas  - choose well cooked vegetables  - Choose canned fruit and soft ripe fruits    3.)  Have a mid day mini meal   - Also work in a Glucerna shake or other equivalent brand. Thanks for the food logging. Bring again to next dietitian appt. 1-2 weeks along with your meter.      -Nutrition prescription: 1600 - 1700 calories/day, 180 g carbs/day. Comprehension verified using teachback method. Monitoring/Evaluation:   -Followup visit: 6 weeks with dietitian.   -Receptiveness to education/goals: Agreeable.  -Evaluation of education: Indicates understanding.  -Readiness to change: contemplation - ambivalent about change eating a mini meal mid day and include a Glucerna Shake everyday. -Expected compliance: good. Thank you for your referral of this patient. Total time involved in direct patient education: 45 minutes for follow-up MNT visit.

## 2020-09-03 NOTE — CARE COORDINATION
Ambulatory Care Coordination Note  9/3/2020  CM Risk Score: 3  [place] 10 Year Mortality Risk Score: 23%     ACC: Humphrey Sanchez, RN    Summary Note: Breanna Fish is being followed by care coordination for education and assistance in managing his DM and other chronic conditions. Spoke with Breanna Fish today. Pt is s/p ACDF surgery on 8/25. Had filter placed prior to procedure d/t being on xarelto and past medical hx of PE. Pt is back on xarelto and will be contacted vascular to see what plan is as far as filter goes. Had f/u with ortho surgeon yesterday. Following restrictions. Reviewed importance of continued use of incentive spirometer and wt restrictions. Wearing cervical collar and ambulating with rolling walker. DM: working with DM clinic. Was seen by dietician today. Keeping food log and BS log. Working with DM clinic on getting Dexcom and hoping to eventually get an insulin pump. Has appt with CDE on 9/15. Reminded pt of appt. Denies episodes of hypoglycemia. Diet has consisted of soft foods since surgery as he has throat pain when swallowing. Aware this will improve as he heals. Taking Lantus and Humalog. Last A1C 8.1. Denies edema to ext's. Wearing compression stockings. Ambulating periodically t/o the day. Taking pain meds. Denies constipation. Encouraged to monitor while on narcotics. Plan of care:  Continue close f/u with DM clinic. Keep food log and BS log and bring to next appt. F/u with DM clinic on 9/15  F/u with surgeon as scheduled in Oct.   Continue following surgery restrictions. Pt overall is doing well. Recovering from recent surgery. No new barriers. Continues to f/u with DM clinic. Has improved A1C over the last few mths.   Will graduate from care coordination upon PCP approval.         Care Coordination Interventions    Program Enrollment:  Complex Care  Referral from Primary Care Provider:  No  Suggested Interventions and Community Resources  Disease Specific w/Device KIT 1 kit by Does not apply route daily 6/25/20   Nisreen Dye MD   metFORMIN (GLUCOPHAGE) 500 MG tablet Take 1 tablet by mouth 2 times daily (with meals) 6/25/20 12/22/20  Nisreen Dye MD   CPAP Machine MISC by Does not apply route Please change BIPAP pressure to IPAP max 15 and EPAP min 6 and PS 4cm H20. Add EPR 1-3 for comfort 4/27/20   Mary Soto PA-C   blood glucose monitor strips One Touch Ultra Test Strips. Test 2 times a day and as needed for diabetic symptoms.  DX: E11.22 4/3/20   Nisreen Dye MD   acyclovir (ZOVIRAX) 800 MG tablet TAKE 1 TABLET BY MOUTH AS NEEDED (HERPES LABIALIS) 1/8/20   Historical Provider, MD   insulin glargine (LANTUS SOLOSTAR) 100 UNIT/ML injection pen Inject 20 Units into the skin 2 times daily 1/10/20   Nisrene Dye MD   sertraline (ZOLOFT) 25 MG tablet Take 100 mg by mouth daily     Historical Provider, MD   nitroGLYCERIN (NITROSTAT) 0.4 MG SL tablet Place 1 tablet under the tongue every 5 minutes as needed for Chest pain 5/21/16   Patricio Burnett MD   metoprolol (TOPROL XL) 25 MG XL tablet Take 1 tablet by mouth daily 5/21/16   Elena Haddad MD   aspirin 81 MG tablet Take 81 mg by mouth daily    Historical Provider, MD   vitamin D 1000 UNITS CAPS Take by mouth daily     Historical Provider, MD   cyclobenzaprine (FLEXERIL) 10 MG tablet Take 0.5 tablets by mouth 3 times daily as needed for Muscle spasms 5/14/15   Mark Arndt PA-C       Future Appointments   Date Time Provider Hailey Brown   9/15/2020  8:00 AM Kristin Reyes RN [facility] Physic [address]   10/14/2020 10:00 AM Barbara Castellon RD, LD [facility] Physic [facility] [place]   10/23/2020  8:45 AM [address]   10/27/2020 11:45 AM Mary Soto PA-C Pulm Med [address]   11/17/2020 10:20 AM Ilda Cj, APRN - CNP [facility] Physic [facility]

## 2020-09-03 NOTE — PATIENT INSTRUCTIONS
1.)  Other soft foods to plan in your meal planning and count the carbohydrates. 2.)  Refer to gastroparesis sample menu for meal and snack ideas  - choose well cooked vegetables  - Choose canned fruit and soft ripe fruits    3.)  Have a mid day mini meal   - Also work in a Glucerna shake or other equivalent brand. Thanks for the food logging. Bring again to next dietitian appt. 1-2 weeks along with your meter.

## 2020-09-03 NOTE — TELEPHONE ENCOUNTER
Spoke with patient to let him know that the DB center will look into his Dexcom application next week. Also changed his outpatient dietitian appt for a sooner date in October. Pt appreciative.

## 2020-09-09 ENCOUNTER — OFFICE VISIT (OUTPATIENT)
Dept: FAMILY MEDICINE CLINIC | Age: 65
End: 2020-09-09
Payer: MEDICARE

## 2020-09-09 ENCOUNTER — NURSE ONLY (OUTPATIENT)
Dept: LAB | Age: 65
End: 2020-09-09

## 2020-09-09 VITALS
WEIGHT: 206.2 LBS | BODY MASS INDEX: 28.76 KG/M2 | DIASTOLIC BLOOD PRESSURE: 72 MMHG | TEMPERATURE: 97.2 F | HEART RATE: 92 BPM | RESPIRATION RATE: 24 BRPM | SYSTOLIC BLOOD PRESSURE: 136 MMHG

## 2020-09-09 LAB — VITAMIN D 25-HYDROXY: 25 NG/ML (ref 30–100)

## 2020-09-09 PROCEDURE — 99213 OFFICE O/P EST LOW 20 MIN: CPT | Performed by: FAMILY MEDICINE

## 2020-09-09 ASSESSMENT — ENCOUNTER SYMPTOMS: BACK PAIN: 1

## 2020-09-09 NOTE — PROGRESS NOTES
Subjective:      Patient ID: Ivana Baltazar is a 60 y.o. male. HPI  Encounter Diagnoses   Name Primary?  Pain of right hip joint Yes    Lumbar degenerative disc disease      Patient is here for evaluation of some increasing right hip pain. He has a significant history of a fall carrying a sofa bed down some steps approximately 25 years ago. He landed on his buttocks and \"tailbone\" but there seemed to be no sequela after that. Since then he has had lumbar spine x-rays which show degenerative disc changes and wedging of T12 from a remote incident. The right hip pain is not associated with an injury but in the last 2 weeks, there has been increasing discomfort without radicular pain into either thigh. If he lays on the right hip there is marked pain and then there is also pain in the right hip laying on his left side without radicular pain either. He is currently using a wheeled walker since he is just had cervical surgery and due to his pain with ambulation, handicap placard will be prescribed. His lumbar spine is always generated some discomfort which tends to remain midline. X-rays of the lumbar spine and right hip will be ordered with follow-up in a week. Review of Systems   Musculoskeletal: Positive for arthralgias and back pain. See HPI. Objective:   Physical Exam  Musculoskeletal:      Right hip: He exhibits decreased range of motion and tenderness. He exhibits no crepitus. Lumbar back: He exhibits decreased range of motion, tenderness, bony tenderness and pain. Back:          Assessment:       Diagnosis Orders   1. Pain of right hip joint  XR HIP RIGHT (2-3 VIEWS)    Handicap placard   2.  Lumbar degenerative disc disease  XR LUMBAR SPINE (MIN 6 VIEWS)    Handicap placard           Plan:      Orders Placed This Encounter   Procedures    Handicap placard     Standing Status:   Future     Standing Expiration Date:   9/9/2025     Scheduling Instructions:      Exp: 9/15/2025.  XR HIP RIGHT (2-3 VIEWS)     Standing Status:   Future     Standing Expiration Date:   11/9/2020    XR LUMBAR SPINE (MIN 6 VIEWS)     Standing Status:   Future     Standing Expiration Date:   11/9/2020     Order Specific Question:   Reason for exam:     Answer:   include obliques, flexion/extension. There are no discontinued medications. Current Outpatient Medications   Medication Sig Dispense Refill    OXYCODONE ER PO Take by mouth Taking 4x/day. Unsure of dosage      docusate sodium (COLACE) 100 MG capsule Take 100 mg by mouth 2 times daily      pravastatin (PRAVACHOL) 80 MG tablet Take 1 tablet by mouth daily 90 tablet 1    vitamin D (ERGOCALCIFEROL) 1.25 MG (29950 UT) CAPS capsule Take 1 capsule by mouth once a week 12 capsule 1    lisinopril (PRINIVIL;ZESTRIL) 10 MG tablet Take 1 tablet by mouth once daily 30 tablet 2    Misc. Devices (ROLLATOR ULTRA-LIGHT) MISC Use Rollator for stability with ambulation. 1 each 0    omeprazole (PRILOSEC) 40 MG delayed release capsule Take 1 capsule by mouth once daily 90 capsule 2    insulin lispro, 1 Unit Dial, (HUMALOG KWIKPEN) 100 UNIT/ML SOPN Inject 7 Units into the skin 3 times daily (before meals) 10 pen 3    XARELTO 10 MG TABS tablet Take 1 tablet by mouth once daily with breakfast 30 tablet 0    Blood Glucose Monitoring Suppl (ONE TOUCH ULTRA 2) w/Device KIT 1 kit by Does not apply route daily 1 kit 0    metFORMIN (GLUCOPHAGE) 500 MG tablet Take 1 tablet by mouth 2 times daily (with meals) 60 tablet 5    CPAP Machine MISC by Does not apply route Please change BIPAP pressure to IPAP max 15 and EPAP min 6 and PS 4cm H20. Add EPR 1-3 for comfort 1 each 0    blood glucose monitor strips One Touch Ultra Test Strips. Test 2 times a day and as needed for diabetic symptoms.  DX: E11.22 300 strip 5    acyclovir (ZOVIRAX) 800 MG tablet TAKE 1 TABLET BY MOUTH AS NEEDED (HERPES LABIALIS)      insulin glargine (LANTUS SOLOSTAR) 100 UNIT/ML injection pen Inject 20 Units into the skin 2 times daily 15 pen 3    sertraline (ZOLOFT) 25 MG tablet Take 100 mg by mouth daily       nitroGLYCERIN (NITROSTAT) 0.4 MG SL tablet Place 1 tablet under the tongue every 5 minutes as needed for Chest pain 25 tablet 3    metoprolol (TOPROL XL) 25 MG XL tablet Take 1 tablet by mouth daily 30 tablet 3    aspirin 81 MG tablet Take 81 mg by mouth daily      vitamin D 1000 UNITS CAPS Take by mouth daily       cyclobenzaprine (FLEXERIL) 10 MG tablet Take 0.5 tablets by mouth 3 times daily as needed for Muscle spasms 30 tablet 0     No current facility-administered medications for this visit. Get x-rays this weeks. Recheck appointment for xray review in 1 weeks.         Alex Liu MD

## 2020-09-09 NOTE — PATIENT INSTRUCTIONS
You may receive a survey regarding the care you received during your visit. Your input is valuable to us. We encourage you to complete and return your survey. We hope you will choose us in the future for your healthcare needs. Get x-rays this weeks. Recheck appointment for xray review in 1 weeks.

## 2020-09-10 ENCOUNTER — HOSPITAL ENCOUNTER (OUTPATIENT)
Dept: GENERAL RADIOLOGY | Age: 65
Discharge: HOME | End: 2020-09-10
Payer: MEDICARE

## 2020-09-10 ENCOUNTER — HOSPITAL ENCOUNTER (OUTPATIENT)
Age: 65
Discharge: HOME | End: 2020-09-10
Payer: MEDICARE

## 2020-09-10 PROCEDURE — 73502 X-RAY EXAM HIP UNI 2-3 VIEWS: CPT

## 2020-09-10 PROCEDURE — 72114 X-RAY EXAM L-S SPINE BENDING: CPT

## 2020-09-10 NOTE — RESULT ENCOUNTER NOTE
X-rays of the lumbar spine show no acute process. Healed T12 vertebral compression wedging is unchanged. There are some degenerative changes in the lumbar spine which apparently are stable. The height of all lumbar vertebra are well maintained. There is no obvious indication based on the x-rays of what is causing pain. Keep upcoming appointment next week.

## 2020-09-10 NOTE — RESULT ENCOUNTER NOTE
Vitamin D level is below normal range at 25.   is in normal range so he needs to start taking 5000 units of vitamin D daily. This will be vitamin D3 over-the-counter.

## 2020-09-15 ENCOUNTER — TELEPHONE (OUTPATIENT)
Dept: INTERNAL MEDICINE CLINIC | Age: 65
End: 2020-09-15

## 2020-09-15 ENCOUNTER — OFFICE VISIT (OUTPATIENT)
Dept: INTERNAL MEDICINE CLINIC | Age: 65
End: 2020-09-15
Payer: MEDICARE

## 2020-09-15 VITALS
DIASTOLIC BLOOD PRESSURE: 70 MMHG | HEART RATE: 68 BPM | BODY MASS INDEX: 30.27 KG/M2 | SYSTOLIC BLOOD PRESSURE: 140 MMHG | WEIGHT: 216.2 LBS | TEMPERATURE: 97.4 F | HEIGHT: 71 IN

## 2020-09-15 PROCEDURE — G0108 DIAB MANAGE TRN  PER INDIV: HCPCS | Performed by: INTERNAL MEDICINE

## 2020-09-15 RX ORDER — OXYCODONE HYDROCHLORIDE AND ACETAMINOPHEN 5; 325 MG/1; MG/1
1 TABLET ORAL
COMMUNITY
Start: 2020-09-04 | End: 2020-10-23 | Stop reason: ALTCHOICE

## 2020-09-15 RX ORDER — CYCLOBENZAPRINE HCL 5 MG
5 TABLET ORAL PRN
COMMUNITY
Start: 2020-08-26

## 2020-09-15 NOTE — PROGRESS NOTES
[facility]  [address] Terry Hernandez, [address]  [phone] (phone)  [phone] (fax)    Patient ID: Carlos Worley [id number]  Referring Provider: Dr. Barry Gutiérrez     Patient's name and  were verified. Subjective:    He presents for His follow-up diabetic visit. He has type 2 diabetes mellitus. Home regimen includes: insulin  biguanide He is compliant most of the time. Assessment:     Lab Results   Component Value Date    LABA1C 8.1 2020    BUN 10 2020    CREATININE 0.91 2020     There were no vitals filed for this visit. Wt Readings from Last 3 Encounters:   20 206 lb 3.2 oz (93.5 kg)   20 204 lb 12.8 oz (92.9 kg)   20 216 lb 1.6 oz (98 kg)     Ht Readings from Last 3 Encounters:   20 5' 11\" (1.803 m)   20 5' 11\" (1.803 m)   20 5' 11\" (1.803 m)       Current monitoring regimen: home blood tests - 4 times daily  Home blood sugar trends: FBS's 125-249. [id number]. Dinner 150, 178. -162  Any episodes of hypoglycemia? no  Previous visit with dietician: yes -   Current diet: B biscuit  Or sc eggs/ sausage/ 1 toast                 L skips  SF jello. 4 club crackers/ cr cheese                 D brat/ noodles (1 cup)                Snacking popcorn 3 cups  Current exercise: walking 15 minutes daily  Eye exam current (within one year): no Dr. Sheree Bence 2019  Any history of foot problems? no  Last foot exam: 20  Immunizations up to date: yes -   Taking ASA:  Yes   Appropriate for use of MyChart Glucose Grid:  Yes    Focus:     Diabetes education. Most recent A1C 8.1%. Glucose levels have improved since then, but remain above goal 4 of 7 days. Mercy Higgins has recently had neck surgery and is healing well. Activity is limited to walking at this time. He is still waiting on Dexcom CGM. He will continue follow up on this process. He is also interested in insulin pump therapy that will communicate with the Dexcom.  We will explore the Tandem pump-submit request. Overall Nancy Hook is doing well. Glucose levels are slightly above goal and are flat through the day. He could benefit from an increase in basal insulin. Follow up visit 3 months. DSME PLAN:   Discussed general issues about diabetes pathophysiology and management. Counseling at today's visit: BG goals; carbs; exercise goals; insulin action. Watch how many carbohydrates you eat at each meal         --this needs to correlate with how many carbohydrates you are eating       7 units = 30 grams carbohydrate              You may need only 5 units if you are eating 15 grams                Or 9-10 units if you are eating 70 grams or more      *the blood sugar 2 hours after you eat or the next meal                    2hrs after - goal is under 160/180                     Before meals -goal is under 130  Take Lantus 21 units morning and night  Call the clinic with any questions and if you haven't heard from Dexcom/ [address] in a week    Meter download, medications, PMH and nursing assessment reviewed. Jose Mmariano Daniel states He is willing to participate in this plan of care and verbalized understanding of all instructions provided. Teach back used to verify comprehension. Total time involved in direct patient education: 60 minutes.

## 2020-09-15 NOTE — TELEPHONE ENCOUNTER
Diabetes education  Glucose levels are slightly above goal and are flat through the day. He could benefit from an increase in basal insulin. FBS's 125-249. [id number]. Dinner 150, 178. -162    Medications              Lantus 20 units 2 times per day              Humalog 7 units before meals plus                               Scale              Metformin 500mg BID    DEVEN Elizondo CNP,      Please authorize the [facility] at [address] [place] [address] to teach/ assist Fer Soto to increase his Lantus to 21 units 2 times daily. If you agree, please note and return. Thank you.

## 2020-09-15 NOTE — PATIENT INSTRUCTIONS
Watch how many carbohydrates you eat at each meal         --this needs to correlate with how many carbohydrates you are eating       7 units = 30 grams carbohydrate              You may need only 5 units if you are eating 15 grams                Or 9-10 units if you are eating 70 grams or more      *the blood sugar 2 hours after you eat or the next meal                    2hrs after - goal is under 160/180                     Before meals -goal is under 130  Take Lantus 21 units morning and night  Call the clinic with any questions and if you haven't heard from Dexcom/ [address] in a week

## 2020-09-16 ENCOUNTER — OFFICE VISIT (OUTPATIENT)
Dept: FAMILY MEDICINE CLINIC | Age: 65
End: 2020-09-16
Payer: MEDICARE

## 2020-09-16 VITALS
TEMPERATURE: 97.8 F | BODY MASS INDEX: 30.18 KG/M2 | OXYGEN SATURATION: 98 % | DIASTOLIC BLOOD PRESSURE: 80 MMHG | HEART RATE: 75 BPM | RESPIRATION RATE: 15 BRPM | SYSTOLIC BLOOD PRESSURE: 138 MMHG | WEIGHT: 216.4 LBS

## 2020-09-16 PROCEDURE — 99213 OFFICE O/P EST LOW 20 MIN: CPT | Performed by: FAMILY MEDICINE

## 2020-09-16 RX ORDER — INSULIN GLARGINE 100 [IU]/ML
21 INJECTION, SOLUTION SUBCUTANEOUS 2 TIMES DAILY
Qty: 15 PEN | Refills: 3 | Status: SHIPPED
Start: 2020-09-16 | End: 2020-10-05 | Stop reason: SDUPTHER

## 2020-09-16 ASSESSMENT — ENCOUNTER SYMPTOMS: BACK PAIN: 1

## 2020-09-16 NOTE — PATIENT INSTRUCTIONS
You may receive a survey regarding the care you received during your visit. Your input is valuable to us. We encourage you to complete and return your survey. We hope you will choose us in the future for your healthcare needs. Continue home exercise routine for back pain. Start chiropractic treatment for further back pain control. Recheck next regular appointment.

## 2020-09-16 NOTE — PROGRESS NOTES
Subjective:      Patient ID: Tobias Brown is a 60 y.o. male. HPI  Encounter Diagnoses   Name Primary?  Pain of right hip joint     Chronic midline low back pain without sciatica Yes     Patient is here for review of his x-rays of his right hip and lumbar spine. The x-ray of the hip showed no arthritic changes or degenerative changes so the hip pain is most likely referred in nature. Lumbar spine shows some 10% loss of anterior vertebral height of T12 due to wedging. The patient does not recall a history of a spinal injury. The lumbar x-rays show some osteophytic spurring but disc spaces are well-maintained as well as vertebral body height. He has no retrolisthesis on extension or flexion of the spine. I believe this back pain is most likely mechanical in origin so he will continue doing his back exercises and will start seeing a chiropractor for low velocity or activator chiropractic treatments. Review of Systems   Musculoskeletal: Positive for arthralgias and back pain. Negative for gait problem. See HPI. All other systems reviewed and are negative. Objective:   Physical Exam  Vitals signs and nursing note reviewed. Musculoskeletal:      Lumbar back: He exhibits tenderness and pain. He exhibits normal range of motion, no bony tenderness, no swelling and no spasm. Back:          Assessment:       Diagnosis Orders   1. Chronic midline low back pain without sciatica     2. Pain of right hip joint             Plan:      No orders of the defined types were placed in this encounter. There are no discontinued medications. Current Outpatient Medications   Medication Sig Dispense Refill    cyclobenzaprine (FLEXERIL) 5 MG tablet Take 5 mg by mouth as needed      oxyCODONE-acetaminophen (PERCOCET) 5-325 MG per tablet Take 1 tablet by mouth every 6-8 hours as needed.  OXYCODONE ER PO Take by mouth Taking 4x/day.   Unsure of dosage      pravastatin (PRAVACHOL) 80 MG tablet Take 1 tablet by mouth daily 90 tablet 1    vitamin D (ERGOCALCIFEROL) 1.25 MG (49030 UT) CAPS capsule Take 1 capsule by mouth once a week 12 capsule 1    lisinopril (PRINIVIL;ZESTRIL) 10 MG tablet Take 1 tablet by mouth once daily 30 tablet 2    Misc. Devices (ROLLATOR ULTRA-LIGHT) MISC Use Rollator for stability with ambulation. 1 each 0    omeprazole (PRILOSEC) 40 MG delayed release capsule Take 1 capsule by mouth once daily 90 capsule 2    insulin lispro, 1 Unit Dial, (HUMALOG KWIKPEN) 100 UNIT/ML SOPN Inject 7 Units into the skin 3 times daily (before meals) 10 pen 3    XARELTO 10 MG TABS tablet Take 1 tablet by mouth once daily with breakfast 30 tablet 0    Blood Glucose Monitoring Suppl (ONE TOUCH ULTRA 2) w/Device KIT 1 kit by Does not apply route daily 1 kit 0    metFORMIN (GLUCOPHAGE) 500 MG tablet Take 1 tablet by mouth 2 times daily (with meals) 60 tablet 5    CPAP Machine MISC by Does not apply route Please change BIPAP pressure to IPAP max 15 and EPAP min 6 and PS 4cm H20. Add EPR 1-3 for comfort 1 each 0    blood glucose monitor strips One Touch Ultra Test Strips. Test 2 times a day and as needed for diabetic symptoms. DX: E11.22 300 strip 5    acyclovir (ZOVIRAX) 800 MG tablet TAKE 1 TABLET BY MOUTH AS NEEDED (HERPES LABIALIS)      insulin glargine (LANTUS SOLOSTAR) 100 UNIT/ML injection pen Inject 20 Units into the skin 2 times daily 15 pen 3    sertraline (ZOLOFT) 25 MG tablet Take 100 mg by mouth daily       nitroGLYCERIN (NITROSTAT) 0.4 MG SL tablet Place 1 tablet under the tongue every 5 minutes as needed for Chest pain 25 tablet 3    metoprolol (TOPROL XL) 25 MG XL tablet Take 1 tablet by mouth daily 30 tablet 3    aspirin 81 MG tablet Take 81 mg by mouth daily      vitamin D 1000 UNITS CAPS Take by mouth daily        No current facility-administered medications for this visit. Continue home exercise routine for back pain.   Start chiropractic treatment for further back pain control. Recheck next regular appointment.           Krissy Ramirez MD

## 2020-09-16 NOTE — TELEPHONE ENCOUNTER
Ricardo Tamayo instructed to increase Lantus insulin to 21 units morning and night. Ricardo Tamayo voiced understanding via teach back.

## 2020-09-17 ENCOUNTER — TELEPHONE (OUTPATIENT)
Dept: FAMILY MEDICINE CLINIC | Age: 65
End: 2020-09-17

## 2020-09-17 NOTE — TELEPHONE ENCOUNTER
Pt called office stating he had an appt with you yesterday and a chiropractor was discussed. Pt checked with insurance and they require a referral to be sent. Pt would like one sent to [facility] here [email or web address]. He would like a call back once this has been sent. Please advise.

## 2020-10-05 RX ORDER — INSULIN GLARGINE 100 [IU]/ML
21 INJECTION, SOLUTION SUBCUTANEOUS 2 TIMES DAILY
Qty: 15 PEN | Refills: 3 | Status: SHIPPED | OUTPATIENT
Start: 2020-10-05 | End: 2021-07-07 | Stop reason: SDUPTHER

## 2020-10-08 RX ORDER — BLOOD-GLUCOSE SENSOR
EACH MISCELLANEOUS
Qty: 1 EACH | Refills: 11 | Status: SHIPPED | OUTPATIENT
Start: 2020-10-08 | End: 2021-07-08 | Stop reason: SDUPTHER

## 2020-10-08 RX ORDER — BLOOD-GLUCOSE TRANSMITTER
EACH MISCELLANEOUS
Qty: 1 EACH | Refills: 3 | Status: SHIPPED | OUTPATIENT
Start: 2020-10-08 | End: 2021-07-08 | Stop reason: SDUPTHER

## 2020-10-08 RX ORDER — BLOOD-GLUCOSE,RECEIVER,CONT
EACH MISCELLANEOUS
Qty: 1 DEVICE | Refills: 0 | Status: SHIPPED | OUTPATIENT
Start: 2020-10-08

## 2020-10-08 NOTE — TELEPHONE ENCOUNTER
Slime Modi, the [facility] rep asked for us to send the script for a DexCom to be sent to the patients pharmacy. Please sign script.

## 2020-10-19 ENCOUNTER — OFFICE VISIT (OUTPATIENT)
Dept: INTERNAL MEDICINE CLINIC | Age: 65
End: 2020-10-19
Payer: MEDICARE

## 2020-10-19 VITALS — HEIGHT: 71 IN | WEIGHT: 215.4 LBS | BODY MASS INDEX: 30.15 KG/M2 | TEMPERATURE: 98.3 F

## 2020-10-19 PROCEDURE — 97803 MED NUTRITION INDIV SUBSEQ: CPT | Performed by: DIETITIAN, REGISTERED

## 2020-10-19 NOTE — PROGRESS NOTES
[address] [place] [address]. [address] [place] [address] [address] [facility], [address]  [phone] (phone)  [phone] (fax)    Patient Name: Danika Velásquez. Date of Birth: [id number]. MRN: [id number]      Assessment: Patient is a 60 y.o. male seen for follow-up MNT visit for Type 2 DB (&gastroparesis). -Nutritionally relevant labs:   Lab Results   Component Value Date/Time    LABA1C 8.1 (H) 07/28/2020 02:50 PM    LABA1C 10.3 (H) 06/18/2020 02:45 PM    LABA1C 11.8 01/10/2020 09:30 AM    GLUCOSE 135 (H) 08/26/2020 06:12 AM    GLUCOSE 240 (H) 08/19/2020 11:20 AM    CHOL 166 05/21/2016 06:22 AM    HDL 44 07/22/2020 09:06 AM    LDLCALC 169 07/22/2020 09:06 AM    TRIG 215 (H) 05/21/2016 06:22 AM     -Blood sugar trends: Forgot to bring his meter. Pt states he checks 5x/day  . Usually FBS are ok. Taking Lantus 20 units morning and night. (suppose to take 21 units)  Pt c/o bile vomiting each morning. Pt c/o mouth breather at night causing him to wake up bloated and vomiting bile and flatulance. Wears a cpap ~ 4 hrs. Does not stay in bed past that and gets up after 4 hours d/t head pressure (dx chiari)  Pt c/o giving up on the insulin pump if cannot get a CGM - it was getting to be too much of a hassle for him. Household includes SO. SO works PT 3-7 pm at [place] [address]. -Food recall:   Up 5 am coffee and 1 khushbu cracker to help with his bile reflux in the morning. Breakfast: 10 - 11 am - sc or poached egg, a small amount of coffee sometimes if tolerates. He feels the warmth of the coffee seems to help his stomach in the morning. Will also drink crystal lite in the morning. Lunch: rarely eats lunch. If so will have a tomato sandwich with lite khan and 2 sunchips.    Dinner: 7 pm - pre-made salad from [employer] - spring mix, took chicken out (was not good), carrots, tomatoes, cucumbers, and rosanna dinner (beef)    Staying away from fatty foods such as argueta. Choosing turkey argueta    Pt c/o Sciatica  Has salmon right now. Pork or chicken. Red meat is rare. No exercising or other extra PA. Denies eating sweets, such as, pies, ice cream or cookies. -Main Beverages: crystal lite, coffee, Glucerna. -Impression of Dietary Intake: in general, an \"unhealthy\" diet, on average, 2 meals per day, on average, 1-2 fast food meals per week. Current Outpatient Medications on File Prior to Visit   Medication Sig Dispense Refill    Cholecalciferol (VITAMIN D3 PO) Take 5,000 Units by mouth daily      XARELTO 10 MG TABS tablet Take 1 tablet by mouth once daily with breakfast 30 tablet 5    insulin glargine (LANTUS SOLOSTAR) 100 UNIT/ML injection pen Inject 21 Units into the skin 2 times daily (Patient taking differently: Inject 20 Units into the skin 2 times daily ) 15 pen 3    cyclobenzaprine (FLEXERIL) 5 MG tablet Take 5 mg by mouth as needed      oxyCODONE-acetaminophen (PERCOCET) 5-325 MG per tablet Take 1 tablet by mouth every 6-8 hours as needed.  pravastatin (PRAVACHOL) 80 MG tablet Take 1 tablet by mouth daily 90 tablet 1    vitamin D (ERGOCALCIFEROL) 1.25 MG (68806 UT) CAPS capsule Take 1 capsule by mouth once a week 12 capsule 1    lisinopril (PRINIVIL;ZESTRIL) 10 MG tablet Take 1 tablet by mouth once daily (Patient taking differently: Take 2 tablet by mouth once daily) 30 tablet 2    Misc. Devices (ROLLATOR ULTRA-LIGHT) MISC Use Rollator for stability with ambulation.  1 each 0    omeprazole (PRILOSEC) 40 MG delayed release capsule Take 1 capsule by mouth once daily 90 capsule 2    insulin lispro, 1 Unit Dial, (HUMALOG KWIKPEN) 100 UNIT/ML SOPN Inject 7 Units into the skin 3 times daily (before meals) 10 pen 3    Blood Glucose Monitoring Suppl (ONE TOUCH ULTRA 2) w/Device KIT 1 kit by Does not apply route daily 1 kit 0    metFORMIN (GLUCOPHAGE) 500 MG tablet Take 1 tablet by mouth 2 times daily (with meals) 60 tablet 5    CPAP Machine MISC by Does not apply route Please change BIPAP pressure to IPAP max 15 and EPAP min 6 and PS 4cm H20. Add EPR 1-3 for comfort 1 each 0    blood glucose monitor strips One Touch Ultra Test Strips. Test 2 times a day and as needed for diabetic symptoms. DX: E11.22 300 strip 5    sertraline (ZOLOFT) 25 MG tablet Take 100 mg by mouth daily       nitroGLYCERIN (NITROSTAT) 0.4 MG SL tablet Place 1 tablet under the tongue every 5 minutes as needed for Chest pain 25 tablet 3    metoprolol (TOPROL XL) 25 MG XL tablet Take 1 tablet by mouth daily 30 tablet 3    aspirin 81 MG tablet Take 81 mg by mouth daily      vitamin D 1000 UNITS CAPS Take by mouth daily       Continuous Blood Gluc  (DEXCOM G6 ) YULI Use as directed for continuous glucose monitor. (Patient not taking: Reported on 10/19/2020) 1 Device 0    Continuous Blood Gluc Transmit (DEXCOM G6 TRANSMITTER) MISC Change every 3 months- Use as directed for continuous glucose monitoring. (Patient not taking: Reported on 10/19/2020) 1 each 3    Continuous Blood Gluc Sensor (DEXCOM G6 SENSOR) MISC Change every 10 days-Use as directed for continuous glucose monitoring. (Patient not taking: Reported on 10/19/2020) 1 each 11    OXYCODONE ER PO Take by mouth Taking 4x/day. Unsure of dosage      acyclovir (ZOVIRAX) 800 MG tablet TAKE 1 TABLET BY MOUTH AS NEEDED (HERPES LABIALIS)       No current facility-administered medications on file prior to visit. Vitals from current and previous visits:  Temp 98.3 °F (36.8 °C)   Ht 5' 11\" (1.803 m)   Wt 215 lb 6.4 oz (97.7 kg)   BMI 30.04 kg/m²     -Body mass index is 30.04 kg/m². 30-34.9 - Obesity Grade I.   - Patient gained and 10 pounds over the last 6 weeks. .  -Weight goal: lose weight. Nutrition Diagnosis:   Limited adherence to nutrition-related recommendations related to currently undergoing MNT as evidenced by Food recall and diagnosis of DB and gastroparesis.          Intervention:  -Impression: Past food logs show red meat often, even though he states he avoids red meat and chooses pork and chicken. Pt affirms he still eats these foods. Pt is not really following gastroparesis guidelines. Pt gained weight. He attributes weight gain d/t inactivity - dealing with back and sciatic issues. Suspect he is eating out more than he states based on past food logs and patient stating he still eats these foods.    -Instructed the patient on: DB gastroparesis guidelines. See instructions below.    -Handouts given for: food logging and AND gastroparesis guidelines and sample menu, Glucerna samples and coupons. Patient Instructions   1.)  Yogurt recommendations - greek or regular yogurt   - make sure added sugars are less than 8 gms. - Usually 100 calorie yogurts are fine. 2.)  Make sure you have at least 3 food items at each meal.  A cheese stick does not make a meal - you have to add to this. - Do not skip your mid-day meal    3.) You can try unsweetened soy milk or unsweetened almond milk, if you feel you have a lactose intolerance. 4.)  Aim for 30 - 45 gms carbohydrates/meals. (refer to your sample menu)  Eating 4x/day. When having a snack in addition to the 4 meals/day - limit carbs to 15-20 gms carbs. 5.)  Have protein with each meal and snack:  Cheese, egg, pbutter, moist meat. 6.)  Make a grocery list from the foods recommended list given. Bring a 1-2 week food log and meter to next appt with Ermelinda Lord, nurse diabetes educator  Also bring when you see the dietitian in January. Bring meter next month for [employer]. CNP appt. -Nutrition prescription: 1600 calories/day, 180 g carbs/day. Comprehension verified using teachback method.     Monitoring/Evaluation:   -Followup visit: 3 mo with dietitian.   -Receptiveness to education/goals: Agreeable.  -Evaluation of education: Needs reinforcement.  -Readiness to change: contemplation - ambivalent about change following the food recommendations for gastroparesis and making a grocery list of these recommended foods.  -Expected compliance: fair. Thank you for your referral of this patient. Total time involved in direct patient education: 45 minutes for follow-up MNT visit.

## 2020-10-19 NOTE — PATIENT INSTRUCTIONS
1.)  Yogurt recommendations - greek or regular yogurt   - make sure added sugars are less than 8 gms. - Usually 100 calorie yogurts are fine. 2.)  Make sure you have at least 3 food items at each meal.  A cheese stick does not make a meal - you have to add to this. - Do not skip your mid-day meal    3.) You can try unsweetened soy milk or unsweetened almond milk, if you feel you have a lactose intolerance. 4.)  Aim for 30 - 45 gms carbohydrates/meals. (refer to your sample menu)  Eating 4x/day. When having a snack in addition to the 4 meals/day - limit carbs to 15-20 gms carbs. 5.)  Have protein with each meal and snack:  Cheese, egg, pbutter, moist meat. 6.)  Make a grocery list from the foods recommended list given. Bring a 1-2 week food log and meter to next appt with Russel Hart nurse diabetes educator  Also bring when you see the dietitian in January. Bring meter next month for [employer]. CNP appt.

## 2020-10-23 ENCOUNTER — CARE COORDINATION (OUTPATIENT)
Dept: CARE COORDINATION | Age: 65
End: 2020-10-23

## 2020-10-23 ENCOUNTER — TELEPHONE (OUTPATIENT)
Dept: INTERNAL MEDICINE CLINIC | Age: 65
End: 2020-10-23

## 2020-10-23 ENCOUNTER — OFFICE VISIT (OUTPATIENT)
Dept: FAMILY MEDICINE CLINIC | Age: 65
End: 2020-10-23
Payer: MEDICARE

## 2020-10-23 VITALS
DIASTOLIC BLOOD PRESSURE: 70 MMHG | HEART RATE: 64 BPM | RESPIRATION RATE: 20 BRPM | TEMPERATURE: 97.1 F | BODY MASS INDEX: 30.29 KG/M2 | WEIGHT: 217.2 LBS | SYSTOLIC BLOOD PRESSURE: 122 MMHG

## 2020-10-23 LAB — HBA1C MFR BLD: 7.9 %

## 2020-10-23 PROCEDURE — 99214 OFFICE O/P EST MOD 30 MIN: CPT | Performed by: FAMILY MEDICINE

## 2020-10-23 PROCEDURE — 3051F HG A1C>EQUAL 7.0%<8.0%: CPT | Performed by: FAMILY MEDICINE

## 2020-10-23 PROCEDURE — 83036 HEMOGLOBIN GLYCOSYLATED A1C: CPT | Performed by: FAMILY MEDICINE

## 2020-10-23 ASSESSMENT — ENCOUNTER SYMPTOMS
BACK PAIN: 1
ALLERGIC/IMMUNOLOGIC NEGATIVE: 1
RESPIRATORY NEGATIVE: 1
COUGH: 0

## 2020-10-23 NOTE — PROGRESS NOTES
kg)   Body mass index is 30.29 kg/m². He continues to use BiPAP for treatment of his obstructive sleep apnea with good results. The rest of this patient's conditions are stable. Past medical and surgical hx reviewed. Past Medical History:   Diagnosis Date    Anxiety     CAD (coronary artery disease)     Depression     Diabetes mellitus ([facility]     Frequent headaches     Gastroparesis     GERD (gastroesophageal reflux disease)     History of Chiari malformation     Hyperlipidemia     Hypertension     Kidney stone     Kidney stone     Migraine     Movement disorder     DDD    Pulmonary emboli ([facility] 10/29/2018    Sleep apnea     Tattoos      Past Surgical History:   Procedure Laterality Date    BACK SURGERY  2008    C1 oates & decompression    BACK SURGERY  08/25/2020    [facility] Dr. Eliane Henry, C5-7 cadaver patch   [facility]  2008    chiari    CARDIAC SURGERY  2007    heart cath    CHOLECYSTECTOMY  2009    COLONOSCOPY  2017    COLONOSCOPY N/A 3/12/2020    COLONOSCOPY POLYPECTOMY SNARE/COLD BIOPSY performed by Cristin Saavedra MD at [facility], COLON, DIAGNOSTIC  2007   [address]    right elbow    NASAL SEPTUM SURGERY  2006    SHOULDER SURGERY Left 08/16/2018    TONSILLECTOMY  1964    UPPER GASTROINTESTINAL ENDOSCOPY Left 3/12/2020    EGD ESOPHAGOGASTRODUODENOSCOPY DILATATION performed by Cristin Saavedra MD at [facility]   [facility] ENDOSCOPY Left 3/12/2020    EGD BIOPSY performed by Cristin Saavedra MD at [address] [place] [address]  2015    right middle finger artery     Portions of this note were completed with a voice recording program.  Efforts were made to edit the dictations but occasionally words are mis-transcribed. Review of Systems   Constitutional: Negative. HENT: Negative. Respiratory: Negative. Negative for cough. Cardiovascular: Negative. Negative for chest pain, palpitations and leg swelling. UNIT/ML SOPN Inject 7 Units into the skin 3 times daily (before meals) 10 pen 3    Blood Glucose Monitoring Suppl (ONE TOUCH ULTRA 2) w/Device KIT 1 kit by Does not apply route daily 1 kit 0    metFORMIN (GLUCOPHAGE) 500 MG tablet Take 1 tablet by mouth 2 times daily (with meals) 60 tablet 5    CPAP Machine MISC by Does not apply route Please change BIPAP pressure to IPAP max 15 and EPAP min 6 and PS 4cm H20. Add EPR 1-3 for comfort 1 each 0    blood glucose monitor strips One Touch Ultra Test Strips. Test 2 times a day and as needed for diabetic symptoms. DX: E11.22 300 strip 5    sertraline (ZOLOFT) 25 MG tablet Take 100 mg by mouth daily       nitroGLYCERIN (NITROSTAT) 0.4 MG SL tablet Place 1 tablet under the tongue every 5 minutes as needed for Chest pain 25 tablet 3    metoprolol (TOPROL XL) 25 MG XL tablet Take 1 tablet by mouth daily 30 tablet 3    aspirin 81 MG tablet Take 81 mg by mouth daily      vitamin D 1000 UNITS CAPS Take by mouth daily       Continuous Blood Gluc  (DEXCOM G6 ) YULI Use as directed for continuous glucose monitor. (Patient not taking: Reported on 10/19/2020) 1 Device 0    Continuous Blood Gluc Transmit (DEXCOM G6 TRANSMITTER) MISC Change every 3 months- Use as directed for continuous glucose monitoring. (Patient not taking: Reported on 10/19/2020) 1 each 3    Continuous Blood Gluc Sensor (DEXCOM G6 SENSOR) MISC Change every 10 days-Use as directed for continuous glucose monitoring. (Patient not taking: Reported on 10/19/2020) 1 each 11    acyclovir (ZOVIRAX) 800 MG tablet TAKE 1 TABLET BY MOUTH AS NEEDED (HERPES LABIALIS)       No current facility-administered medications for this visit. Continue present medications. Continue tracking home blood sugar.     Law Fleming received counseling on the following healthy behaviors: nutrition and exercise    Patient given educational materials on Diabetes    I have instructed Law Fleming to complete a self tracking

## 2020-10-23 NOTE — TELEPHONE ENCOUNTER
Pt called to follow-up in regards to [facility] and he feels that it has been a month and it has come to a buckley, he stated that his insurance denied the dexcom and that I would need to go through the pharmacy to be covered.  He was told Monday that this would be discussed with Miki Jones in the diabetes management in regards to what could be done

## 2020-10-23 NOTE — CARE COORDINATION
Received message from Rhett Schuler today. Pt called as he is extremely frustrated. Reports that he has been working on getting Dexcom meter. Feels process has been taking extremely long and was recently notified by DM clinic that PA was denied when sent to pharmacy for Dexcom. Pt asking for assistance with this as he is unsure where to go from here. DM closed today. Message left with Leonid Ashley at [facility]. Spoke with pt. Updated him that I have a call out and will also f/u with DM clinic as they are not in today. Pt has spoken with provider at DM clinic today and she is also f/u on it.

## 2020-10-23 NOTE — PROGRESS NOTES
Chronic Disease Visit Information    BP Readings from Last 3 Encounters:   09/16/20 138/80   09/15/20 (!) 140/70   09/09/20 136/72          Hemoglobin A1C (%)   Date Value   07/28/2020 8.1 (H)   06/18/2020 10.3 (H)   01/10/2020 11.8     Microalbumin, Random Urine (mg/dL)   Date Value   07/22/2020 250.22     LDL Calculated (mg/dL)   Date Value   07/22/2020 169     HDL (mg/dL)   Date Value   07/22/2020 44     BUN (mg/dL)   Date Value   08/26/2020 10     CREATININE (mg/dL)   Date Value   08/26/2020 0.91     Glucose (mg/dL)   Date Value   08/26/2020 135 (H)            Have you changed or started any medications since your last visit including any over-the-counter medicines, vitamins, or herbal medicines? Yes, see list  Are you having any side effects from any of your medications? -  no  Have you stopped taking any of your medications? Is so, why? -  yes - tx complete    Have you seen any other physician or provider since your last visit? Yes - Records Obtained  Have you had any other diagnostic tests since your last visit? Yes - Records Obtained  Have you been seen in the emergency room and/or had an admission to a hospital since we last saw you? No  Have you had your annual diabetic retinal (eye) exam? No  Have you had your routine dental cleaning in the past 6 months? no    Have you activated your [employer] account? If not, what are your barriers?  Yes     Patient Care Team:  Abby Donahue MD as PCP - General (Family Medicine)  Abby Donahue MD as PCP - [facility]  Colletta Moros, MD as Consulting Physician (Otolaryngology)  Christine Kessler MD as Consulting Physician (Pulmonology)  Seymour Blanton MD (Orthopedic Surgery)         Medical History Review  Past Medical, Family, and Social History reviewed and does contribute to the patient presenting condition    Health Maintenance   Topic Date Due    Hepatitis C screen  1955    Diabetic retinal exam  05/18/1966    HIV screen  05/18/1971    Shingles Vaccine (2 of 3) 01/23/2017    Flu vaccine (1) 09/01/2020    A1C test (Diabetic or Prediabetic)  10/28/2020    Annual Wellness Visit (AWV)  10/31/2020    Diabetic foot exam  07/14/2021    Diabetic microalbuminuria test  07/22/2021    Lipid screen  07/22/2021    Potassium monitoring  08/26/2021    Creatinine monitoring  08/26/2021    DTaP/Tdap/Td vaccine (2 - Td) 07/22/2025    Colon cancer screen colonoscopy  03/12/2030    Pneumococcal 0-64 years Vaccine  Completed    Hepatitis A vaccine  Aged Out    Hib vaccine  Aged Out    Meningococcal (ACWY) vaccine  Aged Out

## 2020-10-26 NOTE — TELEPHONE ENCOUNTER
I called insurance and asked what needed to be done, in needed to go through pharmacy and needed a prior authrization done. [facility] did not carry the Dexcom G6, [facility] on [facility] did and I also got the dexcom approved through cover my meds. Had the pharmacy run everything it was all paid for by insurance, called pt and left a message and let him know all the details.

## 2020-10-29 NOTE — TELEPHONE ENCOUNTER
Spoke with Sheree Strange today. She reports that office addressed pt's issue on Friday. Pt has been approved for Dexcom.

## 2020-11-03 ENCOUNTER — OFFICE VISIT (OUTPATIENT)
Dept: PULMONOLOGY | Age: 65
End: 2020-11-03
Payer: MEDICARE

## 2020-11-03 VITALS
BODY MASS INDEX: 30.24 KG/M2 | TEMPERATURE: 98.8 F | HEIGHT: 71 IN | OXYGEN SATURATION: 98 % | SYSTOLIC BLOOD PRESSURE: 122 MMHG | DIASTOLIC BLOOD PRESSURE: 68 MMHG | HEART RATE: 76 BPM | WEIGHT: 216 LBS

## 2020-11-03 PROCEDURE — 99213 OFFICE O/P EST LOW 20 MIN: CPT | Performed by: PHYSICIAN ASSISTANT

## 2020-11-03 ASSESSMENT — ENCOUNTER SYMPTOMS
COUGH: 0
ALLERGIC/IMMUNOLOGIC NEGATIVE: 1
STRIDOR: 0
EYES NEGATIVE: 1
DIARRHEA: 0
CHEST TIGHTNESS: 0
NAUSEA: 0
BACK PAIN: 1
WHEEZING: 0
SHORTNESS OF BREATH: 0

## 2020-11-03 NOTE — PROGRESS NOTES
Laterality Date    BACK SURGERY  2008    C1 oates & decompression    BACK SURGERY  2020    [facility] Dr. Ronen Rosen, C5-7 cadaver patch   [address]      chiari    CARDIAC SURGERY      heart cath    CHOLECYSTECTOMY  2009    COLONOSCOPY  2017    COLONOSCOPY N/A 3/12/2020    COLONOSCOPY POLYPECTOMY SNARE/COLD BIOPSY performed by Arturo Cooney MD at [facility]    ENDOSCOPY, COLON, DIAGNOSTIC  2007   [address]    right elbow    NASAL SEPTUM SURGERY  2006    SHOULDER SURGERY Left 2018    TONSILLECTOMY  1964    UPPER GASTROINTESTINAL ENDOSCOPY Left 3/12/2020    EGD ESOPHAGOGASTRODUODENOSCOPY DILATATION performed by Arturo Cooney MD at [facility]    [address] ENDOSCOPY Left 3/12/2020    EGD BIOPSY performed by Arturo Cooney MD at [facility]  2015    right middle finger artery       Social History     Tobacco Use    Smoking status: Former Smoker     Packs/day: 1.50     Years: 3.00     Pack years: 4.50     Types: Cigarettes     Start date: 1976     Last attempt to quit: 10/12/1979     Years since quittin.0    Smokeless tobacco: Never Used   Substance Use Topics    Alcohol use: No    Drug use: No       Allergies   Allergen Reactions    Latex Rash       Current Outpatient Medications   Medication Sig Dispense Refill    Cholecalciferol (VITAMIN D3) 125 MCG (5000 UT) TABS Take 5,000 Units by mouth daily      Cholecalciferol (VITAMIN D3 PO) Take 5,000 Units by mouth daily      XARELTO 10 MG TABS tablet Take 1 tablet by mouth once daily with breakfast 30 tablet 5    Continuous Blood Gluc Transmit (DEXCOM G6 TRANSMITTER) MISC Change every 3 months- Use as directed for continuous glucose monitoring. 1 each 3    Continuous Blood Gluc Sensor (DEXCOM G6 SENSOR) MISC Change every 10 days-Use as directed for continuous glucose monitoring.  1 each 11    insulin glargine (LANTUS SOLOSTAR) 100 UNIT/ML injection pen Inject 21 Units into the skin 2 times daily (Patient taking differently: Inject 20 Units into the skin 2 times daily ) 15 pen 3    cyclobenzaprine (FLEXERIL) 5 MG tablet Take 5 mg by mouth as needed      pravastatin (PRAVACHOL) 80 MG tablet Take 1 tablet by mouth daily 90 tablet 1    vitamin D (ERGOCALCIFEROL) 1.25 MG (69385 UT) CAPS capsule Take 1 capsule by mouth once a week 12 capsule 1    lisinopril (PRINIVIL;ZESTRIL) 10 MG tablet Take 1 tablet by mouth once daily (Patient taking differently: Take 2 tablet by mouth once daily) 30 tablet 2    Misc. Devices (ROLLATOR ULTRA-LIGHT) MISC Use Rollator for stability with ambulation. 1 each 0    omeprazole (PRILOSEC) 40 MG delayed release capsule Take 1 capsule by mouth once daily 90 capsule 2    insulin lispro, 1 Unit Dial, (HUMALOG KWIKPEN) 100 UNIT/ML SOPN Inject 7 Units into the skin 3 times daily (before meals) 10 pen 3    Blood Glucose Monitoring Suppl (ONE TOUCH ULTRA 2) w/Device KIT 1 kit by Does not apply route daily 1 kit 0    metFORMIN (GLUCOPHAGE) 500 MG tablet Take 1 tablet by mouth 2 times daily (with meals) 60 tablet 5    CPAP Machine MISC by Does not apply route Please change BIPAP pressure to IPAP max 15 and EPAP min 6 and PS 4cm H20. Add EPR 1-3 for comfort 1 each 0    blood glucose monitor strips One Touch Ultra Test Strips. Test 2 times a day and as needed for diabetic symptoms.  DX: E11.22 300 strip 5    acyclovir (ZOVIRAX) 800 MG tablet TAKE 1 TABLET BY MOUTH AS NEEDED (HERPES LABIALIS)      sertraline (ZOLOFT) 25 MG tablet Take 100 mg by mouth daily       nitroGLYCERIN (NITROSTAT) 0.4 MG SL tablet Place 1 tablet under the tongue every 5 minutes as needed for Chest pain 25 tablet 3    metoprolol (TOPROL XL) 25 MG XL tablet Take 1 tablet by mouth daily 30 tablet 3    aspirin 81 MG tablet Take 81 mg by mouth daily      vitamin D 1000 UNITS CAPS Take by mouth daily       Continuous Blood Gluc  (DEXCOM G6 ) YULI Use as directed for continuous glucose monitor. 1 Device 0     No current facility-administered medications for this visit. Family History   Problem Relation Age of Onset    Kidney Disease Mother     Diabetes Mother     Heart Disease Mother     High Blood Pressure Mother     Diabetes Father     Cancer Father         lung--smoker    High Blood Pressure Father     Diabetes Sister     High Blood Pressure Sister     Diabetes Brother     High Blood Pressure Brother         Review of Systems -   Review of Systems   Constitutional: Negative for activity change, appetite change, chills and fever. HENT: Negative for congestion and postnasal drip. Eyes: Negative. Respiratory: Negative for cough, chest tightness, shortness of breath, wheezing and stridor. Cardiovascular: Negative for chest pain and leg swelling. Gastrointestinal: Negative for diarrhea and nausea. Bloating    Endocrine: Negative. Genitourinary: Negative. Musculoskeletal: Positive for back pain. Negative for arthralgias. Skin: Negative. Allergic/Immunologic: Negative. Neurological: Negative. Negative for dizziness and light-headedness. Psychiatric/Behavioral: Negative. All other systems reviewed and are negative. Physical Exam:    BMI:  Body mass index is 30.13 kg/m². Wt Readings from Last 3 Encounters:   11/03/20 216 lb (98 kg)   10/23/20 217 lb 3.2 oz (98.5 kg)   10/19/20 215 lb 6.4 oz (97.7 kg)     Weight stable / unchanged  Vitals: /68 (Site: Left Upper Arm, Position: Sitting, Cuff Size: Large Adult)   Pulse 76   Temp 98.8 °F (37.1 °C) (Temporal)   Ht 5' 11\" (1.803 m)   Wt 216 lb (98 kg)   SpO2 98% Comment: r/a  BMI 30.13 kg/m²       Physical Exam  Constitutional:       Appearance: He is well-developed. HENT:      Head: Normocephalic and atraumatic.       Right Ear: External ear normal.      Left Ear: External ear normal.   Eyes:      Conjunctiva/sclera: Conjunctivae normal.      Pupils: Pupils are equal, round, and reactive to light. Neck:      Musculoskeletal: Normal range of motion and neck supple. Cardiovascular:      Rate and Rhythm: Normal rate and regular rhythm. Heart sounds: Normal heart sounds. Pulmonary:      Effort: Pulmonary effort is normal.      Breath sounds: Normal breath sounds. Musculoskeletal: Normal range of motion. Skin:     General: Skin is warm and dry. Neurological:      Mental Status: He is alert and oriented to person, place, and time. Psychiatric:         Behavior: Behavior normal.         Thought Content: Thought content normal.         Judgment: Judgment normal.           ASSESSMENT/DIAGNOSIS     Diagnosis Orders   1. CSA (central sleep apnea)     2. Obesity (BMI 30-39. 9)              Plan   Do you need any equipment today? No  - Will decrease pressure to improve comfort  - Download reviewed and discussed with patient  - He  was advised to continue current positive airway pressure therapy with above described pressure. - He  advised to keep good compliance with current recommended pressure to get optimal results and clinical improvement  - Recommend 7-9 hours of sleep with PAP  - He was advised to call [employer] regarding supplies if needed.   -He call my office for earlier appointment if needed for worsening of sleep symptoms.   - He was instructed on weight loss  - Judi Ochoa was educated about my impression and plan. Patient verbalizesunderstanding.   We will see Alyssa Farris back in: 1 year with download    Information added by my medical assistant/LPN was reviewed today       Bryanna Mccarthy PA-C, MPAS  11/3/2020

## 2020-11-09 ENCOUNTER — CARE COORDINATION (OUTPATIENT)
Dept: CARE COORDINATION | Age: 65
End: 2020-11-09

## 2020-11-09 ENCOUNTER — TELEPHONE (OUTPATIENT)
Dept: INTERNAL MEDICINE CLINIC | Age: 65
End: 2020-11-09

## 2020-11-09 ENCOUNTER — TELEPHONE (OUTPATIENT)
Dept: FAMILY MEDICINE CLINIC | Age: 65
End: 2020-11-09

## 2020-11-09 NOTE — TELEPHONE ENCOUNTER
Called pt to discuss his diabetes supplies, pt was upset and stating our office never gets back to him in regards to his questions, he had a tandem pump information sent to him and he had no idea about it, and he complained about he needed to be shown how to use his dexcom as well. Something had happen to the phone where we had gotten disconnected and I had called back and he said f**k you multiple times and hung up

## 2020-11-09 NOTE — CARE COORDINATION
Received message from Mayelin Dc. Requesting call back. Kyle Nicolas. Pt shares that he is very upset. Pt shares that he finally got his Dexcom meter but does not know how to set it up. Has been watching videos to try to learn more about the pump. Upset with  office as he doesn't feel there has been much communication. I informed pt that  Marilee Andrea at [facility] can assist him with getting his Dexcom set up. Pt is willing to meet with her. Contacted Georgina. She can see pt at 1pm today. Pt will be in at 1pm for [facility] education and set up. Pt also upset b/c he states that he is getting paperwork from [employer] [facility] for his signature and is wondering if this is what he needs to do to get the pump. Does not feel this has been explained to him thoroughly. Pt wants to proceed with getting the pump. Spoke with Marilee Andrea at [facility]. She will contact Annemarie Duarte rep and will provide pt with additional info and contact resource to facilitate getting the pump. Initially pt was wanting to switch to a different IM office d/t his frustrations. I informed pt that is his choice if he chooses. After speaking with him pt is agreeable in staying at this time. Spoke with office mgr at  office who is aware of situation and she has all ready spoken with pt. Regarding his concerns. Encouraged pt to call in future with any concerns or issues.

## 2020-11-09 NOTE — TELEPHONE ENCOUNTER
Spoke with Dr. Meño Morgan office at ZT#[phone]. They request we fax referral, med list and last OV for Doctor to review. She will be in office tomorrow. They will contact us if they will or will not see patient.   Records faxed for review

## 2020-11-09 NOTE — TELEPHONE ENCOUNTER
Pt called office left vm requesting recommendations on a new internal med doctor. He currently sees [facility] for his diabetes and is NOT happy with their lack of attention and communication. \"They suck\". Please advise.

## 2020-11-10 ENCOUNTER — NURSE ONLY (OUTPATIENT)
Dept: INTERNAL MEDICINE CLINIC | Age: 65
End: 2020-11-10
Payer: MEDICARE

## 2020-11-10 PROCEDURE — 95249 CONT GLUC MNTR PT PROV EQP: CPT | Performed by: NURSE PRACTITIONER

## 2020-11-10 NOTE — PROGRESS NOTES
Dexcom CGM instructions completed. Dexcom CGM initiated Upper Left Quadrant (body site). 2 hour warm up period initiated. Urgent low warning set at 80  Urgent high warning set at 250  System is waterproof-do not need to do anything for showers. You can request \"overpatches\" from Dexcom to  help keep sensor in place for 10 days. Warm up period: 2 hours until your sensor will start recording blood sugar results  Watch the arrows --this arrow indicates if your blood sugars are trending up, trending down or    staying stable. If the [place] is red= indicates a sugar in low range                                                Bradly Lay = indicates a sugar in range                                                Orange =indicates a sugar above goal range  Do not throw your transmitter away (the small gray piece)--this is used with each new sensor and is good for 3-6 months before needing replaced  Change sensor every 10 days  Contact Dexcom [phone] for assistance.   Monday- Friday 5:30am-8pm PST

## 2020-11-11 ENCOUNTER — TELEPHONE (OUTPATIENT)
Dept: FAMILY MEDICINE CLINIC | Age: 65
End: 2020-11-11

## 2020-11-12 NOTE — TELEPHONE ENCOUNTER
Spoke with Sintia Zuleta at Dr. Kassi Palmer office regarding status of referral. She transferred me to EXT: 101 and I left a vm message regarding

## 2020-11-14 ENCOUNTER — NURSE ONLY (OUTPATIENT)
Dept: LAB | Age: 65
End: 2020-11-14

## 2020-11-14 LAB — GLUCOSE FASTING: 146 MG/DL (ref 70–108)

## 2020-11-16 LAB — C-PEPTIDE: 3.2 NG/ML (ref 1.1–4.4)

## 2020-11-17 ENCOUNTER — OFFICE VISIT (OUTPATIENT)
Dept: INTERNAL MEDICINE CLINIC | Age: 65
End: 2020-11-17
Payer: MEDICARE

## 2020-11-17 VITALS
TEMPERATURE: 97.9 F | HEIGHT: 71 IN | WEIGHT: 220 LBS | HEART RATE: 66 BPM | DIASTOLIC BLOOD PRESSURE: 68 MMHG | SYSTOLIC BLOOD PRESSURE: 136 MMHG | BODY MASS INDEX: 30.8 KG/M2

## 2020-11-17 PROCEDURE — 90686 IIV4 VACC NO PRSV 0.5 ML IM: CPT | Performed by: NURSE PRACTITIONER

## 2020-11-17 PROCEDURE — G0008 ADMIN INFLUENZA VIRUS VAC: HCPCS | Performed by: NURSE PRACTITIONER

## 2020-11-17 PROCEDURE — 99214 OFFICE O/P EST MOD 30 MIN: CPT | Performed by: NURSE PRACTITIONER

## 2020-11-17 PROCEDURE — 3051F HG A1C>EQUAL 7.0%<8.0%: CPT | Performed by: NURSE PRACTITIONER

## 2020-11-17 ASSESSMENT — ENCOUNTER SYMPTOMS
TROUBLE SWALLOWING: 0
DIARRHEA: 0
VOMITING: 0
SHORTNESS OF BREATH: 0
NAUSEA: 0
ABDOMINAL PAIN: 0
CONSTIPATION: 0
SORE THROAT: 0
COUGH: 0

## 2020-11-17 NOTE — RESULT ENCOUNTER NOTE
His C-peptide level is within normal limits. It is 3.2 and his fasting blood sugar was 147. The application for his pump at this point can move forward. I believe the diabetic nurses are the ones working on this. I do not know if this meets criteria for eligibility for a insulin pump or not.

## 2020-11-17 NOTE — PROGRESS NOTES
[facility]ron [facility] INTERNAL MEDICINE  [address] [facility] [address]  Dept: [phone]  Dept Fax: 21 [phone]: [phone]     Visit Date:  11/17/2020    Patient:  Te Xie  YOB: 1955    HPI:     Chief Complaint   Patient presents with   Livan Ramos Diabetes       PCP - Dr. Steffi Sandhoff. I last saw him July 2020. DM - A1C 7.9% on 10/23/2020, down from 10.3% in July. C-peptide 3.2. He is interested in pump if it is approved by his company. Reviewed glucose results, he is now using the Dexcom for the past 1 week. Had overnight high 350 - chinese/missed injections. States he sees a lot of glucose elevation with tortilla, white bread. He does have gastroparesis - so whole grains are difficult for him to tolerate. He avoid cookies, cakes etc.   In the morning has one cup of coffee and one khushbu cracker due to bile reflux. Has seen Dr. Alverto Guaman in the past for EGD and colonoscopy. Saw him last in May. Has bile in am, so he eats light. No longer has a gallbladder. Also has bipap - saw them last week, adjusted his pressures. On Metformin 500 mg BID, he never increased this to 1000 mg twice a day. Lantus 20 units BID, Humalog 7 units TID. Had been on Glyburide but taken off and placed back on Metformin. Microalbuminuria- On Lisinopril 10 mg daily. Has recheck lab orders. HLD - On Pravachol 80 mg daily. Had his cervical spine surgery - feels improved. ROM of his neck is returned. Medications    Current Outpatient Medications:     metFORMIN (GLUCOPHAGE) 500 MG tablet, Take 2 tablets by mouth 2 times daily (with meals), Disp: 180 tablet, Rfl: 1    CPAP Machine MISC, by Does not apply route Please change BIPAP pressure to IPAP 12 and EPAP 8 PS 4 cm H20.  Download in 1 month, Disp: 1 each, Rfl: 0    Cholecalciferol (VITAMIN D3) 125 MCG (5000 UT) TABS, Take 5,000 Units by mouth daily, Disp: , Rfl:     Cholecalciferol (VITAMIN D3 PO), Take 5,000 Units by mouth daily, Disp: , Rfl:     XARELTO 10 MG TABS tablet, Take 1 tablet by mouth once daily with breakfast, Disp: 30 tablet, Rfl: 5    Continuous Blood Gluc  (DEXCOM G6 ) YULI, Use as directed for continuous glucose monitor. , Disp: 1 Device, Rfl: 0    Continuous Blood Gluc Transmit (DEXCOM G6 TRANSMITTER) MISC, Change every 3 months- Use as directed for continuous glucose monitoring., Disp: 1 each, Rfl: 3    Continuous Blood Gluc Sensor (DEXCOM G6 SENSOR) MISC, Change every 10 days-Use as directed for continuous glucose monitoring., Disp: 1 each, Rfl: 11    insulin glargine (LANTUS SOLOSTAR) 100 UNIT/ML injection pen, Inject 21 Units into the skin 2 times daily (Patient taking differently: Inject 20 Units into the skin 2 times daily ), Disp: 15 pen, Rfl: 3    pravastatin (PRAVACHOL) 80 MG tablet, Take 1 tablet by mouth daily, Disp: 90 tablet, Rfl: 1    vitamin D (ERGOCALCIFEROL) 1.25 MG (89640 UT) CAPS capsule, Take 1 capsule by mouth once a week, Disp: 12 capsule, Rfl: 1    lisinopril (PRINIVIL;ZESTRIL) 10 MG tablet, Take 1 tablet by mouth once daily (Patient taking differently: Take 2 tablet by mouth once daily), Disp: 30 tablet, Rfl: 2    Misc. Devices (ROLLATOR ULTRA-LIGHT) MISC, Use Rollator for stability with ambulation. , Disp: 1 each, Rfl: 0    omeprazole (PRILOSEC) 40 MG delayed release capsule, Take 1 capsule by mouth once daily, Disp: 90 capsule, Rfl: 2    insulin lispro, 1 Unit Dial, (HUMALOG KWIKPEN) 100 UNIT/ML SOPN, Inject 7 Units into the skin 3 times daily (before meals), Disp: 10 pen, Rfl: 3    acyclovir (ZOVIRAX) 800 MG tablet, TAKE 1 TABLET BY MOUTH AS NEEDED (HERPES LABIALIS), Disp: , Rfl:     sertraline (ZOLOFT) 25 MG tablet, Take 100 mg by mouth daily , Disp: , Rfl:     nitroGLYCERIN (NITROSTAT) 0.4 MG SL tablet, Place 1 tablet under the tongue every 5 minutes as needed for Chest pain, Disp: 25 tablet, Rfl: 3    metoprolol (TOPROL XL) 25 Call me if problems with glucose lows/highs, or GI issues as discussed above    - metFORMIN (GLUCOPHAGE) 500 MG tablet; Take 2 tablets by mouth 2 times daily (with meals)  Dispense: 180 tablet; Refill: 1    2. Microalbuminuria due to type 2 diabetes mellitus ([facility] [address]   last MACR 554 7/22/2020. Urine protein to creatinine ratio 0.8, lisinopril was increased to 20 mg daily. Due for recheck. 3. Gastroparesis  Call GI about bile reflux. Advised improve diabetic control will help with his gastroparesis as well    4. Mixed hyperlipidemia  On Pravastatin 80 mg daily. Managed by PCP. .    5. Obesity (BMI 30-39.9)  1500 calories diet rich in non starchy vegetables, lean protein and portion controlled carbohydates combined with 30 minutes continuous activity 5 or more days per week will lead to modest weight loss. 6. Need for prophylactic vaccination and inoculation against influenza  - INFLUENZA, QUADV, 0.5ML, 6 MO AND OLDER, IM, PF, PREFILL SYR OR SDV (FLUZONE QUADV, PF)  - CO ADMIN INFLUENZA VIRUS VAC    Return in about 3 months (around 2/17/2021) for Diabetes. Patient given educational materials - see patient instructions. Discussed use, benefit, and side effects of prescribed medications. All patient questions answered. Pt voiced understanding.       Electronically signed RAFAEL Ye - CNP on 11/17/20 at 10:39 AM EST

## 2020-11-17 NOTE — PATIENT INSTRUCTIONS
Contact GI about your bile reflux. Increase Metformin to 1000 mg twice daily. Let me know if you have GI issues with this change. Schedule dilated eye exam .      Flu vaccine today. Schedule annual wellness visit with PCP. See if you are due for another shingles vaccine    Dexcom downloads 2 weeks and 1 month.      Call me if problems with glucose lows/highs, or GI issues as discussed above

## 2020-11-18 NOTE — PROGRESS NOTES
After obtaining consent, and per orders of Renzo Chun CNP, injection of Afluria given in Left deltoid by RENE MOISE. Patient instructed to remain in clinic for 20 minutes afterwards, and to report any adverse reaction to me immediately.

## 2020-11-23 NOTE — TELEPHONE ENCOUNTER
Spoke with Dr. Hardy Pro office. They will call the pt today to schedule last call to pt from them was 11-11/2020.

## 2020-11-27 ENCOUNTER — TELEPHONE (OUTPATIENT)
Dept: FAMILY MEDICINE CLINIC | Age: 65
End: 2020-11-27

## 2020-11-27 NOTE — TELEPHONE ENCOUNTER
Patient calling in with concerns regarding COVID. Patient's co-workers spouse tested positive for COVID. Patient only works at [employer] part-time and he last worked 10 days ago. Patient is asymptomatic. Advised no need to test unless it is required by his employer. He states employer is not requiring him to be tested. He did not have direct exposure to the person who tested positive so a COVID test is no recommended.

## 2020-12-16 ENCOUNTER — TELEPHONE (OUTPATIENT)
Dept: FAMILY MEDICINE CLINIC | Age: 65
End: 2020-12-16

## 2020-12-16 ENCOUNTER — VIRTUAL VISIT (OUTPATIENT)
Dept: FAMILY MEDICINE CLINIC | Age: 65
End: 2020-12-16
Payer: MEDICARE

## 2020-12-16 PROCEDURE — 99213 OFFICE O/P EST LOW 20 MIN: CPT | Performed by: FAMILY MEDICINE

## 2020-12-16 ASSESSMENT — ENCOUNTER SYMPTOMS
RESPIRATORY NEGATIVE: 1
GASTROINTESTINAL NEGATIVE: 1

## 2020-12-16 NOTE — TELEPHONE ENCOUNTER
The patient called in and stated that her significant other tested positive yesterday for Covid 19. He stated that he feels fine but would like to be tested. Please advise.

## 2020-12-16 NOTE — PROGRESS NOTES
 VASCULAR SURGERY  2015    right middle finger artery     Prior to Admission medications    Medication Sig Start Date End Date Taking? Authorizing Provider   metFORMIN (GLUCOPHAGE) 500 MG tablet Take 2 tablets by mouth 2 times daily (with meals) 11/17/20 2/15/21  RAFAEL Lemon CNP   CPAP Machine MISC by Does not apply route Please change BIPAP pressure to IPAP 12 and EPAP 8 PS 4 cm H20. Download in 1 month 11/3/20   Kinjal Monterroso PA-C   Cholecalciferol (VITAMIN D3) 125 MCG (5000 UT) TABS Take 5,000 Units by mouth daily    Historical Provider, MD   Cholecalciferol (VITAMIN D3 PO) Take 5,000 Units by mouth daily    Historical Provider, MD   XARELTO 10 MG TABS tablet Take 1 tablet by mouth once daily with breakfast 10/9/20   RAFAEL Gallardo CNP   Continuous Blood Gluc  (DEXCOM G6 ) YULI Use as directed for continuous glucose monitor. 10/8/20   RAFAEL Lemon CNP   Continuous Blood Gluc Transmit (DEXCOM G6 TRANSMITTER) MISC Change every 3 months- Use as directed for continuous glucose monitoring. 10/8/20   RAFAEL Lemon CNP   Continuous Blood Gluc Sensor (DEXCOM G6 SENSOR) MISC Change every 10 days-Use as directed for continuous glucose monitoring.  10/8/20   RAFAEL Lemon CNP   insulin glargine (LANTUS SOLOSTAR) 100 UNIT/ML injection pen Inject 21 Units into the skin 2 times daily  Patient taking differently: Inject 20 Units into the skin 2 times daily  10/5/20   Jamee Harrison MD   cyclobenzaprine (FLEXERIL) 5 MG tablet Take 5 mg by mouth as needed 8/26/20   Historical Provider, MD   pravastatin (PRAVACHOL) 80 MG tablet Take 1 tablet by mouth daily 8/4/20   Jamee Harrison MD   vitamin D (ERGOCALCIFEROL) 1.25 MG (06235 UT) CAPS capsule Take 1 capsule by mouth once a week 8/4/20   Jamee Harrison MD   lisinopril (PRINIVIL;ZESTRIL) 10 MG tablet Take 1 tablet by mouth once daily Patient taking differently: Take 2 tablet by mouth once daily 7/31/20   RAFAEL Hernandez - CNP   Misc. Devices (ROLLATOR ULTRA-LIGHT) MISC Use Rollator for stability with ambulation. 7/28/20   Cass Cristina MD   omeprazole (PRILOSEC) 40 MG delayed release capsule Take 1 capsule by mouth once daily 7/24/20   Cass Cristina MD   insulin lispro, 1 Unit Dial, ([facility]) 100 UNIT/ML SOPN Inject 7 Units into the skin 3 times daily (before meals) 7/22/20 7/17/21  RAFAEL Hernandez CNP   Blood Glucose Monitoring Suppl (ONE TOUCH ULTRA 2) w/Device KIT 1 kit by Does not apply route daily 6/25/20   Cass Cristina MD   blood glucose monitor strips One Touch Ultra Test Strips. Test 2 times a day and as needed for diabetic symptoms. DX: E11.22 4/3/20   Cass Cristina MD   acyclovir (ZOVIRAX) 800 MG tablet TAKE 1 TABLET BY MOUTH AS NEEDED (HERPES LABIALIS) 1/8/20   Historical Provider, MD   sertraline (ZOLOFT) 25 MG tablet Take 100 mg by mouth daily     Historical Provider, MD   nitroGLYCERIN (NITROSTAT) 0.4 MG SL tablet Place 1 tablet under the tongue every 5 minutes as needed for Chest pain 5/21/16   Lc Gallagher MD   metoprolol (TOPROL XL) 25 MG XL tablet Take 1 tablet by mouth daily 5/21/16   Lc Gallagher MD   aspirin 81 MG tablet Take 81 mg by mouth daily    Historical Provider, MD   vitamin D 1000 UNITS CAPS Take by mouth daily     Historical Provider, MD         Review of Systems   Constitutional: Negative. HENT: Negative. Respiratory: Negative. Cardiovascular: Negative. Gastrointestinal: Negative. Musculoskeletal: Negative. All other systems reviewed and are negative. Objective:   Physical Exam  Constitutional:       General: He is not in acute distress. Appearance: Normal appearance. He is well-developed. He is not ill-appearing. HENT:      Head: Normocephalic and atraumatic.       Right Ear: External ear normal.      Left Ear: External ear normal.   Eyes: Conjunctiva/sclera: Conjunctivae normal.   Pulmonary:      Effort: Pulmonary effort is normal. No respiratory distress. Skin:     Findings: No rash (on exposed surfaces). Neurological:      Mental Status: He is alert and oriented to person, place, and time. Psychiatric:         Mood and Affect: Mood normal.         Behavior: Behavior normal.         Thought Content: Thought content normal.         Judgment: Judgment normal.         Assessment:       Diagnosis Orders   1. Close exposure to COVID-19 virus             Plan:      -  Pt's wife tested + yesterday  -  He will quarantine x 10-14 days, notify office if develops symptoms  -  RTO prn    Due to this being a TeleHealth encounter (During OSV-43 public health emergency), evaluation of the following organ systems was limited: Vitals/Constitutional/EENT/Resp/CV/GI//MS/Neuro/Skin/Heme-Lymph-Imm. Pursuant to the emergency declaration under the [address] [place] [address] authority and the [employer] and [employer] Act, this Virtual Visit was conducted with patient's (and/or legal guardian's) consent, to reduce the patient's risk of exposure to COVID-19 and provide necessary medical care. The patient (and/or legal guardian) has also been advised to contact this office for worsening conditions or problems, and seek emergency medical treatment and/or call 911 if deemed necessary. Patient identification was verified at the start of the visit: Yes    Total time spent for this encounter: Not billed by time    Services were provided through a video synchronous discussion virtually to substitute for in-person clinic visit. Patient and provider were located at their individual homes.           Dean Longoria DO

## 2020-12-26 ENCOUNTER — PATIENT MESSAGE (OUTPATIENT)
Dept: FAMILY MEDICINE CLINIC | Age: 65
End: 2020-12-26

## 2020-12-28 NOTE — TELEPHONE ENCOUNTER
Spoke with pt [facility]. Pt aware that office will call with results once received.

## 2020-12-29 ENCOUNTER — HOSPITAL ENCOUNTER (OUTPATIENT)
Age: 65
Setting detail: SPECIMEN
Discharge: HOME | End: 2020-12-29
Payer: MEDICARE

## 2020-12-29 PROCEDURE — U0003 INFECTIOUS AGENT DETECTION BY NUCLEIC ACID (DNA OR RNA); SEVERE ACUTE RESPIRATORY SYNDROME CORONAVIRUS 2 (SARS-COV-2) (CORONAVIRUS DISEASE [COVID-19]), AMPLIFIED PROBE TECHNIQUE, MAKING USE OF HIGH THROUGHPUT TECHNOLOGIES AS DESCRIBED BY CMS-2020-01-R: HCPCS

## 2020-12-31 LAB — SARS-COV-2: DETECTED

## 2021-01-07 ENCOUNTER — TELEPHONE (OUTPATIENT)
Dept: INTERNAL MEDICINE CLINIC | Age: 66
End: 2021-01-07

## 2021-01-07 NOTE — TELEPHONE ENCOUNTER
Patient had called in per Ted Hagen and stated that he is having troubles with his DexCom with alarms. I called patient and had to leave a message. I stated that he can call back or he can call the [address] [place] [address] and they should be able help him troubleshoot the issue.

## 2021-01-07 NOTE — TELEPHONE ENCOUNTER
Patient returned call. He stated he was not calling in regards to his DexCom alarms but wanting to know when he was going to be getting his Tandem pump. I let him know that I would contact our Tandem Rep and let him know what the response is. He then did state that his DexCom alarms are going off and it was driving him crazy. I let him know that he can change the settings for his alarms and he said he already did that and that if he is not going to get the pump them he probably wont wear the DexCom. I again said that as soon as I hear from the rep, I would let him know. Patient verbalized understanding.

## 2021-01-14 NOTE — TELEPHONE ENCOUNTER
I heard back from the Tandem Pump rep. He stated that because Chris's C-Peptide was 3.2, Medicare would not pay for the pump. Unfortunately, the C-Peptide needs to be 1/21 or lower for the insurance company to pay. I called Sue Buckner and let him know. He stated he was ok with that but he will continue to wear his DexCom.

## 2021-01-25 ENCOUNTER — VIRTUAL VISIT (OUTPATIENT)
Dept: FAMILY MEDICINE CLINIC | Age: 66
End: 2021-01-25
Payer: MEDICARE

## 2021-01-25 DIAGNOSIS — M50.30 DDD (DEGENERATIVE DISC DISEASE), CERVICAL: ICD-10-CM

## 2021-01-25 DIAGNOSIS — E66.09 CLASS 1 OBESITY DUE TO EXCESS CALORIES IN ADULT, UNSPECIFIED BMI, UNSPECIFIED WHETHER SERIOUS COMORBIDITY PRESENT: ICD-10-CM

## 2021-01-25 DIAGNOSIS — Z86.711 HISTORY OF PULMONARY EMBOLISM: ICD-10-CM

## 2021-01-25 DIAGNOSIS — R80.9 MICROALBUMINURIA DUE TO TYPE 2 DIABETES MELLITUS (HCC): ICD-10-CM

## 2021-01-25 DIAGNOSIS — Z79.4 TYPE 2 DIABETES MELLITUS WITH HYPERGLYCEMIA, WITH LONG-TERM CURRENT USE OF INSULIN (HCC): Primary | ICD-10-CM

## 2021-01-25 DIAGNOSIS — E78.00 PURE HYPERCHOLESTEROLEMIA: ICD-10-CM

## 2021-01-25 DIAGNOSIS — Z12.5 SCREENING FOR PROSTATE CANCER: ICD-10-CM

## 2021-01-25 DIAGNOSIS — E11.65 TYPE 2 DIABETES MELLITUS WITH HYPERGLYCEMIA, WITH LONG-TERM CURRENT USE OF INSULIN (HCC): Primary | ICD-10-CM

## 2021-01-25 DIAGNOSIS — G47.33 OSA (OBSTRUCTIVE SLEEP APNEA): ICD-10-CM

## 2021-01-25 DIAGNOSIS — I10 ESSENTIAL HYPERTENSION: ICD-10-CM

## 2021-01-25 DIAGNOSIS — M51.36 LUMBAR DEGENERATIVE DISC DISEASE: ICD-10-CM

## 2021-01-25 DIAGNOSIS — E11.29 MICROALBUMINURIA DUE TO TYPE 2 DIABETES MELLITUS (HCC): ICD-10-CM

## 2021-01-25 DIAGNOSIS — E66.9 OBESITY (BMI 30-39.9): ICD-10-CM

## 2021-01-25 PROCEDURE — 99214 OFFICE O/P EST MOD 30 MIN: CPT | Performed by: FAMILY MEDICINE

## 2021-01-25 RX ORDER — LISINOPRIL 10 MG/1
TABLET ORAL
Qty: 30 TABLET | Refills: 2 | Status: SHIPPED
Start: 2021-01-25 | End: 2021-12-09 | Stop reason: SDUPTHER

## 2021-01-25 ASSESSMENT — ENCOUNTER SYMPTOMS
RESPIRATORY NEGATIVE: 1
GASTROINTESTINAL NEGATIVE: 1

## 2021-01-25 NOTE — PROGRESS NOTES
Samy Swartz (:  1955) is a 60 y.o. male,Established patient, here for evaluation of the following chief complaint(s): 6 Month Follow-Up        SUBJECTIVE/OBJECTIVE:  HPI:    Chief Complaint   Patient presents with    6 Month Follow-Up     6 month eval.    Pt recently tested positive for COVID-19, feeling much better from that regard. Started feeling poor on the . No fevers at this time. Hx of PE, he on Xarelto and baby ASA. No cough but chest is still a little tight. Sugars are doing fairly well. He is compliant with his insulin. [facility] with [facility]. Lab Results   Component Value Date    LABA1C 7.9 10/23/2020    LABA1C 8.1 (H) 2020    LABA1C 10.3 (H) 2020     Lab Results   Component Value Date    GLUF 146 (H) 2020    LABMICR 250.22 2020    LDLCALC 169 2020    CREATININE 0.91 2020     GERD controlled on the omeprazole. Hx of SHAHRAM, compliant with CPAP. Patient Active Problem List   Diagnosis    Chest pain at rest    Type 2 diabetes mellitus ([facility] [address]    GERD with stricture    Back pain at L4-L5 level    Gastroparesis    Status post dilatation of esophageal stricture    History of colonic polyps    Acute saddle pulmonary embolism with acute cor pulmonale (HCC)    Medication monitoring encounter    Mixed hyperlipidemia    SHAHRAM (obstructive sleep apnea)    Obesity (BMI 30-39. 9)    Personal history of tobacco use    Hypersomnia     Past Surgical History:   Procedure Laterality Date    BACK SURGERY      C1 oates & decompression    BACK SURGERY  [facility]    [facility] Arin Mcmahan, C5-7 cadaver patch   [address]      chiari    CARDIAC SURGERY      heart cath    CHOLECYSTECTOMY      COLONOSCOPY  2017    COLONOSCOPY N/A 3/12/2020    COLONOSCOPY POLYPECTOMY SNARE/COLD BIOPSY performed by Kacey Salazar MD at [facility]    ENDOSCOPY, COLON, DIAGNOSTIC     [email or web address]    right elbow cyclobenzaprine (FLEXERIL) 5 MG tablet Take 5 mg by mouth as needed 8/26/20   Historical Provider, MD   pravastatin (PRAVACHOL) 80 MG tablet Take 1 tablet by mouth daily 8/4/20   Renny Villafana MD   vitamin D (ERGOCALCIFEROL) 1.25 MG (75151 UT) CAPS capsule Take 1 capsule by mouth once a week 8/4/20   Renny Villafaan MD   [facility]. Devices (ROLLATOR ULTRA-LIGHT) MISC Use Rollator for stability with ambulation. 7/28/20   Renny Villafana MD   omeprazole (PRILOSEC) 40 MG delayed release capsule Take 1 capsule by mouth once daily 7/24/20   Renny Villafana MD   insulin lispro, 1 Unit Dial, ([facility]) 100 UNIT/ML SOPN Inject 7 Units into the skin 3 times daily (before meals) 7/22/20 7/17/21  RAFAEL Fagan - CNP   Blood Glucose Monitoring Suppl (ONE TOUCH ULTRA 2) w/Device KIT 1 kit by Does not apply route daily 6/25/20   Renny Villafana MD   blood glucose monitor strips One Touch Ultra Test Strips. Test 2 times a day and as needed for diabetic symptoms. DX: E11.22 4/3/20   Renny Villafana MD   acyclovir (ZOVIRAX) 800 MG tablet TAKE 1 TABLET BY MOUTH AS NEEDED (HERPES LABIALIS) 1/8/20   Historical Provider, MD   sertraline (ZOLOFT) 25 MG tablet Take 100 mg by mouth daily     Historical Provider, MD   nitroGLYCERIN (NITROSTAT) 0.4 MG SL tablet Place 1 tablet under the tongue every 5 minutes as needed for Chest pain 5/21/16   Ronny Arango MD   aspirin 81 MG tablet Take 81 mg by mouth daily    Historical Provider, MD   vitamin D 1000 UNITS CAPS Take by mouth daily     Historical Provider, MD       Review of Systems   Constitutional: Negative. HENT: Negative. Respiratory: Negative. Cardiovascular: Negative. Gastrointestinal: Negative. Musculoskeletal: Negative. All other systems reviewed and are negative. No flowsheet data found. Physical Exam  Constitutional:       General: He is not in acute distress. Appearance: Normal appearance. He is well-developed. He is not ill-appearing.    HENT: Tuan Cleaning is a 60 y.o. male being evaluated by a Virtual Visit (video visit) encounter to address concerns as mentioned above. A caregiver was present when appropriate. Due to this being a TeleHealth encounter (During CDOCA-03 public health emergency), evaluation of the following organ systems was limited: Vitals/Constitutional/EENT/Resp/CV/GI//MS/Neuro/Skin/Heme-Lymph-Imm. Pursuant to the emergency declaration under the [address] [place] [address] and the [employer] and [employer] Act, this Virtual Visit was conducted with patient's (and/or legal guardian's) consent, to reduce the patient's risk of exposure to COVID-19 and provide necessary medical care. The patient (and/or legal guardian) has also been advised to contact this office for worsening conditions or problems, and seek emergency medical treatment and/or call 911 if deemed necessary. Patient identification was verified at the start of the visit: Yes    Services were provided through a video synchronous discussion virtually to substitute for in-person clinic visit. Patient was located at home and provider was located in office or at home. An electronic signature was used to authenticate this note.     --Kaity Cool,

## 2021-02-01 ENCOUNTER — TELEPHONE (OUTPATIENT)
Dept: PULMONOLOGY | Age: 66
End: 2021-02-01

## 2021-02-01 ENCOUNTER — TELEPHONE (OUTPATIENT)
Dept: FAMILY MEDICINE CLINIC | Age: 66
End: 2021-02-01

## 2021-02-01 DIAGNOSIS — G47.31 CSA (CENTRAL SLEEP APNEA): Primary | ICD-10-CM

## 2021-02-01 PROCEDURE — 99091 COLLJ & INTERPJ DATA EA 30 D: CPT | Performed by: PHYSICIAN ASSISTANT

## 2021-02-01 NOTE — TELEPHONE ENCOUNTER
Pt called with a 1 week hx of SOB with bending over with a heavy feeling in his lungs. pt works part time with ambulation he's noticing symptoms are not improving. Pt would like a call back with advice. Pt tested + for COVID on 1/13/2021.

## 2021-02-01 NOTE — PROGRESS NOTES
2/1/2021   PAP Download:   Compliant  73%     Noncompliant 27 %     PAP Type Bipap Level  12/8   Avg Hrs/Day [id number]  AHI: 2.9             Machine/Mfg:   [] ResMed    [] Respironics/Dreamstation     Bravo [employer] called and states that he is not tolerating current PAP pressure. It is too much. Review of download shows AHI is well controlled. Will decrease IPAP and see if he is more comfortable but not loose control of sleep apnea  Download and case was reviewed for 30min    The encounter diagnosis was CSA (central sleep apnea).           Meg Lanier PA-C, MPAS  2/1/2021

## 2021-02-01 NOTE — TELEPHONE ENCOUNTER
Please see media, from [facility] patient having issues with pap pressure. Please advise. Download scanned in.

## 2021-02-02 ENCOUNTER — VIRTUAL VISIT (OUTPATIENT)
Dept: FAMILY MEDICINE CLINIC | Age: 66
End: 2021-02-02
Payer: MEDICARE

## 2021-02-02 ENCOUNTER — HOSPITAL ENCOUNTER (OUTPATIENT)
Age: 66
Discharge: HOME | End: 2021-02-02
Payer: MEDICARE

## 2021-02-02 ENCOUNTER — HOSPITAL ENCOUNTER (OUTPATIENT)
Dept: GENERAL RADIOLOGY | Age: 66
Discharge: HOME | End: 2021-02-02
Payer: MEDICARE

## 2021-02-02 DIAGNOSIS — R07.89 CHEST TIGHTNESS: Primary | ICD-10-CM

## 2021-02-02 DIAGNOSIS — Z86.16 HISTORY OF COVID-19: ICD-10-CM

## 2021-02-02 DIAGNOSIS — R05.3 PERSISTENT COUGH: ICD-10-CM

## 2021-02-02 DIAGNOSIS — R07.89 CHEST TIGHTNESS: ICD-10-CM

## 2021-02-02 PROCEDURE — 71046 X-RAY EXAM CHEST 2 VIEWS: CPT

## 2021-02-02 PROCEDURE — 99213 OFFICE O/P EST LOW 20 MIN: CPT | Performed by: FAMILY MEDICINE

## 2021-02-02 RX ORDER — ALBUTEROL SULFATE 90 UG/1
2 AEROSOL, METERED RESPIRATORY (INHALATION) EVERY 4 HOURS PRN
Qty: 1 INHALER | Refills: 0 | Status: SHIPPED | OUTPATIENT
Start: 2021-02-02 | End: 2022-01-05 | Stop reason: ALTCHOICE

## 2021-02-02 ASSESSMENT — ENCOUNTER SYMPTOMS
GASTROINTESTINAL NEGATIVE: 1
WHEEZING: 0
SHORTNESS OF BREATH: 1
CHEST TIGHTNESS: 1
COUGH: 1

## 2021-02-02 NOTE — PROGRESS NOTES
Dena Delgado (:  1955) is a 60 y.o. male,Established patient, here for evaluation of the following chief complaint(s): Cough and Shortness of Breath        SUBJECTIVE/OBJECTIVE:  HPI:    Chief Complaint   Patient presents with    Cough    Shortness of Breath     Pt c/o ongoing chest pressure and tightness since he had COVID early January. Denies fevers. Still with productive cough and some SOB with mild exertion. Hx of PE which is his main concern. He is on Xarelto at this time and will be life-long. Patient Active Problem List   Diagnosis    Chest pain at rest    Type 2 diabetes mellitus ([facility] [address]    GERD with stricture    Back pain at L4-L5 level    Gastroparesis    Status post dilatation of esophageal stricture    History of colonic polyps    Acute saddle pulmonary embolism with acute cor pulmonale (HCC)    Medication monitoring encounter    Mixed hyperlipidemia    SHAHRAM (obstructive sleep apnea)    Obesity (BMI 30-39. 9)    Personal history of tobacco use    Hypersomnia     Past Surgical History:   Procedure Laterality Date    BACK SURGERY      C1 oates & decompression    BACK SURGERY  2020    [facility] Jordan Corrales, C5-7 cadaver patch   [address]  [address]    chiari    CARDIAC SURGERY      heart cath    CHOLECYSTECTOMY  2009    COLONOSCOPY  2017    COLONOSCOPY N/A 3/12/2020    COLONOSCOPY POLYPECTOMY SNARE/COLD BIOPSY performed by Chhaya Florentino MD at [address] [facility], COLON, DIAGNOSTIC     [address]    right elbow    NASAL SEPTUM SURGERY  2006    SHOULDER SURGERY Left 2018    TONSILLECTOMY  1964    UPPER GASTROINTESTINAL ENDOSCOPY Left 3/12/2020    EGD ESOPHAGOGASTRODUODENOSCOPY DILATATION performed by Chhaya Florentino MD at [address] [facility]   [facility] ENDOSCOPY Left 3/12/2020    EGD BIOPSY performed by Chhaya Florentino MD at [address] [facility]  2015    right middle finger artery Review of Systems   Constitutional: Negative. Negative for fever. HENT: Positive for congestion. Respiratory: Positive for cough, chest tightness and shortness of breath. Negative for wheezing. Cardiovascular: Negative. Gastrointestinal: Negative. Musculoskeletal: Negative. All other systems reviewed and are negative. No flowsheet data found. Physical Exam  Constitutional:       General: He is not in acute distress. Appearance: Normal appearance. He is well-developed. He is not ill-appearing. HENT:      Head: Normocephalic and atraumatic. Right Ear: External ear normal.      Left Ear: External ear normal.   Eyes:      Conjunctiva/sclera: Conjunctivae normal.   Pulmonary:      Effort: Pulmonary effort is normal. No respiratory distress. Skin:     Findings: No rash (on exposed surfaces). Neurological:      Mental Status: He is alert and oriented to person, place, and time. Psychiatric:         Mood and Affect: Mood normal.         Behavior: Behavior normal.         Thought Content: Thought content normal.         Judgment: Judgment normal.       ASSESSMENT/PLAN:  1. Chest tightness  -     XR CHEST (2 VW); Future  2. Persistent cough  -     XR CHEST (2 VW); Future  3. History of COVID-19  -     XR CHEST (2 VW); Future    -  This is likely lingering effects from COVID-19  -  Will check CXR to rule out secondary pneumonia  -  Low suspicion for PE, pt already on Xarelto  -  If CXR negative, will treat symptomatically  -  Ultimately, if symptoms do not resolve, will have to consider repeat CT and/or echo    Return if symptoms worsen or fail to improve.

## 2021-02-22 ENCOUNTER — TELEPHONE (OUTPATIENT)
Dept: FAMILY MEDICINE CLINIC | Age: 66
End: 2021-02-22

## 2021-02-22 NOTE — TELEPHONE ENCOUNTER
Libia Guerrero has been having issues with the back of his throat. States it becomes \"instantly dry and can't breathe. \" He has to immediately find something to drink/cough drop. His throat isn't actually dry, only feels like it. Has been happening on and off. Pt does use a CPAP and is a mouth breather. States he cleans his machine regularly, is wondering if there is something going on in his lungs from his machine. States he has central apnea, had surgery in 2008. His \"brain malfunctions and stops breathing. Has to mechanically breathe on his own. Gasps for air like he just got done running a marathon. \" Hasn't happened recently, but does occasionally. 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 Inquiring if Dr. Kae Perdomo wants to see him, should he start with Dr. Taya Roque in [place]? Please advise. [phone]  Pt ok'd VM if needed.

## 2021-02-23 ENCOUNTER — OFFICE VISIT (OUTPATIENT)
Dept: INTERNAL MEDICINE CLINIC | Age: 66
End: 2021-02-23
Payer: MEDICARE

## 2021-02-23 VITALS
HEIGHT: 71 IN | SYSTOLIC BLOOD PRESSURE: 130 MMHG | TEMPERATURE: 98.1 F | DIASTOLIC BLOOD PRESSURE: 72 MMHG | BODY MASS INDEX: 29.4 KG/M2 | OXYGEN SATURATION: 98 % | HEART RATE: 76 BPM | WEIGHT: 210 LBS

## 2021-02-23 DIAGNOSIS — Z79.4 TYPE 2 DIABETES MELLITUS WITH HYPERGLYCEMIA, WITH LONG-TERM CURRENT USE OF INSULIN (HCC): Primary | ICD-10-CM

## 2021-02-23 DIAGNOSIS — E11.29 MICROALBUMINURIA DUE TO TYPE 2 DIABETES MELLITUS (HCC): ICD-10-CM

## 2021-02-23 DIAGNOSIS — E11.65 TYPE 2 DIABETES MELLITUS WITH HYPERGLYCEMIA, WITH LONG-TERM CURRENT USE OF INSULIN (HCC): Primary | ICD-10-CM

## 2021-02-23 DIAGNOSIS — R80.9 MICROALBUMINURIA DUE TO TYPE 2 DIABETES MELLITUS (HCC): ICD-10-CM

## 2021-02-23 DIAGNOSIS — E78.2 MIXED HYPERLIPIDEMIA: ICD-10-CM

## 2021-02-23 LAB — HBA1C MFR BLD: 7.1 % (ref 4.3–5.7)

## 2021-02-23 PROCEDURE — 83036 HEMOGLOBIN GLYCOSYLATED A1C: CPT | Performed by: NURSE PRACTITIONER

## 2021-02-23 PROCEDURE — 3051F HG A1C>EQUAL 7.0%<8.0%: CPT | Performed by: NURSE PRACTITIONER

## 2021-02-23 PROCEDURE — 99214 OFFICE O/P EST MOD 30 MIN: CPT | Performed by: NURSE PRACTITIONER

## 2021-02-23 ASSESSMENT — PATIENT HEALTH QUESTIONNAIRE - PHQ9
SUM OF ALL RESPONSES TO PHQ QUESTIONS 1-9: 0
1. LITTLE INTEREST OR PLEASURE IN DOING THINGS: 0
SUM OF ALL RESPONSES TO PHQ QUESTIONS 1-9: 0
SUM OF ALL RESPONSES TO PHQ9 QUESTIONS 1 & 2: 0
SUM OF ALL RESPONSES TO PHQ QUESTIONS 1-9: 0

## 2021-02-23 NOTE — PATIENT INSTRUCTIONS
Increase Metformin to 1000 mg am, 500 mg pm.   Continue current insulin. Glucose logs and labs in about 4 weeks, and may go up to 1000 mg twice daily then.      Schedule dilated eye exam  Foot exam next visit

## 2021-02-24 ENCOUNTER — TELEPHONE (OUTPATIENT)
Dept: INTERNAL MEDICINE CLINIC | Age: 66
End: 2021-02-24

## 2021-02-24 NOTE — TELEPHONE ENCOUNTER
Patient notified to decrease Lantus to 16 units at bedtime . Will have [place] patient's dexcom on Monday .

## 2021-02-24 NOTE — TELEPHONE ENCOUNTER
Patient called asking if you could review his BS readings from yesterday again .  Patient states that his BS bottomed out at 2 AM at 49 and again at 6 AM . Please advise

## 2021-02-25 ENCOUNTER — TELEPHONE (OUTPATIENT)
Dept: INTERNAL MEDICINE CLINIC | Age: 66
End: 2021-02-25

## 2021-02-25 NOTE — TELEPHONE ENCOUNTER
Patient called in and left a message in regards to his DexCom CGM. He was worried because the reading on his DexCom and the reading on his blood sugar meter were different. I explained to him that with the DexCom being on his belly, that is in the interstitial fluid and that will read differently then when he is using the capillary blood from his finger. I explained to look at the arrow on his DexCom when he scans to see if it is trending towards the number that is on his blood sugar meter. I told him to go to the [facility] website and there is a chart on there to compare the 2 readings. He also thought that he should possibly have to calibrate the DexCom. I explained to him that when you pull off the sticker on the sensor, as long as you put in the 4 digit number that is on that into the DexCom, you do no calibrate. Patient verbalized understanding.

## 2021-03-02 ENCOUNTER — OFFICE VISIT (OUTPATIENT)
Dept: PULMONOLOGY | Age: 66
End: 2021-03-02
Payer: MEDICARE

## 2021-03-02 ENCOUNTER — TELEPHONE (OUTPATIENT)
Dept: ENT CLINIC | Age: 66
End: 2021-03-02

## 2021-03-02 VITALS
BODY MASS INDEX: 30.52 KG/M2 | HEIGHT: 71 IN | DIASTOLIC BLOOD PRESSURE: 62 MMHG | SYSTOLIC BLOOD PRESSURE: 110 MMHG | OXYGEN SATURATION: 98 % | TEMPERATURE: 97.3 F | WEIGHT: 218 LBS | HEART RATE: 76 BPM

## 2021-03-02 DIAGNOSIS — G93.5 CHIARI I MALFORMATION (HCC): ICD-10-CM

## 2021-03-02 DIAGNOSIS — K11.8 PAROTID MASS: ICD-10-CM

## 2021-03-02 DIAGNOSIS — R09.89 CHOKING SENSATION: ICD-10-CM

## 2021-03-02 DIAGNOSIS — G47.31 CSA (CENTRAL SLEEP APNEA): Primary | ICD-10-CM

## 2021-03-02 DIAGNOSIS — E66.9 OBESITY (BMI 30-39.9): ICD-10-CM

## 2021-03-02 PROCEDURE — 99215 OFFICE O/P EST HI 40 MIN: CPT | Performed by: PHYSICIAN ASSISTANT

## 2021-03-02 ASSESSMENT — ENCOUNTER SYMPTOMS
ALLERGIC/IMMUNOLOGIC NEGATIVE: 1
EYES NEGATIVE: 1
COUGH: 0
CHEST TIGHTNESS: 0
STRIDOR: 0
BACK PAIN: 0
NAUSEA: 0
DIARRHEA: 0
SHORTNESS OF BREATH: 0
CHOKING: 1
WHEEZING: 0

## 2021-03-02 NOTE — PROGRESS NOTES
[facility]      Talita Brito         [id number]  3/2/2021   Chief Complaint   Patient presents with    Sleep Apnea     SHAHRAM follow up. Last seen 11/3/2020        Pt of Dr. Winston Snider    PAP Download:   Original or initial AHI: 34.5     Date of initial study: 8/26/19      Compliant  67%     Noncompliant 33 %     PAP Type  Cpap  Level  11/8/3  Avg Hrs/Day 4 hours 27 minutes  AHI: 2.5   Recorded compliance dates , 1/27/21  to 2/25/21   Machine/Mfg:   [x] ResMed    [] Respironics/Dreamstation   Interface:   [] Nasal    [] Nasal pillows   [x] FFM      Provider:      [x] -KHURRAM     []Milla     [] Josh    [] Sandy Muñoz    [] Pasha So               [] [facility]      [] Adaptive    [] [address]      [] Other    Neck Size:16.5   Mallampati 4  ESS: 8   SAQLI: 78    Here is a scan of the most recent download:              Presentation:   Leila Benjamin presents for sleep medicine follow up for central sleep apnea  Since the last visit, Leila Benjamin is doing ok PAP. He is struggling with sensation of not breathing. He states that he feels like he not breathing at times with PAP on. This is while he is awake. This is causing anxiety. He has this sensation during the day. Pressure was lowered on PAP a few months ago secondary to bloating. Equipment issues: The pressure is  acceptable, the mask is acceptable     Sleep issues:  Do you feel better? No  More rested? No   Better concentration? no    Progress History:   Since last visit any new medical issues? Yes Covid  New ER or hospitlal visits? No  Any new or changes in medicines? No  Any new sleep medicines? No    Review of Systems -   Review of Systems   Constitutional: Negative for activity change, appetite change, chills and fever. HENT: Negative for congestion and postnasal drip. Eyes: Negative. Respiratory: Positive for choking. Negative for cough, chest tightness, shortness of breath, wheezing and stridor. Cardiovascular: Negative for chest pain and leg swelling. Gastrointestinal: Negative for diarrhea and nausea. Endocrine: Negative. Genitourinary: Negative. Musculoskeletal: Negative. Negative for arthralgias and back pain. Skin: Negative. Allergic/Immunologic: Negative. Neurological: Negative. Negative for dizziness and light-headedness. Psychiatric/Behavioral: Negative. All other systems reviewed and are negative. Physical Exam:    BMI:  Body mass index is 30.4 kg/m². Wt Readings from Last 3 Encounters:   03/02/21 218 lb (98.9 kg)   02/23/21 210 lb (95.3 kg)   11/17/20 220 lb (99.8 kg)     Weight stable / unchanged  Vitals: /62 (Site: Left Upper Arm, Position: Sitting, Cuff Size: Large Adult)   Pulse 76   Temp 97.3 °F (36.3 °C) (Tympanic)   Ht 5' 11\" (1.803 m)   Wt 218 lb (98.9 kg)   SpO2 98% Comment: room air  BMI 30.40 kg/m²       Physical Exam  Constitutional:       Appearance: He is well-developed. HENT:      Head: Normocephalic and atraumatic. Right Ear: External ear normal.      Left Ear: External ear normal.   Eyes:      Conjunctiva/sclera: Conjunctivae normal.      Pupils: Pupils are equal, round, and reactive to light. Neck:      Musculoskeletal: Normal range of motion and neck supple. Cardiovascular:      Rate and Rhythm: Normal rate and regular rhythm. Heart sounds: Normal heart sounds. Pulmonary:      Effort: Pulmonary effort is normal.      Breath sounds: Normal breath sounds. Musculoskeletal: Normal range of motion. Skin:     General: Skin is warm and dry. Neurological:      Mental Status: He is alert and oriented to person, place, and time. Psychiatric:         Behavior: Behavior normal.         Thought Content: Thought content normal.         Judgment: Judgment normal.         ASSESSMENT/DIAGNOSIS     Diagnosis Orders   1. CSA (central sleep apnea)     2. Obesity (BMI 30-39. 9) 3. Choking sensation  Juliane Whittington MD, Otolaryngology, [facility] [address]   4. Chiari I malformation ([facility] [address]     5. Parotid mass  Olimpia Schwartz MD, Otolaryngology, [facility]   Do you need any equipment today? No  - Concern Chiari may be causing choking sensation and hypoventilation  -  Will try increasing pressure again  - ABG to eval for hypoventilation  - ENT referral for known parotid mass and possible upper airway issue  - Need to get established with neuro for chiari   - Download reviewed and discussed with patient  - He  was advised to continue current positive airway pressure therapy with above described pressure. - He  advised to keep good compliance with current recommended pressure to get optimal results and clinical improvement  - Recommend 7-9 hours of sleep with PAP  - He was advised to call [facility] regarding supplies if needed.   -He call my office for earlier appointment if needed for worsening of sleep symptoms.   - He was instructed on weight loss  - Teresa Cooley was educated about my impression and plan. Patient verbalizesunderstanding.   We will see Lauri Nurse back pending ABG results    Information added by my medical assistant/LPN was reviewed today       Delaney Crigler PA-C, MPAS  3/2/2021

## 2021-03-03 ENCOUNTER — TELEPHONE (OUTPATIENT)
Dept: INTERNAL MEDICINE CLINIC | Age: 66
End: 2021-03-03

## 2021-03-03 NOTE — TELEPHONE ENCOUNTER
Pt called asking if the labwork that you ordered yesterday is to be done today or on 3/23/21? He states the order has the expected date of 3/23/21 but just wants to clarify and get it done when you are wanting.

## 2021-03-04 ENCOUNTER — HOSPITAL ENCOUNTER (OUTPATIENT)
Age: 66
Discharge: HOME | End: 2021-03-04
Payer: MEDICARE

## 2021-03-04 LAB
ALLEN TEST: POSITIVE
BASE EXCESS (CALCULATED): 1.4 MMOL/L (ref -2.5–2.5)
COLLECTED BY:: NORMAL
DEVICE: NORMAL
HCO3: 27 MMOL/L (ref 23–28)
O2 SATURATION: 95 %
PCO2: 45 MMHG (ref 35–45)
PH BLOOD GAS: 7.38 (ref 7.35–7.45)
PO2: 77 MMHG (ref 71–104)
SOURCE, BLOOD GAS: NORMAL

## 2021-03-04 PROCEDURE — 36600 WITHDRAWAL OF ARTERIAL BLOOD: CPT

## 2021-03-04 PROCEDURE — 82803 BLOOD GASES ANY COMBINATION: CPT

## 2021-03-08 ENCOUNTER — TELEPHONE (OUTPATIENT)
Dept: PULMONOLOGY | Age: 66
End: 2021-03-08

## 2021-03-08 ENCOUNTER — TELEPHONE (OUTPATIENT)
Dept: INTERNAL MEDICINE CLINIC | Age: 66
End: 2021-03-08

## 2021-03-08 NOTE — TELEPHONE ENCOUNTER
Reviewed download, appears his glucose has come up from the insulin reduction due to lows during the night and lows are resolved. If no further lows - Increase Lantus from 16 to 18 units nightly, and glucose logs in two weeks.

## 2021-03-10 ENCOUNTER — OFFICE VISIT (OUTPATIENT)
Dept: INTERNAL MEDICINE CLINIC | Age: 66
End: 2021-03-10
Payer: MEDICARE

## 2021-03-10 VITALS
BODY MASS INDEX: 29.9 KG/M2 | DIASTOLIC BLOOD PRESSURE: 64 MMHG | HEIGHT: 71 IN | HEART RATE: 71 BPM | SYSTOLIC BLOOD PRESSURE: 128 MMHG | WEIGHT: 213.6 LBS | TEMPERATURE: 97.7 F

## 2021-03-10 DIAGNOSIS — E11.69 TYPE 2 DIABETES MELLITUS WITH OTHER SPECIFIED COMPLICATION, WITH LONG-TERM CURRENT USE OF INSULIN (HCC): ICD-10-CM

## 2021-03-10 DIAGNOSIS — Z79.4 TYPE 2 DIABETES MELLITUS WITH OTHER SPECIFIED COMPLICATION, WITH LONG-TERM CURRENT USE OF INSULIN (HCC): ICD-10-CM

## 2021-03-10 PROCEDURE — G0108 DIAB MANAGE TRN  PER INDIV: HCPCS | Performed by: REGISTERED NURSE

## 2021-03-10 NOTE — PATIENT INSTRUCTIONS
Watch the response of  Your blood sugars to your meal choices       --when you are at home and have time         Note the 1 hour, 2hour, 3 hour and 4 hour level of your blood sugars after                    You eat. Goal for your blood sugars before eating , 1 hour after under 200                            2hours under 160/180, 4 hours under 130 again  Watch for low blood sugars or drops in blood sugar before or while you are                 Eating. This could mean your insulin is working faster than you are                digesting your food. We many need to think about giving Humalog                  While you are eating or right after eating if you are seeing this problem  Continue with your exercise/ activity efforts!   Follow up with your stomach doctor

## 2021-03-10 NOTE — PROGRESS NOTES
[facility]  [address] [place], [address]  [phone] (phone)  [phone] (fax)    Patient ID: Matty Millan [id number]  Referring Provider: Dr. Cleo Johnson     Patient's name and  were verified. Subjective:    He presents for His follow-up diabetic visit. He has type 2 diabetes mellitus. Home regimen includes: insulin  biguanide He is compliant most of the time. Assessment:     Lab Results   Component Value Date    LABA1C 7.1 2021    LABA1C 7.9 10/23/2020    BUN 10 2020    CREATININE 0.91 2020     There were no vitals filed for this visit. Wt Readings from Last 3 Encounters:   21 218 lb (98.9 kg)   21 210 lb (95.3 kg)   20 220 lb (99.8 kg)     Ht Readings from Last 3 Encounters:   21 5' 11\" (1.803 m)   21 5' 11\" (1.803 m)   20 5' 11\" (1.803 m)       Glucose at FBS today resulted at 154mg/dl  Current monitoring regimen: home blood tests - 4+ times daily/ Dexcom CGM  Home blood sugar trends:   Any episodes of hypoglycemia? No; last low 3 weeks ago--hx of lows before bedtime/ middle of night  Previous visit with dietician: yes - 10/19/2020  Current diet: B argueta/ egg/ cheese biscuit                    L chicken salad sandwich; sm handful chips                    D chicken/ brocoli/ kale salad                    Snacking --minimal. BT rare  Current exercise: fitness center 1 time per week- machines. Working at [employer] 5 days per week 3-6 hours-lifting/ active  Eye exam current (within one year): no Dr. Shannan Mendoza 2019 --need to make appt with Dr. Jason Jasmine  Any history of foot problems? no  Last foot exam: 2020  Immunizations up to date: yes -   Taking ASA:  Yes   Appropriate for use of MyChart Glucose Grid:  Yes    Focus:     Diabetes education. Recent A1C 7.1%. Overall, blood sugars are in reasonable control; basal BG levels tend to run 140-160's.  Malcolm Gutiérrez reports that he just increase his Lantus to 20 units 2 times daily yesterday. Notes indicate that order was for 18 units 2 times daily, but he is opting to stay with 20 units. He is decrease to 18 units 2 times daily, if he notes any nocturnal lows. Glucose response to meals is quite variable and we are not able to find any patterns. He has significant gastroparesis and reports daily nausea with emesis-always in the morning before eating and with some meals. He doses Humalog 10 minutes AC meals, but may have to adjust closer to eating if seeing lows. It will be difficult to dose accurately for meals. He is sticking with 30gms carbs most meals, so large spikes in BG are rare. Recent C-Peptide of 3.2 keeps [facility] from getting coverage for insulin pump (we will check with [employer] to be sure they are exempt as well). It is possible that SGLT2 could help minimize the need for insulin dosing, but with all of his health issues, may be prudent to stay with the insulin dosing. Follow up 5 months    DSME PLAN:   Discussed general issues about diabetes pathophysiology and management. Counseling at today's visit: BG goals; carbs; exercise; evaluating meal clearance; timing of bolus insulin based on BG response. Treating low BS. Watch the response of  Your blood sugars to your meal choices       --when you are at home and have time         Note the 1 hour, 2hour, 3 hour and 4 hour level of your blood sugars after                    You eat. Goal for your blood sugars before eating , 1 hour after under 200                            2hours under 160/180, 4 hours under 130 again  Watch for low blood sugars or drops in blood sugar before or while you are                 Eating. This could mean your insulin is working faster than you are                digesting your food. We many need to think about giving Humalog                  While you are eating or right after eating if you are seeing this problem  Continue with your exercise/ activity efforts!   Follow up with your stomach doctor  Meter download, medications, PMH and nursing assessment reviewed. Tuan Krishnadavid states He is willing to participate in this plan of care and verbalized understanding of all instructions provided. Teach back used to verify comprehension. Total time involved in direct patient education: 60 minutes.

## 2021-03-11 ENCOUNTER — NURSE ONLY (OUTPATIENT)
Dept: LAB | Age: 66
End: 2021-03-11

## 2021-03-11 LAB
ANION GAP SERPL CALCULATED.3IONS-SCNC: 10 MEQ/L (ref 8–16)
BASOPHILS # BLD: 0.4 %
BASOPHILS ABSOLUTE: 0 THOU/MM3 (ref 0–0.1)
BUN BLDV-MCNC: 16 MG/DL (ref 7–22)
CALCIUM SERPL-MCNC: 9.3 MG/DL (ref 8.5–10.5)
CHLORIDE BLD-SCNC: 103 MEQ/L (ref 98–111)
CO2: 27 MEQ/L (ref 23–33)
CREAT SERPL-MCNC: 1.1 MG/DL (ref 0.4–1.2)
EOSINOPHIL # BLD: 1.4 %
EOSINOPHILS ABSOLUTE: 0.1 THOU/MM3 (ref 0–0.4)
ERYTHROCYTE [DISTWIDTH] IN BLOOD BY AUTOMATED COUNT: 13.4 % (ref 11.5–14.5)
ERYTHROCYTE [DISTWIDTH] IN BLOOD BY AUTOMATED COUNT: 43.7 FL (ref 35–45)
GFR SERPL CREATININE-BSD FRML MDRD: 67 ML/MIN/1.73M2
GLUCOSE BLD-MCNC: 126 MG/DL (ref 70–108)
HCT VFR BLD CALC: 40.4 % (ref 42–52)
HEMOGLOBIN: 13.2 GM/DL (ref 14–18)
IMMATURE GRANS (ABS): 0.03 THOU/MM3 (ref 0–0.07)
IMMATURE GRANULOCYTES: 0.4 %
INR BLD: 1.34 (ref 0.85–1.13)
LYMPHOCYTES # BLD: 28.3 %
LYMPHOCYTES ABSOLUTE: 2.3 THOU/MM3 (ref 1–4.8)
MCH RBC QN AUTO: 29.5 PG (ref 26–33)
MCHC RBC AUTO-ENTMCNC: 32.7 GM/DL (ref 32.2–35.5)
MCV RBC AUTO: 90.2 FL (ref 80–94)
MONOCYTES # BLD: 7.8 %
MONOCYTES ABSOLUTE: 0.6 THOU/MM3 (ref 0.4–1.3)
NUCLEATED RED BLOOD CELLS: 0 /100 WBC
PLATELET # BLD: 279 THOU/MM3 (ref 130–400)
PMV BLD AUTO: 9.2 FL (ref 9.4–12.4)
POTASSIUM SERPL-SCNC: 4.6 MEQ/L (ref 3.5–5.2)
PTH INTACT: 40.6 PG/ML (ref 15–65)
RBC # BLD: 4.48 MILL/MM3 (ref 4.7–6.1)
SEG NEUTROPHILS: 61.7 %
SEGMENTED NEUTROPHILS ABSOLUTE COUNT: 5.1 THOU/MM3 (ref 1.8–7.7)
SODIUM BLD-SCNC: 140 MEQ/L (ref 135–145)
WBC # BLD: 8.3 THOU/MM3 (ref 4.8–10.8)

## 2021-03-12 DIAGNOSIS — E11.65 TYPE 2 DIABETES MELLITUS WITH HYPERGLYCEMIA, WITH LONG-TERM CURRENT USE OF INSULIN (HCC): ICD-10-CM

## 2021-03-12 DIAGNOSIS — Z79.4 TYPE 2 DIABETES MELLITUS WITH HYPERGLYCEMIA, WITH LONG-TERM CURRENT USE OF INSULIN (HCC): ICD-10-CM

## 2021-03-12 RX ORDER — INSULIN LISPRO 100 [IU]/ML
7 INJECTION, SOLUTION INTRAVENOUS; SUBCUTANEOUS
Qty: 10 PEN | Refills: 3 | Status: SHIPPED | OUTPATIENT
Start: 2021-03-12 | End: 2021-07-07 | Stop reason: SDUPTHER

## 2021-03-14 ASSESSMENT — ENCOUNTER SYMPTOMS
SHORTNESS OF BREATH: 0
TROUBLE SWALLOWING: 0
SORE THROAT: 0
ABDOMINAL PAIN: 0
DIARRHEA: 0
VOMITING: 0
COUGH: 0
NAUSEA: 0
CONSTIPATION: 0

## 2021-03-17 ENCOUNTER — OFFICE VISIT (OUTPATIENT)
Dept: FAMILY MEDICINE CLINIC | Age: 66
End: 2021-03-17
Payer: MEDICARE

## 2021-03-17 ENCOUNTER — NURSE TRIAGE (OUTPATIENT)
Dept: OTHER | Facility: CLINIC | Age: 66
End: 2021-03-17

## 2021-03-17 VITALS
WEIGHT: 215 LBS | SYSTOLIC BLOOD PRESSURE: 122 MMHG | BODY MASS INDEX: 29.99 KG/M2 | DIASTOLIC BLOOD PRESSURE: 62 MMHG | TEMPERATURE: 98.4 F | RESPIRATION RATE: 20 BRPM | HEART RATE: 72 BPM

## 2021-03-17 DIAGNOSIS — B02.9 HERPES ZOSTER WITHOUT COMPLICATION: Primary | ICD-10-CM

## 2021-03-17 PROCEDURE — 99213 OFFICE O/P EST LOW 20 MIN: CPT | Performed by: FAMILY MEDICINE

## 2021-03-17 RX ORDER — VALACYCLOVIR HYDROCHLORIDE 1 G/1
1000 TABLET, FILM COATED ORAL 3 TIMES DAILY
Qty: 21 TABLET | Refills: 0 | Status: SHIPPED | OUTPATIENT
Start: 2021-03-17 | End: 2021-03-24

## 2021-03-17 ASSESSMENT — ENCOUNTER SYMPTOMS
BACK PAIN: 1
RESPIRATORY NEGATIVE: 1
GASTROINTESTINAL NEGATIVE: 1

## 2021-03-17 NOTE — PROGRESS NOTES
Visit Information    Have you changed or started any medications since your last visit including any over-the-counter medicines, vitamins, or herbal medicines? no   Are you having any side effects from any of your medications? -  no  Have you stopped taking any of your medications? Is so, why? -  yes - alternate therapy    Have you seen any other physician or provider since your last visit? Yes - Records Obtained  Have you had any other diagnostic tests since your last visit? Yes - Records Obtained  Have you been seen in the emergency room and/or had an admission to a hospital since we last saw you? No  Have you had your routine dental cleaning in the past 6 months? no    Have you activated your [email or web address] account? If not, what are your barriers?  Yes     Patient Care Team:  Delilah Talamantes DO as PCP - General (Family Medicine)  Delilah Talamantes DO as PCP - [facility] Provider  Connie Guerrero MD as Consulting Physician (Otolaryngology)  Gabrielle Peng MD as Consulting Physician (Pulmonology)  Beverley Trejo MD (Orthopedic Surgery)    Medical History Review  Past Medical, Family, and Social History reviewed and does not contribute to the patient presenting condition    Health Maintenance   Topic Date Due    Diabetic retinal exam  Never done    COVID-19 Vaccine (1) Never done   [employer] Visit (AWV)  Never done    Shingles Vaccine (2 of 3) 11/17/2021 (Originally 1/23/2017)    A1C test (Diabetic or Prediabetic)  05/23/2021    Diabetic foot exam  07/14/2021    Diabetic microalbuminuria test  07/22/2021    Lipid screen  07/22/2021    Potassium monitoring  03/11/2022    Creatinine monitoring  03/11/2022    DTaP/Tdap/Td vaccine (2 - Td) 07/22/2025    Colon cancer screen colonoscopy  03/12/2030    Flu vaccine  Completed    Pneumococcal 0-64 years Vaccine  Completed    Hepatitis A vaccine  Aged Out    Hib vaccine  Aged Out    Meningococcal (ACWY) vaccine  Aged Out    Hepatitis C screen  Discontinued    HIV screen  Discontinued

## 2021-03-17 NOTE — PATIENT INSTRUCTIONS
on the sores. This will keep them from drying and healing. · To help remove loose crusts, soak them in tap water. This can help decrease oozing, and dry and soothe the skin. · Take an over-the-counter pain medicine, such as acetaminophen (Tylenol), ibuprofen (Advil, Motrin), or naproxen (Aleve). Read and follow all instructions on the label. · Avoid close contact with people until the blisters have healed. It is very important for you to avoid contact with anyone who has never had chickenpox or the chickenpox vaccine. Pregnant women, young babies, and anyone else who has a hard time fighting infection (such as someone with HIV, diabetes, or cancer) is especially at risk. When should you call for help? Call your doctor now or seek immediate medical care if:    · You have a new or higher fever.     · You have a severe headache and a stiff neck.     · You lose the ability to think clearly.     · The rash spreads to your forehead, nose, eyes, or eyelids.     · You have eye pain, or your vision gets worse.     · You have new pain in your face, or you cannot move the muscles in your face.     · Blisters spread to new parts of your body. Watch closely for changes in your health, and be sure to contact your doctor if:    · The rash has not healed after 2 to 4 weeks.     · You still have pain after the rash has healed. Where can you learn more? Go to [email or web address]://[email or web address] org and sign in to your [employer] account. Melissa Mckeon in the [facility] box to learn more about \"Shingles: Care Instructions. \"     If you do not have an account, please click on the \"Sign Up Now\" link. Current as of: September 23, 2020               Content Version: 12.8  © 2006-2021 Healthwise, [employer]. Care instructions adapted under license by [employer] [facility] [employer] [facility] ([facility]).  If you have questions about a medical condition or this instruction, always ask your healthcare professional. [address] any warranty or liability for your use of this information.

## 2021-03-17 NOTE — PROGRESS NOTES
Lauri Nurse (:  1955) is a 60 y.o. male,Established patient, here for evaluation of the following chief complaint(s):  Rash (rash on lower left back)      SUBJECTIVE/OBJECTIVE:  HPI:    Chief Complaint   Patient presents with    Rash     rash on lower left back     Pt presents for rash on low back x 3 days. The rash is causing some pain, but the pain is internal.    Patient Active Problem List   Diagnosis    Chest pain at rest    Type 2 diabetes mellitus ([address]    GERD with stricture    Back pain at L4-L5 level    Gastroparesis    Status post dilatation of esophageal stricture    History of colonic polyps    Acute saddle pulmonary embolism with acute cor pulmonale (HCC)    Medication monitoring encounter    Mixed hyperlipidemia    SHAHRAM (obstructive sleep apnea)    Obesity (BMI 30-39. 9)    Personal history of tobacco use    Hypersomnia     Past Surgical History:   Procedure Laterality Date    BACK SURGERY      C1 oates & decompression    BACK SURGERY  2020    [facility] Dr. Terry Barriga, C5-7 cadaver patch   [facility]      chiari    CARDIAC SURGERY      heart cath    CHOLECYSTECTOMY      COLONOSCOPY  2017    COLONOSCOPY N/A 3/12/2020    COLONOSCOPY POLYPECTOMY SNARE/COLD BIOPSY performed by Leobardo Ritter MD at [facility], COLON, DIAGNOSTIC  2007   [address] [facility]    right elbow    IVC FILTER REMOVAL  03/15/2020    [facility] [address]  2006    SHOULDER SURGERY Left 2018    TONSILLECTOMY  1964    UPPER GASTROINTESTINAL ENDOSCOPY Left 3/12/2020    EGD ESOPHAGOGASTRODUODENOSCOPY DILATATION performed by Leobardo Ritter MD at [facility]   [facility] ENDOSCOPY Left 3/12/2020    EGD BIOPSY performed by Leobardo Ritter MD at [address] [place] [address]  2015    right middle finger artery     Prior to Admission medications    Medication Sig Start Date End Date Taking?  Authorizing Provider valACYclovir (VALTREX) 1 g tablet Take 1 tablet by mouth 3 times daily for 7 days 3/17/21 3/24/21 Yes Faby Jasmine, DO   insulin lispro, 1 Unit Dial, (HUMALOG [facility]) 100 UNIT/ML SOPN Inject 7 Units into the skin 3 times daily (before meals) 3/12/21 3/7/22 Yes Faby Pageort, DO   metFORMIN (GLUCOPHAGE) 500 MG tablet Take 1,000 mg by mouth 2 times daily   Yes Historical Provider, MD   CPAP Machine MISC by Does not apply route Please change BIPAP pressure to 12/9 cm H20. 3/2/21  Yes Stephen Olivares PA-C   albuterol sulfate HFA (VENTOLIN HFA) 108 (90 Base) MCG/ACT inhaler Inhale 2 puffs into the lungs every 4 hours as needed for Wheezing or Shortness of Breath 2/2/21  Yes Faby Jasmine, DO   lisinopril (PRINIVIL;ZESTRIL) 10 MG tablet Take 2 tablet by mouth once daily 1/25/21  Yes Faby Jasmine, DO   Cholecalciferol (VITAMIN D3) 125 MCG (5000 UT) TABS Take 5,000 Units by mouth daily   Yes Historical Provider, MD   XARELTO 10 MG TABS tablet Take 1 tablet by mouth once daily with breakfast 10/9/20  Yes RAFAEL Caba CNP   Continuous Blood Gluc  (Christie Bracket) YULI Use as directed for continuous glucose monitor. 10/8/20  Yes RAFAEL Jaimes CNP   Continuous Blood Gluc Transmit (DEXCOM G6 TRANSMITTER) MISC Change every 3 months- Use as directed for continuous glucose monitoring. 10/8/20  Yes RAFAEL Jaimes CNP   Continuous Blood Gluc Sensor (DEXCOM G6 SENSOR) MISC Change every 10 days-Use as directed for continuous glucose monitoring.  10/8/20  Yes RAFAEL Jaimes CNP   insulin glargine (LANTUS SOLOSTAR) 100 UNIT/ML injection pen Inject 21 Units into the skin 2 times daily  Patient taking differently: Inject 20 Units into the skin 2 times daily  10/5/20  Yes Maribel Hermosillo MD   cyclobenzaprine (FLEXERIL) 5 MG tablet Take 5 mg by mouth as needed 8/26/20  Yes Historical Provider, MD   pravastatin (PRAVACHOL) 80 MG tablet Take 1 tablet by mouth daily 8/4/20  Yes Dick Medina MD   omeprazole (PRILOSEC) 40 MG delayed release capsule Take 1 capsule by mouth once daily 7/24/20  Yes Dick Medina MD   Blood Glucose Monitoring Suppl (ONE TOUCH ULTRA 2) w/Device KIT 1 kit by Does not apply route daily 6/25/20  Yes Dick Medina MD   blood glucose monitor strips One Touch Ultra Test Strips. Test 2 times a day and as needed for diabetic symptoms. DX: E11.22 4/3/20  Yes Dick Medina MD   acyclovir (ZOVIRAX) 800 MG tablet TAKE 1 TABLET BY MOUTH AS NEEDED (HERPES LABIALIS) 1/8/20  Yes Historical Provider, MD   sertraline (ZOLOFT) 25 MG tablet Take 100 mg by mouth daily    Yes Historical Provider, MD   nitroGLYCERIN (NITROSTAT) 0.4 MG SL tablet Place 1 tablet under the tongue every 5 minutes as needed for Chest pain 5/21/16  Yes [address], MD   aspirin 81 MG tablet Take 81 mg by mouth daily   Yes Historical Provider, MD   [facility]. Devices (ROLLATOR ULTRA-LIGHT) MISC Use Rollator for stability with ambulation. Patient not taking: Reported on 3/17/2021 7/28/20   Dick Medina MD         Review of Systems   Constitutional: Negative. HENT: Negative. Respiratory: Negative. Cardiovascular: Negative. Gastrointestinal: Negative. Musculoskeletal: Positive for back pain. Skin: Positive for rash. All other systems reviewed and are negative. Physical Exam  Vitals signs and nursing note reviewed. Constitutional:       General: He is not in acute distress. Appearance: Normal appearance. He is well-developed. HENT:      Head: Normocephalic and atraumatic. Right Ear: Tympanic membrane normal.      Left Ear: Tympanic membrane normal.   Eyes:      Conjunctiva/sclera: Conjunctivae normal.   Neck:      Musculoskeletal: Neck supple. Cardiovascular:      Rate and Rhythm: Normal rate and regular rhythm. Heart sounds: Normal heart sounds. No murmur. Pulmonary:      Effort: Pulmonary effort is normal.      Breath sounds: Normal breath sounds. No wheezing, rhonchi or rales. Abdominal:      General: There is no distension. Skin:     General: Skin is warm and dry. Findings: No rash (on exposed surfaces). Neurological:      General: No focal deficit present. Mental Status: He is alert. Psychiatric:         Attention and Perception: Attention normal.         Mood and Affect: Mood normal.         Speech: Speech normal.         Behavior: Behavior normal. Behavior is cooperative. Thought Content: Thought content normal.         Judgment: Judgment normal.             ASSESSMENT/PLAN:  1. Herpes zoster without complication  -     valACYclovir (VALTREX) 1 g tablet; Take 1 tablet by mouth 3 times daily for 7 days, Disp-21 tablet, R-0Normal      Return if symptoms worsen or fail to improve. An electronic signature was used to authenticate this note.     --Debbie Estrella, DO

## 2021-03-24 ENCOUNTER — TELEPHONE (OUTPATIENT)
Dept: FAMILY MEDICINE CLINIC | Age: 66
End: 2021-03-24

## 2021-03-24 ENCOUNTER — TELEPHONE (OUTPATIENT)
Dept: PULMONOLOGY | Age: 66
End: 2021-03-24

## 2021-03-24 DIAGNOSIS — B02.9 HERPES ZOSTER WITHOUT COMPLICATION: Primary | ICD-10-CM

## 2021-03-24 RX ORDER — HYDROCODONE BITARTRATE AND ACETAMINOPHEN 5; 325 MG/1; MG/1
1 TABLET ORAL EVERY 6 HOURS PRN
Qty: 20 TABLET | Refills: 0 | Status: SHIPPED | OUTPATIENT
Start: 2021-03-24 | End: 2021-03-29

## 2021-03-24 RX ORDER — AMITRIPTYLINE HYDROCHLORIDE 25 MG/1
25 TABLET, FILM COATED ORAL NIGHTLY
Qty: 30 TABLET | Refills: 0 | Status: SHIPPED | OUTPATIENT
Start: 2021-03-24

## 2021-03-24 NOTE — TELEPHONE ENCOUNTER
Patient called stating he feels his pressure is too high and would like it turned back down. Patient states he does have the shingles and has been in a lot more pain and has had a hard time consistently wearing it. Download scanned in under media.

## 2021-03-24 NOTE — TELEPHONE ENCOUNTER
Pt on VM he's at the end of the valtrex 1 g tablets pt states the shingles rash has NOT changed still painful and irritating asking if he should have additional medication to [facility]. Pt would like a call back with advice.

## 2021-03-24 NOTE — TELEPHONE ENCOUNTER
Pt notified and voiced understanding. YES, he needs something for pain. If no call back pt will check with pharmacy after 1 pm today.

## 2021-03-29 ENCOUNTER — TELEPHONE (OUTPATIENT)
Dept: FAMILY MEDICINE CLINIC | Age: 66
End: 2021-03-29

## 2021-03-29 ENCOUNTER — HOSPITAL ENCOUNTER (EMERGENCY)
Age: 66
Discharge: HOME | End: 2021-03-29
Attending: EMERGENCY MEDICINE
Payer: MEDICARE

## 2021-03-29 VITALS
DIASTOLIC BLOOD PRESSURE: 86 MMHG | BODY MASS INDEX: 29.54 KG/M2 | WEIGHT: 211 LBS | RESPIRATION RATE: 18 BRPM | TEMPERATURE: 97.9 F | OXYGEN SATURATION: 98 % | SYSTOLIC BLOOD PRESSURE: 141 MMHG | HEIGHT: 71 IN | HEART RATE: 73 BPM

## 2021-03-29 DIAGNOSIS — K22.6 MALLORY-WEISS SYNDROME: ICD-10-CM

## 2021-03-29 DIAGNOSIS — K92.0 HEMATEMESIS, PRESENCE OF NAUSEA NOT SPECIFIED: Primary | ICD-10-CM

## 2021-03-29 LAB
ALBUMIN SERPL-MCNC: 3.7 G/DL (ref 3.5–5.1)
ALP BLD-CCNC: 103 U/L (ref 38–126)
ALT SERPL-CCNC: 20 U/L (ref 11–66)
ANION GAP SERPL CALCULATED.3IONS-SCNC: 8 MEQ/L (ref 8–16)
AST SERPL-CCNC: 19 U/L (ref 5–40)
BASOPHILS # BLD: 0.5 %
BASOPHILS ABSOLUTE: 0 THOU/MM3 (ref 0–0.1)
BILIRUB SERPL-MCNC: 0.4 MG/DL (ref 0.3–1.2)
BUN BLDV-MCNC: 15 MG/DL (ref 7–22)
CALCIUM SERPL-MCNC: 8.7 MG/DL (ref 8.5–10.5)
CHLORIDE BLD-SCNC: 100 MEQ/L (ref 98–111)
CO2: 27 MEQ/L (ref 23–33)
CREAT SERPL-MCNC: 1 MG/DL (ref 0.4–1.2)
EOSINOPHIL # BLD: 2.3 %
EOSINOPHILS ABSOLUTE: 0.1 THOU/MM3 (ref 0–0.4)
ERYTHROCYTE [DISTWIDTH] IN BLOOD BY AUTOMATED COUNT: 14.1 % (ref 11.5–14.5)
ERYTHROCYTE [DISTWIDTH] IN BLOOD BY AUTOMATED COUNT: 46.6 FL (ref 35–45)
GFR SERPL CREATININE-BSD FRML MDRD: 75 ML/MIN/1.73M2
GLUCOSE BLD-MCNC: 131 MG/DL (ref 70–108)
HCT VFR BLD CALC: 41 % (ref 42–52)
HEMOGLOBIN: 13.5 GM/DL (ref 14–18)
IMMATURE GRANS (ABS): 0.02 THOU/MM3 (ref 0–0.07)
IMMATURE GRANULOCYTES: 0.3 %
LIPASE: 35 U/L (ref 5.6–51.3)
LYMPHOCYTES # BLD: 31.8 %
LYMPHOCYTES ABSOLUTE: 2 THOU/MM3 (ref 1–4.8)
MCH RBC QN AUTO: 30.1 PG (ref 26–33)
MCHC RBC AUTO-ENTMCNC: 32.9 GM/DL (ref 32.2–35.5)
MCV RBC AUTO: 91.3 FL (ref 80–94)
MONOCYTES # BLD: 9.4 %
MONOCYTES ABSOLUTE: 0.6 THOU/MM3 (ref 0.4–1.3)
NUCLEATED RED BLOOD CELLS: 0 /100 WBC
OSMOLALITY CALCULATION: 272.7 MOSMOL/KG (ref 275–300)
PLATELET # BLD: 260 THOU/MM3 (ref 130–400)
PMV BLD AUTO: 8.4 FL (ref 9.4–12.4)
POTASSIUM REFLEX MAGNESIUM: 4 MEQ/L (ref 3.5–5.2)
RBC # BLD: 4.49 MILL/MM3 (ref 4.7–6.1)
SEG NEUTROPHILS: 55.7 %
SEGMENTED NEUTROPHILS ABSOLUTE COUNT: 3.6 THOU/MM3 (ref 1.8–7.7)
SODIUM BLD-SCNC: 135 MEQ/L (ref 135–145)
TOTAL PROTEIN: 7.6 G/DL (ref 6.1–8)
WBC # BLD: 6.4 THOU/MM3 (ref 4.8–10.8)

## 2021-03-29 PROCEDURE — 83690 ASSAY OF LIPASE: CPT

## 2021-03-29 PROCEDURE — 99282 EMERGENCY DEPT VISIT SF MDM: CPT

## 2021-03-29 PROCEDURE — 85025 COMPLETE CBC W/AUTO DIFF WBC: CPT

## 2021-03-29 PROCEDURE — 36415 COLL VENOUS BLD VENIPUNCTURE: CPT

## 2021-03-29 PROCEDURE — 80053 COMPREHEN METABOLIC PANEL: CPT

## 2021-03-29 RX ORDER — METOCLOPRAMIDE 10 MG/1
10 TABLET ORAL 4 TIMES DAILY
Qty: 30 TABLET | Refills: 0 | Status: ON HOLD | OUTPATIENT
Start: 2021-03-29 | End: 2021-10-22 | Stop reason: HOSPADM

## 2021-03-29 ASSESSMENT — ENCOUNTER SYMPTOMS
BACK PAIN: 0
DIARRHEA: 0
STRIDOR: 0
SORE THROAT: 0
COUGH: 0
EYE REDNESS: 0
WHEEZING: 0
RHINORRHEA: 0
NAUSEA: 1
SHORTNESS OF BREATH: 0
CONSTIPATION: 0
PHOTOPHOBIA: 0
VOMITING: 1
EYE PAIN: 0
EYE DISCHARGE: 0
EYE ITCHING: 0
ABDOMINAL DISTENTION: 0
ABDOMINAL PAIN: 0
CHEST TIGHTNESS: 0

## 2021-03-29 ASSESSMENT — PAIN DESCRIPTION - LOCATION: LOCATION: BACK;ABDOMEN

## 2021-03-29 ASSESSMENT — PAIN DESCRIPTION - PAIN TYPE: TYPE: ACUTE PAIN

## 2021-03-29 ASSESSMENT — PAIN SCALES - GENERAL: PAINLEVEL_OUTOF10: 5

## 2021-03-29 NOTE — ED TRIAGE NOTES
Pt presents to the ED for vomiting blood that started today. Pt states he has had 2 episodes of bright red emesis. Pt states his pain is due to shingles on his back.

## 2021-03-29 NOTE — ED PROVIDER NOTES
[address] ENCOUNTER      PATIENT NAME: Elizabeth Herndon  MRN: [id number]  : 1955  PARR: 3/29/2021  PROVIDER: Valentino Ming, MD      CHIEF COMPLAINT       Chief Complaint   Patient presents with    Hematemesis       Nurses Notes reviewed and I agreeexcept as noted in the HPI. HISTORY OF PRESENT ILLNESS    Elizabeth Herndon is a 60 y.o. male who presents to Emergency Department with hematemesis. Past medical history remarkable for diabetes, gastroparesis, PE on Xarelto, chronic nausea vomiting, and GERD    Patient had multiple episodes of vomiting today and the last 2 episodes ened up with some fresh blood. Patient states the amount of blood was about half a cup each time. He has been having increasing epigastric burning sensation. No fever, no chills. No shortness of breath. No coffee-ground emesis. No abdominal distention. No melena, no hematochezia. This HPI was provided by the patient. REVIEW OF SYSTEMS     Review of Systems   Constitutional: Negative for activity change, appetite change, chills, fatigue, fever and unexpected weight change. HENT: Negative for congestion, ear discharge, ear pain, hearing loss, nosebleeds, rhinorrhea and sore throat. Eyes: Negative for photophobia, pain, discharge, redness and itching. Respiratory: Negative for cough, chest tightness, shortness of breath, wheezing and stridor. Cardiovascular: Negative for chest pain, palpitations and leg swelling. Gastrointestinal: Positive for nausea and vomiting. Negative for abdominal distention, abdominal pain, constipation and diarrhea. Hematemesis   Endocrine: Negative for cold intolerance, heat intolerance, polydipsia and polyphagia. Genitourinary: Negative for dysuria, flank pain, frequency and hematuria. Musculoskeletal: Negative for arthralgias, back pain, gait problem, myalgias, neck pain and neck stiffness. Skin: Negative for pallor, rash and wound. Allergic/Immunologic: Negative for environmental allergies and food allergies. Neurological: Negative for dizziness, tremors, syncope, weakness and headaches. Psychiatric/Behavioral: Negative for agitation, behavioral problems, confusion, self-injury, sleep disturbance and suicidal ideas.         PAST MEDICAL HISTORY     Past Medical History:   Diagnosis Date    Anxiety     CAD (coronary artery disease)     Depression     Diabetes mellitus [address]     Frequent headaches     Gastroparesis     GERD (gastroesophageal reflux disease)     History of Chiari malformation     Hyperlipidemia     Hypertension     Kidney stone     Kidney stone     Migraine     Movement disorder     DDD    Pulmonary emboli ([facility] 10/29/2018    Sleep apnea     Tattoos        SURGICAL HISTORY       Past Surgical History:   Procedure Laterality Date    BACK SURGERY  2008    C1 oates & decompression    BACK SURGERY  08/25/2020    [facility] Dr. Chloe Shepard, C5-7 cadaver patch   [facility]  2008    chiari    CARDIAC SURGERY  2007    heart cath    CHOLECYSTECTOMY  2009    COLONOSCOPY  2017    COLONOSCOPY N/A 3/12/2020    COLONOSCOPY POLYPECTOMY SNARE/COLD BIOPSY performed by Forrest Ugarte MD at [address] [facility], COLON, DIAGNOSTIC  2007   [facility]    right elbow    IVC FILTER REMOVAL  03/15/2020    [facility] [address]  2006    SHOULDER SURGERY Left 08/16/2018    TONSILLECTOMY  1964    UPPER GASTROINTESTINAL ENDOSCOPY Left 3/12/2020    EGD ESOPHAGOGASTRODUODENOSCOPY DILATATION performed by Forrest Ugarte MD at [facility] Endoscopy   [facility] ENDOSCOPY Left 3/12/2020    EGD BIOPSY performed by Forrest Ugarte MD at [address] [place] [address]  2015    right middle finger artery       CURRENT MEDICATIONS       Discharge Medication List as of 3/29/2021  6:02 PM      CONTINUE these medications which have NOT CHANGED    Details   amitriptyline (ELAVIL) 25 MG tablet Take 1 tablet by mouth nightly, Disp-30 tablet, R-0Normal      HYDROcodone-acetaminophen (NORCO) 5-325 MG per tablet Take 1 tablet by mouth every 6 hours as needed for Pain for up to 5 days. , Disp-20 tablet, R-0Normal      CPAP Machine MISC Starting Wed 3/24/2021, Disp-1 each, R-0, PrintPlease change bipap 11/8      insulin lispro, 1 Unit Dial, (HUMALOG KWIKPEN) 100 UNIT/ML SOPN Inject 7 Units into the skin 3 times daily (before meals), Disp-10 pen, R-3Normal      metFORMIN (GLUCOPHAGE) 500 MG tablet Take 1,000 mg by mouth 2 times dailyHistorical Med      albuterol sulfate HFA (VENTOLIN HFA) 108 (90 Base) MCG/ACT inhaler Inhale 2 puffs into the lungs every 4 hours as needed for Wheezing or Shortness of Breath, Disp-1 Inhaler, R-0Normal      lisinopril (PRINIVIL;ZESTRIL) 10 MG tablet Take 2 tablet by mouth once daily, Disp-30 tablet, R-2Adjust Sig      Cholecalciferol (VITAMIN D3) 125 MCG (5000 UT) TABS Take 5,000 Units by mouth dailyHistorical Med      XARELTO 10 MG TABS tablet Take 1 tablet by mouth once daily with breakfast, Disp-30 tablet,R-5Normal      Continuous Blood Gluc  (DEXCOM G6 ) YULI Use as directed for continuous glucose monitor. , Disp-1 Device,R-0Normal      Continuous Blood Gluc Transmit (DEXCOM G6 TRANSMITTER) MISC Change every 3 months- Use as directed for continuous glucose monitoring., Disp-1 each,R-3Normal      Continuous Blood Gluc Sensor (DEXCOM G6 SENSOR) MISC Change every 10 days-Use as directed for continuous glucose monitoring., Disp-1 each,R-11Normal      insulin glargine (LANTUS SOLOSTAR) 100 UNIT/ML injection pen Inject 21 Units into the skin 2 times daily, Disp-15 pen,R-3Normal      cyclobenzaprine (FLEXERIL) 5 MG tablet Take 5 mg by mouth as neededHistorical Med      pravastatin (PRAVACHOL) 80 MG tablet Take 1 tablet by mouth daily, Disp-90 tablet,R-1Normal      Misc. Devices (ROLLATOR ULTRA-LIGHT) MISC Disp-1 each,R-0, PrintUse Rollator for stability with ambulation. omeprazole (PRILOSEC) 40 MG delayed release capsule Take 1 capsule by mouth once daily, Disp-90 capsule,R-2Normal      Blood Glucose Monitoring Suppl (ONE TOUCH ULTRA 2) w/Device KIT DAILY Starting u 2020, Disp-1 kit, R-0, Normal      blood glucose monitor strips One Touch Ultra Test Strips. Test 2 times a day and as needed for diabetic symptoms. DX: E11.22, Disp-300 strip, R-5, Normal      acyclovir (ZOVIRAX) 800 MG tablet TAKE 1 TABLET BY MOUTH AS NEEDED (HERPES LABIALIS)Historical Med      sertraline (ZOLOFT) 25 MG tablet Take 100 mg by mouth daily Historical Med      nitroGLYCERIN (NITROSTAT) 0.4 MG SL tablet Place 1 tablet under the tongue every 5 minutes as needed for Chest pain, Disp-25 tablet, R-3      aspirin 81 MG tablet Take 81 mg by mouth daily             ALLERGIES     Latex    FAMILY HISTORY     He indicated that his mother is . He indicated that his father is . He indicated that his sister is . He indicated that his brother is . family history includes Cancer in his father; Diabetes in his brother, father, mother, and sister; Heart Disease in his mother; High Blood Pressure in his brother, father, mother, and sister; Kidney Disease in his mother. SOCIAL HISTORY      reports that he quit smoking about 41 years ago. His smoking use included cigarettes. He started smoking about 44 years ago. He has a 4.50 pack-year smoking history. He has never used smokeless tobacco. He reports that he does not drink alcohol or use drugs. PHYSICAL EXAM     INITIAL VITALS:    height is 5' 11\" (1.803 m) and weight is 211 lb (95.7 kg). His oral temperature is 97.9 °F (36.6 °C). His blood pressure is 141/86 (abnormal) and his pulse is 73. His respiration is 18 and oxygen saturation is 98%. Physical Exam  Vitals signs and nursing note reviewed. Constitutional:       Appearance: He is well-developed. He is not diaphoretic. HENT:      Head: Normocephalic and atraumatic. Nose: Nose normal.   Eyes:      General: No scleral icterus. Right eye: No discharge. Left eye: No discharge. Conjunctiva/sclera: Conjunctivae normal.      Pupils: Pupils are equal, round, and reactive to light. Neck:      Musculoskeletal: Normal range of motion and neck supple. Vascular: No JVD. Trachea: No tracheal deviation. Cardiovascular:      Rate and Rhythm: Normal rate and regular rhythm. Heart sounds: Normal heart sounds. No murmur. No friction rub. No gallop. Pulmonary:      Effort: Pulmonary effort is normal. No respiratory distress. Breath sounds: Normal breath sounds. No stridor. No wheezing or rales. Chest:      Chest wall: No tenderness. Abdominal:      General: Bowel sounds are normal. There is no distension. Palpations: Abdomen is soft. There is no mass. Tenderness: There is abdominal tenderness. There is no guarding or rebound. Hernia: No hernia is present. Comments: Mild epigastric tenderness   Musculoskeletal:         General: No tenderness or deformity. Lymphadenopathy:      Cervical: No cervical adenopathy. Skin:     General: Skin is warm and dry. Capillary Refill: Capillary refill takes less than 2 seconds. Coloration: Skin is not pale. Findings: No erythema or rash. Neurological:      Mental Status: He is alert and oriented to person, place, and time. Cranial Nerves: No cranial nerve deficit. Sensory: No sensory deficit. Motor: No abnormal muscle tone. Coordination: Coordination normal.      Deep Tendon Reflexes: Reflexes normal.   Psychiatric:         Behavior: Behavior normal.         Thought Content: Thought content normal.         Judgment: Judgment normal.         MEDICAL DEDISION MAKINGS:     4:12 PM: Patient is seen and evaluated in a timely fashion. ED nurse's documentations are reviewed.     DIFFERENTIAL DIAGNOSIS:  GERD, Paris-Castro syndrome, upper GI bleeding, peptic ulcer    EKG: Interpreted by ED physician  Not indicated    LAB RESULTS:  Results for orders placed or performed during the hospital encounter of 03/29/21   CBC auto differential   Result Value Ref Range    WBC 6.4 4.8 - 10.8 thou/mm3    RBC 4.49 (L) 4.70 - 6.10 mill/mm3    Hemoglobin 13.5 (L) 14.0 - 18.0 gm/dl    Hematocrit 41.0 (L) 42.0 - 52.0 %    MCV 91.3 80.0 - 94.0 fL    MCH 30.1 26.0 - 33.0 pg    MCHC 32.9 32.2 - 35.5 gm/dl    RDW-CV 14.1 11.5 - 14.5 %    RDW-SD 46.6 (H) 35.0 - 45.0 fL    Platelets 229 806 - 150 thou/mm3    MPV 8.4 (L) 9.4 - 12.4 fL    Seg Neutrophils 55.7 %    Lymphocytes 31.8 %    Monocytes 9.4 %    Eosinophils 2.3 %    Basophils 0.5 %    Immature Granulocytes 0.3 %    Segs Absolute 3.6 1 - 7 thou/mm3    Lymphocytes Absolute 2.0 1.0 - 4.8 thou/mm3    Monocytes Absolute 0.6 0.4 - 1.3 thou/mm3    Eosinophils Absolute 0.1 0.0 - 0.4 thou/mm3    Basophils Absolute 0.0 0.0 - 0.1 thou/mm3    Immature Grans (Abs) 0.02 0.00 - 0.07 thou/mm3    nRBC 0 /100 wbc   Comprehensive Metabolic Panel w/ Reflex to MG   Result Value Ref Range    Glucose 131 (H) 70 - 108 mg/dL    CREATININE 1.0 0.4 - 1.2 mg/dL    BUN 15 7 - 22 mg/dL    Sodium 135 135 - 145 meq/L    Potassium reflex Magnesium 4.0 3.5 - 5.2 meq/L    Chloride 100 98 - 111 meq/L    CO2 27 23 - 33 meq/L    Calcium 8.7 8.5 - 10.5 mg/dL    AST 19 5 - 40 U/L    Alkaline Phosphatase 103 38 - 126 U/L    Total Protein 7.6 6.1 - 8.0 g/dL    Albumin 3.7 3.5 - 5.1 g/dL    Total Bilirubin 0.4 0.3 - 1.2 mg/dL    ALT 20 11 - 66 U/L   Lipase   Result Value Ref Range    Lipase 35.0 5.6 - 51.3 U/L   Anion Gap   Result Value Ref Range    Anion Gap 8.0 8.0 - 16.0 meq/L   Glomerular Filtration Rate, Estimated   Result Value Ref Range    Est, Glom Filt Rate 75 (A) ml/min/1.73m2   Osmolality   Result Value Ref Range    Osmolality Calc 272.7 (L) 275.0 - 300.0 mOsmol/kg       RADIOLOGY  No orders to display       RATIONALE:    Laboratory results were reassuring.

## 2021-03-29 NOTE — TELEPHONE ENCOUNTER
The patient called in and stated that when he woke up today he had stomach pain and nausea. He stated that he went to work and started throwing up. He stated that the 1st 3 times was just bile. Stated that the 4th and 5th time was just blood. He stated that each time it was about a 1/2 a cup of blood. Last emesis was at 10:30am.  He stated that he is home resting now. Please advise.

## 2021-03-30 ENCOUNTER — OFFICE VISIT (OUTPATIENT)
Dept: ENT CLINIC | Age: 66
End: 2021-03-30
Payer: MEDICARE

## 2021-03-30 VITALS
HEIGHT: 71 IN | RESPIRATION RATE: 14 BRPM | BODY MASS INDEX: 29.36 KG/M2 | WEIGHT: 209.7 LBS | TEMPERATURE: 97.1 F | DIASTOLIC BLOOD PRESSURE: 70 MMHG | HEART RATE: 64 BPM | SYSTOLIC BLOOD PRESSURE: 132 MMHG

## 2021-03-30 DIAGNOSIS — J38.5 LARYNGOSPASM: Primary | ICD-10-CM

## 2021-03-30 DIAGNOSIS — K31.84 GASTROPARESIS: ICD-10-CM

## 2021-03-30 DIAGNOSIS — K21.9 GASTROESOPHAGEAL REFLUX DISEASE, UNSPECIFIED WHETHER ESOPHAGITIS PRESENT: ICD-10-CM

## 2021-03-30 DIAGNOSIS — D11.9 WARTHIN'S TUMOR: ICD-10-CM

## 2021-03-30 PROCEDURE — 99214 OFFICE O/P EST MOD 30 MIN: CPT | Performed by: OTOLARYNGOLOGY

## 2021-03-30 ASSESSMENT — ENCOUNTER SYMPTOMS
FACIAL SWELLING: 0
CHEST TIGHTNESS: 0
VOMITING: 0
APNEA: 0
CHOKING: 0
COLOR CHANGE: 0
NAUSEA: 0
SINUS PRESSURE: 0
WHEEZING: 0
SHORTNESS OF BREATH: 0
ABDOMINAL PAIN: 0
COUGH: 0
SORE THROAT: 0
DIARRHEA: 0
VOICE CHANGE: 0
RHINORRHEA: 0
TROUBLE SWALLOWING: 0
STRIDOR: 0

## 2021-03-30 NOTE — LETTER
[address]  Alexa Randhawa [address] [facility]  Phone: [phone]  Fax: [address], [place]        April 17, 2021    Patient: Burgess Sorto   MR Number: [id number]   YOB: 1955   Date of Visit: 3/30/2021     Dear Dr. Bayron Lowry,    Thank you for the request for consultation for Burgess Sorto to me for the evaluation of. Below are the relevant portions of my assessment and plan of care. Assessment & Plan   Diagnoses and all orders for this visit:     Diagnosis Orders   1. Laryngospasm     2. Gastroesophageal reflux disease, unspecified whether esophagitis present     3. Gastroparesis     4. Warthin's tumor left parotid      Enlarging       The findings were explained and his questions were answered. The Warthin's tumor is relatively asymptomatic. His biggest problem is his laryngospasm which is probably aggravated by severe GERD. Suggested she simply keep his appointment with his gastroenterologist and come back to see us in a few weeks. We can deal with the parotid later. Call Dr. Ashly Shrestha and his office will make an appointment for him ASAP. Return visit 2 months regarding parotid mass        If you have questions, please do not hesitate to call me. I look forward to following Erik Singh along with you.     Sincerely,          Merly Prabhakar MD

## 2021-03-30 NOTE — PROGRESS NOTES
[address] EAR, NOSE AND THROAT   Kevin [place] [address]  Dept: [phone]  Dept Fax: [phone]  Loc: [phone]    Kyler Cano is a 60 y.o. male who was referred Gerhardt Dare, PA-C for:  Chief Complaint   Patient presents with   Alba Mons Mass     Patient is here for choking sensation,parotid mass   . HPI:     Kyler Cano is a 60 y.o. male who presents today for evaluation of a mass in his parotid gland and frequent choking sensations. Patient describes episodes where his throat closes and he cannot move any air in and out for a few seconds. It is quite frightening. He has a significant history for GERD. He has a history of gastroparesis. He has appointment coming up with Dr. Zach Yoon. Went to the emergency room last night because of the laryngospasm. He was simply given Reglan. We have been following his parotid mass. He was initially seen by Dr. Francine Larose. This is a Warthin's tumor. It previously measured a couple of centimeters. History:      Allergies   Allergen Reactions    Latex Rash     Current Outpatient Medications   Medication Sig Dispense Refill    metoclopramide (REGLAN) 10 MG tablet Take 1 tablet by mouth 4 times daily 30 tablet 0    amitriptyline (ELAVIL) 25 MG tablet Take 1 tablet by mouth nightly 30 tablet 0    CPAP Machine MISC by Does not apply route Please change bipap 11/8 1 each 0    insulin lispro, 1 Unit Dial, (HUMALOG KWIKPEN) 100 UNIT/ML SOPN Inject 7 Units into the skin 3 times daily (before meals) 10 pen 3    metFORMIN (GLUCOPHAGE) 500 MG tablet Take 1,000 mg by mouth 2 times daily      albuterol sulfate HFA (VENTOLIN HFA) 108 (90 Base) MCG/ACT inhaler Inhale 2 puffs into the lungs every 4 hours as needed for Wheezing or Shortness of Breath 1 Inhaler 0    lisinopril (PRINIVIL;ZESTRIL) 10 MG tablet Take 2 tablet by mouth once daily 30 tablet 2    Cholecalciferol (VITAMIN D3) 125 MCG (5000 UT) TABS Take 5,000 Units by mouth daily      XARELTO 10 MG TABS tablet Take 1 tablet by mouth once daily with breakfast 30 tablet 5    Continuous Blood Gluc  (DEXCOM G6 ) YULI Use as directed for continuous glucose monitor. 1 Device 0    Continuous Blood Gluc Transmit (DEXCOM G6 TRANSMITTER) MISC Change every 3 months- Use as directed for continuous glucose monitoring. 1 each 3    Continuous Blood Gluc Sensor (DEXCOM G6 SENSOR) MISC Change every 10 days-Use as directed for continuous glucose monitoring. 1 each 11    insulin glargine (LANTUS SOLOSTAR) 100 UNIT/ML injection pen Inject 21 Units into the skin 2 times daily (Patient taking differently: Inject 20 Units into the skin 2 times daily ) 15 pen 3    cyclobenzaprine (FLEXERIL) 5 MG tablet Take 5 mg by mouth as needed      pravastatin (PRAVACHOL) 80 MG tablet Take 1 tablet by mouth daily 90 tablet 1    Misc. Devices (ROLLATOR ULTRA-LIGHT) MISC Use Rollator for stability with ambulation. 1 each 0    omeprazole (PRILOSEC) 40 MG delayed release capsule Take 1 capsule by mouth once daily 90 capsule 2    Blood Glucose Monitoring Suppl (ONE TOUCH ULTRA 2) w/Device KIT 1 kit by Does not apply route daily 1 kit 0    blood glucose monitor strips One Touch Ultra Test Strips. Test 2 times a day and as needed for diabetic symptoms. DX: E11.22 300 strip 5    sertraline (ZOLOFT) 25 MG tablet Take 100 mg by mouth daily       nitroGLYCERIN (NITROSTAT) 0.4 MG SL tablet Place 1 tablet under the tongue every 5 minutes as needed for Chest pain 25 tablet 3    aspirin 81 MG tablet Take 81 mg by mouth daily      HYDROcodone-acetaminophen (NORCO) 5-325 MG per tablet Take 1 tablet by mouth nightly as needed for Pain.  ondansetron (ZOFRAN ODT) 4 MG disintegrating tablet Take 1 tablet by mouth every 8 hours as needed for Nausea or Vomiting 30 tablet 3     No current facility-administered medications for this visit.       Past Medical History: Diagnosis Date    Anxiety     CAD (coronary artery disease)     Depression     Diabetes mellitus ([address]     Frequent headaches     Gastroparesis     GERD (gastroesophageal reflux disease)     History of Chiari malformation     Hyperlipidemia     Hypertension     Kidney stone     Kidney stone     Migraine     Movement disorder     DDD    Pulmonary emboli ([facility] 10/29/2018    Shingles     Sleep apnea     Tattoos       Past Surgical History:   Procedure Laterality Date    BACK SURGERY  2008    C1 oates & decompression    BACK SURGERY  08/25/2020    [facility] Dr. Matt Jain, C5-7 cadaver patch   [facility]  2008    chiari    CARDIAC SURGERY  2007    heart cath    CHOLECYSTECTOMY  2009    COLONOSCOPY  2017    COLONOSCOPY N/A 3/12/2020    COLONOSCOPY POLYPECTOMY SNARE/COLD BIOPSY performed by Merlinda Sers, MD at [facility]    ENDOSCOPY, COLON, DIAGNOSTIC  2007   [id number]    right elbow    IVC FILTER REMOVAL  03/15/2020    [facility] [address]  2006    SHOULDER SURGERY Left 08/16/2018    TONSILLECTOMY  1964    UPPER GASTROINTESTINAL ENDOSCOPY Left 3/12/2020    EGD ESOPHAGOGASTRODUODENOSCOPY DILATATION [facility]    UPPER GASTROINTESTINAL ENDOSCOPY Left 3/12/2020    EGD BIOPSY performed by Merlinda Sers, MD at [address] [place] [address]  2015    right middle finger artery     Family History   Problem Relation Age of Onset    Kidney Disease Mother     Diabetes Mother     Heart Disease Mother     High Blood Pressure Mother     Diabetes Father     Cancer Father         lung--smoker    High Blood Pressure Father     Diabetes Sister     High Blood Pressure Sister     Diabetes Brother     High Blood Pressure Brother      Social History     Tobacco Use    Smoking status: Former Smoker     Packs/day: 1.50     Years: 3.00     Pack years: 4.50     Types: Cigarettes     Start date: 12/12/1976     Quit date: 10/12/1979     Years since quittin.5    Smokeless tobacco: Never Used   Substance Use Topics    Alcohol use: No       Subjective:      Review of Systems   Constitutional: Negative for activity change, appetite change, chills, diaphoresis, fatigue, fever and unexpected weight change. HENT: Negative for congestion, dental problem, ear discharge, ear pain, facial swelling, hearing loss, mouth sores, nosebleeds, postnasal drip, rhinorrhea, sinus pressure, sneezing, sore throat, tinnitus, trouble swallowing and voice change. Eyes: Negative for visual disturbance. Respiratory: Negative for apnea, cough, choking, chest tightness, shortness of breath, wheezing and stridor. Cardiovascular: Negative for chest pain, palpitations and leg swelling. Gastrointestinal: Negative for abdominal pain, diarrhea, nausea and vomiting. Endocrine: Negative for cold intolerance, heat intolerance, polydipsia and polyuria. Genitourinary: Negative for dysuria, enuresis and hematuria. Musculoskeletal: Negative for arthralgias, gait problem, neck pain and neck stiffness. Skin: Negative for color change and rash. Allergic/Immunologic: Negative for environmental allergies, food allergies and immunocompromised state. Neurological: Negative for dizziness, syncope, facial asymmetry, speech difficulty, light-headedness and headaches. Hematological: Negative for adenopathy. Does not bruise/bleed easily. Psychiatric/Behavioral: Negative for confusion and sleep disturbance. The patient is not nervous/anxious.         Objective:   /70 (Site: Left Upper Arm, Position: Sitting)   Pulse 64   Temp 97.1 °F (36.2 °C) (Infrared)   Resp 14   Ht 5' 11\" (1.803 m)   Wt 209 lb 11.2 oz (95.1 kg)   BMI 29.25 kg/m²     Physical Exam   4 cm left parotid mass  Normal voice no stridor    Data:  All of the past medical history, past surgical history, family history,social history, allergies and current medications were reviewed with the patient. Assessment & Plan   Diagnoses and all orders for this visit:     Diagnosis Orders   1. Laryngospasm     2. Gastroesophageal reflux disease, unspecified whether esophagitis present     3. Gastroparesis     4. Warthin's tumor left parotid      Enlarging       The findings were explained and his questions were answered. The Warthin's tumor is relatively asymptomatic. His biggest problem is his laryngospasm which is probably aggravated by severe GERD. Suggested she simply keep his appointment with his gastroenterologist and come back to see us in a few weeks. We can deal with the parotid later. Call Dr. Arvil Simmonds and his office will make an appointment for him ASAP. Return visit 2 months regarding parotid mass         Rojelio Fournier. Renny Giraldo MD    **This report has been created using voice recognition software. It may contain minor errors which are inherent in voicerecognition technology. **

## 2021-04-01 ENCOUNTER — CARE COORDINATION (OUTPATIENT)
Dept: CARE COORDINATION | Age: 66
End: 2021-04-01

## 2021-04-01 SDOH — SOCIAL STABILITY: SOCIAL NETWORK
DO YOU BELONG TO ANY CLUBS OR ORGANIZATIONS SUCH AS CHURCH GROUPS UNIONS, FRATERNAL OR ATHLETIC GROUPS, OR SCHOOL GROUPS?: NO

## 2021-04-01 SDOH — SOCIAL STABILITY: SOCIAL NETWORK: HOW OFTEN DO YOU ATTEND CHURCH OR RELIGIOUS SERVICES?: MORE THAN 4 TIMES PER YEAR

## 2021-04-01 NOTE — CARE COORDINATION
Ambulatory Care Coordination Note  4/1/2021  CM Risk Score: 3  Charlson 10 Year Mortality Risk Score: 23%     ACC: Alma Rosa Zayas, RN    Summary Note: Lambert Salazar was referred to care coordination following an ED visit for hematemesis. Spoke with pt today for f/u. Introduced self and role. Agreeable to f/u calls. Pt reports that he has not had any hematemesis since Monday. Was seen by GI today. Plan is for upcoming EGD in next couple of wks. Pt has h/o Gastroparesis, DM, chiari malformation, PE, parotid mass. Lambert Salazar recently dx with shingles. Reports area to back is drying up and going away at present time. Continues to have pain in back/ribs. Chiari malformation-feels this has started to affect his breathing. Had recent appt with PULM. Has PAP and reports compliance. Plans on getting connected with neuro specialist in [place] or [address] [place] [address]. Parotid mass-was referred to ENT. Saw Dr. Peter Green. Will f/u once EGD completed. DM: A1C 7.1. Has Dexcom. Monitors BS's closely. Diet is struggle as pt has gastroparesis. Reports BS's fluctuate but majority are 200 or below. S/p ACDF 1 yr ago  Reports that he has had relief since the surgery. Had IVC filter placed prior to back surgery last year. Reports was removed last wk. Remains on Xarelto  Plan of care:  Continue f/u with GI. Monitor for more bleeding. Call should any present  Continue using Dexcom. Monitor BS's closely  Arrange appt with neuro specialist.  Pt plans on researching both facilities and making his determination following that  Continue f/u with ENT  Call with new questions or concerns. Diabetes Assessment    Medic Alert ID: No  Meal Planning: Avoidance of concentrated sweets   How often do you test your blood sugar?: Meals   Do you have barriers with adherence to non-pharmacologic self-management interventions?  (Nutrition/Exercise/Self-Monitoring): No   Have you ever had to go to the ED for symptoms of low blood sugar?: No       Do you have hyperglycemia symptoms?: No   Do you have hypoglycemia symptoms?: No   Blood Sugar Monitoring Regimen: Before Meals   Blood Sugar Trends: Fluctuating      ,   Congestive Heart Failure Assessment    Are you currently restricting fluids?: No Restriction  Do you understand a low sodium diet?: Yes  Do you understand how to read food labels?: Yes  How many restaurant meals do you eat per week?: 1-2  Do you salt your food before tasting it?: No         Symptoms:  CHF associated angina: Neg, CHF associated dyspnea on exertion: Pos, CHF associated fatigue: Neg, CHF associated leg swelling: Neg, CHF associated orthostatic hypotension: Neg, CHF associated PND: Neg, CHF associated shortness of breath: Neg, CHF associated weakness: Neg      Symptom course: stable  Salt intake watch compared to last visit: stable      and Charlson Mortality Risk Score: 23%       . Ambulatory Care Coordination Assessment    Care Coordination Protocol  Program Enrollment: Complex Care  Referral from Primary Care Provider: No  Week 1 - Initial Assessment     Do you have all of your prescriptions and are they filled?: Yes  Barriers to medication adherence: None  Are you able to afford your medications?: Yes  How often do you have trouble taking your medications the way you have been told to take them?: Sometimes I take them as prescribed. Do you have Home O2 Therapy?: No      Ability to seek help/take action for Emergent Urgent situations i.e. fire, crime, inclement weather or health crisis. : Independent  Ability to ambulate to restroom: Independent  Ability handle personal hygeine needs (bathing/dressing/grooming): Independent  Ability to manage Medications: Independent  Ability to prepare Food Preparation: Independent  Ability to maintain home (clean home, laundry): Independent  Ability to drive and/or has transportation: Independent  Ability to do shopping: Independent  Ability to manage finances:  Independent  Is patient able to live independently?: Yes     Current Housing: Private Residence           Do you use rails/bars?: No  Do you have a non-slip tub mat?: Yes     Are you experiencing loss of meaning?: No  Are you experiencing loss of hope and peace?: No     Suggested Interventions and Community Resources   Zone Management Tools: In Process         Set up/Review an Education Plan, Set up/Review Goals              Prior to Admission medications    Medication Sig Start Date End Date Taking? Authorizing Provider   ondansetron (ZOFRAN ODT) 4 MG disintegrating tablet Take 1 tablet by mouth every 8 hours as needed for Nausea or Vomiting 4/1/21  Yes RAFAEL Arciniega - CNP   metoclopramide (REGLAN) 10 MG tablet Take 1 tablet by mouth 4 times daily 3/29/21  Yes Wilton Zarco MD   omeprazole (PRILOSEC) 40 MG delayed release capsule Take 1 capsule by mouth once daily 7/24/20  Yes Ruddy Pablo MD   HYDROcodone-acetaminophen (NORCO) 5-325 MG per tablet Take 1 tablet by mouth nightly as needed for Pain.     Historical Provider, MD   amitriptyline (ELAVIL) 25 MG tablet Take 1 tablet by mouth nightly 3/24/21   Adyromain Gutierrez, DO   CPAP Machine MISC by Does not apply route Please change bipap 11/8 3/24/21   Delmer Kayser, PA-C   insulin lispro, 1 Unit Dial, ([facility]) 100 UNIT/ML SOPN Inject 7 Units into the skin 3 times daily (before meals) 3/12/21 3/7/22  Ady Bulverde, DO   metFORMIN (GLUCOPHAGE) 500 MG tablet Take 1,000 mg by mouth 2 times daily    Historical Provider, MD   albuterol sulfate HFA (VENTOLIN HFA) 108 (90 Base) MCG/ACT inhaler Inhale 2 puffs into the lungs every 4 hours as needed for Wheezing or Shortness of Breath 2/2/21   Ady Bulverde, DO   lisinopril (PRINIVIL;ZESTRIL) 10 MG tablet Take 2 tablet by mouth once daily 1/25/21   Ady Bulverde, DO   Cholecalciferol (VITAMIN D3) 125 MCG (5000 UT) TABS Take 5,000 Units by mouth daily    Historical Provider, MD   XARELTO 10 MG TABS tablet Take 1 tablet by mouth once daily with breakfast 10/9/20   RAFAEL Forbes CNP   Continuous Blood Gluc  ([place]) YULI Use as directed for continuous glucose monitor. 10/8/20   Velda Lanes, APRN - CNP   Continuous Blood Gluc Transmit (DEXCOM G6 TRANSMITTER) MISC Change every 3 months- Use as directed for continuous glucose monitoring. 10/8/20   Vandana Lanes, APRN - CNP   Continuous Blood Gluc Sensor (DEXCOM G6 SENSOR) MISC Change every 10 days-Use as directed for continuous glucose monitoring. 10/8/20   Velda Lanes, APRN - CNP   insulin glargine (LANTUS SOLOSTAR) 100 UNIT/ML injection pen Inject 21 Units into the skin 2 times daily  Patient taking differently: Inject 20 Units into the skin 2 times daily  10/5/20   Ami Cardona MD   cyclobenzaprine (FLEXERIL) 5 MG tablet Take 5 mg by mouth as needed 8/26/20   Historical Provider, MD   pravastatin (PRAVACHOL) 80 MG tablet Take 1 tablet by mouth daily 8/4/20   Ami Cardona MD   Misc. Devices (ROLLATOR ULTRA-LIGHT) MISC Use Rollator for stability with ambulation. 7/28/20   Ami Cardona MD   Blood Glucose Monitoring Suppl (ONE TOUCH ULTRA 2) w/Device KIT 1 kit by Does not apply route daily 6/25/20   Ami Cardona MD   blood glucose monitor strips One Touch Ultra Test Strips. Test 2 times a day and as needed for diabetic symptoms.  DX: E11.22 4/3/20   Ami Cardona MD   sertraline (ZOLOFT) 25 MG tablet Take 100 mg by mouth daily     Historical Provider, MD   nitroGLYCERIN (NITROSTAT) 0.4 MG SL tablet Place 1 tablet under the tongue every 5 minutes as needed for Chest pain 5/21/16   Marva Pope MD   aspirin 81 MG tablet Take 81 mg by mouth daily    Historical Provider, MD       Future Appointments   Date Time Provider Hailey Brown   4/15/2021  7:15 AM Orville Fulton MD [facility]   6/2/2021  1:30 PM Amabr Love MD [facility] Physic [id number]   6/2/2021  3:15 PM Daniel Stone   8/4/2021  8:00 AM Randal Luque, [facility]   11/3/2021  1:15 PM [facility] [place] [address]

## 2021-04-08 ENCOUNTER — HOSPITAL ENCOUNTER (OUTPATIENT)
Age: 66
Discharge: HOME | End: 2021-04-08
Payer: MEDICARE

## 2021-04-08 PROCEDURE — U0003 INFECTIOUS AGENT DETECTION BY NUCLEIC ACID (DNA OR RNA); SEVERE ACUTE RESPIRATORY SYNDROME CORONAVIRUS 2 (SARS-COV-2) (CORONAVIRUS DISEASE [COVID-19]), AMPLIFIED PROBE TECHNIQUE, MAKING USE OF HIGH THROUGHPUT TECHNOLOGIES AS DESCRIBED BY CMS-2020-01-R: HCPCS

## 2021-04-08 PROCEDURE — U0005 INFEC AGEN DETEC AMPLI PROBE: HCPCS

## 2021-04-09 ENCOUNTER — CARE COORDINATION (OUTPATIENT)
Dept: CARE COORDINATION | Age: 66
End: 2021-04-09

## 2021-04-09 LAB
SARS-COV-2: NOT DETECTED
SOURCE: NORMAL

## 2021-04-09 NOTE — CARE COORDINATION
Restriction  Do you understand a low sodium diet?: Yes  Do you understand how to read food labels?: Yes  How many restaurant meals do you eat per week?: 1-2  Do you salt your food before tasting it?: No         Symptoms:  CHF associated angina: Neg, CHF associated dyspnea on exertion: Pos, CHF associated fatigue: Neg, CHF associated leg swelling: Neg, CHF associated orthostatic hypotension: Neg, CHF associated PND: Neg, CHF associated shortness of breath: Neg, CHF associated weakness: Neg      Symptom course: stable  Salt intake watch compared to last visit: stable      and   General Assessment                 Care Coordination Interventions    Program Enrollment: Complex Care  Referral from Primary Care Provider: No  Suggested Interventions and Community Resources  Disease Specific Clinic: Completed (Comment: DM clinic)  Zone Management Tools: Completed (Comment: DM, CHF)         Goals Addressed                 This Visit's Progress     check blood usgars before each meal and bedtime   On track     Conditions and Symptoms   On track     I will schedule office visits, as directed by my provider. I will keep my appointment or reschedule if I have to cancel. I will notify my provider of any barriers to my plan of care. I will follow my Zone Management tool to seek urgent or emergent care. I will notify my provider of any symptoms that indicate a worsening of my condition. Barriers: overwhelmed by complexity of regimen and stress  Plan for overcoming my barriers: support and education from [facility], PCP, Specialists. Confidence: 9/10  Anticipated Goal Completion Date: 7/1/21              Prior to Admission medications    Medication Sig Start Date End Date Taking? Authorizing Provider   HYDROcodone-acetaminophen (NORCO) 5-325 MG per tablet Take 1 tablet by mouth nightly as needed for Pain.     Historical Provider, MD   ondansetron (ZOFRAN ODT) 4 MG disintegrating tablet Take 1 tablet by mouth every 8 hours as needed for Nausea or Vomiting 4/1/21   RAFAEL Arciniega CNP   metoclopramide (REGLAN) 10 MG tablet Take 1 tablet by mouth 4 times daily 3/29/21   Wilton Zarco MD   amitriptyline (ELAVIL) 25 MG tablet Take 1 tablet by mouth nightly 3/24/21   Ady Elcho, DO   CPAP Machine MISC by Does not apply route Please change bipap 11/8 3/24/21   Delmer Kayser, PA-C   insulin lispro, 1 Unit Dial, ([facility] 100 UNIT/ML SOPN Inject 7 Units into the skin 3 times daily (before meals) 3/12/21 3/7/22  Ady Elcho, DO   metFORMIN (GLUCOPHAGE) 500 MG tablet Take 1,000 mg by mouth 2 times daily    Historical Provider, MD   albuterol sulfate HFA (VENTOLIN HFA) 108 (90 Base) MCG/ACT inhaler Inhale 2 puffs into the lungs every 4 hours as needed for Wheezing or Shortness of Breath 2/2/21   Ady Elcho, DO   lisinopril (PRINIVIL;ZESTRIL) 10 MG tablet Take 2 tablet by mouth once daily 1/25/21   Ady Elcho, DO   Cholecalciferol (VITAMIN D3) 125 MCG (5000 UT) TABS Take 5,000 Units by mouth daily    Historical Provider, MD   XARELTO 10 MG TABS tablet Take 1 tablet by mouth once daily with breakfast 10/9/20   RAFAEL Frey CNP   Continuous Blood Gluc  (DEXCOM G6 ) YULI Use as directed for continuous glucose monitor. 10/8/20   RAFAEL Spear CNP   Continuous Blood Gluc Transmit (DEXCOM G6 TRANSMITTER) MISC Change every 3 months- Use as directed for continuous glucose monitoring. 10/8/20   RAFAEL Spear CNP   Continuous Blood Gluc Sensor (DEXCOM G6 SENSOR) MISC Change every 10 days-Use as directed for continuous glucose monitoring.  10/8/20   RAFAEL Spear CNP   insulin glargine (LANTUS SOLOSTAR) 100 UNIT/ML injection pen Inject 21 Units into the skin 2 times daily  Patient taking differently: Inject 20 Units into the skin 2 times daily  10/5/20   Ruddy Pablo MD   cyclobenzaprine (FLEXERIL) 5 MG tablet Take 5 mg by mouth as needed 8/26/20 Historical Provider, MD   pravastatin (PRAVACHOL) 80 MG tablet Take 1 tablet by mouth daily 8/4/20   Dirk Gonsales MD   [facility]. Devices (ROLLATOR ULTRA-LIGHT) MISC Use Rollator for stability with ambulation. 7/28/20   Dirk Gonsales MD   omeprazole (PRILOSEC) 40 MG delayed release capsule Take 1 capsule by mouth once daily 7/24/20   Dirk Gonsales MD   Blood Glucose Monitoring Suppl (ONE TOUCH ULTRA 2) w/Device KIT 1 kit by Does not apply route daily 6/25/20   Dirk Gonsales MD   blood glucose monitor strips One Touch Ultra Test Strips. Test 2 times a day and as needed for diabetic symptoms.  DX: E11.22 4/3/20   Dirk Gonsales MD   sertraline (ZOLOFT) 25 MG tablet Take 100 mg by mouth daily     Historical Provider, MD   nitroGLYCERIN (NITROSTAT) 0.4 MG SL tablet Place 1 tablet under the tongue every 5 minutes as needed for Chest pain 5/21/16   Velia Tadeo MD   aspirin 81 MG tablet Take 81 mg by mouth daily    Historical Provider, MD       Future Appointments   Date Time Provider Haliey Brown   4/15/2021  7:15 AM Chao Main MD [facility]   6/2/2021  1:30 PM Waynard Hatchet, MD [facility] [place] [place]   6/2/2021  3:15 PM Daniel Page [address]   8/4/2021  8:00 AM BRADLY AlvarezX [facility] [place]   11/3/2021  1:15 PM Daniel Page 46

## 2021-04-13 ENCOUNTER — TELEPHONE (OUTPATIENT)
Dept: PULMONOLOGY | Age: 66
End: 2021-04-13

## 2021-04-13 ENCOUNTER — TELEPHONE (OUTPATIENT)
Dept: INTERNAL MEDICINE CLINIC | Age: 66
End: 2021-04-13

## 2021-04-13 NOTE — TELEPHONE ENCOUNTER
I left a message for pt that molly only sees for his diabetes and therefore can not comment on holding Xarelto. He may have to contact PCP if pulmonary will not give the clearance.
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
## 2021-04-13 NOTE — TELEPHONE ENCOUNTER
Pt called asking to hold Xarelto for procedure,verbally spoke to [facility] we did not order,patient to contact ordering provider

## 2021-04-13 NOTE — TELEPHONE ENCOUNTER
Kerwin Mejía is on our list as ordering provider 10/2020 and all refills have been under Kerwin Mejía name in the dispense report. I cannot comment on this as I do not treat him for this.

## 2021-04-22 ENCOUNTER — TELEPHONE (OUTPATIENT)
Dept: ENT CLINIC | Age: 66
End: 2021-04-22

## 2021-04-22 NOTE — TELEPHONE ENCOUNTER
Emily was seen by Dr. Lyubov Thomas for EGD 4/15/21; the notes are scanned into the patient's media manger. Please review the notes, EGD.

## 2021-04-23 ENCOUNTER — CARE COORDINATION (OUTPATIENT)
Dept: CARE COORDINATION | Age: 66
End: 2021-04-23

## 2021-05-07 ENCOUNTER — CARE COORDINATION (OUTPATIENT)
Dept: CARE COORDINATION | Age: 66
End: 2021-05-07

## 2021-05-07 DIAGNOSIS — G93.5 CHIARI I MALFORMATION (HCC): Primary | ICD-10-CM

## 2021-05-07 NOTE — CARE COORDINATION
Ambulatory Care Coordination Note  5/7/2021  CM Risk Score: 3  Charlson 10 Year Mortality Risk Score: 23%     ACC: Frandy Amezquita, RN    Summary Note: Marta Rahman is being followed by care coordination for education and assistance in managing his chronic conditions. Spoke with Marta Rahman today for f/u. DM: pt continues to use Dexcom. Monitoring BS's several times per day. Reports BS's for the most part have remained below 150. Will have some readings above that but attributes it to diet. GI: continues to f/u with GI. Still experiencing n/v. Has improved. Had EGD. Blood work and CT abd scheduled. Recent switch from omeprazole to protonix. Pt feels protonix is working much better. Less n/v. Felt better overall. CT abd arranged for 5/20. Pt will have blood work completed few days prior to CT scan. Provided pt with instructions: NPO 4hrs prior to procedure. Informed  pt is on Metformin. Pt still having sensation of having trouble breathing. Reports that his breathing will become real shallow a times. Wakes him up during the night. Sleeping sitting upright in recliner d/t sensation worsening when lying back. [facility] recommended pt get established with neurosurgeon for mgmt of chairi. Pt has looked in to a couple different physicians and wants a referral sent to Dr. Ovi Dove at [facility]. Back-continues to f/u with Dr. Terri Teague. Low back is being evaluated. Will be having MRI in near future. Plan of care:  Continue working with GI. Have CT abd on 5/20. Complete blood work few days prior to scan  Continue staying active  Monitor BS's closely  Small frequent meals. Avoid high fat greasy foods  Message sent to [facility] re: referral request to neurosurgeon regarding Chiari.   F/u with ortho in [facility] as scheduled  Call with any new questions or concerns  Diabetes Assessment    Medic Alert ID: No  Meal Planning: Avoidance of concentrated sweets   How often do you test your blood sugar?: Meals   Do you have barriers with adherence to non-pharmacologic self-management interventions? (Nutrition/Exercise/Self-Monitoring): No   Have you ever had to go to the ED for symptoms of low blood sugar?: No       Do you have hyperglycemia symptoms?: No   Do you have hypoglycemia symptoms?: No   Last Blood Sugar Value: 143   Blood Sugar Monitoring Regimen: Before Meals, At Bedtime   Blood Sugar Trends: No Change                      Care Coordination Interventions    Program Enrollment: Complex Care  Referral from Primary Care Provider: No  Suggested Interventions and Community Resources  Disease Specific Clinic: Completed (Comment: DM clinic)  Zone Management Tools: Completed (Comment: DM, CHF)         Goals Addressed                 This Visit's Progress     check blood usgars before each meal and bedtime   On track     Conditions and Symptoms   On track     I will schedule office visits, as directed by my provider. I will keep my appointment or reschedule if I have to cancel. I will notify my provider of any barriers to my plan of care. I will follow my Zone Management tool to seek urgent or emergent care. I will notify my provider of any symptoms that indicate a worsening of my condition. Barriers: overwhelmed by complexity of regimen and stress  Plan for overcoming my barriers: support and education from [facility][note 3], PCP, Specialists. Confidence: 9/10  Anticipated Goal Completion Date: 7/1/21              Prior to Admission medications    Medication Sig Start Date End Date Taking? Authorizing Provider   pantoprazole (PROTONIX) 40 MG tablet Take 1 tablet by mouth every morning (before breakfast) 4/22/21   RAFAEL Hernandez CNP   HYDROcodone-acetaminophen (NORCO) 5-325 MG per tablet Take 1 tablet by mouth nightly as needed for Pain.     Historical Provider, MD   ondansetron (ZOFRAN ODT) 4 MG disintegrating tablet Take 1 tablet by mouth every 8 hours as needed for Nausea or Vomiting 4/1/21   Rufina ARCE RAFAEL Byrnes CNP   metoclopramide (REGLAN) 10 MG tablet Take 1 tablet by mouth 4 times daily 3/29/21   Nick Valentin MD   amitriptyline (ELAVIL) 25 MG tablet Take 1 tablet by mouth nightly 3/24/21   Fabiana Ibarra DO   CPAP Machine MISC by Does not apply route Please change bipap 11/8 3/24/21   Camille Freire PA-C   insulin lispro, 1 Unit Dial, [facility] 100 UNIT/ML SOPN Inject 7 Units into the skin 3 times daily (before meals) 3/12/21 3/7/22  Fabiana Ibarra DO   metFORMIN (GLUCOPHAGE) 500 MG tablet Take 1,000 mg by mouth 2 times daily    Historical Provider, MD   albuterol sulfate HFA (VENTOLIN HFA) 108 (90 Base) MCG/ACT inhaler Inhale 2 puffs into the lungs every 4 hours as needed for Wheezing or Shortness of Breath 2/2/21   Fabiana Ibarra DO   lisinopril (PRINIVIL;ZESTRIL) 10 MG tablet Take 2 tablet by mouth once daily 1/25/21   Fabiana Ibarra DO   Cholecalciferol (VITAMIN D3) 125 MCG (5000 UT) TABS Take 5,000 Units by mouth daily    Historical Provider, MD CHANDRARELTO 10 MG TABS tablet Take 1 tablet by mouth once daily with breakfast 10/9/20   RAFAEL Corralse CNP   Continuous Blood Gluc  (DEXCOM G6 ) YULI Use as directed for continuous glucose monitor. 10/8/20   RAFAEL Woods CNP   Continuous Blood Gluc Transmit (DEXCOM G6 TRANSMITTER) MISC Change every 3 months- Use as directed for continuous glucose monitoring. 10/8/20   RAFAEL Woods CNP   Continuous Blood Gluc Sensor (DEXCOM G6 SENSOR) MISC Change every 10 days-Use as directed for continuous glucose monitoring.  10/8/20   RAFAEL Woods CNP   insulin glargine (LANTUS SOLOSTAR) 100 UNIT/ML injection pen Inject 21 Units into the skin 2 times daily  Patient taking differently: Inject 20 Units into the skin 2 times daily  10/5/20   Aristeo Bridges MD   cyclobenzaprine (FLEXERIL) 5 MG tablet Take 5 mg by mouth as needed 8/26/20   Historical Provider, MD   pravastatin (PRAVACHOL)

## 2021-05-07 NOTE — CARE COORDINATION
Pt was advised to look in to seeing neurosurgeon regarding his chiari malformation as last visit on 3/2 had reported sensations of not being able to breathe. Pt has continued with those sensations. Worse at night. Now sleeping in upright position in his recliner. Pt asking if referral can be sent to neurosurgeon Dr. Chandan Avila at [facility] for evaluation and mgmt of chiari malformation. Please advise.

## 2021-05-11 NOTE — TELEPHONE ENCOUNTER
Pt notified referral to Dr. Mian Estrada placed by [facility]. Verbalizes understanding.

## 2021-05-17 ENCOUNTER — NURSE ONLY (OUTPATIENT)
Dept: LAB | Age: 66
End: 2021-05-17

## 2021-05-17 DIAGNOSIS — R11.2 NON-INTRACTABLE VOMITING WITH NAUSEA, UNSPECIFIED VOMITING TYPE: ICD-10-CM

## 2021-05-17 DIAGNOSIS — K64.1 GRADE II INTERNAL HEMORRHOIDS: ICD-10-CM

## 2021-05-17 DIAGNOSIS — E11.43 GASTROPARESIS DUE TO DM (HCC): ICD-10-CM

## 2021-05-17 DIAGNOSIS — Z79.01 CHRONIC ANTICOAGULATION: ICD-10-CM

## 2021-05-17 DIAGNOSIS — R10.13 EPIGASTRIC PAIN: ICD-10-CM

## 2021-05-17 DIAGNOSIS — K31.84 GASTROPARESIS DUE TO DM (HCC): ICD-10-CM

## 2021-05-17 DIAGNOSIS — R19.8 IRREGULAR BOWEL HABITS: ICD-10-CM

## 2021-05-17 LAB
ALBUMIN SERPL-MCNC: 3.7 G/DL (ref 3.5–5.1)
ALP BLD-CCNC: 104 U/L (ref 38–126)
ALT SERPL-CCNC: 21 U/L (ref 11–66)
AMYLASE: 218 U/L (ref 20–104)
ANION GAP SERPL CALCULATED.3IONS-SCNC: 10 MEQ/L (ref 8–16)
AST SERPL-CCNC: 21 U/L (ref 5–40)
BILIRUB SERPL-MCNC: 0.5 MG/DL (ref 0.3–1.2)
BUN BLDV-MCNC: 14 MG/DL (ref 7–22)
CALCIUM SERPL-MCNC: 9 MG/DL (ref 8.5–10.5)
CHLORIDE BLD-SCNC: 98 MEQ/L (ref 98–111)
CO2: 29 MEQ/L (ref 23–33)
CREAT SERPL-MCNC: 1 MG/DL (ref 0.4–1.2)
ERYTHROCYTE [DISTWIDTH] IN BLOOD BY AUTOMATED COUNT: 13.4 % (ref 11.5–14.5)
ERYTHROCYTE [DISTWIDTH] IN BLOOD BY AUTOMATED COUNT: 43.9 FL (ref 35–45)
GFR SERPL CREATININE-BSD FRML MDRD: 75 ML/MIN/1.73M2
GLUCOSE BLD-MCNC: 149 MG/DL (ref 70–108)
HCT VFR BLD CALC: 42.4 % (ref 42–52)
HEMOGLOBIN: 13.9 GM/DL (ref 14–18)
LIPASE: 49.3 U/L (ref 5.6–51.3)
MCH RBC QN AUTO: 29.6 PG (ref 26–33)
MCHC RBC AUTO-ENTMCNC: 32.8 GM/DL (ref 32.2–35.5)
MCV RBC AUTO: 90.4 FL (ref 80–94)
PLATELET # BLD: 285 THOU/MM3 (ref 130–400)
PMV BLD AUTO: 9.7 FL (ref 9.4–12.4)
POTASSIUM SERPL-SCNC: 3.9 MEQ/L (ref 3.5–5.2)
RBC # BLD: 4.69 MILL/MM3 (ref 4.7–6.1)
SODIUM BLD-SCNC: 137 MEQ/L (ref 135–145)
TOTAL PROTEIN: 7.4 G/DL (ref 6.1–8)
WBC # BLD: 7.3 THOU/MM3 (ref 4.8–10.8)

## 2021-05-18 ENCOUNTER — CARE COORDINATION (OUTPATIENT)
Dept: CARE COORDINATION | Age: 66
End: 2021-05-18

## 2021-05-18 NOTE — CARE COORDINATION
Ambulatory Care Coordination Note  5/18/2021  CM Risk Score: 3  Charlson 10 Year Mortality Risk Score: 23%     ACC: Jack Sims, RN    Summary Note: Maylon Mohs is being followed by care coordination for education and assistance in managing his DM, CHF. Spoke with Maylon Mohs today for f/u. Pt reports that he is doing ok. Had blood work completed as ordered. Has CT abd scheduled 5/20. Reports bloating has improved. Doing better on protonix. Continues to experience n/v at times. DM: pt continues to use Dexcom. Monitoring BS's. Reports BS's continue to fluctuate at times. Has had some readings in 200's. Continues to try to monitor diet closely. Back-MRI was denied by insurance. Pt has to complete PT first.  Has appt next wk with oupt PT. Pt reports that he has not heard from Dr. Abby Kwok office-neurosurgery regarding referral placed for his chiari malformation. Pt continues to have problems sleeping in bed with CPAP. If lying back feels breathing becomes shallow at times. Typically ends up sleeping in recliner before night is over. Plans on trying to use CPAP when in chair. Contacted office. Informed that they have not received referral.  Requesting referral be faxed again to [phone] of care:  Complete CT abd 5/20. Message routed to [facility] office to re fax [facility] referral.    Small frequent meals. Continue monitoring BS's closely  Attend outpt PT  F/u wit GI following CT scan. Call with any questions or concerns. Continue trying to stay as active as possible. Try using CPAP in chair. Call with questions or concerns. Diabetes Assessment    Medic Alert ID: No  Meal Planning: Avoidance of concentrated sweets   How often do you test your blood sugar?: Meals   Do you have barriers with adherence to non-pharmacologic self-management interventions?  (Nutrition/Exercise/Self-Monitoring): No   Have you ever had to go to the ED for symptoms of low blood sugar?: No       Do you have hyperglycemia symptoms?: No   Do you have hypoglycemia symptoms?: No   Last Blood Sugar Value: 145   Blood Sugar Monitoring Regimen: Before Meals   Blood Sugar Trends: Fluctuating      ,   Congestive Heart Failure Assessment    Are you currently restricting fluids?: No Restriction  Do you understand a low sodium diet?: Yes  Do you understand how to read food labels?: Yes  How many restaurant meals do you eat per week?: 1-2  Do you salt your food before tasting it?: No         Symptoms:  CHF associated angina: Neg, CHF associated dyspnea on exertion: Pos, CHF associated fatigue: Neg, CHF associated leg swelling: Neg, CHF associated orthostatic hypotension: Neg, CHF associated PND: Neg, CHF associated shortness of breath: Neg, CHF associated weakness: Neg      Symptom course: stable  Weight trend: stable  Salt intake watch compared to last visit: stable      and   COPD Assessment              Symptoms:                      Care Coordination Interventions    Program Enrollment: Complex Care  Referral from Primary Care Provider: No  Suggested Interventions and Community Resources  Disease Specific Clinic: Completed (Comment: DM clinic)  Zone Management Tools: Completed (Comment: DM, CHF)         Goals Addressed                 This Visit's Progress     check blood usgars before each meal and bedtime   On track     Conditions and Symptoms   On track     I will schedule office visits, as directed by my provider. I will keep my appointment or reschedule if I have to cancel. I will notify my provider of any barriers to my plan of care. I will follow my Zone Management tool to seek urgent or emergent care. I will notify my provider of any symptoms that indicate a worsening of my condition. Barriers: overwhelmed by complexity of regimen and stress  Plan for overcoming my barriers: support and education from [facility], PCP, Specialists.    Confidence: 9/10  Anticipated Goal Completion Date: 7/1/21              Prior to

## 2021-05-20 ENCOUNTER — HOSPITAL ENCOUNTER (OUTPATIENT)
Dept: CT IMAGING | Age: 66
Discharge: HOME | End: 2021-05-20
Payer: MEDICARE

## 2021-05-20 DIAGNOSIS — Z98.890 S/P ENDOSCOPY: ICD-10-CM

## 2021-05-20 DIAGNOSIS — R11.2 NON-INTRACTABLE VOMITING WITH NAUSEA, UNSPECIFIED VOMITING TYPE: ICD-10-CM

## 2021-05-20 DIAGNOSIS — E11.43 GASTROPARESIS DUE TO DM (HCC): ICD-10-CM

## 2021-05-20 DIAGNOSIS — K64.1 GRADE II INTERNAL HEMORRHOIDS: ICD-10-CM

## 2021-05-20 DIAGNOSIS — K31.84 GASTROPARESIS DUE TO DM (HCC): ICD-10-CM

## 2021-05-20 DIAGNOSIS — Z79.01 CHRONIC ANTICOAGULATION: ICD-10-CM

## 2021-05-20 DIAGNOSIS — R19.8 IRREGULAR BOWEL HABITS: ICD-10-CM

## 2021-05-20 DIAGNOSIS — R10.13 EPIGASTRIC PAIN: ICD-10-CM

## 2021-05-20 PROCEDURE — 74177 CT ABD & PELVIS W/CONTRAST: CPT

## 2021-05-20 PROCEDURE — 6360000004 HC RX CONTRAST MEDICATION: Performed by: NURSE PRACTITIONER

## 2021-05-20 RX ADMIN — IOPAMIDOL 85 ML: 755 INJECTION, SOLUTION INTRAVENOUS at 10:37

## 2021-05-20 RX ADMIN — IOHEXOL 50 ML: 240 INJECTION, SOLUTION INTRATHECAL; INTRAVASCULAR; INTRAVENOUS; ORAL at 10:37

## 2021-06-07 ENCOUNTER — HOSPITAL ENCOUNTER (OUTPATIENT)
Dept: PHYSICAL THERAPY | Age: 66
Setting detail: THERAPIES SERIES
Discharge: HOME | End: 2021-06-07
Payer: MEDICARE

## 2021-06-07 PROCEDURE — 97161 PT EVAL LOW COMPLEX 20 MIN: CPT

## 2021-06-07 NOTE — PROGRESS NOTES
** PLEASE SIGN, DATE AND TIME CERTIFICATION BELOW AND RETURN TO [facility] (FAX #: [phone]). ATTEST/CO-SIGN IF ACCESSING VIA [employer]. THANK YOU.**    I certify that I have examined the patient below and determined that Physical Medicine and Rehabilitation service is necessary and that I approve the established plan of care for up to 90 days or as specifically noted. Attestation, signature or co-signature of physician indicates approval of certification requirements.    ________________________ ____________ __________  Physician Signature   Date   Time  [address]  PHYSICAL THERAPY  [x] EVALUATION  [] DAILY NOTE (LAND) [] DAILY NOTE (AQUATIC ) [] PROGRESS NOTE [] DISCHARGE NOTE    [] [address] [place] [address]   [] [place] [address]    [] [facility]   [] [facility]    Date: 2021  Patient Name:  Yvan Cooper  :   MRN: [id number]  CSN: [id number]    Referring Practitioner Bogdan Parikh MD   Diagnosis Other intervertebral disc degeneration, lumbar region [M51.36]    Treatment Diagnosis Chronic lumbar pain without radiculopathy, core weakness, lumbar and hip muscular tightness   Date of Evaluation 21    Additional Pertinent History HTN, vertigo/dizziness, diabetes, memory issues, SOB. Cervical surgery 2020. Chiari malformation. Functional Outcome Measure Used Oswestry   Functional Outcome Score 25/50=50% (21)       Insurance: Primary: Payor: Yaquelin Castaneda /  /  / ,   Secondary:    Authorization Information: [place] No required after eval, aquatics and modalities covered ionto, $40 copay/vist   Visit # 1, 1/10 for progress note   Visits Allowed: eval only   Recertification Date:    Physician Follow-Up: None scheduled   Physician Orders: At least 6 weeks for therapy for insurance approval of MRI   History of Present Illness: Pt presents with chronic pain.  Pain is constant, with lifting being most aggravating. Pt denies radiculopathy. H/o right sciatic in right hip. XR shows multiple bone spurring, with L1-3 and L4-5 disc degeneration. MRI was denied. SUBJECTIVE: Pt denies back surgeries. Pt takes occasional Tylenol, uses heat for symptom control. Pt notes difficulty sitting, standing and walking longer durations. Social/Functional History and Current Status:  Medications and Allergies have been reviewed and are listed on Medical History Questionnaire. Lela Pina lives with significant other in a single story home with 4-5 stairs and a handrail to enter.     Task Previous Current   ADLs  Independent Independent   IADL's Independent Independent- increases pain but able to do   Ambulation Independent Independent   Transfers Independent Independent   Recreation Independent- [facility] primarily for [employer], work on cars, walks dog 1/2 mile to 1 mile [address]  Active    Work On Disability  On Disability     Objective:    OBSERVATION   Pain 7/10 low back, 9/10 at worst, 3/10 at best   Palpation No tenderness in lumbar region   Sensation intact   Edema    Spinal Accessory Motion    Bed Mobility independent   Transfers independent   Ambulation No AD, decreased pace, mild antalgia d/t right lumbar pain, equal step length   Stairs Reciprocating pattern, light handrail   Balance        POSTURE    No Deficit Deficit Comments   Forward Head x     Rounded Shoulders  x    Kyphosis x     Lordosis x     Lateral Shift x     Scoliosis  x    Iliac Crest x     PSIS x     ASIS x     Leg Length Discrp x     Slumped Sitting x     Pelvic alignment  x Right anterior rotation       TRUNK RANGE OF MOTION   Flexion: Limited 25% with mild pain   Extension: Limited 75%   Lateral Flexion Left: WFL with oppsite side pain   Lateral Flexion Right: WFL with opposite side pain   Rotation Left: WFL   Rotation Right: WFL   Trunk Range of care  []  Modify plan of care as follows:    []  Hold pending physician visit  []  Discharge    Time In 1310   Time Out 1340   Timed Code Minutes: 0 min   Total Treatment Time: 30 min     Electronically Signed by: Masoud Whiteside PT

## 2021-06-08 ENCOUNTER — CARE COORDINATION (OUTPATIENT)
Dept: CARE COORDINATION | Age: 66
End: 2021-06-08

## 2021-06-09 NOTE — TELEPHONE ENCOUNTER
Pt requests refill. He has been out for 3-4 days. Appt in resident clinic on 7/7/21. I called script in to [address] and had to leave message on voice mail with prescription info.

## 2021-06-16 ENCOUNTER — HOSPITAL ENCOUNTER (OUTPATIENT)
Dept: PHYSICAL THERAPY | Age: 66
Setting detail: THERAPIES SERIES
Discharge: HOME | End: 2021-06-16
Payer: MEDICARE

## 2021-06-16 PROCEDURE — 97110 THERAPEUTIC EXERCISES: CPT

## 2021-06-17 ENCOUNTER — CARE COORDINATION (OUTPATIENT)
Dept: CARE COORDINATION | Age: 66
End: 2021-06-17

## 2021-06-20 NOTE — TELEPHONE ENCOUNTER
81 mg by mouth daily      vitamin D 1000 UNITS CAPS Take by mouth daily       pravastatin (PRAVACHOL) 80 MG tablet Take 80 mg by mouth daily      cyclobenzaprine (FLEXERIL) 10 MG tablet Take 0.5 tablets by mouth 3 times daily as needed for Muscle spasms 30 tablet 0     No current facility-administered medications for this visit. Labs:  Lab Results   Component Value Date    CHOL 166 05/21/2016     Lab Results   Component Value Date    TRIG 215 (H) 05/21/2016     Lab Results   Component Value Date    HDL 35 05/21/2016     Lab Results   Component Value Date    LDLCALC 88 05/21/2016     No results found for: LABVLDL, VLDL  No results found for: [place]  Lab Results   Component Value Date    ALT 25 10/30/2018        The ASCVD Risk score (Oskar Murillo., et al., 2013) failed to calculate for the following reasons:    Cannot find a previous HDL lab    Cannot find a previous total cholesterol lab  Calculated ASCVD risk 29.1%    ASSESSMENT:  Hyperlipidemia Goal: Patient is prescribed moderate-intensity statin therapy. 2019 ADA Guidelines Age:   Skinner >/= 36years old:   o History of ASCVD or 10-year ASCVD risk > 20% - high-intensity statin is recommended. Patient last filled pravastatin 80 mg tablets no record of at [address] [facility]. PLAN:  Left message for patient to return call to [phone]. Will continue to outreach as appropriate.      Thank you,  Magaly Brown, PharmD, [facility]  Phone: [phone], or toll free [phone], option 7 labile

## 2021-06-23 ENCOUNTER — HOSPITAL ENCOUNTER (OUTPATIENT)
Dept: PHYSICAL THERAPY | Age: 66
Setting detail: THERAPIES SERIES
Discharge: HOME | End: 2021-06-23
Payer: MEDICARE

## 2021-06-23 PROCEDURE — 97110 THERAPEUTIC EXERCISES: CPT

## 2021-06-23 PROCEDURE — 97032 APPL MODALITY 1+ESTIM EA 15: CPT

## 2021-06-23 NOTE — PROGRESS NOTES
[address] [facility]  PHYSICAL THERAPY  [] EVALUATION  [x] DAILY NOTE (LAND) [] DAILY NOTE (AQUATIC ) [] PROGRESS NOTE [] DISCHARGE NOTE    [x] OUTPATIENT REHABILITATION [facility]   [] [place] [address]    [] [facility]   [] [employer]    Date: 2021  Patient Name:  Kyler Cano  :   MRN: [id number]  CSN: [id number]    Referring Practitioner Litzy Marlow MD   Diagnosis Other intervertebral disc degeneration, lumbar region [M51.36]    Treatment Diagnosis Chronic lumbar pain without radiculopathy, core weakness, lumbar and hip muscular tightness   Date of Evaluation 21    Additional Pertinent History HTN, vertigo/dizziness, diabetes, memory issues, SOB. Cervical surgery 2020. Chiari malformation. Functional Outcome Measure Used Oswestry   Functional Outcome Score 25/50=50% (21)       Insurance: Primary: Payor: Kavita Mccormick /  /  / ,   Secondary:    Authorization Information: Fawn Garzon required after eval, aquatics and modalities covered ionto, $40 copay/vist  APPROVED FOR 7 VISITS FROM 21 TO 21  CPT CODES:  59163, [id number], 32528, 87872, 32870, 58326  CPT CODE:  16627 IS NOT VALID   Visit # 3, 3/10 for progress note   Visits Allowed: eval + 7    Recertification Date:    Physician Follow-Up: None scheduled   Physician Orders: At least 6 weeks for therapy for insurance approval of MRI   History of Present Illness: Pt presents with chronic pain. Pain is constant, with lifting being most aggravating. Pt denies radiculopathy. H/o right sciatic in right hip. XR shows multiple bone spurring, with L1-3 and L4-5 disc degeneration. MRI was denied. SUBJECTIVE: Pt reports he was stiff after last session. Pt reports he isn't sleeping well d/t inability to find a comfortable position. Pt notes the pain is worsening, as he used to get relief with DKTC on side and that is no longer the case.  Pt rotates between bed, recliner and couch.     OBJECTIVE:  TREATMENT   Precautions: Chiari malformation, neck surgery 2020. Pain: Right lumbar 8/10    X in shaded column indicates activity completed today   Modalities Parameters/  Location  Notes   IFC Estim attended to right lumbar in sitting with MHP 15 min X                 Manual Therapy Time/Technique  Notes   MET for right anterior rotation: hooklying resisted left hip flexion and right hamstring set 5x5 sec  Pelvis aligned post treatment               Exercise/Intervention   Notes   SKTC, DKTC, piriformis diag KTC, hamstring, LTR stretches 3x20 sec   On wedge   Seated stretches: forward flexion, hamstring, piriformis 3x15 sec  x    Seated: abdominal bracing 10x5 sec  x    Alt march, alt UEs, alt combo UE/LE 10  x    Knee fall outs bilat and alt unilat 10  x    Hip adduction ball squeeze                                                   Specific Interventions Next Treatment: progress as tolerated, modalities and manual as needed    Activity/Treatment Tolerance:  [x]  Patient tolerated treatment well  []  Patient limited by fatigue  []  Patient limited by pain   []  Patient limited by medical complications  []  Other:     Assessment: Initiated IFC estim to right lumbar with MHP at start of session d/t elevated pain. Pt reports pain decreased to 5-6/10 after estim. Modified stretches to sitting position instead of supine with good tolerance. Abdominal bracing completed seated as well today. Pt notes pain 4-5/10 at end of session. Goals    Patient Goal: Decrease back pain     Short Term Goals: 6 weeks  Patient will report reduced pain to <5/10  to tolerate sitting, standing, and walking longer durations. Patient will demonstrate good core engagement and postural awareness during therapy session with minimal to no cues  to carry over to functional activities, lifting, and housework.    Patient will have <25% lumbar and hip AROM restriction for ease bending over to complete ADLs with less limitation. Long Term Goals: 12 weeks  Patient will be independent and compliant with HEP daily to achieve above goals. Pt will reduce Oswestry score from 50% to 30% to tolerate sitting, standing and lifting. Patient Education:   [x]  HEP/Education Completed: perform stretches seated, monitor pain   Medbridge Access Code:  []  No new Education completed  [x]  Reviewed Prior HEP      [x]  Patient verbalized and/or demonstrated understanding of education provided. []  Patient unable to verbalize and/or demonstrate understanding of education provided. Will continue education. []  Barriers to learning:     PLAN:  Treatment Recommendations: Strengthening, Range of Motion, Functional Mobility Training, Manual Therapy - Soft Tissue Mobilization, Home Exercise Program, Patient Education, Aquatics and Modalities    []  Plan of care initiated. Plan to see patient 1-2 times per week for 12 weeks to address the treatment planned outlined above.   [x]  Continue with current plan of care  []  Modify plan of care as follows:    []  Hold pending physician visit  []  Discharge    Time In 0845   Time Out 0923   Timed Code Minutes: 38 min   Total Treatment Time: 38 min     Electronically Signed by: Loree James PT

## 2021-06-29 ENCOUNTER — CARE COORDINATION (OUTPATIENT)
Dept: CARE COORDINATION | Age: 66
End: 2021-06-29

## 2021-06-29 NOTE — TELEPHONE ENCOUNTER
Contacted pt. Informed him to arrange appt with DR. Arvil Simmonds. Offered to arrange appt for pt. Pt reports that he will today. Pt is to obtain blood work for GI. I reminded pt of this. He will discuss with office and find out if any other labs need to be obtained prior to being seen.

## 2021-06-29 NOTE — CARE COORDINATION
Pt has had diarrhea x 2 wks. Going multiple times per day. So far today pt reports that he has went 4x. Denies fever or abd pain. Pt has been trying to push fluids. Has taken Pepto Bismol but not getting any relief. Has not tried anti diarrheal.  Please review and advise.      (pt will be seeing you next wk for routine f/u on 7/8).

## 2021-06-29 NOTE — CARE COORDINATION
Ambulatory Care Coordination Note  6/29/2021  CM Risk Score: 3  Charlson 10 Year Mortality Risk Score: 23%     ACC: Danielle Moise, RN    Summary Note: Rob Frank is being followed by care coordination for education and assistance in managing his chronic conditions. I spoke with Rob Frank today for f/u. DM: has Dexcom. Following with DM clinic. Has IM appt 7/7 and f/u with CDE on 8/4. Reports BS's continue to fluctuate. Pt battles with gastroparesis. Denies episodes of hypoglycemia. Denies problems with medications. Currently going through outpt PT for back. SHAHRAM-has PAP. Was having chest pain and upper abd pain with use as well as bloating. Recently stopped using and reports that pain has resolved. Pt reports that prior to stopping settings were adjusted and was still not getting relief. Now that he hasn't been using his PAP he has noticed poor sleep habits. Feeling more fatigued. Encouraged to try using PAP again. If s/s return f/u with Caren Troncoso earlier than scheduled appt which is 8/23. Verbalizes understanding. Chiari-pt will be seeing new specialist: Dr. Theresa Chowdhury 8/10. Continues to have s/s at time where he feels he has trouble breathing. Able to catch his breath if he focuses on his breathing. Denies worsening of s/s. Pt had discussed previously with PULM which is who referred him to Dr. Theresa Chowdhury. Discussed GI-pt not happy with CNP at GI office. Understands importance of f/u with GI. Plans on staying with office but will only be seeing Dr. Arvil Simmonds in future. Reminded pt appt needs arranged. Has had diarrhea x 2 wks. Going multiple times per day. Has taken Pepto Bismol with no relief. Discussed with pt if he had tried imodium or any antidiarrheal.  Reports that he has not. Trying to push fluids but gets nauseated at times. Denies other s/s. Plan of care:  F/u made with PCP on 7/8  Has f/u with IM on 7/7  Message sent to PCP re: diarrhea x 2 wks  Encouraged fluids.    Continue working with DM will notify my provider of any barriers to my plan of care. I will follow my Zone Management tool to seek urgent or emergent care. I will notify my provider of any symptoms that indicate a worsening of my condition. Barriers: overwhelmed by complexity of regimen and stress  Plan for overcoming my barriers: support and education from [facility], PCP, Specialists. Confidence: 9/10  Anticipated Goal Completion Date: 7/1/21              Prior to Admission medications    Medication Sig Start Date End Date Taking? Authorizing Provider   metFORMIN (GLUCOPHAGE) 500 MG tablet Take 2 tablets by mouth 2 times daily 6/10/21   Fabiana Carolina MD   pantoprazole (PROTONIX) 40 MG tablet Take 1 tablet by mouth every morning (before breakfast) 4/22/21   RAFAEL Owens CNP   HYDROcodone-acetaminophen (NORCO) 5-325 MG per tablet Take 1 tablet by mouth nightly as needed for Pain.     Historical Provider, MD   ondansetron (ZOFRAN ODT) 4 MG disintegrating tablet Take 1 tablet by mouth every 8 hours as needed for Nausea or Vomiting 4/1/21   RAFAEL Owens CNP   metoclopramide (REGLAN) 10 MG tablet Take 1 tablet by mouth 4 times daily 3/29/21   Evon Orantes MD   amitriptyline (ELAVIL) 25 MG tablet Take 1 tablet by mouth nightly 3/24/21   Navjot Riddle, DO   CPAP Machine MISC by Does not apply route Please change bipap 11/8 3/24/21   Katelynn Coronel PA-C   insulin lispro, 1 Unit Dial, ([facility]) 100 UNIT/ML SOPN Inject 7 Units into the skin 3 times daily (before meals) 3/12/21 3/7/22  Navjot Riddle, DO   albuterol sulfate HFA (VENTOLIN HFA) 108 (90 Base) MCG/ACT inhaler Inhale 2 puffs into the lungs every 4 hours as needed for Wheezing or Shortness of Breath 2/2/21   Navjot Riddle, DO   lisinopril (PRINIVIL;ZESTRIL) 10 MG tablet Take 2 tablet by mouth once daily 1/25/21   Navjot Riddle, DO   Cholecalciferol (VITAMIN D3) 125 MCG (5000 UT) TABS Take 5,000 Units by mouth daily    Historical Provider, MD   XARELTO 10 MG TABS tablet Take 1 tablet by mouth once daily with breakfast 10/9/20   Ginette Carrier, APRN - CNP   Continuous Blood Gluc  ([address]) YULI Use as directed for continuous glucose monitor. 10/8/20   Maricruz Purchase, APRN - CNP   Continuous Blood Gluc Transmit (DEXCOM G6 TRANSMITTER) MISC Change every 3 months- Use as directed for continuous glucose monitoring. 10/8/20   Maricruz Purchase, APRN - CNP   Continuous Blood Gluc Sensor (DEXCOM G6 SENSOR) MISC Change every 10 days-Use as directed for continuous glucose monitoring. 10/8/20   Maricruz Purchase, APRN - CNP   insulin glargine (LANTUS SOLOSTAR) 100 UNIT/ML injection pen Inject 21 Units into the skin 2 times daily  Patient taking differently: Inject 20 Units into the skin 2 times daily  10/5/20   Luis Manuel Helms MD   cyclobenzaprine (FLEXERIL) 5 MG tablet Take 5 mg by mouth as needed 8/26/20   Historical Provider, MD   pravastatin (PRAVACHOL) 80 MG tablet Take 1 tablet by mouth daily 8/4/20   Luis Manuel Helms MD   Misc. Devices (ROLLATOR ULTRA-LIGHT) MISC Use Rollator for stability with ambulation. 7/28/20   Luis Manuel Helms MD   Blood Glucose Monitoring Suppl (ONE TOUCH ULTRA 2) w/Device KIT 1 kit by Does not apply route daily 6/25/20   Luis Manuel Helms MD   blood glucose monitor strips One Touch Ultra Test Strips. Test 2 times a day and as needed for diabetic symptoms.  DX: E11.22 4/3/20   Luis Manuel Helms MD   sertraline (ZOLOFT) 25 MG tablet Take 100 mg by mouth daily     Historical Provider, MD   nitroGLYCERIN (NITROSTAT) 0.4 MG SL tablet Place 1 tablet under the tongue every 5 minutes as needed for Chest pain 5/21/16   Kallie Dove MD   aspirin 81 MG tablet Take 81 mg by mouth daily    Historical Provider, MD       Future Appointments   Date Time Provider Hailey Brown   6/30/2021  8:15 AM Stef Luciano PTA STR[facility] PT [facility]   7/7/2021  8:45 AM Stef Luciano  [facility]   7/7/2021  1:20 PM Rufina Franco MD SRPX Physic [facility] [place]   7/8/2021  1:45 PM Robles Ney, DO [address]   7/14/2021  8:45 AM Alexander Brothers, PT [facility]   8/4/2021  8:00 AM BRADLY HuertaX Physic [facility]   8/23/2021  1:45 PM [address]   11/3/2021  1:15 PM Daniel Kaufman

## 2021-06-30 ENCOUNTER — HOSPITAL ENCOUNTER (OUTPATIENT)
Dept: PHYSICAL THERAPY | Age: 66
Setting detail: THERAPIES SERIES
Discharge: HOME | End: 2021-06-30
Payer: MEDICARE

## 2021-06-30 PROCEDURE — 97032 APPL MODALITY 1+ESTIM EA 15: CPT

## 2021-06-30 PROCEDURE — 97110 THERAPEUTIC EXERCISES: CPT

## 2021-06-30 NOTE — PROGRESS NOTES
[address] [facility]  PHYSICAL THERAPY  [] EVALUATION  [x] DAILY NOTE (LAND) [] DAILY NOTE (AQUATIC ) [] PROGRESS NOTE [] DISCHARGE NOTE    [x] OUTPATIENT REHABILITATION [facility]   [] [place] [address]    [] [facility]   [] Will Brown    Date: 2021  Patient Name:  Vianey Cassidy  :   MRN: [id number]  CSN: [id number]    Referring Practitioner Rylan Parra MD   Diagnosis Other intervertebral disc degeneration, lumbar region [M51.36]    Treatment Diagnosis Chronic lumbar pain without radiculopathy, core weakness, lumbar and hip muscular tightness   Date of Evaluation 21    Additional Pertinent History HTN, vertigo/dizziness, diabetes, memory issues, SOB. Cervical surgery 2020. Chiari malformation. Functional Outcome Measure Used Oswestry   Functional Outcome Score 25/50=50% (21)       Insurance: Primary: Payor: Damian Wilson /  /  / ,   Secondary:    Authorization Information: Bradly Carlos required after eval, aquatics and modalities covered ionto, $40 copay/vist  APPROVED FOR 7 VISITS FROM 21 TO 21  CPT CODES:  80105, [id number], 98519, 24246, 76836, 74234  CPT CODE:  85639 IS NOT VALID   Visit # 4, 4/10 for progress note   Visits Allowed: eval + 7    Recertification Date: 09   Physician Follow-Up: None scheduled   Physician Orders: At least 6 weeks for therapy for insurance approval of MRI   History of Present Illness: Pt presents with chronic pain. Pain is constant, with lifting being most aggravating. Pt denies radiculopathy. H/o right sciatic in right hip. XR shows multiple bone spurring, with L1-3 and L4-5 disc degeneration. MRI was denied. SUBJECTIVE:  Patient states the exercises sometimes cause an increase in pain. Reports the heat and e-stim last session helped. Patient has a tens unit at home that he is using. Patient changes positions frequently when sleeping.      TREATMENT   Precautions: Chiari malformation, neck surgery 2020. Pain: 5-6/10 Right lumbar     X in shaded column indicates activity completed today   Modalities Parameters/  Location  Notes   IFC Estim attended to right lumbar in sitting with MHP IFC, 20 minutes, Intensity 12.5 X  While on heat and e-stim today               Manual Therapy Time/Technique  Notes   MET for right anterior rotation: hooklying resisted left hip flexion and right hamstring set 5x5 sec  Pelvis aligned post treatment               Exercise/Intervention   Notes   SKTC, DKTC, piriformis diag KTC, hamstring, LTR stretches 3x20 sec   On wedge   Seated stretches: forward flexion, hamstring, piriformis 3x 15 sec X With MHP and E-stim   Seated: abdominal bracing 10x 5 sec X    Alt march, alt UEs, alt combo UE/LE 10x  X    Knee fall outs bilat and alt unilat 10x each   X    Hip adduction ball squeeze 10x 5 sec X                                                Specific Interventions Next Treatment: progress as tolerated, modalities and manual as needed    Activity/Treatment Tolerance:  [x]  Patient tolerated treatment well  []  Patient limited by fatigue  []  Patient limited by pain   []  Patient limited by medical complications  []  Other:     Assessment:  Trial of e-stim and heat while completing exercises to improve tolerance of exercises. Patient did well. Cued patient to maintain abdominal bracing with dynamic movement. Pain decreased to 4/10 at end of session. Goals    Patient Goal: Decrease back pain     Short Term Goals: 6 weeks  Patient will report reduced pain to <5/10  to tolerate sitting, standing, and walking longer durations. Patient will demonstrate good core engagement and postural awareness during therapy session with minimal to no cues  to carry over to functional activities, lifting, and housework. Patient will have <25% lumbar and hip AROM restriction for ease bending over to complete ADLs with less limitation.     Long Term Goals: 12 weeks  Patient will be independent and compliant with HEP daily to achieve above goals. Pt will reduce Oswestry score from 50% to 30% to tolerate sitting, standing and lifting. Patient Education:   [x]  HEP/Education Completed: Perform stretches seated, monitor pain, use of tens and heat while completing exercises   Medbridge Access Code:  []  No new Education completed  [x]  Reviewed Prior HEP      [x]  Patient verbalized and/or demonstrated understanding of education provided. []  Patient unable to verbalize and/or demonstrate understanding of education provided. Will continue education. []  Barriers to learning:     PLAN:  Treatment Recommendations: Strengthening, Range of Motion, Functional Mobility Training, Manual Therapy - Soft Tissue Mobilization, Home Exercise Program, Patient Education, Aquatics and Modalities    []  Plan of care initiated. Plan to see patient 1-2 times per week for 12 weeks to address the treatment planned outlined above.   [x]  Continue with current plan of care  []  Modify plan of care as follows:    []  Hold pending physician visit  []  Discharge    Time In 0815   Time Out 0855   Timed Code Minutes: 40 min   Total Treatment Time: 40 min     Electronically Signed by: Jeanie Escudero PTA

## 2021-07-07 ENCOUNTER — HOSPITAL ENCOUNTER (OUTPATIENT)
Dept: PHYSICAL THERAPY | Age: 66
Setting detail: THERAPIES SERIES
End: 2021-07-07
Payer: MEDICARE

## 2021-07-07 ENCOUNTER — OFFICE VISIT (OUTPATIENT)
Dept: INTERNAL MEDICINE CLINIC | Age: 66
End: 2021-07-07
Payer: MEDICARE

## 2021-07-07 VITALS
WEIGHT: 218.4 LBS | BODY MASS INDEX: 30.57 KG/M2 | TEMPERATURE: 97.1 F | HEART RATE: 68 BPM | SYSTOLIC BLOOD PRESSURE: 160 MMHG | HEIGHT: 71 IN | DIASTOLIC BLOOD PRESSURE: 78 MMHG

## 2021-07-07 DIAGNOSIS — E11.69 TYPE 2 DIABETES MELLITUS WITH OTHER SPECIFIED COMPLICATION, WITH LONG-TERM CURRENT USE OF INSULIN (HCC): Primary | ICD-10-CM

## 2021-07-07 DIAGNOSIS — Z79.4 TYPE 2 DIABETES MELLITUS WITH OTHER SPECIFIED COMPLICATION, WITH LONG-TERM CURRENT USE OF INSULIN (HCC): Primary | ICD-10-CM

## 2021-07-07 DIAGNOSIS — K31.84 GASTROPARESIS: ICD-10-CM

## 2021-07-07 DIAGNOSIS — E11.65 TYPE 2 DIABETES MELLITUS WITH HYPERGLYCEMIA, WITH LONG-TERM CURRENT USE OF INSULIN (HCC): ICD-10-CM

## 2021-07-07 DIAGNOSIS — Z79.4 TYPE 2 DIABETES MELLITUS WITH HYPERGLYCEMIA, WITH LONG-TERM CURRENT USE OF INSULIN (HCC): ICD-10-CM

## 2021-07-07 LAB — HBA1C MFR BLD: 7.5 % (ref 4.3–5.7)

## 2021-07-07 PROCEDURE — 99214 OFFICE O/P EST MOD 30 MIN: CPT | Performed by: STUDENT IN AN ORGANIZED HEALTH CARE EDUCATION/TRAINING PROGRAM

## 2021-07-07 PROCEDURE — 83036 HEMOGLOBIN GLYCOSYLATED A1C: CPT | Performed by: STUDENT IN AN ORGANIZED HEALTH CARE EDUCATION/TRAINING PROGRAM

## 2021-07-07 PROCEDURE — 3051F HG A1C>EQUAL 7.0%<8.0%: CPT | Performed by: STUDENT IN AN ORGANIZED HEALTH CARE EDUCATION/TRAINING PROGRAM

## 2021-07-07 RX ORDER — BLOOD-GLUCOSE TRANSMITTER
EACH MISCELLANEOUS
Qty: 1 EACH | Refills: 3 | Status: CANCELLED | OUTPATIENT
Start: 2021-07-07

## 2021-07-07 RX ORDER — INSULIN GLARGINE 100 [IU]/ML
22 INJECTION, SOLUTION SUBCUTANEOUS 2 TIMES DAILY
Qty: 15 PEN | Refills: 3 | Status: SHIPPED | OUTPATIENT
Start: 2021-07-07 | End: 2022-02-02

## 2021-07-07 RX ORDER — BLOOD-GLUCOSE SENSOR
EACH MISCELLANEOUS
Qty: 3 EACH | Refills: 5 | Status: CANCELLED | OUTPATIENT
Start: 2021-07-07

## 2021-07-07 RX ORDER — INSULIN LISPRO 100 [IU]/ML
7 INJECTION, SOLUTION INTRAVENOUS; SUBCUTANEOUS
Qty: 10 PEN | Refills: 3 | Status: SHIPPED | OUTPATIENT
Start: 2021-07-07 | End: 2022-01-05

## 2021-07-07 SDOH — ECONOMIC STABILITY: FOOD INSECURITY: WITHIN THE PAST 12 MONTHS, YOU WORRIED THAT YOUR FOOD WOULD RUN OUT BEFORE YOU GOT MONEY TO BUY MORE.: NEVER TRUE

## 2021-07-07 SDOH — ECONOMIC STABILITY: FOOD INSECURITY: WITHIN THE PAST 12 MONTHS, THE FOOD YOU BOUGHT JUST DIDN'T LAST AND YOU DIDN'T HAVE MONEY TO GET MORE.: NEVER TRUE

## 2021-07-07 ASSESSMENT — ENCOUNTER SYMPTOMS
CHEST TIGHTNESS: 0
SHORTNESS OF BREATH: 0
APNEA: 1
COUGH: 0
DIARRHEA: 1
VOMITING: 1
ABDOMINAL PAIN: 0

## 2021-07-07 ASSESSMENT — SOCIAL DETERMINANTS OF HEALTH (SDOH): HOW HARD IS IT FOR YOU TO PAY FOR THE VERY BASICS LIKE FOOD, HOUSING, MEDICAL CARE, AND HEATING?: SOMEWHAT HARD

## 2021-07-07 NOTE — PROGRESS NOTES
Attending supervising physicians attestation statement:      I was present with the resident physician during the history and exam.  I Discussed the findings and plans with the resident physician  personally   After examining the patient, and agree with the resident'S note as outlined .        Electronically signed by Khanh Silva MD on 7/7/2021 at 4:34 PM

## 2021-07-07 NOTE — PROGRESS NOTES
Samanta Morris (:  1955) is a 73 y.o. male,Established patient, here for evaluation of the following chief complaint(s):  Diabetes            ASSESSMENT/PLAN:  1. Type 2 diabetes mellitus with other specified complication, with long-term current use of insulin ([facility])  -     POCT glycosylated hemoglobin (Hb A1C)  -     metFORMIN (GLUCOPHAGE) 1000 MG tablet; Take 1 tablet by mouth 2 times daily (with meals), Disp-180 tablet, R-1Normal  -     AFL - Elisa Jurado MD, [facility]thal[facility]  2. Type 2 diabetes mellitus with hyperglycemia, with long-term current use of insulin ([facility])  -     insulin lispro, 1 Unit Dial, (HUMALOG KWIKPEN) 100 UNIT/ML SOPN; Inject 7 Units into the skin 3 times daily (before meals), Disp-10 pen, R-3Normal  -     insulin glargine (LANTUS SOLOSTAR) 100 UNIT/ML injection pen; Inject 22 Units into the skin 2 times daily, Disp-15 pen, R-3Normal  3. Gastroparesis    Will increase Lantus from 21 to 22 units BID for better BG control. Continue humalog 7u premeal- patient takes twice a day. Encourage to continue lifestyle modifications with diet and exercise. Voices understanding. Advised to discuss his concerns regarding on going nausea and diarrhea with Dr. Gladys Blakely. Patient seen and case discussed with Dr. Duke Lennon. Return in about 4 weeks (around 2021), or if symptoms worsen or fail to improve. Subjective   SUBJECTIVE/OBJECTIVE:  Patient with history of T2DM, gastroparesis with chronic issues of diarrhea and nausea/vomiting. Patient is [facility]'s patient, last office visit in 2021. Followed for diabetes management mainly. T2DM: BG ranging in 180s on average. Hb A1c checked this office visit 7.5, mild increase from 7.1. Reports no acute issues since his last office visit. No episodes of shaking, dizziness, syncope, confusion. No noted episodes of hypoglycemia. States he did not take his medications over the last few days due to unexpected travel.  States he usually does not take his meds with him because he has had issues with it in the past. Reports compliance to meds otherwise. Foot exam done this visit- no neuropathy, no skin lesions/wounds noted. Last eye exam over 2 years ago. Was seen by Dr. Vikram Dunn. Will refer to him. Last lipid panel done 7/2020. . Will recheck Lipid panel prior to next visit. Also complaining of ongoing issues with nausea/vomiting and diarrhea. Had EGD and recent imaging of abdomen and pelvis that revealed diverticulosis but no other issues. Has follow up with Dr. Elsy Peters next week. Review of Systems   Constitutional: Negative for chills, diaphoresis, fatigue and fever. Respiratory: Positive for apnea. Negative for cough, chest tightness and shortness of breath. Cardiovascular: Negative for chest pain, palpitations and leg swelling. Gastrointestinal: Positive for diarrhea (chronic) and vomiting (chronic). Negative for abdominal pain. Skin: Negative for rash and wound. Neurological: Negative for dizziness, syncope, weakness and light-headedness. All other systems reviewed and are negative. Objective   Physical Exam  Vitals reviewed. Constitutional:       General: He is not in acute distress. Appearance: Normal appearance. HENT:      Head: Normocephalic and atraumatic. Nose: Nose normal.   Eyes:      Extraocular Movements: Extraocular movements intact. Conjunctiva/sclera: Conjunctivae normal.   Cardiovascular:      Rate and Rhythm: Normal rate and regular rhythm. Pulses: Normal pulses. Heart sounds: Normal heart sounds. No murmur heard. Pulmonary:      Effort: Pulmonary effort is normal. No respiratory distress. Breath sounds: Normal breath sounds. No rales. Abdominal:      General: Abdomen is flat. Bowel sounds are normal. There is no distension. Palpations: Abdomen is soft. Tenderness: There is no abdominal tenderness.    Musculoskeletal:         General: Normal range of motion. Cervical back: Normal range of motion. Skin:     General: Skin is warm and dry. Neurological:      General: No focal deficit present. Mental Status: He is alert and oriented to person, place, and time. Mental status is at baseline. Psychiatric:         Mood and Affect: Mood normal.         Behavior: Behavior normal.                  An electronic signature was used to authenticate this note.     --Nubia Jacobs MD

## 2021-07-08 ENCOUNTER — OFFICE VISIT (OUTPATIENT)
Dept: FAMILY MEDICINE CLINIC | Age: 66
End: 2021-07-08
Payer: MEDICARE

## 2021-07-08 VITALS
HEART RATE: 90 BPM | HEIGHT: 70 IN | BODY MASS INDEX: 31.07 KG/M2 | RESPIRATION RATE: 18 BRPM | OXYGEN SATURATION: 98 % | WEIGHT: 217 LBS | SYSTOLIC BLOOD PRESSURE: 128 MMHG | DIASTOLIC BLOOD PRESSURE: 82 MMHG

## 2021-07-08 DIAGNOSIS — K21.9 GERD WITH STRICTURE: ICD-10-CM

## 2021-07-08 DIAGNOSIS — E78.00 PURE HYPERCHOLESTEROLEMIA: ICD-10-CM

## 2021-07-08 DIAGNOSIS — K22.2 GERD WITH STRICTURE: ICD-10-CM

## 2021-07-08 DIAGNOSIS — G93.5 CHIARI MALFORMATION TYPE I (HCC): ICD-10-CM

## 2021-07-08 DIAGNOSIS — E78.2 MIXED HYPERLIPIDEMIA: ICD-10-CM

## 2021-07-08 DIAGNOSIS — E11.69 TYPE 2 DIABETES MELLITUS WITH OTHER SPECIFIED COMPLICATION, WITH LONG-TERM CURRENT USE OF INSULIN (HCC): ICD-10-CM

## 2021-07-08 DIAGNOSIS — G47.31 CSA (CENTRAL SLEEP APNEA): ICD-10-CM

## 2021-07-08 DIAGNOSIS — E11.65 TYPE 2 DIABETES MELLITUS WITH HYPERGLYCEMIA, WITH LONG-TERM CURRENT USE OF INSULIN (HCC): Primary | ICD-10-CM

## 2021-07-08 DIAGNOSIS — R80.9 MICROALBUMINURIA DUE TO TYPE 2 DIABETES MELLITUS (HCC): ICD-10-CM

## 2021-07-08 DIAGNOSIS — K31.84 GASTROPARESIS: ICD-10-CM

## 2021-07-08 DIAGNOSIS — E11.29 MICROALBUMINURIA DUE TO TYPE 2 DIABETES MELLITUS (HCC): ICD-10-CM

## 2021-07-08 DIAGNOSIS — Z79.4 TYPE 2 DIABETES MELLITUS WITH OTHER SPECIFIED COMPLICATION, WITH LONG-TERM CURRENT USE OF INSULIN (HCC): ICD-10-CM

## 2021-07-08 DIAGNOSIS — E66.9 OBESITY (BMI 30-39.9): ICD-10-CM

## 2021-07-08 DIAGNOSIS — Z12.5 SCREENING FOR PROSTATE CANCER: ICD-10-CM

## 2021-07-08 DIAGNOSIS — Z79.4 TYPE 2 DIABETES MELLITUS WITH HYPERGLYCEMIA, WITH LONG-TERM CURRENT USE OF INSULIN (HCC): Primary | ICD-10-CM

## 2021-07-08 DIAGNOSIS — I10 ESSENTIAL HYPERTENSION: ICD-10-CM

## 2021-07-08 PROCEDURE — 99214 OFFICE O/P EST MOD 30 MIN: CPT | Performed by: FAMILY MEDICINE

## 2021-07-08 PROCEDURE — 3051F HG A1C>EQUAL 7.0%<8.0%: CPT | Performed by: FAMILY MEDICINE

## 2021-07-08 RX ORDER — BLOOD-GLUCOSE TRANSMITTER
EACH MISCELLANEOUS
Qty: 1 EACH | Refills: 3 | Status: SHIPPED | OUTPATIENT
Start: 2021-07-08 | End: 2022-02-02 | Stop reason: SDUPTHER

## 2021-07-08 RX ORDER — BLOOD-GLUCOSE SENSOR
EACH MISCELLANEOUS
Qty: 3 EACH | Refills: 5 | Status: SHIPPED | OUTPATIENT
Start: 2021-07-08 | End: 2022-02-02 | Stop reason: SDUPTHER

## 2021-07-08 ASSESSMENT — ENCOUNTER SYMPTOMS
DIARRHEA: 1
RESPIRATORY NEGATIVE: 1

## 2021-07-08 NOTE — PROGRESS NOTES
insulin lispro, 1 Unit Dial, (HUMALOG KWIKPEN) 100 UNIT/ML SOPN Inject 7 Units into the skin 3 times daily (before meals) Yes Trenton Eduardo MD   insulin glargine (LANTUS SOLOSTAR) 100 UNIT/ML injection pen Inject 22 Units into the skin 2 times daily Yes Trenton Eduardo MD   pantoprazole (PROTONIX) 40 MG tablet Take 1 tablet by mouth every morning (before breakfast) Yes RAFAEL Li CNP   ondansetron (ZOFRAN ODT) 4 MG disintegrating tablet Take 1 tablet by mouth every 8 hours as needed for Nausea or Vomiting Yes RAFAEL Li CNP   metoclopramide (REGLAN) 10 MG tablet Take 1 tablet by mouth 4 times daily Yes Lasha Nieves MD   amitriptyline (ELAVIL) 25 MG tablet Take 1 tablet by mouth nightly Yes Roman Session, DO   CPAP Machine MISC by Does not apply route Please change bipap 11/8 Yes Reshma Avila PA-C   albuterol sulfate HFA (VENTOLIN HFA) 108 (90 Base) MCG/ACT inhaler Inhale 2 puffs into the lungs every 4 hours as needed for Wheezing or Shortness of Breath Yes Roman Session, DO   lisinopril (PRINIVIL;ZESTRIL) 10 MG tablet Take 2 tablet by mouth once daily Yes Roman Session, DO   Cholecalciferol (VITAMIN D3) 125 MCG (5000 UT) TABS Take 5,000 Units by mouth daily Yes Historical Provider, MD   XARELTO 10 MG TABS tablet Take 1 tablet by mouth once daily with breakfast Yes RAFAEL Bernardo CNP   Continuous Blood Gluc  (DEXCOM G6 ) YULI Use as directed for continuous glucose monitor. Yes RAFAEL Parra CNP   cyclobenzaprine (FLEXERIL) 5 MG tablet Take 5 mg by mouth as needed Yes Historical Provider, MD   pravastatin (PRAVACHOL) 80 MG tablet Take 1 tablet by mouth daily Yes Lenise Burkitt, MD   Misc. Devices (ROLLATOR ULTRA-LIGHT) MISC Use Rollator for stability with ambulation.  Yes Lenise Burkitt, MD   Blood Glucose Monitoring Suppl (ONE TOUCH ULTRA 2) w/Device KIT 1 kit by Does not apply route daily Yes Lenise Burkitt, MD   blood glucose monitor strips One Touch Ultra Test Strips. Test 2 times a day and as needed for diabetic symptoms. DX: E11.22 Yes Caty Brewster MD   nitroGLYCERIN (NITROSTAT) 0.4 MG SL tablet Place 1 tablet under the tongue every 5 minutes as needed for Chest pain Yes Donovan Ganser, MD   aspirin 81 MG tablet Take 81 mg by mouth daily Yes Historical Provider, MD   Continuous Blood Gluc Sensor (DEXCOM G6 SENSOR) MISC Change every 10 days-Use as directed for continuous glucose monitoring. Arley Ogden MD   Continuous Blood Gluc Transmit (DEXCOM G6 TRANSMITTER) MISC Change every 3 months- Use as directed for continuous glucose monitoring.   Arron Figueroa MD        Allergies   Allergen Reactions    Latex Rash       Past Medical History:   Diagnosis Date    Anxiety     CAD (coronary artery disease)     Depression     Diabetes mellitus ([address]     Frequent headaches     Gastroparesis     GERD (gastroesophageal reflux disease)     History of Chiari malformation     Hyperlipidemia     Hypertension     Kidney stone     Kidney stone     Migraine     Movement disorder     DDD    Pulmonary emboli ([address] 10/29/2018    Shingles     Sleep apnea     Tattoos        Past Surgical History:   Procedure Laterality Date    BACK SURGERY  2008    C1 oates & decompression    BACK SURGERY  08/25/2020    [facility] Dr. Janie Daniel, C5-7 cadaver patch   [facility]  2008    chiari    CARDIAC SURGERY  2007    heart cath    CHOLECYSTECTOMY  2009    COLONOSCOPY  2017    COLONOSCOPY N/A 3/12/2020    COLONOSCOPY POLYPECTOMY SNARE/COLD BIOPSY performed by Viet Miller MD at [address] [place] [address], COLON, DIAGNOSTIC  2007   [address] [facility]    right elbow    IVC FILTER REMOVAL  03/15/2020    [address]  2006    SHOULDER SURGERY Left 08/16/2018    TONSILLECTOMY  1964    UPPER GASTROINTESTINAL ENDOSCOPY Left 3/12/2020    EGD ESOPHAGOGASTRODUODENOSCOPY DILATATION performed by Veit Miller MD at [facility]  UPPER GASTROINTESTINAL ENDOSCOPY Left 3/12/2020    EGD BIOPSY performed by James Larios MD at [address] [place] [address]  2015    right middle finger artery       Social History     Socioeconomic History    Marital status:      Spouse name: Not on file    Number of children: 0    Years of education: 15    Highest education level: High school graduate   Occupational History    Not on file   Tobacco Use    Smoking status: Former Smoker     Packs/day: 1.50     Years: 3.00     Pack years: 4.50     Types: Cigarettes     Start date: 1976     Quit date: 10/12/1979     Years since quittin.7    Smokeless tobacco: Never Used   Vaping Use    Vaping Use: Never used   Substance and Sexual Activity    Alcohol use: No    Drug use: No    Sexual activity: Yes     Partners: Female   Other Topics Concern    Not on file   Social History Narrative    Not on file     Social Determinants of Health     Financial Resource Strain: Medium Risk    Difficulty of Paying Living Expenses: Somewhat hard   Food Insecurity: No Food Insecurity    Worried About Running Out of Food in the Last Year: Never true    Cayla of Food in the Last Year: Never true   Transportation Needs:     Lack of Transportation (Medical):      Lack of Transportation (Non-Medical):    Physical Activity:     Days of Exercise per Week:     Minutes of Exercise per Session:    Stress:     Feeling of Stress :    Social Connections: Unknown    Frequency of Communication with Friends and Family: Not on file    Frequency of Social Gatherings with Friends and Family: Not on file    Attends Yarsanism Services: More than 4 times per year    Active Member of [employer] or Organizations: No    Attends Club or Organization Meetings: Never    Marital Status: Not on file   Intimate Partner Violence:     Fear of Current or Ex-Partner:     Emotionally Abused:     Physically Abused:     Sexually Abused:         Family History   Problem Relation Age of Onset    Kidney Disease Mother     Diabetes Mother     Heart Disease Mother     High Blood Pressure Mother     Diabetes Father     Cancer Father         lung--smoker    High Blood Pressure Father     Diabetes Sister     High Blood Pressure Sister     Diabetes Brother     High Blood Pressure Brother        ADVANCE DIRECTIVE: N, <no information>    Vitals:    07/08/21 1345   BP: 128/82   Site: Left Upper Arm   Position: Sitting   Cuff Size: Medium Adult   Pulse: 90   Resp: 18   SpO2: 98%   Weight: 217 lb (98.4 kg)   Height: 5' 10\" (1.778 m)     Estimated body mass index is 31.14 kg/m² as calculated from the following:    Height as of this encounter: 5' 10\" (1.778 m). Weight as of this encounter: 217 lb (98.4 kg). Physical Exam  Vitals and nursing note reviewed. Constitutional:       General: He is not in acute distress. Appearance: Normal appearance. He is well-developed. HENT:      Head: Normocephalic and atraumatic. Right Ear: Tympanic membrane normal.      Left Ear: Tympanic membrane normal.   Eyes:      Conjunctiva/sclera: Conjunctivae normal.   Cardiovascular:      Rate and Rhythm: Normal rate and regular rhythm. Heart sounds: Normal heart sounds. No murmur heard. Pulmonary:      Effort: Pulmonary effort is normal.      Breath sounds: Normal breath sounds. No wheezing, rhonchi or rales. Abdominal:      General: There is no distension. Musculoskeletal:      Cervical back: Neck supple. Skin:     General: Skin is warm and dry. Findings: No rash (on exposed surfaces). Neurological:      General: No focal deficit present. Mental Status: He is alert. Psychiatric:         Attention and Perception: Attention normal.         Mood and Affect: Mood normal.         Speech: Speech normal.         Behavior: Behavior normal. Behavior is cooperative. Thought Content:  Thought content normal.         Judgment: Judgment normal.         No flowsheet data found.     Lab Results   Component Value Date    CHOL 166 05/21/2016    CHOLFAST 250 07/22/2020    TRIG 215 05/21/2016    TRIGLYCFAST 186 07/22/2020    HDL 44 07/22/2020    HDL 35 05/21/2016    LDLCALC 169 07/22/2020    LDLCALC 88 05/21/2016    GLUF 146 11/14/2020    GLUCOSE 149 05/17/2021    GLUCOSE 135 08/26/2020    LABA1C 7.5 07/07/2021    LABA1C 7.1 02/23/2021    LABA1C 7.9 10/23/2020    LABA1C 8.1 07/28/2020    LABA1C 10.3 06/18/2020       The 10-year ASCVD risk score (Francisco Javier Alvarenga, et al., 2013) is: 28.4%    Values used to calculate the score:      Age: 73 years      Sex: Male      Is Non- : No      Diabetic: Yes      Tobacco smoker: No      Systolic Blood Pressure: 479 mmHg      Is BP treated: No      HDL Cholesterol: 44 mg/dL      Total Cholesterol: 250 mg/dl    Immunization History   Administered Date(s) Administered    Influenza Vaccine, unspecified formulation 11/03/2010, 10/09/2012, 09/19/2013    Influenza Virus Vaccine 11/03/2010, 10/09/2012, 09/19/2013, 10/06/2014, 10/14/2015, 11/28/2016, 11/14/2018    Influenza, Quadv, IM, PF (6 mo and older Fluzone, Flulaval, Fluarix, and 3 yrs and older Afluria) 10/06/2014, 10/14/2015, 11/28/2016, 11/14/2018, 10/31/2019, 11/17/2020    Pneumococcal Polysaccharide ([id number]) 10/09/2012    Tdap (Boostrix, Adacel) 07/22/2015    Zoster Live (Zostavax) 11/28/2016       Health Maintenance   Topic Date Due    Diabetic retinal exam  Never done    COVID-19 Vaccine (1) Never done   [employer] Visit (AWV)  Never done    Pneumococcal 65+ years Vaccine (1 of 1 - PPSV23) 05/18/2021    Diabetic foot exam  07/14/2021    Diabetic microalbuminuria test  07/22/2021    Lipid screen  07/22/2021    Shingles Vaccine (2 of 3) 11/17/2021 (Originally 1/23/2017)    Flu vaccine (1) 09/01/2021    A1C test (Diabetic or Prediabetic)  10/07/2021    Potassium monitoring  05/17/2022    Creatinine monitoring  05/17/2022    DTaP/Tdap/Td vaccine (2 - Td or Tdap) 07/22/2025    Colon cancer screen colonoscopy  03/12/2030    AAA screen  Completed    Hepatitis A vaccine  Aged Out    Hib vaccine  Aged Out    Meningococcal (ACWY) vaccine  Aged Out    Hepatitis C screen  Discontinued    HIV screen  Discontinued          ASSESSMENT/PLAN:  1. Type 2 diabetes mellitus with hyperglycemia, with long-term current use of insulin (HCC)  -     Microalbumin / Creatinine Urine Ratio; Future  2. Microalbuminuria due to type 2 diabetes mellitus (HCC)  3. Obesity (BMI 30-39.9)  4. Pure hypercholesterolemia  5. Essential hypertension  6. Mixed hyperlipidemia  -     Lipid Panel w/ Reflex Direct LDL; Future  -     TSH with Reflex; Future  7. Screening for prostate cancer  -     PSA screening; Future  8. GERD with stricture  9. Gastroparesis  10. CSA (central sleep apnea)  11. Chi[facility] malformation type [facility] ([facility]    -  Chronic medical problems stable  -  Continue current medications  -  Follow up with specialists as scheduled  -  Add labs above to GI labs    Return in about 6 months (around 1/8/2022) for DM2. An electronic signature was used to authenticate this note.     --Dedrick Medel DO on 7/8/2021 at 2:18 PM

## 2021-07-09 ENCOUNTER — NURSE ONLY (OUTPATIENT)
Dept: LAB | Age: 66
End: 2021-07-09

## 2021-07-09 ENCOUNTER — CARE COORDINATION (OUTPATIENT)
Dept: CARE COORDINATION | Age: 66
End: 2021-07-09

## 2021-07-09 DIAGNOSIS — Z12.5 SCREENING FOR PROSTATE CANCER: ICD-10-CM

## 2021-07-09 DIAGNOSIS — Z79.4 TYPE 2 DIABETES MELLITUS WITH HYPERGLYCEMIA, WITH LONG-TERM CURRENT USE OF INSULIN (HCC): ICD-10-CM

## 2021-07-09 DIAGNOSIS — E78.00 PURE HYPERCHOLESTEROLEMIA: ICD-10-CM

## 2021-07-09 DIAGNOSIS — R10.13 ABDOMINAL PAIN, CHRONIC, EPIGASTRIC: ICD-10-CM

## 2021-07-09 DIAGNOSIS — R80.9 MICROALBUMINURIA DUE TO TYPE 2 DIABETES MELLITUS (HCC): ICD-10-CM

## 2021-07-09 DIAGNOSIS — R74.8 SERUM AMYLASE ELEVATED: ICD-10-CM

## 2021-07-09 DIAGNOSIS — G89.29 ABDOMINAL PAIN, CHRONIC, EPIGASTRIC: ICD-10-CM

## 2021-07-09 DIAGNOSIS — R11.2 NON-INTRACTABLE VOMITING WITH NAUSEA, UNSPECIFIED VOMITING TYPE: ICD-10-CM

## 2021-07-09 DIAGNOSIS — E11.29 MICROALBUMINURIA DUE TO TYPE 2 DIABETES MELLITUS (HCC): ICD-10-CM

## 2021-07-09 DIAGNOSIS — E11.65 TYPE 2 DIABETES MELLITUS WITH HYPERGLYCEMIA, WITH LONG-TERM CURRENT USE OF INSULIN (HCC): ICD-10-CM

## 2021-07-09 LAB
ALBUMIN SERPL-MCNC: 3.8 G/DL (ref 3.5–5.1)
ALP BLD-CCNC: 130 U/L (ref 38–126)
ALT SERPL-CCNC: 26 U/L (ref 11–66)
AMYLASE: 216 U/L (ref 20–104)
ANION GAP SERPL CALCULATED.3IONS-SCNC: 11 MEQ/L (ref 8–16)
AST SERPL-CCNC: 20 U/L (ref 5–40)
BILIRUB SERPL-MCNC: 0.3 MG/DL (ref 0.3–1.2)
BUN BLDV-MCNC: 11 MG/DL (ref 7–22)
CALCIUM SERPL-MCNC: 9.3 MG/DL (ref 8.5–10.5)
CHLORIDE BLD-SCNC: 101 MEQ/L (ref 98–111)
CHOLESTEROL, TOTAL: 224 MG/DL (ref 100–199)
CO2: 26 MEQ/L (ref 23–33)
CREAT SERPL-MCNC: 0.8 MG/DL (ref 0.4–1.2)
CREATININE, URINE: 154.1 MG/DL
ERYTHROCYTE [DISTWIDTH] IN BLOOD BY AUTOMATED COUNT: 12.8 % (ref 11.5–14.5)
ERYTHROCYTE [DISTWIDTH] IN BLOOD BY AUTOMATED COUNT: 42.1 FL (ref 35–45)
GFR SERPL CREATININE-BSD FRML MDRD: > 90 ML/MIN/1.73M2
GLUCOSE BLD-MCNC: 208 MG/DL (ref 70–108)
HCT VFR BLD CALC: 43.3 % (ref 42–52)
HDLC SERPL-MCNC: 43 MG/DL
HEMOGLOBIN: 14.4 GM/DL (ref 14–18)
LDL CHOLESTEROL CALCULATED: 133 MG/DL
LIPASE: 103.9 U/L (ref 5.6–51.3)
MCH RBC QN AUTO: 29.8 PG (ref 26–33)
MCHC RBC AUTO-ENTMCNC: 33.3 GM/DL (ref 32.2–35.5)
MCV RBC AUTO: 89.6 FL (ref 80–94)
MICROALBUMIN UR-MCNC: 128.07 MG/DL
MICROALBUMIN/CREAT UR-RTO: 831 MG/G (ref 0–30)
PLATELET # BLD: 266 THOU/MM3 (ref 130–400)
PMV BLD AUTO: 8.8 FL (ref 9.4–12.4)
POTASSIUM SERPL-SCNC: 4.3 MEQ/L (ref 3.5–5.2)
PROSTATE SPECIFIC ANTIGEN: 0.52 NG/ML (ref 0–1)
RBC # BLD: 4.83 MILL/MM3 (ref 4.7–6.1)
SODIUM BLD-SCNC: 138 MEQ/L (ref 135–145)
TOTAL PROTEIN: 7.6 G/DL (ref 6.1–8)
TRIGL SERPL-MCNC: 238 MG/DL (ref 0–199)
TSH SERPL DL<=0.05 MIU/L-ACNC: 1.84 UIU/ML (ref 0.4–4.2)
WBC # BLD: 6.5 THOU/MM3 (ref 4.8–10.8)

## 2021-07-09 NOTE — CARE COORDINATION
Ambulatory Care Coordination Note  7/9/2021  CM Risk Score: 3  Charlson 10 Year Mortality Risk Score: 23%     ACC: Maribel Bridges, RN    Summary Note: Vance Rodriguez is being followed by care coordination for education and assistance in managing his chronic conditions. Spoke with Vance Rodriguez today for f/u  Pt seen by DM clinic 7/7 and by PCP on 7/8. Pt continues to have diarrhea. Reports that it is intermittent at times. Had lab work completed today and will f/u with GI on 7/12. Staying hydrated. DM: continues to f/u with DM clinic. A1C up slightly to 7.5. Lantus increased to 22 units. On Humalog as well. BS's fluctuating upper 100s-200 range. Working on making some dietary adjustments to improve readings. Battles with gastroparesis which makes it difficult at times. Chiari-has new neuro appt in Aug.   SHAHRAM-has not been wearing bipap recently d/t epigastric pain. Since stopping pain has resolved. Pt has had adjustments to settings in past that did not help with his s/s. Pt is going to try to resume using bipap again to see if symptoms return. Plans on discussing with neuro at visit as well. Occurs rare occasion. Wakes up with feeling of having difficult time catching breath. Not occurring all the time. PULM aware and have suggested the f/u with new neuro for his Chiari. Continues to work with therapy and ortho for his back. Plans on finding new ENT. Plan of care:  F/u with GI as scheduled  Continue working on diet  Resume Bipap use and if s/s of pain return discuss at new neuro appt to see if they have any suggestions or options to address issue. Discussed other ENT's in area. Also recommended talking with neuro  F/u with neuro in Aug as scheduled  Continue close monitoring of BS's. Continue working with DM clinic  Call DM clinic if BS's remain uncontrolled with new dose of Lantus  Continue f/u with ortho  Provided pt with medical records number to have imaging sent to neuro.    Diabetes Assessment    Medic Alert ID: No  Meal Planning: Avoidance of concentrated sweets   How often do you test your blood sugar?: Meals   Do you have barriers with adherence to non-pharmacologic self-management interventions? (Nutrition/Exercise/Self-Monitoring): No   Have you ever had to go to the ED for symptoms of low blood sugar?: No       Do you have hyperglycemia symptoms?: No   Do you have hypoglycemia symptoms?: No   Blood Sugar Monitoring Regimen: Before Meals   Blood Sugar Trends: Fluctuating      ,   Congestive Heart Failure Assessment    Are you currently restricting fluids?: No Restriction  Do you understand a low sodium diet?: Yes  Do you understand how to read food labels?: Yes  How many restaurant meals do you eat per week?: 1-2  Do you salt your food before tasting it?: No         Symptoms:  CHF associated angina: Neg, CHF associated dyspnea on exertion: Neg, CHF associated fatigue: Neg, CHF associated leg swelling: Neg, CHF associated orthostatic hypotension: Neg, CHF associated PND: Neg, CHF associated shortness of breath: Neg, CHF associated weakness: Neg      Symptom course: stable  Salt intake watch compared to last visit: stable      and   General Assessment    Do you have any symptoms that are causing concern?: Yes  Progression since Onset: Intermittent - Waxing/Waning  Reported Symptoms: Other (Comment: diarrhea)                     Care Coordination Interventions    Program Enrollment: Complex Care  Referral from Primary Care Provider: No  Suggested Interventions and Community Resources  Disease Specific Clinic: Completed (Comment: DM clinic)  Zone Management Tools: Completed (Comment: DM, CHF)         Goals Addressed    None         Prior to Admission medications    Medication Sig Start Date End Date Taking? Authorizing Provider   Continuous Blood Gluc Sensor (DEXCOM G6 SENSOR) MISC Change every 10 days-Use as directed for continuous glucose monitoring.  7/8/21   Arley Ogden MD   Continuous Blood Gluc Transmit (DEXCOM 1 tablet by mouth daily 8/4/20   Katherine Farnsworth MD   [facility]. Devices (ROLLATOR ULTRA-LIGHT) MISC Use Rollator for stability with ambulation. 7/28/20   Katherine Farnsworth MD   Blood Glucose Monitoring Suppl (ONE TOUCH ULTRA 2) w/Device KIT 1 kit by Does not apply route daily 6/25/20   Katherine Farnsworth MD   blood glucose monitor strips One Touch Ultra Test Strips. Test 2 times a day and as needed for diabetic symptoms.  DX: E11.22 4/3/20   Katherine Farnsworth MD   nitroGLYCERIN (NITROSTAT) 0.4 MG SL tablet Place 1 tablet under the tongue every 5 minutes as needed for Chest pain 5/21/16   Tamy Castro MD   aspirin 81 MG tablet Take 81 mg by mouth daily    Historical Provider, MD       Future Appointments   Date Time Provider Hailey Brown   7/12/2021  1:00 PM Gi Colby MD [facility]   7/14/2021  8:45 AM Eduard Chang PT [facility]   8/4/2021  8:00 AM Pat Martinez RN [facility]   8/4/2021  1:20 PM Asha Pantoja MD [facility]   8/23/2021  1:45 PM Daniel Parada 45   11/3/2021  1:15 PM Daniel Parada 45   1/7/2022  8:15 AM Calvin Ochoa DO [address]

## 2021-07-13 ENCOUNTER — TELEPHONE (OUTPATIENT)
Dept: ENT CLINIC | Age: 66
End: 2021-07-13

## 2021-07-13 NOTE — TELEPHONE ENCOUNTER
Efrem Burleson called [address] [facility], stating that he received a phone call from [facility]. Efrem Burleson was last seen by Dr. Juan Espinosa 3/30/21. Efrem Burleson states that he will not be coming back. He was told by others not to come to this office, he feels that he received slide to the side visit with Dr. Juan Espinosa. Efrem Benjy also states that he was told to go on to a different office, Dr. Juan Espinosa did not address his main concern or any of his past medical history. From 3/30/21, office notes Efrem Burleson was to have a two month follow up visit, it was not scheduled. Efrem Burleson states that he will likely go to the [facility] or elsewhere.

## 2021-07-14 ENCOUNTER — HOSPITAL ENCOUNTER (OUTPATIENT)
Dept: PHYSICAL THERAPY | Age: 66
Setting detail: THERAPIES SERIES
Discharge: HOME | End: 2021-07-14
Payer: MEDICARE

## 2021-07-14 ENCOUNTER — NURSE ONLY (OUTPATIENT)
Dept: LAB | Age: 66
End: 2021-07-14

## 2021-07-14 DIAGNOSIS — R19.7 DIARRHEA, UNSPECIFIED TYPE: ICD-10-CM

## 2021-07-14 DIAGNOSIS — R11.2 NON-INTRACTABLE VOMITING WITH NAUSEA, UNSPECIFIED VOMITING TYPE: ICD-10-CM

## 2021-07-14 DIAGNOSIS — R13.14 PHARYNGOESOPHAGEAL DYSPHAGIA: ICD-10-CM

## 2021-07-14 PROCEDURE — 97032 APPL MODALITY 1+ESTIM EA 15: CPT

## 2021-07-14 PROCEDURE — 97110 THERAPEUTIC EXERCISES: CPT

## 2021-07-14 NOTE — DISCHARGE SUMMARY
[facility]  PHYSICAL THERAPY  [] EVALUATION  [] DAILY NOTE (LAND) [] DAILY NOTE (AQUATIC ) [] PROGRESS NOTE [x] DISCHARGE NOTE    [x] OUTPATIENT REHABILITATION CENTER [facility]   [] [place] [address]    [] [facility]   [] Alexis Sheikh    Date: 2021  Patient Name:  Becca Nuñez  : [birth year]  MRN: [id number]  CSN: [id number]    Referring Practitioner Robles Mendoza MD   Diagnosis Other intervertebral disc degeneration, lumbar region [M51.36]    Treatment Diagnosis Chronic lumbar pain without radiculopathy, core weakness, lumbar and hip muscular tightness   Date of Evaluation 21    Additional Pertinent History HTN, vertigo/dizziness, diabetes, memory issues, SOB. Cervical surgery 2020. Chiari malformation. Functional Outcome Measure Used Oswestry   Functional Outcome Score 25/50=50% (21) 33/50= 66% (21)      Insurance: Primary: Payor: Sully Reynoso /  /  / ,   Secondary:    Authorization Information: Mai Councilman required after eval, aquatics and modalities covered ionto, $40 copay/vist  APPROVED FOR 7 VISITS FROM 21 TO 21  CPT CODES:  82947, [id number], 28386, 51522, 47882, 26712  CPT CODE:  23178 IS NOT VALID   Visit # 5, 5/10 for progress note   Visits Allowed: eval + 7    Recertification Date: 65   Physician Follow-Up: None scheduled   Physician Orders: At least 6 weeks for therapy for insurance approval of MRI   History of Present Illness: Pt presents with chronic pain. Pain is constant, with lifting being most aggravating. Pt denies radiculopathy. H/o right sciatic in right hip. XR shows multiple bone spurring, with L1-3 and L4-5 disc degeneration. MRI was denied. SUBJECTIVE:  Patient reports he hasn't slept in 2 days d/t back pain. Pt was unable to attend therapy last week due to elevated pain. Pt notes he fell twice in the last week d/t legs giving out on him.      TREATMENT   Precautions: Chiari malformation, neck surgery 2020. Pain: 8/10 Right lumbar     X in shaded column indicates activity completed today   Modalities Parameters/  Location  Notes   IFC Estim attended to right lumbar in sitting with MHP IFC, 20 minutes, Intensity 12.5 X  During assessment               Manual Therapy Time/Technique  Notes   MET for right anterior rotation: hooklying resisted left hip flexion and right hamstring set 5x5 sec  Pelvis aligned post treatment               Exercise/Intervention   Notes   SKTC, DKTC, piriformis diag KTC, hamstring, LTR stretches 3x20 sec   On wedge   Seated stretches: forward flexion, hamstring, piriformis 3x 15 sec  With [facility]HP and E-stim   Seated: abdominal bracing 10x 5 sec X    Alt march, alt UEs, alt combo UE/LE 10x  X No combo   Knee fall outs bilat and alt unilat 10x each       Hip adduction ball squeeze 10x 5 sec X    LAQ 10  x                                         Specific Interventions Next Treatment: progress as tolerated, modalities and manual as needed    Activity/Treatment Tolerance:  [x]  Patient tolerated treatment well  []  Patient limited by fatigue  []  Patient limited by pain   []  Patient limited by medical complications  []  Other:     Assessment:  Pt has completed 6 weeks of therapy with worsening pain noted. Pt unable to sleep, sit/stand/walk for >30 minutes, or lift. Pt has difficulty with personal care and has recently had 2 falls d/t legs giving out. Pt has poor tolerance to exercises and only gets temporary relief from estim. PT has been unsuccessful at this time and recommend further testing. Plan discharge at this time. Goals    Patient Goal: Decrease back pain     Short Term Goals: 6 weeks  Patient will report reduced pain to <5/10  to tolerate sitting, standing, and walking longer durations. GOAL NOT MET: Pain has averaged 8/10 over the past few weeks, limiting sitting, standing and walking tolerance.   Discontinue Goal  Patient will demonstrate good core engagement and postural awareness during therapy session with minimal to no cues  to carry over to functional activities, lifting, and housework. GOAL NOT MET: Pt requires periodic cues for abdominal bracing during exercises, awareness affected by pain and lack of sleep. Discontinue Goal  Patient will have <25% lumbar and hip AROM restriction for ease bending over to complete ADLs with less limitation. GOAL NOT MET: Lumbar flexion and piriformis limited 50% with low back pain, tight hamstring noted as well. Discontinue Goal      Long Term Goals: 12 weeks  Patient will be independent and compliant with HEP daily to achieve above goals. GOAL NOT MET: Pt notes he doesn't tolerate many of his home exercises and unable to do go to gym right now d/t pain. Discontinue Goal  Pt will reduce Oswestry score from 50% to 30% to tolerate sitting, standing and lifting. GOAL NOT MET: score 66%. Discontinue Goal        Patient Education:   []  HEP/Education Completed: Perform stretches seated, monitor pain, use of tens and heat while completing exercises   ip.access Access Code:  []  No new Education completed  [x]  Reviewed Prior HEP      [x]  Patient verbalized and/or demonstrated understanding of education provided. []  Patient unable to verbalize and/or demonstrate understanding of education provided. Will continue education. []  Barriers to learning:     PLAN:  Treatment Recommendations: Strengthening, Range of Motion, Functional Mobility Training, Manual Therapy - Soft Tissue Mobilization, Home Exercise Program, Patient Education, Aquatics and Modalities    []  Plan of care initiated. Plan to see patient 1-2 times per week for 12 weeks to address the treatment planned outlined above.   []  Continue with current plan of care  []  Modify plan of care as follows:    []  Hold pending physician visit  [x]  Discharge    Time In 0847   Time Out 0920   Timed Code Minutes: 33 min   Total Treatment Time: 33 min     Electronically Signed by: Philip Cotto, PT

## 2021-07-15 LAB — TISSUE TRANSGLUTAMINASE IGA: 1 U/ML

## 2021-07-19 ENCOUNTER — TELEPHONE (OUTPATIENT)
Dept: INTERNAL MEDICINE CLINIC | Age: 66
End: 2021-07-19

## 2021-07-19 NOTE — TELEPHONE ENCOUNTER
Pt called in saying that he saw Dr. Rosalino Huang and Dr. Nory Herrera on 7/7/21 for the first time and then had an appt with his pcp on 7/8/21. Dr. Maureen Martinez was commenting that he thinks the metformin should have been extended release d/t his higher numbers in the evenings. Pt has not picked up the script yet and wanted to make sure this was the correct medication.   Any comments or recommendations as Dr. Nory Herrera is out until 7/26/21

## 2021-07-26 ENCOUNTER — CARE COORDINATION (OUTPATIENT)
Dept: CARE COORDINATION | Age: 66
End: 2021-07-26

## 2021-07-26 NOTE — CARE COORDINATION
Ambulatory Care Coordination Note  7/26/2021  CM Risk Score: 3  Charlson 10 Year Mortality Risk Score: 23%     ACC: Yael Porter RN    Summary Note: Denver Pierce is being followed by care coordination for education and assistance in managing his DM, chiari. Spoke with Denver Pierce today for f/u. Pt reports that he is doing ok  GI-had appt with Dr. Chey Dykes. Was experiencing diarrhea for several wks. Since visit diarrhea has subsided. Pt still needs to collect stool specimen for labs. Will f/u with Dr. Chey Dykes in August  [facility] appt with Dr. Helen Giordano. Plans on discussing multiple concerns at this appt. DM: continues to f/u with diabetic clinic. Reports BS's continue to fluctuate. Currently taking Humalog before meals. Taking Lantus twice daily. Typically takes evening dose around his evening meal.  Informed pt that Lantus does not have to be taken in regard to meal and can be taken later in the evening. Plans on discussing new ENT with Dr. Teto Peter at Aug appt. SHAHRAM-pt still not using  Bipap. Has not tried resuming. Plans on doing so  Plan of care:  Continue staying active  Monitor BS's close. F/u with Diabetic clinic. Call with any concerns re: BS's. F/u with Neuro 8/10. Encouraged to try using Bipap again  Get established with new ENT following neuro appt. Diabetes Assessment    Medic Alert ID: No  Meal Planning: Avoidance of concentrated sweets   How often do you test your blood sugar?: Meals   Do you have barriers with adherence to non-pharmacologic self-management interventions?  (Nutrition/Exercise/Self-Monitoring): No   Have you ever had to go to the ED for symptoms of low blood sugar?: No       Do you have hyperglycemia symptoms?: No   Do you have hypoglycemia symptoms?: No   Last Blood Sugar Value: 223   Blood Sugar Monitoring Regimen: Before Meals   Blood Sugar Trends: Fluctuating              Care Coordination Interventions    Program Enrollment: Complex Care  Referral from Primary Care Provider: No  Suggested Interventions and Community Resources  Disease Specific Clinic: Completed (Comment: DM clinic)  Zone Management Tools: Completed (Comment: DM, CHF)         Goals Addressed    None         Prior to Admission medications    Medication Sig Start Date End Date Taking? Authorizing Provider   Continuous Blood Gluc Sensor (DEXCOM G6 SENSOR) MISC Change every 10 days-Use as directed for continuous glucose monitoring. 7/8/21   Arley Ogden MD   Continuous Blood Gluc Transmit (DEXCOM G6 TRANSMITTER) MISC Change every 3 months- Use as directed for continuous glucose monitoring.  7/8/21   Arley Ogden MD   metFORMIN (GLUCOPHAGE) 1000 MG tablet Take 1 tablet by mouth 2 times daily (with meals) 7/7/21   Jacob Chandler MD   insulin lispro, 1 Unit Dial, (HUMALOG KWIKPEN) 100 UNIT/ML SOPN Inject 7 Units into the skin 3 times daily (before meals) 7/7/21 7/2/22  Jacob Chandler MD   insulin glargine (LANTUS SOLOSTAR) 100 UNIT/ML injection pen Inject 22 Units into the skin 2 times daily 7/7/21   Jacob Chandler MD   pantoprazole (PROTONIX) 40 MG tablet Take 1 tablet by mouth every morning (before breakfast) 4/22/21   RAFAEL Ramsay CNP   ondansetron (ZOFRAN ODT) 4 MG disintegrating tablet Take 1 tablet by mouth every 8 hours as needed for Nausea or Vomiting 4/1/21   RAFAEL Ramsay CNP   metoclopramide (REGLAN) 10 MG tablet Take 1 tablet by mouth 4 times daily 3/29/21   Ashley An MD   amitriptyline (ELAVIL) 25 MG tablet Take 1 tablet by mouth nightly 3/24/21   Edwin Ricardo, DO   CPAP Machine MISC by Does not apply route Please change bipap 11/8 3/24/21   Veena Maza PA-C   albuterol sulfate HFA (VENTOLIN HFA) 108 (90 Base) MCG/ACT inhaler Inhale 2 puffs into the lungs every 4 hours as needed for Wheezing or Shortness of Breath 2/2/21   Edwin Silva, DO   lisinopril (PRINIVIL;ZESTRIL) 10 MG tablet Take 2 tablet by mouth once daily 1/25/21   Edwin Silva, DO Cholecalciferol (VITAMIN D3) 125 MCG (5000 UT) TABS Take 5,000 Units by mouth daily    Historical Provider, MD   XARELTO 10 MG TABS tablet Take 1 tablet by mouth once daily with breakfast 10/9/20   RAFAEL Mcintyre CNP   Continuous Blood Gluc  (DEXCOM G6 ) YULI Use as directed for continuous glucose monitor. 10/8/20   RAFAEL Luna CNP   cyclobenzaprine (FLEXERIL) 5 MG tablet Take 5 mg by mouth as needed 8/26/20   Historical Provider, MD   pravastatin (PRAVACHOL) 80 MG tablet Take 1 tablet by mouth daily 8/4/20   Sheryle Loron, MD   [facility]. Devices (ROLLATOR ULTRA-LIGHT) MISC Use Rollator for stability with ambulation. 7/28/20   Sheryle Loron, MD   Blood Glucose Monitoring Suppl (ONE TOUCH ULTRA 2) w/Device KIT 1 kit by Does not apply route daily 6/25/20   Sheryle Loron, MD   blood glucose monitor strips One Touch Ultra Test Strips. Test 2 times a day and as needed for diabetic symptoms.  DX: E11.22 4/3/20   Sheryle Loron, MD   nitroGLYCERIN (NITROSTAT) 0.4 MG SL tablet Place 1 tablet under the tongue every 5 minutes as needed for Chest pain 5/21/16   Harish Kuhn MD   aspirin 81 MG tablet Take 81 mg by mouth daily    Historical Provider, MD       Future Appointments   Date Time Provider Hailey Brown   8/4/2021  8:00 AM Lodema Starch, [address]   8/4/2021  1:20 PM Maranda Corrales MD SR[facility] [address] [address]   8/16/2021 12:30 PM Nas Colmenares MD [facility]   8/23/2021  1:45 PM SofiDaniel [address]   11/3/2021  1:15 PM Daniel Vick [address]   1/7/2022  8:15 AM Cleo Ziegler, DO [address]

## 2021-08-18 ENCOUNTER — CARE COORDINATION (OUTPATIENT)
Dept: CARE COORDINATION | Age: 66
End: 2021-08-18

## 2021-09-01 ENCOUNTER — CARE COORDINATION (OUTPATIENT)
Dept: CARE COORDINATION | Age: 66
End: 2021-09-01

## 2021-09-01 NOTE — CARE COORDINATION
Ambulatory Care Coordination Note  9/1/2021  CM Risk Score: 3  Charlson 10 Year Mortality Risk Score: 23%     ACC: Karuna Coulter, RN    Summary Note: Brenton Marquez is being followed by care coordination for education and assistance in managing his chronic conditions. Spoke with Brenton Marquez today for f/u. Pt has h/o Chiari. Had f/u with new neuro at [facility]. Plan is for pt to have MRI completed of brain and spine. Pt is Waiting to hear when that is scheduled. Biggest issue/concern now is impaired breathing pattern. Will notice increased SOB if pressure if placed in certain areas in back. Neurologist is aware of this. Pt changes positions frequently. Aware if occurs and does not improve to seek medical tx immediately. Has not been able to wear PAP. PULM aware. Pt plans on resuming when able. Has f/u scheduled in Oct. Pt will stay in communication with [facility] office re: use of PAP. Back pain-completed outpt PT. Had MRI lower spine. Following with ortho in [facility]. DM-continues to f/u with DM clinic-needs to reschedule appt as last appt was cancelled. Reports that he has been more strict with diet and in turn has had tighter BS control. Continues to f/u with GI-h/o gastroparesis. No recent diarrhea. Plan of care:  F/u with Neuro office if does not hear anything about scheduling of MRI's. F/u with ortho re: recent MRI lower spine  Continue staying as active as possible  Close monitoring of BS's-pt using CGM. Reschedule Diabetic f/u with [facility].   Resume PAP when able  Try to avoid pressure on back as much as possible. Call if having any issues with scheduling of MRI's through neuro office  Diabetes Assessment    Medic Alert ID: No  Meal Planning: Avoidance of concentrated sweets   How often do you test your blood sugar?: Meals   Do you have barriers with adherence to non-pharmacologic self-management interventions?  (Nutrition/Exercise/Self-Monitoring): No   Have you ever had to go to the ED for symptoms of low blood sugar?: No       Do you have hyperglycemia symptoms?: No   Do you have hypoglycemia symptoms?: No   Blood Sugar Monitoring Regimen: Before Meals   Blood Sugar Trends: Steady Decrease       and   General Assessment    Do you have any symptoms that are causing concern?: Yes  Progression since Onset: Unchanged  Reported Symptoms:  (Comment: chiari. has trouble catching breath at times. reports that it occurs when certain pressure is placed on his back. now f/u with Neuro at [facility]. plan if to have MRI SPine and brain to evaluate. )                 Care Coordination Interventions    Program Enrollment: Complex Care  Referral from Primary Care Provider: No  Suggested Interventions and Community Resources  Disease Specific Clinic: Completed (Comment: DM clinic)  Zone Management Tools: Completed (Comment: DM, CHF)         Goals Addressed                 This Visit's Progress     check blood usgars before each meal and bedtime   On track     Conditions and Symptoms   On track     I will schedule office visits, as directed by my provider. I will keep my appointment or reschedule if I have to cancel. I will notify my provider of any barriers to my plan of care. I will follow my Zone Management tool to seek urgent or emergent care. I will notify my provider of any symptoms that indicate a worsening of my condition. Barriers: overwhelmed by complexity of regimen and stress  Plan for overcoming my barriers: support and education from [facility], PCP, Specialists. Confidence: 9/10  Anticipated Goal Completion Date: 7/1/21              Prior to Admission medications    Medication Sig Start Date End Date Taking? Authorizing Provider   Continuous Blood Gluc Sensor (DEXCOM G6 SENSOR) MISC Change every 10 days-Use as directed for continuous glucose monitoring.  7/8/21   Arley Ogden MD   Continuous Blood Gluc Transmit (DEXCOM G6 TRANSMITTER) MISC Change every 3 months- Use as directed for continuous glucose ULTRA-LIGHT) MISC Use Rollator for stability with ambulation. 7/28/20   Lenise Burkitt, MD   Blood Glucose Monitoring Suppl (ONE TOUCH ULTRA 2) w/Device KIT 1 kit by Does not apply route daily 6/25/20   Lenise Burkitt, MD   blood glucose monitor strips One Touch Ultra Test Strips. Test 2 times a day and as needed for diabetic symptoms.  DX: E11.22 4/3/20   Lenise Burkitt, MD   nitroGLYCERIN (NITROSTAT) 0.4 MG SL tablet Place 1 tablet under the tongue every 5 minutes as needed for Chest pain 5/21/16   Alexsander Garcia MD   aspirin 81 MG tablet Take 81 mg by mouth daily    Historical Provider, MD       Future Appointments   Date Time Provider Hailey Brown   10/14/2021  1:30 PM [address]   1/7/2022  8:15 AM Roman Hoover, DO [address]

## 2021-09-14 ENCOUNTER — CARE COORDINATION (OUTPATIENT)
Dept: CARE COORDINATION | Age: 66
End: 2021-09-14

## 2021-10-01 ENCOUNTER — CARE COORDINATION (OUTPATIENT)
Dept: CARE COORDINATION | Age: 66
End: 2021-10-01

## 2021-10-01 NOTE — CARE COORDINATION
Ambulatory Care Coordination Note  10/1/2021  CM Risk Score: 3  Charlson 10 Year Mortality Risk Score: 23%     ACC: Rex Frost, RN    Summary Note: Mg Reyes is being followed by care coordination for education and assistance in managing his chronic conditions. Spoke with  Mg Reyes today for f/u. Chiari Malformation-now following with Dr. Karine Lundberg in [facility]. Has MRI 10/2. Will f/u with neuro on 10/6  Back pain-being evaluated by ortho. Had recent lumbar MRI. Reports that he has some problems at L4-L5 and L3-L4. Waiting to see what neuro says before deciding. Pt taking muscle relaxer as needed. Tries to stay active. DM: continues to f/u with diabetic clinic. Reminded to reschedule appt with Diane Hawkins. Pt states that he will do that today. No recent hypoglycemic episodes. Has been able to get BS's down. Majority of them running in 130-140 region. Continues to wake up at times feeling SOB. Does get relief with inhaler. Plan of care:  Pt continues to f/u with specialists. Now seeing neuro for chiari malformation. Monitoring BS's closely. No new barriers. Aware of resources. Will graduate from care coordination upon PCP approval.   Diabetes Assessment    Medic Alert ID: No  Meal Planning: Avoidance of concentrated sweets   How often do you test your blood sugar?: Meals   Do you have barriers with adherence to non-pharmacologic self-management interventions? (Nutrition/Exercise/Self-Monitoring): No   Have you ever had to go to the ED for symptoms of low blood sugar?: No       Do you have hyperglycemia symptoms?: No   Do you have hypoglycemia symptoms?: No   Blood Sugar Monitoring Regimen: Before Meals   Blood Sugar Trends: Steady Decrease       and   General Assessment    Do you have any symptoms that are causing concern?: Yes  Progression since Onset: Unchanged  Reported Symptoms: Other (Comment: chiari malformation-following with Dr. Karine Lundberg in [facility].   has MRI tomorrow. ) Care Coordination Interventions    Program Enrollment: Complex Care  Referral from Primary Care Provider: No  Suggested Interventions and Community Resources  Disease Specific Clinic: Completed (Comment: DM clinic)  Zone Management Tools: Completed (Comment: DM, CHF)         Goals Addressed                 This Visit's Progress     check blood usgars before each meal and bedtime   On track     COMPLETED: Conditions and Symptoms        I will schedule office visits, as directed by my provider. I will keep my appointment or reschedule if I have to cancel. I will notify my provider of any barriers to my plan of care. I will follow my Zone Management tool to seek urgent or emergent care. I will notify my provider of any symptoms that indicate a worsening of my condition. Barriers: overwhelmed by complexity of regimen and stress  Plan for overcoming my barriers: support and education from [facility], PCP, Specialists. Confidence: 9/10  Anticipated Goal Completion Date: 7/1/21              Prior to Admission medications    Medication Sig Start Date End Date Taking? Authorizing Provider   Continuous Blood Gluc Sensor (DEXCOM G6 SENSOR) MISC Change every 10 days-Use as directed for continuous glucose monitoring. 7/8/21   Arley Ogden MD   Continuous Blood Gluc Transmit (DEXCOM G6 TRANSMITTER) MISC Change every 3 months- Use as directed for continuous glucose monitoring.  7/8/21   Arley Ogden MD   metFORMIN (GLUCOPHAGE) 1000 MG tablet Take 1 tablet by mouth 2 times daily (with meals) 7/7/21   Rosalina Whitaker MD   insulin lispro, 1 Unit Dial, (HUMALOG KWIKPEN) 100 UNIT/ML SOPN Inject 7 Units into the skin 3 times daily (before meals) 7/7/21 7/2/22  Rosalina Whitaker MD   insulin glargine (LANTUS SOLOSTAR) 100 UNIT/ML injection pen Inject 22 Units into the skin 2 times daily 7/7/21   Rosalina Whitaker MD   pantoprazole (PROTONIX) 40 MG tablet Take 1 tablet by mouth every morning (before breakfast) 4/22/21   Rufina ARCE RAFAEL Couch CNP   ondansetron (ZOFRAN ODT) 4 MG disintegrating tablet Take 1 tablet by mouth every 8 hours as needed for Nausea or Vomiting 4/1/21   RAFAEL Lozoya CNP   metoclopramide (REGLAN) 10 MG tablet Take 1 tablet by mouth 4 times daily 3/29/21   Yaima Schultz MD   amitriptyline (ELAVIL) 25 MG tablet Take 1 tablet by mouth nightly 3/24/21   Laverne Walker,    CPAP Machine MISC by Does not apply route Please change bipap 11/8 3/24/21   Khushboo Pastor PA-C   albuterol sulfate HFA (VENTOLIN HFA) 108 (90 Base) MCG/ACT inhaler Inhale 2 puffs into the lungs every 4 hours as needed for Wheezing or Shortness of Breath 2/2/21   Lavenre Walker DO   lisinopril (PRINIVIL;ZESTRIL) 10 MG tablet Take 2 tablet by mouth once daily 1/25/21   Laverne Walker DO   Cholecalciferol (VITAMIN D3) 125 MCG (5000 UT) TABS Take 5,000 Units by mouth daily    Historical Provider, MD   XARELTO 10 MG TABS tablet Take 1 tablet by mouth once daily with breakfast 10/9/20   RAFAEL Juarez CNP   Continuous Blood Gluc  (DEXCOM G6 ) YULI Use as directed for continuous glucose monitor. 10/8/20   Eual Mention, APRN - CNP   cyclobenzaprine (FLEXERIL) 5 MG tablet Take 5 mg by mouth as needed 8/26/20   Historical Provider, MD   pravastatin (PRAVACHOL) 80 MG tablet Take 1 tablet by mouth daily 8/4/20   Ida Hill MD   Misc. Devices (ROLLATOR ULTRA-LIGHT) MISC Use Rollator for stability with ambulation. 7/28/20   Ida Hill MD   Blood Glucose Monitoring Suppl (ONE TOUCH ULTRA 2) w/Device KIT 1 kit by Does not apply route daily 6/25/20   Ida Hill MD   blood glucose monitor strips One Touch Ultra Test Strips. Test 2 times a day and as needed for diabetic symptoms.  DX: E11.22 4/3/20   Ida Hill MD   nitroGLYCERIN (NITROSTAT) 0.4 MG SL tablet Place 1 tablet under the tongue every 5 minutes as needed for Chest pain 5/21/16   Toshia Ashley MD   aspirin 81 MG tablet Take 81 mg by mouth daily    Historical Provider, MD       Future Appointments   Date Time Provider Hailey Stephanie   10/14/2021  1:30 PM [address]   1/7/2022  8:15 AM Sweta Summers DO [address]

## 2021-10-11 ENCOUNTER — HOSPITAL ENCOUNTER (EMERGENCY)
Age: 66
Discharge: HOME | End: 2021-10-11
Attending: EMERGENCY MEDICINE
Payer: MEDICARE

## 2021-10-11 ENCOUNTER — APPOINTMENT (OUTPATIENT)
Dept: GENERAL RADIOLOGY | Age: 66
End: 2021-10-11
Payer: MEDICARE

## 2021-10-11 ENCOUNTER — APPOINTMENT (OUTPATIENT)
Dept: CT IMAGING | Age: 66
End: 2021-10-11
Payer: MEDICARE

## 2021-10-11 VITALS
RESPIRATION RATE: 16 BRPM | HEART RATE: 73 BPM | TEMPERATURE: 98.7 F | DIASTOLIC BLOOD PRESSURE: 79 MMHG | WEIGHT: 210 LBS | BODY MASS INDEX: 29.4 KG/M2 | OXYGEN SATURATION: 94 % | HEIGHT: 71 IN | SYSTOLIC BLOOD PRESSURE: 123 MMHG

## 2021-10-11 DIAGNOSIS — R07.9 CHEST PAIN, UNSPECIFIED TYPE: ICD-10-CM

## 2021-10-11 DIAGNOSIS — R06.00 DYSPNEA, UNSPECIFIED TYPE: Primary | ICD-10-CM

## 2021-10-11 LAB
ALBUMIN SERPL-MCNC: 4.1 G/DL (ref 3.5–5.1)
ALP BLD-CCNC: 125 U/L (ref 38–126)
ALT SERPL-CCNC: 32 U/L (ref 11–66)
ANION GAP SERPL CALCULATED.3IONS-SCNC: 13 MEQ/L (ref 8–16)
AST SERPL-CCNC: 31 U/L (ref 5–40)
BASOPHILS # BLD: 0.3 %
BASOPHILS ABSOLUTE: 0 THOU/MM3 (ref 0–0.1)
BILIRUB SERPL-MCNC: 0.8 MG/DL (ref 0.3–1.2)
BUN BLDV-MCNC: 16 MG/DL (ref 7–22)
CALCIUM SERPL-MCNC: 9.8 MG/DL (ref 8.5–10.5)
CHLORIDE BLD-SCNC: 100 MEQ/L (ref 98–111)
CO2: 23 MEQ/L (ref 23–33)
CREAT SERPL-MCNC: 1.3 MG/DL (ref 0.4–1.2)
EKG ATRIAL RATE: 81 BPM
EKG P AXIS: 36 DEGREES
EKG P-R INTERVAL: 152 MS
EKG Q-T INTERVAL: 376 MS
EKG QRS DURATION: 94 MS
EKG QTC CALCULATION (BAZETT): 436 MS
EKG R AXIS: -68 DEGREES
EKG T AXIS: 21 DEGREES
EKG VENTRICULAR RATE: 81 BPM
EOSINOPHIL # BLD: 2.6 %
EOSINOPHILS ABSOLUTE: 0.2 THOU/MM3 (ref 0–0.4)
ERYTHROCYTE [DISTWIDTH] IN BLOOD BY AUTOMATED COUNT: 13.2 % (ref 11.5–14.5)
ERYTHROCYTE [DISTWIDTH] IN BLOOD BY AUTOMATED COUNT: 43.6 FL (ref 35–45)
GFR SERPL CREATININE-BSD FRML MDRD: 55 ML/MIN/1.73M2
GLUCOSE BLD-MCNC: 230 MG/DL (ref 70–108)
HCT VFR BLD CALC: 46.4 % (ref 42–52)
HEMOGLOBIN: 15.5 GM/DL (ref 14–18)
IMMATURE GRANS (ABS): 0.02 THOU/MM3 (ref 0–0.07)
IMMATURE GRANULOCYTES: 0.3 %
LYMPHOCYTES # BLD: 34 %
LYMPHOCYTES ABSOLUTE: 2.2 THOU/MM3 (ref 1–4.8)
MCH RBC QN AUTO: 30.3 PG (ref 26–33)
MCHC RBC AUTO-ENTMCNC: 33.4 GM/DL (ref 32.2–35.5)
MCV RBC AUTO: 90.6 FL (ref 80–94)
MONOCYTES # BLD: 8.7 %
MONOCYTES ABSOLUTE: 0.6 THOU/MM3 (ref 0.4–1.3)
NUCLEATED RED BLOOD CELLS: 0 /100 WBC
OSMOLALITY CALCULATION: 280.5 MOSMOL/KG (ref 275–300)
PLATELET # BLD: 274 THOU/MM3 (ref 130–400)
PMV BLD AUTO: 9 FL (ref 9.4–12.4)
POTASSIUM REFLEX MAGNESIUM: 3.8 MEQ/L (ref 3.5–5.2)
PRO-BNP: 59 PG/ML (ref 0–900)
RBC # BLD: 5.12 MILL/MM3 (ref 4.7–6.1)
SEG NEUTROPHILS: 54.1 %
SEGMENTED NEUTROPHILS ABSOLUTE COUNT: 3.5 THOU/MM3 (ref 1.8–7.7)
SODIUM BLD-SCNC: 136 MEQ/L (ref 135–145)
TOTAL PROTEIN: 8.2 G/DL (ref 6.1–8)
TROPONIN T: < 0.01 NG/ML
WBC # BLD: 6.5 THOU/MM3 (ref 4.8–10.8)

## 2021-10-11 PROCEDURE — 83880 ASSAY OF NATRIURETIC PEPTIDE: CPT

## 2021-10-11 PROCEDURE — 71275 CT ANGIOGRAPHY CHEST: CPT

## 2021-10-11 PROCEDURE — 80053 COMPREHEN METABOLIC PANEL: CPT

## 2021-10-11 PROCEDURE — 36415 COLL VENOUS BLD VENIPUNCTURE: CPT

## 2021-10-11 PROCEDURE — 71046 X-RAY EXAM CHEST 2 VIEWS: CPT

## 2021-10-11 PROCEDURE — 99283 EMERGENCY DEPT VISIT LOW MDM: CPT

## 2021-10-11 PROCEDURE — 6360000004 HC RX CONTRAST MEDICATION: Performed by: EMERGENCY MEDICINE

## 2021-10-11 PROCEDURE — 93010 ELECTROCARDIOGRAM REPORT: CPT | Performed by: INTERNAL MEDICINE

## 2021-10-11 PROCEDURE — 84484 ASSAY OF TROPONIN QUANT: CPT

## 2021-10-11 PROCEDURE — 85025 COMPLETE CBC W/AUTO DIFF WBC: CPT

## 2021-10-11 PROCEDURE — 93005 ELECTROCARDIOGRAM TRACING: CPT | Performed by: EMERGENCY MEDICINE

## 2021-10-11 RX ADMIN — IOPAMIDOL 80 ML: 755 INJECTION, SOLUTION INTRAVENOUS at 14:55

## 2021-10-11 ASSESSMENT — ENCOUNTER SYMPTOMS
BACK PAIN: 0
RHINORRHEA: 0
DIARRHEA: 0
SINUS PAIN: 0
SORE THROAT: 0
NAUSEA: 0
COUGH: 1
SHORTNESS OF BREATH: 1
VOMITING: 0
ABDOMINAL PAIN: 0
EYE REDNESS: 0

## 2021-10-11 NOTE — ED TRIAGE NOTES
Pt to ED via intake with c/o chest pain and SOB. Pt reports that chest pain began 2 weeks ago. Pt reports that their pain is worsening and pt reports they cannot sleep. Pt reports having hx of PE's. Pt VSS. EKG completed.

## 2021-10-11 NOTE — ED NOTES
Patient resting in bed. Respirations easy and unlabored. No distress noted. Call light within reach. Updated on POC. Will monitor.       Eric Solares RN  10/11/21 [id number]

## 2021-10-11 NOTE — ED NOTES
Patient resting in bed. Respirations easy and unlabored. No distress noted. Call light within reach.        Toya Hernandez RN  10/11/21 2991

## 2021-10-11 NOTE — ED PROVIDER NOTES
[address] [address]          Pt Name: Patricia Porter  MRN: [id number]  [place] 1955  Date of evaluation: 10/11/2021  Treating Resident Physician: Temitope Aj MD  Supervising Physician: Dr Jeffrey Yeung       Chief Complaint   Patient presents with    Shortness of Breath     History obtained from the patient. HISTORY OF PRESENT ILLNESS    HPI  Patricia Porter is a 73 y.o. male with past medical history of saddle pulmonary embolism, CAD, pretension, hyperlipidemia who presents to the emergency department for evaluation of shortness of breath despite exertion of the past 3 weeks has been progressively worsening. Patient is on Xarelto after being diagnosed with saddle pulmonary was in the past however has missed the entire month of August dose secondary to insurance and cost the medication as well as some doses in November and the prior year. Denies having any significant chest pain at this time, palpitations, fever or chills. Denies any nausea vomiting or diarrhea. He has had no recent exposure to COVID-19. He is unvaccinated against COVID-19. The patient has no other acute complaints at this time. REVIEW OF SYSTEMS   Review of Systems   Constitutional: Positive for fatigue. Negative for chills and fever. HENT: Negative for rhinorrhea, sinus pain and sore throat. Eyes: Negative for redness. Respiratory: Positive for cough and shortness of breath. Cardiovascular: Negative for chest pain. Gastrointestinal: Negative for abdominal pain, diarrhea, nausea and vomiting. Genitourinary: Negative for dysuria. Musculoskeletal: Negative for back pain. Skin: Negative for rash. Neurological: Negative for light-headedness and headaches. Psychiatric/Behavioral: Negative for agitation.          PAST MEDICAL AND SURGICAL HISTORY     Past Medical History:   Diagnosis Date    Anxiety     CAD (coronary artery KWIKPEN) 100 UNIT/ML SOPN, Inject 7 Units into the skin 3 times daily (before meals), Disp: 10 pen, Rfl: 3    insulin glargine (LANTUS SOLOSTAR) 100 UNIT/ML injection pen, Inject 22 Units into the skin 2 times daily, Disp: 15 pen, Rfl: 3    pantoprazole (PROTONIX) 40 MG tablet, Take 1 tablet by mouth every morning (before breakfast), Disp: 30 tablet, Rfl: 3    ondansetron (ZOFRAN ODT) 4 MG disintegrating tablet, Take 1 tablet by mouth every 8 hours as needed for Nausea or Vomiting, Disp: 30 tablet, Rfl: 3    metoclopramide (REGLAN) 10 MG tablet, Take 1 tablet by mouth 4 times daily, Disp: 30 tablet, Rfl: 0    amitriptyline (ELAVIL) 25 MG tablet, Take 1 tablet by mouth nightly, Disp: 30 tablet, Rfl: 0    CPAP Machine MISC, by Does not apply route Please change bipap 11/8, Disp: 1 each, Rfl: 0    albuterol sulfate HFA (VENTOLIN HFA) 108 (90 Base) MCG/ACT inhaler, Inhale 2 puffs into the lungs every 4 hours as needed for Wheezing or Shortness of Breath, Disp: 1 Inhaler, Rfl: 0    lisinopril (PRINIVIL;ZESTRIL) 10 MG tablet, Take 2 tablet by mouth once daily, Disp: 30 tablet, Rfl: 2    Cholecalciferol (VITAMIN D3) 125 MCG (5000 UT) TABS, Take 5,000 Units by mouth daily, Disp: , Rfl:     XARELTO 10 MG TABS tablet, Take 1 tablet by mouth once daily with breakfast, Disp: 30 tablet, Rfl: 5    Continuous Blood Gluc  (DEXCOM G6 ) YULI, Use as directed for continuous glucose monitor. , Disp: 1 Device, Rfl: 0    cyclobenzaprine (FLEXERIL) 5 MG tablet, Take 5 mg by mouth as needed, Disp: , Rfl:     pravastatin (PRAVACHOL) 80 MG tablet, Take 1 tablet by mouth daily, Disp: 90 tablet, Rfl: 1    Misc. Devices (ROLLATOR ULTRA-LIGHT) MISC, Use Rollator for stability with ambulation. , Disp: 1 each, Rfl: 0    Blood Glucose Monitoring Suppl (ONE TOUCH ULTRA 2) w/Device KIT, 1 kit by Does not apply route daily, Disp: 1 kit, Rfl: 0    blood glucose monitor strips, One Touch Ultra Test Strips.  Test 2 times a day and as needed for diabetic symptoms. DX: E11.22, Disp: 300 strip, Rfl: 5    nitroGLYCERIN (NITROSTAT) 0.4 MG SL tablet, Place 1 tablet under the tongue every 5 minutes as needed for Chest pain, Disp: 25 tablet, Rfl: 3    aspirin 81 MG tablet, Take 81 mg by mouth daily, Disp: , Rfl:       SOCIAL HISTORY     Social History     Social History Narrative    Not on file     Social History     Tobacco Use    Smoking status: Former Smoker     Packs/day: 1.50     Years: 3.00     Pack years: 4.50     Types: Cigarettes     Start date: 1976     Quit date: 10/12/1979     Years since quittin.0    Smokeless tobacco: Never Used   Vaping Use    Vaping Use: Never used   Substance Use Topics    Alcohol use: No    Drug use: No         ALLERGIES     Allergies   Allergen Reactions    Latex Rash         FAMILY HISTORY     Family History   Problem Relation Age of Onset    Kidney Disease Mother     Diabetes Mother     Heart Disease Mother     High Blood Pressure Mother     Diabetes Father     Cancer Father         lung--smoker    High Blood Pressure Father     Diabetes Sister     High Blood Pressure Sister     Diabetes Brother     High Blood Pressure Brother     Colon Cancer Neg Hx     Esophageal Cancer Neg Hx     Liver Cancer Neg Hx     Rectal Cancer Neg Hx     Stomach Cancer Neg Hx          PREVIOUS RECORDS   Previous records reviewed: Patient was seen here on 3/29/2021 for hematemesis. PHYSICAL EXAM     ED Triage Vitals [10/11/21 1131]   BP Temp Temp Source Pulse Resp SpO2 Height Weight   122/72 98.7 °F (37.1 °C) Oral 84 22 98 % 5' 11\" (1.803 m) 210 lb (95.3 kg)     Initial vital signs and nursing assessment reviewed and normal. Pulsoximetry is normal per my interpretation. Additional Vital Signs:  Vitals:    10/11/21 1424   BP: 123/79   Pulse: 73   Resp: 16   Temp:    SpO2: 94%       Physical Exam  Vitals and nursing note reviewed.    Constitutional:       Appearance: He is not toxic-appearing. HENT:      Head: Normocephalic and atraumatic. Right Ear: External ear normal.      Left Ear: External ear normal.      Nose: Nose normal.      Mouth/Throat:      Mouth: Mucous membranes are moist.      Pharynx: Oropharynx is clear. Eyes:      General: No scleral icterus. Conjunctiva/sclera: Conjunctivae normal.   Cardiovascular:      Rate and Rhythm: Normal rate and regular rhythm. Pulses: Normal pulses. Heart sounds: Normal heart sounds. Comments: Radial pulses 2+ bilaterally, PT pulses 2+ bilaterally  Pulmonary:      Breath sounds: No wheezing. Comments: Diminished breath sounds in the left. Chest:      Chest wall: No tenderness. Abdominal:      General: Abdomen is flat. There is no distension. Palpations: Abdomen is soft. Tenderness: There is no abdominal tenderness. There is no guarding or rebound. Musculoskeletal:         General: Normal range of motion. Cervical back: Normal range of motion and neck supple. No rigidity. No muscular tenderness. Right lower leg: No edema. Left lower leg: No edema. Lymphadenopathy:      Cervical: No cervical adenopathy. Skin:     General: Skin is warm and dry. Capillary Refill: Capillary refill takes less than 2 seconds. Coloration: Skin is not jaundiced. Neurological:      General: No focal deficit present. Mental Status: He is alert and oriented to person, place, and time. Psychiatric:         Mood and Affect: Mood normal.         Behavior: Behavior normal.         MEDICAL DECISION MAKING   Initial Assessment: This is a 71-year-old male with past medical history of a saddle pulmonary embolism who does have higher dose of Xarelto in August secondary to insurance and cost and has missed other intermittent doses well presents with shortness of breath despite exertion of the past 3 weeks has been progressively worsening. Denies any fever chills recently.   He is unvaccinated against COVID-19 denies any recent exposure. denies any recent trauma or injuries. Differential Diagnosis Included but not limited to: Viral illness, pulmonary embolism, pneumonia, pleural effusion pneumothorax    MDM:   CTA was negative for pulmonary embolism. Patient will be given an appoint with cardiology for next 2 days. Findings are consistent with possible gastroenteritis was considered. ED RESULTS   Laboratory results:  Labs Reviewed   CBC WITH AUTO DIFFERENTIAL - Abnormal; Notable for the following components:       Result Value    MPV 9.0 (*)     All other components within normal limits   COMPREHENSIVE METABOLIC PANEL W/ REFLEX TO MG FOR LOW K - Abnormal; Notable for the following components:    Glucose 230 (*)     CREATININE 1.3 (*)     Total Protein 8.2 (*)     All other components within normal limits   GLOMERULAR FILTRATION RATE, ESTIMATED - Abnormal; Notable for the following components:    Est, Glom Filt Rate 55 (*)     All other components within normal limits   TROPONIN   BRAIN NATRIURETIC PEPTIDE   ANION GAP   OSMOLALITY       Radiologic studies results:  CTA CHEST W WO CONTRAST   Final Result   No PE. No acute abnormality. **This report has been created using voice recognition software. It may contain minor errors which are inherent in voice recognition technology. **      Final report electronically signed by Dr. Hola Avalos on 10/11/2021 4:31 PM      XR CHEST (2 VW)   Final Result   1. Thin fibrotic scarring lingula. 2. Otherwise normal chest. No acute findings. **This report has been created using voice recognition software. It may contain minor errors which are inherent in voice recognition technology. **      Final report electronically signed by Dr. Zenobia Gonzalez on 10/11/2021 12:35 PM          ED Medications administered this visit:   Medications   iopamidol (ISOVUE-370) 76 % injection 80 mL (80 mLs IntraVENous Given 10/11/21 1846)         ED COURSE ED Course as of Oct 11 1646   Mon Oct 11, 2021   1254 WBC: 6.5 [AL]   1254 Hemoglobin Quant: 15.5 [AL]   1254 Hematocrit: 46.4 [AL]   1254 Platelet Count: 638 [AL]   1254 Pro-BNP: 59.0 [AL]   1254 Est, Glom Filt Rate(!): 55 [AL]   1254 Osmolality Ca.5 [AL]   1254 Anion Gap: 13.0 [AL]   1254 Glucose(!): 230 [AL]   1255 Creatinine(!): 1.3 [AL]   1255 Troponin T: < 0.010 [AL]   2543 \"   IMPRESSION:  1. Thin fibrotic scarring lingula. 2. Otherwise normal chest. No acute findings.   \"   XR CHEST (2 VW) [AL]   1197 Is reevaluated updated on his laboratory studies. He is doing well at this time. Shortness of breath is persistent however not worsened. He has follow-up with an outpatient pulmonologist he also mentioned coming up in the next few weeks. [AL]   3743 \"IMPRESSION:  No PE. No acute abnormality.   \"   CTA CHEST W WO CONTRAST [AL]      ED Course User Index  [AL] Manisha Tanner MD     Strict return precautions and follow up instructions were discussed with the patient prior to discharge, with which the patient agrees. MEDICATION CHANGES     New Prescriptions    No medications on file         FINAL DISPOSITION     Final diagnoses:   Dyspnea, unspecified type   Chest pain, unspecified type     Condition: condition: good  Dispo: Discharge to home      This transcription was electronically signed. Parts of this transcriptions may have been dictated by use of voice recognition software and electronically transcribed, and parts may have been transcribed with the assistance of an ED scribe. The transcription may contain errors not detected in proofreading. Please refer to my supervising physician's documentation if my documentation differs.     Electronically Signed: Manisha Tanner MD, 10/11/21, 4:46 PM         Manisha Tanner MD  Resident  10/11/21 8805

## 2021-10-12 ENCOUNTER — OFFICE VISIT (OUTPATIENT)
Dept: CARDIOLOGY CLINIC | Age: 66
End: 2021-10-12
Payer: MEDICARE

## 2021-10-12 VITALS
HEIGHT: 71 IN | WEIGHT: 206.8 LBS | DIASTOLIC BLOOD PRESSURE: 76 MMHG | SYSTOLIC BLOOD PRESSURE: 128 MMHG | BODY MASS INDEX: 28.95 KG/M2 | HEART RATE: 86 BPM

## 2021-10-12 DIAGNOSIS — I20.8 OTHER FORMS OF ANGINA PECTORIS (HCC): ICD-10-CM

## 2021-10-12 DIAGNOSIS — R06.00 DYSPNEA, UNSPECIFIED TYPE: ICD-10-CM

## 2021-10-12 DIAGNOSIS — R06.02 SOB (SHORTNESS OF BREATH): Primary | ICD-10-CM

## 2021-10-12 DIAGNOSIS — E78.5 DYSLIPIDEMIA: ICD-10-CM

## 2021-10-12 PROCEDURE — 99214 OFFICE O/P EST MOD 30 MIN: CPT | Performed by: INTERNAL MEDICINE

## 2021-10-12 NOTE — PROGRESS NOTES
[address] [address] [place] [address]  Dept: [phone]  Dept Fax: [phone]  Loc: [phone]    Visit Date: 10/12/2021    Mr. Yaya Rhodes is a 73 y.o. male  who presented for:  Chief Complaint   Patient presents with    Follow-Up from [address] [place] [address] patient referral  Post ER visit  Chest pain     HPI:   HPI   Ina Pradhan is a pleasant 73year old male patient who  has a past medical history of Anxiety, CAD (coronary artery disease), Depression, Diabetes mellitus ([facility] Frequent headaches, Gastroparesis, GERD (gastroesophageal reflux disease), History of Chiari malformation, Hyperlipidemia, Hypertension, Kidney stone, Kidney stone, Migraine, Movement disorder, Pulmonary emboli ([facility] Shingles, Sleep apnea, and Tattoos. He is on Xarelto for h/o PE. The patient was seen in ER yesterday for SOB, CP. Chest pain is retrosternal, pressure like, non-radiating, intermittent. He reports SOB, PARR. Work up in ER was negative. Chest CTA revealed no PE. EKG revealed SR, incomplete RBBB, LAD, nonspecific TW changes.  Troponin <0.01.       Current Outpatient Medications:     Continuous Blood Gluc Sensor (DEXCOM G6 SENSOR) MISC, Change every 10 days-Use as directed for continuous glucose monitoring., Disp: 3 each, Rfl: 5    Continuous Blood Gluc Transmit (DEXCOM G6 TRANSMITTER) MISC, Change every 3 months- Use as directed for continuous glucose monitoring., Disp: 1 each, Rfl: 3    metFORMIN (GLUCOPHAGE) 1000 MG tablet, Take 1 tablet by mouth 2 times daily (with meals), Disp: 180 tablet, Rfl: 1    insulin lispro, 1 Unit Dial, (HUMALOG KWIKPEN) 100 UNIT/ML SOPN, Inject 7 Units into the skin 3 times daily (before meals), Disp: 10 pen, Rfl: 3    insulin glargine (LANTUS SOLOSTAR) 100 UNIT/ML injection pen, Inject 22 Units into the skin 2 times daily, Disp: 15 pen, Rfl: 3    pantoprazole (PROTONIX) 40 MG tablet, Take 1 tablet by mouth every morning (before breakfast), Disp: 30 tablet, Rfl: 3    ondansetron (ZOFRAN ODT) 4 MG disintegrating tablet, Take 1 tablet by mouth every 8 hours as needed for Nausea or Vomiting, Disp: 30 tablet, Rfl: 3    metoclopramide (REGLAN) 10 MG tablet, Take 1 tablet by mouth 4 times daily, Disp: 30 tablet, Rfl: 0    amitriptyline (ELAVIL) 25 MG tablet, Take 1 tablet by mouth nightly, Disp: 30 tablet, Rfl: 0    CPAP Machine MISC, by Does not apply route Please change bipap 11/8, Disp: 1 each, Rfl: 0    albuterol sulfate HFA (VENTOLIN HFA) 108 (90 Base) MCG/ACT inhaler, Inhale 2 puffs into the lungs every 4 hours as needed for Wheezing or Shortness of Breath, Disp: 1 Inhaler, Rfl: 0    lisinopril (PRINIVIL;ZESTRIL) 10 MG tablet, Take 2 tablet by mouth once daily, Disp: 30 tablet, Rfl: 2    Cholecalciferol (VITAMIN D3) 125 MCG (5000 UT) TABS, Take 5,000 Units by mouth daily, Disp: , Rfl:     XARELTO 10 MG TABS tablet, Take 1 tablet by mouth once daily with breakfast, Disp: 30 tablet, Rfl: 5    Continuous Blood Gluc  (DEXCOM G6 ) YULI, Use as directed for continuous glucose monitor. , Disp: 1 Device, Rfl: 0    cyclobenzaprine (FLEXERIL) 5 MG tablet, Take 5 mg by mouth as needed, Disp: , Rfl:     pravastatin (PRAVACHOL) 80 MG tablet, Take 1 tablet by mouth daily, Disp: 90 tablet, Rfl: 1    Misc. Devices (ROLLATOR ULTRA-LIGHT) MISC, Use Rollator for stability with ambulation. , Disp: 1 each, Rfl: 0    Blood Glucose Monitoring Suppl (ONE TOUCH ULTRA 2) w/Device KIT, 1 kit by Does not apply route daily, Disp: 1 kit, Rfl: 0    blood glucose monitor strips, One Touch Ultra Test Strips. Test 2 times a day and as needed for diabetic symptoms.  DX: E11.22, Disp: 300 strip, Rfl: 5    nitroGLYCERIN (NITROSTAT) 0.4 MG SL tablet, Place 1 tablet under the tongue every 5 minutes as needed for Chest pain, Disp: 25 tablet, Rfl: 3    aspirin 81 MG tablet, Take 81 mg by mouth daily, Disp: , Rfl: Past Medical History  La Alcantar  has a past medical history of Anxiety, CAD (coronary artery disease), Depression, Diabetes mellitus ([address] Frequent headaches, Gastroparesis, GERD (gastroesophageal reflux disease), History of Chiari malformation, Hyperlipidemia, Hypertension, Kidney stone, Kidney stone, Migraine, Movement disorder, Pulmonary emboli ([address] Shingles, Sleep apnea, and Tattoos. Social History  La Alcantar  reports that he quit smoking about 42 years ago. His smoking use included cigarettes. He started smoking about 44 years ago. He has a 4.50 pack-year smoking history. He has never used smokeless tobacco. He reports that he does not drink alcohol and does not use drugs. Family History  La Alcantar family history includes Cancer in his father; Diabetes in his brother, father, mother, and sister; Heart Disease in his mother; High Blood Pressure in his brother, father, mother, and sister; Kidney Disease in his mother.     Past Surgical History   Past Surgical History:   Procedure Laterality Date    BACK SURGERY  2008    C1 oates & decompression    BACK SURGERY  08/25/2020    [facility] Dr. Parker Sultana, C5-7 cadaver patch   [facility]  2008    chiari    CARDIAC SURGERY  2007    heart cath    CHOLECYSTECTOMY  2009    COLONOSCOPY  2017    COLONOSCOPY N/A 3/12/2020    COLONOSCOPY POLYPECTOMY SNARE/COLD BIOPSY performed by Miki Mario MD at [facility], COLON, DIAGNOSTIC  2007   [address] [facility]    right elbow    IVC FILTER REMOVAL  03/15/2020    [facility] [address]  2006    SHOULDER SURGERY Left 08/16/2018    TONSILLECTOMY  1964    UPPER GASTROINTESTINAL ENDOSCOPY Left 3/12/2020    EGD ESOPHAGOGASTRODUODENOSCOPY DILATATION performed by Miki Mario MD at [facility]   [facility] ENDOSCOPY Left 3/12/2020    EGD BIOPSY performed by Miki Mario MD at [address] [place] [address]  2015    right middle finger artery       Review of Systems   Constitutional: Negative for chills and fever  HENT: Negative for congestion, sinus pressure, sneezing and sore throat. Eyes: Negative for pain, discharge, redness and itching. Respiratory: Negative for apnea, cough  Gastrointestinal: Negative for blood in stool, constipation, diarrhea   Endocrine: Negative for cold intolerance, heat intolerance, polydipsia. Genitourinary: Negative for dysuria, enuresis, flank pain and hematuria. Musculoskeletal: Negative for arthralgias, joint swelling and neck pain. Neurological: Negative for numbness and headaches. Psychiatric/Behavioral: Negative for agitation, confusion, decreased concentration and dysphoric mood. Objective:     /76   Pulse 86   Ht 5' 11\" (1.803 m)   Wt 206 lb 12.8 oz (93.8 kg)   BMI 28.84 kg/m²     Wt Readings from Last 3 Encounters:   10/12/21 206 lb 12.8 oz (93.8 kg)   10/11/21 210 lb (95.3 kg)   07/12/21 215 lb (97.5 kg)     BP Readings from Last 3 Encounters:   10/12/21 128/76   10/11/21 123/79   07/12/21 134/85       Nursing note and vitals reviewed. Physical Exam   Constitutional: Oriented to person, place, and time. Appears well-developed and well-nourished. ENT: Moist mucous membranes. No bleeding. Tongue is midline. Head: Normocephalic and atraumatic. Eyes: EOM are normal. Pupils are equal, round, and reactive to light. Neck: Normal range of motion. Neck supple. No JVD present. Cardiovascular: Normal rate, regular rhythm,  murmur, no rubs, and intact distal pulses. Pulmonary/Chest: Effort normal and breath sounds normal. No respiratory distress. No wheezes. No rales. Abdominal: Soft. Bowel sounds are normal. No distension. There is no tenderness. Musculoskeletal: Normal range of motion. no edema. Neurological: Alert and oriented to person, place, and time. No cranial nerve deficit. Coordination normal.   Skin: Skin is warm and dry. Psychiatric: Normal mood and affect.        No results found for: CKTOTAL, CKMB, CKMBINDEX    Lab Results   Component Value Date    WBC 6.5 10/11/2021    RBC 5.12 10/11/2021    RBC 4.22 08/26/2020    HGB 15.5 10/11/2021    HCT 46.4 10/11/2021    MCV 90.6 10/11/2021    MCH 30.3 10/11/2021    MCHC 33.4 10/11/2021    RDW 13.5 08/26/2020     10/11/2021    MPV 9.0 10/11/2021       Lab Results   Component Value Date     10/11/2021    K 3.8 10/11/2021     10/11/2021    CO2 23 10/11/2021    BUN 16 10/11/2021    LABALBU 4.1 10/11/2021    CREATININE 1.3 10/11/2021    CALCIUM 9.8 10/11/2021    LABGLOM 55 10/11/2021    GLUCOSE 230 10/11/2021    GLUCOSE 135 08/26/2020       Lab Results   Component Value Date    ALKPHOS 125 10/11/2021    ALT 32 10/11/2021    AST 31 10/11/2021    PROT 8.2 10/11/2021    BILITOT 0.8 10/11/2021    BILIDIR <0.2 05/21/2016    LABALBU 4.1 10/11/2021       No results found for: MG    Lab Results   Component Value Date    INR 1.34 (H) 03/11/2021    INR 0.91 08/19/2020    INR 1.24 (H) 03/30/2019    PROTIME 12.2 (L) 08/19/2020         Lab Results   Component Value Date    LABA1C 7.5 07/07/2021    LABA1C 7.9 10/23/2020       Lab Results   Component Value Date    TRIG 238 07/09/2021    HDL 43 07/09/2021    LDLCALC 133 07/09/2021       Lab Results   Component Value Date    TSH 1.840 07/09/2021         Testing Reviewed:      I have individually reviewed the cardiac test below:    ECHO: Results for orders placed during the hospital encounter of 10/29/18    ECHO Complete 2D W Doppler W Color    Narrative  Transthoracic Echocardiography Report (TTE)    Demographics    Patient Name    Anamika Cotto Gender                Male    MR #            [id number]     Race                  Other    Ethnicity              or     Account #       [de-identified]     Room Number           0017    Accession       [id number]     Date of Study         10/30/2018  Number    Date of Birth   1955    Referring Physician   Mone Perez MD    Age             59 year(s)    Sonographer           Promise Rhoades DWAYNE    Interpreting          Echo reader of the  Physician             yoandy Perry MD    Procedure    Type of Study    TTE procedure:ECHOCARDIOGRAM COMPLETE 2D W DOPPLER W COLOR. Procedure Date  Date: 10/30/2018 Start: 03:52 PM    Study Location: Bedside  Technical Quality: Adequate visualization    Indications:Pulmonary embolus and Chest pain. Additional Medical History:Hypertension, Hyperlipidemia, Coronary artery  disease, Diabetes, GERD, Family history of heart disease    Patient Status: Routine    Height: 70.87 inches Weight: 211.64 pounds BSA: 2.16 m^2 BMI: 29.63 kg/m^2    BP: 108/66 mmHg    Conclusions    Summary  Left ventricle size and systolic function is normal.  Normal left ventricular wall thickness. Ejection fraction is visually estimated at 55-60%. Severly dilated right ventricle. Right ventricle global systolic function is mildly reduced . Right ventricular septum flattened in systole consistent with RV pressure  overload. Mild mitral regurgitation is present. Mild tricuspid regurgitation. Pulmonary artery systolic pressure calculated from tricuspid regurgitation  jet is 35-40 mm Hg . IVC is normal in size with <50% inspiratory variation. Signature    ----------------------------------------------------------------  Electronically signed by Mackenzie Perry MD (Interpreting  physician) on 10/30/2018 at 05:02 PM  ----------------------------------------------------------------    Findings    Mitral Valve  Structurally normal mitral valve. Mild mitral regurgitation is present. Aortic Valve  The aortic valve appears to be trileaflet with good leaflet separation. No evidence of aortic stenosis. Tricuspid Valve  Tricuspid valve is structurally normal.  Mild tricuspid regurgitation. Pulmonic Valve  Mild pulmonic regurgitation visualized.   Pulmonary artery systolic pressure calculated from tricuspid regurgitation  jet is 35-40 mm Hg . Left Atrium  Normal size left atrium. Left Ventricle  Left ventricle size and systolic function is normal.  Normal left ventricular wall thickness. Ejection fraction is visually estimated at 55-60%. Right Atrium  Moderately enlarged right atrium size. Right Ventricle  Severly dilated right ventricle. Right ventricle global systolic function is mildly reduced . Right ventricular septum flattened in systole consistent with RV pressure  overload. Pericardial Effusion  No evidence of any pericardial effusion. Aorta / Great Vessels  Aortic root dimension within normal limits. IVC is normal in size with <50% inspiratory variation.     M-Mode/2D Measurements & Calculations    LV Diastolic   LV Systolic Dimension:    AV Cusp Separation: 2.2 cmLA  Dimension: 4   2.6 cm                    Dimension: 3 cmAO Root Dimension:  cm             LV Volume Diastolic: 70   3.2 cmLA Area: 18 cm^2  LV FS:35 %     ml  LV PW          LV Volume Systolic: 71.2  Diastolic: 1   ml  cm             LV EDV/LV EDV Index: 70   RV Diastolic Dimension: 3.2 cm  Septum         ml/32 m^2LV ESV/LV ESV  Diastolic: 1   Index: 59.8 ml/11 m^2     LA/Aorta: 0.94  cm             EF Calculated: 64.9 %  LA volume/Index: 48 ml /22m^2    Doppler Measurements & Calculations    MV Peak E-Wave: 48 cm/s    AV Peak Velocity: 95.7 LVOT Peak Velocity: 82  MV Peak A-Wave: 76.2 cm/s  cm/s                   cm/s  MV E/A Ratio: 0.63         AV Peak Gradient: 3.66 LVOT Peak Gradient: 3  MV Peak Gradient: 0.92     mmHg                   mmHg  mmHg  TV Peak E-Wave: 45.1  MV Deceleration Time: 239                         cm/s  msec                                              TV Peak A-Wave: 38.3  IVRT: 81 msec          cm/s    MV E' Septal Velocity: 6.5                        TV Peak Gradient: 0.81  cm/s                       AV DVI (Vmax):0.86     mmHg  MV A' Septal Velocity:                            TR Velocity:262 cm/s  10.3 cm/s TR Gradient:27.46 mmHg  MV E' Lateral Velocity:                           PV Peak Velocity: 44.1  11.4 cm/s                                         cm/s  MV A' Lateral Velocity:                           PV Peak Gradient: 0.78  16.8 cm/s                                         mmHg  E/E' septal: 7.38  E/E' lateral: 4.21  ND ED Velocity: 90.9  cm/s    [id number]%[id number]       AssessmentPlan:     1. Shortness of breath  2. Dyspnea  3. Chest pain   4. FH of CAD  5. Abnormal EKG   6. Post ER visit for above  7. H/o PE  8. DM  9. HTN  10. Dyslipidemia   11. SHAHRAM     He is on Xarelto for h/o PE   The patient was seen in ER yesterday for SOB   Work up in ER was negative   Chest CTA revealed no PE   EKG revealed SR, incomplete RBBB, LAD, nonspecific TW changes   Troponin <0.01   Patient was advised to report to the ER if has recurrent symptoms with specific instructions given about severity and duration of symptoms   Based on patient's risk factors and clinical presentation, stress test is indicated to investigate for possible underlying ischemic heart disease. Patient  agrees with that work up and its risks and benefits and potential need for additional testing if stress test is abnormal such as cardiac catheterization   SHAHRAM, non compliant with CPAP, scheduled to see his pulmonologist    Will need to investigate for underlying structural heart disease, will proceed with a transthoracic echocardiogram     Above findings and plan of care were discussed with patient in details, patient's questions were answered.      Disposition:  RTC in 1 YEAR             Electronically signed by Alexei Melara MD, [facility]    10/12/2021 at 10:07 AM EDT

## 2021-10-14 ENCOUNTER — OFFICE VISIT (OUTPATIENT)
Dept: PULMONOLOGY | Age: 66
End: 2021-10-14
Payer: MEDICARE

## 2021-10-14 VITALS
HEIGHT: 71 IN | OXYGEN SATURATION: 97 % | SYSTOLIC BLOOD PRESSURE: 123 MMHG | BODY MASS INDEX: 29.01 KG/M2 | WEIGHT: 207.2 LBS | TEMPERATURE: 98.4 F | HEART RATE: 76 BPM | DIASTOLIC BLOOD PRESSURE: 72 MMHG

## 2021-10-14 DIAGNOSIS — R06.02 SOB (SHORTNESS OF BREATH): Primary | ICD-10-CM

## 2021-10-14 DIAGNOSIS — G93.5 CHIARI I MALFORMATION (HCC): ICD-10-CM

## 2021-10-14 DIAGNOSIS — G47.33 OSA TREATED WITH BIPAP: ICD-10-CM

## 2021-10-14 DIAGNOSIS — R09.89 CHOKING SENSATION: ICD-10-CM

## 2021-10-14 PROCEDURE — 99215 OFFICE O/P EST HI 40 MIN: CPT | Performed by: PHYSICIAN ASSISTANT

## 2021-10-14 NOTE — PROGRESS NOTES
[facility]      Chris Reynolds                                         [id number]  10/14/2021   Chief Complaint   Patient presents with    Follow-up     roman 3 mo f/u with [facility]        Pt of Dr. Rice [address]  [facility] [address]     Presentation:   Kostas Hurtado presents for sleep medicine follow up for {Sleep apnea diagnosis:67935}  Since the last visit Kostas Hurtado has been doing reasonably well with ***. Symptoms of *** {ARE/ARE NOT:94008} improved    Progress History:   Since last visit any new medical issues? {EXAM;YES/NO:19492::\"No\"}  New ER or hospitlal visits? {EXAM; YES/NO:19492::\"No\"}  Any new or changes in medicines? {EXAM; YES/NO:19492::\"No\"}  Any new sleep medicines? {EXAM; YES/NO:19492::\"No\"}    Sleep issues:  Do you feel better? {YES / FL:60565}  More rested? {YES, NO, SOMETIMES:5083569841}   Better concentration?  {YES/NO:45488}      Past Medical History:   Diagnosis Date    Anxiety     CAD (coronary artery disease)     Depression     Diabetes mellitus ([address]     Frequent headaches     Gastroparesis     GERD (gastroesophageal reflux disease)     History of Chiari malformation     Hyperlipidemia     Hypertension     Kidney stone     Kidney stone     Migraine     Movement disorder     DDD    Pulmonary emboli ([address] 10/29/2018    Shingles     Sleep apnea     Tattoos        Past Surgical History:   Procedure Laterality Date    BACK SURGERY  2008    C1 oates & decompression    BACK SURGERY  08/25/2020    [facility] Dr. Pam Luque, C5-7 cadaver patch   [facility]  2008    chiari    CARDIAC SURGERY  2007    heart cath    CHOLECYSTECTOMY  2009    COLONOSCOPY  2017    COLONOSCOPY N/A 3/12/2020    COLONOSCOPY POLYPECTOMY SNARE/COLD BIOPSY performed by Valencia Primrose, MD at [address], COLON, DIAGNOSTIC  2007   [address]    right elbow    IVC FILTER REMOVAL  03/15/2020    [address]  2006    SHOULDER SURGERY Left 2018    TONSILLECTOMY  1964    UPPER GASTROINTESTINAL ENDOSCOPY Left 3/12/2020    EGD ESOPHAGOGASTRODUODENOSCOPY DILATATION performed by Mariella Gonzalez MD at [address] [facility] Left 3/12/2020    EGD BIOPSY performed by aMriella Gonzalez MD at [facility]      right middle finger artery       Social History     Tobacco Use    Smoking status: Former Smoker     Packs/day: 1.50     Years: 3.00     Pack years: 4.50     Types: Cigarettes     Start date: 1976     Quit date: 10/12/1979     Years since quittin.0    Smokeless tobacco: Never Used   Vaping Use    Vaping Use: Never used   Substance Use Topics    Alcohol use: No    Drug use: No       Allergies   Allergen Reactions    Latex Rash       Current Outpatient Medications   Medication Sig Dispense Refill    Continuous Blood Gluc Sensor (DEXCOM G6 SENSOR) MISC Change every 10 days-Use as directed for continuous glucose monitoring. 3 each 5    Continuous Blood Gluc Transmit (DEXCOM G6 TRANSMITTER) MISC Change every 3 months- Use as directed for continuous glucose monitoring.  1 each 3    metFORMIN (GLUCOPHAGE) 1000 MG tablet Take 1 tablet by mouth 2 times daily (with meals) 180 tablet 1    insulin lispro, 1 Unit Dial, (HUMALOG KWIKPEN) 100 UNIT/ML SOPN Inject 7 Units into the skin 3 times daily (before meals) 10 pen 3    insulin glargine (LANTUS SOLOSTAR) 100 UNIT/ML injection pen Inject 22 Units into the skin 2 times daily 15 pen 3    pantoprazole (PROTONIX) 40 MG tablet Take 1 tablet by mouth every morning (before breakfast) 30 tablet 3    ondansetron (ZOFRAN ODT) 4 MG disintegrating tablet Take 1 tablet by mouth every 8 hours as needed for Nausea or Vomiting 30 tablet 3    metoclopramide (REGLAN) 10 MG tablet Take 1 tablet by mouth 4 times daily 30 tablet 0    amitriptyline (ELAVIL) 25 MG tablet Take 1 tablet by mouth nightly 30 tablet 0    CPAP Machine MISC by Does not apply route Please change bipap 11/8 1 each 0    albuterol sulfate HFA (VENTOLIN HFA) 108 (90 Base) MCG/ACT inhaler Inhale 2 puffs into the lungs every 4 hours as needed for Wheezing or Shortness of Breath 1 Inhaler 0    lisinopril (PRINIVIL;ZESTRIL) 10 MG tablet Take 2 tablet by mouth once daily 30 tablet 2    Cholecalciferol (VITAMIN D3) 125 MCG (5000 UT) TABS Take 5,000 Units by mouth daily      XARELTO 10 MG TABS tablet Take 1 tablet by mouth once daily with breakfast 30 tablet 5    Continuous Blood Gluc  (DEXCOM G6 ) YULI Use as directed for continuous glucose monitor. 1 Device 0    cyclobenzaprine (FLEXERIL) 5 MG tablet Take 5 mg by mouth as needed      pravastatin (PRAVACHOL) 80 MG tablet Take 1 tablet by mouth daily 90 tablet 1    Misc. Devices (ROLLATOR ULTRA-LIGHT) MISC Use Rollator for stability with ambulation. 1 each 0    Blood Glucose Monitoring Suppl (ONE TOUCH ULTRA 2) w/Device KIT 1 kit by Does not apply route daily 1 kit 0    blood glucose monitor strips One Touch Ultra Test Strips. Test 2 times a day and as needed for diabetic symptoms. DX: E11.22 300 strip 5    nitroGLYCERIN (NITROSTAT) 0.4 MG SL tablet Place 1 tablet under the tongue every 5 minutes as needed for Chest pain 25 tablet 3    aspirin 81 MG tablet Take 81 mg by mouth daily       No current facility-administered medications for this visit.        Family History   Problem Relation Age of Onset    Kidney Disease Mother     Diabetes Mother     Heart Disease Mother     High Blood Pressure Mother     Diabetes Father     Cancer Father         lung--smoker    High Blood Pressure Father     Diabetes Sister     High Blood Pressure Sister     Diabetes Brother     High Blood Pressure Brother     Colon Cancer Neg Hx     Esophageal Cancer Neg Hx     Liver Cancer Neg Hx     Rectal Cancer Neg Hx     Stomach Cancer Neg Hx         Review of Systems -   General ROS: {WEIGHT LOSS/GAIN [id number]  ENT ROS: negative for - nasal congestion, oral lesions or sore throat  Hematological andLymphatic ROS: negative  Endocrine ROS: negative  Respiratory ROS: no cough, shortness of breath, or wheezing  Cardiovascular ROS: no chest pain or dyspnea on exertion  Gastrointestinal ROS: no abdominal pain,change in bowel habits, or black or bloody stools  Musculoskeletal ROS: negative  Neurological ROS: negative    Physical Exam:    BMI:  There is no height or weight on file to calculate BMI. Wt Readings from Last 3 Encounters:   10/12/21 206 lb 12.8 oz (93.8 kg)   10/11/21 210 lb (95.3 kg)   07/12/21 215 lb (97.5 kg)     Vitals: There were no vitals taken for this visit. Physical Exam       Assessment     {No diagnosis found. (Refresh or delete this SmartLink)}       Plan     - He call my officefor earlier appointment if needed for worsening of sleep symptoms.   - He was instructed on weight loss  - aPulina Yo was educated about my impression and plan. Patient verbalizes understanding.   We will see Yoav Prince back in: {NUMBERS 1-12:10} {DAY, DAYS, WEEK, WEEKS, MONTH, MONTHS, YEAR, AKNOU:24228}     Leisa Carlson PA-C, [place]  10/14/2021

## 2021-10-14 NOTE — PROGRESS NOTES
[facility]      [facility]                                         [id number]  10/14/2021   Chief Complaint   Patient presents with    Follow-up     roman 3 mo f/u with [facility]        ESS 14  SAQLI 59    Presentation:   Gabriella Wilkes presents for sleep medicine follow up for obstructive sleep apnea  Since the last visit Gabriella Wilkes stopped wearing PAP about 6 months ago secondary to abd pain and bloating. Symptoms improved after he stopped wearing PAP. He slept better with PAP and now not sleeping as well. He was seen in [facility] for Chiari and they did not feel it was contributing to SOB. He was in ED for SOB recently. Progress History:   Since last visit any new medical issues? No  New ER or hospitlal visits? Yes   Any new or changes in medicines? No  Any new sleep medicines? No    Sleep issues:  Do you feel better? No  More rested? No   Better concentration?  no      Past Medical History:   Diagnosis Date    Anxiety     CAD (coronary artery disease)     Depression     Diabetes mellitus ([address]     Frequent headaches     Gastroparesis     GERD (gastroesophageal reflux disease)     History of Chiari malformation     Hyperlipidemia     Hypertension     Kidney stone     Kidney stone     Migraine     Movement disorder     DDD    Pulmonary emboli ([address] 10/29/2018    Shingles     Sleep apnea     Tattoos        Past Surgical History:   Procedure Laterality Date    BACK SURGERY  2008    C1 oates & decompression    BACK SURGERY  08/25/2020    [facility] Dr. Tiffani Fallon, C5-7 cadaver patch   [address]  2008    chiari    CARDIAC SURGERY  2007    heart cath    CHOLECYSTECTOMY  2009    COLONOSCOPY  2017    COLONOSCOPY N/A 3/12/2020    COLONOSCOPY POLYPECTOMY SNARE/COLD BIOPSY performed by Kleber Myles MD at [address], COLON, DIAGNOSTIC  2007   [facility]    right elbow    IVC FILTER REMOVAL  03/15/2020    Mariann Schneider of [address] [place] [address] Road SEPTUM SURGERY  2006    SHOULDER SURGERY Left 2018    TONSILLECTOMY  1964    UPPER GASTROINTESTINAL ENDOSCOPY Left 3/12/2020    EGD ESOPHAGOGASTRODUODENOSCOPY DILATATION performed by Miki Mario MD at [address] [facility] Left 3/12/2020    EGD BIOPSY performed by Miki Mario MD at [address] [facility]  2015    right middle finger artery       Social History     Tobacco Use    Smoking status: Former Smoker     Packs/day: 1.50     Years: 3.00     Pack years: 4.50     Types: Cigarettes     Start date: 1976     Quit date: 10/12/1979     Years since quittin.0    Smokeless tobacco: Never Used   Vaping Use    Vaping Use: Never used   Substance Use Topics    Alcohol use: No    Drug use: No       Allergies   Allergen Reactions    Latex Rash       Current Outpatient Medications   Medication Sig Dispense Refill    CPAP Machine MISC by Does not apply route Please change Bipap to 10/7cm H20. 1 each 0    Continuous Blood Gluc Sensor (DEXCOM G6 SENSOR) MISC Change every 10 days-Use as directed for continuous glucose monitoring. 3 each 5    Continuous Blood Gluc Transmit (DEXCOM G6 TRANSMITTER) MISC Change every 3 months- Use as directed for continuous glucose monitoring.  1 each 3    metFORMIN (GLUCOPHAGE) 1000 MG tablet Take 1 tablet by mouth 2 times daily (with meals) 180 tablet 1    insulin lispro, 1 Unit Dial, (HUMALOG KWIKPEN) 100 UNIT/ML SOPN Inject 7 Units into the skin 3 times daily (before meals) 10 pen 3    insulin glargine (LANTUS SOLOSTAR) 100 UNIT/ML injection pen Inject 22 Units into the skin 2 times daily 15 pen 3    pantoprazole (PROTONIX) 40 MG tablet Take 1 tablet by mouth every morning (before breakfast) 30 tablet 3    ondansetron (ZOFRAN ODT) 4 MG disintegrating tablet Take 1 tablet by mouth every 8 hours as needed for Nausea or Vomiting 30 tablet 3    metoclopramide (REGLAN) 10 MG tablet Take 1 tablet by mouth 4 times daily 30 tablet 0    amitriptyline (ELAVIL) 25 MG tablet Take 1 tablet by mouth nightly 30 tablet 0    albuterol sulfate HFA (VENTOLIN HFA) 108 (90 Base) MCG/ACT inhaler Inhale 2 puffs into the lungs every 4 hours as needed for Wheezing or Shortness of Breath 1 Inhaler 0    lisinopril (PRINIVIL;ZESTRIL) 10 MG tablet Take 2 tablet by mouth once daily 30 tablet 2    Cholecalciferol (VITAMIN D3) 125 MCG (5000 UT) TABS Take 5,000 Units by mouth daily      XARELTO 10 MG TABS tablet Take 1 tablet by mouth once daily with breakfast 30 tablet 5    Continuous Blood Gluc  (DEXCOM G6 ) YULI Use as directed for continuous glucose monitor. 1 Device 0    cyclobenzaprine (FLEXERIL) 5 MG tablet Take 5 mg by mouth as needed      pravastatin (PRAVACHOL) 80 MG tablet Take 1 tablet by mouth daily 90 tablet 1    Misc. Devices (ROLLATOR ULTRA-LIGHT) MISC Use Rollator for stability with ambulation. 1 each 0    Blood Glucose Monitoring Suppl (ONE TOUCH ULTRA 2) w/Device KIT 1 kit by Does not apply route daily 1 kit 0    blood glucose monitor strips One Touch Ultra Test Strips. Test 2 times a day and as needed for diabetic symptoms. DX: E11.22 300 strip 5    nitroGLYCERIN (NITROSTAT) 0.4 MG SL tablet Place 1 tablet under the tongue every 5 minutes as needed for Chest pain 25 tablet 3    aspirin 81 MG tablet Take 81 mg by mouth daily       No current facility-administered medications for this visit.        Family History   Problem Relation Age of Onset    Kidney Disease Mother     Diabetes Mother     Heart Disease Mother     High Blood Pressure Mother     Diabetes Father     Cancer Father         lung--smoker    High Blood Pressure Father     Diabetes Sister     High Blood Pressure Sister     Diabetes Brother     High Blood Pressure Brother     Colon Cancer Neg Hx     Esophageal Cancer Neg Hx     Liver Cancer Neg Hx     Rectal Cancer Neg Hx     Stomach Cancer Neg Hx         Review of Systems -   General ROS: stable / unchanged  ENT ROS: negative for - nasal congestion, oral lesions or sore throat  Hematological andLymphatic ROS: negative  Endocrine ROS: negative  Respiratory ROS: +shortness of breath  Cardiovascular ROS: no chest pain or dyspnea on exertion  Gastrointestinal ROS: no abdominal pain,change in bowel habits, or black or bloody stools  Musculoskeletal ROS: negative  Neurological ROS: negative    Physical Exam:    BMI:  Body mass index is 28.9 kg/m². Wt Readings from Last 3 Encounters:   10/14/21 207 lb 3.2 oz (94 kg)   10/12/21 206 lb 12.8 oz (93.8 kg)   10/11/21 210 lb (95.3 kg)     Vitals: /72 (Site: Left Upper Arm, Position: Sitting, Cuff Size: Large Adult)   Pulse 76   Temp 98.4 °F (36.9 °C)   Ht 5' 11\" (1.803 m)   Wt 207 lb 3.2 oz (94 kg)   SpO2 97% Comment: on r/a  BMI 28.90 kg/m²       Physical Exam  Constitutional:       Appearance: He is well-developed. HENT:      Head: Normocephalic and atraumatic. Right Ear: External ear normal.      Left Ear: External ear normal.   Eyes:      Conjunctiva/sclera: Conjunctivae normal.      Pupils: Pupils are equal, round, and reactive to light. Cardiovascular:      Rate and Rhythm: Normal rate and regular rhythm. Heart sounds: Normal heart sounds. Pulmonary:      Effort: Pulmonary effort is normal.      Breath sounds: Normal breath sounds. Musculoskeletal:         General: Normal range of motion. Cervical back: Normal range of motion and neck supple. Skin:     General: Skin is warm and dry. Neurological:      Mental Status: He is alert and oriented to person, place, and time. Psychiatric:         Behavior: Behavior normal.         Thought Content: Thought content normal.         Judgment: Judgment normal.            Assessment      Diagnosis Orders   1. SOB (shortness of breath)  Full PFT Study With Bronchodilator    KY LUNG FUNCTION TEST (MBC/MVV)   2. SHAHRAM treated with BiPAP     3. Choking sensation     4.  Chiari I malformation ([address]            Plan   - Will get PFT and MIP MEP and MVV to eval SOB  - Still susepct SOB related to neuro issues  - Will have him follow up with Joi after pulm testing  - Recommend getting back to on bipap  - Will lower pressure to improve bloating  - Discussed importance of wearing PAP  - Download reviewed and discussed with patient  - He  was advised to continue current positive airway pressure therapy with above described pressure. - He  advised to keep good compliance with current recommended pressure to get optimal results and clinical improvement  - Recommend 7-9 hours of sleep with PAP  - He was advised to call [facility] regarding supplies if needed.   -He call my office for earlier appointment if needed for worsening of sleep symptoms.   - He was instructed on weight loss  - Gavino Rodriguez was educated about my impression and plan. Patient verbalizesunderstanding.   We will see Cher Wilde back in: 3 months with download        Sarah Levine PA-C, MPAS  10/14/2021

## 2021-10-15 ENCOUNTER — HOSPITAL ENCOUNTER (OUTPATIENT)
Dept: NON INVASIVE DIAGNOSTICS | Age: 66
Discharge: HOME | End: 2021-10-15
Payer: MEDICARE

## 2021-10-15 ENCOUNTER — TELEPHONE (OUTPATIENT)
Dept: PULMONOLOGY | Age: 66
End: 2021-10-15

## 2021-10-15 DIAGNOSIS — R06.00 DYSPNEA, UNSPECIFIED TYPE: ICD-10-CM

## 2021-10-15 DIAGNOSIS — I20.8 OTHER FORMS OF ANGINA PECTORIS (HCC): ICD-10-CM

## 2021-10-15 DIAGNOSIS — R06.02 SOB (SHORTNESS OF BREATH): ICD-10-CM

## 2021-10-15 DIAGNOSIS — E78.5 DYSLIPIDEMIA: ICD-10-CM

## 2021-10-15 LAB
LV EF: 63 %
LVEF MODALITY: NORMAL

## 2021-10-15 PROCEDURE — 93017 CV STRESS TEST TRACING ONLY: CPT | Performed by: INTERNAL MEDICINE

## 2021-10-15 PROCEDURE — 3430000000 HC RX DIAGNOSTIC RADIOPHARMACEUTICAL: Performed by: INTERNAL MEDICINE

## 2021-10-15 PROCEDURE — A9500 TC99M SESTAMIBI: HCPCS | Performed by: INTERNAL MEDICINE

## 2021-10-15 PROCEDURE — 6360000002 HC RX W HCPCS

## 2021-10-15 PROCEDURE — 93306 TTE W/DOPPLER COMPLETE: CPT

## 2021-10-15 PROCEDURE — 78452 HT MUSCLE IMAGE SPECT MULT: CPT

## 2021-10-15 RX ADMIN — Medication 8.4 MILLICURIE: at 12:09

## 2021-10-15 RX ADMIN — Medication 29.7 MILLICURIE: at 12:57

## 2021-10-15 NOTE — TELEPHONE ENCOUNTER
Pt called this morning upset with Erum Blakely from his appointment yesterday while being seen for a PAP follow up, pt stopped using his PAP machine. Erum Blakely has notes of pressure changes documented trying to help him with the discomfort he was having while keeping him on the PAP as he needs it. He was advised to speak with me regarding his appointment yesterday. Pt stated he was in the ED on Monday for SOB and he stopped breathing for 6 minutes and no one came in to check on him, he stated he wasn't upset about that but his girlfriend was. From the ED note on 10/11/2021 pt's 02 was 98% on his physical exam. Pt stated that Ayan Woodruff" at him stating we are short staffed right now due to the number of Covid cases and the severity of the patient's. Pt stated he wasn't going to listen to Michaela's political stance regarding the vaccine and that's not why he was here. He stated the only reason why he didn't get up and walk out was because of the amazing staff we have up front. I asked the pt if he was SOB yesterday when he was here pt stated yes, his 02 stats were at 97% and was negative for SOB. Erum Blakely did order a PFT for him to follow up with Joi who has previously attempted to schedule pt for a PFT on 6/12/2019 to rule out COPD with his SOB pt refused and Joi would see pt back PRN. Pt stated he was never asked that. Pt asked to see another provider here for sleep as he doesn't want to see Erum Blakely again, he still wants to see Joi for Pulmonary. I advised I would have to ask Erum Blakely how she feels about releasing him to another provider and give him a call back. I did speak with Erum Blakely regarding this call. This note will be sent to both Erum Blakely and Jenna and I will call patient back with what has been decided.

## 2021-10-15 NOTE — TELEPHONE ENCOUNTER
Frustrating situation, he declined SOB eval with PFT's 1.5 years ago with Joi so she made him PRN and now he wants it done and insinuated we were not doing our job because it had not been ordered prior. He stopped wearing PAP about 6 months ago because he could not tolerate but failed to call and ask for suggestions. He accused the ER staff of poor care and being tended to promptly and I informed in that staffing is a real issue in the hospital and elaborated on the current Covid situation affecting care. My voice was never raised, staff would have heard it and as you since your office was right next door. Regardless he is free to choose another provider for sleep but not in our office since we do not want patients provider hopping within the practice. I did speak to [facility] and she will be shifting to sleep so she will not be able to see him for pulm any longer. We can transfer his records to [facility] if he would like.   Pulm guevara he can see Coby Ferreira if he wants to stay for pulm

## 2021-10-15 NOTE — TELEPHONE ENCOUNTER
Yes, I discussed with case with Lorna. I do not feel he should be allowed to see another sleep provider in the office. We have discussed that we will not allow \" provider hoping\" within the office, it is too confusing to the providers. He should be directed to another clinic for sleep needs.   I can see him for pulmonary however with my schedule shifting to sleep he should be scheduled with Surya or Abeba Monaco

## 2021-10-18 ENCOUNTER — TELEPHONE (OUTPATIENT)
Dept: CARDIOLOGY CLINIC | Age: 66
End: 2021-10-18

## 2021-10-18 DIAGNOSIS — R94.39 ABNORMAL STRESS TEST: Primary | ICD-10-CM

## 2021-10-18 NOTE — TELEPHONE ENCOUNTER
Heart cath scheduled 10-21-21 @ 12:00pm  Pt on xarelto, hold starting today per dr Saurav Murray    Pt informed, date, time and instructions reviewed

## 2021-10-18 NOTE — TELEPHONE ENCOUNTER
----- Message from Capo Castillo MD sent at 10/15/2021  3:34 PM EDT -----  Schedule [facility] on 10/21/2021 at 1 pm

## 2021-10-18 NOTE — TELEPHONE ENCOUNTER
PROCEDURE: CARDIAC CATH     DATE OF SERVICE: [id number]    SERVICE LOCATION: [facility]    CPT CODE: [id number]    PHYSICIAN: . DATE PRIOR AUTH SUBMITTED: 10/21/2021    STATUS: APPROVED. AUTH NUMBER: [id number]    VALID: 10/18/2021-12/16/2021.

## 2021-10-18 NOTE — TELEPHONE ENCOUNTER
Spoke with patient, advised we will not switch patients to other providers within the office if they are not happy seeing one, the providers here collaborate with one another and it's not ideal in the practice to go from provider to another. It is very confusing for the provider. I advised to patient he can still be seen here for Pulm if he chooses and we can forward his Sleep records to the office of his choice. Pt stated that was fine, he took himself off the PAP Machine as his Chiari  Causes different issues and it's apparent we don't understand chiari, I again advised to please let us know where he would like his Sleep records sent. Pt stated thank you and hung up. If patient should call back regarding his Pulm appointment please schedule with Sanjay Sweeney or Justine Mao as Kathy Damico will be seeing more sleep patients in the future.

## 2021-10-19 ENCOUNTER — CARE COORDINATION (OUTPATIENT)
Dept: CARE COORDINATION | Age: 66
End: 2021-10-19

## 2021-10-19 NOTE — CARE COORDINATION
Received call from Sung Kamara today. He states that he is having a heart cath later this wk d/t abnormal stress. Switching pulmonary providers. Pt is planning on switching to [facility] for further f/u. Denies needs at this time. No

## 2021-10-20 ENCOUNTER — PREP FOR PROCEDURE (OUTPATIENT)
Dept: CARDIOLOGY | Age: 66
End: 2021-10-20

## 2021-10-20 RX ORDER — SODIUM CHLORIDE 0.9 % (FLUSH) 0.9 %
5-40 SYRINGE (ML) INJECTION PRN
Status: CANCELLED | OUTPATIENT
Start: 2021-10-20

## 2021-10-20 RX ORDER — ASPIRIN 325 MG
325 TABLET ORAL ONCE
Status: CANCELLED | OUTPATIENT
Start: 2021-10-20 | End: 2021-10-20

## 2021-10-20 RX ORDER — NITROGLYCERIN 0.4 MG/1
0.4 TABLET SUBLINGUAL EVERY 5 MIN PRN
Status: CANCELLED | OUTPATIENT
Start: 2021-10-20

## 2021-10-20 RX ORDER — SODIUM CHLORIDE 0.9 % (FLUSH) 0.9 %
5-40 SYRINGE (ML) INJECTION EVERY 12 HOURS SCHEDULED
Status: CANCELLED | OUTPATIENT
Start: 2021-10-20

## 2021-10-20 RX ORDER — SODIUM CHLORIDE 9 MG/ML
25 INJECTION, SOLUTION INTRAVENOUS PRN
Status: CANCELLED | OUTPATIENT
Start: 2021-10-20

## 2021-10-20 RX ORDER — SODIUM CHLORIDE 9 MG/ML
INJECTION, SOLUTION INTRAVENOUS CONTINUOUS
Status: CANCELLED | OUTPATIENT
Start: 2021-10-20

## 2021-10-21 PROBLEM — I25.10 CAD IN NATIVE ARTERY: Status: ACTIVE | Noted: 2021-10-21

## 2021-10-25 ENCOUNTER — TELEPHONE (OUTPATIENT)
Dept: FAMILY MEDICINE CLINIC | Age: 66
End: 2021-10-25

## 2021-10-25 NOTE — TELEPHONE ENCOUNTER
[facility] Transitions Initial Follow Up Call    Outreach made within 2 business days of discharge: Yes    Patient: Kamla Rodriguez Patient : 1955   MRN: [id number]  Reason for Admission: There are no discharge diagnoses documented for the most recent discharge. Discharge Date: 10/22/21       Spoke with: Patient    Discharge department/facility: [facility]    TCM Interactive Patient Contact:  Was patient able to fill all prescriptions: Yes  Was patient instructed to bring all medications to the follow-up visit: Yes  Is patient taking all medications as directed in the discharge summary?  Yes  Does patient understand their discharge instructions: Yes  Does patient have questions or concerns that need addressed prior to 7-14 day follow up office visit: no    Scheduled appointment with PCP within 7-14 days    Follow Up  Future Appointments   Date Time Provider Hailey Brown   10/27/2021  8:00 AM Staci Ram DO [facility] [facility]   11/10/2021  9:00 AM [facility] PULMONARY FUNCTION ROOM 1 [facility] PFT [address] [place] [address]   2021  8:45 AM [facility]   2022  8:15 AM Staci Ram DO [facility]   2022 10:00 AM Daniel Monge   10/18/2022 10:00 AM Lyle Garcia MD [facility]       Ruapli Gomez LPN

## 2021-10-25 NOTE — PLAN OF CARE
Hospital Facility-Based Program  [facility] Intensive Cardiac Rehab/Traditional Cardiac Rehab  PHYSICIAN ORDER  Class I Level B based on research  Medical Director:  Dr. Saulo Thakkar MD     Patient Name: Nola Casiano :   Referring Physician: Dr. Tanmay Herndon  Date: 10/25/2021  Allergies: Allergies as of 2021 - Fully Reviewed 10/21/2021   Allergen Reaction Noted    Latex Rash 2016        Diagnosis:  PCI on 10/21/21    [x] [facility] Intensive Cardiac Rehab with telemetry monitoring, resting and exercise        BPs & HRs with each session. Hospital setting for patient safety. [x] 72 sessions: 36 exercise sessions, 36 education sessions   [] 36 sessions: 18 exercise sessions, 18 education sessions  [] Traditional Cardiac Rehab with telemetry monitoring, resting and exercise BPs &       HRs with each session. Hospital setting for patient safety. [] 36 sessions:  32 exercise sessions, 4 education sessions     Per Patient symptoms, proceed with:   [x]Nitroglycerine 0.4mg SL every 5 minutes prn, maximum of 3, for chest pain   [x]12-lead EKG for symptoms of chest pain or noted change in heart rhythm   [x]Administer O2 per nasal cannula for symptoms of chest pain or acute dyspnea    Physician Prescribed Exercise:  Plan of Care:  Patient to attend exercise sessions with aerobic endurance and strength training for a total of 31-60 min/day, 3 days/week with supplemented 30+ minutes of aerobic exercise at home on days not participating in Cardiac Rehab. Aerobic Endurance Training  Aerobic Endurance mode (TM, AD, NS) starting at 5-8 minutes progressing by 2-3 minutes each week to a total of 15-30 minutes 2-3x/week. Arms only 5 min  Stair step increasing to 2 min  Resistance/strength training:  Hand weights starting at 1-5 lbs increasing in weight by 1-2 lbs and/or per patient tolerance weekly.   Start with 8 repetitions and increase the repetitions each exercise session per patient tolerance for a total of 15 repetitions. Measurable Endurance Goal:  Aerobic endurance goal to be measured in minutes. Start endurance training per patient tolerance at 1-8 minutes per exercise type, progressing to a total of 31-60 minutes using various modes of training (see Exercise Prescription). Progression:    [x] Weekly 5-10% intensity progression, as tolerated, during cardiac rehab sessions. [x] 13-17 Rate of Perceived Exertion on the Marisa Scale (6-20). Measurable Muscular Strength Goal:  Starting at 1-5 lbs x 8 reps, progressing to 5-25 lbs x 15 reps per patient tolerance.       Electronically signed by Kapil Fisher on 10/25/2021 at 11:51 AM    Exercise Physiologist/Date/Time

## 2021-10-27 ENCOUNTER — OFFICE VISIT (OUTPATIENT)
Dept: FAMILY MEDICINE CLINIC | Age: 66
End: 2021-10-27
Payer: MEDICARE

## 2021-10-27 VITALS
RESPIRATION RATE: 20 BRPM | WEIGHT: 220 LBS | SYSTOLIC BLOOD PRESSURE: 128 MMHG | BODY MASS INDEX: 30.68 KG/M2 | HEART RATE: 68 BPM | DIASTOLIC BLOOD PRESSURE: 60 MMHG

## 2021-10-27 DIAGNOSIS — I10 ESSENTIAL HYPERTENSION: ICD-10-CM

## 2021-10-27 DIAGNOSIS — Z79.4 TYPE 2 DIABETES MELLITUS WITH HYPERGLYCEMIA, WITH LONG-TERM CURRENT USE OF INSULIN (HCC): ICD-10-CM

## 2021-10-27 DIAGNOSIS — E11.65 TYPE 2 DIABETES MELLITUS WITH HYPERGLYCEMIA, WITH LONG-TERM CURRENT USE OF INSULIN (HCC): ICD-10-CM

## 2021-10-27 DIAGNOSIS — I25.10 CORONARY ARTERY DISEASE INVOLVING NATIVE CORONARY ARTERY OF NATIVE HEART WITHOUT ANGINA PECTORIS: Primary | ICD-10-CM

## 2021-10-27 DIAGNOSIS — E66.9 OBESITY (BMI 30-39.9): ICD-10-CM

## 2021-10-27 DIAGNOSIS — Z95.820 S/P ANGIOPLASTY WITH STENT: ICD-10-CM

## 2021-10-27 DIAGNOSIS — R06.02 SOB (SHORTNESS OF BREATH): ICD-10-CM

## 2021-10-27 DIAGNOSIS — E78.2 MIXED HYPERLIPIDEMIA: ICD-10-CM

## 2021-10-27 DIAGNOSIS — G47.31 CSA (CENTRAL SLEEP APNEA): ICD-10-CM

## 2021-10-27 PROCEDURE — 99495 TRANSJ CARE MGMT MOD F2F 14D: CPT | Performed by: FAMILY MEDICINE

## 2021-10-27 NOTE — PROGRESS NOTES
Post-Discharge Transitional Care Management Services      Miki Sinha   YOB: 1955    Date of Visit:  10/27/2021  30 Day Post-Discharge Date: 11/22/21  Chief Complaint   Patient presents with    Follow-Up from [facility] 10/22/21     POST-PROCEDURE     Physicians/Assistants: Marleen Rose MD MD      Procedure Performed:Cath/ PCI                        Sedation/Anesthesia: Versed/ Fentanyl and 2% xylocaine local anesthesia. Estimated Blood Loss: < 50 ml.      Specimens Removed: None                                           Disposition of Specimen: N/A                                         Complications: No Immediate Complications. Post-procedure Diagnosis/Findings:                                   · Intermediate LAD stenosis, FFR 0.83  · 70-80% Diagonal stenosis, relatively small vessel   · Severe stenosis of distal RCA (Large dominant vessel), s/p successful PCI/GI        Recommendations:  · Bed rest for 4 hours post procedure   · Admit overnight for observation post PCI   · Monitor on Telemetry   · Optimize medical therapy for Coronary Artery Disease  · Aggressive risk factor modification   · Access site care  · Antiplatelet therapy: ASA 81 mg PO daily and Plavix 75 mg po daily  · Stop ASA in one week  · Resume Xarelto tomorrow, decrease dose to 15 mg po daily   · High intensity statin therapy  · IVF: NS at 75 cc/h  · AM Labs: BMP, CBC,  · Outpatient follow up in office in 2 weeks     Follow up on Cardio 11/4. Scheduled for PFT on 11/10. Needs new Pulm referral, was dismissed from current Pulm. States that he had an interaction with Cosme Sheriff which did not go well. BPs stable. BP Readings from Last 3 Encounters:   10/27/21 128/60   10/22/21 133/72   10/14/21 123/72     Weight was 217 lbs at last visit in our office.   Wt Readings from Last 3 Encounters:   10/27/21 220 lb (99.8 kg)   10/21/21 207 lb (93.9 kg)   10/14/21 207 lb 3.2 oz (94 kg)         Allergies   Allergen Reactions    Latex Rash     Outpatient Medications Marked as Taking for the 10/27/21 encounter (Office Visit) with Anuel Cabrera DO   Medication Sig Dispense Refill    rivaroxaban (XARELTO) 15 MG TABS tablet Take 1 tablet by mouth daily (with breakfast) 90 tablet 1    rosuvastatin (CRESTOR) 40 MG tablet Take 1 tablet by mouth nightly 30 tablet 3    metoprolol tartrate (LOPRESSOR) 25 MG tablet Take 0.5 tablets by mouth 2 times daily 60 tablet 3    metoclopramide (REGLAN) 10 MG tablet Take 1 tablet by mouth 3 times daily (before meals) 120 tablet 3    clopidogrel (PLAVIX) 75 MG tablet Take 1 tablet by mouth daily 30 tablet 0    clopidogrel (PLAVIX) 75 MG tablet Take 1 tablet by mouth daily 90 tablet 1    CPAP Machine MISC by Does not apply route Please change Bipap to 10/7cm H20. 1 each 0    Continuous Blood Gluc Sensor (DEXCOM G6 SENSOR) MISC Change every 10 days-Use as directed for continuous glucose monitoring. 3 each 5    Continuous Blood Gluc Transmit (DEXCOM G6 TRANSMITTER) MISC Change every 3 months- Use as directed for continuous glucose monitoring.  1 each 3    metFORMIN (GLUCOPHAGE) 1000 MG tablet Take 1 tablet by mouth 2 times daily (with meals) 180 tablet 1    insulin lispro, 1 Unit Dial, (HUMALOG KWIKPEN) 100 UNIT/ML SOPN Inject 7 Units into the skin 3 times daily (before meals) 10 pen 3    insulin glargine (LANTUS SOLOSTAR) 100 UNIT/ML injection pen Inject 22 Units into the skin 2 times daily 15 pen 3    pantoprazole (PROTONIX) 40 MG tablet Take 1 tablet by mouth every morning (before breakfast) 30 tablet 3    ondansetron (ZOFRAN ODT) 4 MG disintegrating tablet Take 1 tablet by mouth every 8 hours as needed for Nausea or Vomiting 30 tablet 3    amitriptyline (ELAVIL) 25 MG tablet Take 1 tablet by mouth nightly 30 tablet 0    albuterol sulfate HFA (VENTOLIN HFA) 108 (90 Base) MCG/ACT inhaler Inhale 2 puffs into the lungs every 4 hours as needed for Wheezing or Shortness of Breath 1 Inhaler 0    lisinopril (PRINIVIL;ZESTRIL) 10 MG tablet Take 2 tablet by mouth once daily 30 tablet 2    Cholecalciferol (VITAMIN D3) 125 MCG (5000 UT) TABS Take 5,000 Units by mouth daily      Continuous Blood Gluc  (DEXCOM G6 ) YULI Use as directed for continuous glucose monitor. 1 Device 0    cyclobenzaprine (FLEXERIL) 5 MG tablet Take 5 mg by mouth as needed      Misc. Devices (ROLLATOR ULTRA-LIGHT) MISC Use Rollator for stability with ambulation. 1 each 0    Blood Glucose Monitoring Suppl (ONE TOUCH ULTRA 2) w/Device KIT 1 kit by Does not apply route daily 1 kit 0    blood glucose monitor strips One Touch Ultra Test Strips. Test 2 times a day and as needed for diabetic symptoms. DX: E11.22 300 strip 5    nitroGLYCERIN (NITROSTAT) 0.4 MG SL tablet Place 1 tablet under the tongue every 5 minutes as needed for Chest pain 25 tablet 3    aspirin 81 MG tablet Take 81 mg by mouth daily           Vitals:    10/27/21 0757   BP: 128/60   Site: Left Upper Arm   Position: Sitting   Cuff Size: Large Adult   Pulse: 68   Resp: 20   Weight: 220 lb (99.8 kg)     Body mass index is 30.68 kg/m². Wt Readings from Last 3 Encounters:   10/27/21 220 lb (99.8 kg)   10/21/21 207 lb (93.9 kg)   10/14/21 207 lb 3.2 oz (94 kg)     BP Readings from Last 3 Encounters:   10/27/21 128/60   10/22/21 133/72   10/14/21 123/72        Patient was admitted to [facility] from 10/21/21 to 10/22/21 for CAD, stent. Inpatient course: Discharge summary reviewed- see chart. Current status: improved    Review of Systems:  A comprehensive review of systems was negative except for what was noted in the HPI.     Physical Exam:  General Appearance: alert and oriented to person, place and time, well developed and well- nourished, in no acute distress  Skin: warm and dry, no rash or erythema  Head: normocephalic and atraumatic  Eyes: pupils equal, round, and reactive to light, extraocular eye movements intact, conjunctivae normal  ENT: tympanic membrane, external ear and ear canal normal bilaterally, nose without deformity, nasal mucosa and turbinates normal without polyps  Neck: supple and non-tender without mass, no thyromegaly or thyroid nodules, no cervical lymphadenopathy  Pulmonary/Chest: clear to auscultation bilaterally- no wheezes, rales or rhonchi, normal air movement, no respiratory distress  Cardiovascular: normal rate, regular rhythm, normal S1 and S2, no murmurs, rubs, clicks, or gallops, distal pulses intact, no carotid bruits  Abdomen: soft, non-tender, non-distended, normal bowel sounds, no masses or organomegaly  Extremities: no cyanosis, clubbing or edema  Musculoskeletal: normal range of motion, no joint swelling, deformity or tenderness  Neurologic: reflexes normal and symmetric, no cranial nerve deficit, gait, coordination and speech normal    Initial post-discharge communication occurred between nurse and patient on 10/22/21- see documentation in chart: telephone encounter. Assessment/Plan:  Abundio Maya was seen today for follow-up from hospital.    Diagnoses and all orders for this visit:    Coronary artery disease involving native coronary artery of native heart without angina pectoris    S/P angioplasty with stent    SOB (shortness of breath)    Type 2 diabetes mellitus with hyperglycemia, with long-term current use of insulin (HCC)    Obesity (BMI 30-39. 9)    Essential hypertension    CSA (central sleep apnea)    Mixed hyperlipidemia    -  Chronic medical problems stable  -  Continue current medications  -  Follow up with specialists as scheduled  -  RTO 6 weeks to review PFT, likely referral to Dr. Karyn Xie at that time    Diagnostic test results reviewed: x-ray-cxr, CT angiogram-chest, echocardiogram, angiogram-abnormal and stress test-abnormal    Patient risk of morbidity and mortality: high    Medical Decision Making: high complexity

## 2021-11-03 ENCOUNTER — TELEPHONE (OUTPATIENT)
Dept: CARDIOLOGY CLINIC | Age: 66
End: 2021-11-03

## 2021-11-04 ENCOUNTER — OFFICE VISIT (OUTPATIENT)
Dept: CARDIOLOGY CLINIC | Age: 66
End: 2021-11-04
Payer: MEDICARE

## 2021-11-04 ENCOUNTER — HOSPITAL ENCOUNTER (OUTPATIENT)
Dept: CARDIAC REHAB | Age: 66
Discharge: HOME | End: 2021-11-04

## 2021-11-04 VITALS
DIASTOLIC BLOOD PRESSURE: 72 MMHG | WEIGHT: 219.2 LBS | HEART RATE: 63 BPM | BODY MASS INDEX: 30.69 KG/M2 | HEIGHT: 71 IN | SYSTOLIC BLOOD PRESSURE: 120 MMHG

## 2021-11-04 DIAGNOSIS — R94.39 ABNORMAL STRESS TEST: Primary | ICD-10-CM

## 2021-11-04 DIAGNOSIS — R06.00 DYSPNEA, UNSPECIFIED TYPE: ICD-10-CM

## 2021-11-04 DIAGNOSIS — R06.02 SOB (SHORTNESS OF BREATH): ICD-10-CM

## 2021-11-04 PROCEDURE — 99214 OFFICE O/P EST MOD 30 MIN: CPT | Performed by: INTERNAL MEDICINE

## 2021-11-04 PROCEDURE — 93000 ELECTROCARDIOGRAM COMPLETE: CPT | Performed by: INTERNAL MEDICINE

## 2021-11-04 RX ORDER — ISOSORBIDE MONONITRATE 30 MG/1
30 TABLET, EXTENDED RELEASE ORAL DAILY
Qty: 30 TABLET | Refills: 3 | Status: SHIPPED | OUTPATIENT
Start: 2021-11-04 | End: 2022-02-10

## 2021-11-04 RX ORDER — NITROGLYCERIN 0.4 MG/1
0.4 TABLET SUBLINGUAL EVERY 5 MIN PRN
Qty: 25 TABLET | Refills: 3 | Status: SHIPPED | OUTPATIENT
Start: 2021-11-04

## 2021-11-04 NOTE — PROGRESS NOTES
[address] [place] [address]  Dept: [phone]  Dept Fax: [phone]  Loc: [phone]    Visit Date: 11/4/2021    Mr. Kole Coon is a 73 y.o. male  who presented for:  Chief Complaint   Patient presents with    Follow-Up from [place] [address].     Chest pain   Post PCI     HPI:   HPI   Mayra Vázquez is a pleasant 73year old male patient who  has a past medical history of Anxiety, CAD (coronary artery disease), Depression, [facility] ([facility] Frequent headaches, Gastroparesis, GERD (gastroesophageal reflux disease), History of Chiari malformation, Hyperlipidemia, Hypertension, Kidney stone, Kidney stone, Migraine, Movement disorder, Pulmonary emboli ([facility] Shingles, Sleep apnea, and Tattoos. He is on Xarelto for h/o PE. Patient was previously evaluated for chest pains, abnormal stress test. Echo 10/2021 revealed an EF of 60-65%. [facility] 10/2021 revealed severe RCA stenosis. There was evidence for mid LAD 50-60% stenosis, FFR 0.83 with severe stenosis of a relatively small D1 (medically managed). The patient feels better. He had mild atypical chest discomfort yesterday (sharp, increased with deep breathing, nonexertional, self limiting). Patient denies shortness of breath, dyspnea on exertion, orthopnea, paroxysmal nocturnal dyspnea, palpitations, dizziness, syncope, weight gain or leg swelling. No acute ischemic changes on EKG.        Current Outpatient Medications:     rivaroxaban (XARELTO) 15 MG TABS tablet, Take 1 tablet by mouth daily (with breakfast), Disp: 90 tablet, Rfl: 1    rosuvastatin (CRESTOR) 40 MG tablet, Take 1 tablet by mouth nightly, Disp: 30 tablet, Rfl: 3    metoprolol tartrate (LOPRESSOR) 25 MG tablet, Take 0.5 tablets by mouth 2 times daily, Disp: 60 tablet, Rfl: 3    metoclopramide (REGLAN) 10 MG tablet, Take 1 tablet by mouth 3 times daily (before meals), Disp: 120 tablet, Rfl: 3   clopidogrel (PLAVIX) 75 MG tablet, Take 1 tablet by mouth daily, Disp: 30 tablet, Rfl: 0    clopidogrel (PLAVIX) 75 MG tablet, Take 1 tablet by mouth daily, Disp: 90 tablet, Rfl: 1    CPAP Machine MISC, by Does not apply route Please change Bipap to 10/7cm H20., Disp: 1 each, Rfl: 0    Continuous Blood Gluc Sensor (DEXCOM G6 SENSOR) MISC, Change every 10 days-Use as directed for continuous glucose monitoring., Disp: 3 each, Rfl: 5    Continuous Blood Gluc Transmit (DEXCOM G6 TRANSMITTER) MISC, Change every 3 months- Use as directed for continuous glucose monitoring., Disp: 1 each, Rfl: 3    metFORMIN (GLUCOPHAGE) 1000 MG tablet, Take 1 tablet by mouth 2 times daily (with meals), Disp: 180 tablet, Rfl: 1    insulin lispro, 1 Unit Dial, (HUMALOG KWIKPEN) 100 UNIT/ML SOPN, Inject 7 Units into the skin 3 times daily (before meals), Disp: 10 pen, Rfl: 3    insulin glargine (LANTUS SOLOSTAR) 100 UNIT/ML injection pen, Inject 22 Units into the skin 2 times daily, Disp: 15 pen, Rfl: 3    pantoprazole (PROTONIX) 40 MG tablet, Take 1 tablet by mouth every morning (before breakfast), Disp: 30 tablet, Rfl: 3    ondansetron (ZOFRAN ODT) 4 MG disintegrating tablet, Take 1 tablet by mouth every 8 hours as needed for Nausea or Vomiting, Disp: 30 tablet, Rfl: 3    amitriptyline (ELAVIL) 25 MG tablet, Take 1 tablet by mouth nightly, Disp: 30 tablet, Rfl: 0    albuterol sulfate HFA (VENTOLIN HFA) 108 (90 Base) MCG/ACT inhaler, Inhale 2 puffs into the lungs every 4 hours as needed for Wheezing or Shortness of Breath, Disp: 1 Inhaler, Rfl: 0    lisinopril (PRINIVIL;ZESTRIL) 10 MG tablet, Take 2 tablet by mouth once daily, Disp: 30 tablet, Rfl: 2    Cholecalciferol (VITAMIN D3) 125 MCG (5000 UT) TABS, Take 5,000 Units by mouth daily, Disp: , Rfl:     Continuous Blood Gluc  (DEXCOM G6 ) YULI, Use as directed for continuous glucose monitor. , Disp: 1 Device, Rfl: 0    cyclobenzaprine (FLEXERIL) 5 MG tablet, Take 5 mg by mouth as needed, Disp: , Rfl:     Misc. Devices (ROLLATOR ULTRA-LIGHT) MISC, Use Rollator for stability with ambulation. , Disp: 1 each, Rfl: 0    Blood Glucose Monitoring Suppl (ONE TOUCH ULTRA 2) w/Device KIT, 1 kit by Does not apply route daily, Disp: 1 kit, Rfl: 0    blood glucose monitor strips, One Touch Ultra Test Strips. Test 2 times a day and as needed for diabetic symptoms. DX: E11.22, Disp: 300 strip, Rfl: 5    nitroGLYCERIN (NITROSTAT) 0.4 MG SL tablet, Place 1 tablet under the tongue every 5 minutes as needed for Chest pain, Disp: 25 tablet, Rfl: 3    aspirin 81 MG tablet, Take 81 mg by mouth daily, Disp: , Rfl:     Past Medical History  Saumya Mohamud  has a past medical history of Anxiety, CAD (coronary artery disease), Depression, Diabetes mellitus ([address] Frequent headaches, Gastroparesis, GERD (gastroesophageal reflux disease), History of Chiari malformation, Hyperlipidemia, Hypertension, Kidney stone, Kidney stone, Migraine, Movement disorder, Pulmonary emboli ([address] Shingles, Sleep apnea, and Tattoos. Social History  Saumya Mohamud  reports that he quit smoking about 42 years ago. His smoking use included cigarettes. He started smoking about 44 years ago. He has a 4.50 pack-year smoking history. He has never used smokeless tobacco. He reports that he does not drink alcohol and does not use drugs. Family History  Saumya Mohamud family history includes Cancer in his father; Diabetes in his brother, father, mother, and sister; Heart Disease in his mother; High Blood Pressure in his brother, father, mother, and sister; Kidney Disease in his mother.     Past Surgical History   Past Surgical History:   Procedure Laterality Date    BACK SURGERY  2008    C1 oates & decompression    BACK SURGERY  08/25/2020    [facility] Dr. Kwadwo Andino, C5-7 cadaver patch   [address]  2008    chiari    CARDIAC SURGERY  2007    heart cath    CHOLECYSTECTOMY  2009    COLONOSCOPY  2017    COLONOSCOPY N/A 3/12/2020 COLONOSCOPY POLYPECTOMY SNARE/COLD BIOPSY performed by Chandler Santiago MD at [address], COLON, DIAGNOSTIC  2007   [address] [facility]    right elbow    IVC FILTER REMOVAL  03/15/2020    [facility]  2006    SHOULDER SURGERY Left 08/16/2018    TONSILLECTOMY  1964    UPPER GASTROINTESTINAL ENDOSCOPY Left 3/12/2020    EGD ESOPHAGOGASTRODUODENOSCOPY DILATATION performed by Chandler Santiago MD at [facility]   [facility] ENDOSCOPY Left 3/12/2020    EGD BIOPSY performed by Chandler Santiago MD at [address] [place] [address]  2015    right middle finger artery       Review of Systems   Constitutional: Negative for chills and fever  HENT: Negative for congestion, sinus pressure, sneezing and sore throat. Eyes: Negative for pain, discharge, redness and itching. Respiratory: Negative for apnea, cough  Gastrointestinal: Negative for blood in stool, constipation, diarrhea   Endocrine: Negative for cold intolerance, heat intolerance, polydipsia. Genitourinary: Negative for dysuria, enuresis, flank pain and hematuria. Musculoskeletal: Negative for arthralgias, joint swelling and neck pain. Neurological: Negative for numbness and headaches. Psychiatric/Behavioral: Negative for agitation, confusion, decreased concentration and dysphoric mood. Objective:     /72   Pulse 63   Ht 5' 11\" (1.803 m)   Wt 219 lb 3.2 oz (99.4 kg)   BMI 30.57 kg/m²     Wt Readings from Last 3 Encounters:   11/04/21 219 lb 3.2 oz (99.4 kg)   10/27/21 220 lb (99.8 kg)   10/21/21 207 lb (93.9 kg)     BP Readings from Last 3 Encounters:   11/04/21 120/72   10/27/21 128/60   10/22/21 133/72       Nursing note and vitals reviewed. Physical Exam   Constitutional: Oriented to person, place, and time. Appears well-developed and well-nourished. ENT: Moist mucous membranes. No bleeding. Tongue is midline. Head: Normocephalic and atraumatic.    Eyes: EOM are normal. Pupils are equal, round, and reactive to light. Neck: Normal range of motion. Neck supple. No JVD present. Cardiovascular: Normal rate, regular rhythm, No murmur, no rubs, and intact distal pulses. Pulmonary/Chest: Effort normal and breath sounds normal. No respiratory distress. No wheezes. No rales. Abdominal: Soft. Bowel sounds are normal. No distension. There is no tenderness. Musculoskeletal: Normal range of motion. no edema. Neurological: Alert and oriented to person, place, and time. No cranial nerve deficit. Coordination normal.   Skin: Skin is warm and dry. Psychiatric: Normal mood and affect.        No results found for: CKTOTAL, CKMB, CKMBINDEX    Lab Results   Component Value Date    WBC 6.2 10/22/2021    RBC 4.25 10/22/2021    RBC 4.22 08/26/2020    HGB 13.0 10/22/2021    HCT 38.6 10/22/2021    MCV 90.8 10/22/2021    MCH 30.6 10/22/2021    MCHC 33.7 10/22/2021    RDW 13.5 08/26/2020     10/22/2021    MPV 9.0 10/22/2021       Lab Results   Component Value Date     10/22/2021    K 3.9 10/22/2021    K 3.8 10/11/2021     10/22/2021    CO2 25 10/22/2021    BUN 12 10/22/2021    LABALBU 4.1 10/11/2021    CREATININE 1.1 10/22/2021    CALCIUM 8.5 10/22/2021    LABGLOM 67 10/22/2021    GLUCOSE 150 10/22/2021    GLUCOSE 135 08/26/2020       Lab Results   Component Value Date    ALKPHOS 125 10/11/2021    ALT 32 10/11/2021    AST 31 10/11/2021    PROT 8.2 10/11/2021    BILITOT 0.8 10/11/2021    BILIDIR <0.2 05/21/2016    LABALBU 4.1 10/11/2021       No results found for: MG    Lab Results   Component Value Date    INR 0.93 10/21/2021    INR 1.34 (H) 03/11/2021    INR 0.91 08/19/2020    PROTIME 12.2 (L) 08/19/2020         Lab Results   Component Value Date    LABA1C 7.5 07/07/2021    LABA1C 7.9 10/23/2020       Lab Results   Component Value Date    TRIG 129 10/21/2021    HDL 46 10/21/2021    LDLCALC 81 10/21/2021       Lab Results   Component Value Date    TSH 1.840 07/09/2021 Testing Reviewed:      I have individually reviewed the cardiac test below:    ECHO: Results for orders placed during the hospital encounter of 10/15/21    Echo 2D w doppler w color complete    Narrative  Transthoracic Echocardiography Report (TTE)    Demographics    Patient Name     Sarah Mike Gender                Male    MR #             [id number]     Race                      Ethnicity              or     Account #        [de-identified]     Room Number    Accession Number [id number]    Date of Study         10/15/2021    Date of Birth    1955    Referring Physician   Shani Marie MD    Age              73 year(s)    Etta Andino MD  Physician    Procedure    Type of Study    TTE procedure:ECHOCARDIOGRAM COMPLETE 2D W DOPPLER W COLOR. Procedure Date  Date: 10/15/2021 Start: 10:53 AM    Study Location: Echo Lab  Technical Quality: Adequate visualization    Indications:Chest pain. Additional Medical History:Chest pain, Diabetes, GERD, Obstructive sleep  apnea, Shortness of breath, Hypertension, Coronary artery disease    Patient Status: Routine    Height: 71 inches Weight: 207 pounds BSA: 2.14 m^2 BMI: 28.87 kg/m^2    BP: 123/72 mmHg    Conclusions    Summary  Normal left ventricle size and systolic function. Ejection fraction was  estimated at 60-65%. There were no regional left ventricular wall motion  abnormalities and wall thickness was within normal limits. Signature    ----------------------------------------------------------------  Electronically signed by Zaheer Douglas MD (Interpreting  physician) on 10/15/2021 at 02:59 PM  ----------------------------------------------------------------    Findings    Mitral Valve  The mitral valve structure was normal with normal leaflet separation.   DOPPLER: The transmitral velocity was within the normal range with no  evidence for mitral RV Diastolic Dimension: 2.1 cm  Diastolic: 0.8 Index: 79.7 ml/18 m^2  cm             EF Calculated: 61.1 %     LA/Aorta: 1    LA volume/Index: 36.4 ml /17m^2    Doppler Measurements & Calculations    MV Peak E-Wave: 55.3 cm/s  AV Peak Velocity: 133  LVOT Peak Velocity: 74.1  MV Peak A-Wave: 61.7 cm/s  cm/s                   cm/s  MV E/A Ratio: 0.9          AV Peak Gradient: 7.08 LVOT Peak Gradient: 2  MV Peak Gradient: 1.22     mmHg                   mmHg  mmHg  TV Peak E-Wave: 58.7  MV Deceleration Time: 296                         cm/s  msec                                              TV Peak A-Wave: 62.6  MV P1/2t: 87 msec          IVRT: 113 msec         cm/s  MVA by PHT:2.53 cm^2  TV Peak Gradient: 1.38  MV E' Septal Velocity: 6.7 AV DVI (Vmax):0.56     mmHg  cm/s  MV A' Septal Velocity: 8.3                        PV Peak Velocity: 69  cm/s                                              cm/s  MV E' Lateral Velocity:                           PV Peak Gradient: 1.9  9.7 cm/s                                          mmHg  MV A' Lateral Velocity:  12.8 cm/s  E/E' septal: 8.25  E/E' lateral: 5.7    [email or web address] [id number]    Cath:10/2021  FINDINGS:  LEFT VENTRICULOGRAPHY:  No regional wall motion abnormality. Ejection  fraction estimated at 55%.     HEMODYNAMICS:  Left ventricular end-diastolic pressure was 18 mmHg. No  significant pressure gradient across the aortic valve upon pullback.     CORONARY ANGIOGRAM:  1. LEFT MAIN CORONARY ARTERY:  The distal segment of left main coronary  artery has nonobstructive 20% stenosis. Left main trifurcates into  ramus intermedius, left circumflex and left anterior descending  arteries. 2.  LEFT CIRCUMFLEX ARTERY:  Proximal left circumflex artery is patent. OM1 is a relatively large vessel with no significant stenotic lesions,  widely patent.   Distal LCX has 30% to 50% stenosis. 3.  RAMUS INTERMEDIUS:  Luminal irregularities were noticed, otherwise  patent. 4.  LEFT ANTERIOR DESCENDING ARTERY:  Proximal LAD has 30% to 50%  segmental lesions. D1 is a relatively small 1.75 to 2 mm vessel with  70% to 80% stenosis. Mid LAD has 60% stenosis. Distal LAD is patent. 5.  RIGHT CORONARY ARTERY:  Proximal and mid RCA have luminal  irregularities, otherwise patent. RCA is a large dominant vessel. Distal RCA has a 90% focal lesion. The RCA bifurcates into RPL and  RPDA, both are patent with only mild luminal irregularities.     MEDICATIONS:  See MAR.     COMPLICATIONS:  None.     ESTIMATED BLOOD LOSS:  Less than 50 mL.     ACCESS:  Right radial artery access. Vasc Band was applied. Hemostasis  was achieved.     IMPRESSION:  1. Intermediate 60% stenosis of mid LAD. Fractional flow reserve  measurement was consistent with hemodynamically insignificant stenosis. FFR was 0.83.  2.  Severely stenotic lesion of first diagonal branch, relatively small  vessel. 3.  Severe stenosis of the distal right coronary artery, status post  successful PCI and stenting.     AssessmentPlan:   Kamla Rodriguez is a pleasant 73year old male patient who  has a past medical history of Anxiety, CAD (coronary artery disease), Depression, Diabetes mellitus ([address] Frequent headaches, Gastroparesis, GERD (gastroesophageal reflux disease), History of Chiari malformation, Hyperlipidemia, Hypertension, Kidney stone, Kidney stone, Migraine, Movement disorder, Pulmonary emboli ([address] Shingles, Sleep apnea, and Tattoos. He is on Xarelto for h/o PE. Patient was previously evaluated for chest pains, abnormal stress test. Echo 10/2021 revealed an EF of 60-65%. [facility] 10/2021 revealed severe RCA stenosis. There was evidence for mid LAD 50-60% stenosis, FFR 0.83 with severe stenosis of a relatively small D1 (medically managed). The patient feels better.  He had mild atypical chest discomfort yesterday (sharp, increased with deep breathing, nonexertional, self limiting). Patient denies shortness of breath, dyspnea on exertion, orthopnea, paroxysmal nocturnal dyspnea, palpitations, dizziness, syncope, weight gain or leg swelling. No acute ischemic changes on EKG. 1. CAD  2. S/p PCI RCA 10/2021  3. 50-60% Mid LAD stenosis (FFR 0.83), D1 stenosis (small vessel)  4. FH of CAD  5. H/o PE  6. DM  7. HTN  8. Dyslipidemia   9. SHAHRAM       Off ASA to avoid triple therapy, bleeding risk    Cont Xarelto   Cont Plavix   Denies bleeding complications    S/p PCI RCA   Residual nonobstructive CAD/small vessel disease as detailed above   Feels better   Some atypical chest discomfort    Add imdur   Metoprolol   Lisinopril    crestor   Hyperlipidemia: on statins, followed periodically. Patient need periodic lipid and liver profile   Patient advised to call office if symptoms recur   Patient was advised to report to the ER if has recurrent symptoms with specific instructions given about severity and duration of symptoms    Above findings and plan of care were discussed with patient in details, patient's questions were answered.      Disposition:  RTC in 6 months             Electronically signed by Steve Monterroso MD, [address] [facility]    11/4/2021 at 3:52 PM EDT

## 2021-11-09 ENCOUNTER — HOSPITAL ENCOUNTER (OUTPATIENT)
Dept: CARDIAC REHAB | Age: 66
Setting detail: THERAPIES SERIES
Discharge: HOME | End: 2021-11-09
Payer: MEDICARE

## 2021-11-09 VITALS — WEIGHT: 210 LBS | OXYGEN SATURATION: 97 % | HEART RATE: 58 BPM | BODY MASS INDEX: 29.4 KG/M2 | HEIGHT: 71 IN

## 2021-11-09 PROCEDURE — G0423 INTENS CARDIAC REHAB NO EXER: HCPCS

## 2021-11-09 PROCEDURE — G0422 INTENS CARDIAC REHAB W/EXERC: HCPCS

## 2021-11-09 NOTE — PROGRESS NOTES
Video Education Report - ICR/CR  Name:  Bret Apodaca     Date:  11/9/2021  MRN: [id number]     Session #:    Session Length: 40 min    Core Videos        []Heart Disease Risk Reduction       [x]Overview of Pritikin Eating Plan      []Move it          []Calorie Density         []Healthy Minds, Bodies, Hearts        []Label Reading - Part 1       []Metabolic Syndrome and Belly Fat        []How Our Thoughts Can Heal Our Hearts   []Dining Out - Part 1      []Biomechanical Limitations  []Facts on Fat        []Hypertension & Heart Disease    []Diseases of Our Time - Focusing on Diabetes   []Body Composition  []Nutrition Action Plan    []Exercise Action Plan    Exercise      Healthy Mind-Set  []Improving Performance    []Smoking Cessation    []Introduction to [address]  []Aging-Enhancing the Quality of Your Life  []Becoming a Pritikin   []Biology of Weight Control    []Cooking Breakfasts   []Decoding Lab Results    and Snacks  []Diseases of Our Time - Overview   []Cooking Dinner and   []Sleep Disorders     Sides  []Targeting Your Nutrition Priorities   []Healthy Salads &   Dressings  Nutrition      []Cooking Soups and   []Dining Out - Part 2     Desserts  []Fueling a Healthy Body   []Menu Workshop     Overview  []Planning Your Eating Strategy   []The Pritikin Solution  []Vitamins and Minerals    Comments:  Video completed, group discussion

## 2021-11-09 NOTE — PLAN OF CARE
[address] [place] [address] Facility-Based Program  Individualized Cardiac Treatment Plan    Patient Name:  Virgil Mabry  :  1955  Age:  73 y.o. MRN:  [id number]  Diagnosis: PCI  Date of Event: 10/21/21   Physician:  Dane Salazar  Next Office Visit:   Date Entered Program: 21  Risk Stratifications: [] Low [] Intermediate [x] High  Allergies: Allergies   Allergen Reactions    Latex Rash       COVID -19 Screen  Do you have any of the following symptoms:  [] Fever [] Cough [x] SOB [] Muscle/Body Ache [] Loss of taste/smell [x] None    Have you traveled outside of the [place]? [] Yes      [x] No    Have you been around anyone who has tested Positive for COVID 19?  [] Yes      [x] No    The following Education has been completed with patient.  [] Wait in the lobby until we call you back to rehab.  [] You must wear a mask while in the medical center, and in cardiac rehab. Please bring your own. [] Bring your own bottle of water with you.       Individual Cardiac Treatment Plan -EXERCISE  INITIAL 30 DAY 60 DAY 90  DAY FINAL DAY   EXERCISE  ASSESSMENT/PLAN EXERCISE  REASSESSMENT EXERCISE   REASSESSMENT EXERCISE   REASSESSMENT EXERCISE   REASSESSMENT EXERCISE  DISCHARGE/FOLLOW-UP   Date: 21 Date: Date: Date: Date: Date:   Session #1  First Exercise Session:  ** Session # ** Session # ** Session # ** Session # ** Session # **  Last Exercise Session:  **   Stages of Change Stages of Change Stages of Change Stages of Change Stages of Change Stages of Change   [x] Pre Contemplation  [] Contemplation  [] Preparation  [] Action  [] Maintenance  [] Relapse [] Pre Contemplation  [] Contemplation  [] Preparation  [] Action  [] Maintenance  [] Relapse [] Pre Contemplation  [] Contemplation  [] Preparation  [] Action  [] Maintenance  [] Relapse [] Pre Contemplation  [] Contemplation  [] Preparation  [] Action  [] Maintenance  [] Relapse [] Pre Contemplation  [] Contemplation  [] Preparation  [] Action  [] Maintenance  [] Relapse [] Pre Contemplation  [] Contemplation  [] Preparation  [] Action  [] Maintenance  [] Relapse   EXERCISE ASSESSMENT EXERCISE ASSESSMENT EXERCISE ASSESSMENT EXERCISE ASSESSMENT EXERCISE ASSESSMENT EXERCISE ASSESSMENT   6 Min Walk Test  Distance walked:   0.12 miles  633 ft.  1.9 METs  Max HR:76 BPM      RPE:  13    THR:    Rhythm:  NSR     6 Min Walk Test  Distance walked:   ** miles  ** ft  ** METs  Max HR:** BPM      RPE:  **  %Change ft= **    Rhythm:  **   DASI: 4.9 METs DASI: ** METs DASI: ** METs DASI: ** METs DASI: ** METs DASI: ** METs   Return to Work  [employer] on returning to work? [] Yes              [] No   [] Disabled     [x] Retired     Return to work: Has Bevelyn Nett returned to work? [] Yes    [] No    Return to work date set? [] Yes, **    [] No    Bevelyn Nett is doing ** at work. Return to work: Has Bevelyn Nett returned to work? [] Yes    [] No    Return to work date set? [] Yes, **    [] No    Bevelyn Nett is doing ** at work. Return to work: Has Bevelyn Nett returned to work? [] Yes    [] No    Return to work date set? [] Yes, **    [] No    Bevelyn Nett is doing ** at work. Return to work: Has Bevelyn Nett returned to work? [] Yes    [] No    Return to work date set? [] Yes, **    [] No    Bevelyn Nett is doing ** at work. Return to work: Has Bevelyn Nett returned to work? [] Yes    [] No    Return to work? [] Yes, **    [] No    *Required MET Level achieved for job duties?    [] Yes    [] No   Orthopedic Limitations/  [x] Yes    [] No  If yes please list:  Back, knee pain     Orthopedic Limitations  *If patient has orthopedic issue:   Actions/  accomodations needed to make Bevelyn Nett successful : Orthopedic Limitations   Orthopedic Limitations   Orthopedic Limitations Orthopedic Limitations     Fall Risk    []Assessed as a fall risk  [x] Not a fall risk      [x] Balance Issues       [] Ayanna Mccormick        [] [employer]       [] Wheelchair  Actions needed:  none    [x] Safety issues reviewed      Fall Risk  *If patient is a fall risk, action needed to accommodate:  Fall Risk Fall Risk Fall Risk Fall Risk   Home Exercise  [] Yes    [x] No   Home Exercise  [] Yes    [] No  Type: **  Frequency:**  Duration: ** Home Exercise  [] Yes    [] No  Type: **  Frequency: **  Duration: ** Home Exercise  [] Yes    [] No  Type: **  Frequency: **  Duration: ** Home Exercise  [] Yes    [] No  Type: **  Frequency: **  Duration: ** Home Exercise  [] Yes    [] No  Type: **  Frequency: **  Duration: **   Angina with Activity? [x] Yes    [] No  Angina Management: nitro Angina with Activity? [] Yes    [] No  Angina Management: ** Angina with Activity? [] Yes    [] No  Angina Management: ** Angina with Activity? [] Yes    [] No  Angina Management: ** Angina with Activity? [] Yes    [] No  Angina Management: ** Angina with Activity?   [] Yes    [] No  Angina Management: **   EXERCISE PLAN EXERCISE PLAN EXERCISE PLAN EXERCISE PLAN EXERCISE PLAN EXERCISE PLAN   *Interventions* *Interventions* *Interventions* *Interventions* *Interventions* *Interventions*   Exercise Prescription  (per physician & CR staff) Exercise Prescription  (per physician & CR staff) Exercise Prescription  (per physician & CR staff) Exercise Prescription  (per physician & CR staff) Exercise Prescription  (per physician & CR staff) Exercise Prescription  (per physician & CR staff)   Cardiovascular Cardiovascular Cardiovascular Cardiovascular Cardiovascular Cardiovascular   Mode:    [x] Treadmill (TM)  [x] Schwinn Airdyne (AD)  [x] Arms Ergometer (AE)  [] NuStep  [] Elliptical (E) MODE:    [] Treadmill (TM)  [] Schwinn Airdyne (AD)  [] Arms Ergometer (AE)  [] NuStep  [] Elliptical (E) MODE:    [] Treadmill (TM)  [] Schwinn Airdyne (AD)  [] Arms Ergometer (AE)  [] NuStep  [] Elliptical (E) MODE:    [] Treadmill (TM)  [] Schwinn Airdyne (AD)  [] Arms Ergometer (AE)  [] NuStep  [] Elliptical (E) MODE:    [] Treadmill (TM)  [] Schwinn Airdyne (AD)  [] Arms Ergometer (AE)  [] NuStep  [] Elliptical (E) MODE:    [] Treadmill (TM)  [] Schwinn Airdyne (AD)  [] Arms Ergometer (AE)  [] NuStep  [] Elliptical (E)   Initial Workloads  TM: [email or web address] 2.2 METs  AD: 0.4 level = 1.9 METs  NS: 26  Miranda= 1.9 METs  AE: 0.2 level = 1.4 METs Current Workloads  TM:  @ %=  METs  AD:  level =  METs  NS:   Miranda=  METs  AE:  level =  METs Current Workloads  TM:  @ %=  METs  AD:  level =  METs  NS:   Miranda=  METs  AE:  level =  METs Current Workloads  TM:  @ %=  METs  AD:  level =  METs  NS:   Miranda=  METs  AE:  level =  METs Current Workloads  TM:  @ %=  METs  AD:  level =  METs  NS:   Miranda=  METs  AE:  level =  METs Current Workloads  TM:  @ %=  METs  AD:  level =  METs  NS:   Miranda=  METs  AE:  level =  METs     Frequency:    ICR: 3x/week  Home: 2-3x/wk Frequency:   ICR: 3x/week  Home: 3x/wk Frequency:  ICR: 3x/week  Home: 3-4x/wk Frequency:  ICR: 3x/week  Home: 3-4x/wk Frequency:  ICR: 3x/week  Home: 3-4x/wk Frequency:  Gavino Rodriguez will continue exercise at  5-7 days/week   Duration:   Total aerobic exercise = 30-45 min    5-8 min/mode Duration:  Total aerobic exercises = ** min     **min/mode Duration:  Total aerobic exercises = ** min     **min/mode Duration:  Total aerobic exercises = ** min     **min/mode Duration:  Total aerobic exercises = ** min     **min/mode Duration:  Total erobic exercise =  60-90 min   Intensity:   MET Level = 1.9-2.2  RPE = 12-15 Intensity:  Max MET Level = **  RPE = 12-15 Intensity:  Max MET Level = **  RPE = 12-15 Intensity:  Max MET Level = **  RPE = 12-15 Intensity:  Max MET Level = **  RPE = 12-15 Intensity:  Max MET Level = ** RPE = 12-15   Progression: increase aerobic activity up to 15 min over next 4 weeks by increasing time 2-3 min/week.  Progression:   Progression:   Progression: Progression: Progression:  Increase time/intensity when RPE <13, and HR is in [facility] Strength Training Strength Training Strength Training Strength Training   [x] Yes      [] No  Upper and Lower body strength training 2x/wk    Wt: 2#       Reps:  8-15    *Increase wt. after completing 15 reps with an RPE of <12/13. [] Yes      [] No  Upper and Lower body strength training 2x/wk    Wt: **#       Reps:  8-15    *Increase wt. after completing 15 reps with an RPE of <12/13. [] Yes      [] No  Upper and Lower body strength training 2x/wk    Wt: **#       Reps:  8-15    *Increase wt. after completing 15 reps with an RPE of <12/13. [] Yes      [] No  Upper and Lower body strength training 2x/wk    Wt: **#       Reps:  8-15    *Increase wt. after completing 15 reps with an RPE of <12/13. [] Yes      [] No  Upper and Lower body strength training 2x/wk    Wt: **#       Reps:  8-15    *Increase wt. after completing 15 reps with an RPE of <12/13. Continue Strength Training at home   [] Exercise Log & Strength training handout given     Wt: **#       Reps:  8-15    *Increase wt. after completing 15 reps with an RPE of <12/13. Flexibility Flexibility Flexibility Flexibility Flexibility Flexibility   [x] Yes      [] No  25 min session of Core Strength & Flexibility 1x/per week  Attends Core Strength & Flexibility   [] Yes      [] No Attends Core Strength & Flexibility   [] Yes      [] No Attends Core Strength & Flexibility   [] Yes      [] No Attends Core Strength & Flexibility   [] Yes      [] No Continue Core Strength & Flexibility at home   Home Exercise  Jo verbalizes planning to walking 3-4 days/week for 5-10 min on days not in rehab. Home Exercise  *Chris verbalizes planning to ** ** days/week for ** min on days not in rehab. Home Exercise  *Chris verbalizes planning to ** ** days/week for ** min on days not in rehab. Home Exercise  *Chris verbalizes planning to ** ** days/week for ** min on days not in rehab.  Home Exercise  *Chris verbalizes planning to ** ** days/week for ** min on days not in rehab. Home Exercise  *Chris verbalizes his/her plan to ** ** days/week for ** min @ **   *Education* *Education* *Education* *Education* *Education* *Education*   RPE Scale  [x] Yes      [] No  Exercise Safety  [x] Yes      [] No  Equipment Orientation  [x] Yes      [] No  S/S to Report  [x] Yes      [] No  Warm Up/Cool Down  [x] Yes      [] No  Home Exercise  [x] Yes      [] No     All Exercise Education Completed  [] Yes      [] No   Exercise Education Recommended    Workshops  [x] Benefits of Exercise  [x] Balance Training & Fall Prevention  [x] Heart Disease Risk Reduction  [x] Yoga & Heart Health Exercise Education Attended/Date Exercise Education Attended/Date Exercise Education Attended/Date Exercise Education Attended/Date All Sessions Completed? [] Yes  [] No   *Goals* *Goals* *Goals* *Goals* *Goals* *Goals*   Initial Exercise Goals Exercise Goals  Exercise Goals   Exercise Goals  Exercise Goals  Exercise Goals   Chris plans to:  [x] Attend exercise sessions 3x/wk  [x] initiate home exercise 2-3x/wk for 10-20 min  [x] Increase 6 min walk distance by 10%  [x] Attend Exercise workshops Jimmy guo plans to:  [x] Attend exercise sessions 3x/wk  [x] continue home exercise 2-3x/wk for 20-30 min  [x] Attend Exercise workshops Chris's plans to:  [x] Attend exercise sessions 3x/wk  [x] continue home exercise 3-4x/wk for 30-45 min  [x] Determine plan of exercise following rehab  [x] Attend Exercise workshops Chris's plans to:  [x] Attend exercise sessions 3x/wk  [x] continue home exercise 3-4x/wk for 30-45 min  [x] Determine plan of exercise following rehab  [x] Attend Exercise workshops Chris's plans to:  [x] Attend exercise sessions 3x/wk  [x] continue home exercise 3-4x/wk for 30-45 min  [x] Determine plan of exercise following rehab  [x] Attend Exercise workshops Jimmy guo achieved exercise goals?     []  Yes    [] No  If no, why?  **  [] Increased 6 min walk distance by 10%  [] Currently exercising 30-60 min/day, 5-7days/wk   [] Plans to continue exercise on own  [] Plans to join a local fitness center to continue exercise  [] Does not plan to continue to exercise after rehab   Return to ADL or Hobbies:  Yadiel Lloyd would like to improve strength and endurance so he is able to return to working at [employer] Return to ADL or Hobbies:  Verl Big  ** Return to ADL or Hobbies:  Verl Big  ** Return to ADL or Hobbies:  Verl Big ** Return to ADL or Hobbies:  Verl Big  ** Return to ADL or Hobbies:  Verl Big  **    *MET level required for above goal:  5-7 METs MET level Achieved:  **METs MET level Achieved:  **METs MET level Achieved:  **METs MET level Achieved:  **METs MET level Achieved:  **METs     Individual Cardiac Treatment Plan - Nutrition  NUTRITION  ASSESSMENT/PLAN NUTRITION  REASSESSMENT NUTRITION   REASSESSMENT NUTRITION   REASSESSMENT NUTRITION  DISCHARGE/FOLLOW-UP NUTRITION  DISCHARGE/FOLLOW-UP   Stages of Change Stages of Change Stages of Change Stages of Change Stages of Change Stages of Change   [x] Pre Contemplation  [] Contemplation  [] Preparation  [] Action  [] Maintenance  [] Relapse [] Pre Contemplation  [] Contemplation  [] Preparation  [] Action  [] Maintenance  [] Relapse [] Pre Contemplation  [] Contemplation  [] Preparation  [] Action  [] Maintenance  [] Relapse [] Pre Contemplation  [] Contemplation  [] Preparation  [] Action  [] Maintenance  [] Relapse [] Pre Contemplation  [] Contemplation  [] Preparation  [] Action  [] Maintenance  [] Relapse [] Pre Contemplation  [] Contemplation  [] Preparation  [] Action  [] Maintenance  [] Relapse   NUTRITION ASSESSMENT NUTRITION ASSESSMENT NUTRITION ASSESSMENT NUTRITION ASSESSMENT NUTRITION ASSESSMENT NUTRITION ASSESSMENT   Weight Management  Weight: 217.6        Height: 71\"   BMI: 30.5  Weight Management  Weight: **                  Weight Management  Weight: **                  Weight Management  Weight: ** Weight Management  Weight: ** Weight Management  Weight: **                    BMI: **   Eating Plan  Current eating habits: nothing in particular Eating Plan  Changes: Eating Plan  Changes: Eating Plan  Changes: Eating Plan  Changes: Eating Plan Improvements:   Alcohol Use  [x] none          [] daily  [] weekly      [] special           Diet Assessment Tool:  RATE YOUR PLATE  *Given to patient to complete and return. Diet Assessment Tool:    Score: **/69        Diet Assessment Tool: RATE YOUR PLATE  Score: **/54   NUTRITION PLAN NUTRITION PLAN NUTRITION PLAN NUTRITION PLAN NUTRITION PLAN NUTRITION PLAN   *Interventions* *Interventions* *Interventions* *Interventions* *Interventions* *Interventions*   Initial Survey given Goal Setting Discussion:   [] Yes      [] No        Follow Up Survey Reviewed & Goals Updated:     Professional Referral  Please check if needed. [] Dietitian Consult   [] Wt. Management Referral  [] Other:  Professional Referral  Please check if needed. [] Dietitian Consult   [] Wt. Management Referral  [] Other: Professional Referral  Please check if needed. [] Dietitian Consult   [] Wt. Management Referral  [] Other: Professional Referral  Please check if needed. [] Dietitian Consult   [] Wt. Management Referral  [] Other: Professional Referral  Please check if needed. [] Dietitian Consult   [] Wt. Management Referral  [] Other: Professional Referral  Please check if needed. [] Dietitian Consult   [] Wt. Management Referral  [] Other:   *Education* *Education* *Education* *Education* *Education* *Education*   Nutritional Education Recommended    [x] 1:1 Registered Dietitian    Workshops  [x] Label Reading   [x] Menu  [x] Targeting Nutrition Priorities  [x] Mindful Eating   Nutritional Education Attended/Date Nutritional Education Attended/Date Nutritional Education Attended/Date Nutritional Education Attended/Date All Sessions Completed?     [] Yes  [] No   Cooking School  Recommended     [x] Adding Flavor  [x] Fast & Healthy Maintenance  [] Relapse [] Pre Contemplation  [] Contemplation  [] Preparation  [] Action  [] Maintenance  [] Relapse [] Pre Contemplation  [] Contemplation  [] Preparation  [] Action  [] Maintenance  [] Relapse   PSYCHOSOCIAL ASSESSMENT PSYCHOSOCIAL ASSESSMENT PSYCHOSOCIAL ASSESSMENT PSYCHOSOCIAL ASSESSMENT PSYCHOSOCIAL ASSESSMENT PSYCHOSOCIAL ASSESSMENT   Behavioral Outcomes Behavioral Outcomes Behavioral Outcomes Behavioral Outcomes Behavioral Outcomes Behavioral Outcomes   Tool Used:  Miroslava & Abraham, Quality of Life Index, Cardiac Version IV  *Given to patient to complete. Tool Used:    Miroslava & Abraham, Quality of Life Index, Cardiac Version IV     QOL Index Score: **  HF:**  S&E:**  P&S: **  Family: **   Tool Used:     Ferrmarielena & Rosario, Quality of Life Index, Cardiac Version IV    QOL Index Score: **  HF:**  S&E:**  P&S: **  Family: ** Tool Used:     Ferrmarielena & Rosario, Quality of Life Index, Cardiac Version IV    QOL Index Score: **  HF:**  S&E:**  P&S: **  Family: **   PHQ-9 score 10  Depression Severity  []Minimal  []Mild   [x]Moderate  []Moderately Severe  []Severe    PHQ-9 score **  Depression Severity  []Minimal  []Mild   []Moderate  []Moderately Severe []Severe PHQ-9 score **  Depression Severity  []Minimal  []Mild   []Moderate  []Moderately Severe []Severe   Does patient have Family Support? [x] Yes      [] No  No signs of marital/family distress        Within the Past Month:  *Have you wished you were dead or wished you could go to sleep and not wake up? [] Yes      [x] No  *Have you had any thoughts of killing yourself?   [] Yes      [x] No          Using a scale of 0-10, 0=none, 10=very:   Rate your depression: 4  Rate your anxiety:  5  Using a scale of 0-10, 0=none, 10=very:   Rate your depression: **  Rate your anxiety:  ** Using a scale of 0-10, 0=none, 10=very:   Rate your depression: **  Rate your anxiety:  ** Using a scale of 0-10, 0=none, 10=very:   Rate your depression: **  Rate your anxiety:  ** Using a scale of 0-10, 0=none, 10=very:   Rate your depression: **  Rate your anxiety:  ** Using a scale of 0-10, 0=none, 10=very:   Rate your depression: **  Rate your anxiety:  **   Signs and Symptoms of Depression Present? [x] Yes      [] No  If yes, please explain:  Pt states he gets frustrated fighting with the insurance company Signs and Symptoms of Depression Present? [] Yes      [] No  If yes, please explain:  ** Signs and Symptoms of Depression Present? [] Yes      [] No  If yes, please explain:  ** Signs and Symptoms of Depression Present? [] Yes      [] No  If yes, please explain:  ** Signs and Symptoms of Depression Present? [] Yes      [] No  If yes, please explain:  ** Signs and Symptoms of Depression Present? [] Yes      [] No  If yes, please explain:  **   Signs and Symptoms of Anxiety Present? [x] Yes      [] No  If yes, please explain:  See above Signs and Symptoms of Anxiety Present? [] Yes      [] No  If yes, please explain:  ** Signs and Symptoms of Anxiety Present? [] Yes      [] No  If yes, please explain:  ** Signs and Symptoms of Anxiety Present? [] Yes      [] No  If yes, please explain:  ** Signs and Symptoms of Anxiety Present? [] Yes      [] No  If yes, please explain:  ** Signs and Symptoms of Anxiety Present? [] Yes      [] No  If yes, please explain:  **   [] Patient has poor eye contact   [] Flat affect present. [x] Signs of anxiety, but no anger or hostility    [] Signs social isolation present.    []  Signs of alcohol or substance abuse        PSYCHOSOCIAL PLAN PSYCHOSOCIAL PLAN PSYCHOSOCIAL PLAN PSYCHOSOCIAL PLAN PSYCHOSOCIAL PLAN PSYCHOSOCIAL PLAN   *Interventions* *Interventions* *Interventions* *Interventions* *Interventions* *Interventions*   *Please check if needed  [] Psych Consult  [] Physician Referral  [x] Stress Management Skills *Please check if needed  [] Psych Consult  [] Physician Referral  [] Stress Management Skills *Please check if needed  [] Psych Consult  [] Physician Referral  [] Stress Management Skills *Please check if needed  [] Psych Consult  [] Physician Referral  [] Stress Management Skills *Please check if needed  [] Psych Consult  [] Physician Referral  [] Stress Management Skills *Please check if needed  [] Psych Consult  [] Physician Referral  [] Stress Management Skills   Is patient currently taking anti-depressant or anti-anxiety medications? [x] Yes      [] No  If yes, please list medications:  amitriptyline Change in anti-depressant or anti-anxiety medications? [] Yes      [] No  If yes, please list medications:  ** Change in anti-depressant or anti-anxiety medications? [] Yes      [] No  If yes, please list medications:  ** Change in anti-depressant or anti-anxiety medications? [] Yes      [] No  If yes, please list medications:  ** Change in anti-depressant or anti-anxiety medications? [] Yes      [] No  If yes, please list medications:  ** Change in anti-depressant or anti-anxiety medications?   [] Yes      [] No  If yes, please list medications:  **   *Education* *Education* *Education* *Education* *Education* *Education*   Healthy Mind-Set Workshops Recommended  [x] Stress & Health  [x] Taking Charge of Stress  [x] New Thoughts, New Behaviors  [x] Managing Moods & Relationships Healthy Mind-Set Workshops Attended/Date Healthy Mind-Set Workshops Attended/Date Healthy Mind-Set Workshops Attended/Date Healthy Mind-Set Workshops  Completed  [] Yes      [] No Healthy Mind-Set Workshops  Completed  [] Yes      [] No   *Goals* *Goals* *Goals* *Goals* *Goals* *Goals*   Anushka psychosocial goals are as follows:  Complete HADS & Miroslava & Abraham, Quality of Life Index, Cardiac Version IV Anushka psychosocial goals are as follows:  [] Attend Healthy Mind-Set Workshops  [] Reduce depression symptom severity by 1 level  [] ** Anushka psychosocial goals are as follows:  [] Attend Healthy Mind-Set Hypertension  Resting BP: **  Peak Ex BP:**  Medication Changes:  [] Yes      [] No Hypertension  Resting BP: **  Peak Ex BP:**  Medication Changes:  [] Yes      [] No Hypertension  Resting BP: **  Peak Ex BP:**  Medication Changes:  [] Yes      [] No   Lipids  HLD/DLD  [x] Yes      [] No  TOTAL CHOL: 153  HDL:  46  LDL:  81  TRI  Medication: rosuvastatin Lipids  Medication Changes:  [] Yes      [] No     Lipids  Medication Changes:  [] Yes      [] No     Lipids  Medication Changes:  [] Yes      [] No     Lipids    TOTAL CHOL: **  HDL:  **  LDL:  **  TRIG:  **  Medication Changes:  [] Yes      [] No Lipids    TOTAL CHOL: **  HDL:  **  LDL:  **  TRIG:  **  Medication Changes:  [] Yes      [] No   Diabetes  [x] Yes      [] No  FBS: 183           HbA1C: 8.1        Monitor BS @ home:   [x] Yes      [] No  Frequency: constant monitor  Medication: lantus, humalog, metformin Diabetes  Most Recent BS:  BS have been in range  [] Yes      [] No  Medication Changes  [] Yes      [] No   Diabetes  Most Recent BS:  BS have been in range  [] Yes      [] No  Medication Changes  [] Yes      [] No     Diabetes  Most Recent BS:  BS have been in range  [] Yes      [] No  Medication Changes  [] Yes      [] No     Diabetes  Most Recent BS:  BS have been in range  [] Yes      [] No  Medication Changes  [] Yes      [] No Diabetes  Most Recent BS:  BS have been in range  [] Yes      [] No  Medication Changes  [] Yes      [] No       Tobacco Use  [] Current  [x] Former  [] Never    Years smoked: 3:    Date Quit:     # cigarettes smoked/day: 1.5 pk/day  Smokeless Tobacco use:   [] Yes      [x] No   Tobacco Use  Change in smoking status   [] Yes      [] No    Quit date: **   Tobacco Use  Change in smoking status   [] Yes      [] No    Quit date: **   Tobacco Use  Change in smoking status   [] Yes      [] No    Quit date: ** Tobacco Use  Change in smoking status   [] Yes      [] No    Quit date: ** Tobacco Use  Change in smoking status   [] Yes      [] No    Quit date: **             Learning Barriers  Please select one:  [] Speech  [] Literacy  [x] Hearing  [] Cognitive  [] Vision  [x] Ready to Learn Learning Barriers Addressed:   [] Yes      [] No   Learning Barriers Addressed:   [] Yes      [] No   Learning Barriers Addressed:  [] Yes      [] No Learning Barriers Addressed:  [] Yes      [] No Learning Barriers Addressed:  [] Yes      [] No     RISK FACTOR/EDUCATION PLAN RISK FACTOR/EDUCATION PLAN RISK FACTOR/EDUCATION PLAN RISK FACTOR/EDUCATION PLAN RISK FACTOR/EDUCATION PLAN RISK FACTOR/EDUCATION PLAN   *Interventions* *Interventions* *Interventions* *Interventions* *Interventions* *Interventions*   Recommended Educational Videos    [x] Overview of The Pritikin Eating Plan  [x] Heart Disease Risk Reduction  [x] Move It! [x] Calorie Density  [x] Healthy Minds, Bodies, Hearts  [x] Label Reading  [x] Metabolic Syndrome & Belly   Or  [] How Our Thoughts Can Heal our Heart  [x] Dining Out-Part 1  [x] Biomechanical Limitations  [x] Facts on Fat  [x] Hypertension & Heart Disease  [x] Diseases of Our Times-Focusing on Diabetes  [x] Body Composition  [x] Nurtition Action Plan  [x] Exercise Action Plan   Completed Videos/Date      11/9/21  Overview of The Pritikin Eating Plan Completed Videos/Date Completed Videos/Date Completed Videos/Date Recommended Educational Videos Completed    [] Yes      [] No    **If not completed, Why? **           Smoking Cessation/Relaspe Prevention Intervention needed? [] Yes      [x] No   Smoking Cessation/Relapse Prevention Scheduled? [] Yes      [] No  Date:  ** Smoking Cessation/Relapse Prevention completed? [] Yes      [] No  Date: ** Smoking Cessation/Relapse Prevention completed? [] Yes      [] No  Date: ** Smoking Cessation/Relapse Prevention completed?    [] Yes      [] No  Date: ** Smoking Cessation Counseling attended  [] Yes      [] No  **If not completed, Why? **   Professional Referrals:  *Please check if needed  [] Diabetes Clinic  [] Lipid Clinic   [] Other:      Professional Referrals:  *Please check if needed  [] Diabetes Clinic  [] Lipid Clinic   [] Other:   Preventative Medication Preventative Medication Preventative Medication Preventative Medication Preventative Medication Preventative Medication   Aspirin  [] Yes    [x] No  Blood Thinner: Clopidogrel/Effient/Brillinta  [x] Yes    [] No  Beta Blocker  [x] Yes    [] No  Ace Inhibitor  [x] Yes    [] No  Statin/Lipid Lowering  [x] Yes    [] No Medication Changes? [] Yes    [] No Medication Changes? [] Yes    [] No Medication Changes? [] Yes    [] No Medication Changes? [] Yes    [] No Medication Changes? [] Yes    [] No   *Education* *Education* *Education* *Education* *Education* *Education*   Does David Rana require any additional education? [] Yes    [x] No   Does David Rana require any additional education? [] Yes    [] No Does David Rana require any additional education? [] Yes    [] No Does David Rana require any additional education? [] Yes    [] No Does David Rana require any additional education? [] Yes    [] No Does David Rana require any additional education?   [] Yes    [] No   Additional Educational Videos    Exercise  [] Improve Performance    Medical  [] Aging Enhancing Your QoL  [] Biology of Weight Control  [] Decoding Lab Results  [] Diseases of Our Time - Overview  [] Sleep Disorders    Nutrition  [] Dining Out - Part 2  [] Fueling a Healthy Body  [] Menu Workshop  [] Planning Your Eating Strategy  [] Targeting Your Nutrition Priorities  [] Vitamins & Minerals    Healthy Mind-Set  [] Smoking Cessation    Culinary  []Becoming a Pritikin    [] Cooking - Breakfast & Snacks  [] Cooking -Healhty Salads & Dressing  [] Cooking -Dinner & Sides  [] Cooking -Soups & Desserts    Overview  [] The Pritikin Solution Additional Educational Videos Completed Additional Educational Videos Completed Additional Educational Videos Completed Additional Educational Videos Completed Additional Educational Videos Completed    [] Yes    [] No   *Goals* *Goals* *Goals* *Goals* *Goals* *Goals*   Chris's risk factor/education goals are as follows:    [x] Optimal BP <140/90  [x] Blood Sugar <120  [x] Attend recommended video education sessions  [x] Takes medications as prescribed 100% of the time   [] ** Chris's risk factor/education goals are as follows:    [x] Optimal BP <140/90  [] Blood Sugar <120  [x] Attend recommended video education sessions  [x] Takes medications as prescribed 100% of the time   [] ** Chris's risk factor/education goals are as follows:    [x] Optimal BP <140/90  [] Blood Sugar <120  [x] Attend recommended video education sessions  [x] Takes medications as prescribed 100% of the time   [] ** Chris's risk factor/education goals are as follows:    [x] Optimal BP <140/90  [] Blood Sugar <120  [x] Attend recommended video education sessions  [x] Takes medications as prescribed 100% of the time   [] ** Chris's risk factor/education goals are as follows:    [x] Optimal BP <140/90  [] Blood Sugar <120  [x] Attend recommended video education sessions  [x] Takes medications as prescribed 100% of the time   [] ** Chris achieved risk factor goals?   [] Yes    [] No  If no, why?  **     Monitored telemetry has revealed NSR   Monitored telemetry has revealed **  [] documented arrhythmia at increasing workloads  [] associated symptoms ** Monitored telemetry has revealed  [] documented arrhythmia at increasing workloads  [] associated symptoms ** Monitored telemetry has revealed **  [] documented arrhythmia at increasing workloads  [] associated symptoms ** Monitored telemetry has revealed **  [] documented arrhythmia at increasing workloads  [] associated symptoms ** Monitored telemetry has revealed **  [] documented arrhythmia at increasing workloads  [] associated symptoms **   Physician Response    [x] Cardiac rehab is reasonably and medically necessary for continuous cardiac monitoring surveillance  of patient's cardiac activity  [x] Initiate continuous telemetry monitoring and notify me with any concerns  [] Other   Physician Response    [x] Cardiac rehab is reasonably and medically necessary for continuous cardiac monitoring surveillance  of patient's cardiac activity  [x] Continue continuous telemetry monitoring and notify me with any concerns  [] Other     Physician Response    [x] Cardiac rehab is reasonably and medically necessary for continuous cardiac monitoring surveillance  of patient's cardiac activity  [x] Continue continuous telemetry monitoring and notify me with any concerns   [] Other     Physician Response    [x] Cardiac rehab is reasonably and medically necessary for continuous cardiac monitoring surveillance  of patient's cardiac activity  [x] Continue continuous telemetry monitoring and notify me with any concerns   [] Other     Physician Response    [x] Cardiac rehab is reasonably and medically necessary for continuous cardiac monitoring surveillance  of patient's cardiac activity  [x] Continue continuous telemetry monitoring and notify me with any concerns   [] Other

## 2021-11-10 ENCOUNTER — HOSPITAL ENCOUNTER (OUTPATIENT)
Dept: PULMONOLOGY | Age: 66
Discharge: HOME | End: 2021-11-10
Payer: MEDICARE

## 2021-11-10 PROCEDURE — 94060 EVALUATION OF WHEEZING: CPT

## 2021-11-10 PROCEDURE — 94729 DIFFUSING CAPACITY: CPT

## 2021-11-10 PROCEDURE — 94726 PLETHYSMOGRAPHY LUNG VOLUMES: CPT

## 2021-11-10 NOTE — PROGRESS NOTES
Prescreening performed prior to testing. The following symptoms may indicate COVID-19 infection:        One of the following criteria:   Temperature taken, patient temperature was 98 F. Fever greater 100.0 F -no  New onset cough -  no  New onset shortness of breath -no  New onset difficulty breathing -no        And/or   Two or more of the following criteria:  New onset muscle aches -no  New onset headache -no  New onset sore throat -no  New onset loss of smell/taste -no  New onset diarrhea -no    Patient's screening was negative. PFT will be performed.

## 2021-11-16 ENCOUNTER — HOSPITAL ENCOUNTER (OUTPATIENT)
Dept: CARDIAC REHAB | Age: 66
Setting detail: THERAPIES SERIES
End: 2021-11-16
Payer: MEDICARE

## 2021-11-16 ENCOUNTER — APPOINTMENT (OUTPATIENT)
Dept: CARDIAC REHAB | Age: 66
End: 2021-11-16
Payer: MEDICARE

## 2021-11-16 NOTE — PROGRESS NOTES
Trouble starting Humira: but now on x ~4 mos  GI is doing well!  Physically feeling great.  Still has occ loose stools.  Does great after injection, then the last 3 days prior to next shot has to be very careful with diet. Visit Information    Have you changed or started any medications since your last visit including any over-the-counter medicines, vitamins, or herbal medicines? yes - see list   Are you having any side effects from any of your medications? -  no  Have you stopped taking any of your medications? Is so, why? -  no    Have you seen any other physician or provider since your last visit? Yes - Records Obtained  Have you had any other diagnostic tests since your last visit? Yes - Records Obtained  Have you been seen in the emergency room and/or had an admission to a hospital since we last saw you? No  Have you had your routine dental cleaning in the past 6 months? no    Have you activated your [facility] account? If not, what are your barriers?  Yes     Patient Care Team:  Edi Milian MD as PCP - General (Family Medicine)  Edi Milian MD as PCP - [facility] Provider  Ivonne Beach MD as Consulting Physician (Otolaryngology)  Lewis Rich MD as Consulting Physician (Pulmonology)  Deepthi Scott MD (Orthopedic Surgery)    Medical History Review  Past Medical, Family, and Social History reviewed and does not contribute to the patient presenting condition    Health Maintenance   Topic Date Due    Hepatitis C screen  1955    Diabetic retinal exam  05/18/1966    HIV screen  05/18/1971    Shingles Vaccine (2 of 3) 01/23/2017    Flu vaccine (1) 09/01/2020    A1C test (Diabetic or Prediabetic)  10/28/2020    Annual Wellness Visit (AWV)  10/31/2020    Diabetic foot exam  07/14/2021    Diabetic microalbuminuria test  07/22/2021    Lipid screen  07/22/2021    Potassium monitoring  08/26/2021    Creatinine monitoring  08/26/2021    DTaP/Tdap/Td vaccine (2 - Td) 07/22/2025    Colon cancer screen colonoscopy  03/12/2030    Pneumococcal 0-64 years Vaccine  Completed    Hepatitis A vaccine  Aged Out    Hib vaccine  Aged Out    Meningococcal (ACWY) vaccine  Aged Out

## 2021-11-18 ENCOUNTER — HOSPITAL ENCOUNTER (OUTPATIENT)
Dept: CARDIAC REHAB | Age: 66
Setting detail: THERAPIES SERIES
Discharge: HOME | End: 2021-11-18
Payer: MEDICARE

## 2021-11-18 PROCEDURE — G0423 INTENS CARDIAC REHAB NO EXER: HCPCS

## 2021-11-18 PROCEDURE — G0422 INTENS CARDIAC REHAB W/EXERC: HCPCS

## 2021-11-22 NOTE — PROGRESS NOTES
Hospital Facility-Based Program  Phase 2 Cardiac Rehab Weekly Progress Report      Patient prescribed exercise:  9:00 class. 3 times per week in rehab, 1-4 times per week at home for the amount of sessions/weeks specified by insurance. Current Levels: Treadmill: 1.6-1. 8mph/0% for 8 minutes x 2, Schwinn Airdyne: 60watts for 5 minutes,  UBE: 20watts for 5 minutes. Progression Discussion: Maintain/Increase Aerobic exercise 26 minutes to work on endurance. Attempt to increase intensity by 5-20% for each modality this week. Try to increase intensities until Jimmy guo rates the exercises a 13-17 on Marisa RPE.

## 2021-11-23 ENCOUNTER — HOSPITAL ENCOUNTER (OUTPATIENT)
Dept: CARDIAC REHAB | Age: 66
Setting detail: THERAPIES SERIES
Discharge: HOME | End: 2021-11-23
Payer: MEDICARE

## 2021-11-23 ENCOUNTER — APPOINTMENT (OUTPATIENT)
Dept: CARDIAC REHAB | Age: 66
End: 2021-11-23
Payer: MEDICARE

## 2021-11-24 RX ORDER — CLOPIDOGREL BISULFATE 75 MG/1
TABLET ORAL
Qty: 90 TABLET | Refills: 2 | Status: SHIPPED | OUTPATIENT
Start: 2021-11-24

## 2021-11-25 ENCOUNTER — APPOINTMENT (OUTPATIENT)
Dept: CARDIAC REHAB | Age: 66
End: 2021-11-25
Payer: MEDICARE

## 2021-11-25 ENCOUNTER — HOSPITAL ENCOUNTER (OUTPATIENT)
Dept: CARDIAC REHAB | Age: 66
Setting detail: THERAPIES SERIES
End: 2021-11-25
Payer: MEDICARE

## 2021-11-30 ENCOUNTER — HOSPITAL ENCOUNTER (OUTPATIENT)
Dept: CARDIAC REHAB | Age: 66
Setting detail: THERAPIES SERIES
Discharge: HOME | End: 2021-11-30
Payer: MEDICARE

## 2021-11-30 ENCOUNTER — APPOINTMENT (OUTPATIENT)
Dept: CARDIAC REHAB | Age: 66
End: 2021-11-30
Payer: MEDICARE

## 2021-11-30 NOTE — PROGRESS NOTES
Hospital Facility-Based Program  Phase 2 Cardiac Rehab Weekly Progress Report      Patient prescribed exercise:  9:00 class. 3 times per week in rehab, 1-4 times per week at home for the amount of sessions/weeks specified by insurance. Patient currently on hold due to back issues.

## 2021-12-07 ENCOUNTER — HOSPITAL ENCOUNTER (OUTPATIENT)
Dept: CARDIAC REHAB | Age: 66
Setting detail: THERAPIES SERIES
Discharge: HOME | End: 2021-12-07
Payer: MEDICARE

## 2021-12-09 ENCOUNTER — OFFICE VISIT (OUTPATIENT)
Dept: FAMILY MEDICINE CLINIC | Age: 66
End: 2021-12-09
Payer: MEDICARE

## 2021-12-09 VITALS
SYSTOLIC BLOOD PRESSURE: 126 MMHG | RESPIRATION RATE: 15 BRPM | HEART RATE: 66 BPM | OXYGEN SATURATION: 98 % | WEIGHT: 216.4 LBS | BODY MASS INDEX: 30.18 KG/M2 | DIASTOLIC BLOOD PRESSURE: 60 MMHG

## 2021-12-09 DIAGNOSIS — I10 ESSENTIAL HYPERTENSION: ICD-10-CM

## 2021-12-09 DIAGNOSIS — E11.65 TYPE 2 DIABETES MELLITUS WITH HYPERGLYCEMIA, WITH LONG-TERM CURRENT USE OF INSULIN (HCC): ICD-10-CM

## 2021-12-09 DIAGNOSIS — E66.9 OBESITY (BMI 30-39.9): ICD-10-CM

## 2021-12-09 DIAGNOSIS — E11.29 MICROALBUMINURIA DUE TO TYPE 2 DIABETES MELLITUS (HCC): ICD-10-CM

## 2021-12-09 DIAGNOSIS — R80.9 MICROALBUMINURIA DUE TO TYPE 2 DIABETES MELLITUS (HCC): ICD-10-CM

## 2021-12-09 DIAGNOSIS — Z23 NEED FOR INFLUENZA VACCINATION: Primary | ICD-10-CM

## 2021-12-09 DIAGNOSIS — Z95.820 S/P ANGIOPLASTY WITH STENT: ICD-10-CM

## 2021-12-09 DIAGNOSIS — I25.10 CORONARY ARTERY DISEASE INVOLVING NATIVE CORONARY ARTERY OF NATIVE HEART WITHOUT ANGINA PECTORIS: ICD-10-CM

## 2021-12-09 DIAGNOSIS — Z79.4 TYPE 2 DIABETES MELLITUS WITH HYPERGLYCEMIA, WITH LONG-TERM CURRENT USE OF INSULIN (HCC): ICD-10-CM

## 2021-12-09 DIAGNOSIS — R06.02 SOB (SHORTNESS OF BREATH): ICD-10-CM

## 2021-12-09 PROCEDURE — 3051F HG A1C>EQUAL 7.0%<8.0%: CPT | Performed by: FAMILY MEDICINE

## 2021-12-09 PROCEDURE — G0008 ADMIN INFLUENZA VIRUS VAC: HCPCS | Performed by: FAMILY MEDICINE

## 2021-12-09 PROCEDURE — 99214 OFFICE O/P EST MOD 30 MIN: CPT | Performed by: FAMILY MEDICINE

## 2021-12-09 PROCEDURE — 90694 VACC AIIV4 NO PRSRV 0.5ML IM: CPT | Performed by: FAMILY MEDICINE

## 2021-12-09 RX ORDER — LISINOPRIL 10 MG/1
TABLET ORAL
Qty: 30 TABLET | Refills: 2 | Status: CANCELLED | OUTPATIENT
Start: 2021-12-09

## 2021-12-09 RX ORDER — LISINOPRIL 10 MG/1
TABLET ORAL
Qty: 180 TABLET | Refills: 3 | Status: SHIPPED | OUTPATIENT
Start: 2021-12-09 | End: 2021-12-12

## 2021-12-09 ASSESSMENT — PATIENT HEALTH QUESTIONNAIRE - PHQ9
2. FEELING DOWN, DEPRESSED OR HOPELESS: 0
1. LITTLE INTEREST OR PLEASURE IN DOING THINGS: 0
SUM OF ALL RESPONSES TO PHQ9 QUESTIONS 1 & 2: 0
SUM OF ALL RESPONSES TO PHQ QUESTIONS 1-9: 0

## 2021-12-09 ASSESSMENT — ENCOUNTER SYMPTOMS
GASTROINTESTINAL NEGATIVE: 1
SHORTNESS OF BREATH: 1

## 2021-12-09 NOTE — PROGRESS NOTES
Katlin Garcias (:  1955) is a 73 y.o. male,Established patient, here for evaluation of the following chief complaint(s):  Results (PFT, 6 week fup ), Flu Vaccine (HD today ), and Immunizations (discuss PPSV23)        Subjective   SUBJECTIVE/OBJECTIVE:  HPI:   Chief Complaint   Patient presents with    Results     PFT, 6 week fup     Flu Vaccine     HD today     Immunizations     discuss PPSV23     Presents today to discuss PFT. Still with SOB. No cough, fever, wheezing. Hx of CAD, s/p stent. PFT wnl.    BPs and weight stable. BP Readings from Last 3 Encounters:   21 126/60   21 120/72   10/27/21 128/60     Wt Readings from Last 3 Encounters:   21 216 lb 6.4 oz (98.2 kg)   21 210 lb (95.3 kg)   21 219 lb 3.2 oz (99.4 kg)         Review of Systems   Constitutional: Negative. HENT: Negative. Respiratory: Positive for shortness of breath. Cardiovascular: Negative. Gastrointestinal: Negative. Musculoskeletal: Negative. All other systems reviewed and are negative. Objective   Physical Exam  Vitals and nursing note reviewed. Constitutional:       General: He is not in acute distress. Appearance: Normal appearance. He is well-developed. HENT:      Head: Normocephalic and atraumatic. Right Ear: Tympanic membrane normal.      Left Ear: Tympanic membrane normal.   Eyes:      Conjunctiva/sclera: Conjunctivae normal.   Cardiovascular:      Rate and Rhythm: Normal rate and regular rhythm. Heart sounds: Normal heart sounds. No murmur heard. Pulmonary:      Effort: Pulmonary effort is normal.      Breath sounds: Normal breath sounds. No wheezing, rhonchi or rales. Abdominal:      General: There is no distension. Musculoskeletal:      Cervical back: Neck supple. Skin:     General: Skin is warm and dry. Findings: No rash (on exposed surfaces). Neurological:      General: No focal deficit present.       Mental Status: He is alert. Psychiatric:         Attention and Perception: Attention normal.         Mood and Affect: Mood normal.         Speech: Speech normal.         Behavior: Behavior normal. Behavior is cooperative. Thought Content: Thought content normal.         Judgment: Judgment normal.               ASSESSMENT/PLAN:  1. Need for influenza vaccination  -     INFLUENZA, QUADV, ADJUVANTED, 65 YRS =, IM, PF, PREFILL SYR, 0.5ML (FLUAD)  2. Microalbuminuria due to type 2 diabetes mellitus (HCC)  -     lisinopril (PRINIVIL;ZESTRIL) 10 MG tablet; Take 1 tablet BID, Disp-180 tablet, R-3Normal  3. SOB (shortness of breath)  4. Coronary artery disease involving native coronary artery of native heart without angina pectoris  5. S/P angioplasty with stent  6. Essential hypertension  7. Obesity (BMI 30-39.9)  8. Type 2 diabetes mellitus with hyperglycemia, with long-term current use of insulin (HCC)    -  Chronic medical problems stable  -  Continue current medications  -  Follow up with specialists as scheduled  -  PFT reviewed, normal  -  Flu shot today    Return for as needed. An electronic signature was used to authenticate this note.     --Marisela Darnell, DO

## 2021-12-09 NOTE — PROGRESS NOTES
After obtaining consent, and per orders of Dr. Jeff Olmstead, injection of HD Fluad 0.5 ML given IM in Left deltoid by Brain Brown CMA ([facility]). Patient instructed to remain report any adverse reaction to me immediately.     Immunizations Administered     Name Date Dose Route    Influenza, Quadv, adjuvanted, 65 yrs +, IM, PF (Fluad) 12/9/2021 0.5 mL Intramuscular    Site: Deltoid- Left    Lot: [id number]    NDC: 31251-670-97

## 2021-12-11 ENCOUNTER — TELEPHONE (OUTPATIENT)
Dept: FAMILY MEDICINE CLINIC | Age: 66
End: 2021-12-11

## 2021-12-11 NOTE — TELEPHONE ENCOUNTER
----- Message from Alex Ding sent at 12/11/2021  2:03 PM EST -----  Subject: Medication Problem    QUESTIONS  Name of Medication? lisinopril (PRINIVIL;ZESTRIL) 10 MG tablet  Patient-reported dosage and instructions? 5mg  What question or problem do you have with the medication? Patient stated   that he is supposed to take 5mg twice a day. But the refill that was   placed for for 10MG twice a day. Preferred Pharmacy? [facility] [address] phone number (if available)? [phone]  Additional Information for Provider? Patient is wanting to know if this is   accurate or if the dosage is wrong. Patients pcp is Dr. Thomas Dykes. Please call   patient asap to advise.   ---------------------------------------------------------------------------  --------------  CALL BACK INFO  What is the best way for the office to contact you? OK to leave message on   voicemail  Preferred Call Back Phone Number? [phone]  ---------------------------------------------------------------------------  --------------  SCRIPT ANSWERS  Relationship to Patient?  Self

## 2021-12-12 RX ORDER — LISINOPRIL 5 MG/1
5 TABLET ORAL 2 TIMES DAILY
Qty: 180 TABLET | Refills: 3 | Status: SHIPPED | OUTPATIENT
Start: 2021-12-12

## 2022-01-04 NOTE — PROGRESS NOTES
[address]  [address] [place], [address]  Dept: [phone]  Dept Fax: [phone]    Brady Hayden 1955 is a 73 y.o. male, Established patient, here for evaluations of:  Chief Complaint   Patient presents with    Diabetes     6 months           ASSESSMENT/PLAN:    1.)  T2DM with long term current use of insulin:  - A1C 7.7% on 1/2022, increasing gradually  - Scheduling eye exam, no blurry vision or pain  - Microalbumin elevated on 7/2021  - Foot Exam on 1/2022, normal  - LDL 81 on 10/2021  - Continue Lantus 22U BID  - Continue/Refilling Metformin 1000 mg BID  - Increasing Humalog to 10U with meals due to Glucose spikes at 12pm and 12pm  - Discussed diet ideas for DM and Gastroparesis  - Discussed future use of Ozempic to help decrease insulin use and preserve pancreas function, patient agrees  - Checking C-peptide to ensure pancreas function is present    2.)  Gastroparesis due to T2DM:  - States he eats about 2 meals a day  - Continue with Zofran and Reglan as needed  - Avoid high fiber and high fat foods  - Discussed lean protein options to help stabilize sugar when eating  - Given AVS print out of Gastroparesis information for home    Encouraged to try and wear CPAP machine at night. Going to see Dr. Mara Gonzalez. Return in about 1 month (around 2/5/2022). Diagnostic data:   I have reviewed recent diagnostic testing including labs with patient today. I have reviewed the notes from the prior visit, Cardiology, and General Medicine. I have reviewed the labs for BMP and CBC. SUBJECTIVE/OBJECTIVE:    Brady Hayden is a 73 y.o. male who presents for a 6 month follow-up for Diabetes, he is on insulin and has a CGM. He also has Gastroparesis. Insulin Dependent T2DM:  Last HgA1c 7.7%, previously 7.5% on 7/2021. Takes Metformin 1000 mg BID Lantus 22U BID, Humalog 10U before meals (taken twice a day). Takes 15 minutes before meals.   Has been having an egg taste in mouth for the last week (last felt with ulcers), on Protonix. Eats eggs, argueta, and toast (egg McMuffin). Doesn't digest meat well. Diet for Gastroparesis. Reports no shaking, sweating, or syncope associated with low Blood Glucose. Patient checks FSBS 10 times a day (GCM). FSBS ranges 100-290, see media section for specific numbers. Eats Breakfast at 1-2pm, doesn't eat past 7pm at night. Most recent eye in 2019, with Dr. Ann Marie Delvalle, denies DM retinopathy. Patient reminded to have eye exam done yearly, currently getting it set up. Does not need glasses. Normal renal function, eGFR 67, Creatinine 1.1 on 10/2021, Stable, elevated microalbumin, ratio 831 on 7/2021. Patient denies foot lesions. Last foot exam 7/2021. Denies neuropathy. No autonomic dysfunction, but does have Gastroparesis. Takes Reglan and Zofran. LDL 81 on 10/2021. Patient is on an Ace Inhibitor and statin. ASA not used to avoid triple anti-platelet/anticoagulant therapy. Not wearing CPAP machine at night, air pressure caused air swallowing and pain in stomach. Set up to see Dr. Rosalind Hagan. Follows with PCP for:    Primary HTN:  Well Controlled with Current regimen of Metoprolol and Lisinopril. Dyslipidemia:  LDL 81 and AST 31 on 10/2021, on Crestor 40 mg. CAD s/p PCI with stent (on 10/2021):  Sees Dr. Maryjo Burroughs. Due to history of PE, on Xarelto and Plavix, ASA discontinued. Started Imdur for chest pain. Currently in Cardiac Rehab but may have to place on hold for back surgery. GERD/Chronic Diarrhea:  Sees Dr. Sri Banda. GERD uncontrolled though taking Protonix. Sent Stool culture for WBC, DNA, Gonzalez protectin, and tTG. Concerned for Celiacs disease. Mild Normocytic Anemia:  HgB 13.0 on 10/2021, may be secondary to chronic diarrhea/poor absorption of iron. Not currently on a supplement.     Health Maintenance:  Received Flu vaccine on 12/2021 at PCP.  ---------------------------------------------------------------------------------    Review of Systems - General ROS: negative for - chills or fever  Psychological ROS: negative for - anxiety and depression  Hematological and Lymphatic ROS: No history of blood clots or bleeding disorder. Respiratory ROS: no cough, shortness of breath, or wheezing  Cardiovascular ROS: no chest pain or dyspnea on exertion, tolerates a flight of stairs, vacuuming  Gastrointestinal ROS: Stable back and forth diarrhea no abdominal pain, change in bowel habits, or black or bloody stools  Genito-Urinary ROS: no dysuria, trouble voiding, or hematuria  Musculoskeletal ROS: negative for - muscle pain or muscular weakness  Neurological ROS: negative for - headaches, numbness/tingling, seizures or weakness  Dermatological ROS: negative for - rash or skin lesion changes    Blood pressure 130/80, pulse 84, temperature 98.2 °F (36.8 °C), height 5' 10.98\" (1.803 m), weight 214 lb (97.1 kg).     Physical Examination: General appearance - alert, well appearing, and in no distress  Mental status - alert, oriented to person, place, and time  Head - atraumatic, normocephalic  Eyes - pupils equal and reactive to light, extraocular muscles intact  Ears - external ears are normal, hearing is grossly normal  Neck - supple, no significant adenopathy  Chest - clear to auscultation, no wheezes, rales or rhonchi, symmetric air entry  Heart - normal rate, regular rhythm, normal S1, S2, no murmurs, rubs, clicks or gallops  Abdomen -soft, nontender, non-distended, bowel sounds present  Neurological - alert, oriented, foot exam noted below  Extremities - peripheral pulses normal, no pedal edema, no clubbing or cyanosis  Skin - warm and dry, excoriations of right calf, healed    Foot Exam:  Visual inspection:  Deformity/amputation: present - Bunions bilaterally  Skin lesions/pre-ulcerative calluses: absent  Edema: right- negative, left- negative    Sensory exam:  Monofilament sensation: normal  (minimum of 5 random plantar locations tested, avoiding callused areas - > 1 area with absence of sensation is + for neuropathy)    Plus at least one of the following:  Pulses: normal,   Pinprick: Intact  Proprioception: Intact  Vibration (128 Hz): Intact    An electronic signature was used to authenticate this note. --Rut Nava DO  PGY-2 on 1/5/2022 seen with Dr. Ran Padilla. Jason Aldrich.  [address]  Dept: [phone]  Dept Fax: [phone] : [phone]

## 2022-01-05 ENCOUNTER — OFFICE VISIT (OUTPATIENT)
Dept: INTERNAL MEDICINE CLINIC | Age: 67
End: 2022-01-05

## 2022-01-05 VITALS
WEIGHT: 214 LBS | HEART RATE: 84 BPM | SYSTOLIC BLOOD PRESSURE: 130 MMHG | HEIGHT: 71 IN | BODY MASS INDEX: 29.96 KG/M2 | TEMPERATURE: 98.2 F | DIASTOLIC BLOOD PRESSURE: 80 MMHG

## 2022-01-05 DIAGNOSIS — E11.69 TYPE 2 DIABETES MELLITUS WITH OTHER SPECIFIED COMPLICATION, WITH LONG-TERM CURRENT USE OF INSULIN (HCC): Primary | ICD-10-CM

## 2022-01-05 DIAGNOSIS — Z79.4 TYPE 2 DIABETES MELLITUS WITH OTHER SPECIFIED COMPLICATION, WITH LONG-TERM CURRENT USE OF INSULIN (HCC): Primary | ICD-10-CM

## 2022-01-05 DIAGNOSIS — K31.84 GASTROPARESIS DUE TO DM (HCC): ICD-10-CM

## 2022-01-05 DIAGNOSIS — E11.43 GASTROPARESIS DUE TO DM (HCC): ICD-10-CM

## 2022-01-05 PROBLEM — Z95.5 H/O HEART ARTERY STENT: Status: ACTIVE | Noted: 2022-01-05

## 2022-01-05 LAB — HBA1C MFR BLD: 7.7 % (ref 4.3–5.7)

## 2022-01-05 PROCEDURE — 3051F HG A1C>EQUAL 7.0%<8.0%: CPT | Performed by: INTERNAL MEDICINE

## 2022-01-05 PROCEDURE — 99213 OFFICE O/P EST LOW 20 MIN: CPT | Performed by: INTERNAL MEDICINE

## 2022-01-05 PROCEDURE — 83036 HEMOGLOBIN GLYCOSYLATED A1C: CPT | Performed by: INTERNAL MEDICINE

## 2022-01-05 RX ORDER — INSULIN LISPRO 100 [IU]/ML
10 INJECTION, SOLUTION INTRAVENOUS; SUBCUTANEOUS
Qty: 10 PEN | Refills: 3 | Status: SHIPPED | OUTPATIENT
Start: 2022-01-05 | End: 2022-01-05

## 2022-01-05 RX ORDER — INSULIN LISPRO 100 [IU]/ML
10 INJECTION, SOLUTION INTRAVENOUS; SUBCUTANEOUS
Qty: 10 PEN | Refills: 3 | Status: SHIPPED | OUTPATIENT
Start: 2022-01-05 | End: 2022-02-02

## 2022-01-07 ENCOUNTER — NURSE ONLY (OUTPATIENT)
Dept: LAB | Age: 67
End: 2022-01-07

## 2022-01-07 DIAGNOSIS — E11.69 TYPE 2 DIABETES MELLITUS WITH OTHER SPECIFIED COMPLICATION, WITH LONG-TERM CURRENT USE OF INSULIN (HCC): ICD-10-CM

## 2022-01-07 DIAGNOSIS — Z79.4 TYPE 2 DIABETES MELLITUS WITH OTHER SPECIFIED COMPLICATION, WITH LONG-TERM CURRENT USE OF INSULIN (HCC): ICD-10-CM

## 2022-01-08 LAB — C-PEPTIDE: 4.1 NG/ML (ref 1.1–4.4)

## 2022-02-02 ENCOUNTER — OFFICE VISIT (OUTPATIENT)
Dept: INTERNAL MEDICINE CLINIC | Age: 67
End: 2022-02-02
Payer: COMMERCIAL

## 2022-02-02 VITALS
HEIGHT: 71 IN | TEMPERATURE: 97.3 F | WEIGHT: 220.8 LBS | HEART RATE: 62 BPM | SYSTOLIC BLOOD PRESSURE: 124 MMHG | BODY MASS INDEX: 30.91 KG/M2 | DIASTOLIC BLOOD PRESSURE: 70 MMHG

## 2022-02-02 DIAGNOSIS — E11.65 TYPE 2 DIABETES MELLITUS WITH HYPERGLYCEMIA, WITH LONG-TERM CURRENT USE OF INSULIN (HCC): Primary | ICD-10-CM

## 2022-02-02 DIAGNOSIS — Z79.4 TYPE 2 DIABETES MELLITUS WITH OTHER SPECIFIED COMPLICATION, WITH LONG-TERM CURRENT USE OF INSULIN (HCC): ICD-10-CM

## 2022-02-02 DIAGNOSIS — K31.84 GASTROPARESIS: ICD-10-CM

## 2022-02-02 DIAGNOSIS — Z79.4 TYPE 2 DIABETES MELLITUS WITH HYPERGLYCEMIA, WITH LONG-TERM CURRENT USE OF INSULIN (HCC): Primary | ICD-10-CM

## 2022-02-02 DIAGNOSIS — E11.69 TYPE 2 DIABETES MELLITUS WITH OTHER SPECIFIED COMPLICATION, WITH LONG-TERM CURRENT USE OF INSULIN (HCC): ICD-10-CM

## 2022-02-02 PROCEDURE — 3051F HG A1C>EQUAL 7.0%<8.0%: CPT | Performed by: STUDENT IN AN ORGANIZED HEALTH CARE EDUCATION/TRAINING PROGRAM

## 2022-02-02 PROCEDURE — 99214 OFFICE O/P EST MOD 30 MIN: CPT | Performed by: STUDENT IN AN ORGANIZED HEALTH CARE EDUCATION/TRAINING PROGRAM

## 2022-02-02 RX ORDER — INSULIN LISPRO 100 [IU]/ML
8 INJECTION, SOLUTION INTRAVENOUS; SUBCUTANEOUS
Qty: 10 PEN | Refills: 3 | Status: SHIPPED | OUTPATIENT
Start: 2022-02-02 | End: 2022-03-16 | Stop reason: SDUPTHER

## 2022-02-02 RX ORDER — BLOOD-GLUCOSE TRANSMITTER
EACH MISCELLANEOUS
Qty: 1 EACH | Refills: 3 | Status: SHIPPED | OUTPATIENT
Start: 2022-02-02

## 2022-02-02 RX ORDER — INSULIN GLARGINE 100 [IU]/ML
20 INJECTION, SOLUTION SUBCUTANEOUS 2 TIMES DAILY
Qty: 15 PEN | Refills: 3 | Status: SHIPPED | OUTPATIENT
Start: 2022-02-02 | End: 2022-03-16 | Stop reason: SDUPTHER

## 2022-02-02 RX ORDER — BLOOD-GLUCOSE SENSOR
EACH MISCELLANEOUS
Qty: 3 EACH | Refills: 5 | Status: SHIPPED | OUTPATIENT
Start: 2022-02-02

## 2022-02-02 ASSESSMENT — ENCOUNTER SYMPTOMS
NAUSEA: 0
COUGH: 0
SHORTNESS OF BREATH: 1
DIARRHEA: 0
PHOTOPHOBIA: 0
VOMITING: 0

## 2022-02-02 NOTE — PATIENT INSTRUCTIONS
Start on Trulicity 1.16 mg once weekly. Reduce Lantus from 22 to 20u BID and Humalog 8 u from 10 u before meals.

## 2022-02-02 NOTE — PROGRESS NOTES
Fannie Jacobs (:  1955) is a 73 y.o. male,Established patient, here for evaluation of the following chief complaint(s):  Diabetes (1 month) and Other (gastroparesis)         ASSESSMENT/PLAN:  1. Type 2 diabetes mellitus with hyperglycemia, with long-term current use of insulin (HCC)  -     External Referral To Ophthalmology  -     Dulaglutide 0.75 MG/0.5ML SOPN; Inject 0.75 mg into the skin once a week, Disp-4 pen, R-1Normal  -     insulin glargine (LANTUS SOLOSTAR) 100 UNIT/ML injection pen; Inject 20 Units into the skin 2 times daily, Disp-15 pen, R-3Normal  2. Type 2 diabetes mellitus with other specified complication, with long-term current use of insulin (HCC)  -     Continuous Blood Gluc Sensor (DEXCOM G6 SENSOR) MISC; Change every 10 days-Use as directed for continuous glucose monitoring., Disp-3 each, R-5Normal  -     Continuous Blood Gluc Transmit (DEXCOM G6 TRANSMITTER) MISC; Change every 3 months- Use as directed for continuous glucose monitoring., Disp-1 each, R-3Normal  -     insulin lispro, 1 Unit Dial, (HUMALOG KWIKPEN) 100 UNIT/ML SOPN; Inject 8 Units into the skin 3 times daily (before meals), Disp-10 pen, R-3Normal  3. Gastroparesis    Given normal range of C-peptide, patient is a good candidate for initiation of GLP-1 analogs. Will start on Trulicity low dose 4.98 mg weekly. Instructions on how to use the pen was given during visit. Risk of hypoglycemic episodes in the setting of insulin/metformin use was explained to patient. He voiced understanding. Reduce Lantus dose from 22u to 20u BID and Humalog from 10u with meals to 8u. Patient advised to reduce it further if hypoglycemic episodes occur. Patient seen and plan discussed with Dr. Frankey Flirt. Return in about 4 weeks (around 3/2/2022). Subjective   SUBJECTIVE/OBJECTIVE:  Patient with history of T2DM and gastroparesis with chronic issues of diarrhea and nausea/vomiting, here for routine follow up.  Last follow up visit with Dr. Nory Fleming on 01/05/2022. Also has history of Primary HTN, dyslipidemia, CAD s/p PCI with stent (10/2021), GERD, and chronic mild anemia. Follows up with Dr. Sari Baptiste, PCP. T2DM: On insulin, has a CGM. On Metformin 1000 mg BID, Lantus 22u BID,and Humalog 10U with meals that was increased during last visit. Due to insurance changes ([employer] changed to [employer] now), refills were delayed. Has not had Dexcom the last one week. could not get records in with him during this visit. Reports BG ranging in 170s on average throughout the day, BG 140s or less in the morning. Hb A1c 7.7 on 01/05/2022, slightly worse from 7.5 on 07/07/2021. Reports no acute issues since his last office visit. Reports compliance to meds. No episodes of shaking, dizziness, syncope, confusion. No noted episodes of hypoglycemia. Most recent eye in 2019, with Dr. King Marquez, denies DM retinopathy. Last eye exam over 2 years ago. Was seen by Dr. King Marquez. Has had issues with rescheduling. Awaiting change in insurance to see a new provider soon. Normal renal function, eGFR 67, Creatinine 1.1 on 10/2021, Stable, elevated microalbumin, ratio 831 on 7/2021. Patient denies foot lesions. Last foot exam 7/2021. Denies acute changes in neuropathy. No autonomic dysfunction, but does have Gastroparesis. Takes Reglan and Zofran. Last lipid panel done 10/21/2021. LDL 81. C-peptide 4.1.      Patient is on an Ace Inhibitor and statin. ASA not used to avoid triple anti-platelet/anticoagulant therapy.     Not wearing CPAP machine at night, air pressure caused air swallowing and pain in stomach. Set up to see Dr. Virgilio Echeverria. Review of Systems   Constitutional: Negative for activity change and unexpected weight change. Eyes: Negative for photophobia and visual disturbance. Respiratory: Positive for shortness of breath. Negative for cough.          Chronic, no acute changes   Cardiovascular: Negative for chest pain, palpitations and leg swelling. Gastrointestinal: Negative for diarrhea, nausea and vomiting. Skin: Negative for wound. Neurological: Negative for dizziness, tremors, seizures, syncope and weakness. All other systems reviewed and are negative. Objective   Physical Exam  Vitals reviewed. HENT:      Head: Normocephalic. Nose: Nose normal.   Eyes:      Extraocular Movements: Extraocular movements intact. Conjunctiva/sclera: Conjunctivae normal.   Cardiovascular:      Rate and Rhythm: Normal rate. Pulses: Normal pulses. Heart sounds: Normal heart sounds. Pulmonary:      Effort: Pulmonary effort is normal.      Breath sounds: Normal breath sounds. Musculoskeletal:         General: No swelling or tenderness. Normal range of motion. Cervical back: Normal range of motion. Skin:     General: Skin is warm and dry. Neurological:      General: No focal deficit present. Mental Status: He is alert. Psychiatric:         Mood and Affect: Mood normal.            On this date 2/2/2022 I have spent 40 minutes reviewing previous notes, test results and face to face with the patient discussing the diagnosis and importance of compliance with the treatment plan as well as documenting on the day of the visit. An electronic signature was used to authenticate this note. --Radha Mirza MD   . Thalia Swift  INTERNAL MEDICINE  [address]  Dept: [phone]  Dept Fax: [phone]: [phone]

## 2022-02-10 ENCOUNTER — OFFICE VISIT (OUTPATIENT)
Dept: CARDIOLOGY CLINIC | Age: 67
End: 2022-02-10
Payer: COMMERCIAL

## 2022-02-10 VITALS
SYSTOLIC BLOOD PRESSURE: 138 MMHG | WEIGHT: 218.2 LBS | OXYGEN SATURATION: 97 % | BODY MASS INDEX: 32.32 KG/M2 | DIASTOLIC BLOOD PRESSURE: 80 MMHG | HEART RATE: 85 BPM | HEIGHT: 69 IN

## 2022-02-10 DIAGNOSIS — I25.10 CORONARY ARTERY DISEASE INVOLVING NATIVE CORONARY ARTERY OF NATIVE HEART WITHOUT ANGINA PECTORIS: Primary | ICD-10-CM

## 2022-02-10 PROCEDURE — 99214 OFFICE O/P EST MOD 30 MIN: CPT | Performed by: INTERNAL MEDICINE

## 2022-02-10 RX ORDER — ISOSORBIDE MONONITRATE 60 MG/1
60 TABLET, EXTENDED RELEASE ORAL DAILY
Qty: 30 TABLET | Refills: 3 | Status: SHIPPED | OUTPATIENT
Start: 2022-02-10 | End: 2022-09-22

## 2022-02-10 NOTE — PROGRESS NOTES
[address] [facility] [address]  Dept: [phone]  Dept Fax: [phone]  Loc: [phone]    Visit Date: 2/10/2022    Mr. Elton Shen is a 73 y.o. male  who presented for:    CAD  PCI    HPI:   CHIDI Leal is a pleasant 73year old male patient who  has a past medical history of Anxiety, CAD (coronary artery disease), Depression, Diabetes mellitus ([address] Frequent headaches, Gastroparesis, GERD (gastroesophageal reflux disease), History of Chiari malformation, Hyperlipidemia, Hypertension, Kidney stone, Kidney stone, Migraine, Movement disorder, Pulmonary emboli ([address] Shingles, Sleep apnea, and Tattoos. He is on Xarelto for h/o PE. Patient was previously evaluated for chest pains, abnormal stress test. Echo 10/2021 revealed an EF of 60-65%. [facility] 10/2021 revealed severe RCA stenosis (underwent PCI/GI). There was evidence for mid LAD 50-60% stenosis, FFR 0.83 with severe stenosis of a relatively small D1 (medically managed). The patient is followed by ENT for Warthin's tumor, advised to continue to FU with ENT. He is not fully compliant with his medications, missed them one day two weeks ago. He reports occasional retrosternal chest pain, not radiating, pressure or stabbing, hurts with breathing.         Current Outpatient Medications:     pantoprazole (PROTONIX) 40 MG tablet, Take 1 tablet by mouth every morning (before breakfast), Disp: 90 tablet, Rfl: 2    Continuous Blood Gluc Sensor (DEXCOM G6 SENSOR) MISC, Change every 10 days-Use as directed for continuous glucose monitoring., Disp: 3 each, Rfl: 5    Continuous Blood Gluc Transmit (DEXCOM G6 TRANSMITTER) MISC, Change every 3 months- Use as directed for continuous glucose monitoring., Disp: 1 each, Rfl: 3    Dulaglutide 0.75 MG/0.5ML SOPN, Inject 0.75 mg into the skin once a week, Disp: 4 pen, Rfl: 1    insulin lispro, 1 Unit Dial, (HUMALOG KWIKPEN) 100 UNIT/ML SOPN, Inject 8 Units into the skin 3 times daily (before meals), Disp: 10 pen, Rfl: 3    insulin glargine (LANTUS SOLOSTAR) 100 UNIT/ML injection pen, Inject 20 Units into the skin 2 times daily, Disp: 15 pen, Rfl: 3    metFORMIN (GLUCOPHAGE) 1000 MG tablet, Take 1 tablet by mouth 2 times daily (with meals), Disp: 180 tablet, Rfl: 1    lisinopril (PRINIVIL;ZESTRIL) 5 MG tablet, Take 1 tablet by mouth 2 times daily, Disp: 180 tablet, Rfl: 3    clopidogrel (PLAVIX) 75 MG tablet, TAKE 1 TABLET BY MOUTH ONE TIME A DAY, Disp: 90 tablet, Rfl: 2    isosorbide mononitrate (IMDUR) 30 MG extended release tablet, Take 1 tablet by mouth daily, Disp: 30 tablet, Rfl: 3    nitroGLYCERIN (NITROSTAT) 0.4 MG SL tablet, Place 1 tablet under the tongue every 5 minutes as needed for Chest pain, Disp: 25 tablet, Rfl: 3    rivaroxaban (XARELTO) 15 MG TABS tablet, Take 1 tablet by mouth daily (with breakfast), Disp: 90 tablet, Rfl: 1    rosuvastatin (CRESTOR) 40 MG tablet, Take 1 tablet by mouth nightly, Disp: 30 tablet, Rfl: 3    metoprolol tartrate (LOPRESSOR) 25 MG tablet, Take 0.5 tablets by mouth 2 times daily, Disp: 60 tablet, Rfl: 3    metoclopramide (REGLAN) 10 MG tablet, Take 1 tablet by mouth 3 times daily (before meals), Disp: 120 tablet, Rfl: 3    CPAP Machine MISC, by Does not apply route Please change Bipap to 10/7cm H20. (Patient not taking: Reported on 1/5/2022), Disp: 1 each, Rfl: 0    ondansetron (ZOFRAN ODT) 4 MG disintegrating tablet, Take 1 tablet by mouth every 8 hours as needed for Nausea or Vomiting, Disp: 30 tablet, Rfl: 3    amitriptyline (ELAVIL) 25 MG tablet, Take 1 tablet by mouth nightly, Disp: 30 tablet, Rfl: 0    Cholecalciferol (VITAMIN D3) 125 MCG (5000 UT) TABS, Take 5,000 Units by mouth daily, Disp: , Rfl:     Continuous Blood Gluc  (DEXCOM G6 ) YULI, Use as directed for continuous glucose monitor. , Disp: 1 Device, Rfl: 0    cyclobenzaprine (FLEXERIL) 5 MG tablet, Take 5 mg by mouth as needed, Disp: , Rfl:     Misc. Devices (ROLLATOR ULTRA-LIGHT) MISC, Use Rollator for stability with ambulation. , Disp: 1 each, Rfl: 0    Past Medical History  Radha Haney  has a past medical history of Anxiety, CAD (coronary artery disease), Depression, Diabetes mellitus ([address] Frequent headaches, Gastroparesis, GERD (gastroesophageal reflux disease), History of Chiari malformation, Hyperlipidemia, Hypertension, Kidney stone, Kidney stone, Migraine, Movement disorder, Pulmonary emboli ([address] Shingles, Sleep apnea, and Tattoos. Social History  Radha Haney  reports that he quit smoking about 42 years ago. His smoking use included cigarettes. He started smoking about 45 years ago. He has a 4.50 pack-year smoking history. He has never used smokeless tobacco. He reports that he does not drink alcohol and does not use drugs. Family History  Radha Haney family history includes Cancer in his father; Diabetes in his brother, father, mother, and sister; Heart Disease in his mother; High Blood Pressure in his brother, father, mother, and sister; Kidney Disease in his mother.     Past Surgical History   Past Surgical History:   Procedure Laterality Date    BACK SURGERY  2008    C1 oates & decompression    BACK SURGERY  08/25/2020    [facility] Dr. Eddie Castellano, C5-7 cadaver patch   [address]  2008    chiari    CARDIAC SURGERY  2007    heart cath    CHOLECYSTECTOMY  2009    COLONOSCOPY  2017    COLONOSCOPY N/A 3/12/2020    COLONOSCOPY POLYPECTOMY SNARE/COLD BIOPSY performed by Adrian Alatorre MD at [address] [place], COLON, DIAGNOSTIC  2007   [address] [facility]    right elbow    IVC FILTER REMOVAL  03/15/2020    [facility] [address]  2006    SHOULDER SURGERY Left 08/16/2018    TONSILLECTOMY  1964    UPPER GASTROINTESTINAL ENDOSCOPY Left 3/12/2020    EGD ESOPHAGOGASTRODUODENOSCOPY DILATATION performed by Adrian Alatorre MD at [address] [facility] ENDOSCOPY Left 3/12/2020    EGD BIOPSY performed by Taya Adams MD at [address] [place] [address]  2015    right middle finger artery       Review of Systems   Constitutional: Negative for chills and fever  HENT: Negative for congestion, sinus pressure, sneezing and sore throat. Eyes: Negative for pain, discharge, redness and itching. Respiratory: Negative for apnea, cough  Gastrointestinal: Negative for blood in stool, constipation, diarrhea   Endocrine: Negative for cold intolerance, heat intolerance, polydipsia. Genitourinary: Negative for dysuria, enuresis, flank pain and hematuria. Musculoskeletal: Negative for arthralgias, joint swelling and neck pain. Neurological: Negative for numbness and headaches. Psychiatric/Behavioral: Negative for agitation, confusion, decreased concentration and dysphoric mood. Objective: There were no vitals taken for this visit. Wt Readings from Last 3 Encounters:   02/02/22 220 lb 12.8 oz (100.2 kg)   01/05/22 214 lb (97.1 kg)   12/09/21 216 lb 6.4 oz (98.2 kg)     BP Readings from Last 3 Encounters:   02/02/22 124/70   01/05/22 130/80   12/09/21 126/60       Nursing note and vitals reviewed. Physical Exam   Constitutional: Oriented to person, place, and time. Appears well-developed and well-nourished. ENT: Moist mucous membranes. No bleeding. Tongue is midline. Head: Normocephalic and atraumatic. Eyes: EOM are normal. Pupils are equal, round, and reactive to light. Neck: Normal range of motion. Neck supple. No JVD present. Cardiovascular: Normal rate, regular rhythm, no murmur, no rubs, and intact distal pulses. Pulmonary/Chest: Effort normal and breath sounds normal. No respiratory distress. No wheezes. No rales. Abdominal: Soft. Bowel sounds are normal. No distension. There is no tenderness. Musculoskeletal: Normal range of motion. no edema. Neurological: Alert and oriented to person, place, and time.  No cranial nerve deficit. Coordination normal.   Skin: Skin is warm and dry. Psychiatric: Normal mood and affect.        No results found for: CKTOTAL, CKMB, CKMBINDEX    Lab Results   Component Value Date    WBC 6.2 10/22/2021    RBC 4.25 10/22/2021    RBC 4.22 08/26/2020    HGB 13.0 10/22/2021    HCT 38.6 10/22/2021    MCV 90.8 10/22/2021    MCH 30.6 10/22/2021    MCHC 33.7 10/22/2021    RDW 13.5 08/26/2020     10/22/2021    MPV 9.0 10/22/2021       Lab Results   Component Value Date     10/22/2021    K 3.9 10/22/2021    K 3.8 10/11/2021     10/22/2021    CO2 25 10/22/2021    BUN 12 10/22/2021    LABALBU 4.1 10/11/2021    CREATININE 1.1 10/22/2021    CALCIUM 8.5 10/22/2021    LABGLOM 67 10/22/2021    GLUCOSE 150 10/22/2021    GLUCOSE 135 08/26/2020       Lab Results   Component Value Date    ALKPHOS 125 10/11/2021    ALT 32 10/11/2021    AST 31 10/11/2021    PROT 8.2 10/11/2021    BILITOT 0.8 10/11/2021    BILIDIR <0.2 05/21/2016    LABALBU 4.1 10/11/2021       No results found for: MG    Lab Results   Component Value Date    INR 0.93 10/21/2021    INR 1.34 (H) 03/11/2021    INR 0.91 08/19/2020    PROTIME 12.2 (L) 08/19/2020         Lab Results   Component Value Date    LABA1C 7.7 01/05/2022    LABA1C 7.9 10/23/2020       Lab Results   Component Value Date    TRIG 129 10/21/2021    HDL 46 10/21/2021    LDLCALC 81 10/21/2021       Lab Results   Component Value Date    TSH 1.840 07/09/2021         Testing Reviewed:      I have individually reviewed the cardiac test below:    ECHO: Results for orders placed during the hospital encounter of 10/15/21    Echo 2D w doppler w color complete    Narrative  Transthoracic Echocardiography Report (TTE)    Demographics    Patient Name     Eston Cones Gender                Male    MR #             [id number]     Race                      Ethnicity              or     Account #        [de-identified]     Room Number    Accession Number [id number]    Date of Study         10/15/2021    Date of Birth    1955    Referring Physician   Clay Horowitz MD    Age              73 year(s)    [facility]    Interpreting          Emmie Samson MD  Physician    Procedure    Type of Study    TTE procedure:ECHOCARDIOGRAM COMPLETE 2D W DOPPLER W COLOR. Procedure Date  Date: 10/15/2021 Start: 10:53 AM    Study Location: Echo Lab  Technical Quality: Adequate visualization    Indications:Chest pain. Additional Medical History:Chest pain, Diabetes, GERD, Obstructive sleep  apnea, Shortness of breath, Hypertension, Coronary artery disease    Patient Status: Routine    Height: 71 inches Weight: 207 pounds BSA: 2.14 m^2 BMI: 28.87 kg/m^2    BP: 123/72 mmHg    Conclusions    Summary  Normal left ventricle size and systolic function. Ejection fraction was  estimated at 60-65%. There were no regional left ventricular wall motion  abnormalities and wall thickness was within normal limits. Signature    ----------------------------------------------------------------  Electronically signed by Emmie Samson MD (Interpreting  physician) on 10/15/2021 at 02:59 PM  ----------------------------------------------------------------    Findings    Mitral Valve  The mitral valve structure was normal with normal leaflet separation. DOPPLER: The transmitral velocity was within the normal range with no  evidence for mitral stenosis. There was no evidence of mitral  regurgitation. Aortic Valve  The aortic valve was trileaflet with normal thickness and cuspal  separation. DOPPLER: Transaortic velocity was within the normal range with  no evidence of aortic stenosis. There was no evidence of aortic  regurgitation. Tricuspid Valve  The tricuspid valve structure was normal with normal leaflet separation. DOPPLER: There was no evidence of tricuspid stenosis. There was no  evidence of tricuspid regurgitation.     Pulmonic Valve  The pulmonic valve leaflets exhibited normal thickness, no calcification,  and normal cuspal separation. DOPPLER: The transpulmonic velocity was  within the normal range with no evidence for regurgitation. Left Atrium  Left atrial size was normal.    Left Ventricle  Normal left ventricle size and systolic function. Ejection fraction was  estimated at 60-65%. There were no regional left ventricular wall motion  abnormalities and wall thickness was within normal limits. Right Atrium  Right atrial size was normal.    Right Ventricle  The right ventricular size was normal with normal systolic function and  wall thickness. Pericardial Effusion  The pericardium was normal in appearance with no evidence of a pericardial  effusion. Pleural Effusion  No evidence of pleural effusion. Aorta / Great Vessels  -Aortic root dimension within normal limits.  -The Pulmonary artery is within normal limits. -IVC size is within normal limits with normal respiratory phasic changes.     M-Mode/2D Measurements & Calculations    LV Diastolic   LV Systolic Dimension:    AV Cusp Separation: 2.2 cmLA  Dimension: 4.6 3.1 cm                    Dimension: 3.7 cmAO Root  cm             LV Volume Diastolic: 36.4 Dimension: 3.7 cmLA Area: 15.5  LV FS:32.6 %   ml                        cm^2  LV PW          LV Volume Systolic: 53.0  Diastolic: 0.9 ml  cm             LV EDV/LV EDV Index: 97.3  Septum         ml/45 m^2LV ESV/LV ESV    RV Diastolic Dimension: 2.1 cm  Diastolic: 0.8 Index: 09.7 ml/18 m^2  cm             EF Calculated: 61.1 %     LA/Aorta: 1    LA volume/Index: 36.4 ml /17m^2    Doppler Measurements & Calculations    MV Peak E-Wave: 55.3 cm/s  AV Peak Velocity: 133  LVOT Peak Velocity: 74.1  MV Peak A-Wave: 61.7 cm/s  cm/s                   cm/s  MV E/A Ratio: 0.9          AV Peak Gradient: 7.08 LVOT Peak Gradient: 2  MV Peak Gradient: 1.22     mmHg                   mmHg  mmHg  TV Peak E-Wave: 58.7  MV Deceleration Time: 296 cm/s  msec                                              TV Peak A-Wave: 62.6  MV P1/2t: 87 msec          IVRT: 113 msec         cm/s  MVA by PHT:2.53 cm^2  TV Peak Gradient: 1.38  MV E' Septal Velocity: 6.7 AV DVI (Vmax):0.56     mmHg  cm/s  MV A' Septal Velocity: 8.3                        PV Peak Velocity: 69  cm/s                                              cm/s  MV E' Lateral Velocity:                           PV Peak Gradient: 1.9  9.7 cm/s                                          mmHg  MV A' Lateral Velocity:  12.8 cm/s  E/E' septal: 8.25  E/E' lateral: 5.7    [email or web address] [id number]     Cath:10/2021  FINDINGS:  LEFT VENTRICULOGRAPHY:  No regional wall motion abnormality.  Ejection  fraction estimated at 55%.     HEMODYNAMICS:  Left ventricular end-diastolic pressure was 18 mmHg.  No  significant pressure gradient across the aortic valve upon pullback.     CORONARY ANGIOGRAM:  1.  LEFT MAIN CORONARY ARTERY:  The distal segment of left main coronary  artery has nonobstructive 20% stenosis.  Left main trifurcates into  ramus intermedius, left circumflex and left anterior descending  arteries. 2.  LEFT CIRCUMFLEX ARTERY:  Proximal left circumflex artery is patent. OM1 is a relatively large vessel with no significant stenotic lesions,  widely patent.  Distal LCX has 30% to 50% stenosis. 3.  RAMUS INTERMEDIUS:  Luminal irregularities were noticed, otherwise  patent. 4.  LEFT ANTERIOR DESCENDING ARTERY:  Proximal LAD has 30% to 50%  segmental lesions.  D1 is a relatively small 1.75 to 2 mm vessel with  70% to 80% stenosis.  Mid LAD has 60% stenosis.  Distal LAD is patent. 5.  RIGHT CORONARY ARTERY:  Proximal and mid RCA have luminal  irregularities, otherwise patent.  RCA is a large dominant vessel.    Distal RCA has a 90% focal lesion.  The RCA bifurcates into RPL and  RPDA, both are patent with only mild luminal irregularities.     MEDICATIONS:  See MAR.     COMPLICATIONS:  None.     ESTIMATED BLOOD LOSS:  Less than 50 mL.     ACCESS:  Right radial artery access.  Vasc Band was applied.  Hemostasis  was achieved.     IMPRESSION:  1.  Intermediate 60% stenosis of mid LAD.  Fractional flow reserve  measurement was consistent with hemodynamically insignificant stenosis. FFR was 0.83. 2.  Severely stenotic lesion of first diagonal branch, relatively small  vessel. 3.  Severe stenosis of the distal right coronary artery, status post  successful PCI and stenting. AssessmentPlan:   Vicente Chowdhury is a pleasant 73year old male patient who  has a past medical history of Anxiety, CAD (coronary artery disease), Depression, Diabetes mellitus ([address] Frequent headaches, Gastroparesis, GERD (gastroesophageal reflux disease), History of Chiari malformation, Hyperlipidemia, Hypertension, Kidney stone, Kidney stone, Migraine, Movement disorder, Pulmonary emboli ([address] Shingles, Sleep apnea, and Tattoos. He is on Xarelto for h/o PE. Patient was previously evaluated for chest pains, abnormal stress test. Echo 10/2021 revealed an EF of 60-65%. [address] [place] [address] 10/2021 revealed severe RCA stenosis (underwent PCI/GI). There was evidence for mid LAD 50-60% stenosis, FFR 0.83 with severe stenosis of a relatively small D1 (medically managed). The patient is followed by ENT for Warthin's tumor, advised to continue to FU with ENT. He is not fully compliant with his medications, missed them one day two weeks ago. He reports occasional retrosternal chest pain, not radiating, pressure or stabbing, hurts with breathing. 1. Chest pain   2. CAD  3. S/p PCI RCA 10/2021  4. 50-60% Mid LAD stenosis (FFR 0.83), D1 stenosis (small vessel)  5. FH of CAD  6. H/o PE  7. DM  8. HTN  9. Dyslipidemia   10. SHAHRAM     Counseled about need to fully comply with meds   Xarelto   Plavix    Chest pain as described above. Mixed features   ? CAD related, musculoskeletal, ? ?pericarditis, ? GI   No recent viral illnes    Reports \"heart burns\"   ? GERD   On Omeprazole   Also reports trouble swallowing fluids   FU with GI , he will make appointment with Dr Shirley Elias Increase Imdur to 60 mg po daily   Metoprolol    If GI w/u negative, no response to medical therapy, may consider ischemic w/u   Patient was advised to report to the ER if has recurrent symptoms with specific instructions given about severity and duration of symptoms    Above findings and plan of care were discussed with patient in details, patient's questions were answered.      Disposition:  RTC in 6 months             Electronically signed by Margareth Gonzalez MD, [facility]    2/10/2022 at 12:26 PM EST

## 2022-02-25 ENCOUNTER — TELEPHONE (OUTPATIENT)
Dept: CARDIOLOGY CLINIC | Age: 67
End: 2022-02-25

## 2022-02-25 ENCOUNTER — OFFICE VISIT (OUTPATIENT)
Dept: FAMILY MEDICINE CLINIC | Age: 67
End: 2022-02-25
Payer: MEDICARE

## 2022-02-25 ENCOUNTER — TELEPHONE (OUTPATIENT)
Dept: FAMILY MEDICINE CLINIC | Age: 67
End: 2022-02-25

## 2022-02-25 VITALS
DIASTOLIC BLOOD PRESSURE: 56 MMHG | WEIGHT: 218.2 LBS | SYSTOLIC BLOOD PRESSURE: 108 MMHG | HEART RATE: 72 BPM | RESPIRATION RATE: 20 BRPM | BODY MASS INDEX: 32.32 KG/M2 | HEIGHT: 69 IN

## 2022-02-25 DIAGNOSIS — G47.31 CSA (CENTRAL SLEEP APNEA): ICD-10-CM

## 2022-02-25 DIAGNOSIS — D68.59 PROTEIN S DEFICIENCY (HCC): ICD-10-CM

## 2022-02-25 DIAGNOSIS — E66.9 OBESITY (BMI 30-39.9): ICD-10-CM

## 2022-02-25 DIAGNOSIS — I25.118 ATHEROSCLEROTIC HEART DISEASE OF NATIVE CORONARY ARTERY WITH OTHER FORMS OF ANGINA PECTORIS (HCC): ICD-10-CM

## 2022-02-25 DIAGNOSIS — M48.062 SPINAL STENOSIS OF LUMBAR REGION WITH NEUROGENIC CLAUDICATION: ICD-10-CM

## 2022-02-25 DIAGNOSIS — E78.2 MIXED HYPERLIPIDEMIA: ICD-10-CM

## 2022-02-25 DIAGNOSIS — Z01.818 PREOP EXAMINATION: Primary | ICD-10-CM

## 2022-02-25 DIAGNOSIS — I10 ESSENTIAL HYPERTENSION: ICD-10-CM

## 2022-02-25 PROCEDURE — 99212 OFFICE O/P EST SF 10 MIN: CPT | Performed by: FAMILY MEDICINE

## 2022-02-25 ASSESSMENT — ENCOUNTER SYMPTOMS
RESPIRATORY NEGATIVE: 1
GASTROINTESTINAL NEGATIVE: 1

## 2022-02-25 NOTE — TELEPHONE ENCOUNTER
Marcha Cabot a Nurse with [facility] called office stating pt was seen here for a pre op clearance this morning. She needs the office notes and clearance faxed to her at [phone]. Please advise. She also needs to clarify if pt is to stop the Clopidogrel, usually that is stopped 7days prior which would be today. She needs to know about the [facility] as well. Says she spoke with pt regarding these meds and he said you never talked about stopping them. She would like a call back [phone], also include the documentation regarding stopping meds in the office note when it's faxed to her. Please advise.

## 2022-02-25 NOTE — TELEPHONE ENCOUNTER
Pre op Risk Assessment    Procedure lumbar decompression  Physician Dr. Abilio Reddy  Date of surgery/procedure 3-2022    Last OV 2-  Last Stress 10-15-21  Last Echo 10-15-21  Last Cath 10-21-21  Last Stent 10-21-21  Is patient on blood thinners plavix and xarelto  Hold Meds/how many days ? ?     Fax:  [phone]

## 2022-02-25 NOTE — TELEPHONE ENCOUNTER
Recommend continued antiplatelet therapy without interruption for at least 6 months post stenting procedure (after 4/21/2022)  CP was reported during recent visit, GI etiology was suspected per office not, advised to FU with gastroenterology (refer to GI if not done)   Will need to reassess prior to holding antiplatelets/\"clearing\" for surgery

## 2022-02-25 NOTE — TELEPHONE ENCOUNTER
Nurse at [facility] called saying that patient is having spine surgery on 3/3/22. Pt. Was last seen on 2/10/22. Nurse at [facility] is faxing over clearance form.

## 2022-02-25 NOTE — TELEPHONE ENCOUNTER
The patient is requesting todays office note be e-mailed to him at [email or web address].   Office note emailed

## 2022-02-25 NOTE — TELEPHONE ENCOUNTER
Form was faxed this am.  Time-frame for stopping thinners needs to come from his Cardiologist, Dr. Rex Irby.

## 2022-02-25 NOTE — PROGRESS NOTES
Liang Castillo (:  1955) is a 73 y.o. male,Established patient, here for evaluation of the following chief complaint(s):  Pre-op Exam (lumbar fusion with Dr. Chloe Shepard at [place] [address] on 3/3/22 - no pre-op testing done) and Health Maintenance (needs a pneumonia vaccine)        Subjective   SUBJECTIVE/OBJECTIVE:  HPI:    Chief Complaint   Patient presents with    Pre-op Exam     lumbar fusion with Dr. Chloe Shepard at [place] [address] on 3/3/22 - no pre-op testing done   [facility] Maintenance     needs a pneumonia vaccine     Pre-op evaluation. Pt is scheduled for lumbar fusion with Dr. Chloe Shepard on 3/3. Pt denies CP, chest tightness, SOB at this time. Hx of CAD. Unable to perform 4 METs due to his back pain. Recently seen by Cardio, acceptable surgical risk. BPs controlled. BP Readings from Last 3 Encounters:   22 (!) 108/56   02/10/22 138/80   22 124/70     Pt denies hx of general anesthesia complications. DM stable. Lab Results   Component Value Date    LABA1C 7.7 (H) 2022    LABA1C 7.5 (H) 2021    LABA1C 7.1 (H) 2021     Lab Results   Component Value Date    GLUF 146 (H) 2020    LABMICR 128.07 2021    LDLCALC 81 10/21/2021    CREATININE 1.1 10/22/2021       Patient Active Problem List   Diagnosis    Chest pain at rest    Uncontrolled type 2 diabetes mellitus, without long-term current use of insulin (HCC)    GERD with stricture    Back pain at L4-L5 level    Gastroparesis    Status post dilatation of esophageal stricture    History of colonic polyps    Acute saddle pulmonary embolism with acute cor pulmonale ([facility])    Medication monitoring encounter    Mixed hyperlipidemia    SHAHRAM (obstructive sleep apnea)    Obesity (BMI 30-39. 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 John Thomas, C5-7 cadaver patch   [address]      chiari    CARDIAC SURGERY      heart cath    CHOLECYSTECTOMY      COLONOSCOPY  2017    COLONOSCOPY N/A 3/12/2020    COLONOSCOPY POLYPECTOMY SNARE/COLD BIOPSY performed by Sienna Newman MD at [address], COLON, DIAGNOSTIC     [address] [facility]    right elbow    IVC FILTER REMOVAL  03/15/2020    [facility] [address]      SHOULDER SURGERY Left 2018    TONSILLECTOMY  1964    UPPER GASTROINTESTINAL ENDOSCOPY Left 3/12/2020    EGD ESOPHAGOGASTRODUODENOSCOPY DILATATION performed by Sienna Newman MD at [address] [facility] ENDOSCOPY Left 3/12/2020    EGD BIOPSY performed by Sienna Newman MD at [address] [facility]  2015    right middle finger artery     Social History     Tobacco Use    Smoking status: Former Smoker     Packs/day: 1.50     Years: 3.00     Pack years: 4.50     Types: Cigarettes     Start date: 1976     Quit date: 10/12/1979     Years since quittin.4    Smokeless tobacco: Never Used   Vaping Use    Vaping Use: Never used   Substance Use Topics    Alcohol use: No    Drug use: No     Prior to Admission medications    Medication Sig Start Date End Date Taking? Authorizing Provider   isosorbide mononitrate (IMDUR) 60 MG extended release tablet Take 1 tablet by mouth daily 2/10/22  Yes Radha Quiroz MD   pantoprazole (PROTONIX) 40 MG tablet Take 1 tablet by mouth every morning (before breakfast) 22  Yes RAFAEL Jin - CNP   Continuous Blood Gluc Sensor (DEXCOM G6 SENSOR) MISC Change every 10 days-Use as directed for continuous glucose monitoring.  22  Yes Renae Nguyen MD   Continuous Blood Gluc Transmit (DEXCOM G6 TRANSMITTER) MISC Change every 3 months- Use as directed for continuous glucose monitoring.  2/2/22  Yes Charlesetta Baumgarten, MD   Dulaglutide 0.75 MG/0.5ML SOPN Inject 0.75 mg into the skin once a week 2/2/22  Yes Charlesetta Baumgarten, MD   insulin lispro, 1 Unit Dial, (HUMALOG KWIKPEN) 100 UNIT/ML SOPN Inject 8 Units into the skin 3 times daily (before meals) 2/2/22 1/28/23 Yes Charlesetta Baumgarten, MD   insulin glargine (LANTUS SOLOSTAR) 100 UNIT/ML injection pen Inject 20 Units into the skin 2 times daily 2/2/22  Yes Charlesetta Baumgarten, MD   metFORMIN (GLUCOPHAGE) 1000 MG tablet Take 1 tablet by mouth 2 times daily (with meals) 1/5/22  Yes Sarah Ramirez, DO   lisinopril (PRINIVIL;ZESTRIL) 5 MG tablet Take 1 tablet by mouth 2 times daily 12/12/21  Yes Jason Grande, DO   clopidogrel (PLAVIX) 75 MG tablet TAKE 1 TABLET BY MOUTH ONE TIME A DAY 11/24/21  Yes Delaney Cole MD   nitroGLYCERIN (NITROSTAT) 0.4 MG SL tablet Place 1 tablet under the tongue every 5 minutes as needed for Chest pain 11/4/21  Yes Delaney Cole MD   rivaroxaban (XARELTO) 15 MG TABS tablet Take 1 tablet by mouth daily (with breakfast) 10/22/21  Yes Randee ARCE HempRAQUEL boothN - CNP   rosuvastatin (CRESTOR) 40 MG tablet Take 1 tablet by mouth nightly 10/22/21  Yes Randee ARCE Hempleobardo APRN - CNP   metoprolol tartrate (LOPRESSOR) 25 MG tablet Take 0.5 tablets by mouth 2 times daily 10/22/21  Yes Randee Choi APRN - CNP   metoclopramide (REGLAN) 10 MG tablet Take 1 tablet by mouth 3 times daily (before meals) 10/22/21  Yes RAFAEL Mayorga CNP   CPAP Machine MISC by Does not apply route Please change Bipap to 10/7cm H20. 10/14/21  Yes Ernie Gu, PA-C   ondansetron (ZOFRAN ODT) 4 MG disintegrating tablet Take 1 tablet by mouth every 8 hours as needed for Nausea or Vomiting 4/1/21  Yes RAFAEL Baldwin CNP   amitriptyline (ELAVIL) 25 MG tablet Take 1 tablet by mouth nightly 3/24/21  Yes Jason Grande,  Cholecalciferol (VITAMIN D3) 125 MCG (5000 UT) TABS Take 5,000 Units by mouth daily   Yes Historical Provider, MD   Continuous Blood Gluc  (Nickalvaro Padron) YULI Use as directed for continuous glucose monitor. 10/8/20  Yes RAFAEL Artis CNP   cyclobenzaprine (FLEXERIL) 5 MG tablet Take 5 mg by mouth as needed 8/26/20  Yes Historical Provider, MD   Misc. Devices (ROLLATOR ULTRA-LIGHT) MISC Use Rollator for stability with ambulation. 7/28/20  Yes Marcin Blackmon MD         Review of Systems   Constitutional: Negative. HENT: Negative. Respiratory: Negative. Cardiovascular: Negative. Gastrointestinal: Negative. Musculoskeletal: Negative. All other systems reviewed and are negative. Objective   Physical Exam  Vitals and nursing note reviewed. Constitutional:       General: He is not in acute distress. Appearance: Normal appearance. He is well-developed. HENT:      Head: Normocephalic and atraumatic. Right Ear: Tympanic membrane normal.      Left Ear: Tympanic membrane normal.   Eyes:      Conjunctiva/sclera: Conjunctivae normal.   Cardiovascular:      Rate and Rhythm: Normal rate and regular rhythm. Heart sounds: Normal heart sounds. No murmur heard. Pulmonary:      Effort: Pulmonary effort is normal.      Breath sounds: Normal breath sounds. No wheezing, rhonchi or rales. Abdominal:      General: There is no distension. Musculoskeletal:      Cervical back: Neck supple. Skin:     General: Skin is warm and dry. Findings: No rash (on exposed surfaces). Neurological:      General: No focal deficit present. Mental Status: He is alert. Psychiatric:         Attention and Perception: Attention normal.         Mood and Affect: Mood normal.         Speech: Speech normal.         Behavior: Behavior normal. Behavior is cooperative. Thought Content:  Thought content normal.         Judgment: Judgment normal. ASSESSMENT/PLAN:  1. Preop examination  2. Spinal stenosis of lumbar region with neurogenic claudication  3. Protein S deficiency ([facility]  4. Atherosclerotic heart disease of native coronary artery with other forms of angina pectoris ([facility]  5. Uncontrolled type 2 diabetes mellitus, without long-term current use of insulin ([facility]  6. Essential hypertension  7. Obesity (BMI 30-39.9)  8. CSA (central sleep apnea)  9. Mixed hyperlipidemia    -  Pt acceptable risk for surgery, copy of this note will be sent to Dr. Carrie Naik    Return for as needed. An electronic signature was used to authenticate this note.     --Jason Grande, DO

## 2022-03-16 ENCOUNTER — OFFICE VISIT (OUTPATIENT)
Dept: INTERNAL MEDICINE CLINIC | Age: 67
End: 2022-03-16
Payer: MEDICARE

## 2022-03-16 VITALS
HEART RATE: 66 BPM | DIASTOLIC BLOOD PRESSURE: 74 MMHG | HEIGHT: 69 IN | TEMPERATURE: 97.9 F | BODY MASS INDEX: 31.58 KG/M2 | SYSTOLIC BLOOD PRESSURE: 126 MMHG | WEIGHT: 213.2 LBS

## 2022-03-16 DIAGNOSIS — Z79.4 TYPE 2 DIABETES MELLITUS WITH OTHER SPECIFIED COMPLICATION, WITH LONG-TERM CURRENT USE OF INSULIN (HCC): ICD-10-CM

## 2022-03-16 DIAGNOSIS — E11.65 TYPE 2 DIABETES MELLITUS WITH HYPERGLYCEMIA, WITH LONG-TERM CURRENT USE OF INSULIN (HCC): Primary | ICD-10-CM

## 2022-03-16 DIAGNOSIS — E11.69 TYPE 2 DIABETES MELLITUS WITH OTHER SPECIFIED COMPLICATION, WITH LONG-TERM CURRENT USE OF INSULIN (HCC): ICD-10-CM

## 2022-03-16 DIAGNOSIS — Z79.4 TYPE 2 DIABETES MELLITUS WITH HYPERGLYCEMIA, WITH LONG-TERM CURRENT USE OF INSULIN (HCC): Primary | ICD-10-CM

## 2022-03-16 PROCEDURE — 3051F HG A1C>EQUAL 7.0%<8.0%: CPT | Performed by: STUDENT IN AN ORGANIZED HEALTH CARE EDUCATION/TRAINING PROGRAM

## 2022-03-16 PROCEDURE — 99214 OFFICE O/P EST MOD 30 MIN: CPT | Performed by: STUDENT IN AN ORGANIZED HEALTH CARE EDUCATION/TRAINING PROGRAM

## 2022-03-16 RX ORDER — INSULIN LISPRO 100 [IU]/ML
6 INJECTION, SOLUTION INTRAVENOUS; SUBCUTANEOUS
Qty: 10 PEN | Refills: 3 | Status: SHIPPED | OUTPATIENT
Start: 2022-03-16 | End: 2022-08-08 | Stop reason: SDUPTHER

## 2022-03-16 RX ORDER — INSULIN GLARGINE 100 [IU]/ML
18 INJECTION, SOLUTION SUBCUTANEOUS 2 TIMES DAILY
Qty: 15 PEN | Refills: 3 | Status: SHIPPED | OUTPATIENT
Start: 2022-03-16 | End: 2022-08-16 | Stop reason: SDUPTHER

## 2022-03-16 ASSESSMENT — ENCOUNTER SYMPTOMS
NAUSEA: 1
SHORTNESS OF BREATH: 0
PHOTOPHOBIA: 0
COUGH: 0
DIARRHEA: 1
ABDOMINAL PAIN: 1

## 2022-03-16 NOTE — PROGRESS NOTES
shaking, dizziness, syncope, confusion. No noted episodes of hypoglycemia. No dyspnea. Diabetes Mellitus -  Last HgA1c 7.7 on 1/5/2022. Patient checks FSBS 4 times a day. FSBS ranges 80s-180s, see media section for specific numbers (if patient brought to visit today). Most recent eye in 2019, with Dr. Mills, denies DM retinopathy.  Last eye exam over 2 years ago. Was seen by Dr. Ann Marie Delvalle. Has had issues with rescheduling. Awaiting change in insurance to see a new provider soon. Normal renal function, eGFR 67, Creatinine 1.1 on 10/2021, Stable, elevated microalbumin, ratio 831 on 7/2021. Patient denies foot lesions.  Last foot exam 7/2021.  Denies acute changes in neuropathy. No autonomic dysfunction, but does have Gastroparesis.  Takes Reglan and Zofran. Last lipid panel done 10/21/2021. LDL 81. C-peptide 4.1.       Patient is on an Ace Inhibitor and statin.  ASA not used to avoid triple anti-platelet/anticoagulant therapy.     Not wearing CPAP machine at night, air pressure caused air swallowing and pain in stomach.  Set up to see Dr. Rosalind Hagan. Patient to have outpatient EGD and Colonoscopy per Dr. Sri Banda coming up. Scheduled to have back surgery in 04/2022. Review of Systems   Constitutional: Negative for diaphoresis, fatigue, fever and unexpected weight change. Eyes: Negative for photophobia and visual disturbance. Respiratory: Negative for cough and shortness of breath. Cardiovascular: Negative for chest pain, palpitations and leg swelling. Gastrointestinal: Positive for abdominal pain, diarrhea and nausea. Endocrine: Negative for polyuria. Genitourinary: Negative for dysuria. Skin: Negative for wound. Neurological: Negative for dizziness, tremors and syncope. Psychiatric/Behavioral: Negative for confusion. All other systems reviewed and are negative.          Objective    /74 (Site: Left Upper Arm)   Pulse 66   Temp 97.9 °F (36.6 °C)   Ht 5' 9.02\" (1.753 m)   Wt 213 lb 3.2 oz (96.7 kg)   BMI 31.47 kg/m²     Physical Exam  Vitals reviewed. Constitutional:       Appearance: He is obese. HENT:      Head: Normocephalic and atraumatic. Nose: Nose normal.   Eyes:      Extraocular Movements: Extraocular movements intact. Conjunctiva/sclera: Conjunctivae normal.   Cardiovascular:      Rate and Rhythm: Normal rate and regular rhythm. Pulses: Normal pulses. Heart sounds: Normal heart sounds. Pulmonary:      Effort: Pulmonary effort is normal.      Breath sounds: Normal breath sounds. Abdominal:      General: There is no distension. Palpations: Abdomen is soft. Tenderness: There is no abdominal tenderness. Musculoskeletal:         General: No swelling. Normal range of motion. Cervical back: Normal range of motion. Skin:     General: Skin is warm and dry. Neurological:      General: No focal deficit present. Mental Status: He is alert. Sensory: No sensory deficit. Motor: No weakness. Gait: Gait normal.   Psychiatric:         Mood and Affect: Mood normal.            On this date 3/16/2022 I have spent 60 minutes reviewing previous notes, test results and face to face with the patient discussing the diagnosis and importance of compliance with the treatment plan as well as documenting on the day of the visit. An electronic signature was used to authenticate this note. --Radha Mirza MD   . Thalia Swift  INTERNAL MEDICINE  [address]  Dept: [phone]  Dept Fax: [phone] : [phone]

## 2022-03-16 NOTE — PATIENT INSTRUCTIONS
Increase Trulicity from 7.24 mg to 1.5 mg weekly.   Decrease Lantus from 20 units to 18 units twice a day and humalog from 8 units to 6 units three times daily with meals

## 2022-03-21 ENCOUNTER — TELEPHONE (OUTPATIENT)
Dept: CARDIOLOGY CLINIC | Age: 67
End: 2022-03-21

## 2022-03-21 NOTE — TELEPHONE ENCOUNTER
PCI was on 10/21/2021  Low to moderate cardiac risk  Ok to hold xarelto as requested  Continue Plavix

## 2022-03-21 NOTE — TELEPHONE ENCOUNTER
Pt called asking about holding Xarelto x3 days for EGD this Thursday. Pre op Risk Assessment    Procedure EGD   Physician MIRYAM  Date of surgery/procedure 3/24/22    Last OV 2/10/22  Last Stress 10/15/21  Last Echo 10/15/21  Last Cath 10/21/21  Last Stent 10/21/21  Is patient on blood thinners Xarelto, Plavix  Hold Meds/how many days 3 days for Xarelto, may stay on Plavix    Please call patient.

## 2022-03-25 ENCOUNTER — NURSE ONLY (OUTPATIENT)
Dept: LAB | Age: 67
End: 2022-03-25

## 2022-03-25 DIAGNOSIS — Z79.4 TYPE 2 DIABETES MELLITUS WITH HYPERGLYCEMIA, WITH LONG-TERM CURRENT USE OF INSULIN (HCC): ICD-10-CM

## 2022-03-25 DIAGNOSIS — E11.65 TYPE 2 DIABETES MELLITUS WITH HYPERGLYCEMIA, WITH LONG-TERM CURRENT USE OF INSULIN (HCC): ICD-10-CM

## 2022-03-25 LAB
CREATININE, URINE: 142.5 MG/DL
MICROALBUMIN UR-MCNC: 54.81 MG/DL
MICROALBUMIN/CREAT UR-RTO: 385 MG/G (ref 0–30)

## 2022-04-13 ENCOUNTER — OFFICE VISIT (OUTPATIENT)
Dept: INTERNAL MEDICINE CLINIC | Age: 67
End: 2022-04-13
Payer: MEDICARE

## 2022-04-13 VITALS
TEMPERATURE: 97.8 F | BODY MASS INDEX: 31.07 KG/M2 | WEIGHT: 209.8 LBS | SYSTOLIC BLOOD PRESSURE: 118 MMHG | DIASTOLIC BLOOD PRESSURE: 70 MMHG | HEIGHT: 69 IN | HEART RATE: 96 BPM

## 2022-04-13 DIAGNOSIS — Z79.4 TYPE 2 DIABETES MELLITUS WITH HYPERGLYCEMIA, WITH LONG-TERM CURRENT USE OF INSULIN (HCC): Primary | ICD-10-CM

## 2022-04-13 DIAGNOSIS — E11.65 TYPE 2 DIABETES MELLITUS WITH HYPERGLYCEMIA, WITH LONG-TERM CURRENT USE OF INSULIN (HCC): Primary | ICD-10-CM

## 2022-04-13 LAB — HBA1C MFR BLD: 7.8 % (ref 4.3–5.7)

## 2022-04-13 PROCEDURE — 3051F HG A1C>EQUAL 7.0%<8.0%: CPT | Performed by: STUDENT IN AN ORGANIZED HEALTH CARE EDUCATION/TRAINING PROGRAM

## 2022-04-13 PROCEDURE — 83036 HEMOGLOBIN GLYCOSYLATED A1C: CPT | Performed by: STUDENT IN AN ORGANIZED HEALTH CARE EDUCATION/TRAINING PROGRAM

## 2022-04-13 PROCEDURE — 99212 OFFICE O/P EST SF 10 MIN: CPT | Performed by: STUDENT IN AN ORGANIZED HEALTH CARE EDUCATION/TRAINING PROGRAM

## 2022-04-13 ASSESSMENT — ENCOUNTER SYMPTOMS
SHORTNESS OF BREATH: 1
CHEST TIGHTNESS: 0
APNEA: 0
WHEEZING: 0
NAUSEA: 1
BACK PAIN: 1
ABDOMINAL PAIN: 0
DIARRHEA: 1

## 2022-04-13 NOTE — PROGRESS NOTES
Yu Masters (:  1955) is a 73 y.o. male,Established patient, here for evaluation of the following chief complaint(s):  Diabetes  Patient with history of T2DM and gastroparesis with chronic issues of diarrhea and nausea/vomiting, here for routine follow up. Last office visit 3/16/2022. S/p EGD with dilatation 2022. Also has history of Primary HTN, dyslipidemia, CAD s/p PCI with stent (10/2021), GERD, and chronic mild anemia. Follows up with Dr. Veronique Galo, PCP. ASSESSMENT/PLAN:  1. Type 2 diabetes mellitus with hyperglycemia, with long-term current use of insulin ([facility])  -     POCT glycosylated hemoglobin (Hb A1C)  -     [address]24 - Collection Capillary Blood Specimen  -     Dulaglutide 1.5 MG/0.5ML SOPN; Inject 4.5 mg into the skin once a week, Disp-4 pen, [id number]    Increase Trulicity from 1.5 mg to 4.5 mg weekly. To avoid confusion and given that his BG records are slightly worse, will keep insulin as he is taking it (Lantus 20u BID and Humalog 8u TIDWM). Plan discussed with Dr. Darrell Álvarez and Dr. Renard Woods (Pharmacy). Return in about 6 weeks (around 2022). Subjective   SUBJECTIVE/OBJECTIVE:  T2DM: On insulin, has a CGM. On Metformin 1000 mg BID, Lantus 20u BID,and Humalog 8U with meals that was reduced during last visit (was supposed to be taking Lantus 18u and Humalog 6u TIDWM).  Trulicity 9.04 mg was changed to 1.5 mg weekly last office visit. Reports no acute issues since his last office visit. Reports compliance to meds but due to his timing with monthly disability pay, he started has only received Trulicity at 1.5 mg twice so far. No episodes of shaking, dizziness, syncope, confusion. No noted episodes of hypoglycemia.     Last HgA1c 7.8 during this visit (2022) stable, 7.7 on 2022. Patient checks FSBS 4 times a day, see media section for specific numbers (if patient brought to visit today).  Avg  (196 previously), 37% within goal range (worse than prior from 45%).    Most recent eye exam 3 weeks ago with Dr. Gaylen Eisenmenger, denies DM retinopathy reportedly.    Normal renal function, eGFR 67, Creatinine 1.1 on 10/2021, Stable, elevated microalbumin/Cr 385 (03/25/2022) improved from 831 on 7/9/2021. Patient denies foot lesions.  Last foot exam 7/2021.  Denies acute changes in neuropathy. No autonomic dysfunction, but does have Gastroparesis.  Takes Reglan and Zofran. Last lipid panel done 10/21/2021. EDJ 68. C-peptide 4.1 on 01/07/2022.       Patient is on an Ace Inhibitor and statin.  ASA not used to avoid triple anti-platelet/anticoagulant therapy. Of note, scheduled to have back surgery 04/26/2022. Complaining of sulfur-like taste with frequent belching but no concerns per Dr. Otis Lundberg. Advised to consider following up with Neurology after back surgery due to history. Review of Systems   Constitutional: Negative for diaphoresis, fatigue and fever. HENT: Negative for congestion. Eyes: Negative for visual disturbance. Respiratory: Positive for shortness of breath. Negative for apnea, chest tightness and wheezing. When supine only but no acute changes   Cardiovascular: Negative for chest pain, palpitations and leg swelling. Gastrointestinal: Positive for diarrhea (chronic) and nausea (chronic). Negative for abdominal pain. Musculoskeletal: Positive for back pain (chronic). Skin: Negative for rash and wound. Neurological: Negative for dizziness, tremors, syncope, light-headedness and numbness. Hematological: Does not bruise/bleed easily. All other systems reviewed and are negative. Objective    /70 (Site: Left Upper Arm, Position: Sitting, Cuff Size: Medium Adult)   Pulse 96   Temp 97.8 °F (36.6 °C)   Ht 5' 9\" (1.753 m)   Wt 209 lb 12.8 oz (95.2 kg)   BMI 30.98 kg/m²     Physical Exam  Vitals reviewed. Constitutional:       Appearance: Normal appearance. He is obese. HENT:      Head: Normocephalic and atraumatic. Nose: Nose normal.   Eyes:      Extraocular Movements: Extraocular movements intact. Conjunctiva/sclera: Conjunctivae normal.   Cardiovascular:      Rate and Rhythm: Normal rate and regular rhythm. Pulses: Normal pulses. Pulmonary:      Effort: Pulmonary effort is normal. No respiratory distress. Breath sounds: Normal breath sounds. Abdominal:      Palpations: Abdomen is soft. Musculoskeletal:         General: Normal range of motion. Cervical back: Normal range of motion. Skin:     General: Skin is warm and dry. Neurological:      General: No focal deficit present. Mental Status: He is alert. Psychiatric:         Mood and Affect: Mood normal.            On this date 4/13/2022 I have spent 30 minutes reviewing previous notes, test results and face to face with the patient discussing the diagnosis and importance of compliance with the treatment plan as well as documenting on the day of the visit. An electronic signature was used to authenticate this note. --Hank Schaeffer MD   . Thalia Swift  INTERNAL MEDICINE  [address]  Dept: [phone]  Dept Fax: [phone] : [phone]

## 2022-04-14 ENCOUNTER — TELEPHONE (OUTPATIENT)
Dept: CARDIOLOGY CLINIC | Age: 67
End: 2022-04-14

## 2022-04-14 RX ORDER — DULAGLUTIDE 3 MG/.5ML
3 INJECTION, SOLUTION SUBCUTANEOUS WEEKLY
Qty: 4 PEN | Refills: 3 | Status: SHIPPED | OUTPATIENT
Start: 2022-04-14 | End: 2022-08-08 | Stop reason: DRUGHIGH

## 2022-04-14 NOTE — TELEPHONE ENCOUNTER
Good Rx info given to patient for Plavix. Samples prepared and pended in separate encounter for Xarelto.

## 2022-04-14 NOTE — TELEPHONE ENCOUNTER
Dr. Justin Mayfield,    Received patient call regarding need for Trulicity 3mg Rx instead of 4.5mg (3mg is next dosing step after 1.5 mg). Prescription pended for your review.         For Giles Randall in place:  No   Recommendation Provided To: Provider: 1 via Note to Provider   Intervention Detail: Dose Adjustment: 1, reason: Improve Adherence   Gap Closed?: No    Intervention Accepted By: Provider: 1   Time Spent (min): [address], PharmD, [facility]  Internal Medicine Clinical Pharmacist  [phone]  ,

## 2022-04-14 NOTE — TELEPHONE ENCOUNTER
Mayelin Dc needs helps with his meds, he has hit a donut hole with his insurance, and it is only April, next month his Xarelto, and Plavix, will cost him over 900.00 dollars, he is looking for samples, alternatives meds or help with patient assistance. He said if he cant get help he will stop all the medicines, he would rather have a place to live, then medications and no place to live.

## 2022-04-14 NOTE — TELEPHONE ENCOUNTER
Received approval from Dr. Justin Mayfield for Trulicity 3mg weekly, order sent to pharmacy. Called [facility] to discuss this change. Moni Beal mentioned that he can't afford the Trulicity anyways - $109/PVLXD now that he's hit the Medicare Coverage Gap. His other medications will also be more expensive. Moni Beal does not think he'll be able to afford these copays. Will refer him to Angela De La Paz to discuss assistance options.     For Giles Randall in place:  No   Recommendation Provided To: Provider: 1 via Note to Provider and Patient/Caregiver: 1 via Telephone   Intervention Detail: Dose Adjustment: 1, reason: Therapy De-escalation and Patient Access Assistance/Sample Provided   Gap Closed?: No    Intervention Accepted By: Provider: 1 and Patient/Caregiver: 1   Time Spent (min): [address], PharmD, [facility]  Internal Medicine Clinical Pharmacist  [phone]

## 2022-04-15 ENCOUNTER — CARE COORDINATION (OUTPATIENT)
Dept: CARE COORDINATION | Age: 67
End: 2022-04-15

## 2022-04-15 NOTE — CARE COORDINATION
I called and spoke with Annia Gardiner in regards to helping him get assistance on his medications. I am faxing the MD her portion and will mail him his portion next week.         [address] [facility]   Medication Assistance  [address] (h) [phone] (h) [phone]

## 2022-04-19 ENCOUNTER — TELEPHONE (OUTPATIENT)
Dept: CARDIOLOGY CLINIC | Age: 67
End: 2022-04-19

## 2022-04-19 NOTE — TELEPHONE ENCOUNTER
Request for pre op clearance:  Requested by: Dr. Bruce Casanova  Date of surgery 04/26/2022  Procedure L3-L5 decompression infusion  Office phone # [phone]  Fax #  [phone]    Is the patient on anti-coag medication?    If yes, how many days does the surgeon want anti-coag medication held: up to cardiologist    Date of last visit with cardiologist: 02/10/2022

## 2022-04-19 NOTE — TELEPHONE ENCOUNTER
From cardiology standpoint, the patient had PCI in 10/2021  Ok to hold Plavix  Will have low to moderate cardiac risk    [address] for h/o pulmonary embolism  I do not have all the details  May need bridging  PCP to assess

## 2022-04-19 NOTE — TELEPHONE ENCOUNTER
Nurse Eunice Jiang calling back from [facility] to revisit cardiac clearance for this patient. Pre op Risk Assessment     Procedure lumbar decompression  Physician Dr. Aravind Livingston  Date of surgery/procedure 4/26/22     Last OV 2-  Last Stress 10-15-21  Last Echo 10-15-21  Last Cath 10-21-21  Last Stent 10-21-21  Is patient on blood thinners plavix and xarelto  Hold Meds/how many days ? ?     Fax:  [phone], also requesting last OV note and cardiac testing from last 6 months    Phone: [phone]

## 2022-04-20 ENCOUNTER — TELEPHONE (OUTPATIENT)
Dept: FAMILY MEDICINE CLINIC | Age: 67
End: 2022-04-20

## 2022-04-20 NOTE — TELEPHONE ENCOUNTER
Pt was notified and became very frustrated. Says he has been trying to have this surgery done for the past 2yrs and feels it's going to get canceled again. See 2/25/22 TE's from us and Cardio, you said this needs to come from [address] says it needs to come from PCP. Pt feels he keeps getting the run around and not a straight answer. He was originally prescribed Xeralto from Pulmonary 2018 after having a PE. Pt says Michelle Rodriguez has never prescribed this for him. Please advise.

## 2022-04-20 NOTE — TELEPHONE ENCOUNTER
----- Message from Stacy Jones sent at 4/20/2022  2:11 PM EDT -----  Subject: Message to Provider    QUESTIONS  Information for Provider? Patient needs to stop XALRELTO 15MG needs to be   stopped prior to patient's back surgery on 4/26/22. Please call patient   and advise how long he needs to stop taking prior to surgery. ---------------------------------------------------------------------------  --------------  Finis Lady INFO  What is the best way for the office to contact you? OK to leave message on   voicemail  Preferred Call Back Phone Number? [phone]  ---------------------------------------------------------------------------  --------------  SCRIPT ANSWERS  Relationship to Patient?  Self

## 2022-04-20 NOTE — TELEPHONE ENCOUNTER
Patient needing cardiac clearance for surgery on 4-26-22. Pre op Risk Assessment    Procedure L3-L5 decompression  Physician Dr Christa Martinez  Date of surgery/procedure 4-26-22    Last OV 2-10-22  Last Stress 10-15-21  Last Echo 10-15-21  Last Cath 10-21-21  Last Stent 10-21-21  Is patient on blood thinners Plavix and Xarelto  Hold Meds/how many days ?   Ph. [phone]  Fax [phone]

## 2022-04-20 NOTE — TELEPHONE ENCOUNTER
Pt called in frustrated in trying to figure out who needs to hold his Xarelto. Called Dr Melita Galo office and was told it was up to Cardiology, as Randee Choi was last prescriber. PT states he was originally prescribed Xarelto in 2018 by Pulmonary, Tariq Washburn, for PE. He goes on to say he was unhappy with her care and has since \"fired her. \" States refill for Xarelto was sent in by [facility] when he was in for his cath/stent in October. Discussed with Dr Mina Hackett over the phone. Review chest CTA from 10/21 and Vascular Ultrasound from 2/2019 which both showed no evidence of clots. Dr Bravo Pat hold for 3 days. This RN discussed with [facility] nurse, Fani Harris, and surgeon is agreeable to plan. Dr Mina Hackett, agree? Notified patient. Updated clearance form faxed.

## 2022-04-21 ENCOUNTER — TELEPHONE (OUTPATIENT)
Dept: FAMILY MEDICINE CLINIC | Age: 67
End: 2022-04-21

## 2022-04-21 DIAGNOSIS — Z86.711 HISTORY OF PULMONARY EMBOLISM: ICD-10-CM

## 2022-04-21 DIAGNOSIS — G47.33 OSA (OBSTRUCTIVE SLEEP APNEA): Primary | ICD-10-CM

## 2022-04-21 NOTE — TELEPHONE ENCOUNTER
Pt called office stating he was seeing [address] [place] [address] Pulmonology Zeina Landeros and he does not want to see her again. Says she \"vented and talked trash about the ED staffing for his 40min appointment\". Pt was seeing Pulmonology for his history of PE and sleep apnea. Pt would like a referral to [facility] Pulmonology Dr. Jammie Sanz. He would also like to get back to using his CPAP. Pt is aware if no call back, referral will be sent to requested provider and that office will contact him to schedule appt. Please advise.

## 2022-04-21 NOTE — TELEPHONE ENCOUNTER
Pt was notified and voiced understanding. Pt is asking for a letter regarding the Xarelto hold orders to be faxed to Dr. Peter Waldron office in [place] attn: Yair Orellana [phone]. Please advise. Pt has his Pre Op appt in [place] tomorrow 4/22/22 at 11:20am. He goes back to [place] Saturday 4/23/22 for his COVID test and then they will call him Monday with the time he is to arrive on Tuesday 4/26/22 for his lumbar fusion surgery.

## 2022-04-22 ENCOUNTER — CARE COORDINATION (OUTPATIENT)
Dept: CARE COORDINATION | Age: 67
End: 2022-04-22

## 2022-05-03 ENCOUNTER — TELEPHONE (OUTPATIENT)
Dept: INTERNAL MEDICINE CLINIC | Age: 67
End: 2022-05-03

## 2022-05-03 NOTE — TELEPHONE ENCOUNTER
Pt called in saying he is in the donut hole. His trulicity costs $067 and he can not afford that. I advised pt I can give him samples of the 1.5 mg and he will need to take 2 shots once a week to get his dose of 3 mg. Weekly. (they do not sample the 3 mg. Dose)    I advised him Frandy Fung has sent him a pap application for him to fill out and send back to her. He states he had back surgery in [place] last week. He will have someone look through his mail to see if they can find it. I called pt back and advised him I put a blank form in his sample bag in case he does not find what Frandy Fung sent him. I also gave him bola's phone number in case he has any questions about what to do with the form.

## 2022-05-10 ENCOUNTER — TELEPHONE (OUTPATIENT)
Dept: PHARMACY | Facility: CLINIC | Age: 67
End: 2022-05-10

## 2022-05-10 NOTE — TELEPHONE ENCOUNTER
[facility] CLINICAL PHARMACY: ADHERENCE REVIEW  Identified care gap per [place]: fills at [facility]: Diabetes and Statin adherence    Last Visit: 4/13/22    DIABETES ADHERENCE    Insurance Records claims through 4.18.22; 100%; Potential Fail Date: 0/86/55):   TRULICITY 3mg Next refill due: 5/13/22    Per 5/3 enc note: patient in [facility], getting samples. Lab Results   Component Value Date    LABA1C 7.8 (H) 04/13/2022    LABA1C 7.7 (H) 01/05/2022    LABA1C 7.5 (H) 07/07/2021     NOTE A1c <9%    STATIN ADHERENCE    Insurance Records claims through 4.18.22 YTD Ramiro Delcid = Filled only once; Potential Fail Date: 5/31/22):   ROSUVASTATIN 40 MG Next refill due: 3/31/22    Per [address]:   Never been filled there(?)  Staff couldn't find any record. Patient uses [facility], [facility], [facility]. Lab Results   Component Value Date    CHOL 153 10/21/2021    TRIG 129 10/21/2021    HDL 46 10/21/2021    LDLCALC 81 10/21/2021     ALT   Date Value Ref Range Status   10/11/2021 32 11 - 66 U/L Final     Comment:     Performed at [facility], [address]     AST   Date Value Ref Range Status   10/11/2021 31 5 - 40 U/L Final     The 10-year ASCVD risk score (Kaylee Rodrigues, et al., 2013) is: 27.2%    Values used to calculate the score:      Age: 73 years      Sex: Male      Is Non- : No      Diabetic: Yes      Tobacco smoker: No      Systolic Blood Pressure: 846 mmHg      Is BP treated: Yes      HDL Cholesterol: 46 mg/dL      Total Cholesterol: 153 mg/dL     PLAN  The following are interventions that have been identified:   - Patient overdue refilling ROSUVASTATIN CALCIUM 40 MG and active on home medication list.    Most likely not a 90 day candidate while in donut hole. Attempting to reach patient to review.  Left message asking for return call.     Verify where/when last refilled and adherence    Future Appointments   Date Time Provider Hailey Brown   5/18/2022 2:00 PM Romana Peal, MD SRPX Physic [facility] [place]   8/16/2022 12:45 PM Teresa Conrad MD N YUSUFX Heart [facility]   10/18/2022 10:00 AM Teresa Conrad MD N SRPX Heart [facility] - [address] [place] [address].    [address] [place] [address] free: [phone]

## 2022-05-11 ENCOUNTER — CARE COORDINATION (OUTPATIENT)
Dept: CARE COORDINATION | Age: 67
End: 2022-05-11

## 2022-05-11 NOTE — TELEPHONE ENCOUNTER
I left a message for pt that I was just checking back with him on how he is coming along with help on trulicity. I asked him to call back if he wanted to to let me know.

## 2022-05-11 NOTE — CARE COORDINATION
I have to refax the providers portion to them since they have not yet returned it for assistance on his insulins. I have all the information back from the patient just waiting on the providers end.           [facility]   Medication Assistance  Quan Calderon 476 (v) [phone] (g) [phone]

## 2022-05-12 ENCOUNTER — CARE COORDINATION (OUTPATIENT)
Dept: CARE COORDINATION | Age: 67
End: 2022-05-12

## 2022-05-12 ENCOUNTER — PATIENT MESSAGE (OUTPATIENT)
Dept: PHARMACY | Facility: CLINIC | Age: 67
End: 2022-05-12

## 2022-05-12 NOTE — CARE COORDINATION
I was able to fax in his completed application to [facility] for assistance, once I hear back I will let the patient know.         [facility]   Medication Assistance  [facility], and [employer]    (D) [phone]  (O) [phone]

## 2022-05-13 RX ORDER — ROSUVASTATIN CALCIUM 40 MG/1
40 TABLET, COATED ORAL DAILY
Qty: 90 TABLET | Refills: 3 | Status: SHIPPED | OUTPATIENT
Start: 2022-05-13

## 2022-05-13 NOTE — TELEPHONE ENCOUNTER
Called patient to confirm which pharmacy he would like to fill rosuvastatin at - filled previously at [facility] [place] [facility], but also filling other medications at [address]. Patient confirms he would like to fill rosuvastatin at [address] moving forward. Will pend refill request and send to cardio in separate encounter.      Roshan Veronica, PharmD, [address] // Department, toll free [phone], option 1      For Pharmacy [facility] in place:  No   Recommendation Provided To: Patient/Caregiver: 1 via Telephone   Intervention Detail: Adherence Monitorin   Gap Closed?: Yes    Intervention Accepted By: Patient/Caregiver: 1   Time Spent (min): 20

## 2022-05-13 NOTE — TELEPHONE ENCOUNTER
Patient returned call in regards message left about Rosuvastatin 40mg. Patient is interested in getting 90 day fill on medication, but may not be able to afford due to being in \"donut hole\" with insurance. Advised to call pharmacy to see how much medication would cost and then decide wether or not getting 90 days would work for him. Also provided him with [facility] counselor phone  number  [phone] so he could call to see if he qualified for any additional help with cost on his medications. Please reach out to MD to have new script written for 90 days sent over to local pharmacy for process and filling.

## 2022-05-13 NOTE — TELEPHONE ENCOUNTER
Reg Burton MD    Patient is out of refills for rosuvastatin. I have pended a refill request for your convenience. Last OV on 2/10/22. Next OV on 8/16/22.      Thank you,   Doc Leon, PharmD, [place] // Department, toll free [phone], Option 1

## 2022-05-16 NOTE — TELEPHONE ENCOUNTER
Thank you for the quick response! Will sign off.      Liza Nam, PharmD, [place] // Department, toll free [phone], option 1      For [place] in place:  No   Recommendation Provided To: Provider: 1 via Note to Provider   Intervention Detail: New Rx: 1, reason: Improve Adherence   Gap Closed?: Yes    Intervention Accepted By: Provider: 1   Time Spent (min): 10

## 2022-05-17 ENCOUNTER — CARE COORDINATION (OUTPATIENT)
Dept: CARE COORDINATION | Age: 67
End: 2022-05-17

## 2022-05-18 ENCOUNTER — TELEPHONE (OUTPATIENT)
Dept: INTERNAL MEDICINE CLINIC | Age: 67
End: 2022-05-18

## 2022-05-18 NOTE — TELEPHONE ENCOUNTER
Patient called stating that he had to reschedule his appointment for today to 6/7 as his is not up to coming from his recent back surgery . Patient is scheduled to have his Trulicity 3 mg shipped from [facility]  on Friday and Ammon Nguyen want patient to contact the office and make sure you did not want to increase the dose before they shipped the Trulicity .

## 2022-05-18 NOTE — TELEPHONE ENCOUNTER
Per verbal order Dr. Laya Rodrigez patient can continue with the Trulicity 3 mg weekly . Patient informed.

## 2022-05-20 ENCOUNTER — HOSPITAL ENCOUNTER (EMERGENCY)
Age: 67
Discharge: ANOTHER HOSPITAL | End: 2022-05-21
Attending: EMERGENCY MEDICINE
Payer: MEDICARE

## 2022-05-20 ENCOUNTER — APPOINTMENT (OUTPATIENT)
Dept: CT IMAGING | Age: 67
End: 2022-05-20
Payer: MEDICARE

## 2022-05-20 ENCOUNTER — APPOINTMENT (OUTPATIENT)
Dept: GENERAL RADIOLOGY | Age: 67
End: 2022-05-20
Payer: MEDICARE

## 2022-05-20 DIAGNOSIS — A41.9 SEPSIS, DUE TO UNSPECIFIED ORGANISM, UNSPECIFIED WHETHER ACUTE ORGAN DYSFUNCTION PRESENT (HCC): Primary | ICD-10-CM

## 2022-05-20 DIAGNOSIS — T81.49XA SURGICAL SITE INFECTION: ICD-10-CM

## 2022-05-20 LAB
ALBUMIN SERPL-MCNC: 3.6 G/DL (ref 3.5–5.1)
ALP BLD-CCNC: 174 U/L (ref 38–126)
ALT SERPL-CCNC: 15 U/L (ref 11–66)
ANION GAP SERPL CALCULATED.3IONS-SCNC: 15 MEQ/L (ref 8–16)
AST SERPL-CCNC: 14 U/L (ref 5–40)
BASOPHILS # BLD: 0.2 %
BASOPHILS ABSOLUTE: 0 THOU/MM3 (ref 0–0.1)
BILIRUB SERPL-MCNC: 0.7 MG/DL (ref 0.3–1.2)
BILIRUBIN DIRECT: < 0.2 MG/DL (ref 0–0.3)
BUN BLDV-MCNC: 18 MG/DL (ref 7–22)
CALCIUM SERPL-MCNC: 9 MG/DL (ref 8.5–10.5)
CHLORIDE BLD-SCNC: 97 MEQ/L (ref 98–111)
CO2: 19 MEQ/L (ref 23–33)
CREAT SERPL-MCNC: 1.3 MG/DL (ref 0.4–1.2)
EOSINOPHIL # BLD: 0.1 %
EOSINOPHILS ABSOLUTE: 0 THOU/MM3 (ref 0–0.4)
ERYTHROCYTE [DISTWIDTH] IN BLOOD BY AUTOMATED COUNT: 13.5 % (ref 11.5–14.5)
ERYTHROCYTE [DISTWIDTH] IN BLOOD BY AUTOMATED COUNT: 44.2 FL (ref 35–45)
GFR SERPL CREATININE-BSD FRML MDRD: 55 ML/MIN/1.73M2
GLUCOSE BLD-MCNC: 205 MG/DL (ref 70–108)
HCT VFR BLD CALC: 33.6 % (ref 42–52)
HEMOGLOBIN: 11.1 GM/DL (ref 14–18)
IMMATURE GRANS (ABS): 0.2 THOU/MM3 (ref 0–0.07)
IMMATURE GRANULOCYTES: 1.1 %
LACTIC ACID, SEPSIS: 3.2 MMOL/L (ref 0.5–1.9)
LIPASE: 22.8 U/L (ref 5.6–51.3)
LYMPHOCYTES # BLD: 8.9 %
LYMPHOCYTES ABSOLUTE: 1.6 THOU/MM3 (ref 1–4.8)
MCH RBC QN AUTO: 29.9 PG (ref 26–33)
MCHC RBC AUTO-ENTMCNC: 33 GM/DL (ref 32.2–35.5)
MCV RBC AUTO: 90.6 FL (ref 80–94)
MONOCYTES # BLD: 8.7 %
MONOCYTES ABSOLUTE: 1.6 THOU/MM3 (ref 0.4–1.3)
NUCLEATED RED BLOOD CELLS: 0 /100 WBC
OSMOLALITY CALCULATION: 270.5 MOSMOL/KG (ref 275–300)
PLATELET # BLD: 341 THOU/MM3 (ref 130–400)
PMV BLD AUTO: 8.5 FL (ref 9.4–12.4)
POTASSIUM REFLEX MAGNESIUM: 4.6 MEQ/L (ref 3.5–5.2)
PRO-BNP: 125.3 PG/ML (ref 0–900)
RBC # BLD: 3.71 MILL/MM3 (ref 4.7–6.1)
SEG NEUTROPHILS: 81 %
SEGMENTED NEUTROPHILS ABSOLUTE COUNT: 14.9 THOU/MM3 (ref 1.8–7.7)
SODIUM BLD-SCNC: 131 MEQ/L (ref 135–145)
TOTAL PROTEIN: 7 G/DL (ref 6.1–8)
TROPONIN T: < 0.01 NG/ML
WBC # BLD: 18.4 THOU/MM3 (ref 4.8–10.8)

## 2022-05-20 PROCEDURE — 6360000002 HC RX W HCPCS: Performed by: STUDENT IN AN ORGANIZED HEALTH CARE EDUCATION/TRAINING PROGRAM

## 2022-05-20 PROCEDURE — 96375 TX/PRO/DX INJ NEW DRUG ADDON: CPT

## 2022-05-20 PROCEDURE — 80076 HEPATIC FUNCTION PANEL: CPT

## 2022-05-20 PROCEDURE — 84484 ASSAY OF TROPONIN QUANT: CPT

## 2022-05-20 PROCEDURE — 80048 BASIC METABOLIC PNL TOTAL CA: CPT

## 2022-05-20 PROCEDURE — 85025 COMPLETE CBC W/AUTO DIFF WBC: CPT

## 2022-05-20 PROCEDURE — 96361 HYDRATE IV INFUSION ADD-ON: CPT

## 2022-05-20 PROCEDURE — 83605 ASSAY OF LACTIC ACID: CPT

## 2022-05-20 PROCEDURE — 83880 ASSAY OF NATRIURETIC PEPTIDE: CPT

## 2022-05-20 PROCEDURE — 93005 ELECTROCARDIOGRAM TRACING: CPT | Performed by: EMERGENCY MEDICINE

## 2022-05-20 PROCEDURE — 74177 CT ABD & PELVIS W/CONTRAST: CPT

## 2022-05-20 PROCEDURE — 2580000003 HC RX 258: Performed by: STUDENT IN AN ORGANIZED HEALTH CARE EDUCATION/TRAINING PROGRAM

## 2022-05-20 PROCEDURE — 99285 EMERGENCY DEPT VISIT HI MDM: CPT

## 2022-05-20 PROCEDURE — 6360000004 HC RX CONTRAST MEDICATION: Performed by: STUDENT IN AN ORGANIZED HEALTH CARE EDUCATION/TRAINING PROGRAM

## 2022-05-20 PROCEDURE — 36415 COLL VENOUS BLD VENIPUNCTURE: CPT

## 2022-05-20 PROCEDURE — 96365 THER/PROPH/DIAG IV INF INIT: CPT

## 2022-05-20 PROCEDURE — 87040 BLOOD CULTURE FOR BACTERIA: CPT

## 2022-05-20 PROCEDURE — 83690 ASSAY OF LIPASE: CPT

## 2022-05-20 PROCEDURE — 71045 X-RAY EXAM CHEST 1 VIEW: CPT

## 2022-05-20 RX ORDER — ONDANSETRON 2 MG/ML
4 INJECTION INTRAMUSCULAR; INTRAVENOUS ONCE
Status: COMPLETED | OUTPATIENT
Start: 2022-05-20 | End: 2022-05-20

## 2022-05-20 RX ORDER — SODIUM CHLORIDE, SODIUM LACTATE, POTASSIUM CHLORIDE, AND CALCIUM CHLORIDE .6; .31; .03; .02 G/100ML; G/100ML; G/100ML; G/100ML
1200 INJECTION, SOLUTION INTRAVENOUS ONCE
Status: COMPLETED | OUTPATIENT
Start: 2022-05-21 | End: 2022-05-21

## 2022-05-20 RX ORDER — SODIUM CHLORIDE, SODIUM LACTATE, POTASSIUM CHLORIDE, AND CALCIUM CHLORIDE .6; .31; .03; .02 G/100ML; G/100ML; G/100ML; G/100ML
1000 INJECTION, SOLUTION INTRAVENOUS ONCE
Status: COMPLETED | OUTPATIENT
Start: 2022-05-20 | End: 2022-05-20

## 2022-05-20 RX ADMIN — SODIUM CHLORIDE, POTASSIUM CHLORIDE, SODIUM LACTATE AND CALCIUM CHLORIDE 1000 ML: 600; 310; 30; 20 INJECTION, SOLUTION INTRAVENOUS at 22:30

## 2022-05-20 RX ADMIN — IOPAMIDOL 80 ML: 755 INJECTION, SOLUTION INTRAVENOUS at 23:10

## 2022-05-20 RX ADMIN — ONDANSETRON 4 MG: 2 INJECTION INTRAMUSCULAR; INTRAVENOUS at 22:30

## 2022-05-20 RX ADMIN — PIPERACILLIN AND TAZOBACTAM 4500 MG: 4; .5 INJECTION, POWDER, FOR SOLUTION INTRAVENOUS at 23:50

## 2022-05-20 ASSESSMENT — PAIN DESCRIPTION - ORIENTATION: ORIENTATION: MID

## 2022-05-20 ASSESSMENT — PAIN - FUNCTIONAL ASSESSMENT: PAIN_FUNCTIONAL_ASSESSMENT: 0-10

## 2022-05-20 ASSESSMENT — PAIN SCALES - GENERAL
PAINLEVEL_OUTOF10: 8
PAINLEVEL_OUTOF10: 8
PAINLEVEL_OUTOF10: 6

## 2022-05-20 ASSESSMENT — PAIN DESCRIPTION - LOCATION: LOCATION: CHEST

## 2022-05-20 ASSESSMENT — PAIN DESCRIPTION - DESCRIPTORS: DESCRIPTORS: PRESSURE

## 2022-05-21 ENCOUNTER — APPOINTMENT (OUTPATIENT)
Dept: MRI IMAGING | Age: 67
End: 2022-05-21
Payer: MEDICARE

## 2022-05-21 VITALS
RESPIRATION RATE: 18 BRPM | OXYGEN SATURATION: 96 % | HEIGHT: 69 IN | BODY MASS INDEX: 30.66 KG/M2 | SYSTOLIC BLOOD PRESSURE: 114 MMHG | HEART RATE: 85 BPM | DIASTOLIC BLOOD PRESSURE: 72 MMHG | TEMPERATURE: 98.4 F | WEIGHT: 207 LBS

## 2022-05-21 LAB
BACTERIA: ABNORMAL /HPF
BILIRUBIN URINE: NEGATIVE
BLOOD, URINE: NEGATIVE
CASTS 2: ABNORMAL /LPF
CASTS UA: ABNORMAL /LPF
CHARACTER, URINE: CLEAR
COLOR: YELLOW
CRYSTALS, UA: ABNORMAL
EKG ATRIAL RATE: 106 BPM
EKG P AXIS: 32 DEGREES
EKG P-R INTERVAL: 160 MS
EKG Q-T INTERVAL: 342 MS
EKG QRS DURATION: 96 MS
EKG QTC CALCULATION (BAZETT): 454 MS
EKG R AXIS: -82 DEGREES
EKG T AXIS: 37 DEGREES
EKG VENTRICULAR RATE: 106 BPM
EPITHELIAL CELLS, UA: ABNORMAL /HPF
FLU A ANTIGEN: NEGATIVE
FLU B ANTIGEN: NEGATIVE
GLUCOSE URINE: NEGATIVE MG/DL
KETONES, URINE: ABNORMAL
LACTIC ACID, SEPSIS: 2.2 MMOL/L (ref 0.5–1.9)
LACTIC ACID, SEPSIS: 2.5 MMOL/L (ref 0.5–1.9)
LACTIC ACID, SEPSIS: 3.3 MMOL/L (ref 0.5–1.9)
LEUKOCYTE ESTERASE, URINE: NEGATIVE
MISCELLANEOUS 2: ABNORMAL
NITRITE, URINE: NEGATIVE
PH UA: 5.5 (ref 5–9)
PROTEIN UA: 30
RBC URINE: ABNORMAL /HPF
RENAL EPITHELIAL, UA: ABNORMAL
SARS-COV-2, NAAT: NOT  DETECTED
SPECIFIC GRAVITY, URINE: > 1.03 (ref 1–1.03)
UROBILINOGEN, URINE: 1 EU/DL (ref 0–1)
WBC UA: ABNORMAL /HPF
YEAST: ABNORMAL

## 2022-05-21 PROCEDURE — 96366 THER/PROPH/DIAG IV INF ADDON: CPT

## 2022-05-21 PROCEDURE — 96361 HYDRATE IV INFUSION ADD-ON: CPT

## 2022-05-21 PROCEDURE — 6360000004 HC RX CONTRAST MEDICATION: Performed by: STUDENT IN AN ORGANIZED HEALTH CARE EDUCATION/TRAINING PROGRAM

## 2022-05-21 PROCEDURE — 96375 TX/PRO/DX INJ NEW DRUG ADDON: CPT

## 2022-05-21 PROCEDURE — 2580000003 HC RX 258: Performed by: FAMILY MEDICINE

## 2022-05-21 PROCEDURE — 72158 MRI LUMBAR SPINE W/O & W/DYE: CPT

## 2022-05-21 PROCEDURE — 2580000003 HC RX 258: Performed by: STUDENT IN AN ORGANIZED HEALTH CARE EDUCATION/TRAINING PROGRAM

## 2022-05-21 PROCEDURE — 96376 TX/PRO/DX INJ SAME DRUG ADON: CPT

## 2022-05-21 PROCEDURE — 81001 URINALYSIS AUTO W/SCOPE: CPT

## 2022-05-21 PROCEDURE — 87635 SARS-COV-2 COVID-19 AMP PRB: CPT

## 2022-05-21 PROCEDURE — A9579 GAD-BASE MR CONTRAST NOS,1ML: HCPCS | Performed by: STUDENT IN AN ORGANIZED HEALTH CARE EDUCATION/TRAINING PROGRAM

## 2022-05-21 PROCEDURE — 96367 TX/PROPH/DG ADDL SEQ IV INF: CPT

## 2022-05-21 PROCEDURE — 6360000002 HC RX W HCPCS: Performed by: STUDENT IN AN ORGANIZED HEALTH CARE EDUCATION/TRAINING PROGRAM

## 2022-05-21 PROCEDURE — 87804 INFLUENZA ASSAY W/OPTIC: CPT

## 2022-05-21 PROCEDURE — 83605 ASSAY OF LACTIC ACID: CPT

## 2022-05-21 PROCEDURE — 93010 ELECTROCARDIOGRAM REPORT: CPT | Performed by: INTERNAL MEDICINE

## 2022-05-21 RX ORDER — 0.9 % SODIUM CHLORIDE 0.9 %
1000 INTRAVENOUS SOLUTION INTRAVENOUS ONCE
Status: COMPLETED | OUTPATIENT
Start: 2022-05-21 | End: 2022-05-21

## 2022-05-21 RX ORDER — SODIUM CHLORIDE, SODIUM LACTATE, POTASSIUM CHLORIDE, CALCIUM CHLORIDE 600; 310; 30; 20 MG/100ML; MG/100ML; MG/100ML; MG/100ML
INJECTION, SOLUTION INTRAVENOUS ONCE
Status: DISCONTINUED | OUTPATIENT
Start: 2022-05-21 | End: 2022-05-21

## 2022-05-21 RX ORDER — SODIUM CHLORIDE 9 MG/ML
INJECTION, SOLUTION INTRAVENOUS CONTINUOUS
Status: DISCONTINUED | OUTPATIENT
Start: 2022-05-21 | End: 2022-05-21 | Stop reason: HOSPADM

## 2022-05-21 RX ORDER — ONDANSETRON 2 MG/ML
4 INJECTION INTRAMUSCULAR; INTRAVENOUS ONCE
Status: COMPLETED | OUTPATIENT
Start: 2022-05-21 | End: 2022-05-21

## 2022-05-21 RX ORDER — DROPERIDOL 2.5 MG/ML
0.62 INJECTION, SOLUTION INTRAMUSCULAR; INTRAVENOUS EVERY 6 HOURS PRN
Status: DISCONTINUED | OUTPATIENT
Start: 2022-05-21 | End: 2022-05-21 | Stop reason: HOSPADM

## 2022-05-21 RX ADMIN — SODIUM CHLORIDE: 9 INJECTION, SOLUTION INTRAVENOUS at 03:19

## 2022-05-21 RX ADMIN — GADOTERIDOL 20 ML: 279.3 INJECTION, SOLUTION INTRAVENOUS at 02:32

## 2022-05-21 RX ADMIN — VANCOMYCIN HYDROCHLORIDE 1500 MG: 5 INJECTION, POWDER, LYOPHILIZED, FOR SOLUTION INTRAVENOUS at 03:22

## 2022-05-21 RX ADMIN — PIPERACILLIN AND TAZOBACTAM 3375 MG: 3; .375 INJECTION, POWDER, LYOPHILIZED, FOR SOLUTION INTRAVENOUS at 05:38

## 2022-05-21 RX ADMIN — SODIUM CHLORIDE 1000 ML: 9 INJECTION, SOLUTION INTRAVENOUS at 07:49

## 2022-05-21 RX ADMIN — ONDANSETRON 4 MG: 2 INJECTION INTRAMUSCULAR; INTRAVENOUS at 01:37

## 2022-05-21 RX ADMIN — DROPERIDOL 0.62 MG: 2.5 INJECTION, SOLUTION INTRAMUSCULAR; INTRAVENOUS at 03:17

## 2022-05-21 RX ADMIN — SODIUM CHLORIDE, POTASSIUM CHLORIDE, SODIUM LACTATE AND CALCIUM CHLORIDE 1200 ML: 600; 310; 30; 20 INJECTION, SOLUTION INTRAVENOUS at 00:21

## 2022-05-21 ASSESSMENT — PAIN - FUNCTIONAL ASSESSMENT
PAIN_FUNCTIONAL_ASSESSMENT: NONE - DENIES PAIN

## 2022-05-21 ASSESSMENT — ENCOUNTER SYMPTOMS
VOMITING: 1
BLOOD IN STOOL: 0
ANAL BLEEDING: 0
RHINORRHEA: 0
DIARRHEA: 1
BACK PAIN: 1
WHEEZING: 0
CONSTIPATION: 1
ABDOMINAL DISTENTION: 0
RECTAL PAIN: 0
ABDOMINAL PAIN: 1
SHORTNESS OF BREATH: 1
COUGH: 0
NAUSEA: 1
SORE THROAT: 0
CHEST TIGHTNESS: 0
STRIDOR: 0

## 2022-05-21 ASSESSMENT — PAIN DESCRIPTION - LOCATION
LOCATION: BACK
LOCATION: BACK

## 2022-05-21 ASSESSMENT — PAIN SCALES - GENERAL: PAINLEVEL_OUTOF10: 5

## 2022-05-21 ASSESSMENT — PAIN DESCRIPTION - ORIENTATION: ORIENTATION: MID

## 2022-05-21 NOTE — ED NOTES
Pt aware of transfer to [facility] at 11am. Pt aware will be ED to ED and pt will likely not get a bed until Monday. Call light in reach.      Jd Fierro RN  05/21/22 0851

## 2022-05-21 NOTE — ED NOTES
Upon first contact with patient this RN receives bedside shift report Kiara Grande RN. Pt alert and oriented x4. Breathing easy and unlabored on RA. Denies any needs at this time. Updated on POC and ETA for transfer. Remains NPO. Call light within reach. Will continue to monitor for safety and comfort.         Elizabeth Ovalle RN  05/21/22 3030

## 2022-05-21 NOTE — ED NOTES
Pt laying in bed watching tv. Updated on plan of care. Call light in reach. Pt given urinal and asked to provide urine specimen.      Jd Fierro RN  05/21/22 BRADLY Khan  05/21/22 7694

## 2022-05-21 NOTE — ED PROVIDER NOTES
I discussed case with Dr. Unruly Rosales (ED attending) of [facility]. She accepted the transfer on behalf of Dr. Adelso Yanez. He was given a rundown and summary of the hospital course. Pt is stable for transfer (ED to ED) and reminded that he will most likely be boarding in the ED through the weekend. Pt has been sleeping in the ED comfortably with stable vitals. His lactate has been gradually clearing. Pt is resting. Pt will be transferred to [place] later this morning.      Fain Bloch, MD  05/21/22 0048       Fain Bloch, MD  05/21/22 1262

## 2022-05-21 NOTE — ED TRIAGE NOTES
Pt reports chest pain and SOB for a few days. Pt states pain is mid chest and \"feels like pressure\". Pt states he has a history of stent placement.  Pt states pain and SOB is worse tonight so he came in for an eval.

## 2022-05-21 NOTE — ED PROVIDER NOTES
Transfer of Care Note:   Physician Signing out: Dr. Tom Perdomo  Receiving Physician: Richard Hughes MD  Sign out time: 36 AM      Brief history:  Back surgery recently, erythema and swelling at incision site; MRI shows nonspecific fluid collection    Items pending that need to be checked:  Transfer to [facility] around 11 AM      Tentative Impression of patient:  1. Sepsis    Expected disposition of patient:  Pending results, transferred. Additional Assessment and results:   I have personally performed a face to face diagnostic evaluation on this patient. The patient's initial evaluation and plan have been discussed with the prior physician who initially evaluated the patient. Nursing Notes, Past Medical Hx, Past Surgical Hx, Social Hx, Allergies, vital signs and Family Hx were all reviewed. Vitals:    05/21/22 1037   BP:    Pulse: 85   Resp: 18   Temp:    SpO2: 96%     Physical Exam  Constitutional:       General: He is not in acute distress. Appearance: Normal appearance. He is not ill-appearing, toxic-appearing or diaphoretic. HENT:      Head: Normocephalic and atraumatic. Right Ear: External ear normal.      Left Ear: External ear normal.   Eyes:      General: No scleral icterus. Right eye: No discharge. Left eye: No discharge. Cardiovascular:      Rate and Rhythm: Normal rate and regular rhythm. Pulmonary:      Effort: Pulmonary effort is normal. No respiratory distress. Breath sounds: Normal breath sounds. No stridor. No wheezing, rhonchi or rales. Chest:      Chest wall: No tenderness. Abdominal:      General: Abdomen is flat. There is no distension. Palpations: Abdomen is soft. Musculoskeletal:      Cervical back: Neck supple. Right lower leg: No edema. Left lower leg: No edema. Skin:     General: Skin is warm and dry. Neurological:      Mental Status: He is alert and oriented to person, place, and time.  Mental status is at baseline. Psychiatric:         Mood and Affect: Mood normal.         Behavior: Behavior normal.         Thought Content:  Thought content normal.         Judgment: Judgment normal.           Labs Reviewed   BASIC METABOLIC PANEL W/ REFLEX TO MG FOR LOW K - Abnormal; Notable for the following components:       Result Value    Sodium 131 (*)     Chloride 97 (*)     CO2 19 (*)     Glucose 205 (*)     CREATININE 1.3 (*)     All other components within normal limits   CBC WITH AUTO DIFFERENTIAL - Abnormal; Notable for the following components:    WBC 18.4 (*)     RBC 3.71 (*)     Hemoglobin 11.1 (*)     Hematocrit 33.6 (*)     MPV 8.5 (*)     Segs Absolute 14.9 (*)     Monocytes Absolute 1.6 (*)     Immature Grans (Abs) 0.20 (*)     All other components within normal limits   LACTATE, SEPSIS - Abnormal; Notable for the following components:    Lactic Acid, Sepsis 3.2 (*)     All other components within normal limits   LACTATE, SEPSIS - Abnormal; Notable for the following components:    Lactic Acid, Sepsis 3.3 (*)     All other components within normal limits   GLOMERULAR FILTRATION RATE, ESTIMATED - Abnormal; Notable for the following components:    Est, Glom Filt Rate 55 (*)     All other components within normal limits   OSMOLALITY - Abnormal; Notable for the following components:    Osmolality Calc 270.5 (*)     All other components within normal limits   HEPATIC FUNCTION PANEL - Abnormal; Notable for the following components:    Alkaline Phosphatase 174 (*)     All other components within normal limits   URINE WITH REFLEXED MICRO - Abnormal; Notable for the following components:    Ketones, Urine TRACE (*)     Specific Gravity, Urine > 1.030 (*)     Protein, UA 30 (*)     All other components within normal limits   LACTATE, SEPSIS - Abnormal; Notable for the following components:    Lactic Acid, Sepsis 2.5 (*)     All other components within normal limits   LACTATE, SEPSIS - Abnormal; Notable for the following components:    Lactic Acid, Sepsis 2.2 (*)     All other components within normal limits   RAPID INFLUENZA A/B ANTIGENS   COVID-19, RAPID   CULTURE, BLOOD 1   CULTURE, BLOOD 2   TROPONIN   BRAIN NATRIURETIC PEPTIDE   ANION GAP   LIPASE         Medications   piperacillin-tazobactam (ZOSYN) 3,375 mg in dextrose 5 % 50 mL IVPB (mini-bag) (0 mg IntraVENous Stopped 5/21/22 1101)   vancomycin (VANCOCIN) intermittent dosing (placeholder) (has no administration in time range)   vancomycin (VANCOCIN) 1,500 mg in dextrose 5 % 500 mL IVPB (has no administration in time range)   droperidol (INAPSINE) injection 0.625 mg (0.625 mg IntraVENous Given 5/21/22 0317)   0.9 % sodium chloride infusion (0 mL/hr IntraVENous Stopped 5/21/22 1121)   lactated ringers bolus (0 mLs IntraVENous Stopped 5/20/22 2330)   ondansetron (ZOFRAN) injection 4 mg (4 mg IntraVENous Given 5/20/22 2230)   piperacillin-tazobactam (ZOSYN) 4,500 mg in dextrose 5 % 100 mL IVPB (mini-bag) (0 mg IntraVENous Stopped 5/21/22 0020)   iopamidol (ISOVUE-370) 76 % injection 80 mL (80 mLs IntraVENous Given 5/20/22 2310)   lactated ringers bolus (0 mLs IntraVENous Stopped 5/21/22 0121)   ondansetron (ZOFRAN) injection 4 mg (4 mg IntraVENous Given 5/21/22 0137)   vancomycin (VANCOCIN) 1,500 mg in dextrose 5 % 500 mL IVPB (0 mg/kg × 93.9 kg IntraVENous Stopped 5/21/22 0534)   gadoteridol (PROHANCE) injection 20 mL (20 mLs IntraVENous Given 5/21/22 0232)   0.9 % sodium chloride bolus (0 mLs IntraVENous Stopped 5/21/22 1100)         MRI LUMBAR SPINE W WO CONTRAST   Final Result   1. Postoperative changes are noted, with nonspecific postoperative fluid    in the posterior paraspinous musculature and subcutaneous tissues. Please    see above for representative measurements. 2. No evidence of arachnoiditis. No evidence of an epidural fluid    collection. No evidence of discitis-osteomyelitis. 3. Additional findings are detailed above.       This document has been electronically signed by: Alpesh Douglas M.D. on    05/21/2022 04:24 AM      CT ABDOMEN PELVIS W IV CONTRAST Additional Contrast? None   Final Result   1. No evidence of a bowel obstruction or free air. No pericolonic    inflammation. No evidence of appendicitis. 2. Thick-walled urinary bladder which might be due to underdistention. This can be correlated with urinalysis. 3. A 7 mm nodule is suspected in the left midlung. A follow-up CT chest is    recommended, this is not urgent. 4. 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 No    evidence of an occlusion. Follow-up is advised. 5. Additional findings are detailed above. This document has been electronically signed by: Alpesh Douglas M.D. on    05/21/2022 12:03 AM      All CTs at this facility use dose modulation techniques and iterative    reconstructions, and/or weight-based dosing   when appropriate to reduce radiation to a low as reasonably achievable. XR CHEST PORTABLE   Final Result   1. No acute disease. This document has been electronically signed by: Alpesh oDuglas M.D. on    05/20/2022 10:21 PM            ED Course as of 05/21/22 1203   Fri May 20, 2022   2238 WBC(!): 18.4 [SC]   Sat May 21, 2022   0006 Call to [facility] - Dr. Alpesh Douglas - he will addend his report with details of fluid collection observed near spinal incision. No rim enhancement less likely to be abscess. [SC]   0013 Call to [facility] 192 [SC]   0105 MRI Approved by Dr. Brionna Saxena at [facility]. Recommends with and without contrast. [SC]   0114 Call to [facility] directly. [SC]   Alpesh Maynard on call for Dr. Tesfaye Du. He recommends paging Dr. Tesfaye Du directly. [SC]   0127 Call back to  who is paging Dr. Tesfaye Du - he would like the patient transferred to the ED at [facility]. He will  [SC]   [phone] Patient updated on plan, still nauseous. Droperidol ordered.  [SC]   0701 IV Fluids bolus ordered; increased maintenance IV fluids given borderline hypotension and elevated lactic acid and concern for sepsis picture. [CR]      ED Course User Index  [CR] Mckenzie Montelongo MD  [SC] Emiliana Cao MD         Further MDM and disposition:   Assessment:   3 60-year-old male with recent back surgery and imaging concerning for fluid collection possible infection/abscess  Plan:    Awaiting transfer to [facility] Additional IV fluids and increased maintenance fluids due to borderline hypotension while sleeping, he has no altered mental status or confusion so I do not think we need pressors at this point.  While awaiting transfer he had no complications. Patient left for transfer without issue. Final diagnoses:   Sepsis, due to unspecified organism, unspecified whether acute organ dysfunction present [facility]   Surgical site infection     Discharge Medication List as of 5/21/2022 11:21 AM            Condition: condition: stable  Dispo: Transfer to [facility] ED    This transcription was electronically signed. It was dictated by use of voice recognition software and electronically transcribed. The transcription may contain errors not detected in proofreading.       Mckenzie Montelongo MD  Resident  05/21/22 9108

## 2022-05-21 NOTE — ED NOTES
Patient resting in cot with lights dimmed for comfort. Call light in reach. No signs of distress. Will continue to monitor.       Donell Frey RN  05/21/22 0041

## 2022-05-21 NOTE — ED NOTES
Pt ambulated to restroom and back using walker. Steady gait. Call light in reach.      Saskia Ruff RN  05/21/22 0719

## 2022-05-21 NOTE — ED NOTES
Rprt provided to BRADLY Whipple at [facility].       Rashida Sands RN  05/21/22 0139

## 2022-05-21 NOTE — ED NOTES
Pt in bed sleeping at this time. Wakes to voice. Call light in reach.      Sherran Cranker, RN  05/21/22 9698

## 2022-05-21 NOTE — ED PROVIDER NOTES
[address] [address]          Pt Name: Becca Nuñez  MRN: [id number]  [place] 1955  Date of Evaluation: 5/20/2022  Treating Resident Physician: Alfredito Perry MD  Supervising Physician: Roxanne ,     CHIEF COMPLAINT       Chief Complaint   Patient presents with    Chest Pain    Shortness of Breath     History obtained from chart review and the patient. HISTORY OF PRESENT ILLNESS    HPI    Becca Nuñez is a 78 y.o. male with a past medical history significant for PE, on anticoagulation, CAD, hypertension, hyperlipidemia, recent back surgery, immobile for the past 2 weeks recovering. diabetes mellitus, presents to the emergency department for evaluation of shortness of breath, chest pressure, nausea, vomiting, abdominal pain. Smith Carvalho recently had back surgery in April and since then has been recovering with minimal movement at home. He has not been using a incentive spirometer. Over the past 3 days he has had worsening shortness of breath, central chest pressure, abdominal pain, vomiting, nausea. He also notes diarrhea though this has been fluctuating between diarrhea and constipation since time of surgery due to the opioid medication is on and the laxatives has been taking. Today at home he has been complaining that he feels cold, has not had a fever though has had chills. Shortness of breath has been gradually getting worse. The patient has no other acute complaints at this time. REVIEW OF SYSTEMS   Review of Systems   Constitutional: Positive for activity change, appetite change, chills and fatigue. Negative for diaphoresis, fever and unexpected weight change. HENT: Negative for congestion, rhinorrhea, sore throat and tinnitus. Respiratory: Positive for shortness of breath. Negative for cough, chest tightness, wheezing and stridor. Cardiovascular: Positive for chest pain. Negative for palpitations and leg swelling. Gastrointestinal: Positive for abdominal pain, constipation, diarrhea, nausea and vomiting. Negative for abdominal distention, anal bleeding, blood in stool and rectal pain. Endocrine: Negative for polyphagia and polyuria. Genitourinary: Negative for dysuria and urgency. Musculoskeletal: Positive for back pain. Negative for neck pain. Allergic/Immunologic: Negative for environmental allergies, food allergies and immunocompromised state. Neurological: Negative for dizziness, tremors, seizures, weakness and headaches. Hematological: Negative for adenopathy. Does not bruise/bleed easily. Psychiatric/Behavioral: Negative for agitation and confusion. All other systems reviewed and are negative.         PAST MEDICAL AND SURGICAL HISTORY     Past Medical History:   Diagnosis Date    Anxiety     CAD (coronary artery disease)     Depression     Diabetes mellitus ([address]     Dilation of esophagus 2022    Dr Jesika Lim     Frequent headaches     Gastroparesis     GERD (gastroesophageal reflux disease)     History of Chiari malformation     Hyperlipidemia     Hypertension     Kidney stone     Kidney stone     Migraine     Movement disorder     DDD    Pulmonary emboli ([facility] [address] 10/29/2018    Shingles     Sleep apnea     Tattoos      Past Surgical History:   Procedure Laterality Date    BACK SURGERY  2008    C1 oates & decompression    BACK SURGERY  08/25/2020    [facility] [place] Dr. Jorge Howard, C5-7 cadaver patch    BRAIN SURGERY  2008    chiari    CARDIAC SURGERY  2007    heart cath    CHOLECYSTECTOMY  2009    COLONOSCOPY  2017    COLONOSCOPY N/A 3/12/2020    COLONOSCOPY POLYPECTOMY SNARE/COLD BIOPSY performed by Jennyfer Fonseca MD at [facility]    EGD  2022    Dr Emerald Wilson, DIAGNOSTIC  2007    [address]    right elbow    IVC FILTER REMOVAL  03/15/2020    [address]  2006    SHOULDER SURGERY Left 08/16/2018    TONSILLECTOMY  1964    UPPER GASTROINTESTINAL ENDOSCOPY Left 3/12/2020    EGD ESOPHAGOGASTRODUODENOSCOPY DILATATION performed by Arlen Da Silva MD at [facility] [address] Left 3/12/2020    EGD BIOPSY performed by Arlen Da Silva MD at [facility]    VASCULAR SURGERY  2015    right middle finger artery         MEDICATIONS     Current Facility-Administered Medications:     vancomycin (VANCOCIN) 1,500 mg in dextrose 5 % 500 mL IVPB, 15 mg/kg, IntraVENous, Once, Christianna Osler, MD    piperacillin-tazobactam (ZOSYN) 3,375 mg in dextrose 5 % 50 mL IVPB (mini-bag), 3,375 mg, IntraVENous, Q8H, Christianna Osler, MD    lactated ringers infusion, , IntraVENous, Once, Christianna Osler, MD    vancomycin Quoc Bower) intermittent dosing (placeholder), , Other, RX Placeholder, Christianna Osler, MD    [START ON 5/22/2022] vancomycin (VANCOCIN) 1,500 mg in dextrose 5 % 500 mL IVPB, 1,500 mg, IntraVENous, Q24H, Christianna Osler, MD    droperidol (INAPSINE) injection 0.625 mg, 0.625 mg, IntraVENous, Q6H PRN, Christianna Osler, MD    Current Outpatient Medications:     Dulaglutide 1.5 MG/0.5ML SOPN, Inject 3 mg into the skin once a week [id number]  And [id number]  Exp 9/9/23 and 1-13-23  Given to pt on 5/4/22, Disp: 4 pen, Rfl: 0    rosuvastatin (CRESTOR) 40 MG tablet, Take 1 tablet by mouth daily, Disp: 90 tablet, Rfl: 3    ondansetron (ZOFRAN ODT) 4 MG disintegrating tablet, Take 1 tablet by mouth every 8 hours as needed for Nausea or Vomiting, Disp: 30 tablet, Rfl: 3    Dulaglutide (TRULICITY) 3 CT/8.1ZC SOPN, Inject 3 mg into the skin once a week *Please cancel 4.5mg Rx*, Disp: 4 pen, Rfl: 3    insulin lispro, 1 Unit Dial, (HUMALOG KWIKPEN) 100 UNIT/ML SOPN, Inject 6 Units into the skin 3 times daily (before meals) (Patient taking differently: Inject 8 Units into the skin 3 times daily (before meals) ), Disp: 10 pen, Rfl: 3    insulin glargine (LANTUS SOLOSTAR) 100 UNIT/ML injection pen, Inject 18 Units into the skin 2 times daily (Patient taking differently: Inject 20 Units into the skin 2 times daily ), Disp: 15 pen, Rfl: 3    isosorbide mononitrate (IMDUR) 60 MG extended release tablet, Take 1 tablet by mouth daily, Disp: 30 tablet, Rfl: 3    pantoprazole (PROTONIX) 40 MG tablet, Take 1 tablet by mouth every morning (before breakfast), Disp: 90 tablet, Rfl: 2    Continuous Blood Gluc Sensor (DEXCOM G6 SENSOR) MISC, Change every 10 days-Use as directed for continuous glucose monitoring., Disp: 3 each, Rfl: 5    Continuous Blood Gluc Transmit (DEXCOM G6 TRANSMITTER) MISC, Change every 3 months- Use as directed for continuous glucose monitoring., Disp: 1 each, Rfl: 3    metFORMIN (GLUCOPHAGE) 1000 MG tablet, Take 1 tablet by mouth 2 times daily (with meals), Disp: 180 tablet, Rfl: 1    lisinopril (PRINIVIL;ZESTRIL) 5 MG tablet, Take 1 tablet by mouth 2 times daily, Disp: 180 tablet, Rfl: 3    clopidogrel (PLAVIX) 75 MG tablet, TAKE 1 TABLET BY MOUTH ONE TIME A DAY, Disp: 90 tablet, Rfl: 2    nitroGLYCERIN (NITROSTAT) 0.4 MG SL tablet, Place 1 tablet under the tongue every 5 minutes as needed for Chest pain, Disp: 25 tablet, Rfl: 3    rivaroxaban (XARELTO) 15 MG TABS tablet, Take 1 tablet by mouth daily (with breakfast), Disp: 90 tablet, Rfl: 1    metoprolol tartrate (LOPRESSOR) 25 MG tablet, Take 0.5 tablets by mouth 2 times daily, Disp: 60 tablet, Rfl: 3    metoclopramide (REGLAN) 10 MG tablet, Take 1 tablet by mouth 3 times daily (before meals), Disp: 120 tablet, Rfl: 3    CPAP Machine MISC, by Does not apply route Please change Bipap to 10/7cm H20., Disp: 1 each, Rfl: 0    ondansetron (ZOFRAN ODT) 4 MG disintegrating tablet, Take 1 tablet by mouth every 8 hours as needed for Nausea or Vomiting, Disp: 30 tablet, Rfl: 3    amitriptyline (ELAVIL) 25 MG tablet, Take 1 tablet by mouth nightly, Disp: 30 tablet, Rfl: 0    Cholecalciferol (VITAMIN D3) 125 MCG (5000 UT) TABS, Take 5,000 Units by mouth daily, Disp: , Rfl:     Continuous Blood Gluc  (DEXCOM G6 ) YULI, Use as directed and normal weight. He is ill-appearing. He is not toxic-appearing. HENT:      Head: Normocephalic and atraumatic. Mouth/Throat:      Mouth: Mucous membranes are moist.      Pharynx: Oropharynx is clear. Cardiovascular:      Rate and Rhythm: Regular rhythm. Tachycardia present. Pulses: Normal pulses. Heart sounds: Normal heart sounds. Pulmonary:      Effort: Pulmonary effort is normal.      Breath sounds: Normal breath sounds. Abdominal:      General: Bowel sounds are normal.      Palpations: Abdomen is soft. Musculoskeletal:      Cervical back: Normal range of motion. Back:       Right lower leg: No tenderness. No edema. Left lower leg: No tenderness. No edema. Skin:     General: Skin is warm and dry. Capillary Refill: Capillary refill takes less than 2 seconds. Neurological:      General: No focal deficit present. Mental Status: He is alert and oriented to person, place, and time. Psychiatric:         Mood and Affect: Mood normal.         Behavior: Behavior normal.             MEDICAL DECISION MAKING   Initial Differential Diagnosis: (includes but is not limited to)  Sepsis  Pneumonia  Incision site infection  Abdominal source of infection    Plan:   ECG  Monitor  IV access  CBC, BMP, LFT, troponin, BNP, lactate  Chest x-ray  Urinalysis  Swabs for COVID-19 influenza    Summary:  Sepsis, patient received 30 mill per kilo fluid bolus per ideal body weight. Has elevated white count, tachycardic, chills at home, elevated lactate here. Only source of infection we have found so far as his recent spinal surgery incision site. CT abdomen pelvis is negative for intra-abdominal source, chest x-ray negative for pneumonia, urinalysis noninfective, swabs for COVID-19 and influenza negative.   His incision site of the back is swollen, erythematous, concerning for infection, there is a small fluid collection seen on CT scan however they are not ring-enhancing obtain an MRI to further characterize these. No    Discussed this patient with his surgeon  at [facility]. He agrees with transfer the patient for further evaluation of by himself and his team.  Recommends admission transfer to emergency department. [facility] is currently on diversion, house supervisor there thinks patient may be able to be transferred after shift change at 0700 this morning. In addition to piperacillin-tazobactam already ordered additional dose of vancomycin ordered, pharmacy to dose subsequent dose of vancomycin. Subsequent doses of piperacillin tazobactam ordered every 6 hours, repeat lactate ordered, started on IV maintenance fluids.     ED RESULTS   Laboratory results:  Labs Reviewed   BASIC METABOLIC PANEL W/ REFLEX TO MG FOR LOW K - Abnormal; Notable for the following components:       Result Value    Sodium 131 (*)     Chloride 97 (*)     CO2 19 (*)     Glucose 205 (*)     CREATININE 1.3 (*)     All other components within normal limits   CBC WITH AUTO DIFFERENTIAL - Abnormal; Notable for the following components:    WBC 18.4 (*)     RBC 3.71 (*)     Hemoglobin 11.1 (*)     Hematocrit 33.6 (*)     MPV 8.5 (*)     Segs Absolute 14.9 (*)     Monocytes Absolute 1.6 (*)     Immature Grans (Abs) 0.20 (*)     All other components within normal limits   LACTATE, SEPSIS - Abnormal; Notable for the following components:    Lactic Acid, Sepsis 3.2 (*)     All other components within normal limits   LACTATE, SEPSIS - Abnormal; Notable for the following components:    Lactic Acid, Sepsis 3.3 (*)     All other components within normal limits   GLOMERULAR FILTRATION RATE, ESTIMATED - Abnormal; Notable for the following components:    Est, Glom Filt Rate 55 (*)     All other components within normal limits   OSMOLALITY - Abnormal; Notable for the following components:    Osmolality Calc 270.5 (*)     All other components within normal limits   HEPATIC FUNCTION PANEL - Abnormal; Notable for the following components:    Alkaline Phosphatase 174 (*)     All other components within normal limits   URINE WITH REFLEXED MICRO - Abnormal; Notable for the following components:    Ketones, Urine TRACE (*)     Specific Gravity, Urine > 1.030 (*)     Protein, UA 30 (*)     All other components within normal limits   RAPID INFLUENZA A/B ANTIGENS   COVID-19, RAPID   CULTURE, BLOOD 1   CULTURE, BLOOD 2   TROPONIN   BRAIN NATRIURETIC PEPTIDE   ANION GAP   LIPASE   LACTATE, SEPSIS   LACTATE, SEPSIS       Radiologic studies results:  CT ABDOMEN PELVIS W IV CONTRAST Additional Contrast? None   Final Result   1. No evidence of a bowel obstruction or free air. No pericolonic    inflammation. No evidence of appendicitis. 2. Thick-walled urinary bladder which might be due to underdistention. This can be correlated with urinalysis. 3. A 7 mm nodule is suspected in the left midlung. A follow-up CT chest is    recommended, this is not urgent. 4. 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 No    evidence of an occlusion. Follow-up is advised. 5. Additional findings are detailed above. This document has been electronically signed by: Shannan Dhillon M.D. on    05/21/2022 12:03 AM      All CTs at this facility use dose modulation techniques and iterative    reconstructions, and/or weight-based dosing   when appropriate to reduce radiation to a low as reasonably achievable. XR CHEST PORTABLE   Final Result   1. No acute disease.       This document has been electronically signed by: Shannan Dhillon M.D. on    05/20/2022 10:21 PM      MRI [address]    (Results Pending)       ED Medications administered this visit:   Medications   vancomycin (VANCOCIN) 1,500 mg in dextrose 5 % 500 mL IVPB (has no administration in time range)   piperacillin-tazobactam (ZOSYN) 3,375 mg in dextrose 5 % 50 mL IVPB (mini-bag) (has no administration in time range) Parley Aase, MD         PROCEDURES   Procedures      MEDICATION CHANGES     New Prescriptions    No medications on file         FINAL DISPOSITION     Final diagnoses:   Sepsis, due to unspecified organism, unspecified whether acute organ dysfunction present [facility])   Surgical site infection       Plan to transfer to [address] [place] [address] later today if beds available. This transcription was electronically signed. Parts of this transcriptions may have been dictated by use of voice recognition software and electronically transcribed, and parts may have been transcribed with the assistance of an ED scribe. The transcription may contain errors not detected in proofreading. Please refer to my supervising physician's documentation if my documentation differs.     Electronically Signed: Tu Carpenter MD, 05/21/22, 3:06 AM         Angely Carrasquillo MD  Resident  05/21/22 Joey Edmonds DO  05/21/22 2050

## 2022-05-21 NOTE — PROGRESS NOTES
[facility] Pharmacokinetic Monitoring Service - Vancomycin     Da Brothers is a 78 y.o. male starting on vancomycin therapy for a possible postop infection on his back. Pharmacy consulted by Dr Lyle Orellana for monitoring and adjustment. Target Concentration: Goal trough of 10-15 mg/L and AUC/TIM <500 mg*hr/L    Additional Antimicrobials: Zosyn    Pertinent Laboratory Values: Wt Readings from Last 1 Encounters:   05/20/22 207 lb (93.9 kg)     Temp Readings from Last 1 Encounters:   05/20/22 98.4 °F (36.9 °C) (Oral)     Estimated Creatinine Clearance: 63 mL/min (A) (based on SCr of 1.3 mg/dL (H)). Recent Labs     05/20/22  2210   CREATININE 1.3*   WBC 18.4*     Procalcitonin: none resulted yet    Pertinent Cultures:  Culture Date Source Results   05/20/22 BCx2    MRSA Nasal Swab: N/A. Non-respiratory infection.     Plan:  Dosing recommendations based on Bayesian software  Start vancomycin 1500 mg iv every 24 hours  Anticipated AUC of 446 and trough concentration of 13 at steady state  Renal labs as indicated   Pharmacy will continue to monitor patient and adjust therapy as indicated    Thank you for the consult,  SANGITA Osorio [facility]  5/21/2022 2:56 AM

## 2022-05-21 NOTE — ED NOTES
Pt laying in bed watching tv. Wife at bedside. Call light in reach.      Lauren Dennis RN  05/20/22 [id number]

## 2022-05-21 NOTE — ED NOTES
Pt provided with 2nd pillow and warm blanket for comfort. Wife at bedside. Call light in reach.      Al Milton RN  05/20/22 8629

## 2022-05-21 NOTE — ED NOTES
Upon first contact with patient this RN receives bedside shift report Pat Rosario RN. Patient resting in cot, laying on left side for comfort with eyes closed. Lights dimmed for relaxation. VSS. Call light in reach. Will continue to monitor.       Steve Frey RN  05/21/22 4418

## 2022-05-23 ENCOUNTER — TELEPHONE (OUTPATIENT)
Dept: FAMILY MEDICINE CLINIC | Age: 67
End: 2022-05-23

## 2022-05-26 LAB
BLOOD CULTURE, ROUTINE: NORMAL
BLOOD CULTURE, ROUTINE: NORMAL

## 2022-06-14 ENCOUNTER — TELEMEDICINE (OUTPATIENT)
Dept: FAMILY MEDICINE CLINIC | Age: 67
End: 2022-06-14
Payer: MEDICARE

## 2022-06-14 DIAGNOSIS — I10 ESSENTIAL HYPERTENSION: ICD-10-CM

## 2022-06-14 DIAGNOSIS — Z98.1 S/P LUMBAR FUSION: ICD-10-CM

## 2022-06-14 DIAGNOSIS — Z86.711 HISTORY OF PULMONARY EMBOLISM: ICD-10-CM

## 2022-06-14 DIAGNOSIS — I25.118 ATHEROSCLEROTIC HEART DISEASE OF NATIVE CORONARY ARTERY WITH OTHER FORMS OF ANGINA PECTORIS (HCC): ICD-10-CM

## 2022-06-14 DIAGNOSIS — M48.062 SPINAL STENOSIS OF LUMBAR REGION WITH NEUROGENIC CLAUDICATION: ICD-10-CM

## 2022-06-14 DIAGNOSIS — T81.49XA WOUND INFECTION AFTER SURGERY: Primary | ICD-10-CM

## 2022-06-14 PROCEDURE — 1123F ACP DISCUSS/DSCN MKR DOCD: CPT | Performed by: FAMILY MEDICINE

## 2022-06-14 PROCEDURE — 99214 OFFICE O/P EST MOD 30 MIN: CPT | Performed by: FAMILY MEDICINE

## 2022-06-14 PROCEDURE — 3051F HG A1C>EQUAL 7.0%<8.0%: CPT | Performed by: FAMILY MEDICINE

## 2022-06-14 NOTE — PROGRESS NOTES
Lena Beebe (:  1955) is a Established patient, here for evaluation of the following:         Subjective   HPI:    Chief Complaint   Patient presents with    Follow-Up from Hospital     S/P back fusion. Unfortunately wound became infected and he was hospitalized. Further MDM and disposition:   Assessment:   3 81-year-old male with recent back surgery and imaging concerning for fluid collection possible infection/abscess  Plan:   · Awaiting transfer to [place]  · Additional IV fluids and increased maintenance fluids due to borderline hypotension while sleeping, he has no altered mental status or confusion so I do not think we need pressors at this point. · While awaiting transfer he had no complications. Patient left for transfer without issue.     Final diagnoses:   Sepsis, due to unspecified organism, unspecified whether acute organ dysfunction present [facility])   Surgical site infection         He does have a PICC line. Has an appt with Dr. Asha Hinton tomorrow as well as ID. Sugars are actually doing really well. 158 this am.  He did have a few low sugars. Patient Active Problem List   Diagnosis    Chest pain at rest    Uncontrolled type 2 diabetes mellitus, without long-term current use of insulin (HCC)    GERD with stricture    Back pain at L4-L5 level    Gastroparesis    Status post dilatation of esophageal stricture    History of colonic polyps    Acute saddle pulmonary embolism with acute cor pulmonale ([facility])    Medication monitoring encounter    Mixed hyperlipidemia    SHAHRAM (obstructive sleep apnea)    Obesity (BMI 30-39. 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 Gerald Lee, C5-7 cadaver patch   [address]      chiari    CARDIAC SURGERY      heart cath    CHOLECYSTECTOMY      COLONOSCOPY  2017    COLONOSCOPY N/A 3/12/2020    COLONOSCOPY POLYPECTOMY SNARE/COLD BIOPSY performed by Lenore Willingham [facility]    EGD      Dr Alondra Vásquez, DIAGNOSTIC     [address]    right elbow    IVC FILTER REMOVAL  03/15/2020    [facility] [address]      SHOULDER SURGERY Left 2018    TONSILLECTOMY  1964    UPPER GASTROINTESTINAL ENDOSCOPY Left 3/12/2020    EGD ESOPHAGOGASTRODUODENOSCOPY DILATATION performed by Lenore Willingham MD at [facility]   [facility] ENDOSCOPY Left 3/12/2020    EGD BIOPSY performed by Lenore Willingham MD at [address] [place] [address]      right middle finger artery     Social History     Tobacco Use    Smoking status: Former Smoker     Packs/day: 1.50     Years: 3.00     Pack years: 4.50     Types: Cigarettes     Start date: 1976     Quit date: 10/12/1979     Years since quittin.7    Smokeless tobacco: Never Used   Vaping Use    Vaping Use: Never used   Substance Use Topics    Alcohol use: No    Drug use: No     Prior to Admission medications    Medication Sig Start Date End Date Taking?  Authorizing Provider   Dulaglutide 1.5 MG/0.5ML SOPN Inject 3 mg into the skin once a week BLX#[id number]  And [id number]  Exp 23 and 23  Given to pt on 22   Nani Andrea DO   rosuvastatin (CRESTOR) 40 MG tablet Take 1 tablet by mouth daily 22   Josie Conway MD   ondansetron (ZOFRAN ODT) 4 MG disintegrating tablet Take 1 tablet by mouth every 8 hours as needed for Nausea or Vomiting 22   Lenore Willingham MD   Dulaglutide (TRULICITY) 3 IR/0.1KL SOPN Inject 3 mg into the skin once a week *Please cancel 4.5mg Rx* 4/14/22   Lenora Weller MD   insulin lispro, 1 Unit Dial, (HUMALOG KWIKPEN) 100 UNIT/ML SOPN Inject 6 Units into the skin 3 times daily (before meals)  Patient taking differently: Inject 8 Units into the skin 3 times daily (before meals)  3/16/22 3/11/23  Lenora Weller MD   insulin glargine (LANTUS SOLOSTAR) 100 UNIT/ML injection pen Inject 18 Units into the skin 2 times daily  Patient taking differently: Inject 20 Units into the skin 2 times daily  3/16/22   Lenora Weller MD   isosorbide mononitrate (IMDUR) 60 MG extended release tablet Take 1 tablet by mouth daily 2/10/22   Leo Danielle MD   pantoprazole (PROTONIX) 40 MG tablet Take 1 tablet by mouth every morning (before breakfast) 2/7/22   RAFAEL Landa CNP   Continuous Blood Gluc Sensor (DEXCOM G6 SENSOR) MISC Change every 10 days-Use as directed for continuous glucose monitoring. 2/2/22   Lenora Weller MD   Continuous Blood Gluc Transmit (DEXCOM G6 TRANSMITTER) MISC Change every 3 months- Use as directed for continuous glucose monitoring.  2/2/22   Lenora Weller MD   metFORMIN (GLUCOPHAGE) 1000 MG tablet Take 1 tablet by mouth 2 times daily (with meals) 1/5/22   Sarah Mata DO   lisinopril (PRINIVIL;ZESTRIL) 5 MG tablet Take 1 tablet by mouth 2 times daily 12/12/21   Nadya Clark DO   clopidogrel (PLAVIX) 75 MG tablet TAKE 1 TABLET BY MOUTH ONE TIME A DAY 11/24/21   Leo Danielle MD   nitroGLYCERIN (NITROSTAT) 0.4 MG SL tablet Place 1 tablet under the tongue every 5 minutes as needed for Chest pain 11/4/21   Leo Danielle MD   rivaroxaban (XARELTO) 15 MG TABS tablet Take 1 tablet by mouth daily (with breakfast) 10/22/21   RAFAEL Mayorga CNP   metoprolol tartrate (LOPRESSOR) 25 MG tablet Take 0.5 tablets by mouth 2 times daily 10/22/21   RAFAEL Mayorga CNP   metoclopramide (REGLAN) 10 MG tablet Take 1 tablet by mouth 3 times daily (before meals) 10/22/21   RAFAEL Mayorga - CNP   CPAP Machine MISC by Does not apply route Please change Bipap to 10/7cm H20. 10/14/21   Sully Clark PA-C   ondansetron (ZOFRAN ODT) 4 MG disintegrating tablet Take 1 tablet by mouth every 8 hours as needed for Nausea or Vomiting 4/1/21   RAFAEL Yanez CNP   amitriptyline (ELAVIL) 25 MG tablet Take 1 tablet by mouth nightly 3/24/21   [place] Curet,    Cholecalciferol (VITAMIN D3) 125 MCG (5000 UT) TABS Take 5,000 Units by mouth daily    Historical Provider, MD   Continuous Blood Gluc  (Vitrinepix) YULI Use as directed for continuous glucose monitor. 10/8/20   RAFAEL Hanson CNP   cyclobenzaprine (FLEXERIL) 5 MG tablet Take 5 mg by mouth as needed 8/26/20   Historical Provider, MD   Misc. Devices (ROLLATOR ULTRA-LIGHT) MISC Use Rollator for stability with ambulation. 7/28/20   Brenda Killian MD       Review of Systems   Constitutional: Negative. HENT: Negative. Respiratory: Negative. Cardiovascular: Negative. Gastrointestinal: Negative. Musculoskeletal: Negative. All other systems reviewed and are negative. Objective   Patient-Reported Vitals  No data recorded     Physical Exam  Constitutional:       General: He is not in acute distress. Appearance: Normal appearance. He is well-developed. He is not ill-appearing. HENT:      Head: Normocephalic and atraumatic. Right Ear: External ear normal.      Left Ear: External ear normal.   Eyes:      Conjunctiva/sclera: Conjunctivae normal.   Pulmonary:      Effort: Pulmonary effort is normal. No respiratory distress. Skin:     Findings: No rash (on exposed surfaces). Neurological:      Mental Status: He is alert and oriented to person, place, and time. Psychiatric:         Mood and Affect: Mood normal.         Behavior: Behavior normal.         Thought Content:  Thought content normal.         Judgment: Judgment normal.     Assessment & Plan   Below is the assessment and plan developed based on review of pertinent history, physical exam, labs, studies, and medications. 1. Wound infection after surgery  2. Spinal stenosis of lumbar region with neurogenic claudication  3. S/P lumbar fusion  4. History of pulmonary embolism  5. Essential hypertension  6. Uncontrolled type 2 diabetes mellitus, without long-term current use of insulin ([facility]  7. Atherosclerotic heart disease of native coronary artery with other forms of angina pectoris [facility])    [facility] correspondence reviewed  -  Chronic medical problems stable  -  Continue current medications  -  Follow up with specialists as scheduled    Return for as needed. Miriam Guadalupetatiana, was evaluated through a synchronous (real-time) audio-video encounter. The patient (or guardian if applicable) is aware that this is a billable service, which includes applicable co-pays. This Virtual Visit was conducted with patient's (and/or legal guardian's) consent. The visit was conducted pursuant to the emergency declaration under the [facility], [address] [place] [address] authority and the [employer] and [employer] General Act. Patient identification was verified, and a caregiver was present when appropriate. The patient was located at Home: [address] [facility] [address]. Provider was located at [facility] (Appt Dept): [address]  [facility] [place] [address],  [address].         --Ligia Dial DO

## 2022-06-16 ASSESSMENT — ENCOUNTER SYMPTOMS
RESPIRATORY NEGATIVE: 1
GASTROINTESTINAL NEGATIVE: 1

## 2022-06-27 ENCOUNTER — HOSPITAL ENCOUNTER (OUTPATIENT)
Age: 67
Discharge: HOME | End: 2022-06-27
Payer: MEDICARE

## 2022-06-27 ENCOUNTER — HOSPITAL ENCOUNTER (OUTPATIENT)
Dept: GENERAL RADIOLOGY | Age: 67
Discharge: HOME | End: 2022-06-27
Payer: MEDICARE

## 2022-06-27 DIAGNOSIS — R06.02 SOB (SHORTNESS OF BREATH): ICD-10-CM

## 2022-06-27 PROCEDURE — 71046 X-RAY EXAM CHEST 2 VIEWS: CPT

## 2022-06-29 ENCOUNTER — OFFICE VISIT (OUTPATIENT)
Dept: INTERNAL MEDICINE CLINIC | Age: 67
End: 2022-06-29

## 2022-06-29 VITALS
BODY MASS INDEX: 29.53 KG/M2 | HEART RATE: 84 BPM | SYSTOLIC BLOOD PRESSURE: 128 MMHG | TEMPERATURE: 98.6 F | DIASTOLIC BLOOD PRESSURE: 68 MMHG | HEIGHT: 69 IN | WEIGHT: 199.4 LBS

## 2022-06-29 DIAGNOSIS — E11.69 TYPE 2 DIABETES MELLITUS WITH OTHER SPECIFIED COMPLICATION, WITH LONG-TERM CURRENT USE OF INSULIN (HCC): ICD-10-CM

## 2022-06-29 DIAGNOSIS — Z79.4 TYPE 2 DIABETES MELLITUS WITH OTHER SPECIFIED COMPLICATION, WITH LONG-TERM CURRENT USE OF INSULIN (HCC): ICD-10-CM

## 2022-06-29 RX ORDER — IBUPROFEN 200 MG
CAPSULE ORAL
COMMUNITY

## 2022-06-29 RX ORDER — CEFAZOLIN SODIUM 1 G/3ML
INJECTION, POWDER, FOR SOLUTION INTRAMUSCULAR; INTRAVENOUS
COMMUNITY
Start: 2022-06-23

## 2022-06-29 ASSESSMENT — PATIENT HEALTH QUESTIONNAIRE - PHQ9
SUM OF ALL RESPONSES TO PHQ9 QUESTIONS 1 & 2: 0
SUM OF ALL RESPONSES TO PHQ QUESTIONS 1-9: 0
2. FEELING DOWN, DEPRESSED OR HOPELESS: 0
1. LITTLE INTEREST OR PLEASURE IN DOING THINGS: 0

## 2022-06-29 NOTE — PROGRESS NOTES
79 yo M with history of IDDMII. Presented for routine follow up. Patient unfortunately left prior to being seen by myself and Dr. Navjot Conklin due to back pain. Plan was to increase Lantus from 20 to 22u and increase Trulicity to 4.5 mg weekly. Office staff reached out to patient within 5 mins if he could return to be seen but he insisted that he would like to go home and could not tolerate sitting down with his back pain. Will hold off on making those changes at this time. Will resechedule to be seen as soon as possible. Dr. Navjot Conklin aware.      Jovon Singh MD  PGY2, Internal Medicine

## 2022-06-29 NOTE — PROGRESS NOTES
Aletha Johnson (:  1955) is a 78 y.o. male,{New vs Established:[id number]::\"Established patient\"}, here for evaluation of the following chief complaint(s):  Diabetes (5 weeks r/s from  just had back surgery - A1c 7.8 on ) and Coronary Artery Disease         ASSESSMENT/PLAN:  1. Type 2 diabetes mellitus with other specified complication, with long-term current use of insulin (HCC)  The following orders have not been finalized:  -     metFORMIN (GLUCOPHAGE) 1000 MG tablet      No follow-ups on file. Subjective   SUBJECTIVE/OBJECTIVE:    Review of Systems       Objective   Physical Exam       {Time Documentation Optional:[id number]}      An electronic signature was used to authenticate this note.     --Glenn Rajan MD

## 2022-06-30 DIAGNOSIS — Z79.4 TYPE 2 DIABETES MELLITUS WITH OTHER SPECIFIED COMPLICATION, WITH LONG-TERM CURRENT USE OF INSULIN (HCC): Primary | ICD-10-CM

## 2022-06-30 DIAGNOSIS — E11.69 TYPE 2 DIABETES MELLITUS WITH OTHER SPECIFIED COMPLICATION, WITH LONG-TERM CURRENT USE OF INSULIN (HCC): Primary | ICD-10-CM

## 2022-06-30 RX ORDER — BLOOD SUGAR DIAGNOSTIC
STRIP MISCELLANEOUS
Qty: 100 EACH | Refills: 1 | Status: CANCELLED | OUTPATIENT
Start: 2022-06-30

## 2022-06-30 RX ORDER — DULAGLUTIDE 3 MG/.5ML
3 INJECTION, SOLUTION SUBCUTANEOUS WEEKLY
Qty: 4 PEN | Refills: 1 | Status: CANCELLED | OUTPATIENT
Start: 2022-06-30

## 2022-08-04 ENCOUNTER — TELEMEDICINE (OUTPATIENT)
Dept: FAMILY MEDICINE CLINIC | Age: 67
End: 2022-08-04
Payer: MEDICARE

## 2022-08-04 ENCOUNTER — TELEPHONE (OUTPATIENT)
Dept: FAMILY MEDICINE CLINIC | Age: 67
End: 2022-08-04

## 2022-08-04 DIAGNOSIS — M67.911 TENDINOPATHY OF RIGHT ROTATOR CUFF: ICD-10-CM

## 2022-08-04 DIAGNOSIS — G89.29 CHRONIC PAIN IN RIGHT SHOULDER: Primary | ICD-10-CM

## 2022-08-04 DIAGNOSIS — M25.511 CHRONIC PAIN IN RIGHT SHOULDER: Primary | ICD-10-CM

## 2022-08-04 PROCEDURE — 99442 PR PHYS/QHP TELEPHONE EVALUATION 11-20 MIN: CPT | Performed by: FAMILY MEDICINE

## 2022-08-04 ASSESSMENT — ENCOUNTER SYMPTOMS
GASTROINTESTINAL NEGATIVE: 1
RESPIRATORY NEGATIVE: 1

## 2022-08-04 NOTE — TELEPHONE ENCOUNTER
----- Message from Katt Canas DO sent at 8/4/2022  3:16 PM EDT -----  VV completed with Theresa granados for chronic right shoulder pain. Requesting referral to Dr. Briseyda Britton at [facility]. Referral on printer.

## 2022-08-04 NOTE — PROGRESS NOTES
Raymond Bowen (:  1955) is a Established patient, here for evaluation of the following:  Raymond Bowen is a 78 y.o. male evaluated via telephone on 2022 for Shoulder Pain  . Documentation:  I communicated with the patient and/or health care decision maker about shoulder pain. Details of this discussion including any medical advice provided: see A/P    Total Time: minutes: 11-20 minutes    Raymond Bowen was evaluated through a synchronous (real-time) audio encounter. Patient identification was verified at the start of the visit. He (or guardian if applicable) is aware that this is a billable service, which includes applicable co-pays. This visit was conducted with the patient's (and/or legal guardian's) verbal consent. He has not had a related appointment within my department in the past 7 days or scheduled within the next 24 hours. The patient was located at Home: [address] [address]. The provider was located at [facility] (Appt Dept): [address] [facility],  [address] [facility]. Note: not billable if this call serves to triage the patient into an appointment for the relevant concern    DO Laureano Ennis   HPI:    Chief Complaint   Patient presents with    Shoulder Pain     Pt presents today to discuss chronic right shoulder pain. Requesting referral to Ortho at [facility]. Patient Active Problem List   Diagnosis    Chest pain at rest    Uncontrolled type 2 diabetes mellitus, without long-term current use of insulin (HCC)    GERD with stricture    Back pain at L4-L5 level    Gastroparesis    Status post dilatation of esophageal stricture    History of colonic polyps    Acute saddle pulmonary embolism with acute cor pulmonale (HCC)    Medication monitoring encounter    Mixed hyperlipidemia    SHAHRAM (obstructive sleep apnea)    Obesity (BMI 30-39. 9)    Personal history of tobacco use    Hypersomnia    Chiari malformation type I (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 Olivia Boswell, C5-7 cadaver patch    BRAIN SURGERY      [address]  2007    heart cath    CHOLECYSTECTOMY  2009    COLONOSCOPY  2017    COLONOSCOPY N/A 3/12/2020    COLONOSCOPY POLYPECTOMY SNARE/COLD BIOPSY performed by Jg Hoover MD at [email or web address] [facility]    EGD      Dr Israel Gutierrez, DIAGNOSTIC      [address]    right elbow    IVC FILTER REMOVAL  03/15/2020    [address]  2006    SHOULDER SURGERY Left 2018    [facility] 3/12/2020    EGD ESOPHAGOGASTRODUODENOSCOPY DILATATION performed by Jg Hoover MD at [address] Left 3/12/2020    EGD BIOPSY performed by Jg Hoover MD at [email or web address] [facility]    VASCULAR SURGERY  2015    right middle finger artery     Social History     Tobacco Use    Smoking status: Former     Packs/day: 1.50     Years: 3.00     Pack years: 4.50     Types: Cigarettes     Start date: 1976     Quit date: 10/12/1979     Years since quittin.8    Smokeless tobacco: Never   Vaping Use    Vaping Use: Never used   Substance Use Topics    Alcohol use: No    Drug use: No       Review of Systems   Constitutional: Negative. HENT: Negative. Respiratory: Negative. Cardiovascular: Negative. Gastrointestinal: Negative. Musculoskeletal:  Positive for arthralgias (right shoulder pain). All other systems reviewed and are negative.        Objective   Patient-Reported Vitals  No data recorded     Physical Exam  Constitutional:       General: He is not in acute distress. Pulmonary:      Effort: Pulmonary effort is normal. No respiratory distress. Neurological:      Mental Status: Mental status is at baseline. Psychiatric:         Mood and Affect: Mood normal.         Thought Content: Thought content normal.         Judgment: Judgment normal.     Assessment & Plan   Below is the assessment and plan developed based on review of pertinent history, physical exam, labs, studies, and medications. 1. Chronic pain in right shoulder  -     External Referral To Orthopedic Surgery  2. Tendinopathy of right rotator cuff  -     External Referral To Orthopedic Surgery    -  Referral to [facility] at his request    Return if symptoms worsen or fail to improve. On this date 8/4/2022 I have spent 11-20 minutes reviewing previous notes, test results and face to face (virtual) with the patient discussing the diagnosis and importance of compliance with the treatment plan as well as documenting on the day of the visit.            --Trudee Cabot, DO

## 2022-08-05 ENCOUNTER — HOSPITAL ENCOUNTER (OUTPATIENT)
Dept: NUCLEAR MEDICINE | Age: 67
Discharge: HOME | End: 2022-08-05
Payer: MEDICARE

## 2022-08-05 ENCOUNTER — HOSPITAL ENCOUNTER (OUTPATIENT)
Dept: GENERAL RADIOLOGY | Age: 67
Discharge: HOME | End: 2022-08-05
Payer: MEDICARE

## 2022-08-05 DIAGNOSIS — R06.02 SOB (SHORTNESS OF BREATH): ICD-10-CM

## 2022-08-05 DIAGNOSIS — Z86.711 HISTORY OF PULMONARY EMBOLISM: ICD-10-CM

## 2022-08-05 PROCEDURE — A9558 XE133 XENON 10MCI: HCPCS | Performed by: INTERNAL MEDICINE

## 2022-08-05 PROCEDURE — 71046 X-RAY EXAM CHEST 2 VIEWS: CPT

## 2022-08-05 PROCEDURE — 78582 LUNG VENTILAT&PERFUS IMAGING: CPT

## 2022-08-05 PROCEDURE — A9540 TC99M MAA: HCPCS | Performed by: INTERNAL MEDICINE

## 2022-08-05 PROCEDURE — 3430000000 HC RX DIAGNOSTIC RADIOPHARMACEUTICAL: Performed by: INTERNAL MEDICINE

## 2022-08-05 RX ORDER — XENON XE-133 10 MCI/1
3.1 GAS RESPIRATORY (INHALATION)
Status: COMPLETED | OUTPATIENT
Start: 2022-08-05 | End: 2022-08-05

## 2022-08-05 RX ADMIN — Medication 3.1 MILLICURIE: at 09:45

## 2022-08-05 RX ADMIN — XENON XE-133 7.8 MILLICURIE: 10 GAS RESPIRATORY (INHALATION) at 09:40

## 2022-08-05 NOTE — TELEPHONE ENCOUNTER
ORTHO referral to [facility] Dr. Xochitl Jang faxed to his office at [phone] for dx of chronic pain in right shoulder and tendinopathy of right rotator cuff. Patient and partner Maron Klinefelter aware that Leela Anna will call to schedule.

## 2022-08-08 ENCOUNTER — OFFICE VISIT (OUTPATIENT)
Dept: INTERNAL MEDICINE CLINIC | Age: 67
End: 2022-08-08
Payer: MEDICARE

## 2022-08-08 VITALS
HEART RATE: 72 BPM | SYSTOLIC BLOOD PRESSURE: 106 MMHG | DIASTOLIC BLOOD PRESSURE: 74 MMHG | BODY MASS INDEX: 31.06 KG/M2 | TEMPERATURE: 97.8 F | WEIGHT: 210.3 LBS

## 2022-08-08 DIAGNOSIS — E11.69 TYPE 2 DIABETES MELLITUS WITH OTHER SPECIFIED COMPLICATION, WITH LONG-TERM CURRENT USE OF INSULIN (HCC): Primary | ICD-10-CM

## 2022-08-08 DIAGNOSIS — Z79.4 TYPE 2 DIABETES MELLITUS WITH OTHER SPECIFIED COMPLICATION, WITH LONG-TERM CURRENT USE OF INSULIN (HCC): Primary | ICD-10-CM

## 2022-08-08 LAB — HBA1C MFR BLD: 8.7 %

## 2022-08-08 PROCEDURE — 99214 OFFICE O/P EST MOD 30 MIN: CPT | Performed by: STUDENT IN AN ORGANIZED HEALTH CARE EDUCATION/TRAINING PROGRAM

## 2022-08-08 PROCEDURE — 83036 HEMOGLOBIN GLYCOSYLATED A1C: CPT | Performed by: STUDENT IN AN ORGANIZED HEALTH CARE EDUCATION/TRAINING PROGRAM

## 2022-08-08 PROCEDURE — 1123F ACP DISCUSS/DSCN MKR DOCD: CPT | Performed by: STUDENT IN AN ORGANIZED HEALTH CARE EDUCATION/TRAINING PROGRAM

## 2022-08-08 PROCEDURE — 3052F HG A1C>EQUAL 8.0%<EQUAL 9.0%: CPT | Performed by: STUDENT IN AN ORGANIZED HEALTH CARE EDUCATION/TRAINING PROGRAM

## 2022-08-08 RX ORDER — INSULIN LISPRO 100 [IU]/ML
10 INJECTION, SOLUTION INTRAVENOUS; SUBCUTANEOUS
Qty: 15 PEN | Refills: 1 | Status: SHIPPED | OUTPATIENT
Start: 2022-08-08 | End: 2022-08-16 | Stop reason: SDUPTHER

## 2022-08-08 RX ORDER — DULAGLUTIDE 3 MG/.5ML
3 INJECTION, SOLUTION SUBCUTANEOUS WEEKLY
Qty: 4 PEN | Refills: 3 | Status: CANCELLED | OUTPATIENT
Start: 2022-08-08

## 2022-08-08 SDOH — ECONOMIC STABILITY: FOOD INSECURITY: WITHIN THE PAST 12 MONTHS, YOU WORRIED THAT YOUR FOOD WOULD RUN OUT BEFORE YOU GOT MONEY TO BUY MORE.: NEVER TRUE

## 2022-08-08 SDOH — ECONOMIC STABILITY: FOOD INSECURITY: WITHIN THE PAST 12 MONTHS, THE FOOD YOU BOUGHT JUST DIDN'T LAST AND YOU DIDN'T HAVE MONEY TO GET MORE.: NEVER TRUE

## 2022-08-08 ASSESSMENT — ENCOUNTER SYMPTOMS
BACK PAIN: 1
COUGH: 0
SORE THROAT: 0
SHORTNESS OF BREATH: 0

## 2022-08-08 ASSESSMENT — SOCIAL DETERMINANTS OF HEALTH (SDOH): HOW HARD IS IT FOR YOU TO PAY FOR THE VERY BASICS LIKE FOOD, HOUSING, MEDICAL CARE, AND HEATING?: NOT HARD AT ALL

## 2022-08-08 NOTE — PROGRESS NOTES
Promise Isbell (:  1955) is a 78 y.o. male,Established patient, here for evaluation of the following chief complaint(s):  Diabetes         ASSESSMENT/PLAN:  1. Type 2 diabetes mellitus with other specified complication, with long-term current use of insulin (HCC)  -     POCT glycosylated hemoglobin (Hb A1C)  -     insulin lispro, 1 Unit Dial, (HUMALOG KWIKPEN) 100 UNIT/ML SOPN; Inject 10 Units into the skin in the morning and 10 Units at noon and 10 Units in the evening. Inject before meals. , Disp-15 pen, R-1Normal  -     Dulaglutide 4.5 MG/0.5ML SOPN; Inject 4.5 mg into the skin once a week, Disp-4 pen, [id number]    Increase Trulicity dose to 4.5 mg subcut weekly. Will increase Humalog from 8u to 10u TIDWM for better BP control. Continue Lantus 20u BID to avoid further hypoglycemic episodes. Educated on diabetic management and how to titrate his humalog as needed. Encouraged to make lifestyle changes with diet and exercise as back pain improves. Due for foot exam next office visit. Patient seen and plan discussed with Dr. Valarie Alcala. Return in about 2 months (around 10/8/2022), or if symptoms worsen or fail to improve. Subjective   SUBJECTIVE/OBJECTIVE:  79 yo M here for routine follow up of DM management. Last office visit was supposed to be on 2022 but unfortunately, patient had to leave prior to be seen by myself/Dr. Valarie Alcala due to his intolerable back pain s/p surgery. Prior to that, last seen on 2022. IDDMII: C-peptide 4.1 on 2022. On insulin, has a CGM. On Metformin 1000 mg BID, Lantus 20u BID,and Humalog 8U with meals. Reports no acute issues since his last office visit. Noted 3 episodes of hypoglycemia (lowest 44), usually in the middle of the night, during which he feels restless, otherwise no symptoms. Last HgA1c 8.7 this office visit (2022) from 7.8 (2022), 7.7 on 2022.   Patient checks FSBS 4 times a day, see media section for specific numbers (if patient brought to visit today). Avg  (200 previously), 55% within goal range (better than prior from 37%). Most recent eye exam in March 2022 with Dr. Janet Ca, denies DM retinopathy reportedly. Normal renal function, eGFR 75, Creatinine 1.0 on 07/05/2022, Stable. Elevated microalbumin/Cr 385 (03/25/2022) improved from 831 on 7/9/2021. Patient denies foot lesions. Last foot exam 7/2021. Denies acute changes in neuropathy. Last lipid panel done 10/21/2021. LDL 81. Gastroparesis: Takes Reglan and Zofran. Encouraged to eat more protein and less food items with high fiber and fat. Has been having more diarrhea since being on antibiotics after complications with back surgery. Patient is on an Ace Inhibitor and statin. ASA not used to avoid triple anti-platelet/anticoagulant therapy. Review of Systems   Constitutional:  Negative for chills, diaphoresis and fever. HENT:  Negative for sore throat. Respiratory:  Negative for cough and shortness of breath. Cardiovascular:  Negative for chest pain and leg swelling. Musculoskeletal:  Positive for back pain. Much improved    Hematological:  Bruises/bleeds easily. All other systems reviewed and are negative. Objective   /74 (Site: Left Upper Arm)   Pulse 72   Temp 97.8 °F (36.6 °C)   Wt 210 lb 4.8 oz (95.4 kg)   BMI 31.06 kg/m²     Physical Exam  Vitals reviewed. Constitutional:       Appearance: Normal appearance. HENT:      Head: Normocephalic and atraumatic. Nose: Nose normal.   Eyes:      Extraocular Movements: Extraocular movements intact. Conjunctiva/sclera: Conjunctivae normal.   Cardiovascular:      Rate and Rhythm: Normal rate and regular rhythm. Pulses: Normal pulses. Heart sounds: Normal heart sounds. Pulmonary:      Effort: Pulmonary effort is normal. No respiratory distress. Breath sounds: Normal breath sounds. Abdominal:      General: There is no distension. Palpations: Abdomen is soft. Tenderness: There is no abdominal tenderness. Musculoskeletal:         General: Normal range of motion. Cervical back: Normal range of motion and neck supple. Skin:     General: Skin is warm and dry. Neurological:      General: No focal deficit present. Mental Status: He is alert and oriented to person, place, and time. Mental status is at baseline. Psychiatric:         Mood and Affect: Mood normal.         Behavior: Behavior normal.            An electronic signature was used to authenticate this note. --Azucena Kuhn MD   . [facility]  [address]  Dept: [phone]  Dept Fax: [phone] : [phone]

## 2022-08-12 DIAGNOSIS — E11.69 TYPE 2 DIABETES MELLITUS WITH OTHER SPECIFIED COMPLICATION, WITH LONG-TERM CURRENT USE OF INSULIN (HCC): ICD-10-CM

## 2022-08-12 DIAGNOSIS — Z79.4 TYPE 2 DIABETES MELLITUS WITH HYPERGLYCEMIA, WITH LONG-TERM CURRENT USE OF INSULIN (HCC): ICD-10-CM

## 2022-08-12 DIAGNOSIS — Z79.4 TYPE 2 DIABETES MELLITUS WITH OTHER SPECIFIED COMPLICATION, WITH LONG-TERM CURRENT USE OF INSULIN (HCC): ICD-10-CM

## 2022-08-12 DIAGNOSIS — E11.65 TYPE 2 DIABETES MELLITUS WITH HYPERGLYCEMIA, WITH LONG-TERM CURRENT USE OF INSULIN (HCC): ICD-10-CM

## 2022-08-15 NOTE — PROGRESS NOTES
[address] [facility] [address]  Dept: [phone]  Dept Fax: [phone]  Loc: [phone]    Visit Date: 8/16/2022    Mr. Blake Jameson is a 78 y.o. male  who presented for:    CAD, PCI    HPI:   HPI   Lizbeth Garcia is a pleasant 78year old male patient who  has a past medical history of Anxiety, CAD (coronary artery disease), Depression, Diabetes mellitus ([address] Dilation of esophagus, Frequent headaches, Gastroparesis, GERD (gastroesophageal reflux disease), History of Chiari malformation, Hyperlipidemia, Hypertension, Kidney stone, Kidney stone, Migraine, Movement disorder, Pulmonary emboli ([facility] [address] Shingles, Sleep apnea, and Tattoos. Patient was previously evaluated for chest pains, abnormal stress test. Echo 10/2021 revealed an EF of 60-65%. [facility] 10/2021 revealed severe RCA stenosis (underwent PCI/GI). There was evidence for mid LAD 50-60% stenosis, FFR 0.83 with severe stenosis of a relatively small D1 (medically managed). He is on Xarelto for h/o PE. During previous visit, patient reported \"heart burns\". Imdur was added, patient was referred to GI. He was seen by GI, he had endoscopy/esophageal dilation, feels better. He denies chest pains. Patient underwent spinal surgery on 4/2022. Patient denies shortness of breath, dyspnea on exertion. Current Outpatient Medications:     insulin lispro, 1 Unit Dial, (HUMALOG KWIKPEN) 100 UNIT/ML SOPN, Inject 10 Units into the skin in the morning and 10 Units at noon and 10 Units in the evening. Inject before meals. , Disp: 15 pen, Rfl: 1    Dulaglutide 4.5 MG/0.5ML SOPN, Inject 4.5 mg into the skin once a week, Disp: 4 pen, Rfl: 5    blood glucose test strips (ASCENSIA AUTODISC VI;ONE TOUCH ULTRA TEST VI) strip, 1 each by In Vitro route 3 times daily Dx E11.69, Disp: 100 each, Rfl: 3    calcium carbonate (OYSTER SHELL CALCIUM 500 MG) 1250 (500 Ca) MG tablet, calcium carbonate 500 disintegrating tablet, Take 1 tablet by mouth every 8 hours as needed for Nausea or Vomiting, Disp: 30 tablet, Rfl: 3    amitriptyline (ELAVIL) 25 MG tablet, Take 1 tablet by mouth nightly, Disp: 30 tablet, Rfl: 0    Cholecalciferol (VITAMIN D3) 125 MCG (5000 UT) TABS, Take 5,000 Units by mouth daily, Disp: , Rfl:     Continuous Blood Gluc  ([address]) YULI, Use as directed for continuous glucose monitor. , Disp: 1 Device, Rfl: 0    cyclobenzaprine (FLEXERIL) 5 MG tablet, Take 5 mg by mouth as needed, Disp: , Rfl:     Misc. Devices (ROLLATOR ULTRA-LIGHT) MISC, Use Rollator for stability with ambulation. , Disp: 1 each, Rfl: 0    Past Medical History  Nate Antonio  has a past medical history of Anxiety, CAD (coronary artery disease), Depression, Diabetes mellitus ([address] Dilation of esophagus, Frequent headaches, Gastroparesis, GERD (gastroesophageal reflux disease), History of Chiari malformation, Hyperlipidemia, Hypertension, Kidney stone, Kidney stone, Migraine, Movement disorder, Pulmonary emboli ([address] Shingles, Sleep apnea, and Tattoos. Social History  Nate Antonio  reports that he quit smoking about 42 years ago. His smoking use included cigarettes. He started smoking about 45 years ago. He has a 4.50 pack-year smoking history. He has never used smokeless tobacco. He reports that he does not drink alcohol and does not use drugs. Family History  Nate Antonio family history includes Diabetes in his brother, father, mother, and sister; Heart Disease in his mother; High Blood Pressure in his brother, father, mother, and sister; Kidney Disease in his mother; Coralyn Halie in his father.     Past Surgical History   Past Surgical History:   Procedure Laterality Date    BACK SURGERY  2008    C1 oates & decompression    BACK SURGERY  08/25/2020    [facility] Dr. Chapa Prior, C5-7 cadaver patch    BRAIN SURGERY  2008    chiari    CARDIAC SURGERY  2007    heart cath    CHOLECYSTECTOMY  2009    COLONOSCOPY  2017 COLONOSCOPY N/A 3/12/2020    COLONOSCOPY POLYPECTOMY SNARE/COLD BIOPSY performed by Ginette rBown MD at [address] [facility]    EGD  2022    Dr Miguelangel Velazquez, DIAGNOSTIC  2007    [address]    right elbow    IVC FILTER REMOVAL  03/15/2020    [address]  2006    SHOULDER SURGERY Left 08/16/2018    TONSILLECTOMY  1964    UPPER GASTROINTESTINAL ENDOSCOPY Left 3/12/2020    EGD ESOPHAGOGASTRODUODENOSCOPY DILATATION performed by Ginette Brown MD at [facility] [address] Left 3/12/2020    EGD BIOPSY performed by Ginette Brown MD at [address] [facility]    VASCULAR SURGERY  2015    right middle finger artery       Review of Systems   Constitutional: Negative for chills and fever  HENT: Negative for congestion, sinus pressure, sneezing and sore throat. Eyes: Negative for pain, discharge, redness and itching. Respiratory: Negative for apnea, cough  Gastrointestinal: Negative for blood in stool, constipation, diarrhea   Endocrine: Negative for cold intolerance, heat intolerance, polydipsia. Genitourinary: Negative for dysuria, enuresis, flank pain and hematuria. Musculoskeletal: Negative for arthralgias, joint swelling and neck pain. Neurological: Negative for numbness and headaches. Psychiatric/Behavioral: Negative for agitation, confusion, decreased concentration and dysphoric mood. Objective: There were no vitals taken for this visit. Wt Readings from Last 3 Encounters:   08/08/22 210 lb 4.8 oz (95.4 kg)   06/29/22 199 lb 6.4 oz (90.4 kg)   05/20/22 207 lb (93.9 kg)     BP Readings from Last 3 Encounters:   08/08/22 106/74   06/29/22 128/68   05/21/22 114/72       Nursing note and vitals reviewed. Physical Exam   Constitutional: Oriented to person, place, and time. Appears well-developed and well-nourished. ENT: Moist mucous membranes. No bleeding. Tongue is midline. Head: Normocephalic and atraumatic.    Eyes: EOM are normal. Pupils are equal, round, and reactive to light. Neck: Normal range of motion. Neck supple. No JVD present. Cardiovascular: Normal rate, regular rhythm, no murmur, no rubs, and intact distal pulses. Pulmonary/Chest: Effort normal and breath sounds normal. No respiratory distress. No wheezes. No rales. Abdominal: Soft. Bowel sounds are normal. No distension. There is no tenderness. Musculoskeletal: Normal range of motion. no edema. Neurological: Alert and oriented to person, place, and time. No cranial nerve deficit. Coordination normal.   Skin: Skin is warm and dry. Psychiatric: Normal mood and affect.        No results found for: CKTOTAL, CKMB, CKMBINDEX    Lab Results   Component Value Date/Time    WBC 4.9 07/05/2022 12:03 PM    RBC 3.80 07/05/2022 12:03 PM    RBC 3.42 05/22/2022 06:04 AM    HGB 10.8 07/05/2022 12:03 PM    HCT 33.3 07/05/2022 12:03 PM    MCV 87.6 07/05/2022 12:03 PM    MCH 28.4 07/05/2022 12:03 PM    MCHC 32.4 07/05/2022 12:03 PM    RDW 13.5 05/22/2022 06:04 AM     07/05/2022 12:03 PM    MPV 8.9 07/05/2022 12:03 PM       Lab Results   Component Value Date/Time     07/05/2022 12:03 PM    K 4.4 07/05/2022 12:03 PM    K 4.6 05/20/2022 10:10 PM    CL 97 07/05/2022 12:03 PM    CO2 24 07/05/2022 12:03 PM    BUN 22 07/05/2022 12:03 PM    LABALBU 3.6 05/20/2022 10:10 PM    CREATININE 1.0 07/05/2022 12:03 PM    CALCIUM 9.3 07/05/2022 12:03 PM    LABGLOM 75 07/05/2022 12:03 PM    GLUCOSE 299 07/05/2022 12:03 PM    GLUCOSE 110 05/22/2022 06:04 AM       Lab Results   Component Value Date/Time    ALKPHOS 174 05/20/2022 10:10 PM    ALT 15 05/20/2022 10:10 PM    AST 14 05/20/2022 10:10 PM    PROT 7.0 05/20/2022 10:10 PM    BILITOT 0.7 05/20/2022 10:10 PM    BILIDIR <0.2 05/20/2022 10:10 PM    LABALBU 3.6 05/20/2022 10:10 PM       Lab Results   Component Value Date/Time    MG 1.5 05/22/2022 06:04 AM       Lab Results   Component Value Date    INR 1.36 (H) 05/21/2022    INR 0.93 10/21/2021    INR 1.34 (H) 03/11/2021    PROTIME 16.7 (H) 05/21/2022    PROTIME 12.2 (L) 08/19/2020         Lab Results   Component Value Date/Time    LABA1C 8.7 08/08/2022 03:21 PM       Lab Results   Component Value Date/Time    TRIG 129 10/21/2021 10:52 AM    HDL 46 10/21/2021 10:52 AM    LDLCALC 81 10/21/2021 10:52 AM       Lab Results   Component Value Date/Time    TSH 1.840 07/09/2021 08:28 AM         Testing Reviewed:      I have individually reviewed the cardiac test below:    ECHO: Results for orders placed during the hospital encounter of 10/15/21    Echo 2D w doppler w color complete    Narrative  Transthoracic Echocardiography Report (TTE)    Demographics    Patient Name     Melba Goldmann Gender                Male    MR #             [id number]     Race                      Ethnicity              or     Account #        [de-identified]     Room Number    Accession Number [id number]    Date of Study         10/15/2021    Date of Birth    1955    Referring Physician   Uziel Major MD    Age              73 year(s)    Melissa Allen MD  Physician    Procedure    Type of Study    TTE procedure:ECHOCARDIOGRAM COMPLETE 2D W DOPPLER W COLOR. Procedure Date  Date: 10/15/2021 Start: 10:53 AM    Study Location: Echo Lab  Technical Quality: Adequate visualization    Indications:Chest pain. Additional Medical History:Chest pain, Diabetes, GERD, Obstructive sleep  apnea, Shortness of breath, Hypertension, Coronary artery disease    Patient Status: Routine    Height: 71 inches Weight: 207 pounds BSA: 2.14 m^2 BMI: 28.87 kg/m^2    BP: 123/72 mmHg    Conclusions    Summary  Normal left ventricle size and systolic function. Ejection fraction was  estimated at 60-65%.  There were no regional left ventricular wall motion  abnormalities and wall thickness was within normal limits. Signature    ----------------------------------------------------------------  Electronically signed by Shelby Teague MD (Interpreting  physician) on 10/15/2021 at 02:59 PM  ----------------------------------------------------------------    Findings    Mitral Valve  The mitral valve structure was normal with normal leaflet separation. DOPPLER: The transmitral velocity was within the normal range with no  evidence for mitral stenosis. There was no evidence of mitral  regurgitation. Aortic Valve  The aortic valve was trileaflet with normal thickness and cuspal  separation. DOPPLER: Transaortic velocity was within the normal range with  no evidence of aortic stenosis. There was no evidence of aortic  regurgitation. Tricuspid Valve  The tricuspid valve structure was normal with normal leaflet separation. DOPPLER: There was no evidence of tricuspid stenosis. There was no  evidence of tricuspid regurgitation. Pulmonic Valve  The pulmonic valve leaflets exhibited normal thickness, no calcification,  and normal cuspal separation. DOPPLER: The transpulmonic velocity was  within the normal range with no evidence for regurgitation. Left Atrium  Left atrial size was normal.    Left Ventricle  Normal left ventricle size and systolic function. Ejection fraction was  estimated at 60-65%. There were no regional left ventricular wall motion  abnormalities and wall thickness was within normal limits. Right Atrium  Right atrial size was normal.    Right Ventricle  The right ventricular size was normal with normal systolic function and  wall thickness. Pericardial Effusion  The pericardium was normal in appearance with no evidence of a pericardial  effusion. Pleural Effusion  No evidence of pleural effusion. Aorta / Great Vessels  -Aortic root dimension within normal limits.  -The Pulmonary artery is within normal limits.   -IVC size is within normal limits with normal respiratory phasic changes. M-Mode/2D Measurements & Calculations    LV Diastolic   LV Systolic Dimension:    AV Cusp Separation: 2.2 cmLA  Dimension: 4.6 3.1 cm                    Dimension: 3.7 cmAO Root  cm             LV Volume Diastolic: 50.9 Dimension: 3.7 cmLA Area: 15.5  LV FS:32.6 %   ml                        cm^2  LV PW          LV Volume Systolic: 28.4  Diastolic: 0.9 ml  cm             LV EDV/LV EDV Index: 97.3  Septum         ml/45 m^2LV ESV/LV ESV    RV Diastolic Dimension: 2.1 cm  Diastolic: 0.8 Index: 67.9 ml/18 m^2  cm             EF Calculated: 61.1 %     LA/Aorta: 1    LA volume/Index: 36.4 ml /17m^2    Doppler Measurements & Calculations    MV Peak E-Wave: 55.3 cm/s  AV Peak Velocity: 133  LVOT Peak Velocity: 74.1  MV Peak A-Wave: 61.7 cm/s  cm/s                   cm/s  MV E/A Ratio: 0.9          AV Peak Gradient: 7.08 LVOT Peak Gradient: 2  MV Peak Gradient: 1.22     mmHg                   mmHg  mmHg  TV Peak E-Wave: 58.7  MV Deceleration Time: 296                         cm/s  msec                                              TV Peak A-Wave: 62.6  MV P1/2t: 87 msec          IVRT: 113 msec         cm/s  MVA by PHT:2.53 cm^2  TV Peak Gradient: 1.38  MV E' Septal Velocity: 6.7 AV DVI (Vmax):0.56     mmHg  cm/s  MV A' Septal Velocity: 8.3                        PV Peak Velocity: 69  cm/s                                              cm/s  MV E' Lateral Velocity:                           PV Peak Gradient: 1.9  9.7 cm/s                                          mmHg  MV A' Lateral Velocity:  12.8 cm/s  E/E' septal: 8.25  E/E' lateral: 5.7    [email or web address] [id number]      Cath:10/2021  FINDINGS:  LEFT VENTRICULOGRAPHY:  No regional wall motion abnormality. Ejection  fraction estimated at 55%. HEMODYNAMICS:  Left ventricular end-diastolic pressure was 18 mmHg.   No  significant pressure gradient across the aortic valve upon pullback. CORONARY ANGIOGRAM:  1. LEFT MAIN CORONARY ARTERY:  The distal segment of left main coronary  artery has nonobstructive 20% stenosis. Left main trifurcates into  ramus intermedius, left circumflex and left anterior descending  arteries. 2.  LEFT CIRCUMFLEX ARTERY:  Proximal left circumflex artery is patent. OM1 is a relatively large vessel with no significant stenotic lesions,  widely patent. Distal LCX has 30% to 50% stenosis. 3.  RAMUS INTERMEDIUS:  Luminal irregularities were noticed, otherwise  patent. 4.  LEFT ANTERIOR DESCENDING ARTERY:  Proximal LAD has 30% to 50%  segmental lesions. D1 is a relatively small 1.75 to 2 mm vessel with  70% to 80% stenosis. Mid LAD has 60% stenosis. Distal LAD is patent. 5.  RIGHT CORONARY ARTERY:  Proximal and mid RCA have luminal  irregularities, otherwise patent. RCA is a large dominant vessel. Distal RCA has a 90% focal lesion. The RCA bifurcates into RPL and  RPDA, both are patent with only mild luminal irregularities. MEDICATIONS:  See MAR. COMPLICATIONS:  None. ESTIMATED BLOOD LOSS:  Less than 50 mL. ACCESS:  Right radial artery access. Vasc Band was applied. Hemostasis  was achieved. IMPRESSION:  1. Intermediate 60% stenosis of mid LAD. Fractional flow reserve  measurement was consistent with hemodynamically insignificant stenosis. FFR was 0.83.  2.  Severely stenotic lesion of first diagonal branch, relatively small  vessel. 3.  Severe stenosis of the distal right coronary artery, status post  successful PCI and stenting.     AssessmentPlan:   Mal Alex is a pleasant 78year old male patient who  has a past medical history of Anxiety, CAD (coronary artery disease), Depression, Diabetes mellitus ([facility] [address] Dilation of esophagus, Frequent headaches, Gastroparesis, GERD (gastroesophageal reflux disease), History of Chiari malformation, Hyperlipidemia, Hypertension, Kidney stone, Kidney stone, Migraine, Movement disorder, Pulmonary emboli ([address] Shingles, Sleep apnea, and Tattoos. Patient was previously evaluated for chest pains, abnormal stress test. Echo 10/2021 revealed an EF of 60-65%. [facility] 10/2021 revealed severe RCA stenosis (underwent PCI/GI). There was evidence for mid LAD 50-60% stenosis, FFR 0.83 with severe stenosis of a relatively small D1 (medically managed). He is on Xarelto for h/o PE. During previous visit, patient reported \"heart burns\". Imdur was added, patient was referred to GI. He was seen by GI, he had endoscopy/esophageal dilation, feels better. He denies chest pains. Patient underwent spinal surgery on 4/2022. Patient denies shortness of breath, dyspnea on exertion. CAD  S/p PCI RCA 10/2021  50-60% Mid LAD stenosis (FFR 0.83), D1 stenosis (small vessel)  FH of CAD  H/o PE  DM  HTN  Dyslipidemia   SHAHRAM    Seen by GI  Had esophageal dilation   Started on Imdur  Denies chest pains  Feels better  Cont medical therapy   Patient was advised to report to the ER if has recurrent symptoms with specific instructions given about severity and duration of symptoms  Aggressive medical therapy and risk factor modification for CAD is indicated   /78  The patient was instructed to check the blood pressure at home, and record the readings. Patient will call office with blood pressure readings, will adjust patient's antihypertensive medications as needed accordingly   On xarelto   Plavix  Crestor  LDL 81  The patient is advised to begin progressive daily aerobic exercise program  Hyperlipidemia: on statins, followed periodically. Patient need periodic lipid and liver profile    Above findings and plan of care were discussed with patient in details, patient's questions were answered.      Disposition:  RTC in 6 months             Electronically signed by Kaci Johnson MD, [facility]    8/15/2022 at 5:59 PM EDT

## 2022-08-16 ENCOUNTER — OFFICE VISIT (OUTPATIENT)
Dept: CARDIOLOGY CLINIC | Age: 67
End: 2022-08-16
Payer: MEDICARE

## 2022-08-16 VITALS
HEIGHT: 69 IN | HEART RATE: 64 BPM | BODY MASS INDEX: 31.1 KG/M2 | DIASTOLIC BLOOD PRESSURE: 78 MMHG | SYSTOLIC BLOOD PRESSURE: 135 MMHG | WEIGHT: 210 LBS

## 2022-08-16 DIAGNOSIS — I25.10 CAD IN NATIVE ARTERY: Primary | ICD-10-CM

## 2022-08-16 PROCEDURE — 99214 OFFICE O/P EST MOD 30 MIN: CPT | Performed by: INTERNAL MEDICINE

## 2022-08-16 PROCEDURE — 1123F ACP DISCUSS/DSCN MKR DOCD: CPT | Performed by: INTERNAL MEDICINE

## 2022-08-16 RX ORDER — INSULIN LISPRO 100 [IU]/ML
10 INJECTION, SOLUTION INTRAVENOUS; SUBCUTANEOUS
Qty: 15 PEN | Refills: 1 | Status: SHIPPED | OUTPATIENT
Start: 2022-08-16 | End: 2023-08-11

## 2022-08-16 RX ORDER — INSULIN GLARGINE 100 [IU]/ML
20 INJECTION, SOLUTION SUBCUTANEOUS 2 TIMES DAILY
Qty: 15 PEN | Refills: 1 | Status: SHIPPED | OUTPATIENT
Start: 2022-08-16

## 2022-08-17 ENCOUNTER — TELEPHONE (OUTPATIENT)
Dept: PHARMACY | Facility: CLINIC | Age: 67
End: 2022-08-17

## 2022-08-17 NOTE — TELEPHONE ENCOUNTER
[facility] HEALTH CLINICAL PHARMACY: ADHERENCE REVIEW  Identified care gap per [place]: fills at [facility]: ACE/ARB, Diabetes, and Statin adherence    Last Visit: 8/8/22    (Patient in donut hole/samples/Chantel Cares)    On Insulin    DIABETES ADHERENCE    Insurance Records claims through 8.8.22 [facility] [place] =  50%; Failed in 2022): Metformin 1000mg Next refill due: 11/2/22      Lab Results   Component Value Date    LABA1C 8.7 08/08/2022    LABA1C 7.8 (H) 04/13/2022    LABA1C 7.7 (H) 01/05/2022     NOTE A1c <9%    STATIN ADHERENCE    Insurance Records claims through 8.8.22 [facility] [place] =  74%; Potential Fail Date: 9/1/22 ):   Rosuvastatin 40mg Next refill due: 8/11/22    Per Whit Mccoy at [address]:   [address] up on 8/12/22 and went through insurance-zero copay    Lab Results   Component Value Date    CHOL 153 10/21/2021    TRIG 129 10/21/2021    HDL 46 10/21/2021    LDLCALC 81 10/21/2021     ALT   Date Value Ref Range Status   05/20/2022 15 11 - 66 U/L Final     AST   Date Value Ref Range Status   05/20/2022 14 5 - 40 U/L Final     The 10-year ASCVD risk score (Kale Acharya, et al., 2013) is: 27.4%    Values used to calculate the score:      Age: 78 years      Sex: Male      Is Non- : No      Diabetic: Yes      Tobacco smoker: No      Systolic Blood Pressure: 991 mmHg      Is BP treated: Yes      HDL Cholesterol: 46 mg/dL      Total Cholesterol: 153 mg/dL     PLAN  The following are interventions that have been identified:   - Patient overdue refilling Rosuvastatin 40mg  and active on home medication list.     No patient out reach planned at this time. Verified     Future Appointments   Date Time Provider Hailey Brown   10/5/2022  2:20 PM Minor Cooley MD [facility] - [address]   2/21/2023 12:00 PM MD TYE Suggs [facility] - [address] [facility].    [address] [place] [address] free: [phone]     For Pharmacy Admin Tracking Only    CPA in place:  No  Gap Closed?: No   Time Spent (min): 20

## 2022-08-19 ENCOUNTER — NURSE ONLY (OUTPATIENT)
Dept: LAB | Age: 67
End: 2022-08-19

## 2022-08-19 DIAGNOSIS — I25.10 CAD IN NATIVE ARTERY: ICD-10-CM

## 2022-08-19 LAB
CHOLESTEROL, TOTAL: 139 MG/DL (ref 100–199)
HDLC SERPL-MCNC: 40 MG/DL
LDL CHOLESTEROL CALCULATED: 66 MG/DL
TRIGL SERPL-MCNC: 167 MG/DL (ref 0–199)

## 2022-09-22 RX ORDER — ISOSORBIDE MONONITRATE 60 MG/1
TABLET, EXTENDED RELEASE ORAL
Qty: 90 TABLET | Refills: 3 | Status: SHIPPED | OUTPATIENT
Start: 2022-09-22

## 2022-11-16 ENCOUNTER — HOSPITAL ENCOUNTER (OUTPATIENT)
Age: 67
Discharge: HOME | End: 2022-11-16
Payer: MEDICARE

## 2022-11-16 ENCOUNTER — HOSPITAL ENCOUNTER (OUTPATIENT)
Dept: GENERAL RADIOLOGY | Age: 67
Discharge: HOME | End: 2022-11-16
Payer: MEDICARE

## 2022-11-16 ENCOUNTER — OFFICE VISIT (OUTPATIENT)
Dept: FAMILY MEDICINE CLINIC | Age: 67
End: 2022-11-16
Payer: MEDICARE

## 2022-11-16 VITALS
DIASTOLIC BLOOD PRESSURE: 72 MMHG | RESPIRATION RATE: 16 BRPM | HEART RATE: 68 BPM | SYSTOLIC BLOOD PRESSURE: 136 MMHG | BODY MASS INDEX: 29.96 KG/M2 | WEIGHT: 202.9 LBS

## 2022-11-16 DIAGNOSIS — R10.13 EPIGASTRIC PAIN: ICD-10-CM

## 2022-11-16 DIAGNOSIS — R10.12 LUQ ABDOMINAL PAIN: Primary | ICD-10-CM

## 2022-11-16 DIAGNOSIS — R07.89 LEFT-SIDED CHEST WALL PAIN: ICD-10-CM

## 2022-11-16 DIAGNOSIS — R10.12 LUQ ABDOMINAL PAIN: ICD-10-CM

## 2022-11-16 DIAGNOSIS — R10.32 LLQ ABDOMINAL PAIN: ICD-10-CM

## 2022-11-16 PROCEDURE — 99214 OFFICE O/P EST MOD 30 MIN: CPT | Performed by: FAMILY MEDICINE

## 2022-11-16 PROCEDURE — 1123F ACP DISCUSS/DSCN MKR DOCD: CPT | Performed by: FAMILY MEDICINE

## 2022-11-16 PROCEDURE — 74018 RADEX ABDOMEN 1 VIEW: CPT

## 2022-11-16 NOTE — PROGRESS NOTES
Andrea Lennon (:  1955) is a 78 y.o. male,Established patient, here for evaluation of the following chief complaint(s):  Rib Pain (injury) (Left side x 1 month)        Subjective   SUBJECTIVE/OBJECTIVE:  HPI:    Chief Complaint   Patient presents with    Rib Pain (injury)     Left side x 1 month     Pt presents today with left sided chest wall pain for the last month. NKI. No rash. Denies redness or bruising. Pt does have some issues with constipation. Will go 2-3 days without a BM and will then have a large BM followed by loose stools. Denies urinary symptoms. No inciting movements or events. There all the time. No relief with BM. Patient Active Problem List   Diagnosis    Chest pain at rest    Uncontrolled type 2 diabetes mellitus, without long-term current use of insulin    GERD with stricture    Back pain at L4-L5 level    Gastroparesis    Status post dilatation of esophageal stricture    History of colonic polyps    Medication monitoring encounter    Mixed hyperlipidemia    SHAHRAM (obstructive sleep apnea)    Obesity (BMI 30-39. 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 Latesha Dominguez, C5-7 cadaver patch    BRAIN SURGERY      chiari    CARDIAC SURGERY      heart cath    CHOLECYSTECTOMY      COLONOSCOPY  2017    COLONOSCOPY N/A 3/12/2020    COLONOSCOPY POLYPECTOMY SNARE/COLD BIOPSY performed by Pallavi Guzman MD at [facility]    EGD      Dr Erica Campos, DIAGNOSTIC  2007    [address]    right elbow    IVC FILTER REMOVAL 03/15/2020    [facility]    NASAL SEPTUM SURGERY  2006    SHOULDER SURGERY Left 2018    TONSILLECTOMY  1964    UPPER GASTROINTESTINAL ENDOSCOPY Left 3/12/2020    EGD ESOPHAGOGASTRODUODENOSCOPY DILATATION performed by Jeannette Marcano MD at [address] [facility] Left 3/12/2020    EGD BIOPSY performed by Jeannette Marcano MD at [facility]    VASCULAR SURGERY  2015    right middle finger artery     Social History     Tobacco Use    Smoking status: Former     Packs/day: 1.50     Years: 3.00     Pack years: 4.50     Types: Cigarettes     Start date: 1976     Quit date: 10/12/1979     Years since quittin.1    Smokeless tobacco: Never   Vaping Use    Vaping Use: Never used   Substance Use Topics    Alcohol use: No    Drug use: No         Review of Systems   Constitutional:  Positive for appetite change. Negative for unexpected weight change. HENT: Negative. Respiratory: Negative. Cardiovascular:  Positive for chest pain. Gastrointestinal:  Positive for abdominal pain, constipation and diarrhea. Negative for blood in stool, nausea and vomiting. Musculoskeletal: Negative. All other systems reviewed and are negative. Objective   Physical Exam  Vitals and nursing note reviewed. Constitutional:       General: He is not in acute distress. Appearance: Normal appearance. He is well-developed. HENT:      Head: Normocephalic and atraumatic. Right Ear: Tympanic membrane normal.      Left Ear: Tympanic membrane normal.   Eyes:      Conjunctiva/sclera: Conjunctivae normal.   Cardiovascular:      Rate and Rhythm: Normal rate and regular rhythm. Heart sounds: Normal heart sounds. No murmur heard. Pulmonary:      Effort: Pulmonary effort is normal.      Breath sounds: Normal breath sounds. No wheezing, rhonchi or rales. Abdominal:      General: There is no distension. Tenderness:  There is abdominal tenderness in the epigastric area, left upper quadrant and left lower quadrant. There is no guarding or rebound. Musculoskeletal:      Cervical back: Neck supple. Skin:     General: Skin is warm and dry. Findings: No rash (on exposed surfaces). Neurological:      General: No focal deficit present. Mental Status: He is alert. Psychiatric:         Attention and Perception: Attention normal.         Mood and Affect: Mood normal.         Speech: Speech normal.         Behavior: Behavior normal. Behavior is cooperative. Thought Content: Thought content normal.         Judgment: Judgment normal.             ASSESSMENT/PLAN:  1. LUQ abdominal pain  -     XR ABDOMEN (KUB) (SINGLE AP VIEW); Future  2. Left-sided chest wall pain  3. Epigastric pain  4. LLQ abdominal pain    -  Uncertain etiology at this time  -  Feel that constipation is high on the differential  -  Start with KUB and go from there, will call with results and recommendations    Return if symptoms worsen or fail to improve. An electronic signature was used to authenticate this note.     --Chacho Manriquez, DO

## 2022-11-17 PROBLEM — I26.02 ACUTE SADDLE PULMONARY EMBOLISM WITH ACUTE COR PULMONALE (HCC): Status: RESOLVED | Noted: 2018-10-29 | Resolved: 2022-11-17

## 2022-11-17 ASSESSMENT — ENCOUNTER SYMPTOMS
BLOOD IN STOOL: 0
DIARRHEA: 1
NAUSEA: 0
RESPIRATORY NEGATIVE: 1
CONSTIPATION: 1
VOMITING: 0
ABDOMINAL PAIN: 1

## 2022-12-06 NOTE — TELEPHONE ENCOUNTER
Yasmani Srinivasan called requesting a refill on the following medications:  Requested Prescriptions     Pending Prescriptions Disp Refills    rivaroxaban (XARELTO) 15 MG TABS tablet 90 tablet 1     Sig: Take 1 tablet by mouth daily (with breakfast)     Pharmacy verified:  .[facility]  Gets med through [facility] program      Date of last visit: 08/16/2022  Date of next visit (if applicable): 1/71/1829      *patient is out of this medication. He said that the program he gets this through told him they were sending over paperwork and requesting a new script. He also stated that he was advised to check if we have samples for him in the mean time.

## 2022-12-06 NOTE — TELEPHONE ENCOUNTER
Patient called in stating he needs his Xarelto to go to [facility]. I called [facility] and patient gave me phone number [phone]. I received fax number of [phone]. RX out for signature and will be faxed. Samples prepared for patient.

## 2023-03-01 ENCOUNTER — OFFICE VISIT (OUTPATIENT)
Dept: CARDIOLOGY CLINIC | Age: 68
End: 2023-03-01
Payer: MEDICARE

## 2023-03-01 VITALS
BODY MASS INDEX: 29.77 KG/M2 | SYSTOLIC BLOOD PRESSURE: 116 MMHG | HEART RATE: 83 BPM | WEIGHT: 201 LBS | DIASTOLIC BLOOD PRESSURE: 68 MMHG | HEIGHT: 69 IN

## 2023-03-01 DIAGNOSIS — I25.10 CAD IN NATIVE ARTERY: Primary | ICD-10-CM

## 2023-03-01 PROCEDURE — 99214 OFFICE O/P EST MOD 30 MIN: CPT | Performed by: INTERNAL MEDICINE

## 2023-03-01 PROCEDURE — 1123F ACP DISCUSS/DSCN MKR DOCD: CPT | Performed by: INTERNAL MEDICINE

## 2023-03-01 RX ORDER — CLOPIDOGREL BISULFATE 75 MG/1
TABLET ORAL
Qty: 90 TABLET | Refills: 3 | Status: SHIPPED | OUTPATIENT
Start: 2023-03-01

## 2023-03-01 RX ORDER — SILDENAFIL CITRATE 20 MG/1
20 TABLET ORAL DAILY PRN
Qty: 5 TABLET | Refills: 0 | Status: SHIPPED | OUTPATIENT
Start: 2023-03-01

## 2023-03-01 NOTE — PROGRESS NOTES
[address] [facility] [address]  Dept: [phone]  Dept Fax: [phone]  Loc: [phone]    Visit Date: 3/1/2023    Mr. James Galo is a 78 y.o. male  who presented for:  CAD  H/o PCI  HTN, Dyslipidemia   HPI:   CHIDI Adorno is a pleasant 78year old male patient who  has a past medical history of Anxiety, CAD (coronary artery disease), Depression, Diabetes mellitus ([address] Dilation of esophagus, Frequent headaches, Gastroparesis, GERD (gastroesophageal reflux disease), History of Chiari malformation, Hyperlipidemia, Hypertension, Kidney stone, Kidney stone, Migraine, Movement disorder, Pulmonary emboli ([address] Shingles, Sleep apnea, and Tattoos. He is on Xarelto for h/o PE. Echo 10/2021 revealed an EF of 60-65%. 5 [facility] 10/2021 revealed severe RCA stenosis (underwent PCI/GI). There was evidence for mid LAD 50-60% stenosis, FFR 0.83 with severe stenosis of a relatively small D1 (medically managed). He was seen by GI, has required esophageal dilation in the past. The patient was \"out of his medications\" for one week, because he had to travel to see his son in [place] who recently had a stroke. Patient denies chest pain, shortness of breath. Current Outpatient Medications:     rivaroxaban (XARELTO) 15 MG TABS tablet, Take 1 tablet by mouth daily (with breakfast), Disp: 14 tablet, Rfl: 0    rivaroxaban (XARELTO) 15 MG TABS tablet, Take 1 tablet by mouth daily (with breakfast), Disp: 90 tablet, Rfl: 1    GABAPENTIN PO, Take by mouth, Disp: , Rfl:     isosorbide mononitrate (IMDUR) 60 MG extended release tablet, Take 1 tablet by mouth once daily, Disp: 90 tablet, Rfl: 3    insulin lispro, 1 Unit Dial, (HUMALOG KWIKPEN) 100 UNIT/ML SOPN, Inject 10 Units into the skin in the morning and 10 Units at noon and 10 Units in the evening. Inject before meals. , Disp: 15 pen, Rfl: 1    insulin glargine (LANTUS SOLOSTAR) 100 UNIT/ML injection pen, Inject 20 Units into the skin in the morning and 20 Units before bedtime. , Disp: 15 pen, Rfl: 1    Dulaglutide 4.5 MG/0.5ML SOPN, Inject 4.5 mg into the skin once a week, Disp: 4 pen, Rfl: 5    blood glucose test strips (ASCENSIA AUTODISC VI;ONE TOUCH ULTRA TEST VI) strip, 1 each by In Vitro route 3 times daily Dx E11.69, Disp: 100 each, Rfl: 3    calcium carbonate (OYSTER SHELL CALCIUM 500 MG) 1250 (500 Ca) MG tablet, calcium carbonate 500 mg calcium (1,250 mg) tablet  TAKE ONE Tablet BY MOUTH EVERY DAY, Disp: , Rfl:     ceFAZolin (ANCEF) 1 g injection, , Disp: , Rfl:     rosuvastatin (CRESTOR) 40 MG tablet, Take 1 tablet by mouth daily, Disp: 90 tablet, Rfl: 3    pantoprazole (PROTONIX) 40 MG tablet, Take 1 tablet by mouth every morning (before breakfast), Disp: 90 tablet, Rfl: 2    Continuous Blood Gluc Sensor (DEXCOM G6 SENSOR) MISC, Change every 10 days-Use as directed for continuous glucose monitoring., Disp: 3 each, Rfl: 5    Continuous Blood Gluc Transmit (DEXCOM G6 TRANSMITTER) MISC, Change every 3 months- Use as directed for continuous glucose monitoring., Disp: 1 each, Rfl: 3    metFORMIN (GLUCOPHAGE) 1000 MG tablet, Take 1 tablet by mouth 2 times daily (with meals), Disp: 180 tablet, Rfl: 1    lisinopril (PRINIVIL;ZESTRIL) 5 MG tablet, Take 1 tablet by mouth 2 times daily, Disp: 180 tablet, Rfl: 3    clopidogrel (PLAVIX) 75 MG tablet, TAKE 1 TABLET BY MOUTH ONE TIME A DAY, Disp: 90 tablet, Rfl: 2    nitroGLYCERIN (NITROSTAT) 0.4 MG SL tablet, Place 1 tablet under the tongue every 5 minutes as needed for Chest pain, Disp: 25 tablet, Rfl: 3    metoprolol tartrate (LOPRESSOR) 25 MG tablet, Take 0.5 tablets by mouth 2 times daily, Disp: 60 tablet, Rfl: 3    metoclopramide (REGLAN) 10 MG tablet, Take 1 tablet by mouth 3 times daily (before meals), Disp: 120 tablet, Rfl: 3    CPAP Machine MISC, by Does not apply route Please change Bipap to 10/7cm H20., Disp: 1 each, Rfl: 0    ondansetron (ZOFRAN ODT) 4 MG disintegrating tablet, Take 1 tablet by mouth every 8 hours as needed for Nausea or Vomiting, Disp: 30 tablet, Rfl: 3    amitriptyline (ELAVIL) 25 MG tablet, Take 1 tablet by mouth nightly, Disp: 30 tablet, Rfl: 0    Cholecalciferol (VITAMIN D3) 125 MCG (5000 UT) TABS, Take 5,000 Units by mouth daily, Disp: , Rfl:     Continuous Blood Gluc  ([address]) YULI, Use as directed for continuous glucose monitor. , Disp: 1 Device, Rfl: 0    cyclobenzaprine (FLEXERIL) 5 MG tablet, Take 5 mg by mouth as needed, Disp: , Rfl:     Misc. Devices (ROLLATOR ULTRA-LIGHT) MISC, Use Rollator for stability with ambulation. , Disp: 1 each, Rfl: 0    Past Medical History  Alma Rosa Dove  has a past medical history of Anxiety, CAD (coronary artery disease), Depression, Diabetes mellitus ([address] Dilation of esophagus, Frequent headaches, Gastroparesis, GERD (gastroesophageal reflux disease), History of Chiari malformation, Hyperlipidemia, Hypertension, Kidney stone, Kidney stone, Migraine, Movement disorder, Pulmonary emboli ([address] Shingles, Sleep apnea, and Tattoos. Social History  Alma Rosa Dove  reports that he quit smoking about 43 years ago. His smoking use included cigarettes. He started smoking about 46 years ago. He has a 4.50 pack-year smoking history. He has never used smokeless tobacco. He reports that he does not drink alcohol and does not use drugs. Family History  Alma Rosa Dove family history includes Diabetes in his brother, father, mother, and sister; Heart Disease in his mother; High Blood Pressure in his brother, father, mother, and sister; Kidney Disease in his mother; Keanu Riff in his father.     Past Surgical History   Past Surgical History:   Procedure Laterality Date    BACK SURGERY  2008    C1 oates & decompression    BACK SURGERY  08/25/2020    Leblanc Janneth Petersen, C5-7 cadaver patch    BRAIN SURGERY  2008    chiari    CARDIAC SURGERY  2007    heart cath    CHOLECYSTECTOMY  2009 COLONOSCOPY  2017    COLONOSCOPY N/A 3/12/2020    COLONOSCOPY POLYPECTOMY SNARE/COLD BIOPSY performed by Ivonne Ohara MD at [facility]    EGD  2022    Dr Madeline Saab, DIAGNOSTIC  2007    [address]    right elbow    IVC FILTER REMOVAL  03/15/2020    [address]  2006    SHOULDER SURGERY Left 08/16/2018    TONSILLECTOMY  1964    UPPER GASTROINTESTINAL ENDOSCOPY Left 3/12/2020    EGD ESOPHAGOGASTRODUODENOSCOPY DILATATION performed by Ivonne Ohara MD at [address] Left 3/12/2020    EGD BIOPSY performed by Ivonne [facility]ara MD at [facility]    VASCULAR SURGERY  2015    right middle finger artery       Review of Systems   Constitutional: Negative for chills and fever  HENT: Negative for congestion, sinus pressure, sneezing and sore throat. Eyes: Negative for pain, discharge, redness and itching. Respiratory: Negative for apnea, cough  Gastrointestinal: Negative for blood in stool, constipation, diarrhea   Endocrine: Negative for cold intolerance, heat intolerance, polydipsia. Genitourinary: Negative for dysuria, enuresis, flank pain and hematuria. Musculoskeletal: Negative for arthralgias, joint swelling and neck pain. Neurological: Negative for numbness and headaches. Psychiatric/Behavioral: Negative for agitation, confusion, decreased concentration and dysphoric mood. Objective: There were no vitals taken for this visit. Wt Readings from Last 3 Encounters:   11/16/22 202 lb 14.4 oz (92 kg)   08/16/22 210 lb (95.3 kg)   08/08/22 210 lb 4.8 oz (95.4 kg)     BP Readings from Last 3 Encounters:   11/16/22 136/72   08/16/22 135/78   08/08/22 106/74       Nursing note and vitals reviewed. Physical Exam   Constitutional: Oriented to person, place, and time. Appears well-developed and well-nourished. ENT: Moist mucous membranes. No bleeding. Tongue is midline. Head: Normocephalic and atraumatic. Eyes: EOM are normal. Pupils are equal, round, and reactive to light. Neck: Normal range of motion. Neck supple. No JVD present. Cardiovascular: Normal rate, regular rhythm, murmur, no rubs, and intact distal pulses. Pulmonary/Chest: Effort normal and breath sounds normal. No respiratory distress. No wheezes. No rales. Abdominal: Soft. Bowel sounds are normal. No distension. There is no tenderness. Musculoskeletal: Normal range of motion. no edema. Neurological: Alert and oriented to person, place, and time. No cranial nerve deficit. Coordination normal.   Skin: Skin is warm and dry. Psychiatric: Normal mood and affect.        No results found for: CKTOTAL, CKMB, CKMBINDEX    Lab Results   Component Value Date/Time    WBC 4.9 07/05/2022 12:03 PM    RBC 3.80 07/05/2022 12:03 PM    RBC 3.42 05/22/2022 06:04 AM    HGB 10.8 07/05/2022 12:03 PM    HCT 33.3 07/05/2022 12:03 PM    MCV 87.6 07/05/2022 12:03 PM    MCH 28.4 07/05/2022 12:03 PM    MCHC 32.4 07/05/2022 12:03 PM    RDW 13.5 05/22/2022 06:04 AM     07/05/2022 12:03 PM    MPV 8.9 07/05/2022 12:03 PM       Lab Results   Component Value Date/Time     07/05/2022 12:03 PM    K 4.4 07/05/2022 12:03 PM    K 4.6 05/20/2022 10:10 PM    CL 97 07/05/2022 12:03 PM    CO2 24 07/05/2022 12:03 PM    BUN 22 07/05/2022 12:03 PM    LABALBU 3.6 05/20/2022 10:10 PM    CREATININE 1.0 07/05/2022 12:03 PM    CALCIUM 9.3 07/05/2022 12:03 PM    LABGLOM 75 07/05/2022 12:03 PM    GLUCOSE 299 07/05/2022 12:03 PM    GLUCOSE 110 05/22/2022 06:04 AM       Lab Results   Component Value Date/Time    ALKPHOS 174 05/20/2022 10:10 PM    ALT 15 05/20/2022 10:10 PM    AST 14 05/20/2022 10:10 PM    PROT 7.0 05/20/2022 10:10 PM    BILITOT 0.7 05/20/2022 10:10 PM    BILIDIR <0.2 05/20/2022 10:10 PM    LABALBU 3.6 05/20/2022 10:10 PM       Lab Results   Component Value Date/Time    MG 1.5 05/22/2022 06:04 AM       Lab Results   Component Value Date    INR 1.36 (H) 05/21/2022    INR 0.93 10/21/2021    INR 1.34 (H) 03/11/2021    PROTIME 16.7 (H) 05/21/2022    PROTIME 12.2 (L) 08/19/2020         Lab Results   Component Value Date/Time    LABA1C 8.7 08/08/2022 03:21 PM       Lab Results   Component Value Date/Time    TRIG 167 08/19/2022 09:22 AM    HDL 40 08/19/2022 09:22 AM    LDLCALC 66 08/19/2022 09:22 AM       Lab Results   Component Value Date/Time    TSH 1.840 07/09/2021 08:28 AM         Testing Reviewed:      I have individually reviewed the cardiac test below:    ECHO:   Results for orders placed or performed during the hospital encounter of 10/15/21   Echo 2D w doppler w color complete   Result Value Ref Range    Left Ventricular Ejection Fraction 63     LVEF MODALITY ECHO     Narrative    Transthoracic Echocardiography Report (TTE)     Demographics      Patient Name     GLORY PETERSON Gender                Male      MR #             [id number]     Race                                                       Ethnicity              or       Account #        [id number]     Room Number      Accession Number [id number]    Date of Study         10/15/2021      Date of Birth    1955    Referring Physician   Antony Ortiz MD      Age              66 year(s)    Sonographer           Pam Saravia RDCS                                     Interpreting          Emily Tucker MD                                  Physician     Procedure    Type of Study      TTE procedure:ECHOCARDIOGRAM COMPLETE 2D W DOPPLER W COLOR.     Procedure Date  Date: 10/15/2021 Start: 10:53 AM    Study Location: Echo Lab  Technical Quality: Adequate visualization    Indications:Chest pain.    Additional Medical History:Chest pain, Diabetes, GERD, Obstructive sleep  apnea, Shortness of breath, Hypertension, Coronary artery disease    Patient Status: Routine    Height: 71 inches Weight: 207 pounds BSA: 2.14 m^2 BMI: 28.87  kg/m^2    BP: 123/72 mmHg     Conclusions      Summary   Normal left ventricle size and systolic function. Ejection fraction was   estimated at 60-65%. There were no regional left ventricular wall motion   abnormalities and wall thickness was within normal limits. Signature      ----------------------------------------------------------------   Electronically signed by Bert Gonzalez MD (Interpreting   physician) on 10/15/2021 at 02:59 PM   ----------------------------------------------------------------      Findings      Mitral Valve   The mitral valve structure was normal with normal leaflet separation. DOPPLER: The transmitral velocity was within the normal range with no   evidence for mitral stenosis. There was no evidence of mitral   regurgitation. Aortic Valve   The aortic valve was trileaflet with normal thickness and cuspal   separation. DOPPLER: Transaortic velocity was within the normal range with   no evidence of aortic stenosis. There was no evidence of aortic   regurgitation. Tricuspid Valve   The tricuspid valve structure was normal with normal leaflet separation. DOPPLER: There was no evidence of tricuspid stenosis. There was no   evidence of tricuspid regurgitation. Pulmonic Valve   The pulmonic valve leaflets exhibited normal thickness, no calcification,   and normal cuspal separation. DOPPLER: The transpulmonic velocity was   within the normal range with no evidence for regurgitation. Left Atrium   Left atrial size was normal.      Left Ventricle   Normal left ventricle size and systolic function. Ejection fraction was   estimated at 60-65%. There were no regional left ventricular wall motion   abnormalities and wall thickness was within normal limits. Right Atrium   Right atrial size was normal.      Right Ventricle   The right ventricular size was normal with normal systolic function and   wall thickness.       Pericardial Effusion   The pericardium was normal in appearance with no evidence of a pericardial   effusion.      Pleural Effusion   No evidence of pleural effusion.      Aorta / Great Vessels   -Aortic root dimension within normal limits.   -The Pulmonary artery is within normal limits.   -IVC size is within normal limits with normal respiratory phasic changes.     M-Mode/2D Measurements & Calculations      LV Diastolic   LV Systolic Dimension:    AV Cusp Separation: 2.2 cmLA   Dimension: 4.6 3.1 cm                    Dimension: 3.7 cmAO Root   cm             LV Volume Diastolic: 97.3 Dimension: 3.7 cmLA Area: 15.5   LV FS:32.6 %   ml                        cm^2   LV PW          LV Volume Systolic: 37.9   Diastolic: 0.9 ml   cm             LV EDV/LV EDV Index: 97.3   Septum         ml/45 m^2LV ESV/LV ESV    RV Diastolic Dimension: 2.1 cm   Diastolic: 0.8 Index: 37.9 ml/18 m^2   cm             EF Calculated: 61.1 %     LA/Aorta: 1                                               LA volume/Index: 36.4 ml /17m^2     Doppler Measurements & Calculations      MV Peak E-Wave: 55.3 cm/s  AV Peak Velocity: 133  LVOT Peak Velocity: 74.1   MV Peak A-Wave: 61.7 cm/s  cm/s                   cm/s   MV E/A Ratio: 0.9          AV Peak Gradient: 7.08 LVOT Peak Gradient: 2   MV Peak Gradient: 1.22     mmHg                   mmHg   mmHg                                                     TV Peak E-Wave: 58.7   MV Deceleration Time: 296                         cm/s   msec                                              TV Peak A-Wave: 62.6   MV P1/2t: 87 msec          IVRT: 113 msec         cm/s   MVA by PHT:2.53 cm^2                                                     TV Peak Gradient: 1.38   MV E' Septal Velocity: 6.7 AV DVI (Vmax):0.56     mmHg   cm/s   MV A' Septal Velocity: 8.3                        PV Peak Velocity: 69   cm/s                                              cm/s   MV E' Lateral Velocity:                           PV Peak Gradient: 1.9   9.7 cm/s                             mmHg   MV A' Lateral Velocity:   12.8 cm/s   E/E' septal: 8.25   E/E' lateral: 5.7     [id number]   ]    Cath:10/2021  FINDINGS:  LEFT VENTRICULOGRAPHY:  No regional wall motion abnormality. Ejection  fraction estimated at 55%. HEMODYNAMICS:  Left ventricular end-diastolic pressure was 18 mmHg. No  significant pressure gradient across the aortic valve upon pullback. CORONARY ANGIOGRAM:  1. LEFT MAIN CORONARY ARTERY:  The distal segment of left main coronary  artery has nonobstructive 20% stenosis. Left main trifurcates into  ramus intermedius, left circumflex and left anterior descending  arteries. 2.  LEFT CIRCUMFLEX ARTERY:  Proximal left circumflex artery is patent. OM1 is a relatively large vessel with no significant stenotic lesions,  widely patent. Distal LCX has 30% to 50% stenosis. 3.  RAMUS INTERMEDIUS:  Luminal irregularities were noticed, otherwise  patent. 4.  LEFT ANTERIOR DESCENDING ARTERY:  Proximal LAD has 30% to 50%  segmental lesions. D1 is a relatively small 1.75 to 2 mm vessel with  70% to 80% stenosis. Mid LAD has 60% stenosis. Distal LAD is patent. 5.  RIGHT CORONARY ARTERY:  Proximal and mid RCA have luminal  irregularities, otherwise patent. RCA is a large dominant vessel. Distal RCA has a 90% focal lesion. The RCA bifurcates into RPL and  RPDA, both are patent with only mild luminal irregularities. MEDICATIONS:  See MAR. COMPLICATIONS:  None. ESTIMATED BLOOD LOSS:  Less than 50 mL. ACCESS:  Right radial artery access. Vasc Band was applied. Hemostasis  was achieved. IMPRESSION:  1. Intermediate 60% stenosis of mid LAD. Fractional flow reserve  measurement was consistent with hemodynamically insignificant stenosis.   FFR was 0.83.  2.  Severely stenotic lesion of first diagonal branch, relatively small  vessel. 3.  Severe stenosis of the distal right coronary artery, status post  successful PCI and stenting. AssessmentPlan:     Bard Barnett is a pleasant 78year old male patient who  has a past medical history of Anxiety, CAD (coronary artery disease), Depression, Diabetes mellitus ([address] Dilation of esophagus, Frequent headaches, Gastroparesis, GERD (gastroesophageal reflux disease), History of Chiari malformation, Hyperlipidemia, Hypertension, Kidney stone, Kidney stone, Migraine, Movement disorder, Pulmonary emboli ([address] Shingles, Sleep apnea, and Tattoos. He is on Xarelto for h/o PE. Echo 10/2021 revealed an EF of 60-65%. [facility] 10/2021 revealed severe RCA stenosis (underwent PCI/GI). There was evidence for mid LAD 50-60% stenosis, FFR 0.83 with severe stenosis of a relatively small D1 (medically managed). He was seen by GI, has required esophageal dilation in the past. The patient was \"out of his medications\" for one week, because he had to travel to see his son in [place] who recently had a stroke. Patient denies chest pain, shortness of breath. CAD  S/p PCI RCA 10/2021  50-60% Mid LAD stenosis (FFR 0.83), D1 stenosis (small vessel)  FH of CAD  H/o PE  DM  HTN  Dyslipidemia   SHAHRAM    Has known h/o CAD, prior PCI. Continue risk factor modification and medical management  Plavix  On Crestor  LDL has improved to 66 on 8/2022   Hyperlipidemia: on statins, followed periodically. Patient need periodic lipid and liver profile   He has required esophageal dilation by GI in the past   Denies chest pains   On Imdur, metoprolol, lisinopril   The patient was instructed to check the blood pressure at home, and record the readings.  Patient will call office with blood pressure readings, will adjust patient's antihypertensive medications as needed accordingly   Resume medications, counseled  Refills provided   The patient is advised to begin progressive daily aerobic exercise program and attempt to lose weight. Has h/o PE, on Xarelto   No bleeding issues    Surgery is planned for rotator cuff  No cardiac complaints  > 4 METS   Low cardiac risk   Wants to start viagra  Will stop imdur   Stop NTG     Above findings and plan of care were discussed with patient in details, patient's questions were answered.      Disposition:  RTC in 6 months             Electronically signed by Reji Omer MD, [facility]    3/1/2023 at 10:21 AM EST

## 2023-03-02 ENCOUNTER — TELEPHONE (OUTPATIENT)
Dept: CARDIOLOGY CLINIC | Age: 68
End: 2023-03-02

## 2023-03-02 NOTE — TELEPHONE ENCOUNTER
Arturo KAM Case: [id number], Status: Denied. Notification: Completed.  Case ID: [facility]      Payer:  [employer] and Masoud Quiroz - Medicare   Electronic appeal:  Not supported

## 2023-03-03 ENCOUNTER — TELEPHONE (OUTPATIENT)
Dept: FAMILY MEDICINE CLINIC | Age: 68
End: 2023-03-03

## 2023-03-03 DIAGNOSIS — K11.8 PAROTID MASS: Primary | ICD-10-CM

## 2023-03-03 NOTE — TELEPHONE ENCOUNTER
Pt called office stating he saw [facility] Dr. Matti Rhoades in the past, this doctor left after 3months. At the time pt has a FNA done twice. Pt was then scheduled to see Dr. Zahra Mcgee, pt was not happy with him as \"the visit just seemed odd\". Dr. Zahra Mcgee saw the patient and recommended an FNA, which pt advised he had this done twice. \"Come to find out Dr. Zahra Mcgee hadn't even looked at my chart before he saw me\". Pt does NOT want to go back to [facility]. He is requesting a referral to a different ENT in town. He wanted to see what other options he has in town before he looks to go out of town. The past couple days the left side of his neck is very painful and it is affecting his balance. 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 Please advise.

## 2023-03-03 NOTE — TELEPHONE ENCOUNTER
Referral faxed to ENT Dr. Cassidy Rutherford [phone]. They will contact pt to schedule appt. Pt was notified and all information was given.     [facility]  [address]  [address] [address]     [phone]

## 2023-04-27 ENCOUNTER — TELEPHONE (OUTPATIENT)
Dept: CARDIOLOGY CLINIC | Age: 68
End: 2023-04-27

## 2023-04-27 NOTE — TELEPHONE ENCOUNTER
Time Haley calling asking if pt is cleared for sx. Pt on xarelto and is scheduled for rotator cuff sx on 5-4. Looks like you have it in your note, but no form was made out. Is pt cleared?        Fax [phone]

## 2023-06-07 RX ORDER — ROSUVASTATIN CALCIUM 40 MG/1
40 TABLET, COATED ORAL DAILY
Qty: 90 TABLET | Refills: 1 | Status: SHIPPED | OUTPATIENT
Start: 2023-06-07

## 2023-06-09 NOTE — TELEPHONE ENCOUNTER
Rx signed by provider - thank you! Letter mailed to patient.     For Pharmacy Admin Tracking Only    Program: [address] in place:  No  Recommendation Provided To: Provider: 1 via Note to Provider  Intervention Detail: Adherence Monitorin and Refill(s) Provided  Intervention Accepted By: Provider: 1  Gap Closed?: No   Time Spent (min): 10
smoker: No      Systolic Blood Pressure: 341 mmHg      Is BP treated: Yes      HDL Cholesterol: 40 mg/dL      Total Cholesterol: 139 mg/dL     PLAN:  The following are interventions that have been identified:   - Patient overdue refilling rosuvastatin and active on home medication list.   [facility] care gap  Prescribed rosuvastatin 40 mg daily - needs refill Rx  Failed statin adherence metric in 2022; cost seems to be concern but believe generic statin should be $0 (or low cost) at pharmacy    Future Appointments   Date Time Provider Hailey Brown   9/21/2023  1:15 PM Abby Gallegos MD N [facility] Macario, PharmD, [facility]  Department, toll free: [phone], option 1

## 2023-09-14 RX ORDER — LISINOPRIL 5 MG/1
5 TABLET ORAL 2 TIMES DAILY
Qty: 180 TABLET | Refills: 0 | Status: SHIPPED | OUTPATIENT
Start: 2023-09-14

## 2023-09-21 ENCOUNTER — OFFICE VISIT (OUTPATIENT)
Dept: CARDIOLOGY CLINIC | Age: 68
End: 2023-09-21
Payer: MEDICARE

## 2023-09-21 VITALS
SYSTOLIC BLOOD PRESSURE: 146 MMHG | BODY MASS INDEX: 29.62 KG/M2 | WEIGHT: 200 LBS | HEART RATE: 60 BPM | HEIGHT: 69 IN | DIASTOLIC BLOOD PRESSURE: 75 MMHG

## 2023-09-21 DIAGNOSIS — R07.9 CHEST PAIN IN ADULT: ICD-10-CM

## 2023-09-21 DIAGNOSIS — I25.118 ATHEROSCLEROTIC HEART DISEASE OF NATIVE CORONARY ARTERY WITH OTHER FORMS OF ANGINA PECTORIS (HCC): ICD-10-CM

## 2023-09-21 DIAGNOSIS — I73.9 CLAUDICATION (HCC): ICD-10-CM

## 2023-09-21 DIAGNOSIS — I25.10 CAD IN NATIVE ARTERY: Primary | ICD-10-CM

## 2023-09-21 PROCEDURE — 1123F ACP DISCUSS/DSCN MKR DOCD: CPT | Performed by: INTERNAL MEDICINE

## 2023-09-21 PROCEDURE — 99214 OFFICE O/P EST MOD 30 MIN: CPT | Performed by: INTERNAL MEDICINE

## 2023-09-21 PROCEDURE — 93000 ELECTROCARDIOGRAM COMPLETE: CPT | Performed by: INTERNAL MEDICINE

## 2023-09-21 NOTE — PROGRESS NOTES
[address] [place]  Dept: [phone]  Dept Fax: [phone]  Loc: [phone]    Visit Date: 9/21/2023    Mr. Ish Madrid is a 80 y.o. male  who presented for:  CAD  H/o PCI  HTN, Dyslipidemia   HPI:   HPI   Hayley Taylor is a pleasant 80year old male patient who  has a past medical history of Anxiety, CAD (coronary artery disease), Depression, Diabetes mellitus ([address]), Dilation of esophagus, Frequent headaches, Gastroparesis, GERD (gastroesophageal reflux disease), History of Chiari malformation, Hyperlipidemia, Hypertension, Kidney stone, Kidney stone, Migraine, Movement disorder, Pulmonary emboli ([address]), Shingles, Sleep apnea, and Tattoos. He is on Xarelto for h/o PE. Echo 10/2021 revealed an EF of 60-65%. [facility] 10/2021 revealed severe RCA stenosis (underwent PCI/GI). There was evidence for mid LAD 50-60% stenosis, FFR 0.83 with severe stenosis of a relatively small D1 (medically managed). Patient wanted to start Viagra PRN on prior visit, his NTG was stopped. The patient denies chest pain. Has mild PARR. Patient denies shortness of breath or leg swelling. 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        Current Outpatient Medications:     lisinopril (PRINIVIL;ZESTRIL) 5 MG tablet, Take 1 tablet by mouth twice daily, Disp: 180 tablet, Rfl: 0    metoprolol tartrate (LOPRESSOR) 25 MG tablet, TAKE ONE-HALF TABLET BY MOUTH TWICE DAILY, Disp: 60 tablet, Rfl: 3    rosuvastatin (CRESTOR) 40 MG tablet, Take 1 tablet by mouth daily, Disp: 90 tablet, Rfl: 1    rivaroxaban (XARELTO) 15 MG TABS tablet, Take 1 tablet by mouth daily (with breakfast), Disp: 90 tablet, Rfl: 3    clopidogrel (PLAVIX) 75 MG tablet, TAKE 1 TABLET BY MOUTH ONE TIME A DAY, Disp: 90 tablet, Rfl: 3    sildenafil (REVATIO) 20 MG tablet, Take 1 tablet by mouth daily as needed (ed), Disp: 5 tablet, Rfl: 0    rivaroxaban (Mayank Bigger)

## 2023-09-21 NOTE — PROGRESS NOTES
Pt here for 6 mo check up     Pt continues with chest pain , rates pain 2-3/10, dizziness at times,     EKG done today

## 2023-10-02 ENCOUNTER — NURSE ONLY (OUTPATIENT)
Dept: LAB | Age: 68
End: 2023-10-02

## 2023-10-02 DIAGNOSIS — I25.10 CAD IN NATIVE ARTERY: ICD-10-CM

## 2023-10-02 DIAGNOSIS — I73.9 CLAUDICATION (HCC): ICD-10-CM

## 2023-10-02 LAB
CHOLEST SERPL-MCNC: 186 MG/DL (ref 100–199)
HDLC SERPL-MCNC: 51 MG/DL
LDLC SERPL CALC-MCNC: 107 MG/DL
TRIGL SERPL-MCNC: 141 MG/DL (ref 0–199)

## 2023-10-03 NOTE — TELEPHONE ENCOUNTER
----- Message from Vani Carvalho MD sent at 10/2/2023  6:02 PM EDT -----  LDL is not at goal  On Crestor 40 mg po daily (confirm compliance with the patient)   Add Zetia 10 mg po daily

## 2023-10-04 RX ORDER — EZETIMIBE 10 MG/1
10 TABLET ORAL DAILY
Qty: 30 TABLET | Refills: 3 | Status: SHIPPED | OUTPATIENT
Start: 2023-10-04

## 2023-10-06 ENCOUNTER — TELEPHONE (OUTPATIENT)
Dept: CARDIOLOGY CLINIC | Age: 68
End: 2023-10-06

## 2023-10-10 ENCOUNTER — ANESTHESIA (OUTPATIENT)
Dept: TELEMETRY | Age: 68
End: 2023-10-10
Payer: MEDICARE

## 2023-10-10 ENCOUNTER — ANESTHESIA EVENT (OUTPATIENT)
Dept: TELEMETRY | Age: 68
End: 2023-10-10
Payer: MEDICARE

## 2023-10-10 ENCOUNTER — HOSPITAL ENCOUNTER (OUTPATIENT)
Age: 68
Setting detail: OBSERVATION
Discharge: HOME | End: 2023-10-12
Attending: PHYSICIAN ASSISTANT | Admitting: PHYSICIAN ASSISTANT
Payer: MEDICARE

## 2023-10-10 ENCOUNTER — APPOINTMENT (OUTPATIENT)
Dept: CT IMAGING | Age: 68
End: 2023-10-10
Payer: MEDICARE

## 2023-10-10 DIAGNOSIS — R22.1 NECK MASS: Primary | ICD-10-CM

## 2023-10-10 PROBLEM — R13.10 DYSPHAGIA: Status: ACTIVE | Noted: 2023-10-10

## 2023-10-10 LAB
ALBUMIN SERPL BCG-MCNC: 4 G/DL (ref 3.5–5.1)
ALP SERPL-CCNC: 152 U/L (ref 38–126)
ALT SERPL W/O P-5'-P-CCNC: 21 U/L (ref 11–66)
ANION GAP SERPL CALC-SCNC: 9 MEQ/L (ref 8–16)
AST SERPL-CCNC: 19 U/L (ref 5–40)
BASOPHILS ABSOLUTE: 0 THOU/MM3 (ref 0–0.1)
BASOPHILS NFR BLD AUTO: 0.3 %
BILIRUB CONJ SERPL-MCNC: < 0.2 MG/DL (ref 0–0.3)
BILIRUB SERPL-MCNC: 0.2 MG/DL (ref 0.3–1.2)
BUN SERPL-MCNC: 14 MG/DL (ref 7–22)
CALCIUM SERPL-MCNC: 9.4 MG/DL (ref 8.5–10.5)
CHLORIDE SERPL-SCNC: 103 MEQ/L (ref 98–111)
CO2 SERPL-SCNC: 29 MEQ/L (ref 23–33)
CREAT SERPL-MCNC: 1 MG/DL (ref 0.4–1.2)
DEPRECATED RDW RBC AUTO: 45.1 FL (ref 35–45)
EOSINOPHIL NFR BLD AUTO: 1.2 %
EOSINOPHILS ABSOLUTE: 0.1 THOU/MM3 (ref 0–0.4)
ERYTHROCYTE [DISTWIDTH] IN BLOOD BY AUTOMATED COUNT: 13.2 % (ref 11.5–14.5)
GFR SERPL CREATININE-BSD FRML MDRD: > 60 ML/MIN/1.73M2
GLUCOSE BLD STRIP.AUTO-MCNC: 94 MG/DL (ref 70–108)
GLUCOSE SERPL-MCNC: 154 MG/DL (ref 70–108)
HCT VFR BLD AUTO: 43.6 % (ref 42–52)
HGB BLD-MCNC: 14.3 GM/DL (ref 14–18)
IMM GRANULOCYTES # BLD AUTO: 0.02 THOU/MM3 (ref 0–0.07)
IMM GRANULOCYTES NFR BLD AUTO: 0.3 %
LIPASE SERPL-CCNC: 108.2 U/L (ref 5.6–51.3)
LYMPHOCYTES ABSOLUTE: 1.5 THOU/MM3 (ref 1–4.8)
LYMPHOCYTES NFR BLD AUTO: 21.6 %
MAGNESIUM SERPL-MCNC: 2 MG/DL (ref 1.6–2.4)
MCH RBC QN AUTO: 30.2 PG (ref 26–33)
MCHC RBC AUTO-ENTMCNC: 32.8 GM/DL (ref 32.2–35.5)
MCV RBC AUTO: 92 FL (ref 80–94)
MONOCYTES ABSOLUTE: 0.6 THOU/MM3 (ref 0.4–1.3)
MONOCYTES NFR BLD AUTO: 8.5 %
NEUTROPHILS NFR BLD AUTO: 68.1 %
NRBC BLD AUTO-RTO: 0 /100 WBC
OSMOLALITY SERPL CALC.SUM OF ELEC: 284.8 MOSMOL/KG (ref 275–300)
PLATELET # BLD AUTO: 276 THOU/MM3 (ref 130–400)
PMV BLD AUTO: 9 FL (ref 9.4–12.4)
POTASSIUM SERPL-SCNC: 4.3 MEQ/L (ref 3.5–5.2)
PROT SERPL-MCNC: 8 G/DL (ref 6.1–8)
RBC # BLD AUTO: 4.74 MILL/MM3 (ref 4.7–6.1)
SEGMENTED NEUTROPHILS ABSOLUTE COUNT: 4.7 THOU/MM3 (ref 1.8–7.7)
SODIUM SERPL-SCNC: 141 MEQ/L (ref 135–145)
TROPONIN, HIGH SENSITIVITY: 25 NG/L (ref 0–12)
TROPONIN, HIGH SENSITIVITY: 26 NG/L (ref 0–12)
WBC # BLD AUTO: 6.9 THOU/MM3 (ref 4.8–10.8)

## 2023-10-10 PROCEDURE — 83735 ASSAY OF MAGNESIUM: CPT

## 2023-10-10 PROCEDURE — 93010 ELECTROCARDIOGRAM REPORT: CPT | Performed by: INTERNAL MEDICINE

## 2023-10-10 PROCEDURE — 93005 ELECTROCARDIOGRAM TRACING: CPT | Performed by: PHYSICIAN ASSISTANT

## 2023-10-10 PROCEDURE — 99223 1ST HOSP IP/OBS HIGH 75: CPT | Performed by: PHYSICIAN ASSISTANT

## 2023-10-10 PROCEDURE — 71260 CT THORAX DX C+: CPT

## 2023-10-10 PROCEDURE — 6360000004 HC RX CONTRAST MEDICATION: Performed by: PHYSICIAN ASSISTANT

## 2023-10-10 PROCEDURE — 2709999900 HC NON-CHARGEABLE SUPPLY: Performed by: INTERNAL MEDICINE

## 2023-10-10 PROCEDURE — G0378 HOSPITAL OBSERVATION PER HR: HCPCS

## 2023-10-10 PROCEDURE — 85025 COMPLETE CBC W/AUTO DIFF WBC: CPT

## 2023-10-10 PROCEDURE — 99285 EMERGENCY DEPT VISIT HI MDM: CPT

## 2023-10-10 PROCEDURE — 74177 CT ABD & PELVIS W/CONTRAST: CPT

## 2023-10-10 PROCEDURE — 36415 COLL VENOUS BLD VENIPUNCTURE: CPT

## 2023-10-10 PROCEDURE — 80053 COMPREHEN METABOLIC PANEL: CPT

## 2023-10-10 PROCEDURE — 82948 REAGENT STRIP/BLOOD GLUCOSE: CPT

## 2023-10-10 PROCEDURE — 83690 ASSAY OF LIPASE: CPT

## 2023-10-10 PROCEDURE — 3609012900 HC EGD FOREIGN BODY REMOVAL: Performed by: INTERNAL MEDICINE

## 2023-10-10 PROCEDURE — 82248 BILIRUBIN DIRECT: CPT

## 2023-10-10 PROCEDURE — 84484 ASSAY OF TROPONIN QUANT: CPT

## 2023-10-10 RX ORDER — ISOSORBIDE MONONITRATE 60 MG/1
60 TABLET, EXTENDED RELEASE ORAL DAILY
COMMUNITY

## 2023-10-10 RX ORDER — DEXTROSE MONOHYDRATE 100 MG/ML
INJECTION, SOLUTION INTRAVENOUS CONTINUOUS PRN
Status: DISCONTINUED | OUTPATIENT
Start: 2023-10-10 | End: 2023-10-12 | Stop reason: HOSPADM

## 2023-10-10 RX ORDER — SODIUM CHLORIDE 9 MG/ML
INJECTION, SOLUTION INTRAVENOUS PRN
Status: DISCONTINUED | OUTPATIENT
Start: 2023-10-10 | End: 2023-10-12 | Stop reason: HOSPADM

## 2023-10-10 RX ORDER — CLOPIDOGREL BISULFATE 75 MG/1
75 TABLET ORAL DAILY
Status: DISCONTINUED | OUTPATIENT
Start: 2023-10-11 | End: 2023-10-12 | Stop reason: HOSPADM

## 2023-10-10 RX ORDER — ACETAMINOPHEN 650 MG/1
650 SUPPOSITORY RECTAL EVERY 6 HOURS PRN
Status: DISCONTINUED | OUTPATIENT
Start: 2023-10-10 | End: 2023-10-12 | Stop reason: HOSPADM

## 2023-10-10 RX ORDER — IBUPROFEN 600 MG/1
1 TABLET ORAL PRN
Status: DISCONTINUED | OUTPATIENT
Start: 2023-10-10 | End: 2023-10-12 | Stop reason: HOSPADM

## 2023-10-10 RX ORDER — LISINOPRIL 5 MG/1
5 TABLET ORAL 2 TIMES DAILY
Status: DISCONTINUED | OUTPATIENT
Start: 2023-10-11 | End: 2023-10-12 | Stop reason: HOSPADM

## 2023-10-10 RX ORDER — SODIUM CHLORIDE 0.9 % (FLUSH) 0.9 %
10 SYRINGE (ML) INJECTION EVERY 12 HOURS SCHEDULED
Status: DISCONTINUED | OUTPATIENT
Start: 2023-10-11 | End: 2023-10-12 | Stop reason: HOSPADM

## 2023-10-10 RX ORDER — ENOXAPARIN SODIUM 100 MG/ML
40 INJECTION SUBCUTANEOUS DAILY
Status: DISCONTINUED | OUTPATIENT
Start: 2023-10-11 | End: 2023-10-12 | Stop reason: HOSPADM

## 2023-10-10 RX ORDER — INSULIN GLARGINE 100 [IU]/ML
20 INJECTION, SOLUTION SUBCUTANEOUS 2 TIMES DAILY
Status: DISCONTINUED | OUTPATIENT
Start: 2023-10-11 | End: 2023-10-12 | Stop reason: HOSPADM

## 2023-10-10 RX ORDER — ACETAMINOPHEN 325 MG/1
650 TABLET ORAL EVERY 6 HOURS PRN
Status: DISCONTINUED | OUTPATIENT
Start: 2023-10-10 | End: 2023-10-12 | Stop reason: HOSPADM

## 2023-10-10 RX ORDER — POLYETHYLENE GLYCOL 3350 17 G/17G
17 POWDER, FOR SOLUTION ORAL DAILY PRN
Status: DISCONTINUED | OUTPATIENT
Start: 2023-10-10 | End: 2023-10-12 | Stop reason: HOSPADM

## 2023-10-10 RX ORDER — ONDANSETRON 2 MG/ML
4 INJECTION INTRAMUSCULAR; INTRAVENOUS EVERY 6 HOURS PRN
Status: DISCONTINUED | OUTPATIENT
Start: 2023-10-10 | End: 2023-10-12 | Stop reason: HOSPADM

## 2023-10-10 RX ORDER — EZETIMIBE 10 MG/1
10 TABLET ORAL DAILY
Status: DISCONTINUED | OUTPATIENT
Start: 2023-10-11 | End: 2023-10-12 | Stop reason: HOSPADM

## 2023-10-10 RX ORDER — ROSUVASTATIN CALCIUM 20 MG/1
40 TABLET, COATED ORAL DAILY
Status: DISCONTINUED | OUTPATIENT
Start: 2023-10-11 | End: 2023-10-12 | Stop reason: HOSPADM

## 2023-10-10 RX ORDER — SODIUM CHLORIDE 0.9 % (FLUSH) 0.9 %
10 SYRINGE (ML) INJECTION PRN
Status: DISCONTINUED | OUTPATIENT
Start: 2023-10-10 | End: 2023-10-12 | Stop reason: HOSPADM

## 2023-10-10 RX ORDER — ONDANSETRON 4 MG/1
4 TABLET, ORALLY DISINTEGRATING ORAL EVERY 8 HOURS PRN
Status: DISCONTINUED | OUTPATIENT
Start: 2023-10-10 | End: 2023-10-12 | Stop reason: HOSPADM

## 2023-10-10 RX ORDER — SODIUM CHLORIDE, SODIUM LACTATE, POTASSIUM CHLORIDE, CALCIUM CHLORIDE 600; 310; 30; 20 MG/100ML; MG/100ML; MG/100ML; MG/100ML
INJECTION, SOLUTION INTRAVENOUS CONTINUOUS
Status: DISCONTINUED | OUTPATIENT
Start: 2023-10-11 | End: 2023-10-12 | Stop reason: HOSPADM

## 2023-10-10 RX ORDER — PANTOPRAZOLE SODIUM 40 MG/1
40 TABLET, DELAYED RELEASE ORAL
Status: DISCONTINUED | OUTPATIENT
Start: 2023-10-11 | End: 2023-10-12 | Stop reason: HOSPADM

## 2023-10-10 RX ADMIN — IOPAMIDOL 80 ML: 755 INJECTION, SOLUTION INTRAVENOUS at 12:02

## 2023-10-10 ASSESSMENT — PAIN - FUNCTIONAL ASSESSMENT
PAIN_FUNCTIONAL_ASSESSMENT: 0-10
PAIN_FUNCTIONAL_ASSESSMENT: 0-10

## 2023-10-10 ASSESSMENT — PAIN SCALES - GENERAL
PAINLEVEL_OUTOF10: 5
PAINLEVEL_OUTOF10: 5

## 2023-10-10 ASSESSMENT — PAIN DESCRIPTION - LOCATION: LOCATION: ABDOMEN

## 2023-10-10 NOTE — ED NOTES
Patient resting in bed. Respirations easy and unlabored. No distress noted. Call light within reach.        Yoav Portillo, BRADLY  10/10/23 8833

## 2023-10-10 NOTE — PROGRESS NOTES
EGD procedure cancelled per Dr. Bam Cruz and anesthesia due to pts troponin level and new EKG changes

## 2023-10-10 NOTE — ED NOTES
Patient resting in bed. Respirations easy and unlabored. No distress noted. Call light within reach.        Pb Schwab RN  10/10/23 2532

## 2023-10-10 NOTE — ED NOTES
Pt to ED via intake from Dr Vnii Martini office with c/o narrow esophagus and \"blocked GI tract\" pt reports they are to get a scope and stretch done tonight. Pt reports nausea but reports theyre unable to vomit. Pt VSS. IV inserted.       Luz Elena Bill RN  10/10/23 2908

## 2023-10-11 ENCOUNTER — ANESTHESIA EVENT (OUTPATIENT)
Dept: ENDOSCOPY | Age: 68
End: 2023-10-11
Payer: MEDICARE

## 2023-10-11 ENCOUNTER — ANESTHESIA (OUTPATIENT)
Dept: ENDOSCOPY | Age: 68
End: 2023-10-11
Payer: MEDICARE

## 2023-10-11 LAB
ALBUMIN SERPL BCG-MCNC: 3.3 G/DL (ref 3.5–5.1)
ALP SERPL-CCNC: 119 U/L (ref 38–126)
ALT SERPL W/O P-5'-P-CCNC: 12 U/L (ref 11–66)
ANION GAP SERPL CALC-SCNC: 8 MEQ/L (ref 8–16)
AST SERPL-CCNC: 16 U/L (ref 5–40)
BASOPHILS ABSOLUTE: 0 THOU/MM3 (ref 0–0.1)
BASOPHILS NFR BLD AUTO: 0.4 %
BILIRUB SERPL-MCNC: 0.4 MG/DL (ref 0.3–1.2)
BUN SERPL-MCNC: 10 MG/DL (ref 7–22)
CALCIUM SERPL-MCNC: 8.4 MG/DL (ref 8.5–10.5)
CHLORIDE SERPL-SCNC: 106 MEQ/L (ref 98–111)
CO2 SERPL-SCNC: 25 MEQ/L (ref 23–33)
CREAT SERPL-MCNC: 1 MG/DL (ref 0.4–1.2)
DEPRECATED RDW RBC AUTO: 43.4 FL (ref 35–45)
EOSINOPHIL NFR BLD AUTO: 1.3 %
EOSINOPHILS ABSOLUTE: 0.1 THOU/MM3 (ref 0–0.4)
ERYTHROCYTE [DISTWIDTH] IN BLOOD BY AUTOMATED COUNT: 13.2 % (ref 11.5–14.5)
GFR SERPL CREATININE-BSD FRML MDRD: > 60 ML/MIN/1.73M2
GLUCOSE BLD STRIP.AUTO-MCNC: 103 MG/DL (ref 70–108)
GLUCOSE BLD STRIP.AUTO-MCNC: 116 MG/DL (ref 70–108)
GLUCOSE BLD STRIP.AUTO-MCNC: 85 MG/DL (ref 70–108)
GLUCOSE BLD STRIP.AUTO-MCNC: 97 MG/DL (ref 70–108)
GLUCOSE SERPL-MCNC: 102 MG/DL (ref 70–108)
HCT VFR BLD AUTO: 36.8 % (ref 42–52)
HGB BLD-MCNC: 12.3 GM/DL (ref 14–18)
IMM GRANULOCYTES # BLD AUTO: 0.03 THOU/MM3 (ref 0–0.07)
IMM GRANULOCYTES NFR BLD AUTO: 0.6 %
LYMPHOCYTES ABSOLUTE: 1.4 THOU/MM3 (ref 1–4.8)
LYMPHOCYTES NFR BLD AUTO: 25.9 %
MCH RBC QN AUTO: 30.1 PG (ref 26–33)
MCHC RBC AUTO-ENTMCNC: 33.4 GM/DL (ref 32.2–35.5)
MCV RBC AUTO: 90.2 FL (ref 80–94)
MONOCYTES ABSOLUTE: 0.5 THOU/MM3 (ref 0.4–1.3)
MONOCYTES NFR BLD AUTO: 8.9 %
NEUTROPHILS NFR BLD AUTO: 62.9 %
NRBC BLD AUTO-RTO: 0 /100 WBC
PLATELET # BLD AUTO: 229 THOU/MM3 (ref 130–400)
PMV BLD AUTO: 8.9 FL (ref 9.4–12.4)
POTASSIUM SERPL-SCNC: 3.9 MEQ/L (ref 3.5–5.2)
PROT SERPL-MCNC: 6.3 G/DL (ref 6.1–8)
RBC # BLD AUTO: 4.08 MILL/MM3 (ref 4.7–6.1)
SEGMENTED NEUTROPHILS ABSOLUTE COUNT: 3.3 THOU/MM3 (ref 1.8–7.7)
SODIUM SERPL-SCNC: 139 MEQ/L (ref 135–145)
WBC # BLD AUTO: 5.3 THOU/MM3 (ref 4.8–10.8)

## 2023-10-11 PROCEDURE — 99215 OFFICE O/P EST HI 40 MIN: CPT | Performed by: INTERNAL MEDICINE

## 2023-10-11 PROCEDURE — 3700000000 HC ANESTHESIA ATTENDED CARE: Performed by: INTERNAL MEDICINE

## 2023-10-11 PROCEDURE — 6360000002 HC RX W HCPCS: Performed by: NURSE ANESTHETIST, CERTIFIED REGISTERED

## 2023-10-11 PROCEDURE — 82948 REAGENT STRIP/BLOOD GLUCOSE: CPT

## 2023-10-11 PROCEDURE — 3609012900 HC EGD FOREIGN BODY REMOVAL: Performed by: INTERNAL MEDICINE

## 2023-10-11 PROCEDURE — 80053 COMPREHEN METABOLIC PANEL: CPT

## 2023-10-11 PROCEDURE — 36415 COLL VENOUS BLD VENIPUNCTURE: CPT

## 2023-10-11 PROCEDURE — 96372 THER/PROPH/DIAG INJ SC/IM: CPT

## 2023-10-11 PROCEDURE — G0378 HOSPITAL OBSERVATION PER HR: HCPCS

## 2023-10-11 PROCEDURE — 93010 ELECTROCARDIOGRAM REPORT: CPT | Performed by: INTERNAL MEDICINE

## 2023-10-11 PROCEDURE — 99232 SBSQ HOSP IP/OBS MODERATE 35: CPT | Performed by: PHYSICIAN ASSISTANT

## 2023-10-11 PROCEDURE — 3700000001 HC ADD 15 MINUTES (ANESTHESIA): Performed by: INTERNAL MEDICINE

## 2023-10-11 PROCEDURE — 2709999900 HC NON-CHARGEABLE SUPPLY: Performed by: INTERNAL MEDICINE

## 2023-10-11 PROCEDURE — 93005 ELECTROCARDIOGRAM TRACING: CPT | Performed by: PHYSICIAN ASSISTANT

## 2023-10-11 PROCEDURE — 6360000002 HC RX W HCPCS: Performed by: PHYSICIAN ASSISTANT

## 2023-10-11 PROCEDURE — 2500000003 HC RX 250 WO HCPCS: Performed by: NURSE ANESTHETIST, CERTIFIED REGISTERED

## 2023-10-11 PROCEDURE — 6370000000 HC RX 637 (ALT 250 FOR IP): Performed by: PHYSICIAN ASSISTANT

## 2023-10-11 PROCEDURE — 2580000003 HC RX 258: Performed by: PHYSICIAN ASSISTANT

## 2023-10-11 PROCEDURE — 85025 COMPLETE CBC W/AUTO DIFF WBC: CPT

## 2023-10-11 PROCEDURE — 7100000000 HC PACU RECOVERY - FIRST 15 MIN: Performed by: INTERNAL MEDICINE

## 2023-10-11 PROCEDURE — 7100000001 HC PACU RECOVERY - ADDTL 15 MIN: Performed by: INTERNAL MEDICINE

## 2023-10-11 RX ORDER — SUCCINYLCHOLINE/SOD CL,ISO/PF 200MG/10ML
SYRINGE (ML) INTRAVENOUS PRN
Status: DISCONTINUED | OUTPATIENT
Start: 2023-10-11 | End: 2023-10-11 | Stop reason: SDUPTHER

## 2023-10-11 RX ORDER — ONDANSETRON 2 MG/ML
INJECTION INTRAMUSCULAR; INTRAVENOUS PRN
Status: DISCONTINUED | OUTPATIENT
Start: 2023-10-11 | End: 2023-10-11 | Stop reason: SDUPTHER

## 2023-10-11 RX ORDER — DEXAMETHASONE SODIUM PHOSPHATE 4 MG/ML
INJECTION, SOLUTION INTRA-ARTICULAR; INTRALESIONAL; INTRAMUSCULAR; INTRAVENOUS; SOFT TISSUE PRN
Status: DISCONTINUED | OUTPATIENT
Start: 2023-10-11 | End: 2023-10-11 | Stop reason: SDUPTHER

## 2023-10-11 RX ORDER — PROPOFOL 10 MG/ML
INJECTION, EMULSION INTRAVENOUS PRN
Status: DISCONTINUED | OUTPATIENT
Start: 2023-10-11 | End: 2023-10-11 | Stop reason: SDUPTHER

## 2023-10-11 RX ADMIN — LISINOPRIL 5 MG: 5 TABLET ORAL at 09:09

## 2023-10-11 RX ADMIN — PROPOFOL 200 MG: 10 INJECTION, EMULSION INTRAVENOUS at 18:02

## 2023-10-11 RX ADMIN — ROSUVASTATIN CALCIUM 40 MG: 20 TABLET, FILM COATED ORAL at 09:09

## 2023-10-11 RX ADMIN — LISINOPRIL 5 MG: 5 TABLET ORAL at 01:17

## 2023-10-11 RX ADMIN — PANTOPRAZOLE SODIUM 40 MG: 40 TABLET, DELAYED RELEASE ORAL at 06:52

## 2023-10-11 RX ADMIN — METOPROLOL TARTRATE 12.5 MG: 25 TABLET, FILM COATED ORAL at 09:09

## 2023-10-11 RX ADMIN — SODIUM CHLORIDE, PRESERVATIVE FREE 10 ML: 5 INJECTION INTRAVENOUS at 19:57

## 2023-10-11 RX ADMIN — LISINOPRIL 5 MG: 5 TABLET ORAL at 19:56

## 2023-10-11 RX ADMIN — SODIUM CHLORIDE, POTASSIUM CHLORIDE, SODIUM LACTATE AND CALCIUM CHLORIDE: 600; 310; 30; 20 INJECTION, SOLUTION INTRAVENOUS at 13:20

## 2023-10-11 RX ADMIN — SODIUM CHLORIDE, POTASSIUM CHLORIDE, SODIUM LACTATE AND CALCIUM CHLORIDE: 600; 310; 30; 20 INJECTION, SOLUTION INTRAVENOUS at 19:54

## 2023-10-11 RX ADMIN — DEXAMETHASONE SODIUM PHOSPHATE 10 MG: 4 INJECTION, SOLUTION INTRAMUSCULAR; INTRAVENOUS at 18:02

## 2023-10-11 RX ADMIN — EZETIMIBE 10 MG: 10 TABLET ORAL at 09:09

## 2023-10-11 RX ADMIN — Medication 200 MG: at 18:02

## 2023-10-11 RX ADMIN — INSULIN GLARGINE 20 UNITS: 100 INJECTION, SOLUTION SUBCUTANEOUS at 09:09

## 2023-10-11 RX ADMIN — METOPROLOL TARTRATE 12.5 MG: 25 TABLET, FILM COATED ORAL at 01:17

## 2023-10-11 RX ADMIN — CLOPIDOGREL BISULFATE 75 MG: 75 TABLET ORAL at 09:09

## 2023-10-11 RX ADMIN — ENOXAPARIN SODIUM 40 MG: 100 INJECTION SUBCUTANEOUS at 09:10

## 2023-10-11 RX ADMIN — METOPROLOL TARTRATE 12.5 MG: 25 TABLET, FILM COATED ORAL at 19:56

## 2023-10-11 RX ADMIN — SODIUM CHLORIDE, POTASSIUM CHLORIDE, SODIUM LACTATE AND CALCIUM CHLORIDE: 600; 310; 30; 20 INJECTION, SOLUTION INTRAVENOUS at 00:18

## 2023-10-11 RX ADMIN — POLYETHYLENE GLYCOL 3350 17 G: 17 POWDER, FOR SOLUTION ORAL at 09:09

## 2023-10-11 RX ADMIN — ONDANSETRON 4 MG: 2 INJECTION INTRAMUSCULAR; INTRAVENOUS at 18:14

## 2023-10-11 ASSESSMENT — PAIN SCALES - GENERAL: PAINLEVEL_OUTOF10: 2

## 2023-10-11 ASSESSMENT — PAIN - FUNCTIONAL ASSESSMENT: PAIN_FUNCTIONAL_ASSESSMENT: NONE - DENIES PAIN

## 2023-10-11 NOTE — ANESTHESIA PRE PROCEDURE
Department of Anesthesiology  Preprocedure Note       Name:  Kong Diego   Age:  80 y.o.  :  1955                                          MRN:  [id number]         Date:  10/11/2023      Surgeon: Ammy De Leon):  Ro Mccarty MD    Procedure: Procedure(s):  EGD POSSIBLE FOREIGN BODY REMOVAL    Medications prior to admission:   Prior to Admission medications    Medication Sig Start Date End Date Taking? Authorizing Provider   isosorbide mononitrate (IMDUR) 60 MG extended release tablet Take 1 tablet by mouth daily   Yes Provider, MD Stuart   ezetimibe (ZETIA) 10 MG tablet Take 1 tablet by mouth daily 10/4/23   Rohan Grewal MD   lisinopril (PRINIVIL;ZESTRIL) 5 MG tablet Take 1 tablet by mouth twice daily 23   Mitch Lim DO   metoprolol tartrate (LOPRESSOR) 25 MG tablet TAKE ONE-HALF TABLET BY MOUTH TWICE DAILY 23   Rohan Grewal MD   rosuvastatin (CRESTOR) 40 MG tablet Take 1 tablet by mouth daily 23   Rohan Grewal MD   clopidogrel (PLAVIX) 75 MG tablet TAKE 1 TABLET BY MOUTH ONE TIME A DAY 3/1/23   Rohan Grewal MD   sildenafil (REVATIO) 20 MG tablet Take 1 tablet by mouth daily as needed (ed)  Patient not taking: Reported on 10/10/2023 3/1/23   Rohan Grewal MD   rivaroxaban (XARELTO) 15 MG TABS tablet Take 1 tablet by mouth daily (with breakfast) 22   Rohan Grewal MD   GABAPENTIN PO Take 300 mg by mouth    Provider, MD Stuart   insulin lispro, 1 Unit Dial, (HUMALOG KWIKPEN) 100 UNIT/ML SOPN Inject 10 Units into the skin in the morning and 10 Units at noon and 10 Units in the evening. Inject before meals. 8/16/22 10/10/23  Sugey Soda, DO   insulin glargine (LANTUS SOLOSTAR) 100 UNIT/ML injection pen Inject 20 Units into the skin in the morning and 20 Units before bedtime.  22   Sugey Soda, DO   Dulaglutide 4.5 MG/0.5ML SOPN Inject 4.5 mg into the skin once a week 22   Sugey Soda, DO   blood glucose test strips (ASCENSIA AUTODISC

## 2023-10-11 NOTE — PROGRESS NOTES
This Virtual RN completed admission requirements. Pt resting comfortably in bed, call light within reach. Educated patient to use call light  before getting out of bed or if any assistance is needed throughout the night. Understanding verbalized, no questions or concerns voiced at this time.

## 2023-10-11 NOTE — FLOWSHEET NOTE
10/11/23 1044   Safe Environment   Safety Measures Bed in low position;Call light within reach;MD at bedside;Side rails X 2;Standard Safety Measures  (virtual nurse safety check completed)     Pt did not respond to voice, camera turned on for safety.  MD at bedside talking with patient

## 2023-10-11 NOTE — PRE SEDATION
IntraVENous, PRN **OR** dextrose bolus 10% 250 mL, 250 mL, IntraVENous, PRN, Yuliana Benjamin PA    Glucagon Emergency KIT 1 mg, 1 mg, SubCUTAneous, PRN, Yuliana Benjamin PA    dextrose 10 % infusion, , IntraVENous, Continuous PRN, EDDY Sotomayor  Prior to Admission medications    Medication Sig Start Date End Date Taking? Authorizing Provider   isosorbide mononitrate (IMDUR) 60 MG extended release tablet Take 1 tablet by mouth daily   Yes Provider, MD Stuart   ezetimibe (ZETIA) 10 MG tablet Take 1 tablet by mouth daily 10/4/23   Varsha Luo MD   lisinopril (PRINIVIL;ZESTRIL) 5 MG tablet Take 1 tablet by mouth twice daily 9/14/23   Brianna Lim DO   metoprolol tartrate (LOPRESSOR) 25 MG tablet TAKE ONE-HALF TABLET BY MOUTH TWICE DAILY 9/14/23   Varsha Luo MD   rosuvastatin (CRESTOR) 40 MG tablet Take 1 tablet by mouth daily 6/7/23   Varsha Luo MD   clopidogrel (PLAVIX) 75 MG tablet TAKE 1 TABLET BY MOUTH ONE TIME A DAY 3/1/23   Varsha Luo MD   sildenafil (REVATIO) 20 MG tablet Take 1 tablet by mouth daily as needed (ed)  Patient not taking: Reported on 10/10/2023 3/1/23   Varsha Luo MD   rivaroxaban (XARELTO) 15 MG TABS tablet Take 1 tablet by mouth daily (with breakfast) 12/7/22   Varsha Luo MD   GABAPENTIN PO Take 300 mg by mouth    Provider, MD Stuart   insulin lispro, 1 Unit Dial, (HUMALOG KWIKPEN) 100 UNIT/ML SOPN Inject 10 Units into the skin in the morning and 10 Units at noon and 10 Units in the evening. Inject before meals. 8/16/22 10/10/23  Alfonso Reyes DO   insulin glargine (LANTUS SOLOSTAR) 100 UNIT/ML injection pen Inject 20 Units into the skin in the morning and 20 Units before bedtime.  8/16/22   Alfonso Reyes DO   Dulaglutide 4.5 MG/0.5ML SOPN Inject 4.5 mg into the skin once a week 8/16/22   Alfonso Reyes DO   blood glucose test strips (ASCENSIA AUTODISC VI;ONE TOUCH ULTRA TEST VI) strip 1 each by In Vitro route 3 times daily Dx E11.69 record)    Anshu Green MD, MD   Electronically signed 10/11/2023 at 6:03 PM

## 2023-10-11 NOTE — ED NOTES
Patient assisted to restroom at this time. Patient resting in bed. Respirations easy and unlabored. No distress noted. Call light within reach.        Robert Duarte RN  10/10/23 9387

## 2023-10-11 NOTE — BRIEF OP NOTE
Brief Postoperative Note      Patient: Andrew Montague  YOB: 1955  MRN: [id number]    Date of Procedure: 10/11/2023    Pre-Op Diagnosis Codes:     * Esophageal dysphagia [R13.19]    Post-Op Diagnosis:  No esophageal foreign body or obstruction, patient with left neck mass         Procedure(s):  EGD POSSIBLE FOREIGN BODY REMOVAL    Surgeon(s):  Hilda Long MD    Assistant:  * No surgical staff found *    Anesthesia: Monitor Anesthesia Care    Estimated Blood Loss (mL): none    Complications: None    Specimens:   * No specimens in log *    Implants:  * No implants in log *      Drains: * No LDAs found *    Findings:  No esophageal foreign body or obstruction, patient with left neck mass        Electronically signed by Hilda Long MD on 10/11/2023 at 6:23 PM

## 2023-10-11 NOTE — PROGRESS NOTES
Comprehensive Nutrition Assessment    Type and Reason for Visit:  Initial, Positive Nutrition Screen (weight loss and decreased oral intake)    Nutrition Recommendations/Plan:   Continue diet as ordered and advance as medically appropriate and encourage PO intake at best efforts. Encouraged compliance to DM recommendations to manage blood sugars. Pt declines ONS options available at [facility]. Monitoring all nutrition aspects and will provide further nutrition recommendations as necessary and appropriate. Malnutrition Assessment:  Malnutrition Status: At risk for malnutrition (Comment) (10/11/23 6802)    Context:  Acute Illness     Findings of the 6 clinical characteristics of malnutrition:  Energy Intake:  50% or less of estimated energy requirements for 5 or more days  Weight Loss:   (no significant weight loss per EMR weight history)     Body Fat Loss:  No significant body fat loss     Muscle Mass Loss:  No significant muscle mass loss    Fluid Accumulation:  No significant fluid accumulation     Strength:  Not Performed    Nutrition Assessment:     Pt. nutritionally compromised AEB minimal PO intake x2 weeks per pt and family report. At risk for further nutrition compromise r/t admit d/t dysphagia, gastroparesis, elevated troponin, and underlying medical condition (PMH: CAD, DM, dilation of esophagus, GERD, gastroparesis, HLD, HTN). Nutrition Related Findings:    Pt. Report/Treatments/Miscellaneous: EGD planned this evening with Dr. Bam Cruz. Pt reports he hasnt been eating well d/t feeling that food is getting stuck in his throat. Reports this is how he felt previously when he needed his esophagus dialated. Reports good appetite just feels he cant get the food in and down. Reports certain ONS upset his stomach and he cant keep them down. Pt reports that medication he had been prescribed for gastroparesis made him get the \"shakes\"- tremor like in his hands and therefore does not take this medication. Reports previously required to have his anal sphincter dilated. Has followed with [facility] in the past for DM. GI Status: reports last BM 1 week ago, reports uncontrolled belching, reports poop has been \"ping pong ball\" consistency. Pertinent Labs: Na 139, K 3.9, BUN 10, creatinine 1, glucose 102, 116  Pertinent Meds: protonix, glycoalx      Wound Type: None       Current Nutrition Intake & Therapies:    Average Meal Intake: 1-25%  Average Supplements Intake:  (declines, only accepting of certain kinds (not offered here))  ADULT DIET; Clear Liquid; No red dye    Anthropometric Measures:  Height: 5' 9\" (175.3 cm)  Ideal Body Weight (IBW): 160 lbs (73 kg)    Admission Body Weight: 197 lb 0.2 oz (89.4 kg) (10/10: no edema)  Current Body Weight: 197 lb 0.2 oz (89.4 kg) (10/10: no edema),   IBW. Current BMI (kg/m2): 29.1  Usual Body Weight:  (pt reports 197 lbs. 10/12/21: 206 lbs 8 oz, 2/25/22: 218 lbs 3 oz, 6/29/22: 199 lbs 6 oz, 11/16/22: 202 lbs 14 oz)                       BMI Categories: Overweight (BMI 25.0-29. 9)    Estimated Daily Nutrient Needs:  Energy Requirements Based On: Kcal/kg  Weight Used for Energy Requirements: Other (Comment) (10/10: 89.4 kg)  Energy (kcal/day): 3929-5589 kcals/day (20-25 kcals/kg)  Weight Used for Protein Requirements: Ideal  Protein (g/day):  g/day (1.2-1.4 g/kg IBW)       Nutrition Diagnosis:   Inadequate protein-energy intake related to swallowing difficulty as evidenced by poor intake prior to admission    Nutrition Interventions:   Food and/or Nutrient Delivery: Continue Current Diet  Nutrition Education/Counseling:  (encouraged adequate oral intake at best efforts, appropriate use of ONS, management of DM)  Coordination of Nutrition Care: Continue to monitor while inpatient, Interdisciplinary Rounds       Goals:     Goals:  (advance diet within two days)       Nutrition Monitoring and Evaluation:      Food/Nutrient Intake Outcomes: Diet Advancement/Tolerance, Food and

## 2023-10-11 NOTE — ACP (ADVANCE CARE PLANNING)
Advance Care Planning     Advance Care Planning Inpatient Note  [facility] Department    Today's Date: 10/11/2023  Unit: [facility] RENAL TELEMETRY 6K    Received request from IDT Member. Upon review of chart and communication with care team, patient's decision making abilities are not in question. . Patient was/were present in the room during visit. Goals of ACP Conversation:  Discuss advance care planning documents    Health Care Decision Makers:       Primary Decision Maker: Jayt - Domestic Partner - [phone]    Secondary Decision Maker: Giovanna Marceloevelia - Child - [phone]  Summary:  Completed New Documents    Advance Care Planning Documents (Patient Wishes):  Healthcare Power of /Advance Directive Appointment of Health Care Agent  Living Will/Advance Directive     Assessment:  Advanced Directives Consult: It was completed and filed. A copy will be sent to the medical records. Interventions:  Assisted in the completion of documents according to patient's wishes at this time        Outcomes/Plan:  New advance directive completed.     Electronically signed by Leonel [facility] on 10/11/2023 at 4:26 PM

## 2023-10-11 NOTE — PROGRESS NOTES
EGD complete, photos taken, no specimens taken  pt tolerated procedure well    Scope Number  gif [id number] used.

## 2023-10-11 NOTE — CARE COORDINATION
Case Management Assessment  Initial Evaluation    Date/Time of Evaluation: 10/11/2023 11:42 AM  Assessment Completed by: Srinivas Lewis RN    If patient is discharged prior to next notation, then this note serves as note for discharge by case management. Patient Name: Christie Lynch                   YOB: 1955  Diagnosis: Dysphagia [R13.10]                   Date / Time: 10/10/2023 10:18 AM  Location: [address] [place] [address]     Patient Admission Status: Observation   Readmission Risk Low 0-14, Mod 15-19), High > 20: No data recorded  Current PCP: Damon Solano, DO  PCP verified by CM? Yes    Chart Reviewed: Yes      History Provided by: Patient  Patient Orientation: Alert and Oriented    Patient Cognition: Alert    Hospitalization in the last 30 days (Readmission):  No    If yes, Readmission Assessment in CM Navigator will be completed. Advance Directives:      Code Status: Full Code   Patient's Primary Decision Maker is: Legal Next of Kin (Consult placed as he wishes to complete)    Primary Decision Maker: [employer] - Domestic Partner - [phone]    Secondary Decision Maker: Giovanna Cruz - Child - [phone]    Discharge Planning:    Patient lives with: Spouse/Significant Other Type of Home: House  Primary Care Giver: Self  Patient Support Systems include: Spouse/Significant Other   Current Financial resources: Medicare  Current community resources: None  Current services prior to admission: None            Current DME:              Type of Home Care services:  None    ADLS  Prior functional level: Independent in ADLs/IADLs  Current functional level: Independent in ADLs/IADLs    Family can provide assistance at DC: Yes  Would you like Case Management to discuss the discharge plan with any other family members/significant others, and if so, who?  No  Plans to Return to Present Housing: Yes  Other Identified Issues/Barriers to RETURNING to current housing: n/a  Potential Assistance needed at

## 2023-10-11 NOTE — PROGRESS NOTES
1827- pt arrives to pacu, respirations unlabored on room air, pt denies pain, VSS    1840 - pt denies pain at this time, Dr. Brandon Amador at bedside    1850 - report called to [facility] - pt meets criteria for discharge from pacu at this time, pt transported to [id number] 18 02 19 in stable condition, Moody Snider, pt family sent to pt room

## 2023-10-11 NOTE — ED NOTES
Patient resting in bed. Respirations easy and unlabored. No distress noted. Call light within reach. Patient denies needs at this time.      Leonor Hamilton RN  10/10/23 5525

## 2023-10-11 NOTE — ED NOTES
Patient resting in bed. Respirations easy and unlabored. No distress noted. Call light within reach. Patient assisted to restroom at this time.            Vineet Myers RN  10/10/23 9022

## 2023-10-11 NOTE — FLOWSHEET NOTE
10/11/23 1330   Safe Environment   Safety Measures Other (comment)  (virtual nurse safety round complete)     Pt did not respond to voice, camera turned on for safety, pt not in bed.     @13:51 called into pt's room again to ensure he is back in bed. VN safety round complete. Pt resting in bed awake and alert. No signs of distress noted.  5 P's addressed, pt voiced no concerns

## 2023-10-12 ENCOUNTER — APPOINTMENT (OUTPATIENT)
Dept: GENERAL RADIOLOGY | Age: 68
End: 2023-10-12
Payer: MEDICARE

## 2023-10-12 ENCOUNTER — APPOINTMENT (OUTPATIENT)
Dept: CT IMAGING | Age: 68
End: 2023-10-12
Payer: MEDICARE

## 2023-10-12 VITALS
DIASTOLIC BLOOD PRESSURE: 73 MMHG | RESPIRATION RATE: 17 BRPM | HEART RATE: 78 BPM | HEIGHT: 69 IN | SYSTOLIC BLOOD PRESSURE: 125 MMHG | BODY MASS INDEX: 29.18 KG/M2 | WEIGHT: 197.01 LBS | TEMPERATURE: 98 F | OXYGEN SATURATION: 97 %

## 2023-10-12 LAB
EKG ATRIAL RATE: 68 BPM
EKG ATRIAL RATE: 69 BPM
EKG P AXIS: 25 DEGREES
EKG P AXIS: 41 DEGREES
EKG P-R INTERVAL: 192 MS
EKG P-R INTERVAL: 210 MS
EKG Q-T INTERVAL: 396 MS
EKG Q-T INTERVAL: 406 MS
EKG Q-T INTERVAL: 406 MS
EKG QRS DURATION: 112 MS
EKG QRS DURATION: 116 MS
EKG QRS DURATION: 118 MS
EKG QTC CALCULATION (BAZETT): 415 MS
EKG QTC CALCULATION (BAZETT): 424 MS
EKG QTC CALCULATION (BAZETT): 431 MS
EKG R AXIS: -38 DEGREES
EKG R AXIS: -49 DEGREES
EKG R AXIS: -54 DEGREES
EKG T AXIS: -13 DEGREES
EKG T AXIS: 13 DEGREES
EKG T AXIS: 6 DEGREES
EKG VENTRICULAR RATE: 63 BPM
EKG VENTRICULAR RATE: 68 BPM
EKG VENTRICULAR RATE: 69 BPM
GLUCOSE BLD STRIP.AUTO-MCNC: 159 MG/DL (ref 70–108)
GLUCOSE BLD STRIP.AUTO-MCNC: 164 MG/DL (ref 70–108)

## 2023-10-12 PROCEDURE — 6370000000 HC RX 637 (ALT 250 FOR IP): Performed by: PHYSICIAN ASSISTANT

## 2023-10-12 PROCEDURE — 2500000003 HC RX 250 WO HCPCS: Performed by: INTERNAL MEDICINE

## 2023-10-12 PROCEDURE — G0378 HOSPITAL OBSERVATION PER HR: HCPCS

## 2023-10-12 PROCEDURE — 6360000004 HC RX CONTRAST MEDICATION: Performed by: PHYSICIAN ASSISTANT

## 2023-10-12 PROCEDURE — 70491 CT SOFT TISSUE NECK W/DYE: CPT

## 2023-10-12 PROCEDURE — 6370000000 HC RX 637 (ALT 250 FOR IP): Performed by: INTERNAL MEDICINE

## 2023-10-12 PROCEDURE — 74220 X-RAY XM ESOPHAGUS 1CNTRST: CPT

## 2023-10-12 PROCEDURE — 2580000003 HC RX 258: Performed by: PHYSICIAN ASSISTANT

## 2023-10-12 PROCEDURE — 96360 HYDRATION IV INFUSION INIT: CPT

## 2023-10-12 PROCEDURE — 99239 HOSP IP/OBS DSCHRG MGMT >30: CPT | Performed by: PHYSICIAN ASSISTANT

## 2023-10-12 PROCEDURE — 82948 REAGENT STRIP/BLOOD GLUCOSE: CPT

## 2023-10-12 PROCEDURE — 96361 HYDRATE IV INFUSION ADD-ON: CPT

## 2023-10-12 RX ADMIN — METOPROLOL TARTRATE 12.5 MG: 25 TABLET, FILM COATED ORAL at 08:42

## 2023-10-12 RX ADMIN — EZETIMIBE 10 MG: 10 TABLET ORAL at 08:42

## 2023-10-12 RX ADMIN — IOPAMIDOL 80 ML: 755 INJECTION, SOLUTION INTRAVENOUS at 11:00

## 2023-10-12 RX ADMIN — LISINOPRIL 5 MG: 5 TABLET ORAL at 08:42

## 2023-10-12 RX ADMIN — ANTACID/ANTIFLATULENT 1 EACH: 380; 550; 10; 10 GRANULE, EFFERVESCENT ORAL at 09:08

## 2023-10-12 RX ADMIN — ROSUVASTATIN CALCIUM 40 MG: 20 TABLET, FILM COATED ORAL at 08:42

## 2023-10-12 RX ADMIN — BARIUM SULFATE 50 ML: 0.6 SUSPENSION ORAL at 09:08

## 2023-10-12 RX ADMIN — CLOPIDOGREL BISULFATE 75 MG: 75 TABLET ORAL at 08:42

## 2023-10-12 RX ADMIN — INSULIN GLARGINE 20 UNITS: 100 INJECTION, SOLUTION SUBCUTANEOUS at 08:41

## 2023-10-12 RX ADMIN — BARIUM SULFATE 140 ML: 980 POWDER, FOR SUSPENSION ORAL at 09:08

## 2023-10-12 RX ADMIN — SODIUM CHLORIDE, POTASSIUM CHLORIDE, SODIUM LACTATE AND CALCIUM CHLORIDE: 600; 310; 30; 20 INJECTION, SOLUTION INTRAVENOUS at 10:37

## 2023-10-12 NOTE — DISCHARGE SUMMARY
pantoprazole 40 MG tablet  Commonly known as: PROTONIX  Take 1 tablet by mouth every morning (before breakfast)     rivaroxaban 15 MG Tabs tablet  Commonly known as: Xarelto  Take 1 tablet by mouth daily (with breakfast)     * Rollator Ultra-Light Misc  Use Rollator for stability with ambulation. * CPAP Machine Misc  by Does not apply route Please change Bipap to 10/7cm H20.     rosuvastatin 40 MG tablet  Commonly known as: CRESTOR  Take 1 tablet by mouth daily     Vitamin D3 125 MCG (5000 UT) Tabs           * This list has 2 medication(s) that are the same as other medications prescribed for you. Read the directions carefully, and ask your doctor or other care provider to review them with you.                 ASK your doctor about these medications      amitriptyline 25 MG tablet  Commonly known as: ELAVIL  Take 1 tablet by mouth nightly     sildenafil 20 MG tablet  Commonly known as: REVATIO  Take 1 tablet by mouth daily as needed (ed)               Labs :-  Recent Results (from the past 72 hour(s))   CBC with Auto Differential    Collection Time: 10/10/23 10:21 AM   Result Value Ref Range    WBC 6.9 4.8 - 10.8 thou/mm3    RBC 4.74 4.70 - 6.10 mill/mm3    Hemoglobin 14.3 14.0 - 18.0 gm/dl    Hematocrit 43.6 42.0 - 52.0 %    MCV 92.0 80.0 - 94.0 fL    MCH 30.2 26.0 - 33.0 pg    MCHC 32.8 32.2 - 35.5 gm/dl    RDW-CV 13.2 11.5 - 14.5 %    RDW-SD 45.1 (H) 35.0 - 45.0 fL    Platelets 120 354 - 654 thou/mm3    MPV 9.0 (L) 9.4 - 12.4 fL    Seg Neutrophils 68.1 %    Lymphocytes 21.6 %    Monocytes 8.5 %    Eosinophils 1.2 %    Basophils 0.3 %    Immature Granulocytes 0.3 %    Segs Absolute 4.7 1.8 - 7.7 thou/mm3    Lymphocytes Absolute 1.5 1.0 - 4.8 thou/mm3    Monocytes Absolute 0.6 0.4 - 1.3 thou/mm3    Eosinophils Absolute 0.1 0.0 - 0.4 thou/mm3    Basophils Absolute 0.0 0.0 - 0.1 thou/mm3    Immature Grans (Abs) 0.02 0.00 - 0.07 thou/mm3    nRBC 0 /100 wbc   Basic Metabolic Panel    Collection Time: 10/10/23 PT/OT  OTHERS:-    Disposition: home  Condition at Discharge: Stable    Time Spent:- 40 minutes    Electronically signed by EDDY Santiago on 10/12/23 at 2:59 PM EDT   Discharging Hospitalist

## 2023-10-12 NOTE — PLAN OF CARE
Problem: Chronic Conditions and Co-morbidities  Goal: Patient's chronic conditions and co-morbidity symptoms are monitored and maintained or improved  Outcome: Progressing  Flowsheets (Taken 10/11/2023 1930)  Care Plan - Patient's Chronic Conditions and Co-Morbidity Symptoms are Monitored and Maintained or Improved: Monitor and assess patient's chronic conditions and comorbid symptoms for stability, deterioration, or improvement     Problem: Pain  Goal: Verbalizes/displays adequate comfort level or baseline comfort level  Outcome: Progressing     Problem: Discharge Planning  Goal: Discharge to home or other facility with appropriate resources  Outcome: Progressing  Flowsheets (Taken 10/11/2023 1930)  Discharge to home or other facility with appropriate resources: Identify barriers to discharge with patient and caregiver     Problem: Nutrition Deficit:  Goal: Optimize nutritional status  10/12/2023 0203 by Jack Omer RN  Outcome: Progressing  10/11/2023 1633 by Malia Rios RD  Flowsheets (Taken 10/11/2023 1633)  Nutrient intake appropriate for improving, restoring, or maintaining nutritional needs:   Assess nutritional status and recommend course of action   Monitor oral intake, labs, and treatment plans   Recommend appropriate diets, oral nutritional supplements, and vitamin/mineral supplements   Provide specific nutrition education to patient or family as appropriate

## 2023-10-12 NOTE — PLAN OF CARE
Problem: Chronic Conditions and Co-morbidities  Goal: Patient's chronic conditions and co-morbidity symptoms are monitored and maintained or improved  10/12/2023 1109 by Charles Gil RN  Outcome: Progressing  Flowsheets (Taken 10/12/2023 1109)  Care Plan - Patient's Chronic Conditions and Co-Morbidity Symptoms are Monitored and Maintained or Improved:   Monitor and assess patient's chronic conditions and comorbid symptoms for stability, deterioration, or improvement   Collaborate with multidisciplinary team to address chronic and comorbid conditions and prevent exacerbation or deterioration   Update acute care plan with appropriate goals if chronic or comorbid symptoms are exacerbated and prevent overall improvement and discharge    Problem: Pain  Goal: Verbalizes/displays adequate comfort level or baseline comfort level  10/12/2023 1109 by Charles Gil RN  Outcome: Progressing  Flowsheets (Taken 10/12/2023 1109)  Verbalizes/displays adequate comfort level or baseline comfort level:   Encourage patient to monitor pain and request assistance   Assess pain using appropriate pain scale   Administer analgesics based on type and severity of pain and evaluate response   Implement non-pharmacological measures as appropriate and evaluate response  Note: No complaint of pain voiced at this time. Continue to monitor. PRN medications available if needed. Problem: Discharge Planning  Goal: Discharge to home or other facility with appropriate resources  10/12/2023 1109 by Charles Gil RN  Outcome: Progressing  Flowsheets (Taken 10/12/2023 1109)  Discharge to home or other facility with appropriate resources:   Identify barriers to discharge with patient and caregiver   Arrange for needed discharge resources and transportation as appropriate   Identify discharge learning needs (meds, wound care, etc)  Note: Patient plans to discharge home when medically ready.     Problem: Nutrition Deficit:  Goal: Optimize

## 2023-10-12 NOTE — OP NOTE
[place] [address]                                OPERATIVE REPORT    PATIENT NAME: Mika Pena                      :        1955  MED REC NO:   [id number]                           ROOM:       0019  ACCOUNT NO:   [de-identified]                           ADMIT DATE: 10/10/2023  PROVIDER:     MIRNA Rock Samantha PROCEDURE:  10/11/2023    SURGEON:  Ramona Copeland MD    INDICATION:  The patient with dysphagia, not able to eat food, possible  food impaction. Plan today for EGD to evaluate. DESCRIPTION OF PROCEDURE:  The patient brought to GI lab room #14. The  patient sedated with endotracheal intubation by Anesthesia Service as  the  diagnosis of food impaction in the esophagus for his safety. The patient monitored during procedure with pulse oximetry, blood  pressure monitoring and oxygen by endotracheal intubation. The patient positioned in left decubitus position. Mouthpiece was put  to keep the oral cavity open. Upper scope EGD Olympus 190 series  advanced with no difficulty up to the duodenum. Esophagus appeared  normal.  I felt the UES  might be slightly tight that could be because of  endotracheal intubation and the cuff in the trachea. Really cannot  confirm with the endoscopy. Scope advanced to the stomach. Esophagus  was wide open. No stricture. No erosions or ulcers seen. Very mild  thickness mucosa with very mild gastroesophageal reflux. Mucosa and  fundus appeared normal.  Antrum appeared normal.  Pylorus appeared  normal.  No biopsy obtained. No dilation done as the patient is on  Plavix and intubated at this time. The scope was withdrawn with no  immediate complication. IMPRESSION:  1. Mild acid reflux. No strictures seen.   2.  Felt possible upper sphincter could be because of intubationUGI UGI  but that is  difficult to confirm because the patient was intubated during but that is  difficult to confirm because the patient was intubated during the  endoscopy. PLAN:  1. UGI salivary gland exam tomorrow to evaluate. 2.  Consult Dr. Prakash Jimenez from ENT to evaluate for neck tumor,  possible to be salivary gland related related. He might make decision for imaging  study CT scan of the neck for the  patient in the future.         Sue Ocampo M.D.    D: 10/11/2023 18:53:39       T: 10/11/2023 22:23:01     AT/LION_TOO_IN  Job#: [id number]     Doc#: [id number]    CC:  Liberty Garcia D.O.

## 2023-10-13 ENCOUNTER — TELEPHONE (OUTPATIENT)
Dept: FAMILY MEDICINE CLINIC | Age: 68
End: 2023-10-13

## 2023-10-13 ENCOUNTER — CARE COORDINATION (OUTPATIENT)
Dept: CASE MANAGEMENT | Age: 68
End: 2023-10-13

## 2023-10-13 DIAGNOSIS — K11.8 PAROTID MASS: Primary | ICD-10-CM

## 2023-10-13 NOTE — TELEPHONE ENCOUNTER
Care Transitions Initial Follow Up Call    Outreach made within 2 business days of discharge: Yes    Patient: Blanca Antunez Patient : 1955   MRN: [id number]  Reason for Admission: There are no discharge diagnoses documented for the most recent discharge. Discharge Date: 10/12/23       Spoke with: patient    Discharge department/facility: [facility]    TCM Interactive Patient Contact:  Was patient able to fill all prescriptions: N/A  Was patient instructed to bring all medications to the follow-up visit: Yes  Is patient taking all medications as directed in the discharge summary?  Yes  Does patient understand their discharge instructions: Yes  Does patient have questions or concerns that need addressed prior to 7-14 day follow up office visit: yes - see TE dated 10/13/2023    Scheduled appointment with PCP within 7-14 days    Follow Up  Future Appointments   Date Time Provider [address] [place] [address]   2024 10:45 AM Pastora Momin MD N [facility] LPN

## 2023-10-13 NOTE — CARE COORDINATION
Care Transitions Initial Outreach Attempt-1st attempt    Call within 2 business days of discharge: Yes   Attempted initial 24 hour transitional call to patient. Left HIPPA compliant VM to return call directly to [phone].     Patient: Willis Diop Patient : 1955 MRN: [id number]    Last Discharge Facility       Date Complaint Diagnosis Description Type Department Provider    10/10/23 Dysphagia Neck mass ED to Hosp-Admission (Discharged) [address] [place], [place]                Noted following upcoming appointments from discharge chart review:   [facility] follow up appointment(s):   Future Appointments   Date Time Provider [address]   2024 10:45 AM Clara Hernandez MD N [facility] [place]     Non-[facility]  follow up appointment(s): na

## 2023-10-13 NOTE — TELEPHONE ENCOUNTER
Called the patient for his hospital follow up. He is requesting a referral to Dr. Prakash Jimenez for his parotid mass. Please advise. If no call back the patient will know that ENT will call him to schedule.

## 2023-10-15 NOTE — CONSULTS
[facility] [address] [place] [address]    Patient's Name/Date of Birth: Sharif Humphrey / [birth year] (57 y.o.)    Date: October 11, 2023     Referring Provider: EDDY Roblero    CHIEF COMPLAINT: Elevated troponin, Pre-op      HPI: This is a pleasant 80 y.o. male with a PMH of CAD s/p PCI, hypertension, dyslipidemia, DM, GERD, s/p dilation of esophagus for esophageal strictures, gastroparesis, chiari malformation, hx of PE on Xarelto and tobacco abuse who presented to [facility] on 10/10/23 for evaluation dysphagia and that he has been unable to eat any food for the past several days due to feeling like food is getting stuck in his throat. Patient was scheduled for urgent egd for evaluation of possible food impaction but was cancelled due to Troponin in the ED was elevated at 26 with repeat at 25. Patient denies any recent chest pressure or chest pain at this time. He has some chest pain that he locates to his superior portion of his epigastrium but is his baseline. This pain does not worsen with exertion and no recent change. Patient has no exertional chest pain/pressure. Patient walk several acres at work regularly and denies developing chest pain. He does occasionally get short of breath with walking acres. Patient denies any recent nocturnal dyspnea or orthopnea and no new onset of lower extremity edema. Patient was seen and evaluated morning of 10/11/23 and denies chest pain/pressure, SOB, dizziness, lightheadedness, HA. He has some abdominal pain and nausea but this is associated with his dysphagia. Echo: 10/15/23:  Normal left ventricle size and systolic function. Ejection fraction was estimated at 60-65%. There were no regional left ventricular wall motion abnormalities and wall thickness was within normal limits. [facility], 10/21/21 - Intermediated 60% stenosis of mid LA. Fractional flow reserve measurement was consistent with hemodynamically insignificant stenosis with an FFR of 0.83.  Severely
muscles. VITAL SIGNS:  Blood pressure stable , respirations 20, pulse 74,  temperature 97. His weight 202. HEENT:  His head is atraumatic. Sclerae anicteric. His oral cavity, no  lesion seen. NECK:  Supple. Felt a mass in the left side of the neck close to the  parotid gland which is hard and nontender. He has no cervical  lymphadenopathy. CHEST:  Good air entry bilaterally. No crepitation. No rales. CARDIOVASCULAR:  S1, S2, regular. No murmur. ABDOMEN:  Slight epigastric tenderness. No organomegaly. No stigmata  of chronic liver disease. No ascites. EXTREMITIES:  Normal.    LABORATORY DATA:  His sodium of 141, potassium . His BUN is 14,  creatinine is 1. His ALT and AST are normal.  His alkaline phosphatase  is 162. His H and H are 14.3 and 43.6. IMPRESSION:  1. Likely food impaction as the patient not able to swallow his own  saliva after eating [place] food few days ago. 2.  History of esophageal stricture with dilation in the past.  We will  need an urgent EGD today. 3.  Dehydration. The patient has not been eating enough for a few days  because of food impaction. 4.  Anxiety. 5.  Gastroparesis. 6.  Weight loss. 7.  Chronic disease stable at this time. PLAN:  1. Upper endoscopy done today to evaluate. The patient in his way for  endoscopy to be done today. 2.  Follow with control of diabetes by the admitting service. Thank you allowing me to participate in this patient's care.         Millicent Fernandez M.D.    D: 10/14/2023 23:41:52       T: 10/15/2023 1:45:33     LUANN/JUDAH  Job#: [id number]     Doc#: [id number]    CC:

## 2023-10-16 ENCOUNTER — CARE COORDINATION (OUTPATIENT)
Dept: CASE MANAGEMENT | Age: 68
End: 2023-10-16

## 2023-10-16 NOTE — CARE COORDINATION
Care Transitions Initial Outreach Attempt-2nd attempt    Call within 2 business days of discharge: Yes   Attempted initial 24 hour transitional call to patient. Left HIPPA compliant VM to return call directly to [phone]. If no return call, CTN will sign off-2nd attempt. Unable to reach letter sent via [address] [place] [address].     Patient: Hailey Roper Patient : 1955 MRN: [id number]    Last Discharge Facility       Date Complaint Diagnosis Description Type Department Provider    10/10/23 Dysphagia Neck mass ED to Hosp-Admission (Discharged) [address] [place] [address]              Noted following upcoming appointments from discharge chart review:   [address] follow up appointment(s):   Future Appointments   Date Time Provider [facility] [address]   2024 10:45 AM Betty Swann MD N [facility]     Non-[facility]  follow up appointment(s): na

## 2024-01-03 ENCOUNTER — APPOINTMENT (OUTPATIENT)
Dept: CT IMAGING | Age: 69
End: 2024-01-03
Payer: MEDICARE

## 2024-01-03 ENCOUNTER — HOSPITAL ENCOUNTER (INPATIENT)
Age: 69
LOS: 2 days | Discharge: HOME | End: 2024-01-05
Attending: PHYSICIAN ASSISTANT | Admitting: PHYSICIAN ASSISTANT
Payer: MEDICARE

## 2024-01-03 ENCOUNTER — APPOINTMENT (OUTPATIENT)
Dept: GENERAL RADIOLOGY | Age: 69
End: 2024-01-03
Payer: MEDICARE

## 2024-01-03 DIAGNOSIS — Z79.4 TYPE 2 DIABETES MELLITUS WITH OTHER SPECIFIED COMPLICATION, WITH LONG-TERM CURRENT USE OF INSULIN (HCC): ICD-10-CM

## 2024-01-03 DIAGNOSIS — R07.9 CHEST PAIN, UNSPECIFIED TYPE: Primary | ICD-10-CM

## 2024-01-03 DIAGNOSIS — I25.10 CAD IN NATIVE ARTERY: ICD-10-CM

## 2024-01-03 DIAGNOSIS — E11.69 TYPE 2 DIABETES MELLITUS WITH OTHER SPECIFIED COMPLICATION, WITH LONG-TERM CURRENT USE OF INSULIN (HCC): ICD-10-CM

## 2024-01-03 DIAGNOSIS — I24.9 ACS (ACUTE CORONARY SYNDROME) (HCC): ICD-10-CM

## 2024-01-03 DIAGNOSIS — K31.84 GASTROPARESIS: ICD-10-CM

## 2024-01-03 DIAGNOSIS — E11.65 TYPE 2 DIABETES MELLITUS WITH HYPERGLYCEMIA, WITH LONG-TERM CURRENT USE OF INSULIN (HCC): ICD-10-CM

## 2024-01-03 DIAGNOSIS — Z79.4 TYPE 2 DIABETES MELLITUS WITH HYPERGLYCEMIA, WITH LONG-TERM CURRENT USE OF INSULIN (HCC): ICD-10-CM

## 2024-01-03 PROBLEM — I20.0 UNSTABLE ANGINA (HCC): Status: ACTIVE | Noted: 2024-01-03

## 2024-01-03 LAB
ANION GAP SERPL CALC-SCNC: 12 MEQ/L (ref 8–16)
APTT PPP: 34.6 SECONDS (ref 22–38)
BASOPHILS ABSOLUTE: 0 THOU/MM3 (ref 0–0.1)
BASOPHILS NFR BLD AUTO: 0.6 %
BUN SERPL-MCNC: 19 MG/DL (ref 7–22)
CALCIUM SERPL-MCNC: 9.5 MG/DL (ref 8.5–10.5)
CHLORIDE SERPL-SCNC: 101 MEQ/L (ref 98–111)
CO2 SERPL-SCNC: 23 MEQ/L (ref 23–33)
CREAT SERPL-MCNC: 1.3 MG/DL (ref 0.4–1.2)
DEPRECATED RDW RBC AUTO: 42.6 FL (ref 35–45)
DEPRECATED RDW RBC AUTO: 42.8 FL (ref 35–45)
EKG ATRIAL RATE: 91 BPM
EKG P AXIS: 44 DEGREES
EKG P-R INTERVAL: 164 MS
EKG Q-T INTERVAL: 358 MS
EKG QRS DURATION: 96 MS
EKG QTC CALCULATION (BAZETT): 440 MS
EKG R AXIS: -70 DEGREES
EKG T AXIS: 32 DEGREES
EKG VENTRICULAR RATE: 91 BPM
EOSINOPHIL NFR BLD AUTO: 1.5 %
EOSINOPHILS ABSOLUTE: 0.1 THOU/MM3 (ref 0–0.4)
ERYTHROCYTE [DISTWIDTH] IN BLOOD BY AUTOMATED COUNT: 12.9 % (ref 11.5–14.5)
ERYTHROCYTE [DISTWIDTH] IN BLOOD BY AUTOMATED COUNT: 13.1 % (ref 11.5–14.5)
FLUAV RNA RESP QL NAA+PROBE: NOT DETECTED
FLUBV RNA RESP QL NAA+PROBE: NOT DETECTED
GFR SERPL CREATININE-BSD FRML MDRD: 60 ML/MIN/1.73M2
GLUCOSE BLD STRIP.AUTO-MCNC: 138 MG/DL (ref 70–108)
GLUCOSE BLD STRIP.AUTO-MCNC: 170 MG/DL (ref 70–108)
GLUCOSE SERPL-MCNC: 171 MG/DL (ref 70–108)
HCT VFR BLD AUTO: 43.3 % (ref 42–52)
HCT VFR BLD AUTO: 46.4 % (ref 42–52)
HEPARIN UNFRACTIONATED: 0.12 U/ML (ref 0.3–0.7)
HGB BLD-MCNC: 14.5 GM/DL (ref 14–18)
HGB BLD-MCNC: 15.5 GM/DL (ref 14–18)
IMM GRANULOCYTES # BLD AUTO: 0.02 THOU/MM3 (ref 0–0.07)
IMM GRANULOCYTES NFR BLD AUTO: 0.3 %
INR PPP: 1.07 (ref 0.85–1.13)
LYMPHOCYTES ABSOLUTE: 1.6 THOU/MM3 (ref 1–4.8)
LYMPHOCYTES NFR BLD AUTO: 23.5 %
MCH RBC QN AUTO: 29.8 PG (ref 26–33)
MCH RBC QN AUTO: 30 PG (ref 26–33)
MCHC RBC AUTO-ENTMCNC: 33.4 GM/DL (ref 32.2–35.5)
MCHC RBC AUTO-ENTMCNC: 33.5 GM/DL (ref 32.2–35.5)
MCV RBC AUTO: 89.2 FL (ref 80–94)
MCV RBC AUTO: 89.5 FL (ref 80–94)
MONOCYTES ABSOLUTE: 0.5 THOU/MM3 (ref 0.4–1.3)
MONOCYTES NFR BLD AUTO: 8.3 %
NEUTROPHILS NFR BLD AUTO: 65.8 %
NRBC BLD AUTO-RTO: 0 /100 WBC
NT-PROBNP SERPL IA-MCNC: 37.5 PG/ML (ref 0–124)
OSMOLALITY SERPL CALC.SUM OF ELEC: 278.2 MOSMOL/KG (ref 275–300)
PLATELET # BLD AUTO: 250 THOU/MM3 (ref 130–400)
PLATELET # BLD AUTO: 269 THOU/MM3 (ref 130–400)
PMV BLD AUTO: 8.7 FL (ref 9.4–12.4)
PMV BLD AUTO: 8.9 FL (ref 9.4–12.4)
POTASSIUM SERPL-SCNC: 4.1 MEQ/L (ref 3.5–5.2)
RBC # BLD AUTO: 4.84 MILL/MM3 (ref 4.7–6.1)
RBC # BLD AUTO: 5.2 MILL/MM3 (ref 4.7–6.1)
SARS-COV-2 RNA RESP QL NAA+PROBE: NOT DETECTED
SEGMENTED NEUTROPHILS ABSOLUTE COUNT: 4.3 THOU/MM3 (ref 1.8–7.7)
SODIUM SERPL-SCNC: 136 MEQ/L (ref 135–145)
TROPONIN, HIGH SENSITIVITY: 25 NG/L (ref 0–12)
TROPONIN, HIGH SENSITIVITY: 29 NG/L (ref 0–12)
WBC # BLD AUTO: 6.3 THOU/MM3 (ref 4.8–10.8)
WBC # BLD AUTO: 6.6 THOU/MM3 (ref 4.8–10.8)

## 2024-01-03 PROCEDURE — 85520 HEPARIN ASSAY: CPT

## 2024-01-03 PROCEDURE — 93005 ELECTROCARDIOGRAM TRACING: CPT | Performed by: PHYSICIAN ASSISTANT

## 2024-01-03 PROCEDURE — 84484 ASSAY OF TROPONIN QUANT: CPT

## 2024-01-03 PROCEDURE — 83880 ASSAY OF NATRIURETIC PEPTIDE: CPT

## 2024-01-03 PROCEDURE — 80048 BASIC METABOLIC PNL TOTAL CA: CPT

## 2024-01-03 PROCEDURE — 2580000003 HC RX 258: Performed by: PHYSICIAN ASSISTANT

## 2024-01-03 PROCEDURE — 85027 COMPLETE CBC AUTOMATED: CPT

## 2024-01-03 PROCEDURE — 36415 COLL VENOUS BLD VENIPUNCTURE: CPT

## 2024-01-03 PROCEDURE — 1200000003 HC TELEMETRY R&B

## 2024-01-03 PROCEDURE — 93010 ELECTROCARDIOGRAM REPORT: CPT | Performed by: INTERNAL MEDICINE

## 2024-01-03 PROCEDURE — 85730 THROMBOPLASTIN TIME PARTIAL: CPT

## 2024-01-03 PROCEDURE — 87636 SARSCOV2 & INF A&B AMP PRB: CPT

## 2024-01-03 PROCEDURE — 99285 EMERGENCY DEPT VISIT HI MDM: CPT

## 2024-01-03 PROCEDURE — 71275 CT ANGIOGRAPHY CHEST: CPT

## 2024-01-03 PROCEDURE — 71045 X-RAY EXAM CHEST 1 VIEW: CPT

## 2024-01-03 PROCEDURE — 85025 COMPLETE CBC W/AUTO DIFF WBC: CPT

## 2024-01-03 PROCEDURE — 6360000002 HC RX W HCPCS: Performed by: PHYSICIAN ASSISTANT

## 2024-01-03 PROCEDURE — 85610 PROTHROMBIN TIME: CPT

## 2024-01-03 PROCEDURE — 99223 1ST HOSP IP/OBS HIGH 75: CPT | Performed by: INTERNAL MEDICINE

## 2024-01-03 PROCEDURE — 6360000004 HC RX CONTRAST MEDICATION

## 2024-01-03 PROCEDURE — 82948 REAGENT STRIP/BLOOD GLUCOSE: CPT

## 2024-01-03 PROCEDURE — 6370000000 HC RX 637 (ALT 250 FOR IP): Performed by: PHYSICIAN ASSISTANT

## 2024-01-03 RX ORDER — ONDANSETRON 4 MG/1
4 TABLET, ORALLY DISINTEGRATING ORAL EVERY 8 HOURS PRN
Status: DISCONTINUED | OUTPATIENT
Start: 2024-01-03 | End: 2024-01-05 | Stop reason: HOSPADM

## 2024-01-03 RX ORDER — POLYETHYLENE GLYCOL 3350 17 G/17G
17 POWDER, FOR SOLUTION ORAL DAILY PRN
Status: DISCONTINUED | OUTPATIENT
Start: 2024-01-03 | End: 2024-01-05 | Stop reason: HOSPADM

## 2024-01-03 RX ORDER — SODIUM CHLORIDE 0.9 % (FLUSH) 0.9 %
5-40 SYRINGE (ML) INJECTION EVERY 12 HOURS SCHEDULED
Status: DISCONTINUED | OUTPATIENT
Start: 2024-01-03 | End: 2024-01-04 | Stop reason: SDUPTHER

## 2024-01-03 RX ORDER — SODIUM CHLORIDE 9 MG/ML
INJECTION, SOLUTION INTRAVENOUS PRN
Status: DISCONTINUED | OUTPATIENT
Start: 2024-01-03 | End: 2024-01-04 | Stop reason: SDUPTHER

## 2024-01-03 RX ORDER — HEPARIN SODIUM 10000 [USP'U]/100ML
5-30 INJECTION, SOLUTION INTRAVENOUS CONTINUOUS
Status: DISCONTINUED | OUTPATIENT
Start: 2024-01-03 | End: 2024-01-05 | Stop reason: HOSPADM

## 2024-01-03 RX ORDER — SODIUM CHLORIDE 9 MG/ML
INJECTION, SOLUTION INTRAVENOUS CONTINUOUS
Status: ACTIVE | OUTPATIENT
Start: 2024-01-03 | End: 2024-01-04

## 2024-01-03 RX ORDER — HEPARIN SODIUM 1000 [USP'U]/ML
4000 INJECTION, SOLUTION INTRAVENOUS; SUBCUTANEOUS PRN
Status: DISCONTINUED | OUTPATIENT
Start: 2024-01-03 | End: 2024-01-05 | Stop reason: HOSPADM

## 2024-01-03 RX ORDER — INSULIN LISPRO 100 [IU]/ML
10 INJECTION, SOLUTION INTRAVENOUS; SUBCUTANEOUS
Status: DISCONTINUED | OUTPATIENT
Start: 2024-01-03 | End: 2024-01-03

## 2024-01-03 RX ORDER — INSULIN LISPRO 100 [IU]/ML
10 INJECTION, SOLUTION INTRAVENOUS; SUBCUTANEOUS
Status: DISCONTINUED | OUTPATIENT
Start: 2024-01-03 | End: 2024-01-05 | Stop reason: HOSPADM

## 2024-01-03 RX ORDER — CYCLOBENZAPRINE HCL 10 MG
5 TABLET ORAL PRN
Status: DISCONTINUED | OUTPATIENT
Start: 2024-01-03 | End: 2024-01-05 | Stop reason: HOSPADM

## 2024-01-03 RX ORDER — MAGNESIUM SULFATE IN WATER 40 MG/ML
2000 INJECTION, SOLUTION INTRAVENOUS PRN
Status: DISCONTINUED | OUTPATIENT
Start: 2024-01-03 | End: 2024-01-05 | Stop reason: HOSPADM

## 2024-01-03 RX ORDER — EZETIMIBE 10 MG/1
10 TABLET ORAL DAILY
Status: DISCONTINUED | OUTPATIENT
Start: 2024-01-03 | End: 2024-01-05 | Stop reason: HOSPADM

## 2024-01-03 RX ORDER — HEPARIN SODIUM 1000 [USP'U]/ML
4000 INJECTION, SOLUTION INTRAVENOUS; SUBCUTANEOUS ONCE
Status: COMPLETED | OUTPATIENT
Start: 2024-01-03 | End: 2024-01-03

## 2024-01-03 RX ORDER — INSULIN GLARGINE 100 [IU]/ML
10 INJECTION, SOLUTION SUBCUTANEOUS 2 TIMES DAILY
Status: DISCONTINUED | OUTPATIENT
Start: 2024-01-03 | End: 2024-01-05 | Stop reason: HOSPADM

## 2024-01-03 RX ORDER — PANTOPRAZOLE SODIUM 40 MG/1
40 TABLET, DELAYED RELEASE ORAL
Status: DISCONTINUED | OUTPATIENT
Start: 2024-01-04 | End: 2024-01-05 | Stop reason: HOSPADM

## 2024-01-03 RX ORDER — CLOPIDOGREL BISULFATE 75 MG/1
75 TABLET ORAL DAILY
Status: DISCONTINUED | OUTPATIENT
Start: 2024-01-03 | End: 2024-01-05 | Stop reason: HOSPADM

## 2024-01-03 RX ORDER — LISINOPRIL 5 MG/1
5 TABLET ORAL 2 TIMES DAILY
Status: DISCONTINUED | OUTPATIENT
Start: 2024-01-03 | End: 2024-01-05

## 2024-01-03 RX ORDER — POTASSIUM CHLORIDE 20 MEQ/1
40 TABLET, EXTENDED RELEASE ORAL PRN
Status: DISCONTINUED | OUTPATIENT
Start: 2024-01-03 | End: 2024-01-05 | Stop reason: HOSPADM

## 2024-01-03 RX ORDER — HEPARIN SODIUM 1000 [USP'U]/ML
2000 INJECTION, SOLUTION INTRAVENOUS; SUBCUTANEOUS PRN
Status: DISCONTINUED | OUTPATIENT
Start: 2024-01-03 | End: 2024-01-05 | Stop reason: HOSPADM

## 2024-01-03 RX ORDER — ACETAMINOPHEN 325 MG/1
650 TABLET ORAL EVERY 6 HOURS PRN
Status: DISCONTINUED | OUTPATIENT
Start: 2024-01-03 | End: 2024-01-04 | Stop reason: SDUPTHER

## 2024-01-03 RX ORDER — SODIUM CHLORIDE 0.9 % (FLUSH) 0.9 %
5-40 SYRINGE (ML) INJECTION PRN
Status: DISCONTINUED | OUTPATIENT
Start: 2024-01-03 | End: 2024-01-04 | Stop reason: SDUPTHER

## 2024-01-03 RX ORDER — ACETAMINOPHEN 650 MG/1
650 SUPPOSITORY RECTAL EVERY 6 HOURS PRN
Status: DISCONTINUED | OUTPATIENT
Start: 2024-01-03 | End: 2024-01-05 | Stop reason: HOSPADM

## 2024-01-03 RX ORDER — IBUPROFEN 600 MG/1
1 TABLET ORAL PRN
Status: DISCONTINUED | OUTPATIENT
Start: 2024-01-03 | End: 2024-01-05 | Stop reason: HOSPADM

## 2024-01-03 RX ORDER — ISOSORBIDE MONONITRATE 60 MG/1
60 TABLET, EXTENDED RELEASE ORAL DAILY
Status: DISCONTINUED | OUTPATIENT
Start: 2024-01-03 | End: 2024-01-05 | Stop reason: HOSPADM

## 2024-01-03 RX ORDER — POTASSIUM CHLORIDE 7.45 MG/ML
10 INJECTION INTRAVENOUS PRN
Status: DISCONTINUED | OUTPATIENT
Start: 2024-01-03 | End: 2024-01-05 | Stop reason: HOSPADM

## 2024-01-03 RX ORDER — ONDANSETRON 2 MG/ML
4 INJECTION INTRAMUSCULAR; INTRAVENOUS EVERY 6 HOURS PRN
Status: DISCONTINUED | OUTPATIENT
Start: 2024-01-03 | End: 2024-01-05 | Stop reason: HOSPADM

## 2024-01-03 RX ORDER — 0.9 % SODIUM CHLORIDE 0.9 %
500 INTRAVENOUS SOLUTION INTRAVENOUS ONCE
Status: DISCONTINUED | OUTPATIENT
Start: 2024-01-03 | End: 2024-01-05 | Stop reason: HOSPADM

## 2024-01-03 RX ORDER — ROSUVASTATIN CALCIUM 20 MG/1
40 TABLET, COATED ORAL DAILY
Status: DISCONTINUED | OUTPATIENT
Start: 2024-01-03 | End: 2024-01-05 | Stop reason: HOSPADM

## 2024-01-03 RX ORDER — DEXTROSE MONOHYDRATE 100 MG/ML
INJECTION, SOLUTION INTRAVENOUS CONTINUOUS PRN
Status: DISCONTINUED | OUTPATIENT
Start: 2024-01-03 | End: 2024-01-05 | Stop reason: HOSPADM

## 2024-01-03 RX ADMIN — HEPARIN SODIUM 4000 UNITS: 1000 INJECTION INTRAVENOUS; SUBCUTANEOUS at 18:45

## 2024-01-03 RX ADMIN — IOPAMIDOL 80 ML: 755 INJECTION, SOLUTION INTRAVENOUS at 12:25

## 2024-01-03 RX ADMIN — LISINOPRIL 5 MG: 5 TABLET ORAL at 21:19

## 2024-01-03 RX ADMIN — ISOSORBIDE MONONITRATE 60 MG: 60 TABLET, EXTENDED RELEASE ORAL at 21:20

## 2024-01-03 RX ADMIN — METOPROLOL TARTRATE 12.5 MG: 25 TABLET, FILM COATED ORAL at 21:18

## 2024-01-03 RX ADMIN — SODIUM CHLORIDE: 9 INJECTION, SOLUTION INTRAVENOUS at 18:48

## 2024-01-03 RX ADMIN — ROSUVASTATIN CALCIUM 40 MG: 20 TABLET, FILM COATED ORAL at 21:19

## 2024-01-03 RX ADMIN — HEPARIN SODIUM 11 UNITS/KG/HR: 10000 INJECTION, SOLUTION INTRAVENOUS at 18:45

## 2024-01-03 RX ADMIN — INSULIN GLARGINE 10 UNITS: 100 INJECTION, SOLUTION SUBCUTANEOUS at 21:22

## 2024-01-03 ASSESSMENT — PAIN SCALES - GENERAL
PAINLEVEL_OUTOF10: 5
PAINLEVEL_OUTOF10: 2

## 2024-01-03 ASSESSMENT — PAIN - FUNCTIONAL ASSESSMENT
PAIN_FUNCTIONAL_ASSESSMENT: 0-10
PAIN_FUNCTIONAL_ASSESSMENT: ACTIVITIES ARE NOT PREVENTED

## 2024-01-03 ASSESSMENT — PAIN DESCRIPTION - DESCRIPTORS: DESCRIPTORS: PRESSURE

## 2024-01-03 ASSESSMENT — PAIN DESCRIPTION - LOCATION
LOCATION: CHEST
LOCATION: CHEST

## 2024-01-03 ASSESSMENT — HEART SCORE: ECG: 0

## 2024-01-03 ASSESSMENT — PAIN DESCRIPTION - ORIENTATION: ORIENTATION: ANTERIOR

## 2024-01-03 NOTE — H&P
Hospitalist History & Physical    Patient:  Chris Estevez    Unit/Bed:36/036A  YOB: 1955  MRN: [id number]   Acct: [id number]   PCP: Kelvin Hardy DO  Code Status: Full Code    Date of Service: Pt seen/examined on 01/03/24 and admitted to Inpatient with expected LOS less than two midnights due to medical therapy.     Chief Complaint: Chest pain    Assessment/Plan:    Unstable angina  Initial troponin 29, repeat troponin 25  EKG without ischemic changes  Heart score 5 per cardiology.  Cardiology consulted in the emergency department.  Start heparin.  Plan for cardiac catheterization tomorrow morning.  Hold Xarelto.  Continue Plavix.  YANCI  Creatinine 1.3.  Baseline appears to be 0.9-1.  IV fluids.  Repeat BMP tomorrow morning.  Coronary artery disease  Last cardiac catheterization in October 2021 with PCI and GI to RCA.  60% stenosis noted of LAD at that time with FFR 0.83  Continue Plavix  Dysphagia  Follows with GI.  History of gastroparesis and gastroesophageal reflux disease.  Has undergone EGD with dilation in the past.  Reports increased dysphagia more recently.  EGD in October notable for mild GERD, possible increased tone of upper esophageal sphincter  Will place outpatient referral to gastro [facility] as patient is interested in a second opinion  Hypertension  Continue home medication regimen.  Hyperlipidemia  Continue statin.  Insulin-dependent type 2 diabetes mellitus  Decreased basal insulin to 10 units twice daily as patient will be n.p.o.  Continue prandial insulin  History of PE  On Xarelto.  Last dose 1/1/2024 per patient.  Hold Xarelto as patient is on heparin as above.  Left neck mass  Follow-up with ENT as planned  Obstructive sleep apnea  Not currently using his CPAP.    History of Present Illness:  Chris Estevez is a 68 y.o. male with a history of coronary artery disease, chronic dysphagia, gastroparesis, hypertension, hyperlipidemia, insulin-dependent type 2 diabetes  opportunity to be involved in this patient's care.    Electronically signed by Nick Villalpando PA-C on 1/3/2024 at 5:19 PM

## 2024-01-03 NOTE — ED NOTES
Presents to ED with complaints of chest pain and shortness of breath for one week. 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 Upon arrival pt reports pain 5/10 in severity. EKG completed.

## 2024-01-03 NOTE — ED PROVIDER NOTES
[facility] EMERGENCY DEPT    EMERGENCY MEDICINE     Pt Name: Chris Estevez  MRN: [id number]  Birthdate 1955  Date of evaluation: 1/3/2024  Provider: RAFAEL Sam CNP    CHIEF COMPLAINT       Chief Complaint   Patient presents with    Shortness of Breath    Chest Pain     HISTORY OF PRESENT ILLNESS   Chris Estevez is a pleasant 68 y.o. male who presents to the emergency department from home by personal transportation with chief complaint of chest pain.  Started approximately 3 months ago, overall worsening the last week.  Located midsternal, nonradiating.  Described as \"somebody sitting on my chest.\"  Concomitant vomiting that began approximately 1 week ago, nonbloody x 6 on Sunday described as bilious/food and fluid  Chest pain is TTP, nonreplicable and exertional.  Worse with deep breathing/walking long distances.  Concomitant sweatiness.  Radiating up to his neck.  Denies any treatments.    Significant medical history of heart cath x 2, heart stent x 1.  Significant saddle pulmonary embolism necessitating blood thinners he describes a similar symptoms.  Currently on blood thinners (Xarelto/clopidogrel).    Describes left neck mass for years.Patient states this has been there for year, being managed/evaluated s per Dr. Marmolejo from ENT.    Denies fever or chills    History obtained from patient  PASTMEDICAL HISTORY     Past Medical History:   Diagnosis Date    Anxiety     CAD (coronary artery disease)     Depression     Diabetes mellitus (HCC)     Dilation of esophagus 2022    Dr Saxena     Frequent headaches     Gastroparesis     GERD (gastroesophageal reflux disease)     History of Chiari malformation     Hyperlipidemia     Hypertension     Kidney stone     Kidney stone     Migraine     Movement disorder     DDD    Pulmonary emboli (HCC) 10/29/2018    Shingles     Sleep apnea     Tattoos        Patient Active Problem List   Diagnosis Code    Chest pain at rest R07.9    Uncontrolled type 2  ensure the accuracy of this automated transcription, some errors in transcription may have occurred.         Primitivo Devi, RAFAEL - CNP  01/03/24 124

## 2024-01-03 NOTE — ED NOTES
Pt transported to [facility] in stable condition. Floor contacted before transport,spoke to floor staff.

## 2024-01-03 NOTE — PROGRESS NOTES
Pharmacy Medication History Note      List of current medications patient is taking is complete.    Source of information: Patient    Changes made to medication list:  Medications removed (include reason, ex. therapy complete or physician discontinued):  Pantoprazole- Not taking d/t ineffectiveness   Gabapentin- No longer taking     Medications added/doses adjusted:  Lisinopril 5mg daily      Other notes (ex. Recent course of antibiotics, Coumadin dosing):  Confirmed patient still using insulin lispro 10units TID with meals. Last used 1/1/24 AM. Pt stated blood glucose this morning was 119.    Denies use of other OTC or herbal medications.      Allergies reviewed      Electronically signed by Palomo Rees on 1/3/2024 at 5:14 PM

## 2024-01-03 NOTE — ED NOTES
ED to inpatient nurses report      Chief Complaint:  Chief Complaint   Patient presents with    Shortness of Breath    Chest Pain     Present to ED from: home    MOA:     LOC: alert and orientated to name, place, date  Mobility: Independent  Oxygen Baseline: room air    Current needs required: none     Code Status:   Prior    What abnormal results were found and what did you give/do to treat them? Chest Pain  Any procedures or intervention occur? Admission    Mental Status:  Level of Consciousness: Alert (0)    Psych Assessment:        Vitals:  Patient Vitals for the past 24 hrs:   BP Temp Temp src Pulse Resp SpO2 Height Weight   01/03/24 1334 (!) 147/85 -- -- 83 16 97 % -- --   01/03/24 1215 (!) 155/84 -- -- 89 14 98 % -- --   01/03/24 1102 127/85 98 °F (36.7 °C) Oral 90 17 98 % 1.753 m (5' 9\") 90.7 kg (200 lb)        LDAs:   Peripheral IV 01/03/24 Right Forearm (Active)   Site Assessment Clean, dry & intact 01/03/24 1106       Ambulatory Status:  Presents to emergency department  because of falls (Syncope, seizure, or loss of consciousness): No, Age > 70: No, Altered Mental Status, Intoxication with alcohol or substance confusion (Disorientation, impaired judgment, poor safety awaremess, or inability to follow instructions): No, Impaired Mobility: Ambulates or transfers with assistive devices or assistance; Unable to ambulate or transer.: No, Nursing Judgement: No    Diagnosis:  DISPOSITION Decision To Admit 01/03/2024 12:32:56 PM   Final diagnoses:   Chest pain, unspecified type        Consults:  None     Pain Score:  Pain Assessment  Pain Assessment: 0-10  Pain Level: 5  Patient's Stated Pain Goal: 3  Pain Location: Chest    C-SSRS:   Risk of Suicide: No Risk    Sepsis Screening:  Sepsis Risk Score: 1.28    [place] Fall Risk:  [place] 1 Fall Risk  Presents to emergency department  because of falls (Syncope, seizure, or loss of consciousness): No  Age > 70: No  Altered Mental Status, Intoxication with alcohol or  Hyperlipidemia     Hypertension     Kidney stone     Kidney stone     Migraine     Movement disorder     DDD    Pulmonary emboli (HCC) 10/29/2018    Shingles     Sleep apnea     Tattoos            Electronically signed by Farhana Harrison RN (Casie) on 1/3/2024 at 1:34 PM

## 2024-01-03 NOTE — ED NOTES
Patient resting in bed. Respirations easy and unlabored. No distress noted. Call light within reach.

## 2024-01-03 NOTE — CONSULTS
reasonably achievable. CONTRAST: 80  cc of Isovue-370  intravenously FINDINGS: There is adequate opacification of the main pulmonary artery and its branches.. No CT evidence of pulmonary embolus . A stable 7 mm left upper lobe pulmonary nodule is again noted (series 601, image 91). Platelike atelectasis seen in the lung bases and the lingula. Mild coronary calcifications. No focal pulmonary consolidation. No large pulmonary mass. Central airway is patent. No pleural effusions.  No significant pericardial effusion. The heart is not enlarged. Ascending thoracic aorta is not dilated.  The main pulmonary artery is not dilated. No significantly enlarged mediastinal or  axillary lymph node. The thyroid is not enlarged. No chest wall mass. Limited evaluation below the diaphragm show that the gallbladder is surgically absent.. Bones: Degenerative changes of the thoracic spine.. Plate and screw fixation seen in the lower cervical spine.     1. No CT evidence of pulmonary embolus. 2. Stable 7 mm left upper lobe pulmonary nodule. **This report has been created using voice recognition software.  It may contain minor errors which are inherent in voice recognition technology.** Final report electronically signed by Dr Cheryl Ordonez on 1/3/2024 1:13 PM    XR CHEST PORTABLE    Result Date: 1/3/2024  PROCEDURE: XR CHEST PORTABLE CLINICAL INFORMATION: SOB COMPARISON: 8/5/2022 TECHNIQUE: AP portable chest radiograph performed. FINDINGS: No focal pulmonary consolidation. Cardiac silhouette is not enlarged. No pleural effusion. No pneumothorax. No acute bony abnormality. Plate and screw fixation seen in the lower cervical spine.     1. No acute cardiopulmonary finding. **This report has been created using voice recognition software.  It may contain minor errors which are inherent in voice recognition technology.** Final report electronically signed by Dr Cheryl Ordonez on 1/3/2024 11:25 AM      LABS:  No results for input(s):  10/2021 revealed an EF of 60-65%  [facility] 10/2021 revealed severe RCA stenosis (underwent PCI/GI)  There was evidence for mid LAD 50-60% stenosis, FFR 0.83 with severe stenosis of a relatively small D1 (medically managed)  The patient presented to [facility] ER today with worsening chest pain concerning for unstable angina  He also reports dyspnea on exertion  Pro-BNP 37.5  CXR revealed no acute abnormality  Cr 1.3 (baseline 1)   Hs troponin 29, 25  Chest CTA was negative for pulmonary embolus  Resume cardiac medications  DAPT, Stating, metoprolol   Telemetry  Patient will need LHC with possible FFR/?PCI of LAD, however, will need to hold Xarelto prior to procedure (ideally for 48 hours)  May bridge with Heparin gtt  Consider NTG gtt with titration to chest pain (if chest pain recurs)  Repeat EKG if patient has recurrent chest pain   A decision was made to proceed with cardiac catheterization with possible PCI as it it the recommended procedure for the patient.The indication, risks and benefits of the procedure and possible therapeutic consequences and alternatives were discussed with the patient. The patient was given the opportunity to ask questions and to have them answered to his/her satisfaction. Risks of the procedure include but are not limited to the following: Bleeding, hematoma including retroperitoneal hematoma, infection, pain and discomfort, injury to the aorta and other blood vessels, rhythm disturbance, low blood pressure, myocardial infarction, need for bypass surgery, stroke, kidney damage/failure, myocardial perforation, allergic reactions to sedatives/contrast material, loss of pulse/vascular compromise requiring surgery, aneurysm/pseudoaneurysm formation, possible loss of a limb/hand/leg, death. Alternatives to and omission of the suggested procedure were discussed. The patient also understands the need for DAPT if PCI is necessary. The patient had no further questions and wished to proceed    Above

## 2024-01-04 ENCOUNTER — APPOINTMENT (OUTPATIENT)
Age: 69
End: 2024-01-04
Attending: INTERNAL MEDICINE
Payer: MEDICARE

## 2024-01-04 LAB
ABO: NORMAL
ANION GAP SERPL CALC-SCNC: 10 MEQ/L (ref 8–16)
ANTIBODY SCREEN: NORMAL
APTT PPP: 56.2 SECONDS (ref 22–38)
APTT PPP: 66 SECONDS (ref 22–38)
APTT PPP: 71.5 SECONDS (ref 22–38)
BASOPHILS ABSOLUTE: 0 THOU/MM3 (ref 0–0.1)
BASOPHILS NFR BLD AUTO: 0.5 %
BUN SERPL-MCNC: 18 MG/DL (ref 7–22)
CALCIUM SERPL-MCNC: 8.5 MG/DL (ref 8.5–10.5)
CHLORIDE SERPL-SCNC: 105 MEQ/L (ref 98–111)
CHOLEST SERPL-MCNC: 91 MG/DL (ref 100–199)
CO2 SERPL-SCNC: 23 MEQ/L (ref 23–33)
CREAT SERPL-MCNC: 1.2 MG/DL (ref 0.4–1.2)
DEPRECATED RDW RBC AUTO: 43.8 FL (ref 35–45)
ECHO AV CUSP MM: 2.3 CM
ECHO AV PEAK GRADIENT: 6 MMHG
ECHO AV PEAK VELOCITY: 1.3 M/S
ECHO AV VELOCITY RATIO: 0.62
ECHO BSA: 2.1 M2
ECHO BSA: 2.1 M2
ECHO LA AREA 2C: 17.3 CM2
ECHO LA AREA 4C: 18.1 CM2
ECHO LA DIAMETER INDEX: 1.21 CM/M2
ECHO LA DIAMETER: 2.5 CM
ECHO LA MAJOR AXIS: 6.2 CM
ECHO LA MINOR AXIS: 5.5 CM
ECHO LA VOL BP: 45 ML (ref 18–58)
ECHO LA VOL MOD A2C: 44 ML (ref 18–58)
ECHO LA VOL MOD A4C: 43 ML (ref 18–58)
ECHO LA VOL/BSA BIPLANE: 22 ML/M2 (ref 16–34)
ECHO LA VOLUME INDEX MOD A2C: 21 ML/M2 (ref 16–34)
ECHO LA VOLUME INDEX MOD A4C: 21 ML/M2 (ref 16–34)
ECHO LV E' LATERAL VELOCITY: 7 CM/S
ECHO LV E' SEPTAL VELOCITY: 5 CM/S
ECHO LV FRACTIONAL SHORTENING: 27 % (ref 28–44)
ECHO LV INTERNAL DIMENSION DIASTOLE INDEX: 2.13 CM/M2
ECHO LV INTERNAL DIMENSION DIASTOLIC: 4.4 CM (ref 4.2–5.9)
ECHO LV INTERNAL DIMENSION SYSTOLIC INDEX: 1.55 CM/M2
ECHO LV INTERNAL DIMENSION SYSTOLIC: 3.2 CM
ECHO LV ISOVOLUMETRIC RELAXATION TIME (IVRT): 74 MS
ECHO LV IVSD: 0.9 CM (ref 0.6–1)
ECHO LV MASS 2D: 137.8 G (ref 88–224)
ECHO LV MASS INDEX 2D: 66.6 G/M2 (ref 49–115)
ECHO LV POSTERIOR WALL DIASTOLIC: 1 CM (ref 0.6–1)
ECHO LV RELATIVE WALL THICKNESS RATIO: 0.45
ECHO LVOT PEAK GRADIENT: 2 MMHG
ECHO LVOT PEAK VELOCITY: 0.8 M/S
ECHO MV A VELOCITY: 0.54 M/S
ECHO MV E DECELERATION TIME (DT): 245 MS
ECHO MV E VELOCITY: 0.53 M/S
ECHO MV E/A RATIO: 0.98
ECHO MV E/E' LATERAL: 7.57
ECHO MV E/E' RATIO (AVERAGED): 9.09
ECHO PV MAX VELOCITY: 0.7 M/S
ECHO PV PEAK GRADIENT: 2 MMHG
ECHO RV INTERNAL DIMENSION: 2.2 CM
ECHO RV TAPSE: 2 CM (ref 1.7–?)
ECHO TV E WAVE: 0.3 M/S
EKG ATRIAL RATE: 69 BPM
EKG P AXIS: 33 DEGREES
EKG P-R INTERVAL: 206 MS
EKG Q-T INTERVAL: 410 MS
EKG QRS DURATION: 116 MS
EKG QTC CALCULATION (BAZETT): 439 MS
EKG R AXIS: -58 DEGREES
EKG T AXIS: -7 DEGREES
EKG VENTRICULAR RATE: 69 BPM
EOSINOPHIL NFR BLD AUTO: 2.4 %
EOSINOPHILS ABSOLUTE: 0.1 THOU/MM3 (ref 0–0.4)
ERYTHROCYTE [DISTWIDTH] IN BLOOD BY AUTOMATED COUNT: 13.1 % (ref 11.5–14.5)
GFR SERPL CREATININE-BSD FRML MDRD: > 60 ML/MIN/1.73M2
GLUCOSE BLD STRIP.AUTO-MCNC: 124 MG/DL (ref 70–108)
GLUCOSE BLD STRIP.AUTO-MCNC: 141 MG/DL (ref 70–108)
GLUCOSE BLD STRIP.AUTO-MCNC: 142 MG/DL (ref 70–108)
GLUCOSE BLD STRIP.AUTO-MCNC: 94 MG/DL (ref 70–108)
GLUCOSE SERPL-MCNC: 117 MG/DL (ref 70–108)
HCT VFR BLD AUTO: 41.4 % (ref 42–52)
HDLC SERPL-MCNC: 44 MG/DL
HEPARIN UNFRACTIONATED: 0.31 U/ML (ref 0.3–0.7)
HGB BLD-MCNC: 13.3 GM/DL (ref 14–18)
IMM GRANULOCYTES # BLD AUTO: 0.01 THOU/MM3 (ref 0–0.07)
IMM GRANULOCYTES NFR BLD AUTO: 0.2 %
LDLC SERPL CALC-MCNC: 35 MG/DL
LYMPHOCYTES ABSOLUTE: 2.1 THOU/MM3 (ref 1–4.8)
LYMPHOCYTES NFR BLD AUTO: 33.2 %
MCH RBC QN AUTO: 29.5 PG (ref 26–33)
MCHC RBC AUTO-ENTMCNC: 32.1 GM/DL (ref 32.2–35.5)
MCV RBC AUTO: 91.8 FL (ref 80–94)
MONOCYTES ABSOLUTE: 0.6 THOU/MM3 (ref 0.4–1.3)
MONOCYTES NFR BLD AUTO: 10.2 %
NEUTROPHILS NFR BLD AUTO: 53.5 %
NRBC BLD AUTO-RTO: 0 /100 WBC
OSMOLALITY SERPL CALC.SUM OF ELEC: 278.6 MOSMOL/KG (ref 275–300)
PLATELET # BLD AUTO: 263 THOU/MM3 (ref 130–400)
PMV BLD AUTO: 9.2 FL (ref 9.4–12.4)
POTASSIUM SERPL-SCNC: 3.9 MEQ/L (ref 3.5–5.2)
RBC # BLD AUTO: 4.51 MILL/MM3 (ref 4.7–6.1)
RH FACTOR: NORMAL
SEGMENTED NEUTROPHILS ABSOLUTE COUNT: 3.3 THOU/MM3 (ref 1.8–7.7)
SODIUM SERPL-SCNC: 138 MEQ/L (ref 135–145)
TRIGL SERPL-MCNC: 59 MG/DL (ref 0–199)
WBC # BLD AUTO: 6.2 THOU/MM3 (ref 4.8–10.8)

## 2024-01-04 PROCEDURE — 99152 MOD SED SAME PHYS/QHP 5/>YRS: CPT | Performed by: INTERNAL MEDICINE

## 2024-01-04 PROCEDURE — 2580000003 HC RX 258: Performed by: PHYSICIAN ASSISTANT

## 2024-01-04 PROCEDURE — 93010 ELECTROCARDIOGRAM REPORT: CPT | Performed by: INTERNAL MEDICINE

## 2024-01-04 PROCEDURE — 6360000004 HC RX CONTRAST MEDICATION: Performed by: INTERNAL MEDICINE

## 2024-01-04 PROCEDURE — 80048 BASIC METABOLIC PNL TOTAL CA: CPT

## 2024-01-04 PROCEDURE — 6360000002 HC RX W HCPCS: Performed by: INTERNAL MEDICINE

## 2024-01-04 PROCEDURE — 4A023N7 MEASUREMENT OF CARDIAC SAMPLING AND PRESSURE, LEFT HEART, PERCUTANEOUS APPROACH: ICD-10-PCS | Performed by: INTERNAL MEDICINE

## 2024-01-04 PROCEDURE — 93458 L HRT ARTERY/VENTRICLE ANGIO: CPT | Performed by: INTERNAL MEDICINE

## 2024-01-04 PROCEDURE — 82948 REAGENT STRIP/BLOOD GLUCOSE: CPT

## 2024-01-04 PROCEDURE — C1769 GUIDE WIRE: HCPCS | Performed by: INTERNAL MEDICINE

## 2024-01-04 PROCEDURE — 1200000003 HC TELEMETRY R&B

## 2024-01-04 PROCEDURE — B2111ZZ FLUOROSCOPY OF MULTIPLE CORONARY ARTERIES USING LOW OSMOLAR CONTRAST: ICD-10-PCS | Performed by: INTERNAL MEDICINE

## 2024-01-04 PROCEDURE — 93306 TTE W/DOPPLER COMPLETE: CPT | Performed by: INTERNAL MEDICINE

## 2024-01-04 PROCEDURE — 36415 COLL VENOUS BLD VENIPUNCTURE: CPT

## 2024-01-04 PROCEDURE — 6370000000 HC RX 637 (ALT 250 FOR IP): Performed by: PHYSICIAN ASSISTANT

## 2024-01-04 PROCEDURE — 2580000003 HC RX 258: Performed by: INTERNAL MEDICINE

## 2024-01-04 PROCEDURE — 2709999900 HC NON-CHARGEABLE SUPPLY: Performed by: INTERNAL MEDICINE

## 2024-01-04 PROCEDURE — 2500000003 HC RX 250 WO HCPCS: Performed by: INTERNAL MEDICINE

## 2024-01-04 PROCEDURE — 80061 LIPID PANEL: CPT

## 2024-01-04 PROCEDURE — 85025 COMPLETE CBC W/AUTO DIFF WBC: CPT

## 2024-01-04 PROCEDURE — 93306 TTE W/DOPPLER COMPLETE: CPT

## 2024-01-04 PROCEDURE — B2151ZZ FLUOROSCOPY OF LEFT HEART USING LOW OSMOLAR CONTRAST: ICD-10-PCS | Performed by: INTERNAL MEDICINE

## 2024-01-04 PROCEDURE — 86901 BLOOD TYPING SEROLOGIC RH(D): CPT

## 2024-01-04 PROCEDURE — 99232 SBSQ HOSP IP/OBS MODERATE 35: CPT | Performed by: PHYSICIAN ASSISTANT

## 2024-01-04 PROCEDURE — 93005 ELECTROCARDIOGRAM TRACING: CPT | Performed by: PHYSICIAN ASSISTANT

## 2024-01-04 PROCEDURE — C1894 INTRO/SHEATH, NON-LASER: HCPCS | Performed by: INTERNAL MEDICINE

## 2024-01-04 PROCEDURE — 86900 BLOOD TYPING SEROLOGIC ABO: CPT

## 2024-01-04 PROCEDURE — 85730 THROMBOPLASTIN TIME PARTIAL: CPT

## 2024-01-04 PROCEDURE — 86850 RBC ANTIBODY SCREEN: CPT

## 2024-01-04 PROCEDURE — 85520 HEPARIN ASSAY: CPT

## 2024-01-04 RX ORDER — FENTANYL CITRATE 50 UG/ML
INJECTION, SOLUTION INTRAMUSCULAR; INTRAVENOUS PRN
Status: DISCONTINUED | OUTPATIENT
Start: 2024-01-04 | End: 2024-01-04 | Stop reason: HOSPADM

## 2024-01-04 RX ORDER — ASPIRIN 325 MG
325 TABLET ORAL ONCE
Status: DISCONTINUED | OUTPATIENT
Start: 2024-01-04 | End: 2024-01-05 | Stop reason: HOSPADM

## 2024-01-04 RX ORDER — SODIUM CHLORIDE 0.9 % (FLUSH) 0.9 %
5-40 SYRINGE (ML) INJECTION PRN
Status: DISCONTINUED | OUTPATIENT
Start: 2024-01-04 | End: 2024-01-05 | Stop reason: HOSPADM

## 2024-01-04 RX ORDER — MIDAZOLAM HYDROCHLORIDE 1 MG/ML
INJECTION INTRAMUSCULAR; INTRAVENOUS PRN
Status: DISCONTINUED | OUTPATIENT
Start: 2024-01-04 | End: 2024-01-04 | Stop reason: HOSPADM

## 2024-01-04 RX ORDER — SODIUM CHLORIDE 0.9 % (FLUSH) 0.9 %
5-40 SYRINGE (ML) INJECTION EVERY 12 HOURS SCHEDULED
Status: DISCONTINUED | OUTPATIENT
Start: 2024-01-04 | End: 2024-01-05 | Stop reason: HOSPADM

## 2024-01-04 RX ORDER — LIDOCAINE HYDROCHLORIDE 20 MG/ML
INJECTION, SOLUTION EPIDURAL; INFILTRATION; INTRACAUDAL; PERINEURAL PRN
Status: DISCONTINUED | OUTPATIENT
Start: 2024-01-04 | End: 2024-01-04 | Stop reason: HOSPADM

## 2024-01-04 RX ORDER — ACETAMINOPHEN 325 MG/1
650 TABLET ORAL EVERY 4 HOURS PRN
Status: DISCONTINUED | OUTPATIENT
Start: 2024-01-04 | End: 2024-01-05 | Stop reason: HOSPADM

## 2024-01-04 RX ORDER — SODIUM CHLORIDE 9 MG/ML
INJECTION, SOLUTION INTRAVENOUS CONTINUOUS
Status: DISCONTINUED | OUTPATIENT
Start: 2024-01-04 | End: 2024-01-05 | Stop reason: HOSPADM

## 2024-01-04 RX ORDER — NITROGLYCERIN 0.4 MG/1
0.4 TABLET SUBLINGUAL EVERY 5 MIN PRN
Status: DISCONTINUED | OUTPATIENT
Start: 2024-01-04 | End: 2024-01-05 | Stop reason: HOSPADM

## 2024-01-04 RX ORDER — SODIUM CHLORIDE 9 MG/ML
INJECTION, SOLUTION INTRAVENOUS PRN
Status: DISCONTINUED | OUTPATIENT
Start: 2024-01-04 | End: 2024-01-05 | Stop reason: HOSPADM

## 2024-01-04 RX ADMIN — INSULIN GLARGINE 10 UNITS: 100 INJECTION, SOLUTION SUBCUTANEOUS at 22:16

## 2024-01-04 RX ADMIN — EZETIMIBE 10 MG: 10 TABLET ORAL at 22:16

## 2024-01-04 RX ADMIN — SODIUM CHLORIDE: 9 INJECTION, SOLUTION INTRAVENOUS at 18:16

## 2024-01-04 RX ADMIN — METOPROLOL TARTRATE 12.5 MG: 25 TABLET, FILM COATED ORAL at 10:31

## 2024-01-04 RX ADMIN — INSULIN GLARGINE 10 UNITS: 100 INJECTION, SOLUTION SUBCUTANEOUS at 10:30

## 2024-01-04 RX ADMIN — ROSUVASTATIN CALCIUM 40 MG: 20 TABLET, FILM COATED ORAL at 10:31

## 2024-01-04 RX ADMIN — ISOSORBIDE MONONITRATE 60 MG: 60 TABLET, EXTENDED RELEASE ORAL at 10:31

## 2024-01-04 RX ADMIN — METOPROLOL TARTRATE 12.5 MG: 25 TABLET, FILM COATED ORAL at 22:15

## 2024-01-04 RX ADMIN — SODIUM CHLORIDE: 9 INJECTION, SOLUTION INTRAVENOUS at 19:57

## 2024-01-04 ASSESSMENT — PAIN DESCRIPTION - LOCATION
LOCATION: CHEST
LOCATION: CHEST

## 2024-01-04 ASSESSMENT — PAIN SCALES - GENERAL
PAINLEVEL_OUTOF10: 7
PAINLEVEL_OUTOF10: 0

## 2024-01-04 NOTE — PROGRESS NOTES
[facility]  Sedation/Analgesia History & Physical    Pt Name: Chris Estevez  Account number: [id number]  MRN: [id number]  YOB: 1955  Provider Performing Procedure: Tai Vargas MD MD [facility]  Primary Care Physician: Kelvin Hardy DO  Date: 1/4/2024    PRE-PROCEDURE    Code Status: FULL CODE  Brief History/Pre-Procedure Diagnosis: angina iii    Consent: : I have discussed with the patient risks, benefits, and alternatives (and relevant risks, benefits, and side effects related to alternatives or not receiving care), and likelihood of the success.   The patient and/or representative appear to understand and agree to proceed.  The discussion encompasses risks, benefits, and side effects related to the alternatives and the risks related to not receiving the proposed care, treatment, and services.       PLANNED PROCEDURE   [x]Cath  [x]PCI                []Pacemaker/AICD  []EFREM             []Cardioversion []Peripheral angiography/PTA  []Other:      VITAL SIGNS   Vitals:    01/04/24 1639   BP: (!) 98/57   Pulse: 67   Resp: 19   Temp: 97.9 °F (36.6 °C)   SpO2: 97%       PHYSICAL:   General: No acute distress  HEENT:  Unremarkable for age  Neck: without increased JVD, carotid pulses 2+ bilaterally without bruits  Heart: RRR, S1 & S2 WNL   Lungs: Clear to auscultation    Abdomen: BS present, without HSM, masses, or tenderness    Extremities: without C,C,E.  Pulses 2+ bilaterally  Mental Status: Alert & Oriented    SEDATION  Planned agent:[x]Midazolam []Meperidine []Sublimaze []Morphine  []Diazepam  [x]Other: fentanyl      ASA Classification:  []1 []2 [x]3 []4 []5  Class 1: A normal healthy patient  Class 2: Pt with mild to moderate systemic disease  Class 3: Severe systemic disease or disturbance  Class 4: Severe systemic disorders that are already life threatening.  Class 5: Moribund pt with little chances of survival, for more than 24 hours.  Mallampati I Airway Classification:   []1  10 Units into the skin in the morning and 10 Units at noon and 10 Units in the evening. Inject before meals. 8/16/22 10/10/23  Messi Coronel DO   insulin glargine (LANTUS SOLOSTAR) 100 UNIT/ML injection pen Inject 20 Units into the skin in the morning and 20 Units before bedtime. 8/16/22   Messi Coronel DO   Dulaglutide 4.5 MG/0.5ML SOPN Inject 4.5 mg into the skin once a week 8/16/22   Messi Coronel, DO   blood glucose test strips (ASCENSIA AUTODISC VI;ONE TOUCH ULTRA TEST VI) strip 1 each by In Vitro route 3 times daily Dx E11.69 6/30/22   Trenton Eduardo MD   pantoprazole (PROTONIX) 40 MG tablet Take 1 tablet by mouth every morning (before breakfast)  Patient not taking: Reported on 1/3/2024 2/7/22   Rufina Palumbo APRN - CNP   Continuous Blood Gluc Sensor (DEXCOM G6 SENSOR) MISC Change every 10 days-Use as directed for continuous glucose monitoring. 2/2/22   Trenton Eduardo MD   Continuous Blood Gluc Transmit (DEXCOM G6 TRANSMITTER) MISC Change every 3 months- Use as directed for continuous glucose monitoring. 2/2/22   Trenton Eduardo MD   metFORMIN (GLUCOPHAGE) 1000 MG tablet Take 1 tablet by mouth 2 times daily (with meals) 1/5/22   Sarah Ramirez,    CPAP Machine MISC by Does not apply route Please change Bipap to 10/7cm H20. 10/14/21   Kacie Mosley PA-C   ondansetron (ZOFRAN ODT) 4 MG disintegrating tablet Take 1 tablet by mouth every 8 hours as needed for Nausea or Vomiting 4/1/21   Rufina Palumbo APRN - CNP   amitriptyline (ELAVIL) 25 MG tablet Take 1 tablet by mouth nightly 3/24/21   Kelvin Hardy DO   Cholecalciferol (VITAMIN D3) 125 MCG (5000 UT) TABS Take 1 tablet by mouth daily    Provider, MD Stuart   Continuous Blood Gluc  (DEXCOM G6 ) YULI Use as directed for continuous glucose monitor. 10/8/20   Umfleet, Kayce S, APRN - CNP   cyclobenzaprine (FLEXERIL) 5 MG tablet Take 1 tablet by mouth as needed 8/26/20   Provider, MD Stuart   Misc. Devices

## 2024-01-04 NOTE — PLAN OF CARE
Problem: Pain  Goal: Verbalizes/displays adequate comfort level or baseline comfort level  Outcome: Progressing  Flowsheets (Taken 1/4/2024 0633)  Verbalizes/displays adequate comfort level or baseline comfort level:   Encourage patient to monitor pain and request assistance   Assess pain using appropriate pain scale     Problem: Discharge Planning  Goal: Discharge to home or other facility with appropriate resources  Outcome: Progressing  Flowsheets (Taken 1/4/2024 0633)  Discharge to home or other facility with appropriate resources:   Identify barriers to discharge with patient and caregiver   Arrange for needed discharge resources and transportation as appropriate     Problem: Cardiovascular - Adult  Goal: Maintains optimal cardiac output and hemodynamic stability  Outcome: Progressing  Flowsheets (Taken 1/4/2024 0633)  Maintains optimal cardiac output and hemodynamic stability:   Monitor blood pressure and heart rate   Monitor urine output and notify Licensed Independent Practitioner for values outside of normal range   Assess for signs of decreased cardiac output     Problem: Infection - Adult  Goal: Absence of infection during hospitalization  Outcome: Progressing  Flowsheets (Taken 1/4/2024 0633)  Absence of infection during hospitalization:   Assess and monitor for signs and symptoms of infection   Monitor lab/diagnostic results   Monitor all insertion sites i.e., indwelling lines, tubes and drains   Administer medications as ordered

## 2024-01-04 NOTE — PROGRESS NOTES
Cardiology Progress Note      Patient:  Chris Estevez  YOB: 1955  MRN: [id number]   Acct: [id number]  Admit Date:  1/3/2024  Primary Cardiologist:  dr hernandez    Note per dr hernandez \"REASON FOR CONSULT:    Chest pain   CAD      CHIEF COMPLAINT:    Chest pain      HISTORY OF PRESENT ILLNESS:    Chris Estevez is a pleasant 68 year old male patient with past medical history that includes:   Past Medical History        Past Medical History:   Diagnosis Date    Anxiety      CAD (coronary artery disease)      Depression      Diabetes mellitus (HCC)      Dilation of esophagus 2022     Dr Saxena     Frequent headaches      Gastroparesis      GERD (gastroesophageal reflux disease)      History of Chiari malformation      Hyperlipidemia      Hypertension      Kidney stone      Kidney stone      Migraine      Movement disorder       DDD    Pulmonary emboli (HCC) 10/29/2018    Shingles      Sleep apnea      Tattoos        He is on Xarelto for h/o PE. Echo 10/2021 revealed an EF of 60-65%. LHC 10/2021 revealed severe RCA stenosis (underwent PCI/GI). There was evidence for mid LAD 50-60% stenosis, FFR 0.83 with severe stenosis of a relatively small D1 (medically managed). The patient presented to [facility] ER today with chest pain and shortness of breath. He reports that for the past few weeks, he had worsening shortness of breath and dyspnea on exertion. Patient also reports having intermittent chest pain that have worsened in the past one week. Chest pain is left sided, not radiating, described as pressure \"someone sitting on the chest\", associated with shortness of breath. Patient states that chest pain is similar to what he had prior to previous PCI, but is less severe. He will be admitted to hospitalist medicine, cardiology was consulted in ER. CXR revealed no acute abnormality. Cr 1.3 (baseline 1). Hs troponin 29, 25. Chest CTA was negative for pulmonary embolus. \"    Subjective (Events in last 24 hours): pt awake

## 2024-01-04 NOTE — BRIEF OP NOTE
[facility]  Sedation/Analgesia Post Sedation Record        Pt Name: Chris Estevez  MRN: [id number]  YOB: 1955  Procedure Performed By: Tai Vargas MD MD, FACC, FSCAI, RPVI  Primary Care Physician: Kelvin Hardy DO    POST-PROCEDURE    Sedation/Anesthesia:  Local Anesthesia and IV Conscious Sedation with continuous O2 monitoring    Estimated Blood Loss: 10 cc     Specimens Removed:  [x]None []Other:      Disposition of Specimen:  []Pathology []Other        Complications:   [x]None Immediate []Other:       Procedure performed: Left heart cath    Post Procedure Diagnosis/Findings:    Non-obstructive Coronary Artery Disease   No significant changes from prior cath of 10/2021       Recommendations:    Medical treatment  Aggressive risk factor modification  Routine post-cath care.               Tai Vargas MD MD, FACC, FSCTHOMAS, RPVI  Electronically signed 1/4/2024 at 5:48 PM

## 2024-01-04 NOTE — CARE COORDINATION
Case Management Assessment  Initial Evaluation    Date/Time of Evaluation: 1/4/2024 3:38 PM  Assessment Completed by: Gissel Diez RN    If patient is discharged prior to next notation, then this note serves as note for discharge by case management.    Patient Name: Chris Estevez                   YOB: 1955  Diagnosis: Unstable angina (HCC) [I20.0]  ACS (acute coronary syndrome) (HCC) [I24.9]  CAD in native artery [I25.10]  Chest pain, unspecified type [R07.9]                   Date / Time: 1/3/2024 10:49 AM  Location: [facility]26/026-A     Patient Admission Status: Inpatient   Readmission Risk Low 0-14, Mod 15-19), High > 20: Readmission Risk Score: 9.9    Current PCP: Kelvin Hardy, DO  PCP verified by CM? Yes    Chart Reviewed: Yes      History Provided by: Patient  Patient Orientation: Alert and Oriented    Patient Cognition: Alert    Hospitalization in the last 30 days (Readmission):  No    If yes, Readmission Assessment in CM Navigator will be completed.    Advance Directives:      Code Status: Full Code   Patient's Primary Decision Maker is: Named in Scanned ACP Document    Primary Decision Maker: Rosamaria Stuart - Domestic Partner - [phone]    Secondary Decision Maker: LinwoodAlhaji - Child - [phone]    Discharge Planning:    Patient lives with: Alone Type of Home: House  Primary Care Giver: Self  Patient Support Systems include: Spouse/Significant Other   Current Financial resources: Medicare  Current community resources: None  Current services prior to admission: None            Current DME:              Type of Home Care services:  None    ADLS  Prior functional level: Independent in ADLs/IADLs  Current functional level: Independent in ADLs/IADLs    Family can provide assistance at DC: Yes  Would you like Case Management to discuss the discharge plan with any other family members/significant others, and if so, who? No  Plans to Return to Present Housing: Yes  Other Identified

## 2024-01-04 NOTE — PROGRESS NOTES
lisinopril  5 mg Oral BID    metoprolol tartrate  12.5 mg Oral BID    pantoprazole  40 mg Oral QAM AC    [Held by provider] rivaroxaban  15 mg Oral Daily with breakfast    rosuvastatin  40 mg Oral Daily    insulin lispro  10 Units SubCUTAneous TID WC    insulin glargine  10 Units SubCUTAneous BID     PRN Meds: sodium chloride flush, sodium chloride, potassium chloride **OR** potassium alternative oral replacement **OR** potassium chloride, magnesium sulfate, ondansetron **OR** ondansetron, polyethylene glycol, acetaminophen **OR** acetaminophen, cyclobenzaprine, heparin (porcine), heparin (porcine), glucose, dextrose bolus **OR** dextrose bolus, glucagon (rDNA), dextrose      Intake/Output Summary (Last 24 hours) at 1/4/2024 1626  Last data filed at 1/4/2024 0855  Gross per 24 hour   Intake 1397.14 ml   Output --   Net 1397.14 ml       Diet:  Diet NPO Exceptions are: Sips of Water with Meds    Physical Exam:  /64   Pulse 70   Temp 97.6 °F (36.4 °C) (Oral)   Resp 18   Ht 1.753 m (5' 9\")   Wt 90.7 kg (199 lb 15.3 oz)   SpO2 99%   BMI 29.53 kg/m²   General appearance: No apparent distress, appears stated age and cooperative.  HEENT: Pupils equal, round, and reactive to light. Conjunctivae/corneas clear.  Neck: Supple, with full range of motion. No jugular venous distention. Trachea midline. Mass noted.   Respiratory:  Normal respiratory effort. Clear to auscultation, bilaterally without Rales/Wheezes/Rhonchi.  Cardiovascular: Regular rate and rhythm with normal S1/S2 without murmurs, rubs or gallops.  Abdomen: Soft, non-tender, non-distended with normal bowel sounds.  Musculoskeletal: passive and active ROM x 4 extremities.  Skin: Skin color, texture, turgor normal.  No rashes or lesions.  Neurologic:  Neurovascularly intact without any focal sensory/motor deficits. Cranial nerves: II-XII intact, grossly non-focal.  Psychiatric: Alert and oriented, thought content appropriate, normal insight      Labs:

## 2024-01-05 VITALS
HEART RATE: 75 BPM | DIASTOLIC BLOOD PRESSURE: 65 MMHG | SYSTOLIC BLOOD PRESSURE: 111 MMHG | HEIGHT: 69 IN | WEIGHT: 197.53 LBS | TEMPERATURE: 98.6 F | BODY MASS INDEX: 29.26 KG/M2 | OXYGEN SATURATION: 95 % | RESPIRATION RATE: 17 BRPM

## 2024-01-05 LAB
ANION GAP SERPL CALC-SCNC: 13 MEQ/L (ref 8–16)
BUN SERPL-MCNC: 16 MG/DL (ref 7–22)
CALCIUM SERPL-MCNC: 8.9 MG/DL (ref 8.5–10.5)
CHLORIDE SERPL-SCNC: 107 MEQ/L (ref 98–111)
CO2 SERPL-SCNC: 21 MEQ/L (ref 23–33)
CREAT SERPL-MCNC: 1.2 MG/DL (ref 0.4–1.2)
DEPRECATED RDW RBC AUTO: 43.6 FL (ref 35–45)
ERYTHROCYTE [DISTWIDTH] IN BLOOD BY AUTOMATED COUNT: 13.2 % (ref 11.5–14.5)
GFR SERPL CREATININE-BSD FRML MDRD: > 60 ML/MIN/1.73M2
GLUCOSE BLD STRIP.AUTO-MCNC: 193 MG/DL (ref 70–108)
GLUCOSE BLD STRIP.AUTO-MCNC: 91 MG/DL (ref 70–108)
GLUCOSE SERPL-MCNC: 88 MG/DL (ref 70–108)
HCT VFR BLD AUTO: 37.4 % (ref 42–52)
HGB BLD-MCNC: 12.3 GM/DL (ref 14–18)
MCH RBC QN AUTO: 29.9 PG (ref 26–33)
MCHC RBC AUTO-ENTMCNC: 32.9 GM/DL (ref 32.2–35.5)
MCV RBC AUTO: 91 FL (ref 80–94)
PLATELET # BLD AUTO: 228 THOU/MM3 (ref 130–400)
PMV BLD AUTO: 9 FL (ref 9.4–12.4)
POTASSIUM SERPL-SCNC: 4.5 MEQ/L (ref 3.5–5.2)
RBC # BLD AUTO: 4.11 MILL/MM3 (ref 4.7–6.1)
SODIUM SERPL-SCNC: 141 MEQ/L (ref 135–145)
WBC # BLD AUTO: 6.4 THOU/MM3 (ref 4.8–10.8)

## 2024-01-05 PROCEDURE — 2580000003 HC RX 258: Performed by: INTERNAL MEDICINE

## 2024-01-05 PROCEDURE — 6370000000 HC RX 637 (ALT 250 FOR IP): Performed by: PHYSICIAN ASSISTANT

## 2024-01-05 PROCEDURE — 99232 SBSQ HOSP IP/OBS MODERATE 35: CPT | Performed by: PHYSICIAN ASSISTANT

## 2024-01-05 PROCEDURE — 80048 BASIC METABOLIC PNL TOTAL CA: CPT

## 2024-01-05 PROCEDURE — 36415 COLL VENOUS BLD VENIPUNCTURE: CPT

## 2024-01-05 PROCEDURE — 85027 COMPLETE CBC AUTOMATED: CPT

## 2024-01-05 PROCEDURE — 82948 REAGENT STRIP/BLOOD GLUCOSE: CPT

## 2024-01-05 RX ORDER — NITROGLYCERIN 0.4 MG/1
0.4 TABLET SUBLINGUAL EVERY 5 MIN PRN
Qty: 25 TABLET | Refills: 1 | Status: SHIPPED | OUTPATIENT
Start: 2024-01-05

## 2024-01-05 RX ORDER — ASPIRIN 325 MG
325 TABLET ORAL ONCE
Qty: 1 TABLET | Refills: 0 | Status: SHIPPED | OUTPATIENT
Start: 2024-01-05 | End: 2024-01-05 | Stop reason: HOSPADM

## 2024-01-05 RX ADMIN — ISOSORBIDE MONONITRATE 60 MG: 60 TABLET, EXTENDED RELEASE ORAL at 08:48

## 2024-01-05 RX ADMIN — METOPROLOL TARTRATE 12.5 MG: 25 TABLET, FILM COATED ORAL at 08:48

## 2024-01-05 RX ADMIN — ROSUVASTATIN CALCIUM 40 MG: 20 TABLET, FILM COATED ORAL at 08:47

## 2024-01-05 RX ADMIN — SODIUM CHLORIDE: 9 INJECTION, SOLUTION INTRAVENOUS at 05:02

## 2024-01-05 RX ADMIN — PANTOPRAZOLE SODIUM 40 MG: 40 TABLET, DELAYED RELEASE ORAL at 05:03

## 2024-01-05 RX ADMIN — INSULIN GLARGINE 10 UNITS: 100 INJECTION, SOLUTION SUBCUTANEOUS at 08:48

## 2024-01-05 RX ADMIN — CLOPIDOGREL BISULFATE 75 MG: 75 TABLET ORAL at 08:48

## 2024-01-05 RX ADMIN — SODIUM CHLORIDE, PRESERVATIVE FREE 10 ML: 5 INJECTION INTRAVENOUS at 08:47

## 2024-01-05 NOTE — PLAN OF CARE
Problem: Pain  Goal: Verbalizes/displays adequate comfort level or baseline comfort level  Flowsheets (Taken 1/4/2024 2232)  Verbalizes/displays adequate comfort level or baseline comfort level:   Encourage patient to monitor pain and request assistance   Assess pain using appropriate pain scale   Implement non-pharmacological measures as appropriate and evaluate response     Problem: Discharge Planning  Goal: Discharge to home or other facility with appropriate resources  Flowsheets (Taken 1/4/2024 2232)  Discharge to home or other facility with appropriate resources:   Identify barriers to discharge with patient and caregiver   Arrange for needed discharge resources and transportation as appropriate   Identify discharge learning needs (meds, wound care, etc)     Problem: Cardiovascular - Adult  Goal: Maintains optimal cardiac output and hemodynamic stability  Flowsheets (Taken 1/4/2024 2232)  Maintains optimal cardiac output and hemodynamic stability:   Monitor blood pressure and heart rate   Monitor urine output and notify Licensed Independent Practitioner for values outside of normal range   Administer fluid and/or volume expanders as ordered     Problem: Infection - Adult  Goal: Absence of infection during hospitalization  Flowsheets (Taken 1/4/2024 2232)  Absence of infection during hospitalization:   Monitor lab/diagnostic results   Monitor all insertion sites i.e., indwelling lines, tubes and drains   Assess and monitor for signs and symptoms of infection     Problem: Chronic Conditions and Co-morbidities  Goal: Patient's chronic conditions and co-morbidity symptoms are monitored and maintained or improved  Flowsheets (Taken 1/4/2024 2232)  Care Plan - Patient's Chronic Conditions and Co-Morbidity Symptoms are Monitored and Maintained or Improved:   Monitor and assess patient's chronic conditions and comorbid symptoms for stability, deterioration, or improvement   Collaborate with multidisciplinary team to

## 2024-01-05 NOTE — DISCHARGE INSTRUCTIONS
Discharge Instructions for Radial Heart Catherization    1.  Take it easy for 3-4 days.  2.  No driving for 2 days.  3.  No lifting of 5 lbs or more for 5 days with the affected arm.  4.  Can shower after 24 hours.  5.  Remove arm board after 24 hours.  6.  Apply a band aid to the insertion site daily for 5 days.  May apply antibiotic ointment if desired, but not necessary.  Wash site daily with soap and water.  7.  No creams, ointments, or powders near the insertion site.   8.  No tub baths, swimming, hot tubs, or hand washing dishes for 1 week.  9.  Watch for signs of infection (redness, warmth, swelling, or pus drainage) or coolness of extremity and call physician if this occurs  10.  If bleeding occurs from insertion site, apply pressure and call 911.     F/up dr hernandez 2 weeks

## 2024-01-05 NOTE — PROGRESS NOTES
Discharge teaching and instructions for diagnosis/procedure of angina completed with patient using teachback method. AVS reviewed. Printed prescriptions given to patient. Patient voiced understanding regarding prescriptions, follow up appointments, and care of self at home. Discharged ambulatory to  home with support per self

## 2024-01-05 NOTE — CARE COORDINATION
1/5/24, 3:20 PM EST    Patient goals/plan/ treatment preferences discussed by  and .  Patient goals/plan/ treatment preferences reviewed with patient/ family.  Patient/ family verbalize understanding of discharge plan and are in agreement with goal/plan/treatment preferences.  Understanding was demonstrated using the teach back method.  AVS provided by RN at time of discharge, which includes all necessary medical information pertaining to the patients current course of illness, treatment, post-discharge goals of care, and treatment preferences.     Services At/After Discharge: None       IMM Letter  IMM Letter given to Patient/Family/Significant other/Guardian/POA/by:: patient registration  IMM Letter date given:: 01/03/24  IMM Letter time given:: 1600

## 2024-01-05 NOTE — PROGRESS NOTES
Cardiology Progress Note      Patient:  Chris Estevez  YOB: 1955  MRN: [id number]   Acct: [id number]  Admit Date:  1/3/2024  Primary Cardiologist:  dr hernandez    Note per dr hernandez \"REASON FOR CONSULT:    Chest pain   CAD      CHIEF COMPLAINT:    Chest pain      HISTORY OF PRESENT ILLNESS:    Chris Estevez is a pleasant 68 year old male patient with past medical history that includes:   Past Medical History        Past Medical History:   Diagnosis Date    Anxiety      CAD (coronary artery disease)      Depression      Diabetes mellitus (HCC)      Dilation of esophagus 2022     Dr Saxena     Frequent headaches      Gastroparesis      GERD (gastroesophageal reflux disease)      History of Chiari malformation      Hyperlipidemia      Hypertension      Kidney stone      Kidney stone      Migraine      Movement disorder       DDD    Pulmonary emboli (HCC) 10/29/2018    Shingles      Sleep apnea      Tattoos        He is on Xarelto for h/o PE. Echo 10/2021 revealed an EF of 60-65%. LHC 10/2021 revealed severe RCA stenosis (underwent PCI/GI). There was evidence for mid LAD 50-60% stenosis, FFR 0.83 with severe stenosis of a relatively small D1 (medically managed). The patient presented to [facility] ER today with chest pain and shortness of breath. He reports that for the past few weeks, he had worsening shortness of breath and dyspnea on exertion. Patient also reports having intermittent chest pain that have worsened in the past one week. Chest pain is left sided, not radiating, described as pressure \"someone sitting on the chest\", associated with shortness of breath. Patient states that chest pain is similar to what he had prior to previous PCI, but is less severe. He will be admitted to hospitalist medicine, cardiology was consulted in ER. CXR revealed no acute abnormality. Cr 1.3 (baseline 1). Hs troponin 29, 25. Chest CTA was negative for pulmonary embolus. \"    Subjective (Events in last 24 hours): pt awake  Results   Component Value Date/Time    TSH 1.840 07/09/2021 08:28 AM         Assessment:    Chest pain- - noncardiac  Mildly elevated troponins, not NSTEMI  S/p cath 1/4/24 -   Non-obstructive Coronary Artery Disease   No significant changes from prior cath of 10/2021  CAD - hx PCI RCA 10/2021  Residual 50-60% mid LAD(FFR 0.83), D1 stenosis (small vessel)  ef 55-60 per TTE 1/4/24  HTN  DM  Family hx CAD  SHAHRAM  Hx PE      Plan:    Cont plavix/statin/BB/imdur  No ace due to soft bp  Ok to resume home xarelto  F/up dr hernandez 2 weeks  Will follow prn        Patient and Practitioner mutually agreed upon goal:   Patient and provider goals: Feel better and have more energy     Electronically signed by Cathie Rice PA-C on 1/5/2024 at 1:43 PM

## 2024-01-06 NOTE — DISCHARGE SUMMARY
Hospitalist Discharge Note      Patient:  Chris Estevez    Unit/Bed:6K-26/026-A  YOB: 1955  MRN: [id number]   Acct: [id number]     PCP: Kelvin Hardy DO  Date of Admission: 1/3/2024      Discharge date: 1/5/2024  3:49 PM    Chief Complaint on presentation :-  Chest pain      Discharge Assessment and Plan:-   Non-cardiac CP / mildy elevated trop  Initial troponin 29, repeat troponin 25 -- NO NSTEMI  EKG without ischemic changes  Heart score 5 per cardiology.  Cardiology consulted in the emergency department.  Start heparin.  Plan for cardiac catheterization 1/4.  Hold Xarelto.  Continue Plavix.    1/5: S/p LHC without evidence of obstructive CAD -continue Plavix, statin, beta-blocker, Imdur.  ACE inhibitor stopped due to soft BP continue on home Xarelto. Follow-up with cardiology in 2 weeks.     YANCI  Creatinine 1.3 on admit, improved to 1.2.  Baseline appears to be 0.9-1.  IV fluids.  Repeat BMP tomorrow morning    Nonobstructive coronary artery disease  Last cardiac catheterization in October 2021 with PCI and GI to RCA.  60% stenosis noted of LAD at that time with FFR 0.83  Continue Plavix  Dysphagia  Follows with GI.  History of gastroparesis and gastroesophageal reflux disease.  Has undergone EGD with dilation in the past.  Reports increased dysphagia more recently. EGD in October notable for mild GERD, possible increased tone of upper esophageal sphincter  Will place outpatient referral to gastro health as patient is interested in a second opinion  Hypertension  Continue home medication regimen.  Hyperlipidemia  Continue statin.  Insulin-dependent type 2 diabetes mellitus  Decreased basal insulin to 10 units twice daily as patient will be n.p.o.  Continue prandial insulin  History of PE  On Xarelto.  Last dose 1/1/2024 per patient.  Hold Xarelto as patient is on heparin as above.  Left neck mass  Follow-up with ENT as planned  Obstructive sleep apnea  Not currently  home  Condition at Discharge: Stable    Time Spent:- 40 minutes    Electronically signed by Gissel Christine PA-C on 1/6/24 at 6:40 PM EST   Discharging Hospitalist

## 2024-01-07 ENCOUNTER — HOSPITAL ENCOUNTER (EMERGENCY)
Age: 69
Discharge: HOME | End: 2024-01-08
Attending: STUDENT IN AN ORGANIZED HEALTH CARE EDUCATION/TRAINING PROGRAM
Payer: MEDICARE

## 2024-01-07 VITALS
TEMPERATURE: 97.6 F | HEART RATE: 68 BPM | DIASTOLIC BLOOD PRESSURE: 75 MMHG | HEIGHT: 69 IN | WEIGHT: 197 LBS | RESPIRATION RATE: 18 BRPM | SYSTOLIC BLOOD PRESSURE: 136 MMHG | BODY MASS INDEX: 29.18 KG/M2 | OXYGEN SATURATION: 100 %

## 2024-01-07 DIAGNOSIS — S61.212A LACERATION OF RIGHT MIDDLE FINGER WITHOUT FOREIGN BODY WITHOUT DAMAGE TO NAIL, INITIAL ENCOUNTER: Primary | ICD-10-CM

## 2024-01-07 PROCEDURE — 12001 RPR S/N/AX/GEN/TRNK 2.5CM/<: CPT

## 2024-01-07 PROCEDURE — 99282 EMERGENCY DEPT VISIT SF MDM: CPT

## 2024-01-08 ENCOUNTER — PATIENT MESSAGE (OUTPATIENT)
Dept: FAMILY MEDICINE CLINIC | Age: 69
End: 2024-01-08

## 2024-01-08 ENCOUNTER — CARE COORDINATION (OUTPATIENT)
Dept: CARE COORDINATION | Age: 69
End: 2024-01-08

## 2024-01-08 ENCOUNTER — TELEPHONE (OUTPATIENT)
Dept: CARDIOLOGY CLINIC | Age: 69
End: 2024-01-08

## 2024-01-08 ENCOUNTER — CARE COORDINATION (OUTPATIENT)
Dept: CASE MANAGEMENT | Age: 69
End: 2024-01-08

## 2024-01-08 DIAGNOSIS — I20.0 UNSTABLE ANGINA (HCC): Primary | ICD-10-CM

## 2024-01-08 PROCEDURE — 1111F DSCHRG MED/CURRENT MED MERGE: CPT | Performed by: FAMILY MEDICINE

## 2024-01-08 NOTE — PROGRESS NOTES
Physician Progress Note      PATIENT:               NICHOLAS HOLDER  [facility] #:                  [id number]  :                       1955  ADMIT DATE:       1/3/2024 10:49 AM  DISCH DATE:        2024 3:49 PM  RESPONDING  PROVIDER #:        Gissel Christine PA-C          QUERY TEXT:    Patient admitted with CAD With unstable ungina.  Noted documentation of Acute   Kidney Injury inH&P 1/3.  In order to support the diagnosis of YANCI, please   include additional clinical indicators in your documentation.? Or please   document if the diagnosis of YANCI has been ruled out after further study.    The medical record reflects the following:  Risk Factors: DM,HTN    Clinical Indicators:H&P 1/3\"YNACI Creatinine 1.3.  Baseline appears to be   0.9-1.\"  BUN  19,18,  Cr 1.3, 1.2,GFR >60    Treatment: IVF, Serial lab  Options provided:  -- Acute kidney injury evidenced by, Please document evidence as well as a   numerical baseline creatinine, if known.  -- Acute kidney injury ruled out after study  -- Other - I will add my own diagnosis  -- Disagree - Not applicable / Not valid  -- Disagree - Clinically unable to determine / Unknown  -- Refer to Clinical Documentation Reviewer    PROVIDER RESPONSE TEXT:    This patient has acute kidney injury as evidenced by baseline 0.9 - Cr noted   1.3 on admit    Query created by: Rosamaria Maldonado on 2024 9:43 AM      Electronically signed by:  Gissel Christine PA-C 2024 4:37 PM

## 2024-01-08 NOTE — TELEPHONE ENCOUNTER
Pt care cordinator Brittany britt states that his cath was clean and they said something to him about a 2 week follow up? Pt asking if he really needs this?

## 2024-01-08 NOTE — CARE COORDINATION
Patient notified and understanding voiced.  We have done at local pharmacy.  Appt scheduled for hospital and ED follow-up

## 2024-01-08 NOTE — ED TRIAGE NOTES
PT to the ED with complaint of a laceration to the right middle finger. PT states he cut it on his pill cutter accidentally. PT states he is on blood thinners and the finger has been bleeding for about an hour. PT fhas on pressure dressing on arrival

## 2024-01-08 NOTE — CARE COORDINATION
Care Transitions Initial Follow Up Call    Call within 2 business days of discharge: Yes    Patient Current Location:  Home: [address] [place] [address] [place] [address]    Care Transition Nurse contacted the patient by telephone to perform post hospital discharge assessment. Verified name and  with patient as identifiers. Provided introduction to self, and explanation of the Care Transition Nurse role.     Patient: Chris Estevez Patient : 1955   MRN: [id number]  Reason for Admission: Chest pain  Discharge Date: 24 RARS: Readmission Risk Score: 10.3      Last Discharge Facility       Date Complaint Diagnosis Description Type Department Provider    24 Laceration Laceration of right middle finger without foreign body without damage to nail, initial encounter ED (DISCHARGE) [facility],               Challenges to be reviewed by the provider   Additional needs identified to be addressed with provider: Yes  Itz said the ED provider mentioned a Tetanus shot for the finger laceration but was not given. He doesn't know the last one he had. Does he need this?  Also needs 7 day HFU.  Thank you!               Method of communication with provider: chart routing.    Spoke with Itz, said he is feeling ok.  Has a little headache this morning, feels \"a little off keel.\"  Denies dyspnea, has a little heaviness on sternum.  Had radial heart cath, negative.  Wrist looks good, has been 5 days.  Reviewed medications/changes.  Is not using Dexcom but has a glucometer.  Needs to ask PCP about getting a different system.  He cut his finger yesterday while cleaning his pill splitter, went to ED, couldn't stop the bleeding.  Looks good today.  Said the ED provider mentioned a Tetanus shot but wasn't given.  Can't remember when he got the last one.  Will send PCP message, also needs 7 day HFU.  His appetite and fluid intake is not great.  He has some dysphagia and feels food gets stuck in his esophagus and then

## 2024-01-08 NOTE — DISCHARGE INSTRUCTIONS
Please leave dressing for 24 hours May remove dressing after 24 hours and may place Band-Aid.  Continue anticoagulation at this time.

## 2024-01-08 NOTE — CARE COORDINATION
Contacted [facility] to discuss if f/u appointment is needed.  AVS says 2 wks but pt asking if needed since heart cath was negative and no med changes.  Has f/u in Aug.  Voicemail left requesting a call back.

## 2024-01-08 NOTE — ED NOTES
[facility] EMERGENCY DEPT  EMERGENCY DEPARTMENT ENCOUNTER      Pt Name: Chris Estevez  MRN: [id number]  Birthdate 1955  Date of evaluation: 1/7/2024  Resident Physician: Alf Pickett DO  Supervising Physician: Dr. Taurn Joy DO    CHIEF COMPLAINT       Chief Complaint   Patient presents with    Laceration       HISTORY OF PRESENT ILLNESS    HPI  Chris Estevez is a 68 y.o. male who presents to the emergency department from home, by private vehicle for evaluation of right middle finger laceration.  Patient was recently discharged from the hospital and was placed on Xarelto for anticoagulation.  Patient noted to be cleaning his knife when he accidentally cut his finger causing a superficial laceration at the tip which has not stopped bleeding.  He placed a pressure gauze with zip ties and arrived in the ED. The patient has no other acute complaints at this time.    PAST MEDICAL AND SURGICAL HISTORY     Past Medical History:   Diagnosis Date    Anxiety     CAD (coronary artery disease)     Depression     Diabetes mellitus (HCC)     Dilation of esophagus 2022    Dr Saxena     Frequent headaches     Gastroparesis     GERD (gastroesophageal reflux disease)     History of Chiari malformation     Hyperlipidemia     Hypertension     Kidney stone     Kidney stone     Migraine     Movement disorder     DDD    Pulmonary emboli (HCC) 10/29/2018    Shingles     Sleep apnea     Tattoos      Past Surgical History:   Procedure Laterality Date    BACK SURGERY  2008    C1 oates & decompression    BACK SURGERY  08/25/2020    [facility] Dr[facility], C5-7 cadaver patch    BRAIN SURGERY  2008    chiari    CARDIAC PROCEDURE N/A 1/4/2024    Left heart cath / coronary angiography performed by Tai Vargas MD at [facility] CARDIAC CATH LAB    CARDIAC SURGERY  2007    heart cath    CHOLECYSTECTOMY  2009    COLONOSCOPY  2017    COLONOSCOPY N/A 3/12/2020    COLONOSCOPY POLYPECTOMY SNARE/COLD BIOPSY performed by

## 2024-01-09 NOTE — TELEPHONE ENCOUNTER
Spoke with jerry.  Everything cardiology was okay in the hospital.  He will keep the August 20th appt.

## 2024-01-09 NOTE — CARE COORDINATION
Note in chart, cardiology made contact with patient.  \" Everything cardiology was okay in the hospital.  He will keep the August 20th appt.\"  Will notify CTN at this time.

## 2024-01-10 NOTE — ED PROVIDER NOTES
Attested           Attestation signed by Tarun Joy DO at 1/8/2024 10:06 PM     Attending Supervising Physician's Attestation Statement  I performed a history and physical examination on the patient and discussed the management with the resident physician. I reviewed and agree with the findings and plan as documented in the resident physician note.      This includes all diagnostic interpretations and treatment plans as written. I was present for the key portion of any procedures performed and the inclusive time noted in any critical care statement.   I have reviewed and interpreted all available lab, radiology and ekg results available at the moment.     Diagnosis, treatment and disposition plans were discussed and agreed upon by the  patient.      This transcription was electronically signed. It was dictated by use of voice recognition software and electronically transcribed. The transcription may contain errors not detected in proofreading.         Electronically signed by Tarun Joy DO on 1/8/24 at 10:06 PM EST               Expand All Collapse All      [facility] EMERGENCY DEPT  EMERGENCY DEPARTMENT ENCOUNTER       Pt Name: Chris Estevez  MRN: [id number]  Birthdate 1955  Date of evaluation: 1/7/2024  Resident Physician: Alf Pickett DO  Supervising Physician: Dr. Tarun Joy DO     CHIEF COMPLAINT            Chief Complaint   Patient presents with    Laceration         HISTORY OF PRESENT ILLNESS    HPI  Chris Estevez is a 68 y.o. male who presents to the emergency department from home, by private vehicle for evaluation of right middle finger laceration.  Patient was recently discharged from the hospital and was placed on Xarelto for anticoagulation.  Patient noted to be cleaning his knife when he accidentally cut his finger causing a superficial laceration at the tip which has not stopped bleeding.  He placed a pressure gauze with zip ties and arrived in the ED. The

## 2024-01-11 ENCOUNTER — CARE COORDINATION (OUTPATIENT)
Dept: CASE MANAGEMENT | Age: 69
End: 2024-01-11

## 2024-01-11 NOTE — CARE COORDINATION
Care Transitions Outreach Attempt-1st attempt    Call within 2 business days of discharge: Yes   Attempted to reach patient for subsequent transitional call.  Left HIPPA compliant VM to return call directly to [phone].     Patient: Chris Estevez Patient : 1955 MRN: [id number]    Last Discharge Facility       Date Complaint Diagnosis Description Type Department Provider    24 Laceration Laceration of right middle finger without foreign body without damage to nail, initial encounter ED (DISCHARGE) [facility], DO              Noted following upcoming appointments from discharge chart review:   [facility] follow up appointment(s):   Future Appointments   Date Time Provider Department Center   1/15/2024  8:30 AM Kelvin Hardy DO [facility] [place]   2024  8:00 AM Jaswinder Saenz MD [facility] [place]   2024 10:45 AM Caitlin Diaz MD [facility] [place]     Non-[facility]  follow up appointment(s): jacques

## 2024-01-12 ENCOUNTER — CARE COORDINATION (OUTPATIENT)
Dept: CASE MANAGEMENT | Age: 69
End: 2024-01-12

## 2024-01-12 NOTE — CARE COORDINATION
Care Transitions Outreach Attempt-2nd attempt    Call within 2 business days of discharge: Yes   Attempted to reach patient/SO for subsequent transitional call.  Left HIPPA compliant VM to return call directly to [phone].  Unable to reach letter sent via [employer].  Has no activated RPM yet, will continue to try to reach.    Patient: Chris Estevez Patient : 1955 MRN: [id number]    Last Discharge Facility       Date Complaint Diagnosis Description Type Department Provider    24 Laceration Laceration of right middle finger without foreign body without damage to nail, initial encounter ED (DISCHARGE) [facility], DO              Noted following upcoming appointments from discharge chart review:   [facility] follow up appointment(s):   Future Appointments   Date Time Provider Department Center   1/15/2024  8:30 AM Kelvin Hardy DO [facility] [place]   2024  8:00 AM Jaswinder Saenz MD [facility] [place]   2024 10:45 AM Caitlin Diaz MD [facility] [place]     Non-[facility]  follow up appointment(s): na

## 2024-01-15 ENCOUNTER — OFFICE VISIT (OUTPATIENT)
Dept: FAMILY MEDICINE CLINIC | Age: 69
End: 2024-01-15
Payer: MEDICARE

## 2024-01-15 VITALS
HEART RATE: 64 BPM | DIASTOLIC BLOOD PRESSURE: 68 MMHG | SYSTOLIC BLOOD PRESSURE: 118 MMHG | WEIGHT: 201.9 LBS | RESPIRATION RATE: 16 BRPM | BODY MASS INDEX: 29.82 KG/M2

## 2024-01-15 DIAGNOSIS — N17.9 AKI (ACUTE KIDNEY INJURY) (HCC): ICD-10-CM

## 2024-01-15 DIAGNOSIS — Z12.5 SCREENING FOR PROSTATE CANCER: ICD-10-CM

## 2024-01-15 DIAGNOSIS — E11.65 UNCONTROLLED TYPE 2 DIABETES MELLITUS WITH HYPERGLYCEMIA (HCC): ICD-10-CM

## 2024-01-15 DIAGNOSIS — I10 PRIMARY HYPERTENSION: ICD-10-CM

## 2024-01-15 DIAGNOSIS — R13.10 DYSPHAGIA, UNSPECIFIED TYPE: ICD-10-CM

## 2024-01-15 DIAGNOSIS — I25.10 CORONARY ARTERY DISEASE INVOLVING NATIVE CORONARY ARTERY OF NATIVE HEART WITHOUT ANGINA PECTORIS: ICD-10-CM

## 2024-01-15 DIAGNOSIS — R07.89 NON-CARDIAC CHEST PAIN: ICD-10-CM

## 2024-01-15 DIAGNOSIS — S61.212A LACERATION WITHOUT FOREIGN BODY OF RIGHT MIDDLE FINGER WITHOUT DAMAGE TO NAIL, INITIAL ENCOUNTER: Primary | ICD-10-CM

## 2024-01-15 DIAGNOSIS — E78.00 PURE HYPERCHOLESTEROLEMIA: ICD-10-CM

## 2024-01-15 PROBLEM — S61.219A LACERATION OF FINGER: Status: ACTIVE | Noted: 2022-08-28

## 2024-01-15 PROBLEM — R09.02 HYPOXIA: Status: ACTIVE | Noted: 2022-08-28

## 2024-01-15 PROBLEM — R06.00 DYSPNEA: Status: ACTIVE | Noted: 2024-01-15

## 2024-01-15 PROBLEM — I51.89 DIASTOLIC DYSFUNCTION: Status: ACTIVE | Noted: 2022-08-28

## 2024-01-15 PROBLEM — R07.9 CENTRAL CHEST PAIN: Status: ACTIVE | Noted: 2024-01-15

## 2024-01-15 PROBLEM — M21.619 BUNION: Status: ACTIVE | Noted: 2022-08-28

## 2024-01-15 PROCEDURE — 3078F DIAST BP <80 MM HG: CPT | Performed by: FAMILY MEDICINE

## 2024-01-15 PROCEDURE — 3074F SYST BP LT 130 MM HG: CPT | Performed by: FAMILY MEDICINE

## 2024-01-15 PROCEDURE — 1123F ACP DISCUSS/DSCN MKR DOCD: CPT | Performed by: FAMILY MEDICINE

## 2024-01-15 PROCEDURE — 99214 OFFICE O/P EST MOD 30 MIN: CPT | Performed by: FAMILY MEDICINE

## 2024-01-15 SDOH — ECONOMIC STABILITY: HOUSING INSECURITY
IN THE LAST 12 MONTHS, WAS THERE A TIME WHEN YOU DID NOT HAVE A STEADY PLACE TO SLEEP OR SLEPT IN A SHELTER (INCLUDING NOW)?: NO

## 2024-01-15 SDOH — ECONOMIC STABILITY: INCOME INSECURITY: HOW HARD IS IT FOR YOU TO PAY FOR THE VERY BASICS LIKE FOOD, HOUSING, MEDICAL CARE, AND HEATING?: NOT HARD AT ALL

## 2024-01-15 SDOH — ECONOMIC STABILITY: FOOD INSECURITY: WITHIN THE PAST 12 MONTHS, THE FOOD YOU BOUGHT JUST DIDN'T LAST AND YOU DIDN'T HAVE MONEY TO GET MORE.: NEVER TRUE

## 2024-01-15 SDOH — ECONOMIC STABILITY: FOOD INSECURITY: WITHIN THE PAST 12 MONTHS, YOU WORRIED THAT YOUR FOOD WOULD RUN OUT BEFORE YOU GOT MONEY TO BUY MORE.: NEVER TRUE

## 2024-01-15 ASSESSMENT — PATIENT HEALTH QUESTIONNAIRE - PHQ9
SUM OF ALL RESPONSES TO PHQ9 QUESTIONS 1 & 2: 0
SUM OF ALL RESPONSES TO PHQ QUESTIONS 1-9: 0
2. FEELING DOWN, DEPRESSED OR HOPELESS: 0
SUM OF ALL RESPONSES TO PHQ QUESTIONS 1-9: 0
SUM OF ALL RESPONSES TO PHQ QUESTIONS 1-9: 0
1. LITTLE INTEREST OR PLEASURE IN DOING THINGS: 0
SUM OF ALL RESPONSES TO PHQ QUESTIONS 1-9: 0

## 2024-01-15 NOTE — PROGRESS NOTES
noted in the HPI.    Physical Exam:  General Appearance: alert and oriented to person, place and time, well developed and well- nourished, in no acute distress  Skin: warm and dry, no rash or erythema  Head: normocephalic and atraumatic  Eyes: pupils equal, round, and reactive to light, extraocular eye movements intact, conjunctivae normal  ENT: tympanic membrane, external ear and ear canal normal bilaterally, nose without deformity, nasal mucosa and turbinates normal without polyps  Neck: supple and non-tender without mass, no thyromegaly or thyroid nodules, no cervical lymphadenopathy  Pulmonary/Chest: clear to auscultation bilaterally- no wheezes, rales or rhonchi, normal air movement, no respiratory distress  Cardiovascular: normal rate, regular rhythm, normal S1 and S2, no murmurs, rubs, clicks, or gallops, distal pulses intact, no carotid bruits  Abdomen: soft, non-tender, non-distended, normal bowel sounds, no masses or organomegaly  Extremities: right middle finger wrapped   Musculoskeletal: normal range of motion, no joint swelling, deformity or tenderness  Neurologic: reflexes normal and symmetric, no cranial nerve deficit, gait, coordination and speech normal      Assessment/Plan:  Chris was seen today for ed follow-up, follow-up from hospital and health maintenance.    Diagnoses and all orders for this visit:    Laceration without foreign body of right middle finger without damage to nail, initial encounter    Non-cardiac chest pain    YANCI (acute kidney injury) (HCC)  -     Comprehensive Metabolic Panel; Future    Coronary artery disease involving native coronary artery of native heart without angina pectoris    Dysphagia, unspecified type    Primary hypertension    Pure hypercholesterolemia  -     Lipid Panel w/ Reflex Direct LDL; Future  -     TSH with Reflex; Future    Uncontrolled type 2 diabetes mellitus with hyperglycemia (HCC)  -     Hemoglobin A1C; Future  -     Microalbumin / Creatinine Urine

## 2024-01-15 NOTE — PATIENT INSTRUCTIONS
You may receive a survey regarding the care you received during your visit.  Your input is valuable to us.  We encourage you to complete and return your survey.  We hope you will choose us in the future for your healthcare needs.

## 2024-01-16 ENCOUNTER — CARE COORDINATION (OUTPATIENT)
Dept: CASE MANAGEMENT | Age: 69
End: 2024-01-16

## 2024-01-16 ENCOUNTER — CARE COORDINATION (OUTPATIENT)
Dept: CARE COORDINATION | Age: 69
End: 2024-01-16

## 2024-01-16 NOTE — CARE COORDINATION
Care Transitions Outreach Attempt-3rd attempt    Call within 2 business days of discharge: Yes   Attempted to reach patient/SO for subsequent transitional call.  Left HIPPA compliant VM to return call directly to [phone].     Patient: Chris Estevez Patient : 1955 MRN: [id number]    Last Discharge Facility       Date Complaint Diagnosis Description Type Department Provider    24 Laceration Laceration of right middle finger without foreign body without damage to nail, initial encounter ED (DISCHARGE) [facility], DO              Noted following upcoming appointments from discharge chart review:   [facility] follow up appointment(s):   Future Appointments   Date Time Provider Department Center   2024  8:00 AM Jaswinder Saenz MD [facility] [place]   2024  8:15 AM Kelvin Hardy DO [facility] [place]   2024 10:45 AM Caitlin Diaz MD [facility] [place]     Non-[facility]  follow up appointment(s): na

## 2024-01-16 NOTE — CARE COORDINATION
01/16/24 2:23 PM Patient is currently enrolled in Remote Patient Monitoring for Diabetes and HTN.  Out reach to pt regarding  no oxygen/oxygen heart rate since 01/13/24.  No answer at this time and left HIPAA compliant voice message requesting pt to update oxygen and glucose for review.

## 2024-01-17 ENCOUNTER — CARE COORDINATION (OUTPATIENT)
Dept: CARE COORDINATION | Age: 69
End: 2024-01-17

## 2024-01-17 NOTE — CARE COORDINATION
01/17/24 2:41 PM Patient is currently enrolled in Remote Patient Monitoring for Diabetes and HTN.  Pt has not entered oxygen level since 01/13/24 or glucose since 01/15/24.  Attempted to reach pt and emergency contact, Rosamaria Stuart to remind him to enter metrics.  No answer with either number and HIPAA compliant voice messages left requesting a return call.

## 2024-01-17 NOTE — CARE COORDINATION
01/17/24 4:12 PM Patient is currently enrolled in Remote Patient Monitoring for Diabetes and HTN.  No return call received as of this time.  Will route to [facility] to update.

## 2024-01-19 ENCOUNTER — CARE COORDINATION (OUTPATIENT)
Dept: CASE MANAGEMENT | Age: 69
End: 2024-01-19

## 2024-01-19 NOTE — CARE COORDINATION
Care Transitions Follow Up Call    Patient Current Location:  Home: [address] [place] [address] [place] [address]    Care Transition Nurse contacted the patient by telephone to follow up after admission on 1/3/24.  Verified name and  with patient as identifiers.    Patient: Chris Estevez  Patient : 1955   MRN: [id number]  Reason for Admission: Chest pain  Discharge Date: 24 RARS: Readmission Risk Score: 10.3      Needs to be reviewed by the provider   Additional needs identified to be addressed with provider: No  none             Method of communication with provider: none.    Spoke with Itz, said he has been doing good.  Has been very busy with the weather, shoveling snow at the .  Denies chest pain but does have a little heaviness at times but said something he has to live with, along with a little bit of SOB.  Denies any L arm/jaw pain.  He saw GI this morning and are planning to do more testing.  Eating, drinking, sleeping ok.  Reviewed RPM, has not done today, just got home from shoveling.  Reviewed welcome letter.  He requests that we do not call Rosamaria, S.O., said she can't take calls at work.  Asked if he has another emergency contact to call if he can't be reached and said no.  No other issues to report.  Denies any other needs.  No other questions or concerns at this time.  Will continue to follow.    Addressed changes since last contact:  none  Discussed follow-up appointments. If no appointment was previously scheduled, appointment scheduling offered: Yes.   Is follow up appointment scheduled within 7 days of discharge? No.    Follow Up  Future Appointments   Date Time Provider Department Center   2024  8:00 AM Jaswinder Saenz MD N ENT [facility] [place]   2024  8:15 AM Kelvin Hardy, DO [facility] [place]   2024 10:45 AM Caitlin Diaz MD [facility] [place]     External follow up appointment(s): jacques    Care Transition Nurse reviewed medical action plan and red flags

## 2024-01-23 VITALS — DIASTOLIC BLOOD PRESSURE: 74 MMHG | OXYGEN SATURATION: 96 % | HEART RATE: 62 BPM | SYSTOLIC BLOOD PRESSURE: 134 MMHG

## 2024-01-24 ENCOUNTER — CARE COORDINATION (OUTPATIENT)
Dept: CASE MANAGEMENT | Age: 69
End: 2024-01-24

## 2024-01-24 NOTE — CARE COORDINATION
Care Transitions Follow Up Call    Patient Current Location:  Home: [address] [place] [address] [place] [address]    Care Transition Nurse contacted the patient by telephone to follow up after admission on 1/3/24.  Verified name and  with patient as identifiers.    Patient: Chris Estevez  Patient : 1955   MRN: [id number]  Reason for Admission: Chest pain  Discharge Date: 24 RARS: Readmission Risk Score: 10.3      Needs to be reviewed by the provider   Additional needs identified to be addressed with provider: No  none             Method of communication with provider: none.    Incoming call from Itz, said he called [employer], wanting to discontinue RPM, was told to call me.  Will send message to RPM team to start return process.  Said he thought it was a good idea but doesn't like the alarms and feels it is controlling his everyday life.  Said he is doing well.  Denies chest pain, dyspnea.  Eating, drinking, sleeping good.  No other issues to report.  Denies any other needs.  No other questions or concerns at this time.  Will continue to follow for final call.    Addressed changes since last contact:  none  Discussed follow-up appointments. If no appointment was previously scheduled, appointment scheduling offered: Yes.   Is follow up appointment scheduled within 7 days of discharge? No.    Follow Up  Future Appointments   Date Time Provider Department Center   2024  8:00 AM Jaswinder Saenz MD N E[facility]   2024  8:15 AM Kelvin Hardy, DO [facility]   2024 10:45 AM Caitlin Diaz MD [facility]     External follow up appointment(s): jacques    Care Transition Nurse reviewed medical action plan and red flags with patient and discussed any barriers to care and/or understanding of plan of care after discharge. Discussed appropriate site of care based on symptoms and resources available to patient including: PCP  Specialist  Urgent care clinics  When to call Gaby  Yunior

## 2024-01-29 ENCOUNTER — TELEPHONE (OUTPATIENT)
Dept: FAMILY MEDICINE CLINIC | Age: 69
End: 2024-01-29

## 2024-01-29 NOTE — TELEPHONE ENCOUNTER
Pt called office stating he had seen Dr. Saxena in the past \"and had an issue\". So we had place a new referral to [facility] Dr. Menon. Pt had his first appt with him on 1/19/2024, was told they would call him within 10days to schedule a procedure. If no call back, pt was to contact their office. Pt says he got no call back. When he contacted them, he has been given a lot of contradicting information from his visit to what they are saying now. Pt says he is not getting \"the warm and fuzzies\" from that office and wants to know if he should be going to [facility] or elsewhere. They are talking to him about not being able to do a procedure that was discussed at his appt, then telling pt he needs a procedure that pt was previously told couldn't be done. Then they are talking about cardiac clearance that pt was told in the hospital \"I didn't need\". Pt is upset with the conflicting information. If you feel Dr. Menon is the doctor he needs to see, pt says he will stay there. But he doesn't feel like his staff knows what they are doing. Please advise.

## 2024-01-29 NOTE — TELEPHONE ENCOUNTER
That's up to him.  I'm confident in Dr. Menon and his staff to care for him but I'm happy to make a referral to [facility] if he'd like?

## 2024-01-30 ENCOUNTER — TELEPHONE (OUTPATIENT)
Dept: CARDIOLOGY CLINIC | Age: 69
End: 2024-01-30

## 2024-01-30 ENCOUNTER — NURSE ONLY (OUTPATIENT)
Dept: LAB | Age: 69
End: 2024-01-30

## 2024-01-30 DIAGNOSIS — Z79.4 TYPE 2 DIABETES MELLITUS WITH OTHER SPECIFIED COMPLICATION, WITH LONG-TERM CURRENT USE OF INSULIN (HCC): ICD-10-CM

## 2024-01-30 DIAGNOSIS — N17.9 AKI (ACUTE KIDNEY INJURY) (HCC): ICD-10-CM

## 2024-01-30 DIAGNOSIS — E11.69 TYPE 2 DIABETES MELLITUS WITH OTHER SPECIFIED COMPLICATION, WITH LONG-TERM CURRENT USE OF INSULIN (HCC): ICD-10-CM

## 2024-01-30 DIAGNOSIS — E11.65 UNCONTROLLED TYPE 2 DIABETES MELLITUS WITH HYPERGLYCEMIA (HCC): ICD-10-CM

## 2024-01-30 DIAGNOSIS — Z12.5 SCREENING FOR PROSTATE CANCER: ICD-10-CM

## 2024-01-30 DIAGNOSIS — E78.00 PURE HYPERCHOLESTEROLEMIA: ICD-10-CM

## 2024-01-30 LAB
ALBUMIN SERPL BCG-MCNC: 3.9 G/DL (ref 3.5–5.1)
ALP SERPL-CCNC: 142 U/L (ref 38–126)
ALT SERPL W/O P-5'-P-CCNC: 25 U/L (ref 11–66)
ANION GAP SERPL CALC-SCNC: 10 MEQ/L (ref 8–16)
AST SERPL-CCNC: 26 U/L (ref 5–40)
BILIRUB SERPL-MCNC: 0.6 MG/DL (ref 0.3–1.2)
BUN SERPL-MCNC: 20 MG/DL (ref 7–22)
CALCIUM SERPL-MCNC: 9.1 MG/DL (ref 8.5–10.5)
CHLORIDE SERPL-SCNC: 101 MEQ/L (ref 98–111)
CHOLEST SERPL-MCNC: 189 MG/DL (ref 100–199)
CO2 SERPL-SCNC: 27 MEQ/L (ref 23–33)
CREAT SERPL-MCNC: 1.2 MG/DL (ref 0.4–1.2)
CREAT UR-MCNC: 223.5 MG/DL
DEPRECATED MEAN GLUCOSE BLD GHB EST-ACNC: 183 MG/DL (ref 70–126)
GFR SERPL CREATININE-BSD FRML MDRD: > 60 ML/MIN/1.73M2
GLUCOSE SERPL-MCNC: 189 MG/DL (ref 70–108)
HBA1C MFR BLD HPLC: 8.1 % (ref 4.4–6.4)
HDLC SERPL-MCNC: 54 MG/DL
LDLC SERPL CALC-MCNC: 99 MG/DL
MICROALBUMIN UR-MCNC: 224.36 MG/DL
MICROALBUMIN/CREAT RATIO PNL UR: 1004 MG/G (ref 0–30)
POTASSIUM SERPL-SCNC: 4.2 MEQ/L (ref 3.5–5.2)
PROT SERPL-MCNC: 7.8 G/DL (ref 6.1–8)
PSA SERPL-MCNC: 0.52 NG/ML (ref 0–1)
SODIUM SERPL-SCNC: 138 MEQ/L (ref 135–145)
TRIGL SERPL-MCNC: 181 MG/DL (ref 0–199)
TSH SERPL DL<=0.005 MIU/L-ACNC: 1.09 UIU/ML (ref 0.4–4.2)

## 2024-01-30 NOTE — TELEPHONE ENCOUNTER
Pre op Risk Assessment    Procedure EGD WITH DILATION  Physician DR. ZARATE  Date of surgery/procedure AWAITING CLEARANCE    Last OV 09/21/23  Last Stress 10/15/21  Last Echo 01/03/24  Last Cath 01/03/24  Last Stent 10/21/21  Is patient on blood thinners XARELTO 15 MG, PLAVIX  Hold Meds/how many days 7 DAYS     [facility] [phone]

## 2024-02-01 ENCOUNTER — CARE COORDINATION (OUTPATIENT)
Dept: CASE MANAGEMENT | Age: 69
End: 2024-02-01

## 2024-02-01 NOTE — CARE COORDINATION
Care Transitions Follow Up Call    Patient Current Location:  Home: [address] [place] [address] [place] [address]    Care Transition Nurse contacted the patient by telephone to follow up after admission on 1/3/24.  Verified name and  with patient as identifiers.    Patient: Chris Estevez  Patient : 1955   MRN: [id number]  Reason for Admission: chest pain  Discharge Date: 24 RARS: Readmission Risk Score: 10.3      Needs to be reviewed by the provider   Additional needs identified to be addressed with provider: No  none             Method of communication with provider: none.    Spoke with Itz, said he is doing pretty well.  Has a little bit of chest heaviness and PARR at times but not bad.  Saw PCP on Monday, no changes.  Will see ENT next week.  Waiting on cardiac clearance for GI to do his dilatation.  Told him it looked like it was being addressed per notes and should hear in the next day or so.  Eating, drinking, sleeping ok.  No other issues to report.  Denies any other needs.  No other questions or concerns at this time.  Final call, appreciative.     Addressed changes since last contact:  none  Discussed follow-up appointments. If no appointment was previously scheduled, appointment scheduling offered: Yes.   Is follow up appointment scheduled within 7 days of discharge? No.    Follow Up  Future Appointments   Date Time Provider Department Center   2024  8:00 AM Jaswinder Saenz MD N ENT [facility]   2024  8:15 AM Kelvin Hardy, DO [facility]   2024 10:45 AM Caitlin Diaz MD N [facility]     External follow up appointment(s): jacques    Care Transition Nurse reviewed medical action plan and red flags with patient and discussed any barriers to care and/or understanding of plan of care after discharge. Discussed appropriate site of care based on symptoms and resources available to patient including: PCP  Specialist  Urgent care clinics  When to call Gaby  Yunior

## 2024-02-01 NOTE — TELEPHONE ENCOUNTER
Form out for signature    Per Pharmacy, Basaglar not covered by insurance, asking for alternative to be sent, please advise. Thank you

## 2024-02-07 ENCOUNTER — OFFICE VISIT (OUTPATIENT)
Dept: ENT CLINIC | Age: 69
End: 2024-02-07

## 2024-02-07 VITALS
HEIGHT: 69 IN | HEART RATE: 62 BPM | TEMPERATURE: 97.9 F | BODY MASS INDEX: 30.75 KG/M2 | RESPIRATION RATE: 20 BRPM | DIASTOLIC BLOOD PRESSURE: 72 MMHG | OXYGEN SATURATION: 95 % | WEIGHT: 207.6 LBS | SYSTOLIC BLOOD PRESSURE: 126 MMHG

## 2024-02-07 DIAGNOSIS — R59.0 CERVICAL ADENOPATHY: Primary | ICD-10-CM

## 2024-02-07 DIAGNOSIS — R22.1 NECK MASS: ICD-10-CM

## 2024-02-07 NOTE — PROGRESS NOTES
[facility] [place] [facility]  [facility] EAR, NOSE AND THROAT  [address]   [address]  [facility] [address]  Dept: [phone]  Dept Fax: [phone]  Loc: [phone]    Chris Estevez is a 68 y.o. 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 He says that he has had it for 7-8 years. Patient has dysphagia, vomiting, chocking, and digestive issues. Patient did have an FNA in the past.        HPI:     Chris Estevez is a 68 y.o. male with a history of a left parotid mass seen by 2 otolaryngologist here in the past including Dr. [place] Cruz in 2019.  For what ever reason the patient was not pleased by the care and was lost to follow-up.  He returns today and describes himself as being willing to resume care and proceed with what ever necessary diagnostic studies need to be had to enable this process.  He has a recent CT scan for review.     History:     Allergies   Allergen Reactions    Latex Rash     Current Outpatient Medications   Medication Sig Dispense Refill    blood glucose test strips (ASCENSIA AUTODISC VI;ONE TOUCH ULTRA TEST VI) strip Check sugars TID.  Dx: E11.69 300 each 3    nitroGLYCERIN (NITROSTAT) 0.4 MG SL tablet Place 1 tablet under the tongue every 5 minutes as needed for Chest pain up to max of 3 total doses. If no relief after 1 dose, call 911. 25 tablet 1    isosorbide mononitrate (IMDUR) 60 MG extended release tablet Take 1 tablet by mouth daily      ezetimibe (ZETIA) 10 MG tablet Take 1 tablet by mouth daily 30 tablet 3    metoprolol tartrate (LOPRESSOR) 25 MG tablet TAKE ONE-HALF TABLET BY MOUTH TWICE DAILY 60 tablet 3    rosuvastatin (CRESTOR) 40 MG tablet Take 1 tablet by mouth daily 90 tablet 1    clopidogrel (PLAVIX) 75 MG tablet TAKE 1 TABLET BY MOUTH ONE TIME A DAY 90 tablet 3    sildenafil (REVATIO) 20 MG tablet Take 1 tablet by mouth

## 2024-02-12 ENCOUNTER — TRANSCRIBE ORDERS (OUTPATIENT)
Dept: SPEECH THERAPY | Age: 69
End: 2024-02-12

## 2024-02-12 DIAGNOSIS — R13.10 DYSPHAGIA, UNSPECIFIED TYPE: Primary | ICD-10-CM

## 2024-02-14 ENCOUNTER — HOSPITAL ENCOUNTER (OUTPATIENT)
Dept: ULTRASOUND IMAGING | Age: 69
Discharge: HOME | End: 2024-02-14
Payer: MEDICARE

## 2024-02-14 DIAGNOSIS — R59.0 CERVICAL ADENOPATHY: ICD-10-CM

## 2024-02-14 DIAGNOSIS — R22.1 NECK MASS: ICD-10-CM

## 2024-02-14 PROCEDURE — 88173 CYTOPATH EVAL FNA REPORT: CPT

## 2024-02-14 PROCEDURE — 10005 FNA BX W/US GDN 1ST LES: CPT

## 2024-02-14 PROCEDURE — 88333 PATH CONSLTJ SURG CYTO XM 1: CPT

## 2024-02-14 PROCEDURE — 88305 TISSUE EXAM BY PATHOLOGIST: CPT

## 2024-02-14 PROCEDURE — 76942 ECHO GUIDE FOR BIOPSY: CPT

## 2024-02-14 PROCEDURE — 88172 CYTP DX EVAL FNA 1ST EA SITE: CPT

## 2024-02-17 ENCOUNTER — HOSPITAL ENCOUNTER (INPATIENT)
Age: 69
LOS: 1 days | Discharge: HOME | DRG: 920 | End: 2024-02-20
Attending: EMERGENCY MEDICINE | Admitting: INTERNAL MEDICINE
Payer: MEDICARE

## 2024-02-17 ENCOUNTER — APPOINTMENT (OUTPATIENT)
Dept: CT IMAGING | Age: 69
DRG: 920 | End: 2024-02-17
Payer: MEDICARE

## 2024-02-17 DIAGNOSIS — R22.1 LOCALIZED SWELLING, MASS AND LUMP, NECK: Primary | ICD-10-CM

## 2024-02-17 LAB
ALBUMIN SERPL BCG-MCNC: 3.9 G/DL (ref 3.5–5.1)
ALP SERPL-CCNC: 165 U/L (ref 38–126)
ALT SERPL W/O P-5'-P-CCNC: 20 U/L (ref 11–66)
ANION GAP SERPL CALC-SCNC: 12 MEQ/L (ref 8–16)
APTT PPP: 34.7 SECONDS (ref 22–38)
AST SERPL-CCNC: 17 U/L (ref 5–40)
BASOPHILS ABSOLUTE: 0 THOU/MM3 (ref 0–0.1)
BASOPHILS NFR BLD AUTO: 0.2 %
BILIRUB SERPL-MCNC: 0.6 MG/DL (ref 0.3–1.2)
BUN SERPL-MCNC: 11 MG/DL (ref 7–22)
CALCIUM SERPL-MCNC: 9.1 MG/DL (ref 8.5–10.5)
CHLORIDE SERPL-SCNC: 97 MEQ/L (ref 98–111)
CO2 SERPL-SCNC: 25 MEQ/L (ref 23–33)
CREAT SERPL-MCNC: 1 MG/DL (ref 0.4–1.2)
DEPRECATED RDW RBC AUTO: 43.5 FL (ref 35–45)
EOSINOPHIL NFR BLD AUTO: 0.1 %
EOSINOPHILS ABSOLUTE: 0 THOU/MM3 (ref 0–0.4)
ERYTHROCYTE [DISTWIDTH] IN BLOOD BY AUTOMATED COUNT: 13.3 % (ref 11.5–14.5)
GFR SERPL CREATININE-BSD FRML MDRD: > 60 ML/MIN/1.73M2
GLUCOSE BLD STRIP.AUTO-MCNC: 361 MG/DL (ref 70–108)
GLUCOSE SERPL-MCNC: 162 MG/DL (ref 70–108)
HCT VFR BLD AUTO: 46.1 % (ref 42–52)
HGB BLD-MCNC: 15.2 GM/DL (ref 14–18)
IMM GRANULOCYTES # BLD AUTO: 0.05 THOU/MM3 (ref 0–0.07)
IMM GRANULOCYTES NFR BLD AUTO: 0.4 %
INR PPP: 0.95 (ref 0.85–1.13)
LACTATE SERPL-SCNC: 1.8 MMOL/L (ref 0.5–2)
LYMPHOCYTES ABSOLUTE: 1.2 THOU/MM3 (ref 1–4.8)
LYMPHOCYTES NFR BLD AUTO: 9.7 %
MCH RBC QN AUTO: 29.2 PG (ref 26–33)
MCHC RBC AUTO-ENTMCNC: 33 GM/DL (ref 32.2–35.5)
MCV RBC AUTO: 88.7 FL (ref 80–94)
MONOCYTES ABSOLUTE: 1.1 THOU/MM3 (ref 0.4–1.3)
MONOCYTES NFR BLD AUTO: 8.6 %
NEUTROPHILS NFR BLD AUTO: 81 %
NRBC BLD AUTO-RTO: 0 /100 WBC
OSMOLALITY SERPL CALC.SUM OF ELEC: 271.2 MOSMOL/KG (ref 275–300)
PLATELET # BLD AUTO: 269 THOU/MM3 (ref 130–400)
PMV BLD AUTO: 8.9 FL (ref 9.4–12.4)
POTASSIUM SERPL-SCNC: 4.2 MEQ/L (ref 3.5–5.2)
PROT SERPL-MCNC: 8.1 G/DL (ref 6.1–8)
RBC # BLD AUTO: 5.2 MILL/MM3 (ref 4.7–6.1)
SEGMENTED NEUTROPHILS ABSOLUTE COUNT: 10 THOU/MM3 (ref 1.8–7.7)
SODIUM SERPL-SCNC: 134 MEQ/L (ref 135–145)
WBC # BLD AUTO: 12.4 THOU/MM3 (ref 4.8–10.8)

## 2024-02-17 PROCEDURE — 96375 TX/PRO/DX INJ NEW DRUG ADDON: CPT

## 2024-02-17 PROCEDURE — 6360000002 HC RX W HCPCS

## 2024-02-17 PROCEDURE — 36415 COLL VENOUS BLD VENIPUNCTURE: CPT

## 2024-02-17 PROCEDURE — 96374 THER/PROPH/DIAG INJ IV PUSH: CPT

## 2024-02-17 PROCEDURE — 2580000003 HC RX 258

## 2024-02-17 PROCEDURE — 99285 EMERGENCY DEPT VISIT HI MDM: CPT

## 2024-02-17 PROCEDURE — 6360000004 HC RX CONTRAST MEDICATION: Performed by: EMERGENCY MEDICINE

## 2024-02-17 PROCEDURE — G0378 HOSPITAL OBSERVATION PER HR: HCPCS

## 2024-02-17 PROCEDURE — 87040 BLOOD CULTURE FOR BACTERIA: CPT

## 2024-02-17 PROCEDURE — 70492 CT SFT TSUE NCK W/O & W/DYE: CPT

## 2024-02-17 PROCEDURE — 85730 THROMBOPLASTIN TIME PARTIAL: CPT

## 2024-02-17 PROCEDURE — 85610 PROTHROMBIN TIME: CPT

## 2024-02-17 PROCEDURE — 6370000000 HC RX 637 (ALT 250 FOR IP)

## 2024-02-17 PROCEDURE — 85025 COMPLETE CBC W/AUTO DIFF WBC: CPT

## 2024-02-17 PROCEDURE — 82948 REAGENT STRIP/BLOOD GLUCOSE: CPT

## 2024-02-17 PROCEDURE — 83605 ASSAY OF LACTIC ACID: CPT

## 2024-02-17 PROCEDURE — 80053 COMPREHEN METABOLIC PANEL: CPT

## 2024-02-17 PROCEDURE — 99223 1ST HOSP IP/OBS HIGH 75: CPT

## 2024-02-17 RX ORDER — ONDANSETRON 4 MG/1
4 TABLET, ORALLY DISINTEGRATING ORAL EVERY 8 HOURS PRN
Status: DISCONTINUED | OUTPATIENT
Start: 2024-02-17 | End: 2024-02-20 | Stop reason: HOSPADM

## 2024-02-17 RX ORDER — POLYETHYLENE GLYCOL 3350 17 G/17G
17 POWDER, FOR SOLUTION ORAL DAILY PRN
Status: DISCONTINUED | OUTPATIENT
Start: 2024-02-17 | End: 2024-02-20 | Stop reason: HOSPADM

## 2024-02-17 RX ORDER — SODIUM CHLORIDE 0.9 % (FLUSH) 0.9 %
5-40 SYRINGE (ML) INJECTION PRN
Status: DISCONTINUED | OUTPATIENT
Start: 2024-02-17 | End: 2024-02-20 | Stop reason: HOSPADM

## 2024-02-17 RX ORDER — DEXAMETHASONE SODIUM PHOSPHATE 4 MG/ML
10 INJECTION, SOLUTION INTRA-ARTICULAR; INTRALESIONAL; INTRAMUSCULAR; INTRAVENOUS; SOFT TISSUE ONCE
Status: COMPLETED | OUTPATIENT
Start: 2024-02-17 | End: 2024-02-17

## 2024-02-17 RX ORDER — ONDANSETRON 2 MG/ML
4 INJECTION INTRAMUSCULAR; INTRAVENOUS ONCE
Status: COMPLETED | OUTPATIENT
Start: 2024-02-17 | End: 2024-02-17

## 2024-02-17 RX ORDER — DEXTROSE MONOHYDRATE 100 MG/ML
INJECTION, SOLUTION INTRAVENOUS CONTINUOUS PRN
Status: DISCONTINUED | OUTPATIENT
Start: 2024-02-17 | End: 2024-02-20 | Stop reason: HOSPADM

## 2024-02-17 RX ORDER — ONDANSETRON 2 MG/ML
4 INJECTION INTRAMUSCULAR; INTRAVENOUS EVERY 6 HOURS PRN
Status: DISCONTINUED | OUTPATIENT
Start: 2024-02-17 | End: 2024-02-20 | Stop reason: HOSPADM

## 2024-02-17 RX ORDER — PANTOPRAZOLE SODIUM 40 MG/1
40 TABLET, DELAYED RELEASE ORAL
Status: DISCONTINUED | OUTPATIENT
Start: 2024-02-18 | End: 2024-02-20 | Stop reason: HOSPADM

## 2024-02-17 RX ORDER — MORPHINE SULFATE 4 MG/ML
4 INJECTION, SOLUTION INTRAMUSCULAR; INTRAVENOUS ONCE
Status: COMPLETED | OUTPATIENT
Start: 2024-02-17 | End: 2024-02-17

## 2024-02-17 RX ORDER — ROSUVASTATIN CALCIUM 20 MG/1
40 TABLET, COATED ORAL DAILY
Status: DISCONTINUED | OUTPATIENT
Start: 2024-02-17 | End: 2024-02-20 | Stop reason: HOSPADM

## 2024-02-17 RX ORDER — DEXAMETHASONE SODIUM PHOSPHATE 4 MG/ML
8 INJECTION, SOLUTION INTRA-ARTICULAR; INTRALESIONAL; INTRAMUSCULAR; INTRAVENOUS; SOFT TISSUE EVERY 6 HOURS
Status: COMPLETED | OUTPATIENT
Start: 2024-02-18 | End: 2024-02-18

## 2024-02-17 RX ORDER — GLUCAGON 1 MG/ML
1 KIT INJECTION PRN
Status: DISCONTINUED | OUTPATIENT
Start: 2024-02-17 | End: 2024-02-20 | Stop reason: HOSPADM

## 2024-02-17 RX ORDER — SODIUM CHLORIDE 0.9 % (FLUSH) 0.9 %
5-40 SYRINGE (ML) INJECTION EVERY 12 HOURS SCHEDULED
Status: DISCONTINUED | OUTPATIENT
Start: 2024-02-17 | End: 2024-02-20 | Stop reason: HOSPADM

## 2024-02-17 RX ORDER — SODIUM CHLORIDE 9 MG/ML
INJECTION, SOLUTION INTRAVENOUS PRN
Status: DISCONTINUED | OUTPATIENT
Start: 2024-02-17 | End: 2024-02-20 | Stop reason: HOSPADM

## 2024-02-17 RX ORDER — MAGNESIUM SULFATE IN WATER 40 MG/ML
2000 INJECTION, SOLUTION INTRAVENOUS PRN
Status: DISCONTINUED | OUTPATIENT
Start: 2024-02-17 | End: 2024-02-20 | Stop reason: HOSPADM

## 2024-02-17 RX ORDER — ACETAMINOPHEN 325 MG/1
650 TABLET ORAL EVERY 6 HOURS PRN
Status: DISCONTINUED | OUTPATIENT
Start: 2024-02-17 | End: 2024-02-20 | Stop reason: HOSPADM

## 2024-02-17 RX ORDER — POTASSIUM CHLORIDE 7.45 MG/ML
10 INJECTION INTRAVENOUS PRN
Status: DISCONTINUED | OUTPATIENT
Start: 2024-02-17 | End: 2024-02-20 | Stop reason: HOSPADM

## 2024-02-17 RX ORDER — EZETIMIBE 10 MG/1
10 TABLET ORAL DAILY
Status: DISCONTINUED | OUTPATIENT
Start: 2024-02-17 | End: 2024-02-20 | Stop reason: HOSPADM

## 2024-02-17 RX ORDER — INSULIN LISPRO 100 [IU]/ML
0-4 INJECTION, SOLUTION INTRAVENOUS; SUBCUTANEOUS EVERY 6 HOURS
Status: DISCONTINUED | OUTPATIENT
Start: 2024-02-17 | End: 2024-02-18

## 2024-02-17 RX ORDER — ISOSORBIDE MONONITRATE 60 MG/1
60 TABLET, EXTENDED RELEASE ORAL DAILY
Status: DISCONTINUED | OUTPATIENT
Start: 2024-02-18 | End: 2024-02-18

## 2024-02-17 RX ORDER — ACETAMINOPHEN 650 MG/1
650 SUPPOSITORY RECTAL EVERY 6 HOURS PRN
Status: DISCONTINUED | OUTPATIENT
Start: 2024-02-17 | End: 2024-02-20 | Stop reason: HOSPADM

## 2024-02-17 RX ORDER — POTASSIUM CHLORIDE 20 MEQ/1
40 TABLET, EXTENDED RELEASE ORAL PRN
Status: DISCONTINUED | OUTPATIENT
Start: 2024-02-17 | End: 2024-02-20 | Stop reason: HOSPADM

## 2024-02-17 RX ADMIN — METOPROLOL TARTRATE 12.5 MG: 25 TABLET, FILM COATED ORAL at 22:12

## 2024-02-17 RX ADMIN — INSULIN LISPRO 4 UNITS: 100 INJECTION, SOLUTION INTRAVENOUS; SUBCUTANEOUS at 22:20

## 2024-02-17 RX ADMIN — MORPHINE SULFATE 4 MG: 4 INJECTION, SOLUTION INTRAMUSCULAR; INTRAVENOUS at 16:48

## 2024-02-17 RX ADMIN — DEXAMETHASONE SODIUM PHOSPHATE 10 MG: 4 INJECTION, SOLUTION INTRAMUSCULAR; INTRAVENOUS at 16:29

## 2024-02-17 RX ADMIN — IOPAMIDOL 80 ML: 755 INJECTION, SOLUTION INTRAVENOUS at 17:04

## 2024-02-17 RX ADMIN — ONDANSETRON 4 MG: 2 INJECTION INTRAMUSCULAR; INTRAVENOUS at 16:48

## 2024-02-17 RX ADMIN — SODIUM CHLORIDE, PRESERVATIVE FREE 10 ML: 5 INJECTION INTRAVENOUS at 22:17

## 2024-02-17 RX ADMIN — ROSUVASTATIN CALCIUM 40 MG: 20 TABLET, FILM COATED ORAL at 22:12

## 2024-02-17 RX ADMIN — EZETIMIBE 10 MG: 10 TABLET ORAL at 22:12

## 2024-02-17 ASSESSMENT — PAIN DESCRIPTION - PAIN TYPE
TYPE: ACUTE PAIN
TYPE: ACUTE PAIN

## 2024-02-17 ASSESSMENT — PAIN SCALES - GENERAL
PAINLEVEL_OUTOF10: 8
PAINLEVEL_OUTOF10: 6
PAINLEVEL_OUTOF10: 9
PAINLEVEL_OUTOF10: 7
PAINLEVEL_OUTOF10: 8
PAINLEVEL_OUTOF10: 8

## 2024-02-17 ASSESSMENT — PAIN DESCRIPTION - LOCATION
LOCATION: NECK
LOCATION: EAR
LOCATION: NECK;HEAD

## 2024-02-17 ASSESSMENT — PAIN DESCRIPTION - DESCRIPTORS: DESCRIPTORS: PRESSURE

## 2024-02-17 ASSESSMENT — PAIN DESCRIPTION - ONSET: ONSET: GRADUAL

## 2024-02-17 ASSESSMENT — PAIN DESCRIPTION - FREQUENCY
FREQUENCY: CONTINUOUS
FREQUENCY: CONTINUOUS

## 2024-02-17 ASSESSMENT — PAIN - FUNCTIONAL ASSESSMENT
PAIN_FUNCTIONAL_ASSESSMENT: 0-10
PAIN_FUNCTIONAL_ASSESSMENT: 0-10
PAIN_FUNCTIONAL_ASSESSMENT: ACTIVITIES ARE NOT PREVENTED

## 2024-02-17 ASSESSMENT — PAIN DESCRIPTION - ORIENTATION
ORIENTATION: LEFT
ORIENTATION: LEFT;LOWER

## 2024-02-17 NOTE — ED PROVIDER NOTES
PATIENT NAME: Chris Estevez  MRN: [id number]  : 1955  PARR: 2024    I performed a history and physical examination of the patient and discussed management with the Resident. I reviewed the Resident's note and agree with the documented findings and plan of care. Any areas of disagreement are noted on the chart. I was personally present for the key portions of any procedures and have documented in the chart those procedures where I was not present during the key portions. I have reviewed the emergency nurses triage note and agree with the chief complaint, past medical history, past surgical history, allergies, medications, social and family history as documented unless otherwise noted below.    Vitals:    24 2212   BP: 129/69 129/69  130/65   Pulse: 88 88  89   Resp:       Temp:       TempSrc:       SpO2:       Weight:   89 kg (196 lb 3.4 oz)    Height:   1.753 m (5' 9.02\")      Labs Reviewed   CBC WITH AUTO DIFFERENTIAL - Abnormal; Notable for the following components:       Result Value    WBC 12.4 (*)     MPV 8.9 (*)     Segs Absolute 10.0 (*)     All other components within normal limits   COMPREHENSIVE METABOLIC PANEL - Abnormal; Notable for the following components:    Glucose 162 (*)     Sodium 134 (*)     Chloride 97 (*)     Alkaline Phosphatase 165 (*)     Total Protein 8.1 (*)     All other components within normal limits   OSMOLALITY - Abnormal; Notable for the following components:    Osmolality Calc 271.2 (*)     All other components within normal limits   POCT GLUCOSE - Abnormal; Notable for the following components:    POC Glucose 361 (*)     All other components within normal limits   CULTURE, BLOOD 1    Narrative:     Epic Plan - [id number]   CULTURE, BLOOD 1    Narrative:     Epic Plan - [id number]   LACTIC ACID   APTT   PROTIME-INR   ANION GAP   GLOMERULAR FILTRATION RATE, ESTIMATED   BASIC METABOLIC PANEL W/ REFLEX TO MG FOR LOW K   CBC WITH AUTO

## 2024-02-17 NOTE — ED TRIAGE NOTES
Patient presents to the Emergency Department via self . Patient presents with a complaint of swelling in the neck with pain while swallowing. 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 IV inserted. Patient is alert and oriented x4, patient does not appear in acute distress upon triage. Patient respirations are regular and unlabored with even rise and fall of chest. Call light within reach.

## 2024-02-17 NOTE — ED NOTES
Pt resting in bed at this time. Pt states he still has pain while swallowing along with breathing problems

## 2024-02-17 NOTE — ED PROVIDER NOTES
[facility] EMERGENCY DEPARTMENT  EMERGENCY DEPARTMENT ENCOUNTER          Pt Name: Chris Estevez  MRN: [id number]  Birthdate 1955  Date of evaluation: 2/17/2024  Resident Physician: Rupesh Antonio MD EM Resident PGY-2  Attending Physician: Dl Maldonado MD        CHIEF COMPLAINT       Chief Complaint   Patient presents with    Neck Swelling         HISTORY OF PRESENT ILLNESS    HPI  Chris Estevez is a 68 y.o. male who presents to the emergency department from home, as a walk in to the ED lobby for evaluation of neck swelling.  Patient states that he has history of Chiari malformation, Warthin's tumor.  Patient had fine-needle biopsy and core biopsy of tumor of left side of neck on 2/14/2024 by interventional radiology Dr. Kessler.  Patient states after that procedure he has had pain and increased in swelling.  Patient states that he has had difficulty with breathing and swallowing.  Patient states he is only able to eat a little bit of soft eggs.  Started to have a difficulty with swallowing his saliva.  Patient states that he was previously on Xarelto and Plavix that were held prior to his biopsy and have not been restarted.  Patient states that he subjectively feels like he has had increase in swelling status post procedure.  Patient has no stridor, no respiratory distress, no accessory muscle use is able to handle secretions currently.      The patient has no other acute complaints at this time.    ROS negative except as stated above.    PAST MEDICAL AND SURGICAL HISTORY     Past Medical History:   Diagnosis Date    Anxiety     CAD (coronary artery disease)     Depression     Diabetes mellitus (HCC)     Dilation of esophagus 2022    Dr Saxena     Frequent headaches     Gastroparesis     GERD (gastroesophageal reflux disease)     History of Chiari malformation     Hyperlipidemia     Hypertension     Kidney stone     Kidney stone     Migraine     Movement disorder     DDD    Pulmonary emboli (HCC)            See ED course below for my interpretation if applicable.  All radiology images independently reviewed by me in the clinical context of this patient, in addition to interpretation provided by the radiologist.      EKG interpretation (none if blank):    All EKG results are individually reviewed and interpreted by me in the clinical context of this patient.  All EKGs are also interpreted by our Cardiology department, final interpretation may not be available as of the writing of this note.      MEDICAL DECISION MAKING   Initial Assessment Summary:   Patient 68-year-old male presented to ED with significant swelling of face and throat 3 days status post biopsy of tumor  Please see ED course section below for continuation and resolution of this initial assessment if applicable.      Comorbid conditions pertinent to this ED encounter:  Chiari malformation, Warthin tumor of neck, status post biopsy 3 days, previously on Xarelto, Plavix held prior to procedure    Differential Diagnosis includes but is not limited to:  Ludewig's angina  Hematoma  Arterial bleed  Venous bleeding  Anaphylaxis      Decision Rules/Clinical Scores utilized:            Plan:   Labs, CT soft tissue face with and without contrast  IV Decadron 10 mg for significant swelling      Code Status:  Not addressed during this ED visit    Social determinants of health impacting treatment or disposition:  Not Applicable.      PREVIOUS RECORDS  AND EXTERNAL INFORMATION REVIEWED   History obtained from: chart review and the patient.    Pertinent previous and/or external records reviewed: Noncontributory.    Case discussed with specialties other than Emergency Medicine: ENT, hospitalist for admission.      ED COURSE   ED Medications administered this visit (None if left blank):   Medications   iopamidol (ISOVUE-370) 76 % injection 80 mL (80 mLs IntraVENous Given 2/17/24 7672)   dexAMETHasone Sodium Phosphate injection 10 mg (10 mg IntraVENous Given

## 2024-02-17 NOTE — CONSULTS
Department of Otolaryngology  Consult Note    Reason for Consult:  Neck swelling  Requesting Physician:  Dr Antonio     CHIEF COMPLAINT:  Neck swelling, sore throat    History Obtained From:  patient, electronic medical record, medical staff    HISTORY OF PRESENT ILLNESS:                The patient is a 68 y.o. male who presented to [facility] Emergency Department this evening for increased left neck swelling and sore throat.  Patient has known left parotid mass present for ~10 years and has gradually grown in size.  His last CT neck was completed 10/12/2023 showing a 3.2 x 4.2 x 3.9 cm left parotid mass.  This had prior FNA 5/7/2019 positive for Warthin tumor.  Patient was unfortunately lost to follow up care in ENT clinic after that time. He recently re-established care and was seen in our ENT clinic with Dr Saenz on 2/10/2024. He underwent repeat FNA with core needle biopsies on 2/14/2024 with pathology consistent with Warthin tumor.  Patient takes Plavix and Xarelto, which were held prior to FNA and he has not restarted yet.  Patient reports occasional shortness of breath, even prior to FNA that he states is unchanged since FNA and acute onset of increased swelling.  He does have some trouble swallowing, so sticks to soft food consistencies such as scrambled eggs since the procedure on 2/14.  Also since the procedure, he has tenderness and intermittent pain in the left neck.      Past Medical History:        Diagnosis Date    Anxiety     CAD (coronary artery disease)     Depression     Diabetes mellitus (HCC)     Dilation of esophagus 2022    Dr Saxena     Frequent headaches     Gastroparesis     GERD (gastroesophageal reflux disease)     History of Chiari malformation     Hyperlipidemia     Hypertension     Kidney stone     Kidney stone     Migraine     Movement disorder     DDD    Pulmonary emboli (HCC) 10/29/2018    Shingles     Sleep apnea     Tattoos        Past Surgical  reports no history of drug use.    Family History:       Problem Relation Age of Onset    Kidney Disease Mother     Diabetes Mother     Heart Disease Mother     High Blood Pressure Mother     Diabetes Father     High Blood Pressure Father     Lung Cancer Father     Diabetes Sister     High Blood Pressure Sister     Diabetes Brother     High Blood Pressure Brother     Esophageal Cancer Neg Hx     Liver Cancer Neg Hx     Rectal Cancer Neg Hx     Stomach Cancer Neg Hx     Colon Cancer Neg Hx        REVIEW OF SYSTEMS:    A complete multi-organ review of systems was performed using a new patient questionnaire, and reviewed by me.  ENT:  negative except as noted in HPI  CONSTITUTIONAL:  negative except as noted in HPI  EYES:  negative except as noted in HPI  RESPIRATORY:  negative except as noted in HPI  CARDIOVASCULAR:  negative except as noted in HPI  GASTROINTESTINAL:  negative except as noted in HPI  GENITOURINARY:  negative except as noted in HPI  MUSCULOSKELETAL:  negative except as noted in HPI  SKIN:  negative except as noted in HPI  ENDOCRINE/METABOLIC: negative except as noted in HPI  HEMATOLOGIC/LYMPHATIC:  negative except as noted in HPI  ALLERGY/IMMUN: negative except as noted in HPI  NEUROLOGICAL:  negative except as noted in HPI  BEHAVIOR/PSYCH:  negative except as noted in HPI    PHYSICAL EXAM:    VITALS:  /69   Pulse 85   Temp 99.7 °F (37.6 °C) (Oral)   Resp 17   SpO2 95%     Middle-age adult male lying on right side with eyes closed with even/unlabored respirations; awakens to voice.  No vocal changes.  No breathy vocal quality.  Obvious edema of the left neck and anterior neck.  Oropharynx is unremarkable without any pharyngeal or FOM swelling.  No trismus.  The left neck is significantly swollen and indurated, as seen in the pictures below. Left neck at biopsy sites are particularly tender to palpation.  Area of induration limited to left neck.  The swelling at anterior neck under his chin is

## 2024-02-18 LAB
ANION GAP SERPL CALC-SCNC: 14 MEQ/L (ref 8–16)
BASOPHILS ABSOLUTE: 0 THOU/MM3 (ref 0–0.1)
BASOPHILS NFR BLD AUTO: 0.1 %
BUN SERPL-MCNC: 19 MG/DL (ref 7–22)
CALCIUM SERPL-MCNC: 9.1 MG/DL (ref 8.5–10.5)
CHLORIDE SERPL-SCNC: 97 MEQ/L (ref 98–111)
CO2 SERPL-SCNC: 21 MEQ/L (ref 23–33)
CREAT SERPL-MCNC: 1.2 MG/DL (ref 0.4–1.2)
DEPRECATED RDW RBC AUTO: 44.6 FL (ref 35–45)
EOSINOPHIL NFR BLD AUTO: 0 %
EOSINOPHILS ABSOLUTE: 0 THOU/MM3 (ref 0–0.4)
ERYTHROCYTE [DISTWIDTH] IN BLOOD BY AUTOMATED COUNT: 13.5 % (ref 11.5–14.5)
GFR SERPL CREATININE-BSD FRML MDRD: > 60 ML/MIN/1.73M2
GLUCOSE BLD STRIP.AUTO-MCNC: 200 MG/DL (ref 70–108)
GLUCOSE BLD STRIP.AUTO-MCNC: 291 MG/DL (ref 70–108)
GLUCOSE BLD STRIP.AUTO-MCNC: 331 MG/DL (ref 70–108)
GLUCOSE BLD STRIP.AUTO-MCNC: 363 MG/DL (ref 70–108)
GLUCOSE BLD STRIP.AUTO-MCNC: 407 MG/DL (ref 70–108)
GLUCOSE SERPL-MCNC: 330 MG/DL (ref 70–108)
HCT VFR BLD AUTO: 42.8 % (ref 42–52)
HGB BLD-MCNC: 14.2 GM/DL (ref 14–18)
IMM GRANULOCYTES # BLD AUTO: 0.19 THOU/MM3 (ref 0–0.07)
IMM GRANULOCYTES NFR BLD AUTO: 1.2 %
LYMPHOCYTES ABSOLUTE: 0.9 THOU/MM3 (ref 1–4.8)
LYMPHOCYTES NFR BLD AUTO: 6 %
MCH RBC QN AUTO: 29.7 PG (ref 26–33)
MCHC RBC AUTO-ENTMCNC: 33.2 GM/DL (ref 32.2–35.5)
MCV RBC AUTO: 89.5 FL (ref 80–94)
MONOCYTES ABSOLUTE: 0.6 THOU/MM3 (ref 0.4–1.3)
MONOCYTES NFR BLD AUTO: 4.2 %
NEUTROPHILS NFR BLD AUTO: 88.5 %
NRBC BLD AUTO-RTO: 0 /100 WBC
PLATELET # BLD AUTO: 238 THOU/MM3 (ref 130–400)
PMV BLD AUTO: 9.2 FL (ref 9.4–12.4)
POTASSIUM SERPL-SCNC: 4.9 MEQ/L (ref 3.5–5.2)
RBC # BLD AUTO: 4.78 MILL/MM3 (ref 4.7–6.1)
SEGMENTED NEUTROPHILS ABSOLUTE COUNT: 13.5 THOU/MM3 (ref 1.8–7.7)
SODIUM SERPL-SCNC: 132 MEQ/L (ref 135–145)
WBC # BLD AUTO: 15.3 THOU/MM3 (ref 4.8–10.8)

## 2024-02-18 PROCEDURE — 2580000003 HC RX 258

## 2024-02-18 PROCEDURE — 6360000002 HC RX W HCPCS

## 2024-02-18 PROCEDURE — 99232 SBSQ HOSP IP/OBS MODERATE 35: CPT | Performed by: INTERNAL MEDICINE

## 2024-02-18 PROCEDURE — 36415 COLL VENOUS BLD VENIPUNCTURE: CPT

## 2024-02-18 PROCEDURE — 6370000000 HC RX 637 (ALT 250 FOR IP)

## 2024-02-18 PROCEDURE — 80048 BASIC METABOLIC PNL TOTAL CA: CPT

## 2024-02-18 PROCEDURE — 85025 COMPLETE CBC W/AUTO DIFF WBC: CPT

## 2024-02-18 PROCEDURE — 82948 REAGENT STRIP/BLOOD GLUCOSE: CPT

## 2024-02-18 PROCEDURE — 6370000000 HC RX 637 (ALT 250 FOR IP): Performed by: STUDENT IN AN ORGANIZED HEALTH CARE EDUCATION/TRAINING PROGRAM

## 2024-02-18 PROCEDURE — G0378 HOSPITAL OBSERVATION PER HR: HCPCS

## 2024-02-18 PROCEDURE — 96376 TX/PRO/DX INJ SAME DRUG ADON: CPT

## 2024-02-18 PROCEDURE — 99222 1ST HOSP IP/OBS MODERATE 55: CPT | Performed by: NURSE PRACTITIONER

## 2024-02-18 RX ORDER — INSULIN LISPRO 100 [IU]/ML
0-8 INJECTION, SOLUTION INTRAVENOUS; SUBCUTANEOUS EVERY 6 HOURS
Status: DISCONTINUED | OUTPATIENT
Start: 2024-02-18 | End: 2024-02-18

## 2024-02-18 RX ORDER — INSULIN LISPRO 100 [IU]/ML
10 INJECTION, SOLUTION INTRAVENOUS; SUBCUTANEOUS
Status: DISCONTINUED | OUTPATIENT
Start: 2024-02-18 | End: 2024-02-19

## 2024-02-18 RX ORDER — ISOSORBIDE MONONITRATE 30 MG/1
30 TABLET, EXTENDED RELEASE ORAL DAILY
Status: DISCONTINUED | OUTPATIENT
Start: 2024-02-19 | End: 2024-02-20 | Stop reason: HOSPADM

## 2024-02-18 RX ORDER — INSULIN LISPRO 100 [IU]/ML
0-8 INJECTION, SOLUTION INTRAVENOUS; SUBCUTANEOUS
Status: DISCONTINUED | OUTPATIENT
Start: 2024-02-18 | End: 2024-02-20 | Stop reason: HOSPADM

## 2024-02-18 RX ORDER — INSULIN GLARGINE 100 [IU]/ML
20 INJECTION, SOLUTION SUBCUTANEOUS DAILY
Status: DISCONTINUED | OUTPATIENT
Start: 2024-02-18 | End: 2024-02-18

## 2024-02-18 RX ORDER — INSULIN GLARGINE 100 [IU]/ML
20 INJECTION, SOLUTION SUBCUTANEOUS 2 TIMES DAILY
Status: DISCONTINUED | OUTPATIENT
Start: 2024-02-18 | End: 2024-02-20 | Stop reason: HOSPADM

## 2024-02-18 RX ORDER — INSULIN LISPRO 100 [IU]/ML
0-4 INJECTION, SOLUTION INTRAVENOUS; SUBCUTANEOUS NIGHTLY
Status: DISCONTINUED | OUTPATIENT
Start: 2024-02-18 | End: 2024-02-20 | Stop reason: HOSPADM

## 2024-02-18 RX ADMIN — INSULIN LISPRO 10 UNITS: 100 INJECTION, SOLUTION INTRAVENOUS; SUBCUTANEOUS at 18:47

## 2024-02-18 RX ADMIN — PANTOPRAZOLE SODIUM 40 MG: 40 TABLET, DELAYED RELEASE ORAL at 06:23

## 2024-02-18 RX ADMIN — INSULIN GLARGINE 20 UNITS: 100 INJECTION, SOLUTION SUBCUTANEOUS at 12:38

## 2024-02-18 RX ADMIN — SODIUM CHLORIDE, PRESERVATIVE FREE 10 ML: 5 INJECTION INTRAVENOUS at 09:36

## 2024-02-18 RX ADMIN — DEXAMETHASONE SODIUM PHOSPHATE 8 MG: 4 INJECTION, SOLUTION INTRA-ARTICULAR; INTRALESIONAL; INTRAMUSCULAR; INTRAVENOUS; SOFT TISSUE at 00:10

## 2024-02-18 RX ADMIN — METOPROLOL TARTRATE 12.5 MG: 25 TABLET, FILM COATED ORAL at 21:03

## 2024-02-18 RX ADMIN — INSULIN LISPRO 8 UNITS: 100 INJECTION, SOLUTION INTRAVENOUS; SUBCUTANEOUS at 18:05

## 2024-02-18 RX ADMIN — INSULIN LISPRO 6 UNITS: 100 INJECTION, SOLUTION INTRAVENOUS; SUBCUTANEOUS at 12:38

## 2024-02-18 RX ADMIN — DEXAMETHASONE SODIUM PHOSPHATE 8 MG: 4 INJECTION, SOLUTION INTRA-ARTICULAR; INTRALESIONAL; INTRAMUSCULAR; INTRAVENOUS; SOFT TISSUE at 06:23

## 2024-02-18 RX ADMIN — INSULIN GLARGINE 20 UNITS: 100 INJECTION, SOLUTION SUBCUTANEOUS at 21:07

## 2024-02-18 RX ADMIN — SODIUM CHLORIDE, PRESERVATIVE FREE 10 ML: 5 INJECTION INTRAVENOUS at 21:04

## 2024-02-18 RX ADMIN — METOPROLOL TARTRATE 12.5 MG: 25 TABLET, FILM COATED ORAL at 09:36

## 2024-02-18 RX ADMIN — ISOSORBIDE MONONITRATE 60 MG: 60 TABLET, EXTENDED RELEASE ORAL at 09:36

## 2024-02-18 RX ADMIN — ROSUVASTATIN CALCIUM 40 MG: 20 TABLET, FILM COATED ORAL at 09:36

## 2024-02-18 RX ADMIN — INSULIN LISPRO 4 UNITS: 100 INJECTION, SOLUTION INTRAVENOUS; SUBCUTANEOUS at 07:04

## 2024-02-18 RX ADMIN — INSULIN LISPRO 3 UNITS: 100 INJECTION, SOLUTION INTRAVENOUS; SUBCUTANEOUS at 03:30

## 2024-02-18 RX ADMIN — EZETIMIBE 10 MG: 10 TABLET ORAL at 09:36

## 2024-02-18 ASSESSMENT — PAIN DESCRIPTION - DESCRIPTORS
DESCRIPTORS: PRESSURE;DISCOMFORT

## 2024-02-18 ASSESSMENT — PAIN SCALES - GENERAL
PAINLEVEL_OUTOF10: 5
PAINLEVEL_OUTOF10: 4
PAINLEVEL_OUTOF10: 6
PAINLEVEL_OUTOF10: 5

## 2024-02-18 ASSESSMENT — PAIN DESCRIPTION - FREQUENCY
FREQUENCY: CONTINUOUS

## 2024-02-18 ASSESSMENT — PAIN DESCRIPTION - ORIENTATION
ORIENTATION: LEFT

## 2024-02-18 ASSESSMENT — PAIN DESCRIPTION - PAIN TYPE
TYPE: ACUTE PAIN

## 2024-02-18 ASSESSMENT — PAIN DESCRIPTION - LOCATION
LOCATION: NECK

## 2024-02-18 NOTE — H&P
who presents to [facility] with swelling and pain of left neck.  Patient states that 3 days ago he had a biopsy of a mass in his left side of his neck he states that since then he has had increasing pain and swelling at that site. He states it is difficult for him to swallow and that he had some shortness of breath. He denies any fevers, chills, nausea, vomiting, diarrhea, chest pain, dizziness or lightheadedness.       ED course: labs drawn, imaging obtained, dexamethasone given      Relevant Medical/Surgical/Social/Family History (not otherwise mentioned in HPI; see information at end of note or chart)       Physical Exam:    /69   Pulse 85   Temp 99.7 °F (37.6 °C) (Oral)   Resp 17   SpO2 95%     General appearance: No apparent distress, appears stated age.  Eyes:  Pupils equal, round, and reactive to light. Conjunctivae/corneas clear.  HENT: Head normal in appearance. External nares normal.  Oral mucosa moist without lesions.  Hearing grossly intact.   Neck: Supple, with limited range of motion. Trachea midline.  No gross JVD appreciated. Tenderness and swelling to left side of neck from left ear to left collar bone area.   Respiratory:  Normal respiratory effort. Clear to auscultation, bilaterally without rales or wheezes or rhonchi.  Cardiovascular: Normal rate, regular rhythm with normal S1/S2 without murmurs.  No lower extremity edema.   Abdomen: Soft, non-tender, non-distended with normal bowel sounds.  Musculoskeletal: No joint swelling or tenderness. Normal tone. No abnormal movements.   Skin: Warm and dry. No rashes or lesions. Noted swelling to left neck  Neurologic:  No focal sensory/motor deficits in the upper and lower extremities. Cranial nerves:  grossly non-focal 2-12.     Psychiatric: Alert and oriented, normal insight and thought content.   Pulse/Cap refill: +2 palpable bilaterally/ < 3 seconds      Imaging:  CT SOFT TISSUE NECK W WO CONTRAST    Result Date: 2/17/2024  WET  POLYPECTOMY SNARE/COLD BIOPSY performed by Jasmin Saxena MD at [facility]    EGD  2022    Dr Saxena     ENDOSCOPY, COLON, DIAGNOSTIC  2007    FRACTURE SURGERY  1964    right elbow    IVC FILTER REMOVAL  03/15/2020    [facility]    NASAL SEPTUM SURGERY  2006    SHOULDER SURGERY Left 08/16/2018    TONSILLECTOMY  1964    UPPER GASTROINTESTINAL ENDOSCOPY Left 3/12/2020    EGD ESOPHAGOGASTRODUODENOSCOPY DILATATION performed by Jasmin Saxena MD at [facility]    UPPER GASTROINTESTINAL ENDOSCOPY Left 3/12/2020    EGD BIOPSY performed by Jasmin Saxena MD at [facility]    UPPER GASTROINTESTINAL ENDOSCOPY N/A 10/10/2023    EGD POSS FOREIGN BODY REMOVAL performed by Jasmin Saxena MD at [facility]    UPPER GASTROINTESTINAL ENDOSCOPY N/A 10/11/2023    EGD POSSIBLE FOREIGN BODY REMOVAL performed by Jasmin Saxena MD at [facility] ENDOSCOPY    US BIOPSY OF SALIVARY GLAND  2/14/2024    US BIOPSY OF SALIVARY GLAND 2/14/2024 Taqueria Small MD [facility] ULTRASOUND    VASCULAR SURGERY  2015    right middle finger artery       Tobacco use:   reports that he quit smoking about 44 years ago. His smoking use included cigarettes. He started smoking about 47 years ago. He has a 4.5 pack-year smoking history. He has never used smokeless tobacco.  Alcohol use:   reports no history of alcohol use.  Drug use:  reports no history of drug use.     Family Hx      Problem Relation Age of Onset    Kidney Disease Mother     Diabetes Mother     Heart Disease Mother     High Blood Pressure Mother     Diabetes Father     High Blood Pressure Father     Lung Cancer Father     Diabetes Sister     High Blood Pressure Sister     Diabetes Brother     High Blood Pressure Brother     Esophageal Cancer Neg Hx     Liver Cancer Neg Hx     Rectal Cancer Neg Hx     Stomach Cancer Neg Hx     Colon Cancer Neg Hx          PT/OT Eval Status: pending clinical course  Diet: Diet NPO  DVT prophylaxis: scds  Code Status: Full Code  Disposition: admit to stepdown

## 2024-02-18 NOTE — PLAN OF CARE
Problem: Discharge Planning  Goal: Discharge to home or other facility with appropriate resources  Outcome: Progressing     Problem: Pain  Goal: Verbalizes/displays adequate comfort level or baseline comfort level  Outcome: Progressing  Flowsheets (Taken 2/17/2024 2027)  Verbalizes/displays adequate comfort level or baseline comfort level:   Encourage patient to monitor pain and request assistance   Assess pain using appropriate pain scale   Administer analgesics based on type and severity of pain and evaluate response     Problem: Safety - Adult  Goal: Free from fall injury  Outcome: Progressing

## 2024-02-18 NOTE — PROGRESS NOTES
Two 7.5 Nasal Trumpets and lubricating jelly placed in a pouch and taped to right upper side rail of patients bed as head of bed is not exposed with head of bed being above 30 degrees as ordered. Supplies are visible front patients door. Patient's neck circumference measured when he arrived to unit at 22 inches. Ice placed on patients neck as ordered. At 0000 his neck circumference reduced to 21.5 inches. At 0400 the circumference measured 20 inches. Will continue to apply ice and monitor.

## 2024-02-18 NOTE — PROGRESS NOTES
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    CT soft tissue neck with and without contrast on 2024 showed left parotid mass containing a small amount of hemorrhage consistent with recent biopsy.    ENT evaluated -recommend supportive care with ice packs, keep HOB elevated.  Planning for surgery outpatient.  Weaned off of Decadron today and is no longer having shortness of breath.  Will continue to monitor overnight and plan for discharge in the a.m. if he remains stable without worsening swelling off of Decadron.  He is scheduled for outpatient modified barium swallow study due to ongoing dysphagia likely related to this mass.  Type 2 diabetes mellitus: Restart home dose insulin 20 units twice daily and lispro 10 units with meals. Medium dose sliding scale insulin.  Hypoglycemia protocol.  POCT glucose every 4 ACHS.  CAD: Continue Lopressor, Imdur, Crestor.  Plavix is currently on hold.  History of PE: Xarelto currently held due to acute bleeding.  GERD: Continue Protonix.  HLD: Continue Zetia.  Leukocytosis: Likely due to steroids.  No signs of infection at this time.    Subjective: Patient assessed at bedside.  Resting comfortably.  No acute complaints or distress.  Swelling and breathing has improved since yesterday. Reports dysphagia has also improved however is still minimally present.  Planning to have outpatient surgery to remove his parotid mass. Denies CP/SOB/N/V/D/C, abdominal pain or back pain.    Initial HPI: \"Chris Estevez is a 68 y.o. male with PMHx of CAD, DMII, hypertension, hyperlipidemia, blood clots, depression with anxiety, GERD who presents to [facility] with swelling and pain of left neck.  Patient states that 3 days ago he had a biopsy of

## 2024-02-19 LAB
ALBUMIN SERPL BCG-MCNC: 3 G/DL (ref 3.5–5.1)
ALP SERPL-CCNC: 127 U/L (ref 38–126)
ALT SERPL W/O P-5'-P-CCNC: 12 U/L (ref 11–66)
ANION GAP SERPL CALC-SCNC: 11 MEQ/L (ref 8–16)
AST SERPL-CCNC: 8 U/L (ref 5–40)
BILIRUB SERPL-MCNC: 0.3 MG/DL (ref 0.3–1.2)
BUN SERPL-MCNC: 36 MG/DL (ref 7–22)
CALCIUM SERPL-MCNC: 8.7 MG/DL (ref 8.5–10.5)
CHLORIDE SERPL-SCNC: 99 MEQ/L (ref 98–111)
CO2 SERPL-SCNC: 25 MEQ/L (ref 23–33)
CREAT SERPL-MCNC: 1.5 MG/DL (ref 0.4–1.2)
DEPRECATED RDW RBC AUTO: 46.2 FL (ref 35–45)
ERYTHROCYTE [DISTWIDTH] IN BLOOD BY AUTOMATED COUNT: 13.8 % (ref 11.5–14.5)
GFR SERPL CREATININE-BSD FRML MDRD: 50 ML/MIN/1.73M2
GLUCOSE BLD STRIP.AUTO-MCNC: 134 MG/DL (ref 70–108)
GLUCOSE BLD STRIP.AUTO-MCNC: 186 MG/DL (ref 70–108)
GLUCOSE BLD STRIP.AUTO-MCNC: 223 MG/DL (ref 70–108)
GLUCOSE BLD STRIP.AUTO-MCNC: 226 MG/DL (ref 70–108)
GLUCOSE SERPL-MCNC: 196 MG/DL (ref 70–108)
HCT VFR BLD AUTO: 37 % (ref 42–52)
HGB BLD-MCNC: 12 GM/DL (ref 14–18)
MAGNESIUM SERPL-MCNC: 2.1 MG/DL (ref 1.6–2.4)
MCH RBC QN AUTO: 29.4 PG (ref 26–33)
MCHC RBC AUTO-ENTMCNC: 32.4 GM/DL (ref 32.2–35.5)
MCV RBC AUTO: 90.7 FL (ref 80–94)
PLATELET # BLD AUTO: 241 THOU/MM3 (ref 130–400)
PMV BLD AUTO: 9.4 FL (ref 9.4–12.4)
POTASSIUM SERPL-SCNC: 4.5 MEQ/L (ref 3.5–5.2)
PROT SERPL-MCNC: 6.4 G/DL (ref 6.1–8)
RBC # BLD AUTO: 4.08 MILL/MM3 (ref 4.7–6.1)
SODIUM SERPL-SCNC: 135 MEQ/L (ref 135–145)
WBC # BLD AUTO: 22.8 THOU/MM3 (ref 4.8–10.8)

## 2024-02-19 PROCEDURE — 2580000003 HC RX 258

## 2024-02-19 PROCEDURE — 96365 THER/PROPH/DIAG IV INF INIT: CPT

## 2024-02-19 PROCEDURE — 6360000002 HC RX W HCPCS: Performed by: PHYSICIAN ASSISTANT

## 2024-02-19 PROCEDURE — 80053 COMPREHEN METABOLIC PANEL: CPT

## 2024-02-19 PROCEDURE — 99232 SBSQ HOSP IP/OBS MODERATE 35: CPT | Performed by: INTERNAL MEDICINE

## 2024-02-19 PROCEDURE — 2580000003 HC RX 258: Performed by: PHYSICIAN ASSISTANT

## 2024-02-19 PROCEDURE — 83735 ASSAY OF MAGNESIUM: CPT

## 2024-02-19 PROCEDURE — 6370000000 HC RX 637 (ALT 250 FOR IP): Performed by: INTERNAL MEDICINE

## 2024-02-19 PROCEDURE — 99231 SBSQ HOSP IP/OBS SF/LOW 25: CPT | Performed by: PHYSICIAN ASSISTANT

## 2024-02-19 PROCEDURE — 6370000000 HC RX 637 (ALT 250 FOR IP)

## 2024-02-19 PROCEDURE — 6370000000 HC RX 637 (ALT 250 FOR IP): Performed by: STUDENT IN AN ORGANIZED HEALTH CARE EDUCATION/TRAINING PROGRAM

## 2024-02-19 PROCEDURE — 6360000002 HC RX W HCPCS: Performed by: STUDENT IN AN ORGANIZED HEALTH CARE EDUCATION/TRAINING PROGRAM

## 2024-02-19 PROCEDURE — 82948 REAGENT STRIP/BLOOD GLUCOSE: CPT

## 2024-02-19 PROCEDURE — 85027 COMPLETE CBC AUTOMATED: CPT

## 2024-02-19 PROCEDURE — 2580000003 HC RX 258: Performed by: STUDENT IN AN ORGANIZED HEALTH CARE EDUCATION/TRAINING PROGRAM

## 2024-02-19 PROCEDURE — 96366 THER/PROPH/DIAG IV INF ADDON: CPT

## 2024-02-19 PROCEDURE — 36415 COLL VENOUS BLD VENIPUNCTURE: CPT

## 2024-02-19 PROCEDURE — 96361 HYDRATE IV INFUSION ADD-ON: CPT

## 2024-02-19 PROCEDURE — G0378 HOSPITAL OBSERVATION PER HR: HCPCS

## 2024-02-19 RX ORDER — 0.9 % SODIUM CHLORIDE 0.9 %
500 INTRAVENOUS SOLUTION INTRAVENOUS ONCE
Status: COMPLETED | OUTPATIENT
Start: 2024-02-19 | End: 2024-02-19

## 2024-02-19 RX ORDER — HYDROCODONE BITARTRATE AND ACETAMINOPHEN 5; 325 MG/1; MG/1
1 TABLET ORAL ONCE
Status: COMPLETED | OUTPATIENT
Start: 2024-02-19 | End: 2024-02-19

## 2024-02-19 RX ORDER — DEXAMETHASONE 0.5 MG/1
1 TABLET ORAL DAILY
Status: DISCONTINUED | OUTPATIENT
Start: 2024-02-19 | End: 2024-02-19

## 2024-02-19 RX ADMIN — ROSUVASTATIN CALCIUM 40 MG: 20 TABLET, FILM COATED ORAL at 07:57

## 2024-02-19 RX ADMIN — SODIUM CHLORIDE, PRESERVATIVE FREE 10 ML: 5 INJECTION INTRAVENOUS at 07:59

## 2024-02-19 RX ADMIN — DEXAMETHASONE 1 MG: 0.5 TABLET ORAL at 12:41

## 2024-02-19 RX ADMIN — HYDROCODONE BITARTRATE AND ACETAMINOPHEN 1 TABLET: 5; 325 TABLET ORAL at 18:45

## 2024-02-19 RX ADMIN — INSULIN LISPRO 2 UNITS: 100 INJECTION, SOLUTION INTRAVENOUS; SUBCUTANEOUS at 17:53

## 2024-02-19 RX ADMIN — METOPROLOL TARTRATE 12.5 MG: 25 TABLET, FILM COATED ORAL at 07:58

## 2024-02-19 RX ADMIN — SODIUM CHLORIDE 3000 MG: 900 INJECTION INTRAVENOUS at 15:20

## 2024-02-19 RX ADMIN — ISOSORBIDE MONONITRATE 30 MG: 30 TABLET, EXTENDED RELEASE ORAL at 07:58

## 2024-02-19 RX ADMIN — EZETIMIBE 10 MG: 10 TABLET ORAL at 07:58

## 2024-02-19 RX ADMIN — SODIUM CHLORIDE, PRESERVATIVE FREE 10 ML: 5 INJECTION INTRAVENOUS at 20:26

## 2024-02-19 RX ADMIN — PANTOPRAZOLE SODIUM 40 MG: 40 TABLET, DELAYED RELEASE ORAL at 05:47

## 2024-02-19 RX ADMIN — INSULIN GLARGINE 20 UNITS: 100 INJECTION, SOLUTION SUBCUTANEOUS at 20:25

## 2024-02-19 RX ADMIN — INSULIN GLARGINE 20 UNITS: 100 INJECTION, SOLUTION SUBCUTANEOUS at 09:57

## 2024-02-19 RX ADMIN — ACETAMINOPHEN 650 MG: 325 TABLET ORAL at 16:11

## 2024-02-19 RX ADMIN — SODIUM CHLORIDE 3000 MG: 900 INJECTION INTRAVENOUS at 20:26

## 2024-02-19 RX ADMIN — METOPROLOL TARTRATE 12.5 MG: 25 TABLET, FILM COATED ORAL at 20:26

## 2024-02-19 RX ADMIN — SODIUM CHLORIDE 500 ML: 9 INJECTION, SOLUTION INTRAVENOUS at 09:58

## 2024-02-19 ASSESSMENT — PAIN SCALES - GENERAL
PAINLEVEL_OUTOF10: 6
PAINLEVEL_OUTOF10: 7
PAINLEVEL_OUTOF10: 8

## 2024-02-19 ASSESSMENT — PAIN DESCRIPTION - DESCRIPTORS
DESCRIPTORS: HEAVINESS;STABBING
DESCRIPTORS: ACHING;THROBBING
DESCRIPTORS: ACHING;THROBBING

## 2024-02-19 ASSESSMENT — PAIN DESCRIPTION - ORIENTATION
ORIENTATION: LEFT

## 2024-02-19 ASSESSMENT — PAIN DESCRIPTION - LOCATION
LOCATION: NECK
LOCATION: HEAD;NECK

## 2024-02-19 NOTE — PROGRESS NOTES
Department of Otolaryngology  Progress Note    Chief Complaint:  neck swelling    Initial ENT HPI: The patient is a 68 y.o. male who presented to [facility] Emergency Department this evening for increased left neck swelling and sore throat.  Patient has known left parotid mass present for ~10 years and has gradually grown in size.  His last CT neck was completed 10/12/2023 showing a 3.2 x 4.2 x 3.9 cm left parotid mass.  This had prior FNA 5/7/2019 positive for Warthin tumor.  Patient was unfortunately lost to follow up care in ENT clinic after that time. He recently re-established care and was seen in our ENT clinic with Dr Saenz on 2/10/2024. He underwent repeat FNA with core needle biopsies on 2/14/2024 with pathology consistent with Warthin tumor.  Patient takes Plavix and Xarelto, which were held prior to FNA and he has not restarted yet.  Patient reports occasional shortness of breath, even prior to FNA that he states is unchanged since FNA and acute onset of increased swelling.  He does have some trouble swallowing, so sticks to soft food consistencies such as scrambled eggs since the procedure on 2/14.  Also since the procedure, he has tenderness and intermittent pain in the left neck.       SUBJECTIVE 2/19/24:  No acute events reported overnight. Patient was reporting some persistent dysphagia to primary earlier. At time of my rounding he reports the dysphagia feels slightly worse compared to his chronic dysphagia he had prior to the FNA, but reports his dysphagia has improved since his admission on 2/17/24. He reports a chronic, intermittent SOB which he reports feels just a bit worse than his baseline. He reports the degree of swelling in his neck has improved since admission 2/17/24, but feels relatively stable from yesterday to today. He denies fevers or chills. He reports persistent, but stable pain in the area of swelling. Patient reports the swelling around

## 2024-02-19 NOTE — PLAN OF CARE
Problem: Discharge Planning  Goal: Discharge to home or other facility with appropriate resources  2/19/2024 0038 by Leandro Reaves, RN  Outcome: Progressing  Flowsheets (Taken 2/19/2024 0038)  Discharge to home or other facility with appropriate resources:   Identify barriers to discharge with patient and caregiver   Identify discharge learning needs (meds, wound care, etc)   Refer to discharge planning if patient needs post-hospital services based on physician order or complex needs related to functional status, cognitive ability or social support system   Arrange for needed discharge resources and transportation as appropriate     Problem: Pain  Goal: Verbalizes/displays adequate comfort level or baseline comfort level  2/19/2024 0038 by Leandro Reaves RN  Outcome: Progressing  Flowsheets (Taken 2/19/2024 0038)  Verbalizes/displays adequate comfort level or baseline comfort level:   Encourage patient to monitor pain and request assistance   Administer analgesics based on type and severity of pain and evaluate response   Assess pain using appropriate pain scale   Implement non-pharmacological measures as appropriate and evaluate response   Notify Licensed Independent Practitioner if interventions unsuccessful or patient reports new pain   Consider cultural and social influences on pain and pain management     Problem: Safety - Adult  Goal: Free from fall injury  2/19/2024 0038 by Leandro Reaves, RN  Outcome: Progressing  Flowsheets (Taken 2/19/2024 0038)  Free From Fall Injury: Instruct family/caregiver on patient safety   Care plan reviewed with patient.  Patient verbalized understanding of the plan of care and contribute to goal setting.

## 2024-02-19 NOTE — PROGRESS NOTES
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    CT soft tissue neck with and without contrast on 2024 showed left parotid mass containing a small amount of hemorrhage consistent with recent biopsy.    ENT evaluated -recommend supportive care with ice packs, keep HOB elevated.  Planning for surgery outpatient.  Weaned off of Decadron.  On 24 and is no longer having shortness of breath.  Will continue to monitor overnight and plan for discharge in the a.m. if he remains stable without worsening swelling off of Decadron.  Was started on Unasyn by ENT due to elevated white count.  Will transition to Augmentin at discharge.  He is scheduled for outpatient modified barium swallow study due to ongoing dysphagia likely related to this mass.  YANCI: Likely due to poor p.o. intake.  Will give 500 NS bolus and repeat BMP in the a.m.  Encouraged adequate water intake however patient reports he drinks frozen teas at home usually and does not like water.  Leukocytosis: Likely reactive due to steroids.  No signs of infection.  Started on prophylactic Unasyn by ENT.  Will continue to monitor and repeat CBC with differential tomorrow a.m.  Type 2 diabetes mellitus: Continue home dose insulin 20 units twice daily and lispro 10 units with meals. Medium dose sliding scale insulin.  Hypoglycemia protocol.  POCT glucose every 4 ACHS.  CAD: Continue Lopressor, Imdur, Crestor.  Plavix is currently on hold.  History of PE: Xarelto currently held due to acute bleeding.  GERD: Continue Protonix.  HLD: Continue Zetia.  Leukocytosis: Likely due to steroids.  No signs of infection at this time.    Subjective: Patient assessed at bedside.  Resting comfortably.  No acute complaints or distress.  Swelling

## 2024-02-19 NOTE — CARE COORDINATION
2/19/24, 10:48 AM EST      DISCHARGE PLANNING EVALUATION    Chris Estevez  Admitted: 2/17/2024  Hospital Day: 0    Location: -18/018-A Reason for admit: Localized swelling, mass and lump, neck [R22.1]  Neck swelling [R22.1]    Past Medical History:   Diagnosis Date    Anxiety     CAD (coronary artery disease)     Depression     Diabetes mellitus (HCC)     Dilation of esophagus 2022    Dr Saxena     Frequent headaches     Gastroparesis     GERD (gastroesophageal reflux disease)     History of Chiari malformation     Hyperlipidemia     Hypertension     Kidney stone     Kidney stone     Migraine     Movement disorder     DDD    Pulmonary emboli (HCC) 10/29/2018    Shingles     Sleep apnea     Tattoos      Barriers to Discharge:   Neck Swelling  History: DM, SHAHRAM, Pulmonary Emboli, Chiari Malformation, Warthin Tumor  2/14 Left Neck Biopsy  2/18 Left Neck Bleeding (biopsy site)  Creatinine 1.5, elevated WBC  PCP: Kelvin Hardy,     Readmission Risk Low 0-14, Mod 15-19), High > 20: Readmission Risk Score: 10.3      Advance Directives:      Code Status: Full Code   Patient's Primary Decision Maker is:      Primary Decision Maker: Rosamaria Stuart - Domestic Partner - [phone]    Secondary Decision Maker: Alhaji Palumbo - Child - [phone]    Patient Goals/Plan/Treatment Preferences: plans home kelsey Blank when medically cleared, has CPAP, still drives    Transportation/Food Security/Housekeeping Addressed: No issues identified.     If patient is discharged prior to next notation, then this note serves as note for discharge by case management.

## 2024-02-20 ENCOUNTER — TELEPHONE (OUTPATIENT)
Dept: ENT CLINIC | Age: 69
End: 2024-02-20

## 2024-02-20 VITALS
OXYGEN SATURATION: 99 % | DIASTOLIC BLOOD PRESSURE: 68 MMHG | HEART RATE: 57 BPM | HEIGHT: 69 IN | WEIGHT: 205.47 LBS | BODY MASS INDEX: 30.43 KG/M2 | RESPIRATION RATE: 16 BRPM | SYSTOLIC BLOOD PRESSURE: 124 MMHG | TEMPERATURE: 97.8 F

## 2024-02-20 DIAGNOSIS — K11.8 PAROTID MASS: Primary | ICD-10-CM

## 2024-02-20 PROBLEM — R22.1 MASS OF LEFT SIDE OF NECK: Status: ACTIVE | Noted: 2024-02-20

## 2024-02-20 LAB
ANION GAP SERPL CALC-SCNC: 9 MEQ/L (ref 8–16)
BASOPHILS ABSOLUTE: 0 THOU/MM3 (ref 0–0.1)
BASOPHILS NFR BLD AUTO: 0.1 %
BUN SERPL-MCNC: 29 MG/DL (ref 7–22)
CALCIUM SERPL-MCNC: 8.7 MG/DL (ref 8.5–10.5)
CHLORIDE SERPL-SCNC: 102 MEQ/L (ref 98–111)
CO2 SERPL-SCNC: 27 MEQ/L (ref 23–33)
CREAT SERPL-MCNC: 1.2 MG/DL (ref 0.4–1.2)
DEPRECATED RDW RBC AUTO: 44.7 FL (ref 35–45)
EOSINOPHIL NFR BLD AUTO: 0.1 %
EOSINOPHILS ABSOLUTE: 0 THOU/MM3 (ref 0–0.4)
ERYTHROCYTE [DISTWIDTH] IN BLOOD BY AUTOMATED COUNT: 13.6 % (ref 11.5–14.5)
GFR SERPL CREATININE-BSD FRML MDRD: > 60 ML/MIN/1.73M2
GLUCOSE BLD STRIP.AUTO-MCNC: 110 MG/DL (ref 70–108)
GLUCOSE BLD STRIP.AUTO-MCNC: 164 MG/DL (ref 70–108)
GLUCOSE SERPL-MCNC: 109 MG/DL (ref 70–108)
HCT VFR BLD AUTO: 39.6 % (ref 42–52)
HGB BLD-MCNC: 13 GM/DL (ref 14–18)
IMM GRANULOCYTES # BLD AUTO: 0.05 THOU/MM3 (ref 0–0.07)
IMM GRANULOCYTES NFR BLD AUTO: 0.4 %
LYMPHOCYTES ABSOLUTE: 1.9 THOU/MM3 (ref 1–4.8)
LYMPHOCYTES NFR BLD AUTO: 15 %
MAGNESIUM SERPL-MCNC: 1.9 MG/DL (ref 1.6–2.4)
MCH RBC QN AUTO: 29.4 PG (ref 26–33)
MCHC RBC AUTO-ENTMCNC: 32.8 GM/DL (ref 32.2–35.5)
MCV RBC AUTO: 89.6 FL (ref 80–94)
MONOCYTES ABSOLUTE: 1.2 THOU/MM3 (ref 0.4–1.3)
MONOCYTES NFR BLD AUTO: 9.3 %
NEUTROPHILS NFR BLD AUTO: 75.1 %
NRBC BLD AUTO-RTO: 0 /100 WBC
PLATELET # BLD AUTO: 268 THOU/MM3 (ref 130–400)
PMV BLD AUTO: 8.9 FL (ref 9.4–12.4)
POTASSIUM SERPL-SCNC: 4.4 MEQ/L (ref 3.5–5.2)
RBC # BLD AUTO: 4.42 MILL/MM3 (ref 4.7–6.1)
SEGMENTED NEUTROPHILS ABSOLUTE COUNT: 9.5 THOU/MM3 (ref 1.8–7.7)
SODIUM SERPL-SCNC: 138 MEQ/L (ref 135–145)
WBC # BLD AUTO: 12.6 THOU/MM3 (ref 4.8–10.8)

## 2024-02-20 PROCEDURE — 82948 REAGENT STRIP/BLOOD GLUCOSE: CPT

## 2024-02-20 PROCEDURE — 1200000000 HC SEMI PRIVATE

## 2024-02-20 PROCEDURE — 99231 SBSQ HOSP IP/OBS SF/LOW 25: CPT | Performed by: PHYSICIAN ASSISTANT

## 2024-02-20 PROCEDURE — 85025 COMPLETE CBC W/AUTO DIFF WBC: CPT

## 2024-02-20 PROCEDURE — 2580000003 HC RX 258: Performed by: PHYSICIAN ASSISTANT

## 2024-02-20 PROCEDURE — 6370000000 HC RX 637 (ALT 250 FOR IP)

## 2024-02-20 PROCEDURE — 80048 BASIC METABOLIC PNL TOTAL CA: CPT

## 2024-02-20 PROCEDURE — 2580000003 HC RX 258

## 2024-02-20 PROCEDURE — G0378 HOSPITAL OBSERVATION PER HR: HCPCS

## 2024-02-20 PROCEDURE — 6370000000 HC RX 637 (ALT 250 FOR IP): Performed by: INTERNAL MEDICINE

## 2024-02-20 PROCEDURE — 6360000002 HC RX W HCPCS: Performed by: PHYSICIAN ASSISTANT

## 2024-02-20 PROCEDURE — 96366 THER/PROPH/DIAG IV INF ADDON: CPT

## 2024-02-20 PROCEDURE — 6370000000 HC RX 637 (ALT 250 FOR IP): Performed by: STUDENT IN AN ORGANIZED HEALTH CARE EDUCATION/TRAINING PROGRAM

## 2024-02-20 PROCEDURE — 83735 ASSAY OF MAGNESIUM: CPT

## 2024-02-20 PROCEDURE — 36415 COLL VENOUS BLD VENIPUNCTURE: CPT

## 2024-02-20 RX ORDER — PREDNISONE 10 MG/1
TABLET ORAL
Qty: 10 TABLET | Refills: 0 | Status: SHIPPED | OUTPATIENT
Start: 2024-02-20 | End: 2024-02-26

## 2024-02-20 RX ORDER — AMOXICILLIN AND CLAVULANATE POTASSIUM 875; 125 MG/1; MG/1
1 TABLET, FILM COATED ORAL 2 TIMES DAILY
Qty: 20 TABLET | Refills: 0 | Status: SHIPPED | OUTPATIENT
Start: 2024-02-20 | End: 2024-03-01

## 2024-02-20 RX ORDER — ISOSORBIDE MONONITRATE 30 MG/1
30 TABLET, EXTENDED RELEASE ORAL DAILY
Qty: 30 TABLET | Refills: 0 | Status: SHIPPED | OUTPATIENT
Start: 2024-02-20 | End: 2024-03-21

## 2024-02-20 RX ADMIN — ACETAMINOPHEN 650 MG: 325 TABLET ORAL at 08:27

## 2024-02-20 RX ADMIN — METOPROLOL TARTRATE 12.5 MG: 25 TABLET, FILM COATED ORAL at 08:23

## 2024-02-20 RX ADMIN — PANTOPRAZOLE SODIUM 40 MG: 40 TABLET, DELAYED RELEASE ORAL at 06:25

## 2024-02-20 RX ADMIN — ISOSORBIDE MONONITRATE 30 MG: 30 TABLET, EXTENDED RELEASE ORAL at 08:23

## 2024-02-20 RX ADMIN — EZETIMIBE 10 MG: 10 TABLET ORAL at 08:23

## 2024-02-20 RX ADMIN — INSULIN GLARGINE 20 UNITS: 100 INJECTION, SOLUTION SUBCUTANEOUS at 08:28

## 2024-02-20 RX ADMIN — SODIUM CHLORIDE 3000 MG: 900 INJECTION INTRAVENOUS at 02:40

## 2024-02-20 RX ADMIN — SODIUM CHLORIDE, PRESERVATIVE FREE 10 ML: 5 INJECTION INTRAVENOUS at 08:31

## 2024-02-20 RX ADMIN — SODIUM CHLORIDE 3000 MG: 900 INJECTION INTRAVENOUS at 08:30

## 2024-02-20 RX ADMIN — ROSUVASTATIN CALCIUM 40 MG: 20 TABLET, FILM COATED ORAL at 08:23

## 2024-02-20 ASSESSMENT — PAIN DESCRIPTION - DESCRIPTORS: DESCRIPTORS: ACHING

## 2024-02-20 ASSESSMENT — PAIN SCALES - GENERAL: PAINLEVEL_OUTOF10: 7

## 2024-02-20 ASSESSMENT — PAIN DESCRIPTION - LOCATION: LOCATION: NECK

## 2024-02-20 ASSESSMENT — PAIN DESCRIPTION - ORIENTATION: ORIENTATION: LEFT

## 2024-02-20 NOTE — DISCHARGE INSTRUCTIONS
-Take the antibiotic (Augmentin) twice a day as prescribed  -Frequently apply ice to the neck to help with pain and swelling  -keep head of bed elevated for the next week  -Resume Plavix on 2/22/24 if the swelling does not worsen over the next few days. You can resume Xarelto 2/23/24 if the swelling does not worsen with starting Plavix the day before. Hold both of these medications and call ENT or return to ER if the swelling worsens  -Follow up with ENT in [place] 3/4/24 at 1:30PM. Referral was placed to [facility] and [facility] should call you directly to schedule.

## 2024-02-20 NOTE — PLAN OF CARE
Problem: Discharge Planning  Goal: Discharge to home or other facility with appropriate resources  Outcome: Progressing  Flowsheets (Taken 2/19/2024 2000)  Discharge to home or other facility with appropriate resources:   Identify barriers to discharge with patient and caregiver   Arrange for needed discharge resources and transportation as appropriate   Refer to discharge planning if patient needs post-hospital services based on physician order or complex needs related to functional status, cognitive ability or social support system     Problem: Pain  Goal: Verbalizes/displays adequate comfort level or baseline comfort level  Outcome: Progressing     Problem: Safety - Adult  Goal: Free from fall injury  Outcome: Progressing     Problem: Chronic Conditions and Co-morbidities  Goal: Patient's chronic conditions and co-morbidity symptoms are monitored and maintained or improved  Outcome: Progressing  Flowsheets (Taken 2/19/2024 2000)  Care Plan - Patient's Chronic Conditions and Co-Morbidity Symptoms are Monitored and Maintained or Improved:   Monitor and assess patient's chronic conditions and comorbid symptoms for stability, deterioration, or improvement   Update acute care plan with appropriate goals if chronic or comorbid symptoms are exacerbated and prevent overall improvement and discharge

## 2024-02-20 NOTE — PLAN OF CARE
Problem: Discharge Planning  Goal: Discharge to home or other facility with appropriate resources  2/20/2024 1008 by Brandee Joya RN  Outcome: Progressing  2/20/2024 0322 by STEVEN VALDEZ  Outcome: Progressing  Flowsheets (Taken 2/19/2024 2000)  Discharge to home or other facility with appropriate resources:   Identify barriers to discharge with patient and caregiver   Arrange for needed discharge resources and transportation as appropriate   Refer to discharge planning if patient needs post-hospital services based on physician order or complex needs related to functional status, cognitive ability or social support system     Problem: Pain  Goal: Verbalizes/displays adequate comfort level or baseline comfort level  2/20/2024 1008 by Brandee Joya RN  Outcome: Progressing  2/20/2024 0322 by STEVEN VALDEZ  Outcome: Progressing     Problem: Safety - Adult  Goal: Free from fall injury  2/20/2024 1008 by Brandee Joya RN  Outcome: Progressing  2/20/2024 0322 by STEVEN VALDEZ  Outcome: Progressing     Problem: Chronic Conditions and Co-morbidities  Goal: Patient's chronic conditions and co-morbidity symptoms are monitored and maintained or improved  2/20/2024 1008 by Brandee Joya RN  Outcome: Progressing  2/20/2024 0322 by STEVEN VALDEZ  Outcome: Progressing  Flowsheets (Taken 2/19/2024 2000)  Care Plan - Patient's Chronic Conditions and Co-Morbidity Symptoms are Monitored and Maintained or Improved:   Monitor and assess patient's chronic conditions and comorbid symptoms for stability, deterioration, or improvement   Update acute care plan with appropriate goals if chronic or comorbid symptoms are exacerbated and prevent overall improvement and discharge

## 2024-02-20 NOTE — TELEPHONE ENCOUNTER
Referral has been faxed to [facility], I called PT to reschedule the MBS and they told me they would call me back because they had patient's in their office to register. Waiting to hear back.

## 2024-02-20 NOTE — PLAN OF CARE
Problem: Discharge Planning  Goal: Discharge to home or other facility with appropriate resources  2/20/2024 1448 by Brandee Joya RN  Outcome: Completed  2/20/2024 1008 by Brandee Joya RN  Outcome: Progressing  2/20/2024 0322 by STEVEN VALDEZ  Outcome: Progressing  Flowsheets (Taken 2/19/2024 2000)  Discharge to home or other facility with appropriate resources:   Identify barriers to discharge with patient and caregiver   Arrange for needed discharge resources and transportation as appropriate   Refer to discharge planning if patient needs post-hospital services based on physician order or complex needs related to functional status, cognitive ability or social support system     Problem: Pain  Goal: Verbalizes/displays adequate comfort level or baseline comfort level  2/20/2024 1448 by Brandee Joya RN  Outcome: Completed  2/20/2024 1008 by Brandee Joya RN  Outcome: Progressing  2/20/2024 0322 by STEVEN VALDEZ  Outcome: Progressing     Problem: Safety - Adult  Goal: Free from fall injury  2/20/2024 1448 by Brandee Joya RN  Outcome: Completed  2/20/2024 1008 by Brandee Joya RN  Outcome: Progressing  2/20/2024 0322 by STEVEN VALDEZ  Outcome: Progressing     Problem: Chronic Conditions and Co-morbidities  Goal: Patient's chronic conditions and co-morbidity symptoms are monitored and maintained or improved  2/20/2024 1448 by Brandee Joya RN  Outcome: Completed  2/20/2024 1008 by Brandee Joya RN  Outcome: Progressing  2/20/2024 0322 by STEVEN VALDEZ  Outcome: Progressing  Flowsheets (Taken 2/19/2024 2000)  Care Plan - Patient's Chronic Conditions and Co-Morbidity Symptoms are Monitored and Maintained or Improved:   Monitor and assess patient's chronic conditions and comorbid symptoms for stability, deterioration, or improvement   Update acute care plan with appropriate goals if chronic or comorbid symptoms are exacerbated and prevent overall improvement and discharge

## 2024-02-20 NOTE — PROGRESS NOTES
Pt. Being discharged to home in care of self with spouse.  Discharge instructions with prescription information and follow up recommendations reviewed with patient.  Pt. Verbalized understanding.

## 2024-02-20 NOTE — DISCHARGE SUMMARY
Hospital Medicine Discharge Summary      Patient Identification:   Chris Estevez   : 1955  MRN: [id number]   Account: [id number]   Patient's PCP: Kelvin Hardy DO    Admit Date: 2024   Discharge Date: 2024      Admitting Physician: Arley Ogden MD  Discharge Physician: Colin Rush DO       Discharge Diagnoses:  Left parotid Warthin's tumor   YANCI   Leukocytosis   Type 2 diabetes mellitus   Coronary artery disease  History of PE  GERD  Hyperlipidemia    Hospital Course:   Chris Estevez is a 68 y.o. male with  has a past medical history of Anxiety, CAD (coronary artery disease), Depression, Diabetes mellitus (HCC), Dilation of esophagus, Frequent headaches, Gastroparesis, GERD (gastroesophageal reflux disease), History of Chiari malformation, Hyperlipidemia, Hypertension, Kidney stone, Kidney stone, Migraine, Movement disorder, Pulmonary emboli (HCC), Shingles, Sleep apnea, and Tattoos..     he was admitted to [facility] on 2024 for swelling and pain of left neck.  He has been dealing with a left parotid mass for the last 10 years which has been growing in size. CT neck was completed 10/12/2023 showing a 3.2 x 4.2 x 3.9 cm left parotid mass.  This had prior FNA 2019 positive for Warthin tumor.   He was then lost to follow-up. Then he underwent repeat FNA with core needle biopsies on 2024 with pathology consistent with Warthin tumor.  Since admission the shortness of breath and dysphagia that he was experiencing prior to admission has improved.  Mild dysphagia still present.  He received IV Decadron while inpatient.  He was also started on Unasyn due to leukocytosis.  He was discharged with a course of Augmentin for 10 days and prednisone taper over 6 days.  He will will follow-up with ENT in the outpatient setting to reassess the swelling.  He is scheduled for outpatient modified barium swallow study as well.    ENTs instructions:  \"-Frequent ice packs to

## 2024-02-20 NOTE — PROGRESS NOTES
Department of Otolaryngology  Progress Note    Chief Complaint:  neck swelling     Initial ENT HPI: The patient is a 68 y.o. male who presented to [facility] Emergency Department this evening for increased left neck swelling and sore throat.  Patient has known left parotid mass present for ~10 years and has gradually grown in size.  His last CT neck was completed 10/12/2023 showing a 3.2 x 4.2 x 3.9 cm left parotid mass.  This had prior FNA 5/7/2019 positive for Warthin tumor.  Patient was unfortunately lost to follow up care in ENT clinic after that time. He recently re-established care and was seen in our ENT clinic with Dr Saenz on 2/10/2024. He underwent repeat FNA with core needle biopsies on 2/14/2024 with pathology consistent with Warthin tumor.  Patient takes Plavix and Xarelto, which were held prior to FNA and he has not restarted yet.  Patient reports occasional shortness of breath, even prior to FNA that he states is unchanged since FNA and acute onset of increased swelling.  He does have some trouble swallowing, so sticks to soft food consistencies such as scrambled eggs since the procedure on 2/14.  Also since the procedure, he has tenderness and intermittent pain in the left neck.        SUBJECTIVE 2/19/24:  No acute events reported overnight. Patient was reporting some persistent dysphagia to primary earlier. At time of my rounding he reports the dysphagia feels slightly worse compared to his chronic dysphagia he had prior to the FNA, but reports his dysphagia has improved since his admission on 2/17/24. He reports a chronic, intermittent SOB which he reports feels just a bit worse than his baseline. He reports the degree of swelling in his neck has improved since admission 2/17/24, but feels relatively stable from yesterday to today. He denies fevers or chills. He reports persistent, but stable pain in the area of swelling. Patient reports the swelling around

## 2024-02-20 NOTE — TELEPHONE ENCOUNTER
Patient was seen in the hospital after he had some bleeding post-FNA of parotid mass. The patient wanted a referral to [facility] to see if they have sooner availability for removal of the parotid nodule. Referral placed, please send the required information. I will see him in follow up of the bleeding on 3/4/24 as scheduled (he is aware of this appointment), but wanted to get the referral moving sooner than that.     The patient is also scheduled for an MBS on 2/22/24. This should be pushed back until at least a few days after the follow up appointment with me on 3/4/24. Depending how he looks on exam that day and how he is feeling, we may need to push it back even further.

## 2024-02-21 ENCOUNTER — TELEPHONE (OUTPATIENT)
Dept: FAMILY MEDICINE CLINIC | Age: 69
End: 2024-02-21

## 2024-02-21 ENCOUNTER — CARE COORDINATION (OUTPATIENT)
Dept: CASE MANAGEMENT | Age: 69
End: 2024-02-21

## 2024-02-21 ENCOUNTER — TELEPHONE (OUTPATIENT)
Dept: ENT CLINIC | Age: 69
End: 2024-02-21

## 2024-02-21 DIAGNOSIS — R22.1 LOCALIZED SWELLING, MASS AND LUMP, NECK: Primary | ICD-10-CM

## 2024-02-21 PROCEDURE — 1111F DSCHRG MED/CURRENT MED MERGE: CPT | Performed by: FAMILY MEDICINE

## 2024-02-21 NOTE — TELEPHONE ENCOUNTER
Patient left message on clinical voicemail. He would like to know if he is allowed to restart his Xarelto. If not now, when can he.  Please advise.

## 2024-02-21 NOTE — TELEPHONE ENCOUNTER
I called and let Itz know that it was to use his glucose test strips as he normally would.    Patient verbalized understanding and thanked me.

## 2024-02-21 NOTE — CARE COORDINATION
has been eating soft foods.  Drinking fluids w/o issues.  FBS this morning 127.  Denies having any shortness of breath or difficulty breathing.  Has not used Bipap in a long time.  He denies having any other symptoms currently.  Pt was previously enrolled in RPM.  We discussed RPM again and he declines re-enrollment.  States it doesn't work for him and will self monitor.  Reviewed medication list. Also reviewed changed and stopped medications.  Reports that he has not taken Trulicity in 2 weeks and Pantoprazole was changed to BID.  No questions regarding his medications.  Pt has a follow up with PCP on 2/23/24 and with ENT on 3/4/24.  He will be driving himself to the appts.  No questions, concerns or needs at this time.  He is agreeable to follow up calls.      Care Transition Nurse reviewed discharge instructions, medical action plan, and red flags with patient who verbalized understanding. The patient was given an opportunity to ask questions and does not have any further questions or concerns at this time. Were discharge instructions available to patient? Yes. Reviewed appropriate site of care based on symptoms and resources available to patient including: PCP  Specialist  When to call 911. The patient agrees to contact the PCP office for questions related to their healthcare.     Advance Care Planning:   Does patient have an Advance Directive:  on file .    Medication reconciliation was performed with patient, who verbalizes understanding of administration of home medications. Medications reviewed, 1111F entered: yes    Was patient discharged with a pulse oximeter? no    Scheduled appointment with PCP-appt on 2/23/24  Scheduled appointment with Specialist-ENT on 3/4/24  Obtained and reviewed discharge summary and/or continuity of care documents    Offered patient enrollment in the Remote Patient Monitoring (RPM) program for in-home monitoring: Patient declined.    Care Transitions 24 Hour Call    Do you have a

## 2024-02-21 NOTE — TELEPHONE ENCOUNTER
Care Transitions Initial Follow Up Call    Outreach made within 2 business days of discharge: Yes    Patient: Chris Estevez Patient : 1955   MRN: [id number]  Reason for Admission: There are no discharge diagnoses documented for the most recent discharge.  Discharge Date: 24       Spoke with: Pt    Discharge department/facility: [facility]    TCM Interactive Patient Contact:  Was patient able to fill all prescriptions: Yes  Was patient instructed to bring all medications to the follow-up visit: Yes  Is patient taking all medications as directed in the discharge summary? Yes  Does patient understand their discharge instructions: Yes  Does patient have questions or concerns that need addressed prior to 7-14 day follow up office visit: no    Scheduled appointment with PCP within 7-14 days    Follow Up  Future Appointments   Date Time Provider Department Center   2024  8:00 AM STR FLUORO ROOM 2 STRZ RAD STR Rad/Card   3/4/2024  1:30 PM Dirk Viveros PA N ENT P - [place]   2024  8:00 AM Jaswinder Saenz MD N ENT P - [place]   2024  8:15 AM Kelvin Hardy DO [facility] [place]   2024 10:45 AM Caitlin Diaz MD N SRPX Heart P - [place]       Adeola Hsieh CMA ([facility])

## 2024-02-21 NOTE — TELEPHONE ENCOUNTER
I called and spoke with the patient and let him know what Dirk said. Patient did have one more question about his medication. Patient stated the his glucose test strips were on the list to stop using after surgery and he said that it seemed odd to him that something like that would be on there so he is wanting to clarify if he can use these or if there is a reason he couldn't. Please advise.

## 2024-02-22 LAB
BACTERIA BLD AEROBE CULT: NORMAL
BACTERIA BLD AEROBE CULT: NORMAL

## 2024-02-23 ENCOUNTER — OFFICE VISIT (OUTPATIENT)
Dept: FAMILY MEDICINE CLINIC | Age: 69
End: 2024-02-23

## 2024-02-23 VITALS
RESPIRATION RATE: 18 BRPM | BODY MASS INDEX: 30.72 KG/M2 | DIASTOLIC BLOOD PRESSURE: 70 MMHG | HEIGHT: 69 IN | SYSTOLIC BLOOD PRESSURE: 128 MMHG | WEIGHT: 207.4 LBS | HEART RATE: 60 BPM

## 2024-02-23 DIAGNOSIS — D11.9 WARTHIN TUMOR: ICD-10-CM

## 2024-02-23 DIAGNOSIS — D68.59 PROTEIN S DEFICIENCY (HCC): ICD-10-CM

## 2024-02-23 DIAGNOSIS — N17.9 AKI (ACUTE KIDNEY INJURY) (HCC): ICD-10-CM

## 2024-02-23 DIAGNOSIS — E11.65 UNCONTROLLED TYPE 2 DIABETES MELLITUS WITH HYPERGLYCEMIA (HCC): ICD-10-CM

## 2024-02-23 DIAGNOSIS — D72.829 LEUKOCYTOSIS, UNSPECIFIED TYPE: ICD-10-CM

## 2024-02-23 DIAGNOSIS — I73.9 CLAUDICATION (HCC): ICD-10-CM

## 2024-02-23 DIAGNOSIS — K11.8 MASS OF LEFT PAROTID GLAND: Primary | ICD-10-CM

## 2024-02-23 DIAGNOSIS — I25.10 CORONARY ARTERY DISEASE INVOLVING NATIVE CORONARY ARTERY OF NATIVE HEART WITHOUT ANGINA PECTORIS: ICD-10-CM

## 2024-02-23 DIAGNOSIS — I10 PRIMARY HYPERTENSION: ICD-10-CM

## 2024-02-23 DIAGNOSIS — E78.00 PURE HYPERCHOLESTEROLEMIA: ICD-10-CM

## 2024-02-23 DIAGNOSIS — Z09 HOSPITAL DISCHARGE FOLLOW-UP: ICD-10-CM

## 2024-02-23 RX ORDER — HYDROCODONE BITARTRATE AND ACETAMINOPHEN 5; 325 MG/1; MG/1
1 TABLET ORAL EVERY 8 HOURS PRN
Qty: 30 TABLET | Refills: 0 | Status: SHIPPED | OUTPATIENT
Start: 2024-02-23 | End: 2024-03-04

## 2024-02-23 NOTE — PROGRESS NOTES
Post-Discharge Transitional Care Management Services      Chris Estevez   YOB: 1955    Date of Visit:  2/23/2024  30 Day Post-Discharge Date: 3/20/24  Chief Complaint   Patient presents with    Follow-Up from Hospital     [facility] D/C 2/20/24.     Appointment scheduled at  [facility] on 3/1/2024     Admit Date: 2/17/2024   Discharge Date: 2/20/2024       Admitting Physician: Arley Ogden MD  Discharge Physician: Colin Rush DO         Discharge Diagnoses:  Left parotid Warthin's tumor   YANCI   Leukocytosis   Type 2 diabetes mellitus   Coronary artery disease  History of PE  GERD  Hyperlipidemia     Hospital Course:   Chris Estevez is a 68 y.o. male with  has a past medical history of Anxiety, CAD (coronary artery disease), Depression, Diabetes mellitus (HCC), Dilation of esophagus, Frequent headaches, Gastroparesis, GERD (gastroesophageal reflux disease), History of Chiari malformation, Hyperlipidemia, Hypertension, Kidney stone, Kidney stone, Migraine, Movement disorder, Pulmonary emboli (HCC), Shingles, Sleep apnea, and Tattoos..      he was admitted to [facility] on 2/17/2024 for swelling and pain of left neck.  He has been dealing with a left parotid mass for the last 10 years which has been growing in size. CT neck was completed 10/12/2023 showing a 3.2 x 4.2 x 3.9 cm left parotid mass.  This had prior FNA 5/7/2019 positive for Warthin tumor.   He was then lost to follow-up. Then he underwent repeat FNA with core needle biopsies on 2/14/2024 with pathology consistent with Warthin tumor.  Since admission the shortness of breath and dysphagia that he was experiencing prior to admission has improved.  Mild dysphagia still present.  He received IV Decadron while inpatient.  He was also started on Unasyn due to leukocytosis.  He was discharged with a course of Augmentin for 10 days and prednisone taper over 6 days.  He will will follow-up with ENT in the outpatient setting to reassess

## 2024-02-26 ENCOUNTER — CARE COORDINATION (OUTPATIENT)
Dept: CASE MANAGEMENT | Age: 69
End: 2024-02-26

## 2024-02-26 NOTE — TELEPHONE ENCOUNTER
Itz informed of ENT message & recommendation. He expressed understanding and will address after [facility] appt. 3/1/24.

## 2024-02-26 NOTE — CARE COORDINATION
Care Transitions Follow Up Call    Patient Current Location:  Home: [address] [place] [address] [place] [address]    Care Transition Nurse contacted the patient by telephone to follow up after admission on 24.  Verified name and  with patient as identifiers.    Patient: Chris Estevez  Patient : 1955   MRN: [id number]  Reason for Admission: phil swelling L parotid Warthins tumor  Discharge Date: 24 RARS: Readmission Risk Score: 16.2      Needs to be reviewed by the provider   Additional needs identified to be addressed with provider: Yes  Pt. Wants to know if he should keep ENT appt. 3/4/24 since he is going to [facility] 3/1/24?             Method of communication with provider: chart routing.    CTN call to Itz today and he says he is feeling about the same. C/o some pain and dysphagia.  PCP 24-> Norco (takes sparingly d/t constipation side effects, Tylenol relieves most of the time.  Denies fever, chills, sob, n/v/d,malaise, chest/abd pain, dizziness.  L neck swelling the same.  Has HFU w/ Otolaryn. @ [facility] 3/1/24  ENT 3/4/24  Denies need for transportation.  Asking if need to keep ENT appt. 3/4/24.  Says going to [facility] because surgery can be sooner than 2024.  This writer will message ENT re: need to keep appt. 3/4/24.  Eating, drinking & sleeping ok.  Meats can cause dysphagia. He eats softer proteins like eggs.  GI scheduled barium swallow w/ video 3/12/24. Will ask Specialist if he should do it now or wait.  Says BLOOD SUGAR=[phone]  Finished w/ steroids, still taking Augmentin.  Denies problems w urination/bowels. No other concerns voiced at this time Will continue to follow.    Addressed changes since last contact:  medications-Norco form PCP  Discussed follow-up appointments. If no appointment was previously scheduled, appointment scheduling offered: Yes.   Is follow up appointment scheduled within 7 days of discharge? Yes.    Follow Up  Future Appointments   Date Time Provider Department Center

## 2024-02-28 ENCOUNTER — ENROLLMENT (OUTPATIENT)
Dept: PHARMACY | Facility: CLINIC | Age: 69
End: 2024-02-28

## 2024-03-04 ENCOUNTER — CARE COORDINATION (OUTPATIENT)
Dept: CASE MANAGEMENT | Age: 69
End: 2024-03-04

## 2024-03-04 NOTE — CARE COORDINATION
Care Transitions Follow Up Call    Patient Current Location:  Home: [address] [place] [address] [place] [address]    Care Transition Nurse contacted the patient by telephone to follow up after admission on 24.  Verified name and  with patient as identifiers.    Patient: Chris Estevez  Patient : 1955   MRN: [id number]  Reason for Admission: neck swelling L parotid Warthins tumor  Discharge Date: 24 RARS: Readmission Risk Score: 16.2      Needs to be reviewed by the provider   Additional needs identified to be addressed with provider: No  none             Method of communication with provider: none.    CTN call to Itz today and he says he is feeling the same., some neck pain & swelling but much improved.  Denies sob, cough, chest pain, n/v/d, fever, chills, dizziness, malaise.  Says some dysphagia depending on what he is eating.   [facility] otolaryngology HFU 3/1/24->scheduled surgery 3/25/24 w/ preop appt, 3/13/24.  He cancelled ENT HFU for today in [place] per Dirk BACH.  GI scheduled Barium swallow w/ video for 3/12/24. He is going to call GI to cancel for now. He is also going to check w/ Provider re: restarting Trulicity. He's been off of it since d/c. BLOOD SUGAR=270.  Eating, drinking & sleeping ok. Denies problems w/ urination/bowels.  No other concerns voiced at this time. Agrees to future calls.    Addressed changes since last contact:  medications-? Resume Trulicity?  Discussed follow-up appointments. If no appointment was previously scheduled, appointment scheduling offered: Yes.   Is follow up appointment scheduled within 7 days of discharge? Yes.    Follow Up  Future Appointments   Date Time Provider Department Center   3/12/2024  8:45 AM STR FLUORO ROOM 2 STRZ RAD STR Rad/Card   2024  8:00 AM Jaswinder Saenz MD N ENT [facility]   2024  8:15 AM Kelvin Hardy, DO [facility]   2024 10:45 AM Caitlin Diaz MD N SRPX Heart [facility]     External follow up

## 2024-03-11 ENCOUNTER — CARE COORDINATION (OUTPATIENT)
Dept: CASE MANAGEMENT | Age: 69
End: 2024-03-11

## 2024-03-11 NOTE — CARE COORDINATION
Care Transitions Outreach Attempt    Attempted to reach patient for transitions of care follow up. Unable to reach patient.    Patient: Chris Estevez Patient : 1955 MRN: [id number]    Last Discharge Facility       Date Complaint Diagnosis Description Type Department Provider    24 Neck Swelling Localized swelling, mass and lump, neck ED to Hosp-Admission (Discharged) (ADMITTED) Arley Solitario MD; Dl Maldonado MD...            Noted following upcoming appointments from discharge chart review:   [facility] follow up appointment(s):   Future Appointments   Date Time Provider Department Center   2024  8:00 AM Jaswinder Saenz MD N ENT P - [place]   2024  8:15 AM Kelvin Hardy, DO [facility] [place] [facility] [place]   2024 10:45 AM Caitlin Diaz MD N SRPX Heart P - [place]     Non-[facility]  follow up appointment(s):     Attempted to contact pt for care transition follow up.  Unable to reach pt.  Left message with contact information and request for call back.      Va Antoine RN

## 2024-03-14 ENCOUNTER — CARE COORDINATION (OUTPATIENT)
Dept: CASE MANAGEMENT | Age: 69
End: 2024-03-14

## 2024-03-14 NOTE — CARE COORDINATION
Time Provider Department Center   6/5/2024  8:00 AM Jaswinder Saenz MD N ENT [facility] [place]   7/16/2024  8:15 AM Kelvin Hardy, DO [place] [facility] [place] [facility]   8/20/2024 10:45 AM Caitlin Diaz MD N [facility]       Patients top risk factors for readmission: neck swelling L parotid Warthins tumor, DM, CAD, GERD   Interventions to address risk factors:  procedure on 3/19/24 at [facility]    Offered patient enrollment in the Remote Patient Monitoring (RPM) program for in-home monitoring: Patient is not eligible for RPM program.     Care Transitions Subsequent and Final Call    Subsequent and Final Calls  Do you have any ongoing symptoms?: Yes  Patient-reported symptoms: Pain, Nausea, Other  Have your medications changed?: No  Do you have any questions related to your medications?: No  Do you currently have any active services?: No  Do you have any needs or concerns that I can assist you with?: No  Care Transitions Interventions     Transportation Support: Declined   Disease Specific Clinic: Completed      Disease Association: Completed      Other Interventions:             Care Transition Nurse provided contact information for future needs. Plan for follow-up call in 5-7 days based on severity of symptoms and risk factors.  Plan for next call: symptom management-how did the procedure go?, neck pain, swelling, any new or worsening symptoms    Va Antoine RN

## 2024-03-18 ENCOUNTER — TELEPHONE (OUTPATIENT)
Dept: CARDIOLOGY CLINIC | Age: 69
End: 2024-03-18

## 2024-03-18 NOTE — TELEPHONE ENCOUNTER
Pre op Risk Assessment    Procedure Partod Excision  Physician Presley-LISA  Date of surgery/procedure Tomorrow 3/19/24    Last OV 09/21/23  Last Stress 10/15/21  Last Echo 01/04/24  Last Cath 01/04/24  Last Stent 10/21/21  Is patient on blood thinners XARELTO 15 MG, PLAVIX  Hold Meds/how many days?    F. [phone]  Ph. [phone]

## 2024-03-21 ENCOUNTER — CARE COORDINATION (OUTPATIENT)
Dept: CASE MANAGEMENT | Age: 69
End: 2024-03-21

## 2024-03-21 DIAGNOSIS — Z79.4 TYPE 2 DIABETES MELLITUS WITH OTHER SPECIFIED COMPLICATION, WITH LONG-TERM CURRENT USE OF INSULIN (HCC): ICD-10-CM

## 2024-03-21 DIAGNOSIS — E11.69 TYPE 2 DIABETES MELLITUS WITH OTHER SPECIFIED COMPLICATION, WITH LONG-TERM CURRENT USE OF INSULIN (HCC): ICD-10-CM

## 2024-03-21 NOTE — TELEPHONE ENCOUNTER
Patient called and stated that he needs a refill on his metformin. He was seeing a doctor that was prescribing it but it has been awhile since he has seen her. They recommended to reach out to your office.     He will check with the pharmacy around 4:00 today

## 2024-03-21 NOTE — CARE COORDINATION
Care Transitions Follow Up Call    Patient Current Location:  Home: [address] [place] [address] [place] [address]    Care Transition Nurse contacted the patient by telephone to follow up after admission on 24.  Verified name and  with patient as identifiers.    Patient: Chris Estevez  Patient : 1955   MRN: [id number]  Reason for Admission: neck swelling L parotid Warthins tumor    Discharge Date: 24 RARS: Readmission Risk Score: 16.2      Needs to be reviewed by the provider   Additional needs identified to be addressed with provider: No  none             Method of communication with provider: none.    Contacted pt for care transition follow up.  Itz states he is doing okay.  He had procedure to remove parotid mass on 3/19/24 at [facility].  States pain has been mild.  Was prescribed pain medication but he did not pick it up from the pharmacy.  States he does not need it.  Swelling to neck area still present as well as the numbness.  He states provider told him he may have symptoms.  Incision is open to air.  Denies having any s/s of infection.  Pt currently has a drain.  He has an appt to pull the drain tomorrow, 3/22/24.  Per pt, he does not feel short of breath but has been monitoring just because he was intubated.  Also monitoring his swallowing which he reports is decent.  He is aware of when to contact provider as well as when to report  to the ED.  Will make one more outreach and if pt stable, CTN will be signing off.  No questions or concerns at this time.      Addressed changes since last contact:  none  Discussed follow-up appointments. If no appointment was previously scheduled, appointment scheduling offered: Yes.   Is follow up appointment scheduled within 7 days of discharge? Yes.    Follow Up  Future Appointments   Date Time Provider Department Center   2024  8:00 AM Jaswinder Saenz MD N ENT [facility] [place]   2024  8:15 AM Kelvin Hardy DO [facility] [place]   2024 10:45

## 2024-03-28 ENCOUNTER — CARE COORDINATION (OUTPATIENT)
Dept: CASE MANAGEMENT | Age: 69
End: 2024-03-28

## 2024-03-28 NOTE — CARE COORDINATION
Care Transitions Follow Up Call    Patient Current Location:  Home: [address] [place] [address] [place] [address]    [facility] Care Coordinator contacted the patient by telephone to follow up after admission on 2024.  Verified name and  with patient as identifiers.    Patient: Chris Estevez  Patient : 1955   MRN: <[id number]>  Reason for Admission: Neck swelling L parotid Warthins tumor   Discharge Date: 24 RARS: Readmission Risk Score: 16.2      Needs to be reviewed by the provider   Additional needs identified to be addressed with provider: No  none             Method of communication with provider: none.  Patient reports he is feeling fair. He has an appointment on Tuesday with his surgeon at [facility]. Denies fever, chills, nvd, dizziness, sob or swelling. He has numbness and tenderness to area. He isn't having any swallowing issues, but he tends to bite his left side of lip when eating. He holds his lip and bites down on food to prevent this from happening. He exercises opening mouth and smiling wide. He takes Tylenol for pain. He stated he has had some pain in right leg behind knee and calf. Patient knows when to contact provider and when to go to ED. He attributes that to sciatica. . Will follow patient once more after visit to surgeon then send to ACM.        Addressed changes since last contact:  none  Discussed follow-up appointments. If no appointment was previously scheduled, appointment scheduling offered: No.   Is follow up appointment scheduled within 7 days of discharge? Yes.    Follow Up  Future Appointments   Date Time Provider Department Center   2024  8:00 AM Jaswinder Saenz MD N ENT [facility] [place]   2024  8:15 AM Kelvin Hardy DO [facility]   2024 10:45 AM Caitlin Diaz MD N [facility]     External follow up appointment(s):     Advance Care Planning:   reviewed and current.     Patients top risk factors for readmission: medical condition-Neck

## 2024-04-03 ENCOUNTER — CARE COORDINATION (OUTPATIENT)
Dept: CASE MANAGEMENT | Age: 69
End: 2024-04-03

## 2024-04-03 NOTE — CARE COORDINATION
Care Transitions Outreach Attempt    Attempted to reach patient for transitions of care follow up. Unable to reach patient.    Patient: Chris Estevez Patient : 1955 MRN: [id number]    Last Discharge Facility       Date Complaint Diagnosis Description Type Department Provider    24 Neck Swelling Localized swelling, mass and lump, neck ED to Hosp-Admission (Discharged) (ADMITTED) Arley Solitario MD; Dl Maldonado MD...            Noted following upcoming appointments from discharge chart review:   [facility] follow up appointment(s):   Future Appointments   Date Time Provider Department Center   2024  8:00 AM Jaswinder Saenz MD N ENT [facility] [place]   2024  8:15 AM Kelvin Hardy, DO [facility] [place] [facility] [place]   2024 10:45 AM Caitlin Diaz MD N [facility] [place]     Non-[facility]  follow up appointment(s):     Attempted to contact pt for care transition follow up.  Unable to reach pt.  Left message with contact information and request for call back.    Next call final and hand off to ACM.      Va Antoine RN

## 2024-04-05 ENCOUNTER — CARE COORDINATION (OUTPATIENT)
Dept: CASE MANAGEMENT | Age: 69
End: 2024-04-05

## 2024-04-05 NOTE — CARE COORDINATION
Care Transitions Outreach Attempt    Attempted to reach patient for transitions of care follow up. Unable to reach patient.    Patient: Chris Estevez Patient : 1955 MRN: [id number]    Last Discharge Facility       Date Complaint Diagnosis Description Type Department Provider    24 Neck Swelling Localized swelling, mass and lump, neck ED to Hosp-Admission (Discharged) (ADMITTED) Arley Solitario MD; Dl Maldonado MD...            Noted following upcoming appointments from discharge chart review:   [facility] follow up appointment(s):   Future Appointments   Date Time Provider Department Center   2024  8:00 AM Jaswinder Saenz MD N ENT [facility] [place]   2024  8:15 AM Kelvin Hardy, DO [facility] [place] [facility] [place]   2024 10:45 AM Caitlin Diaz MD N [facility] [place]     Non-[facility]  follow up appointment(s):     2nd attempt to contact pt for care transition follow up.  Unable to reach pt.  Left message with contact information and request for call back.    Program will be closed and CTN to sign off if return call is not received from the patient.      Sent staff message to Mona aDmon, CC Manager with ACM referral.    Va Antoine RN

## 2024-04-11 ENCOUNTER — CARE COORDINATION (OUTPATIENT)
Dept: CARE COORDINATION | Age: 69
End: 2024-04-11

## 2024-04-11 NOTE — CARE COORDINATION
(bathing/dressing/grooming): Independent  Ability to manage Medications: Independent  Ability to prepare Food Preparation: Independent  Ability to maintain home (clean home, laundry): Independent  Ability to drive and/or has transportation: Independent  Ability to do shopping: Independent  Ability to manage finances: Independent  Is patient able to live independently?: Yes     Current Housing: Private Residence  Who do you live with?: Partner/Spouse/SO  Are you an active caregiver in your home?: No     Do you have any DME?: Yes  Patient Home Equipment: BiPAP        Do you use rails/bars?: No  Do you have a non-slip tub mat?: Yes     Are you experiencing loss of meaning?: No  Are you experiencing loss of hope and peace?: No     Thinking about your patient's physical health needs, are there any symptoms or problems (risk indicators) you are unsure about that require further investigation?: No identified areas of uncertainly or problems already being investigated   Are the patient’s physical health problems impacting on their mental well-being?: No identified areas of concern   Are there any problems with your patient’s lifestyle behaviors (alcohol, drugs, diet, exercise) that are impacting on physical or mental well-being?: No identified areas of concern   Do you have any other concerns about your patient’s mental well-being? How would you rate their severity and impact on the patient?: No identified areas of concern   How would you rate their home environment in terms of safety and stability (including domestic violence, insecure housing, neighbor harassment)?: Consistently safe, supportive, stable, no identified problems   How would you rate their financial resources (including ability to afford all required medical care)?: Financially secure, some resource challenges   How wells does the patient now understand their health and well-being (symptoms, signs or risk factors) and what they need to do to manage their

## 2024-04-16 ENCOUNTER — OFFICE VISIT (OUTPATIENT)
Dept: FAMILY MEDICINE CLINIC | Age: 69
End: 2024-04-16
Payer: COMMERCIAL

## 2024-04-16 VITALS
WEIGHT: 211 LBS | RESPIRATION RATE: 16 BRPM | BODY MASS INDEX: 31.14 KG/M2 | SYSTOLIC BLOOD PRESSURE: 122 MMHG | DIASTOLIC BLOOD PRESSURE: 64 MMHG | HEART RATE: 60 BPM

## 2024-04-16 DIAGNOSIS — E78.00 PURE HYPERCHOLESTEROLEMIA: ICD-10-CM

## 2024-04-16 DIAGNOSIS — K11.8 MASS OF LEFT PAROTID GLAND: Primary | ICD-10-CM

## 2024-04-16 DIAGNOSIS — D68.59 PROTEIN S DEFICIENCY (HCC): ICD-10-CM

## 2024-04-16 DIAGNOSIS — I10 PRIMARY HYPERTENSION: ICD-10-CM

## 2024-04-16 DIAGNOSIS — E11.69 TYPE 2 DIABETES MELLITUS WITH OTHER SPECIFIED COMPLICATION, WITH LONG-TERM CURRENT USE OF INSULIN (HCC): ICD-10-CM

## 2024-04-16 DIAGNOSIS — I25.10 CORONARY ARTERY DISEASE INVOLVING NATIVE CORONARY ARTERY OF NATIVE HEART WITHOUT ANGINA PECTORIS: ICD-10-CM

## 2024-04-16 DIAGNOSIS — Z79.4 TYPE 2 DIABETES MELLITUS WITH OTHER SPECIFIED COMPLICATION, WITH LONG-TERM CURRENT USE OF INSULIN (HCC): ICD-10-CM

## 2024-04-16 PROCEDURE — 3052F HG A1C>EQUAL 8.0%<EQUAL 9.0%: CPT | Performed by: FAMILY MEDICINE

## 2024-04-16 PROCEDURE — 99214 OFFICE O/P EST MOD 30 MIN: CPT | Performed by: FAMILY MEDICINE

## 2024-04-16 PROCEDURE — 3074F SYST BP LT 130 MM HG: CPT | Performed by: FAMILY MEDICINE

## 2024-04-16 PROCEDURE — G2211 COMPLEX E/M VISIT ADD ON: HCPCS | Performed by: FAMILY MEDICINE

## 2024-04-16 PROCEDURE — 1123F ACP DISCUSS/DSCN MKR DOCD: CPT | Performed by: FAMILY MEDICINE

## 2024-04-16 PROCEDURE — 3078F DIAST BP <80 MM HG: CPT | Performed by: FAMILY MEDICINE

## 2024-04-16 ASSESSMENT — ENCOUNTER SYMPTOMS
GASTROINTESTINAL NEGATIVE: 1
RESPIRATORY NEGATIVE: 1

## 2024-04-16 NOTE — PROGRESS NOTES
Bunion    Central chest pain    Diastolic dysfunction    Dyspnea    Epigastric pain    Headache    Hypoxia    Laceration of finger    Local infection of skin and subcutaneous tissue    Multiple joint pain    Nausea and vomiting    Nonproliferative retinopathy due to secondary diabetes mellitus (HCC)    Terminal esophageal web    Cervical adenopathy    Localized swelling, mass and lump, neck    Mass of left side of neck       Current Outpatient Medications   Medication Sig Dispense Refill    Vitamin D3 125 MCG (5000 UT) TABS tablet Take 1 tablet by mouth daily      metFORMIN (GLUCOPHAGE) 1000 MG tablet Take 1 tablet by mouth 2 times daily (with meals) 180 tablet 1    isosorbide mononitrate (IMDUR) 30 MG extended release tablet Take 1 tablet by mouth daily 30 tablet 0    nitroGLYCERIN (NITROSTAT) 0.4 MG SL tablet Place 1 tablet under the tongue every 5 minutes as needed for Chest pain up to max of 3 total doses. If no relief after 1 dose, call 911. 25 tablet 1    ezetimibe (ZETIA) 10 MG tablet Take 1 tablet by mouth daily 30 tablet 3    metoprolol tartrate (LOPRESSOR) 25 MG tablet TAKE ONE-HALF TABLET BY MOUTH TWICE DAILY 60 tablet 3    rosuvastatin (CRESTOR) 40 MG tablet Take 1 tablet by mouth daily 90 tablet 1    clopidogrel (PLAVIX) 75 MG tablet TAKE 1 TABLET BY MOUTH ONE TIME A DAY 90 tablet 3    rivaroxaban (XARELTO) 15 MG TABS tablet Take 1 tablet by mouth daily (with breakfast) 14 tablet 0    insulin lispro, 1 Unit Dial, (HUMALOG KWIKPEN) 100 UNIT/ML SOPN Inject 10 Units into the skin in the morning and 10 Units at noon and 10 Units in the evening. Inject before meals. 15 pen 1    insulin glargine (LANTUS SOLOSTAR) 100 UNIT/ML injection pen Inject 20 Units into the skin in the morning and 20 Units before bedtime. 15 pen 1    pantoprazole (PROTONIX) 40 MG tablet Take 1 tablet by mouth every morning (before breakfast) 90 tablet 2    CPAP Machine MISC by Does not apply route Please change Bipap to 10/7cm H20. 1

## 2024-04-18 ENCOUNTER — CARE COORDINATION (OUTPATIENT)
Dept: CARE COORDINATION | Age: 69
End: 2024-04-18

## 2024-04-18 NOTE — CARE COORDINATION
Attempted to reach patient for continued Care Coordination follow up and education.  Patient was unavailable at the time of my call, and a generic voicemail message was left asking patient to return my call at [phone].

## 2024-04-25 ENCOUNTER — CARE COORDINATION (OUTPATIENT)
Dept: CARE COORDINATION | Age: 69
End: 2024-04-25

## 2024-05-07 ENCOUNTER — TELEPHONE (OUTPATIENT)
Dept: PHARMACY | Facility: CLINIC | Age: 69
End: 2024-05-07

## 2024-05-07 ENCOUNTER — CARE COORDINATION (OUTPATIENT)
Dept: CARE COORDINATION | Age: 69
End: 2024-05-07

## 2024-05-07 SDOH — ECONOMIC STABILITY: FOOD INSECURITY: WITHIN THE PAST 12 MONTHS, THE FOOD YOU BOUGHT JUST DIDN'T LAST AND YOU DIDN'T HAVE MONEY TO GET MORE.: NEVER TRUE

## 2024-05-07 SDOH — ECONOMIC STABILITY: HOUSING INSECURITY: IN THE LAST 12 MONTHS, HOW MANY PLACES HAVE YOU LIVED?: 1

## 2024-05-07 SDOH — HEALTH STABILITY: MENTAL HEALTH
STRESS IS WHEN SOMEONE FEELS TENSE, NERVOUS, ANXIOUS, OR CAN'T SLEEP AT NIGHT BECAUSE THEIR MIND IS TROUBLED. HOW STRESSED ARE YOU?: TO SOME EXTENT

## 2024-05-07 SDOH — ECONOMIC STABILITY: FOOD INSECURITY: WITHIN THE PAST 12 MONTHS, YOU WORRIED THAT YOUR FOOD WOULD RUN OUT BEFORE YOU GOT MONEY TO BUY MORE.: NEVER TRUE

## 2024-05-07 SDOH — ECONOMIC STABILITY: INCOME INSECURITY: IN THE LAST 12 MONTHS, WAS THERE A TIME WHEN YOU WERE NOT ABLE TO PAY THE MORTGAGE OR RENT ON TIME?: NO

## 2024-05-07 SDOH — SOCIAL STABILITY: SOCIAL NETWORK: HOW OFTEN DO YOU GET TOGETHER WITH FRIENDS OR RELATIVES?: MORE THAN THREE TIMES A WEEK

## 2024-05-07 SDOH — SOCIAL STABILITY: SOCIAL NETWORK
IN A TYPICAL WEEK, HOW MANY TIMES DO YOU TALK ON THE PHONE WITH FAMILY, FRIENDS, OR NEIGHBORS?: MORE THAN THREE TIMES A WEEK

## 2024-05-07 SDOH — ECONOMIC STABILITY: INCOME INSECURITY: HOW HARD IS IT FOR YOU TO PAY FOR THE VERY BASICS LIKE FOOD, HOUSING, MEDICAL CARE, AND HEATING?: NOT HARD AT ALL

## 2024-05-07 SDOH — SOCIAL STABILITY: SOCIAL NETWORK: HOW OFTEN DO YOU ATTENT MEETINGS OF THE CLUB OR ORGANIZATION YOU BELONG TO?: MORE THAN 4 TIMES PER YEAR

## 2024-05-07 SDOH — SOCIAL STABILITY: SOCIAL NETWORK: HOW OFTEN DO YOU ATTEND CHURCH OR RELIGIOUS SERVICES?: MORE THAN 4 TIMES PER YEAR

## 2024-05-07 NOTE — TELEPHONE ENCOUNTER
Received a referral:  from Care Coordinator to review patient’s medications. Called patient to schedule a time to speak with a pharmacist over the telephone.   Spoke to patient and advised them of the above message.  Patient verified understanding and scheduled their appointment: 5/10/24 at 3:30pm      Nereyda Tristan CPhT.   Population Health Clinical   [facility]  Toll free: [phone]    For Pharmacy Admin Tracking Only    Program: [employer]uri  CPA in place:  No  Recommendation Provided To: Patient/Caregiver: 1 via Telephone  Intervention Detail: Scheduled Appointment  Intervention Accepted By: Patient/Caregiver: 1  Gap Closed?: Yes   Time Spent (min): 15

## 2024-05-07 NOTE — CARE COORDINATION
Ambulatory Care Coordination Note  2024    Patient Current Location:  Home: [address] [place] [address] [place] [address]     ACM contacted the patient by telephone. Verified name and  with patient as identifiers. Provided introduction to self, and explanation of the ACM role.     Challenges to be reviewed by the provider   Additional needs identified to be addressed with provider: No  none               Method of communication with provider: none.    ACM: Shelby Pradhan RN      Itz was referred to care coordination by CTN following recent hospitalization and surgery for excision of parotid mass (Warthin's tumor).  Pt had surgery on 3/19 at [facility].  Has f/u with surgeon on .   Pt has h/o: CAD, Warthin's tumor, Chiari malformation, DM, sleep apnea, CAD, HLD, PE, HTN, gastroparesis.   DM: monitoring BS's with fingerstick meter.  Due for A1C in July.  BS today 156.  Has had 1 reading over the last wk in the 200's but states that it was d/t diet the night before.  Pt is interested in getting Dexcom G7 if insurance will cover.  Message left with Georgina at diabetic clinic.    Pt had f/u with surgeon a couple wks ago.  Only has to f/u PRN.    Monitoring BP's daily.  Reports BP's typically 120's/70's.   Pt reports that he is sleeping better, eating better.    Discussed medications.  Pt would like pharmacy referral to review all of his meds.    Plan of care:  Pharmacy referral placed for medication review.    Continue staying active  Monitor BS's daily  Message left with diabetic clinic as pt is interested in obtaining Dexcom G7.    Continue monitoring BP's daily.  Declines RPM  Diabetes Assessment    Medic Alert ID: No  Meal Planning: Avoidance of concentrated sweets   How often do you test your blood sugar?:  (Comment: 2-3x daily)   Do you have barriers with adherence to non-pharmacologic self-management interventions? (Nutrition/Exercise/Self-Monitoring): No   Have you ever had to go to the ED for symptoms of low blood

## 2024-05-07 NOTE — TELEPHONE ENCOUNTER
----- Message from Shelby Pradhan RN sent at 5/7/2024  2:02 PM EDT -----  Regarding: medication review  Good afternoon!  Please reach out to pt to complete medication review.  Pt would like to discuss best time of day to take his medications and review each medication with him.    Thanks,  CITLALLI PazN, RN

## 2024-05-10 ENCOUNTER — TELEPHONE (OUTPATIENT)
Dept: PHARMACY | Facility: CLINIC | Age: 69
End: 2024-05-10

## 2024-05-10 NOTE — TELEPHONE ENCOUNTER
CLINICAL PHARMACY NOTE - Medication Review    Chris Estevez is a 68 y.o. male referred to a clinical pharmacy specialist by care coordination nurse for evaluation of complete home medication list and directions for use.    2 attempts made to reach patient by telephone for scheduled medication review. Left voice message for patient to return clinician's phone call to [phone] opt 1.    Thank you,  Gopi Coy, PharmD  PGY1 Pharmacy Resident  [facility]

## 2024-05-16 ENCOUNTER — CARE COORDINATION (OUTPATIENT)
Dept: CARE COORDINATION | Age: 69
End: 2024-05-16

## 2024-05-16 NOTE — CARE COORDINATION
Attempted to reach Itz today for care coordination f/u.  No answer.  Message left to return call.

## 2024-05-20 NOTE — TELEPHONE ENCOUNTER
CSS sent message via Qstream which has not been read. Routed letter to pool to be sent to patient.

## 2024-05-28 RX ORDER — ROSUVASTATIN CALCIUM 40 MG/1
40 TABLET, COATED ORAL DAILY
Qty: 90 TABLET | Refills: 1 | Status: SHIPPED | OUTPATIENT
Start: 2024-05-28

## 2024-05-28 NOTE — TELEPHONE ENCOUNTER
Dr. Emily MD, patient out of refills. Rx(s) pended for your signature/modification as appropriate    Last Visit: 9/21/23  Next Visit: 8/20/24    Thank you,  Merlyn Sherman, PharmD, [facility], [facility] Clinical Pharmacy  Department, toll free: [phone], option 1  =================================================================    [facility] CLINICAL PHARMACY: STATIN THERAPY REVIEW  Identified statin use in persons with cardiovascular disease care gap per [facility]. Records dated: 5/14/24.     ASSESSMENT of Statin in Persons with Cardiovascular Disease Care Gap    Chris Estevez  has been identified as having a diagnosis for clinical ASCVD or event (e.g., inpatient hospitalization for MI, CABG, PCI or other revascularization procedures) in the measurement year and not currently filling a moderate or high intensity statin.   Patients included in this care gap are males age 21-75 and females age 40-75.     Currently Prescribed a statin: yes - rosuvastatin 40 mg tablets  Per Reconcile Dispense History:     Dispensed Days Supply Quantity Provider Pharmacy    ROSUVASTATIN 40MG TAB 09/12/2023 90 90 each [facility]aitlin Diaz MD [facility] ...   ROSUVASTATIN 40MG TAB 06/07/2023 90 90 each [facility]aitlin [facility] MD [facility] ...   ROSUVASTATIN 40MG TAB 08/12/2022 90 90 each [facility] ...   ROSUVASTATIN 40MG TAB 05/13/2022 90 90 each CAITLIN DIAZ [facility] ...   ROSUVASTATIN 40MG TAB 03/01/2022 30 30 tablet HempflingRandee APRN - CNP [facility]...   ROSUVASTATIN CALCIUM 40MG TAB 10/22/2021 30 30 tablet HEMPFLING,RANDEE [facility]...     Per chart review: Note 10/3/23 refill encounter from Dr. Melhem cardiology, \"LDL is not at goal  On Crestor 40 mg po daily (confirm compliance with the patient)   Add Zetia 10 mg po daily\"    Allergies   Allergen Reactions    Latex Rash       Lab Results   Component Value

## 2024-05-29 ENCOUNTER — CARE COORDINATION (OUTPATIENT)
Dept: CARE COORDINATION | Age: 69
End: 2024-05-29

## 2024-06-06 ENCOUNTER — CARE COORDINATION (OUTPATIENT)
Dept: CARE COORDINATION | Age: 69
End: 2024-06-06

## 2024-06-06 NOTE — CARE COORDINATION
Ambulatory Care Coordination Note  2024    Patient Current Location:  Home: [address] [place] [address] [place] [address]     ACM contacted the patient by telephone. Verified name and  with patient as identifiers. Provided introduction to self, and explanation of the ACM role.     Challenges to be reviewed by the provider   Additional needs identified to be addressed with provider: No  none               Method of communication with provider: none.    ACM: Shelby Pradhan RN    Itz was referred to care coordination by CTN following recent hospitalization and surgery for excision of parotid mass (Warthin's tumor).  Pt had surgery on 3/19 at [facility].  Has f/u with surgeon on .   Pt has h/o: CAD, Warthin's tumor, Chiari malformation, DM, sleep apnea, CAD, HLD, PE, HTN, gastroparesis.   Spoke with Itz today.   Pt reports that he is doing exceptionally well.  Now back working full time at [employer].  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   Pt still interested in getting Dexcom G7.  Discussed [facility]. He will contact them and set up pt profile and will let this ACM know once that has been completed.  Provided pt with their number.  Pt is using fingerstick meter.  Reports that he has not had any BS readings in the 200's.  Today BS was 150 d/t having chili for supper yesterday.   Pt is very active.   Reports mild stomach upset In morning but much improved from what it had been previously.   Pt missed pharmacy appt for med review.  He has their contact number to call back and reschedule.  D/t new job has not had opportunity to do so  Denies new concerns.   Plan of care:  Pt to reschedule pharmacy appt for med review  Pt to call [facility] to set up pt profile.  He will let this ACM know once that has been completed so new order can be sent to [facility] for Dexcom G7.   Encouraged pt to continue staying active but to make sure he is not overdoing it  Continue

## 2024-06-19 ENCOUNTER — TELEPHONE (OUTPATIENT)
Dept: PHARMACY | Facility: CLINIC | Age: 69
End: 2024-06-19

## 2024-06-20 ENCOUNTER — CARE COORDINATION (OUTPATIENT)
Dept: CARE COORDINATION | Age: 69
End: 2024-06-20

## 2024-06-20 NOTE — CARE COORDINATION
Attempted to reach patient for continued Care Coordination follow up and education.  Patient was unavailable at the time of my call.

## 2024-06-24 NOTE — TELEPHONE ENCOUNTER
Dr. Hardy, medication review completed with patient:  - Would benefit from low dose ACEi/ARB? patient had Lisinopril stopped in the hospital back in January, likely due to YANCI.  GFR has since recovered to baseline.  Has longstanding history of T2DM and proteinuria.    Patient has appointment with you 7/16/24. Please discuss with patient at appointment or route back to me to discuss with patient.    See note below for complete details.     Thank you,  FERNANDO Parsons, PharmD, BCACP  Ambulatory Care Clinical Pharmacist- [facility] Clinical Pharmacy  Department, toll free: [phone]      
Note done'd by pcp.  Will sign off.    FERNANDO Parsons, PharmD, BCACP  Ambulatory Care Clinical Pharmacist- [facility] Clinical Pharmacy  Department, toll free: [phone]    
as prescribed    CPAP Machine MISC by Does not apply route Please change Bipap to 10/7cm H20.        ASSESSMENT/PLAN:   General:  Education provided on all current medications.  Reviewed why pt is taking each one, and stressed the importance adherence.  At the end, pt verbalized understanding and will take all meds as directed.  He admitted to stopping everything about a year or so ago, and has recently gotten back on track    Lipids  Patient prescribed a statin:yes - Rosuvastatin 40mg  Other lipid lowering therapy?yes - Ezetimibe 10mg  40-74 yo Male; Patient has:Adults 40 to 75 years of age with diabetes mellitus, regardless of estimated 10-year ASCVD risk, moderate-intensity statin therapy is indicated if LDL >70 mg/dL. (ACC 2018).  LDL Target goal: <70mg/dL- Aged 40-74 yo with Diabetes   At goal?  Lab Results   Component Value Date    CHOL 189 01/30/2024    CHOLFAST 250 (H) 07/22/2020    TRIG 181 01/30/2024    TRIGLYCFAST 186 07/22/2020    HDL 54 01/30/2024    LDL 99 01/30/2024     ALT   Date Value Ref Range Status   02/19/2024 12 11 - 66 U/L Final     Comment:     Performed at [facility] [address] [place] [address]     AST   Date Value Ref Range Status   02/19/2024 8 5 - 40 U/L Final     The 10-year ASCVD risk score (Steve MCMAHON, et al., 2019) is: 28.2%      Type 2 uncontrolled  Glycemic Goal: <7.0%. Is not at blood glucose goal  Pt suffering from significant gastroparesis which complicates his DM treatment at times.  Reports that meals are often small and infrequent. When he does eat he often experiences significant nausea and will sometimes vomit undigested food from 1-2 days prior.  This can make insulin dosing/adjustment difficult, and he occasionally isn't sure if he keeps his oral meds down either.   Recommended reaching back out to gastro provider.  Has tried/failed metoclopramide previously. Has not tried erythromycin which could be an option  Current regimen :   Metformin 1000mg

## 2024-06-27 ENCOUNTER — CARE COORDINATION (OUTPATIENT)
Dept: CARE COORDINATION | Age: 69
End: 2024-06-27

## 2024-06-27 NOTE — CARE COORDINATION
Ambulatory Care Coordination Note     6/27/2024 1:19 PM     Patient outreach attempt by this ACM today to perform care management follow up . ACM was unable to reach the patient by telephone today;  no answer.       ACM: Shelby Pradhan RN     Care Summary Note: Itz was referred to care coordination by CTN following recent hospitalization and surgery for excision of parotid mass (Warthin's tumor).  Pt had surgery on 3/19 at [facility].  Pt has h/o: CAD, Warthin's tumor, Chiari malformation, DM, sleep apnea, CAD, HLD, PE, HTN, gastroparesis.   Spoke with Itz today.     PCP/Specialist follow up:   Future Appointments         Provider Specialty Dept Phone    7/16/2024 8:15 AM Kelvin Hardy DO Family Medicine [phone]    8/20/2024 10:45 AM Caitlin Diaz MD Cardiology [phone]            Follow Up:   Plan for next ACM outreach in approximately 1 week to complete:  - CC Protocol assessments  - disease specific assessments  - goal progression  - education .

## 2024-07-03 ENCOUNTER — CARE COORDINATION (OUTPATIENT)
Dept: CARE COORDINATION | Age: 69
End: 2024-07-03

## 2024-07-03 NOTE — CARE COORDINATION
Ambulatory Care Coordination Note     7/3/2024 4:25 PM     Patient Current Location:  Home: [address] [place] [address] [place] [id number]     ACM contacted the patient by telephone. Verified name and  with patient as identifiers.         ACM: Shelby Pradhan RN     Challenges to be reviewed by the provider   Additional needs identified to be addressed with provider No  none               Method of communication with provider: none.    Care Summary Note:    Itz was referred to care coordination by CTN following recent hospitalization and surgery for excision of parotid mass (Warthin's tumor).  Pt had surgery on 3/19 at [facility].  Has f/u with surgeon on .   Pt has h/o: CAD, Warthin's tumor, Chiari malformation, DM, sleep apnea, CAD, HLD, PE, HTN, gastroparesis.   Spoke with Itz today.   Pt reports that he is doing ok.    DM: monitoring BS's.  Reports FBS today was 135.  Appetite reduced. Provided pt with [employer] medical number again to arrange pt profile.  Pt will call this ACM back once that is set up.   Gastroparesis-plans on arranging appt with GI in near future.   Offered patient enrollment in the Remote Patient Monitoring (RPM) program for in-home monitoring: Yes, but did not enroll at this time: already monitoring with home equipment.     Assessments Completed:   Diabetes Assessment    Medic Alert ID: No  Meal Planning: Avoidance of concentrated sweets   How often do you test your blood sugar?:  (Comment: 2-3x daily)   Do you have barriers with adherence to non-pharmacologic self-management interventions? (Nutrition/Exercise/Self-Monitoring): No   Have you ever had to go to the ED for symptoms of low blood sugar?: No       Do you have hyperglycemia symptoms?: No   Do you have hypoglycemia symptoms?: No   Last Blood Sugar Value: 135   Blood Sugar Monitoring Regimen: Before Meals   Blood Sugar Trends: No Change             Medications Reviewed:   Completed during a previous call     Advance Care Planning:   Reviewed and

## 2024-07-10 ENCOUNTER — OFFICE VISIT (OUTPATIENT)
Dept: CARDIOLOGY CLINIC | Age: 69
End: 2024-07-10
Payer: MEDICARE

## 2024-07-10 VITALS
WEIGHT: 201 LBS | BODY MASS INDEX: 29.77 KG/M2 | SYSTOLIC BLOOD PRESSURE: 138 MMHG | HEART RATE: 72 BPM | DIASTOLIC BLOOD PRESSURE: 78 MMHG | HEIGHT: 69 IN

## 2024-07-10 DIAGNOSIS — Z95.820 S/P ANGIOPLASTY WITH STENT: ICD-10-CM

## 2024-07-10 DIAGNOSIS — E78.01 FAMILIAL HYPERCHOLESTEROLEMIA: ICD-10-CM

## 2024-07-10 DIAGNOSIS — I25.10 CAD IN NATIVE ARTERY: Primary | ICD-10-CM

## 2024-07-10 PROCEDURE — 99214 OFFICE O/P EST MOD 30 MIN: CPT | Performed by: PHYSICIAN ASSISTANT

## 2024-07-10 PROCEDURE — 1123F ACP DISCUSS/DSCN MKR DOCD: CPT | Performed by: PHYSICIAN ASSISTANT

## 2024-07-10 NOTE — PROGRESS NOTES
Pt here for 1 yr check up     Pt c/o chest soreness , has been lifting more over last 4 weeks,with work lifting more, more active ,    Pt continues with sob , dizziness,   
Standard  antithrombotic/antispasmodic medications were given.  I used 6-Irish  JR4, 6-Irish JL3.5 diagnostic catheters to complete the coronary  angiogram and left ventriculogram.  Heparin was given.  ACT was  confirmed to be above 250.  I used 6-Irish EBU 3.5 guide catheter to  cannulate the left main coronary artery.  The patient had evidence of  intermediate stenosis of the left anterior descending artery, which I  elected to do FFR measurement for.  The FFR wire was prepared outside  the patient's body per 's recommendations.  I then advanced  the FFR wire to the left main coronary artery.  Equalization of  pressures were performed.  After that, I advanced the FFR wire to the  distal LAD.  At baseline FFR was 0.92.  Adenosine at 140 mcg/kg per  minute was initiated.  At maximal hyperemia and after 3 minutes, the  adenosine was stopped and FFR was measured at 0.83.  FFR wire was  removed and repeat angiogram revealed no complications including no  dissection, distal embolization or perforation.  I then used a 6-Irish  JR4 guide catheter to cannulate the right coronary artery.  Runthrough  wire was used to wire the RCA and cross the lesion in the distal RCA.  I  then proceeded with predilation using a 3 mm x 10 mm PTCA balloon.   Afterwards I proceeded with the stenting, Resolute Ambrose 3.5 x 18 mm  drug-eluting stent was successfully deployed.  This was postdilated  using a 3.5 x 12 mm noncompliant balloon.  Wire was removed.  Final  angiogram revealed 0% residual stenosis.  No complications including no  dissections, distal embolization or perforation.    FINDINGS:  LEFT VENTRICULOGRAPHY:  No regional wall motion abnormality.  Ejection  fraction estimated at 55%.    HEMODYNAMICS:  Left ventricular end-diastolic pressure was 18 mmHg.  No  significant pressure gradient across the aortic valve upon pullback.    CORONARY ANGIOGRAM:  1.  LEFT MAIN CORONARY ARTERY:  The distal segment of left main

## 2024-07-10 NOTE — PATIENT INSTRUCTIONS
If you receive a survey asking about your care experience, please respond. Your answers will help ensure you receive high-quality care at this office. Thank you!    Your Medical Assistants today: Cheikh  Thank you for coming to our office! It was a pleasure to serve you.

## 2024-07-16 ENCOUNTER — LAB (OUTPATIENT)
Dept: LAB | Age: 69
End: 2024-07-16

## 2024-07-16 ENCOUNTER — OFFICE VISIT (OUTPATIENT)
Dept: FAMILY MEDICINE CLINIC | Age: 69
End: 2024-07-16
Payer: COMMERCIAL

## 2024-07-16 VITALS
WEIGHT: 201.7 LBS | RESPIRATION RATE: 12 BRPM | HEART RATE: 64 BPM | BODY MASS INDEX: 29.87 KG/M2 | DIASTOLIC BLOOD PRESSURE: 70 MMHG | SYSTOLIC BLOOD PRESSURE: 122 MMHG | HEIGHT: 69 IN

## 2024-07-16 DIAGNOSIS — E66.9 OBESITY (BMI 30-39.9): ICD-10-CM

## 2024-07-16 DIAGNOSIS — Z95.5 H/O HEART ARTERY STENT: ICD-10-CM

## 2024-07-16 DIAGNOSIS — E78.00 PURE HYPERCHOLESTEROLEMIA: ICD-10-CM

## 2024-07-16 DIAGNOSIS — Z00.00 MEDICARE ANNUAL WELLNESS VISIT, SUBSEQUENT: Primary | ICD-10-CM

## 2024-07-16 DIAGNOSIS — K31.84 GASTROPARESIS: ICD-10-CM

## 2024-07-16 DIAGNOSIS — E78.2 MIXED HYPERLIPIDEMIA: ICD-10-CM

## 2024-07-16 DIAGNOSIS — Z79.4 TYPE 2 DIABETES MELLITUS WITH OTHER SPECIFIED COMPLICATION, WITH LONG-TERM CURRENT USE OF INSULIN (HCC): ICD-10-CM

## 2024-07-16 DIAGNOSIS — I25.10 CORONARY ARTERY DISEASE INVOLVING NATIVE CORONARY ARTERY OF NATIVE HEART WITHOUT ANGINA PECTORIS: ICD-10-CM

## 2024-07-16 DIAGNOSIS — E11.69 TYPE 2 DIABETES MELLITUS WITH OTHER SPECIFIED COMPLICATION, WITH LONG-TERM CURRENT USE OF INSULIN (HCC): ICD-10-CM

## 2024-07-16 DIAGNOSIS — I10 PRIMARY HYPERTENSION: ICD-10-CM

## 2024-07-16 DIAGNOSIS — G47.33 OSA (OBSTRUCTIVE SLEEP APNEA): ICD-10-CM

## 2024-07-16 DIAGNOSIS — D68.59 PROTEIN S DEFICIENCY (HCC): ICD-10-CM

## 2024-07-16 LAB
DEPRECATED MEAN GLUCOSE BLD GHB EST-ACNC: 177 MG/DL (ref 70–126)
HBA1C MFR BLD HPLC: 7.9 % (ref 4.4–6.4)

## 2024-07-16 PROCEDURE — 3074F SYST BP LT 130 MM HG: CPT | Performed by: FAMILY MEDICINE

## 2024-07-16 PROCEDURE — 1123F ACP DISCUSS/DSCN MKR DOCD: CPT | Performed by: FAMILY MEDICINE

## 2024-07-16 PROCEDURE — 3052F HG A1C>EQUAL 8.0%<EQUAL 9.0%: CPT | Performed by: FAMILY MEDICINE

## 2024-07-16 PROCEDURE — 3078F DIAST BP <80 MM HG: CPT | Performed by: FAMILY MEDICINE

## 2024-07-16 PROCEDURE — 99214 OFFICE O/P EST MOD 30 MIN: CPT | Performed by: FAMILY MEDICINE

## 2024-07-16 PROCEDURE — G0439 PPPS, SUBSEQ VISIT: HCPCS | Performed by: FAMILY MEDICINE

## 2024-07-16 ASSESSMENT — PATIENT HEALTH QUESTIONNAIRE - PHQ9
1. LITTLE INTEREST OR PLEASURE IN DOING THINGS: NOT AT ALL
2. FEELING DOWN, DEPRESSED OR HOPELESS: NOT AT ALL
SUM OF ALL RESPONSES TO PHQ QUESTIONS 1-9: 0
SUM OF ALL RESPONSES TO PHQ9 QUESTIONS 1 & 2: 0
SUM OF ALL RESPONSES TO PHQ QUESTIONS 1-9: 0

## 2024-07-16 ASSESSMENT — ENCOUNTER SYMPTOMS
RESPIRATORY NEGATIVE: 1
GASTROINTESTINAL NEGATIVE: 1

## 2024-07-16 ASSESSMENT — LIFESTYLE VARIABLES
HOW MANY STANDARD DRINKS CONTAINING ALCOHOL DO YOU HAVE ON A TYPICAL DAY: PATIENT DOES NOT DRINK
HOW OFTEN DO YOU HAVE A DRINK CONTAINING ALCOHOL: NEVER

## 2024-07-16 NOTE — PATIENT INSTRUCTIONS
You may receive a survey about your visit with us today. The feedback from our patients helps us identify what is working well and where the service to all patients can be enhanced. Thank you!        Learning About Being Active as an Older Adult  Why is being active important as you get older?     Being active is one of the best things you can do for your health. And it's never too late to start. Being active--or getting active, if you aren't already--has definite benefits. It can:  Give you more energy,  Keep your mind sharp.  Improve balance to reduce your risk of falls.  Help you manage chronic illness with fewer medicines.  No matter how old you are, how fit you are, or what health problems you have, there is a form of activity that will work for you. And the more physical activity you can do, the better your overall health will be.  What kinds of activity can help you stay healthy?  Being more active will make your daily activities easier. Physical activity includes planned exercise and things you do in daily life. There are four types of activity:  Aerobic.  Doing aerobic activity makes your heart and lungs strong.  Includes walking, dancing, and gardening.  Aim for at least 2½ hours spread throughout the week.  It improves your energy and can help you sleep better.  Muscle-strengthening.  This type of activity can help maintain muscle and strengthen bones.  Includes climbing stairs, using resistance bands, and lifting or carrying heavy loads.  Aim for at least twice a week.  It can help protect the knees and other joints.  Stretching.  Stretching gives you better range of motion in joints and muscles.  Includes upper arm stretches, calf stretches, and gentle yoga.  Aim for at least twice a week, preferably after your muscles are warmed up from other activities.  It can help you function better in daily life.  Balancing.  This helps you stay coordinated and have good posture.  Includes heel-to-toe walking, barbara

## 2024-07-16 NOTE — PROGRESS NOTES
Reactions    Latex Rash     Prior to Visit Medications    Medication Sig Taking? Authorizing Provider   rosuvastatin (CRESTOR) 40 MG tablet Take 1 tablet by mouth daily Yes Caitlin Diaz MD   Vitamin D3 125 MCG (5000 UT) TABS tablet Take 1 tablet by mouth daily Yes ProviderStuart MD   metFORMIN (GLUCOPHAGE) 1000 MG tablet Take 1 tablet by mouth 2 times daily (with meals) Yes Kelvin Hardy, DO   isosorbide mononitrate (IMDUR) 30 MG extended release tablet Take 1 tablet by mouth daily Yes Colin Rush,    nitroGLYCERIN (NITROSTAT) 0.4 MG SL tablet Place 1 tablet under the tongue every 5 minutes as needed for Chest pain up to max of 3 total doses. If no relief after 1 dose, call 911. Yes Gissel Christine PA-C   ezetimibe (ZETIA) 10 MG tablet Take 1 tablet by mouth daily Yes Caitlin Diaz MD   metoprolol tartrate (LOPRESSOR) 25 MG tablet TAKE ONE-HALF TABLET BY MOUTH TWICE DAILY Yes Caitlin Diaz MD   clopidogrel (PLAVIX) 75 MG tablet TAKE 1 TABLET BY MOUTH ONE TIME A DAY Yes Caitlin Diaz MD   rivaroxaban (XARELTO) 15 MG TABS tablet Take 1 tablet by mouth daily (with breakfast) Yes Caitlin Diaz MD   insulin lispro, 1 Unit Dial, (HUMALOG KWIKPEN) 100 UNIT/ML SOPN Inject 10 Units into the skin in the morning and 10 Units at noon and 10 Units in the evening. Inject before meals. Yes Messi Coronel DO   insulin glargine (LANTUS SOLOSTAR) 100 UNIT/ML injection pen Inject 20 Units into the skin in the morning and 20 Units before bedtime. Yes Messi Coronel DO   pantoprazole (PROTONIX) 40 MG tablet Take 1 tablet by mouth every morning (before breakfast) Yes Rufina Palumbo, APRN - CNP   CPAP Machine MISC by Does not apply route Please change Bipap to 10/7cm H20. Yes Kacie Mosley PA-C CareTeam (Including outside providers/suppliers regularly involved in providing care):   Patient Care Team:  Kelvni Hardy DO as PCP - General (Family Medicine)  Kelvin Hardy DO as PCP

## 2024-07-17 RX ORDER — RIVAROXABAN 15 MG/1
15 TABLET, FILM COATED ORAL
Qty: 90 TABLET | Refills: 3 | Status: SHIPPED | OUTPATIENT
Start: 2024-07-17

## 2024-07-18 ENCOUNTER — CARE COORDINATION (OUTPATIENT)
Dept: CARE COORDINATION | Age: 69
End: 2024-07-18

## 2024-07-29 NOTE — PATIENT INSTRUCTIONS
You may receive a survey regarding the care you received during your visit. Your input is valuable to us. We encourage you to complete and return your survey. We hope you will choose us in the future for your healthcare needs. Continue present medications. Continue tracking home blood sugar. [Feeling Tired] : feeling tired [Recent Weight Loss (___ Lbs)] : recent [unfilled] ~Ulb weight loss [Dry Eyes] : no dryness of the eyes [As Noted in HPI] : as noted in HPI [Arthralgias] : arthralgias [Joint Pain] : joint pain [Joint Swelling] : joint swelling [Joint Stiffness] : joint stiffness [Negative] : Heme/Lymph

## 2024-08-06 ENCOUNTER — CARE COORDINATION (OUTPATIENT)
Dept: CARE COORDINATION | Age: 69
End: 2024-08-06

## 2024-08-06 NOTE — CARE COORDINATION
previous call     Advance Care Planning:   Not reviewed during this call     Care Planning:   Education Documentation  No documentation found.  Education Comments  No comments found.     ,    Goals Addressed    None          PCP/Specialist follow up:   Future Appointments         Provider Specialty Dept Phone    1/15/2025 7:45 AM Kelvin Hardy DO Family Medicine [phone]    7/16/2025 1:30 PM Caitlin Diaz MD Cardiology [phone]            Follow Up:   Plan for next ACM outreach in approximately 1 week to complete:  - CC Protocol assessments  - disease specific assessments  - goal progression  - education .   Patient  is agreeable to this plan.

## 2024-08-07 ENCOUNTER — TELEPHONE (OUTPATIENT)
Dept: PHARMACY | Facility: CLINIC | Age: 69
End: 2024-08-07

## 2024-08-07 NOTE — TELEPHONE ENCOUNTER
[facility]– [place] 2[place] CLINICAL PHARMACY: ADHERENCE REVIEW  Identified care gap per [place]: fills at [facility]: Diabetes adherence    Patient also appears to be prescribed: Statin    ASSESSMENT  DIABETES ADHERENCE    Insurance Records claims through 24 (Prior Year PDC = not reported; YTD PDC = FIRST FILL; Potential Fail Date: 8/15/24):   Metformin 1000mg Next refill due: 24    Prescribed si tablet/capsule twice daily    Per Insurer Portal: last filled on 3/21/24 for 90 day supply. 1RF    Per [facility]: will get  90 day supply ready to  since past due.  Tomorrow after 4pm    Rx# [id number]    Lab Results   Component Value Date    LABA1C 7.9 (H) 2024    LABA1C 8.1 (H) 2024    LABA1C 8.7 2022     NOTE: A1c <9%    STATIN ADHERENCE    Statin not in database    Per Insurer Portal: rosuvastatin 40mg last filled in May but RTS   Reconcile: 23    Lab Results   Component Value Date    CHOL 189 2024    TRIG 181 2024    HDL 54 2024     Lab Results   Component Value Date    LDL 99 2024      ALT   Date Value Ref Range Status   2024 12 11 - 66 U/L Final     Comment:     Performed at [facility] [address] [place] [address]     AST   Date Value Ref Range Status   2024 8 5 - 40 U/L Final     The 10-year ASCVD risk score (Steve MCMAHON, et al., 2019) is: 28.2%    Values used to calculate the score:      Age: 69 years      Sex: Male      Is Non- : No      Diabetic: Yes      Tobacco smoker: No      Systolic Blood Pressure: 122 mmHg      Is BP treated: Yes      HDL Cholesterol: 54 mg/dL      Total Cholesterol: 189 mg/dL       The following are interventions that have been identified:   Patient OVERDUE refilling metformin 1000mg & rosuvastatin 40mg and active on home medication list.     Attempting to reach patient to review.  Left message asking for return call. MyChart message sent to patient.      Last Visit: 24  Next

## 2024-08-13 ENCOUNTER — CARE COORDINATION (OUTPATIENT)
Dept: CARE COORDINATION | Age: 69
End: 2024-08-13

## 2024-08-13 NOTE — CARE COORDINATION
Received communication from [employer] liaison, Gissel Daugherty.  Informed that CGM should be covered through any in network retail pharmacy or [facility] mail order pharmacy.  Spoke with pt.  He is ok with using [facility] on [address] as that is who he uses for his medications.      Pt requesting script for Dexcom G7 CGM kit along with sensors to [facility] [address].  Please advise.

## 2024-08-13 NOTE — CARE COORDINATION
Office has not received detailed written form for Dexcom yet from [facility].   Call placed to [employer]. Informed that pt's insurance now covers Dexcom CGM through pt's pharmacy benefits.  [facility] pharmacy number is [phone]

## 2024-08-14 RX ORDER — ACYCLOVIR 400 MG/1
TABLET ORAL
Qty: 1 EACH | Refills: 0 | Status: SHIPPED | OUTPATIENT
Start: 2024-08-14 | End: 2024-08-15

## 2024-08-14 RX ORDER — ACYCLOVIR 400 MG/1
TABLET ORAL
Qty: 9 EACH | Refills: 3 | Status: SHIPPED | OUTPATIENT
Start: 2024-08-14 | End: 2024-08-14 | Stop reason: SDUPTHER

## 2024-08-14 RX ORDER — ACYCLOVIR 400 MG/1
TABLET ORAL
Qty: 9 EACH | Refills: 3 | Status: SHIPPED | OUTPATIENT
Start: 2024-08-14 | End: 2024-08-15

## 2024-08-14 RX ORDER — ACYCLOVIR 400 MG/1
TABLET ORAL
Qty: 1 EACH | Refills: 0 | Status: SHIPPED | OUTPATIENT
Start: 2024-08-14 | End: 2024-08-14 | Stop reason: SDUPTHER

## 2024-08-14 NOTE — TELEPHONE ENCOUNTER
Spoke with Kacie at [facility] pharmacy.  Informed that when they run Dexcom script through to insurance it's not on formulary.  Preferred CGM is Freestyle Emir. Will discuss with pt.

## 2024-08-15 DIAGNOSIS — E11.69 TYPE 2 DIABETES MELLITUS WITH OTHER SPECIFIED COMPLICATION, WITH LONG-TERM CURRENT USE OF INSULIN (HCC): Primary | ICD-10-CM

## 2024-08-15 DIAGNOSIS — Z79.4 TYPE 2 DIABETES MELLITUS WITH OTHER SPECIFIED COMPLICATION, WITH LONG-TERM CURRENT USE OF INSULIN (HCC): Primary | ICD-10-CM

## 2024-08-15 RX ORDER — KETOROLAC TROMETHAMINE 30 MG/ML
INJECTION, SOLUTION INTRAMUSCULAR; INTRAVENOUS
Qty: 1 EACH | Refills: 0 | Status: SHIPPED | OUTPATIENT
Start: 2024-08-15

## 2024-08-15 RX ORDER — BLOOD-GLUCOSE SENSOR
EACH MISCELLANEOUS
Qty: 6 EACH | Refills: 3 | Status: SHIPPED | OUTPATIENT
Start: 2024-08-15

## 2024-08-15 NOTE — TELEPHONE ENCOUNTER
[facility] pharmacy has informed me that preferred brand of CGM through pt's insurance plan is Emir.  Please send script for Freestyle Emir 3 reader and script for Zh[employer]ai [employer] 3 sensors to [facility] on [address]. Please advise.

## 2024-08-15 NOTE — TELEPHONE ENCOUNTER
Spoke with pt. Informed him that preferred CGM with his insurance is Freestyle Emir and not Dexcom.  Pt does not have insulin pump or reason that would require him to have Dexcom over Emir.  Pt agreeable to go with Freestyle Emir 3 if insurance will not cover Dexcom.

## 2024-08-16 ENCOUNTER — CARE COORDINATION (OUTPATIENT)
Dept: CARE COORDINATION | Age: 69
End: 2024-08-16

## 2024-08-16 NOTE — CARE COORDINATION
Call placed to [facility] pharmacy. Spoke with Lakesha regarding Freestyle Emir 3 script that PCP sent over.    She informs me that it is requiring a PA which was sent to PCP office 8/15.  Will f/u with PCP office next wk.

## 2024-08-20 ENCOUNTER — APPOINTMENT (OUTPATIENT)
Dept: GENERAL RADIOLOGY | Age: 69
End: 2024-08-20
Payer: COMMERCIAL

## 2024-08-20 ENCOUNTER — TELEPHONE (OUTPATIENT)
Dept: FAMILY MEDICINE CLINIC | Age: 69
End: 2024-08-20

## 2024-08-20 ENCOUNTER — HOSPITAL ENCOUNTER (EMERGENCY)
Age: 69
Discharge: HOME | End: 2024-08-20
Payer: COMMERCIAL

## 2024-08-20 ENCOUNTER — APPOINTMENT (OUTPATIENT)
Dept: INTERVENTIONAL RADIOLOGY/VASCULAR | Age: 69
End: 2024-08-20
Payer: COMMERCIAL

## 2024-08-20 VITALS
DIASTOLIC BLOOD PRESSURE: 72 MMHG | HEIGHT: 69 IN | RESPIRATION RATE: 17 BRPM | TEMPERATURE: 97.7 F | SYSTOLIC BLOOD PRESSURE: 141 MMHG | HEART RATE: 62 BPM | BODY MASS INDEX: 28.88 KG/M2 | OXYGEN SATURATION: 98 % | WEIGHT: 195 LBS

## 2024-08-20 DIAGNOSIS — S80.11XA CONTUSION OF RIGHT LOWER LEG, INITIAL ENCOUNTER: ICD-10-CM

## 2024-08-20 DIAGNOSIS — M79.604 RIGHT LEG PAIN: Primary | ICD-10-CM

## 2024-08-20 LAB — ECHO BSA: 2.07 M2

## 2024-08-20 PROCEDURE — 99284 EMERGENCY DEPT VISIT MOD MDM: CPT

## 2024-08-20 PROCEDURE — 73590 X-RAY EXAM OF LOWER LEG: CPT

## 2024-08-20 PROCEDURE — 93971 EXTREMITY STUDY: CPT

## 2024-08-20 ASSESSMENT — PAIN DESCRIPTION - ORIENTATION: ORIENTATION: RIGHT

## 2024-08-20 ASSESSMENT — PAIN DESCRIPTION - LOCATION: LOCATION: LEG

## 2024-08-20 ASSESSMENT — PAIN - FUNCTIONAL ASSESSMENT: PAIN_FUNCTIONAL_ASSESSMENT: 0-10

## 2024-08-20 ASSESSMENT — PAIN SCALES - GENERAL: PAINLEVEL_OUTOF10: 7

## 2024-08-20 NOTE — ED NOTES
Presents to ED with complaints of right leg pain. Pt reports he turned around and accidentally kicked a trailer last night and now has a knot on his leg. Pt reports he has a history of blood clots, so he wants to get checked out.

## 2024-08-20 NOTE — ED PROVIDER NOTES
pulses.      Heart sounds: Normal heart sounds.   Pulmonary:      Effort: Pulmonary effort is normal.      Breath sounds: Normal breath sounds.   Musculoskeletal:      Comments: Right medial mid-shin hematoma with brown hyperpigmentation of this area and distal shin. TTP over the entire right lower leg including calf, no bony tenderness or step offs. Pain with knee extension, dorsiflexion, plantar flexion. No point tenderness or bony abnormalities of knee, foot, ankle. RLE sensation intact, 2+ DP.    Neurological:      General: No focal deficit present.      Mental Status: He is alert and oriented to person, place, and time.   Psychiatric:         Mood and Affect: Mood normal.         FORMAL DIAGNOSTIC RESULTS     RADIOLOGY: Interpretation per the Radiologist below, if available at the time of this note (none if blank):    Vascular duplex lower extremity venous right   Final Result   1. There is no sonographic evidence of deep venous thrombosis within the right   lower extremity.            **This report has been created using voice recognition software.  It may contain   minor errors which are inherent in voice recognition technology.**      Electronically signed by Dr. Cheryl Ordonez      XR TIBIA FIBULA RIGHT (2 VIEWS)   Final Result    No fracture.               **This report has been created using voice recognition software. It may contain   minor errors which are inherent in voice recognition technology.**      Electronically signed by Dr. Josemanuel Velez          LABS: (none if blank)  Labs Reviewed - No data to display    (Any cultures that may have been sent were not resulted at the time of this patient visit)    MEDICAL DECISION MAKING / ED COURSE:     1) Number and Complexity of Problems            Problem List This Visit:         Chief Complaint   Patient presents with    Leg Pain     R         Differential Diagnosis includes (but not limited to):  Hematoma, Tibia fx, DVT        Diagnoses Considered but I

## 2024-08-20 NOTE — CARE COORDINATION
Prior authorization done online and approved--see TE from today.  Pharmacy [facility] Raya notified and will get RXs ready for patient

## 2024-08-20 NOTE — TELEPHONE ENCOUNTER
R[facility] notified of above and understanding voiced.  She will get Rxs ready for patient.

## 2024-08-20 NOTE — DISCHARGE INSTRUCTIONS
Ice and elevate the extremity.   Wear ace wrap for comfort and swelling.  You can soak in epsom salts 1-2 times a day to reduce the bruising and swelling.  If symptoms do not improve in 2-3 days, please return to the ER, see your pcp or to orthopedics.  Make sure you are doing gentle range of motion exercises.      PLEASE RETURN TO THE EMERGENCY DEPARTMENT IMMEDIATELY for worsening symptoms, or if you develop any concerning symptoms such as: high fever not relieved by acetaminophen (Tylenol) and/or ibuprofen (Motrin), chills, shortness of breath, chest pain, persistent nausea and/or vomiting, numbness, weakness or tingling in the arms or legs or change in color of the extremities, changes in mental status, persistent headache, blurry vision.    Return within 8 - 12 hours if you have any of the following: worsening of pain in your abdomen, no food sounds good to you, you continue to vomit, pain goes to your back, have pain in the abdomen when going over a bump in the car or when you jump up and down, develop vaginal bleeding or discharge, inability to urinate, unable to follow up with your physician, or other any other care or concern.

## 2024-08-20 NOTE — TELEPHONE ENCOUNTER
PA request received from pharmacy for Welltheonyle Emir 3 [place] Device #1 for 90 days.  PA submitted online at [email or web address] and pending review.    Information regarding your request  Available without authorization. The member is able to fill the requested drug at the pharmacy. If coverage is still needed or requesting prior to the expiration of a current authorization, a request can be made by sending a fax or calling the number on the back of the member's ID card.

## 2024-08-20 NOTE — TELEPHONE ENCOUNTER
PA request received from pharmacy for Freestyle Emir 3 sensor #6 for 84 days.  PA submitted online at [email or web address] and approved.    Approved today by [employer] 2017  PA Case: [id number], Status: Approved, Coverage Starts on: 5/19/2024 12:00:00 AM, Coverage Ends on: 8/20/2025 12:00:00 AM.  Authorization Expiration Date: 8/19/2025

## 2024-08-21 ENCOUNTER — CARE COORDINATION (OUTPATIENT)
Dept: CARE COORDINATION | Age: 69
End: 2024-08-21

## 2024-08-21 ENCOUNTER — TELEPHONE (OUTPATIENT)
Dept: FAMILY MEDICINE CLINIC | Age: 69
End: 2024-08-21

## 2024-08-21 NOTE — CARE COORDINATION
parotid mass (Warthin's tumor).  Pt had surgery on 3/19 at [facility].  Pt has h/o: CAD, Warthin's tumor, Chiari malformation, DM, sleep apnea, CAD, HLD, PE, HTN, gastroparesis.   Informed pt that Emir 3 has been approved and that he can obtain at pharmacy.  Pt reports that he has been using fingerstick meter.  Majority of BS's have been below 110.    Pt remains very active  Gastroparesis-pt reports that overall s/s are doing well. Pt has not scheduled appt with GI yet for f/u but aware that this is something he needs to do.   Discussed 8/7 encounter from pharmacy regarding metformin and rosuvastatin. Pt was off medications for a period of time which is why fill dates were off on some of his medications.  Pt is now back on all of his meds and is taking as prescribed/  Offered patient enrollment in the Remote Patient Monitoring (RPM) program for in-home monitoring: Yes, but did not enroll at this time: already monitoring with home equipment.     Assessments Completed:   Diabetes Assessment    Medic Alert ID: No  Meal Planning: Avoidance of concentrated sweets   How often do you test your blood sugar?:  (Comment: 2-3x daily)   Do you have barriers with adherence to non-pharmacologic self-management interventions? (Nutrition/Exercise/Self-Monitoring): No   Have you ever had to go to the ED for symptoms of low blood sugar?: No       Do you have hyperglycemia symptoms?: No   Do you have hypoglycemia symptoms?: No   Blood Sugar Monitoring Regimen: Before Meals   Blood Sugar Trends: Steady Decrease             Medications Reviewed:   Completed during a previous call     Advance Care Planning:   Not reviewed during this call     Care Planning:   Education Documentation  Educate reporting changes in condition, taught by Shelby Pradhan RN at 8/21/2024 10:27 AM.  Learner: Patient  Readiness: Acceptance  Method: Explanation  Response: Verbalizes Understanding, Needs Reinforcement    Educate Patient on When to Call for Symptoms,

## 2024-08-27 ENCOUNTER — OFFICE VISIT (OUTPATIENT)
Dept: FAMILY MEDICINE CLINIC | Age: 69
End: 2024-08-27
Payer: COMMERCIAL

## 2024-08-27 VITALS
BODY MASS INDEX: 29.84 KG/M2 | DIASTOLIC BLOOD PRESSURE: 72 MMHG | WEIGHT: 202.1 LBS | SYSTOLIC BLOOD PRESSURE: 136 MMHG | HEART RATE: 60 BPM | RESPIRATION RATE: 16 BRPM

## 2024-08-27 DIAGNOSIS — S80.11XA HEMATOMA OF RIGHT LOWER LEG: Primary | ICD-10-CM

## 2024-08-27 PROCEDURE — G2211 COMPLEX E/M VISIT ADD ON: HCPCS | Performed by: FAMILY MEDICINE

## 2024-08-27 PROCEDURE — 99213 OFFICE O/P EST LOW 20 MIN: CPT | Performed by: FAMILY MEDICINE

## 2024-08-27 PROCEDURE — 1123F ACP DISCUSS/DSCN MKR DOCD: CPT | Performed by: FAMILY MEDICINE

## 2024-08-27 ASSESSMENT — ENCOUNTER SYMPTOMS
RESPIRATORY NEGATIVE: 1
GASTROINTESTINAL NEGATIVE: 1

## 2024-08-27 NOTE — PROGRESS NOTES
Chris Estevez (:  1955) is a 69 y.o. male,Established patient, here for evaluation of the following chief complaint(s):  ED Follow-up          Subjective   SUBJECTIVE/OBJECTIVE:  HPI  Chief Complaint   Patient presents with    ED Follow-up     ER follow up.      CHIEF COMPLAINT             Chief Complaint   Patient presents with    Leg Pain       R      HISTORY OF PRESENT ILLNESS   Chris Estevez is a pleasant 69 y.o. male who presents to the emergency department from from home, as a walk in to the ED lobby for evaluation of right lower leg pain. Last night around 1900 pt accidentally kicked a trailer hitch. Pain is 7/10 constant pressure and located at the right medial mid-shin with radiation into the right knee and foot. Pain worsens with knee extension, plantar flexion, dorsiflexion. He has difficulty/pain with walking and uses assistive devices to ambulate at home. Ice and Tylenol 1300mg provide minimal relief. There is localized swelling over the right medial mid-shin with a visible mass which is hyperpigmented. Denies numbness or tingling in RLE, N/V, F/C, dyspnea, chest pain, abdominal pain. 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 Takes daily Xarelto, rosuvastatin, ezetimibe, Protonix, clopidogrel, metoprolol daily.     Vascular duplex lower extremity venous right   Final Result   1. There is no sonographic evidence of deep venous thrombosis within the right   lower extremity.               **This report has been created using voice recognition software.  It may contain   minor errors which are inherent in voice recognition technology.**       Electronically signed by Dr. Cheryl Ordonez       XR TIBIA FIBULA RIGHT (2 VIEWS)   Final Result    No fracture.         Patient Active Problem List   Diagnosis    Chest pain at rest    Uncontrolled type 2 diabetes mellitus, without long-term current use of insulin    GERD with stricture    Back pain at L4-L5 level                Assessment & Plan   ASSESSMENT/PLAN:  1. Hematoma of right lower leg    -  ER reports reviewed  -  Should resolve with time    Return if symptoms worsen or fail to improve.             An electronic signature was used to authenticate this note.    --Kelvin Hardy, DO

## 2024-09-09 ENCOUNTER — CARE COORDINATION (OUTPATIENT)
Dept: CARE COORDINATION | Age: 69
End: 2024-09-09

## 2024-09-13 RX ORDER — METOPROLOL TARTRATE 25 MG/1
25 TABLET, FILM COATED ORAL 2 TIMES DAILY
Qty: 180 TABLET | Refills: 2 | Status: SHIPPED | OUTPATIENT
Start: 2024-09-13

## 2024-09-13 RX ORDER — ISOSORBIDE MONONITRATE 30 MG/1
60 TABLET, EXTENDED RELEASE ORAL DAILY
Qty: 180 TABLET | Refills: 2 | Status: SHIPPED | OUTPATIENT
Start: 2024-09-13 | End: 2025-06-10

## 2024-09-16 ENCOUNTER — CARE COORDINATION (OUTPATIENT)
Dept: CARE COORDINATION | Age: 69
End: 2024-09-16

## 2024-09-24 ENCOUNTER — CARE COORDINATION (OUTPATIENT)
Dept: CARE COORDINATION | Age: 69
End: 2024-09-24

## 2024-11-12 ENCOUNTER — TELEPHONE (OUTPATIENT)
Dept: PHARMACY | Facility: CLINIC | Age: 69
End: 2024-11-12

## 2024-11-12 ENCOUNTER — TELEPHONE (OUTPATIENT)
Dept: FAMILY MEDICINE CLINIC | Age: 69
End: 2024-11-12

## 2024-11-12 NOTE — TELEPHONE ENCOUNTER
Received a call from patient  and there is a recommendation that patient is on a statin medication. Patient is on rosuvastatin however they don't have a paid claim recently.   She would recommend that we discuss the medication with the patient.   Patient is scheduled in January for an appointment.

## 2024-11-12 NOTE — TELEPHONE ENCOUNTER
[facility] CLINICAL PHARMACY: STATIN THERAPY REVIEW  Identified statin use in persons with cardiovascular disease care gap per [facility]. Records dated: 10/21/24.     ASSESSMENT of Statin in Persons with Cardiovascular Disease Care Gap    Chris Estevez  has been identified as having a diagnosis for clinical ASCVD or event (e.g., inpatient hospitalization for MI, CABG, PCI or other revascularization procedures) in the measurement year and not currently filling a moderate or high intensity statin.   Patients included in this care gap are males age 21-75 and females age 40-75.     Currently Prescribed a statin: yes - rosuvastatin 40 mg tablets.      Note 6/19/24 pharmacy encounter, \"He admitted to stopping everything about a year or so ago, and has recently gotten back on track\"    Per Reconcile Dispense History:     Dispensed Days Supply Quantity Provider Pharmacy    ROSUVASTATIN 40MG TAB 09/12/2023 90 90 each Caitlin Us MD [id number] ...   ROSUVASTATIN 40MG TAB 06/07/2023 90 90 each Caitlin Us MD [facility] ...   ROSUVASTATIN 40MG TAB 08/12/2022 90 90 each [facility] ...   ROSUVASTATIN 40MG TAB 05/13/2022 90 90 each [facility] ...   ROSUVASTATIN 40MG TAB 03/01/2022 30 30 tablet PasqualepRandee booth APRN [facility]...   ROSUVASTATIN CALCIUM 40MG TAB 10/22/2021 30 30 tablet HEMPRANDEE BOOTH [facility]'...     Allergies   Allergen Reactions    Latex Rash       Lab Results   Component Value Date/Time    CHOL 189 01/30/2024 12:52 PM    TRIG 181 01/30/2024 12:52 PM    HDL 54 01/30/2024 12:52 PM    CHOLFAST 250 07/22/2020 09:06 AM    TRIGLYCFAST 186 07/22/2020 09:06 AM    LDL 99 01/30/2024 12:52 PM     ALT   Date Value Ref Range Status   02/19/2024 12 11 - 66 U/L Final     Comment:     Performed at [facility] [address] [place] [address]     AST   Date Value Ref Range Status   02/19/2024 8 5 - 40

## 2024-11-19 ENCOUNTER — TELEPHONE (OUTPATIENT)
Dept: CARDIOLOGY CLINIC | Age: 69
End: 2024-11-19

## 2024-11-19 NOTE — TELEPHONE ENCOUNTER
Pre op Risk Assessment    Procedure EGD and Colonoscopy  [facility]  Date of surgery/procedure TBD    Last OV 7-10-24 w/ Noah Bradford  Last Stress 10-19-21  Last Echo 1-4-24  Last Cath 1-4-24  Last Stent 10-21-21  Is patient on blood thinners Plavix and Xarelto  Hold Meds/how many days Plavix 7 days, Xarelto 2 days.    Phone: [phone]  Fax: [phone]

## 2024-11-21 ENCOUNTER — HOSPITAL ENCOUNTER (OUTPATIENT)
Dept: PHYSICAL THERAPY | Age: 69
Setting detail: THERAPIES SERIES
Discharge: HOME | End: 2024-11-21
Payer: MEDICARE

## 2024-11-21 PROCEDURE — 97162 PT EVAL MOD COMPLEX 30 MIN: CPT

## 2024-11-21 NOTE — PROGRESS NOTES
moving\" but no real change in pain.     MRI from 1/23  At T12-L1: No disc bulge or herniation. No central canal stenosis or neural foraminal narrowing. At L1-L2: Broad-based diffuse disc bulge facet hypertrophic change of mild central canal stenosis. Mild inferior left greater than right neural foraminal narrowing.   At L2-L3: Broad-based diffuse bulge and facet hypertrophic changes moderate to severe central canal stenosis. Moderate severe left greater than right neural foraminal narrowing.   At L3-L4: Broad-based diffuse disc bulge and facet hypertrophic change with moderate central canal stenosis and mild to moderate left greater than right neural foraminal narrowing.   At L4-L5: Broad-based diffuse disc bulge and facet hypertrophic change of mild central canal stenosis and mild bilateral inferior neural foraminal narrowing. At L5-S1: Mild central disc protrusion and broad-based diffuse disc bulge with central canal stenosis or neural foraminal narrowing. Impression  Postoperative changes and multilevel degenerative disc disease as above with  central canal stenosis or neural foraminal narrowing most pronounced at L2-L3.       SUBJECTIVE: See HPI     Social/Functional History and Current Status:  Patient lives with significant other in a single story home with stairs and a handrail to enter.    Task Previous Current   ADL’s  Independent Modified Independent   IADL's Independent Modified Independent   Ambulation Independent Modified Independent   Transfers Independent Modified Independent   Recreation Independent Modified Independent   Community Integration Independent Independent   Driving Active  Active     at [employer].    Manager at [employer]      Objective:    OBSERVATION   Pain 9/10   Palpation Tenderness upon palpation to lumbar paraspinals, R piriformis, medial hamstring   Transfers Mod I with heavy UE use   Ambulation Decreased R stride length, absent heel strike, push

## 2024-11-22 ENCOUNTER — HOSPITAL ENCOUNTER (INPATIENT)
Age: 69
LOS: 1 days | Discharge: HOME | DRG: 321 | End: 2024-11-24
Attending: EMERGENCY MEDICINE | Admitting: STUDENT IN AN ORGANIZED HEALTH CARE EDUCATION/TRAINING PROGRAM
Payer: MEDICARE

## 2024-11-22 ENCOUNTER — APPOINTMENT (OUTPATIENT)
Age: 69
DRG: 321 | End: 2024-11-22
Payer: MEDICARE

## 2024-11-22 ENCOUNTER — APPOINTMENT (OUTPATIENT)
Dept: GENERAL RADIOLOGY | Age: 69
DRG: 321 | End: 2024-11-22
Payer: MEDICARE

## 2024-11-22 DIAGNOSIS — Z98.890 S/P ENDOSCOPY: ICD-10-CM

## 2024-11-22 DIAGNOSIS — Z87.19 S/P DILATATION OF ESOPHAGEAL STRICTURE: ICD-10-CM

## 2024-11-22 DIAGNOSIS — Z98.890 S/P DILATATION OF ESOPHAGEAL STRICTURE: ICD-10-CM

## 2024-11-22 DIAGNOSIS — I24.9 ACUTE CORONARY SYNDROME (HCC): ICD-10-CM

## 2024-11-22 DIAGNOSIS — I21.4 NSTEMI (NON-ST ELEVATED MYOCARDIAL INFARCTION) (HCC): ICD-10-CM

## 2024-11-22 DIAGNOSIS — R11.2 NON-INTRACTABLE VOMITING WITH NAUSEA: ICD-10-CM

## 2024-11-22 DIAGNOSIS — R07.9 CHEST PAIN, UNSPECIFIED TYPE: Primary | ICD-10-CM

## 2024-11-22 DIAGNOSIS — R10.13 EPIGASTRIC PAIN: ICD-10-CM

## 2024-11-22 DIAGNOSIS — K29.90 GASTRITIS AND DUODENITIS: ICD-10-CM

## 2024-11-22 DIAGNOSIS — K21.9 GASTROESOPHAGEAL REFLUX DISEASE WITHOUT ESOPHAGITIS: ICD-10-CM

## 2024-11-22 DIAGNOSIS — R79.89 ELEVATED TROPONIN: ICD-10-CM

## 2024-11-22 PROBLEM — E78.5 HYPERLIPIDEMIA: Status: ACTIVE | Noted: 2018-12-12

## 2024-11-22 PROBLEM — I25.110 CORONARY ARTERY DISEASE INVOLVING NATIVE CORONARY ARTERY OF NATIVE HEART WITH UNSTABLE ANGINA PECTORIS (HCC): Status: ACTIVE | Noted: 2021-10-21

## 2024-11-22 PROBLEM — Z86.711 HISTORY OF PULMONARY EMBOLISM: Status: ACTIVE | Noted: 2024-11-22

## 2024-11-22 PROBLEM — E83.42 HYPOMAGNESEMIA: Status: ACTIVE | Noted: 2024-11-22

## 2024-11-22 PROBLEM — D64.9 NORMOCYTIC ANEMIA: Status: ACTIVE | Noted: 2024-11-22

## 2024-11-22 PROBLEM — F41.8 DEPRESSION WITH ANXIETY: Status: ACTIVE | Noted: 2024-11-22

## 2024-11-22 PROBLEM — Z86.69 HISTORY OF MIGRAINE: Status: ACTIVE | Noted: 2024-11-22

## 2024-11-22 PROBLEM — I10 PRIMARY HYPERTENSION: Status: ACTIVE | Noted: 2024-11-22

## 2024-11-22 LAB
ALBUMIN SERPL BCG-MCNC: 3.6 G/DL (ref 3.5–5.1)
ALP SERPL-CCNC: 122 U/L (ref 38–126)
ALT SERPL W/O P-5'-P-CCNC: 25 U/L (ref 11–66)
ANION GAP SERPL CALC-SCNC: 11 MEQ/L (ref 8–16)
APTT PPP: 30.7 SECONDS (ref 22–38)
APTT PPP: 51.5 SECONDS (ref 22–38)
AST SERPL-CCNC: 35 U/L (ref 5–40)
BASOPHILS ABSOLUTE: 0 THOU/MM3 (ref 0–0.1)
BASOPHILS NFR BLD AUTO: 0.6 %
BILIRUB SERPL-MCNC: 0.3 MG/DL (ref 0.3–1.2)
BUN SERPL-MCNC: 17 MG/DL (ref 7–22)
CALCIUM SERPL-MCNC: 9.2 MG/DL (ref 8.5–10.5)
CHLORIDE SERPL-SCNC: 103 MEQ/L (ref 98–111)
CO2 SERPL-SCNC: 26 MEQ/L (ref 23–33)
CREAT SERPL-MCNC: 1.1 MG/DL (ref 0.4–1.2)
D DIMER PPP IA.FEU-MCNC: 420 NG/ML FEU (ref 0–500)
DEPRECATED RDW RBC AUTO: 43.1 FL (ref 35–45)
ECHO AV CUSP MM: 2.1 CM
ECHO AV PEAK GRADIENT: 7 MMHG
ECHO AV PEAK VELOCITY: 1.3 M/S
ECHO AV VELOCITY RATIO: 0.54
ECHO BSA: 2.07 M2
ECHO LA AREA 2C: 19.4 CM2
ECHO LA AREA 4C: 19.4 CM2
ECHO LA DIAMETER INDEX: 2.16 CM/M2
ECHO LA DIAMETER: 4.4 CM
ECHO LA MAJOR AXIS: 5.7 CM
ECHO LA MINOR AXIS: 5.9 CM
ECHO LA VOL BP: 52 ML (ref 18–58)
ECHO LA VOL MOD A2C: 51 ML (ref 18–58)
ECHO LA VOL MOD A4C: 51 ML (ref 18–58)
ECHO LA VOL/BSA BIPLANE: 25 ML/M2 (ref 16–34)
ECHO LA VOLUME INDEX MOD A2C: 25 ML/M2 (ref 16–34)
ECHO LA VOLUME INDEX MOD A4C: 25 ML/M2 (ref 16–34)
ECHO LV E' LATERAL VELOCITY: 8.5 CM/S
ECHO LV E' SEPTAL VELOCITY: 5 CM/S
ECHO LV EF PHYSICIAN: 55 %
ECHO LV FRACTIONAL SHORTENING: 28 % (ref 28–44)
ECHO LV INTERNAL DIMENSION DIASTOLE INDEX: 2.25 CM/M2
ECHO LV INTERNAL DIMENSION DIASTOLIC: 4.6 CM (ref 4.2–5.9)
ECHO LV INTERNAL DIMENSION SYSTOLIC INDEX: 1.62 CM/M2
ECHO LV INTERNAL DIMENSION SYSTOLIC: 3.3 CM
ECHO LV IVSD: 0.8 CM (ref 0.6–1)
ECHO LV MASS 2D: 148.1 G (ref 88–224)
ECHO LV MASS INDEX 2D: 72.6 G/M2 (ref 49–115)
ECHO LV POSTERIOR WALL DIASTOLIC: 1.1 CM (ref 0.6–1)
ECHO LV RELATIVE WALL THICKNESS RATIO: 0.48
ECHO LVOT PEAK GRADIENT: 2 MMHG
ECHO LVOT PEAK VELOCITY: 0.7 M/S
ECHO MV A VELOCITY: 0.72 M/S
ECHO MV E DECELERATION TIME (DT): 285 MS
ECHO MV E VELOCITY: 0.46 M/S
ECHO MV E/A RATIO: 0.64
ECHO MV E/E' LATERAL: 5.41
ECHO MV E/E' RATIO (AVERAGED): 7.31
ECHO MV E/E' SEPTAL: 9.2
ECHO PV MAX VELOCITY: 0.7 M/S
ECHO PV PEAK GRADIENT: 2 MMHG
ECHO RV INTERNAL DIMENSION: 3.4 CM
ECHO TV E WAVE: 0.4 M/S
ECHO TV REGURGITANT MAX VELOCITY: 2.65 M/S
ECHO TV REGURGITANT PEAK GRADIENT: 28 MMHG
EKG ATRIAL RATE: 64 BPM
EKG P AXIS: 61 DEGREES
EKG P-R INTERVAL: 158 MS
EKG Q-T INTERVAL: 414 MS
EKG QRS DURATION: 112 MS
EKG QTC CALCULATION (BAZETT): 427 MS
EKG R AXIS: -38 DEGREES
EKG T AXIS: 31 DEGREES
EKG VENTRICULAR RATE: 64 BPM
EOSINOPHIL NFR BLD AUTO: 2.4 %
EOSINOPHILS ABSOLUTE: 0.2 THOU/MM3 (ref 0–0.4)
ERYTHROCYTE [DISTWIDTH] IN BLOOD BY AUTOMATED COUNT: 13.2 % (ref 11.5–14.5)
GFR SERPL CREATININE-BSD FRML MDRD: 73 ML/MIN/1.73M2
GLUCOSE BLD STRIP.AUTO-MCNC: 257 MG/DL (ref 70–108)
GLUCOSE SERPL-MCNC: 114 MG/DL (ref 70–108)
HCT VFR BLD AUTO: 39.7 % (ref 42–52)
HEPARIN UNFRACTIONATED: 0.17 U/ML (ref 0.3–0.7)
HGB BLD-MCNC: 13.3 GM/DL (ref 14–18)
IMM GRANULOCYTES # BLD AUTO: 0.03 THOU/MM3 (ref 0–0.07)
IMM GRANULOCYTES NFR BLD AUTO: 0.4 %
INR PPP: 0.97 (ref 0.85–1.13)
LYMPHOCYTES ABSOLUTE: 1.7 THOU/MM3 (ref 1–4.8)
LYMPHOCYTES NFR BLD AUTO: 24.9 %
MAGNESIUM SERPL-MCNC: 1.5 MG/DL (ref 1.6–2.4)
MAGNESIUM SERPL-MCNC: 2.3 MG/DL (ref 1.6–2.4)
MCH RBC QN AUTO: 29.7 PG (ref 26–33)
MCHC RBC AUTO-ENTMCNC: 33.5 GM/DL (ref 32.2–35.5)
MCV RBC AUTO: 88.6 FL (ref 80–94)
MONOCYTES ABSOLUTE: 0.7 THOU/MM3 (ref 0.4–1.3)
MONOCYTES NFR BLD AUTO: 10.8 %
NEUTROPHILS ABSOLUTE: 4.1 THOU/MM3 (ref 1.8–7.7)
NEUTROPHILS NFR BLD AUTO: 60.9 %
NRBC BLD AUTO-RTO: 0 /100 WBC
NT-PROBNP SERPL IA-MCNC: 117.3 PG/ML (ref 0–124)
OSMOLALITY SERPL CALC.SUM OF ELEC: 281.8 MOSMOL/KG (ref 275–300)
PLATELET # BLD AUTO: 258 THOU/MM3 (ref 130–400)
PMV BLD AUTO: 8.7 FL (ref 9.4–12.4)
POTASSIUM SERPL-SCNC: 3.8 MEQ/L (ref 3.5–5.2)
PROT SERPL-MCNC: 7.2 G/DL (ref 6.1–8)
RBC # BLD AUTO: 4.48 MILL/MM3 (ref 4.7–6.1)
SODIUM SERPL-SCNC: 140 MEQ/L (ref 135–145)
TROPONIN, HIGH SENSITIVITY: 41 NG/L (ref 0–12)
TROPONIN, HIGH SENSITIVITY: 42 NG/L (ref 0–12)
TROPONIN, HIGH SENSITIVITY: 49 NG/L (ref 0–12)
WBC # BLD AUTO: 6.8 THOU/MM3 (ref 4.8–10.8)

## 2024-11-22 PROCEDURE — 99285 EMERGENCY DEPT VISIT HI MDM: CPT

## 2024-11-22 PROCEDURE — 93010 ELECTROCARDIOGRAM REPORT: CPT | Performed by: INTERNAL MEDICINE

## 2024-11-22 PROCEDURE — 84484 ASSAY OF TROPONIN QUANT: CPT

## 2024-11-22 PROCEDURE — 96375 TX/PRO/DX INJ NEW DRUG ADDON: CPT

## 2024-11-22 PROCEDURE — 71045 X-RAY EXAM CHEST 1 VIEW: CPT

## 2024-11-22 PROCEDURE — 6370000000 HC RX 637 (ALT 250 FOR IP)

## 2024-11-22 PROCEDURE — 85610 PROTHROMBIN TIME: CPT

## 2024-11-22 PROCEDURE — 85730 THROMBOPLASTIN TIME PARTIAL: CPT

## 2024-11-22 PROCEDURE — 85520 HEPARIN ASSAY: CPT

## 2024-11-22 PROCEDURE — 93005 ELECTROCARDIOGRAM TRACING: CPT

## 2024-11-22 PROCEDURE — 6360000002 HC RX W HCPCS

## 2024-11-22 PROCEDURE — 83735 ASSAY OF MAGNESIUM: CPT

## 2024-11-22 PROCEDURE — 93306 TTE W/DOPPLER COMPLETE: CPT | Performed by: INTERNAL MEDICINE

## 2024-11-22 PROCEDURE — 93306 TTE W/DOPPLER COMPLETE: CPT

## 2024-11-22 PROCEDURE — 82948 REAGENT STRIP/BLOOD GLUCOSE: CPT

## 2024-11-22 PROCEDURE — G0378 HOSPITAL OBSERVATION PER HR: HCPCS

## 2024-11-22 PROCEDURE — 80053 COMPREHEN METABOLIC PANEL: CPT

## 2024-11-22 PROCEDURE — 99223 1ST HOSP IP/OBS HIGH 75: CPT | Performed by: INTERNAL MEDICINE

## 2024-11-22 PROCEDURE — 85379 FIBRIN DEGRADATION QUANT: CPT

## 2024-11-22 PROCEDURE — 83880 ASSAY OF NATRIURETIC PEPTIDE: CPT

## 2024-11-22 PROCEDURE — 85025 COMPLETE CBC W/AUTO DIFF WBC: CPT

## 2024-11-22 PROCEDURE — 36415 COLL VENOUS BLD VENIPUNCTURE: CPT

## 2024-11-22 PROCEDURE — 96366 THER/PROPH/DIAG IV INF ADDON: CPT

## 2024-11-22 PROCEDURE — 96365 THER/PROPH/DIAG IV INF INIT: CPT

## 2024-11-22 PROCEDURE — 99223 1ST HOSP IP/OBS HIGH 75: CPT | Performed by: STUDENT IN AN ORGANIZED HEALTH CARE EDUCATION/TRAINING PROGRAM

## 2024-11-22 PROCEDURE — 96368 THER/DIAG CONCURRENT INF: CPT

## 2024-11-22 RX ORDER — INSULIN GLARGINE 100 [IU]/ML
20 INJECTION, SOLUTION SUBCUTANEOUS 2 TIMES DAILY
Status: DISCONTINUED | OUTPATIENT
Start: 2024-11-22 | End: 2024-11-22

## 2024-11-22 RX ORDER — CLOPIDOGREL BISULFATE 75 MG/1
75 TABLET ORAL DAILY
Status: DISCONTINUED | OUTPATIENT
Start: 2024-11-22 | End: 2024-11-24 | Stop reason: HOSPADM

## 2024-11-22 RX ORDER — POTASSIUM CHLORIDE 1500 MG/1
40 TABLET, EXTENDED RELEASE ORAL PRN
Status: DISCONTINUED | OUTPATIENT
Start: 2024-11-22 | End: 2024-11-24 | Stop reason: HOSPADM

## 2024-11-22 RX ORDER — ISOSORBIDE MONONITRATE 60 MG/1
60 TABLET, EXTENDED RELEASE ORAL DAILY
Status: DISCONTINUED | OUTPATIENT
Start: 2024-11-22 | End: 2024-11-24 | Stop reason: HOSPADM

## 2024-11-22 RX ORDER — SODIUM CHLORIDE 0.9 % (FLUSH) 0.9 %
5-40 SYRINGE (ML) INJECTION PRN
Status: DISCONTINUED | OUTPATIENT
Start: 2024-11-22 | End: 2024-11-24 | Stop reason: HOSPADM

## 2024-11-22 RX ORDER — MORPHINE SULFATE 4 MG/ML
4 INJECTION, SOLUTION INTRAMUSCULAR; INTRAVENOUS ONCE
Status: COMPLETED | OUTPATIENT
Start: 2024-11-22 | End: 2024-11-22

## 2024-11-22 RX ORDER — EZETIMIBE 10 MG/1
10 TABLET ORAL DAILY
Status: DISCONTINUED | OUTPATIENT
Start: 2024-11-22 | End: 2024-11-24 | Stop reason: HOSPADM

## 2024-11-22 RX ORDER — SODIUM CHLORIDE 9 MG/ML
INJECTION, SOLUTION INTRAVENOUS PRN
Status: DISCONTINUED | OUTPATIENT
Start: 2024-11-22 | End: 2024-11-24 | Stop reason: HOSPADM

## 2024-11-22 RX ORDER — MAGNESIUM SULFATE IN WATER 40 MG/ML
2000 INJECTION, SOLUTION INTRAVENOUS ONCE
Status: COMPLETED | OUTPATIENT
Start: 2024-11-22 | End: 2024-11-22

## 2024-11-22 RX ORDER — ONDANSETRON 4 MG/1
4 TABLET, ORALLY DISINTEGRATING ORAL EVERY 8 HOURS PRN
Status: DISCONTINUED | OUTPATIENT
Start: 2024-11-22 | End: 2024-11-24 | Stop reason: HOSPADM

## 2024-11-22 RX ORDER — HEPARIN SODIUM 1000 [USP'U]/ML
4000 INJECTION, SOLUTION INTRAVENOUS; SUBCUTANEOUS PRN
Status: DISCONTINUED | OUTPATIENT
Start: 2024-11-22 | End: 2024-11-24

## 2024-11-22 RX ORDER — HEPARIN SODIUM 1000 [USP'U]/ML
2000 INJECTION, SOLUTION INTRAVENOUS; SUBCUTANEOUS PRN
Status: DISCONTINUED | OUTPATIENT
Start: 2024-11-22 | End: 2024-11-24

## 2024-11-22 RX ORDER — ONDANSETRON 2 MG/ML
4 INJECTION INTRAMUSCULAR; INTRAVENOUS ONCE
Status: COMPLETED | OUTPATIENT
Start: 2024-11-22 | End: 2024-11-22

## 2024-11-22 RX ORDER — NITROGLYCERIN 0.4 MG/1
0.4 TABLET SUBLINGUAL EVERY 5 MIN PRN
Status: DISCONTINUED | OUTPATIENT
Start: 2024-11-22 | End: 2024-11-24 | Stop reason: HOSPADM

## 2024-11-22 RX ORDER — ASPIRIN 81 MG/1
81 TABLET, CHEWABLE ORAL DAILY
Status: DISCONTINUED | OUTPATIENT
Start: 2024-11-23 | End: 2024-11-23

## 2024-11-22 RX ORDER — METOPROLOL TARTRATE 50 MG
25 TABLET ORAL 2 TIMES DAILY
Status: DISCONTINUED | OUTPATIENT
Start: 2024-11-22 | End: 2024-11-22

## 2024-11-22 RX ORDER — ACETAMINOPHEN 325 MG/1
650 TABLET ORAL EVERY 6 HOURS PRN
Status: DISCONTINUED | OUTPATIENT
Start: 2024-11-22 | End: 2024-11-24 | Stop reason: HOSPADM

## 2024-11-22 RX ORDER — INSULIN GLARGINE 100 [IU]/ML
10 INJECTION, SOLUTION SUBCUTANEOUS 2 TIMES DAILY
Status: DISCONTINUED | OUTPATIENT
Start: 2024-11-22 | End: 2024-11-24 | Stop reason: HOSPADM

## 2024-11-22 RX ORDER — ROSUVASTATIN CALCIUM 20 MG/1
40 TABLET, COATED ORAL DAILY
Status: DISCONTINUED | OUTPATIENT
Start: 2024-11-22 | End: 2024-11-24 | Stop reason: HOSPADM

## 2024-11-22 RX ORDER — POLYETHYLENE GLYCOL 3350 17 G/17G
17 POWDER, FOR SOLUTION ORAL DAILY PRN
Status: DISCONTINUED | OUTPATIENT
Start: 2024-11-22 | End: 2024-11-24 | Stop reason: HOSPADM

## 2024-11-22 RX ORDER — MAGNESIUM SULFATE IN WATER 40 MG/ML
2000 INJECTION, SOLUTION INTRAVENOUS PRN
Status: DISCONTINUED | OUTPATIENT
Start: 2024-11-22 | End: 2024-11-24 | Stop reason: HOSPADM

## 2024-11-22 RX ORDER — DEXTROSE MONOHYDRATE 100 MG/ML
INJECTION, SOLUTION INTRAVENOUS CONTINUOUS PRN
Status: DISCONTINUED | OUTPATIENT
Start: 2024-11-22 | End: 2024-11-24 | Stop reason: HOSPADM

## 2024-11-22 RX ORDER — HEPARIN SODIUM 10000 [USP'U]/100ML
5-30 INJECTION, SOLUTION INTRAVENOUS CONTINUOUS
Status: DISCONTINUED | OUTPATIENT
Start: 2024-11-22 | End: 2024-11-24

## 2024-11-22 RX ORDER — HEPARIN SODIUM 1000 [USP'U]/ML
4000 INJECTION, SOLUTION INTRAVENOUS; SUBCUTANEOUS ONCE
Status: COMPLETED | OUTPATIENT
Start: 2024-11-22 | End: 2024-11-22

## 2024-11-22 RX ORDER — ACETAMINOPHEN 650 MG/1
650 SUPPOSITORY RECTAL EVERY 6 HOURS PRN
Status: DISCONTINUED | OUTPATIENT
Start: 2024-11-22 | End: 2024-11-24 | Stop reason: HOSPADM

## 2024-11-22 RX ORDER — METOPROLOL TARTRATE 25 MG/1
12.5 TABLET, FILM COATED ORAL 2 TIMES DAILY
Status: DISCONTINUED | OUTPATIENT
Start: 2024-11-22 | End: 2024-11-24 | Stop reason: HOSPADM

## 2024-11-22 RX ORDER — PANTOPRAZOLE SODIUM 40 MG/1
40 TABLET, DELAYED RELEASE ORAL
Status: DISCONTINUED | OUTPATIENT
Start: 2024-11-22 | End: 2024-11-24 | Stop reason: HOSPADM

## 2024-11-22 RX ORDER — CLOPIDOGREL BISULFATE 75 MG/1
75 TABLET ORAL ONCE
Status: DISCONTINUED | OUTPATIENT
Start: 2024-11-22 | End: 2024-11-22 | Stop reason: DRUGHIGH

## 2024-11-22 RX ORDER — SODIUM CHLORIDE 0.9 % (FLUSH) 0.9 %
5-40 SYRINGE (ML) INJECTION EVERY 12 HOURS SCHEDULED
Status: DISCONTINUED | OUTPATIENT
Start: 2024-11-22 | End: 2024-11-24 | Stop reason: HOSPADM

## 2024-11-22 RX ORDER — POTASSIUM CHLORIDE 7.45 MG/ML
10 INJECTION INTRAVENOUS PRN
Status: DISCONTINUED | OUTPATIENT
Start: 2024-11-22 | End: 2024-11-22 | Stop reason: RX

## 2024-11-22 RX ORDER — ASPIRIN 81 MG/1
324 TABLET, CHEWABLE ORAL ONCE
Status: COMPLETED | OUTPATIENT
Start: 2024-11-22 | End: 2024-11-22

## 2024-11-22 RX ORDER — ONDANSETRON 2 MG/ML
4 INJECTION INTRAMUSCULAR; INTRAVENOUS EVERY 6 HOURS PRN
Status: DISCONTINUED | OUTPATIENT
Start: 2024-11-22 | End: 2024-11-24 | Stop reason: HOSPADM

## 2024-11-22 RX ORDER — GLUCAGON 1 MG/ML
1 KIT INJECTION PRN
Status: DISCONTINUED | OUTPATIENT
Start: 2024-11-22 | End: 2024-11-24 | Stop reason: HOSPADM

## 2024-11-22 RX ORDER — CYCLOBENZAPRINE HCL 10 MG
5 TABLET ORAL 3 TIMES DAILY PRN
Status: DISCONTINUED | OUTPATIENT
Start: 2024-11-22 | End: 2024-11-24 | Stop reason: HOSPADM

## 2024-11-22 RX ADMIN — HEPARIN SODIUM 4000 UNITS: 1000 INJECTION INTRAVENOUS; SUBCUTANEOUS at 12:24

## 2024-11-22 RX ADMIN — CYCLOBENZAPRINE 5 MG: 10 TABLET, FILM COATED ORAL at 14:31

## 2024-11-22 RX ADMIN — NITROGLYCERIN 0.4 MG: 0.4 TABLET, ORALLY DISINTEGRATING SUBLINGUAL at 06:46

## 2024-11-22 RX ADMIN — EZETIMIBE 10 MG: 10 TABLET ORAL at 16:13

## 2024-11-22 RX ADMIN — METOPROLOL TARTRATE 12.5 MG: 25 TABLET, FILM COATED ORAL at 20:22

## 2024-11-22 RX ADMIN — ASPIRIN 324 MG: 81 TABLET, CHEWABLE ORAL at 06:29

## 2024-11-22 RX ADMIN — POTASSIUM BICARBONATE 20 MEQ: 782 TABLET, EFFERVESCENT ORAL at 16:13

## 2024-11-22 RX ADMIN — NITROGLYCERIN 0.4 MG: 0.4 TABLET, ORALLY DISINTEGRATING SUBLINGUAL at 06:30

## 2024-11-22 RX ADMIN — ISOSORBIDE MONONITRATE 60 MG: 60 TABLET, EXTENDED RELEASE ORAL at 16:13

## 2024-11-22 RX ADMIN — MAGNESIUM SULFATE HEPTAHYDRATE 2000 MG: 2 INJECTION, SOLUTION INTRAVENOUS at 16:19

## 2024-11-22 RX ADMIN — MORPHINE SULFATE 4 MG: 4 INJECTION, SOLUTION INTRAMUSCULAR; INTRAVENOUS at 07:49

## 2024-11-22 RX ADMIN — ROSUVASTATIN CALCIUM 40 MG: 20 TABLET, FILM COATED ORAL at 16:14

## 2024-11-22 RX ADMIN — HEPARIN SODIUM 11 UNITS/KG/HR: 10000 INJECTION, SOLUTION INTRAVENOUS at 12:28

## 2024-11-22 RX ADMIN — ONDANSETRON 4 MG: 2 INJECTION INTRAMUSCULAR; INTRAVENOUS at 07:49

## 2024-11-22 RX ADMIN — PANTOPRAZOLE SODIUM 40 MG: 40 TABLET, DELAYED RELEASE ORAL at 16:14

## 2024-11-22 RX ADMIN — INSULIN GLARGINE 10 UNITS: 100 INJECTION, SOLUTION SUBCUTANEOUS at 22:29

## 2024-11-22 RX ADMIN — CLOPIDOGREL BISULFATE 75 MG: 75 TABLET ORAL at 20:22

## 2024-11-22 ASSESSMENT — PAIN DESCRIPTION - LOCATION
LOCATION: CHEST
LOCATION: CHEST
LOCATION: SHOULDER
LOCATION: CHEST
LOCATION: LEG;BACK;CHEST

## 2024-11-22 ASSESSMENT — PAIN DESCRIPTION - ONSET: ONSET: ON-GOING

## 2024-11-22 ASSESSMENT — PAIN DESCRIPTION - ORIENTATION
ORIENTATION: LEFT
ORIENTATION: RIGHT;LEFT
ORIENTATION: LEFT

## 2024-11-22 ASSESSMENT — HEART SCORE: ECG: NORMAL

## 2024-11-22 ASSESSMENT — PAIN SCALES - GENERAL
PAINLEVEL_OUTOF10: 7
PAINLEVEL_OUTOF10: 5
PAINLEVEL_OUTOF10: 7
PAINLEVEL_OUTOF10: 6
PAINLEVEL_OUTOF10: 7
PAINLEVEL_OUTOF10: 7
PAINLEVEL_OUTOF10: 5

## 2024-11-22 ASSESSMENT — PAIN DESCRIPTION - FREQUENCY
FREQUENCY: CONTINUOUS
FREQUENCY: CONTINUOUS

## 2024-11-22 ASSESSMENT — PAIN DESCRIPTION - DESCRIPTORS
DESCRIPTORS: ACHING

## 2024-11-22 ASSESSMENT — PAIN DESCRIPTION - PAIN TYPE
TYPE: ACUTE PAIN

## 2024-11-22 ASSESSMENT — PAIN - FUNCTIONAL ASSESSMENT
PAIN_FUNCTIONAL_ASSESSMENT: 0-10
PAIN_FUNCTIONAL_ASSESSMENT: NONE - DENIES PAIN
PAIN_FUNCTIONAL_ASSESSMENT: 0-10
PAIN_FUNCTIONAL_ASSESSMENT: 0-10
PAIN_FUNCTIONAL_ASSESSMENT: PREVENTS OR INTERFERES SOME ACTIVE ACTIVITIES AND ADLS
PAIN_FUNCTIONAL_ASSESSMENT: 0-10
PAIN_FUNCTIONAL_ASSESSMENT: PREVENTS OR INTERFERES SOME ACTIVE ACTIVITIES AND ADLS

## 2024-11-22 NOTE — ED PROVIDER NOTES
[facility] EMERGENCY DEPARTMENT  EMERGENCY DEPARTMENT ENCOUNTER          Pt Name: Chris Estevez  MRN: [id number]  Birthdate 1955  Date of evaluation: 11/22/2024  Resident Physician: Rupesh Antonio MD EM Resident PGY-3  Attending Physician: Familia Greenwood DO      CHIEF COMPLAINT       Chief Complaint   Patient presents with    Chest Pain         HISTORY OF PRESENT ILLNESS    HPI  Chris Estevez is a 69 y.o. male who presents to the emergency department from home, as a walk in to the ED lobby for evaluation of chest pain.    Patient states that his pain began yesterday around noon while he was at work.  Describes the pain as left-sided radiation to his chest shoulder.  Patient states he took 1 nitro yesterday and reports to give him slight relief.  States that the pain as pressure-like.  Currently 7/10 severity.  Patient has had mild shortness of breath no diaphoresis.  Endorses chronic gastroparesis had single episode of emesis this morning alternating between diarrhea and constipation per patient's baseline.      The patient has no other acute complaints at this time.    ROS negative except as stated above.    PAST MEDICAL AND SURGICAL HISTORY     Past Medical History:   Diagnosis Date    Anxiety     CAD (coronary artery disease)     Depression     Diabetes mellitus (HCC)     Dilation of esophagus 2022    Dr Saxena     Frequent headaches     Gastroparesis     GERD (gastroesophageal reflux disease)     History of Chiari malformation     Hyperlipidemia     Hypertension     Kidney stone     Kidney stone     Migraine     Movement disorder     DDD    Pulmonary emboli (HCC) 10/29/2018    Shingles     Sleep apnea     Tattoos      Past Surgical History:   Procedure Laterality Date    BACK SURGERY  2008    C1 oates & decompression    BACK SURGERY  08/25/2020    [facility] Dr. Gibson, C5-7 cadaver patch    BRAIN SURGERY  2008    chiari    CARDIAC PROCEDURE N/A 01/04/2024    Left heart cath / coronary  not be available as of the writing of this note.      MEDICAL DECISION MAKING   Initial Assessment Summary:   Patient 69-year-old male presented to ED with chest pain.  Please see ED course section below for continuation and resolution of this initial assessment if applicable.      Comorbid conditions pertinent to this ED encounter:  CAD, s/p PCI, DM, PE, gastroparesis, HLD, HTN, on Xarelto, Plavix    Differential Diagnosis includes but is not limited to:  ACS  Pneumonia  Cardiac arrhythmia  Electrolyte abnormality  CHF exacerbation  PE-low risk by Wells criteria, previously had saddle PE, ordered D-dimer      Decision Rules/Clinical Scores utilized:        Heart Score    Heart Score for chest pain patients  History: Moderately Suspicious  ECG: Normal  Patient Age: > 65 years  *Risk factors for Atherosclerotic disease: Coronary Artery Disease  Risk Factors: > 3 Risk factors or history of atherosclerotic disease*  Troponin: > 3X normal limit  Heart Score Total: 7    HEART Score recommendations:  Score 0 - 3:   <1.7%  risk of MACE over next 6 wks = Outpatient workup  Score 4 - 6: 12-16% risk of MACE over next 6 wks = Admission for observation  Score 7- 10: 50-65% risk of MACE over next 6 wks = Early invasive strategy    MACE: Major Acute Cardiac Event (All Cause Mortality, AMI or revascularization)  “Chest Pain in the Emergency Room: A Multicenter Validation of the HEART Score”. [place] BE, Six AJ, Montez LEWIS, Patricia TP, van brisa Cole F, Patricia EG, Floyd SH, van Elvis RM, Jesus PA. Crit Pathw Cardiol. 2010 Sep; 9(3): 164-169.  \"A prospective validation of the HEART score for chest pain patients at the emergency department.\" Int J Cardiol. 2013 Oct 3;168(3):2153-8. doi: 10.1016/j.ijcard.2013.01.255. Epub 2013 Mar 7.       Wells' Criteria for Pulmonary Embolism from [employer]  on 11/22/2024  ** All calculations should be rechecked by clinician prior to use **    RESULT SUMMARY:  1.5 points  Low risk group: 1.3%

## 2024-11-22 NOTE — ED NOTES
Pt transported to [facility] on cart in stable condition. Floor contacted before transport and spoke Sweta.

## 2024-11-22 NOTE — ED TRIAGE NOTES
Pt comes to ED with c/o chest pain. Pain began yesterday around noon while he wa standing at work. Pain is located in left side, chest and shoulder. Pt took one nitro yesterday and reports it gave him slight relief. Pt complains of mild SOB. EKG completed. Pt placed on telemetry.

## 2024-11-22 NOTE — ED NOTES
ED to inpatient nurses report      Chief Complaint:  Chief Complaint   Patient presents with    Chest Pain     Present to ED from: home    MOA:     LOC: alert and orientated to name, place, date  Mobility: Independent  Oxygen Baseline: 98    Current needs required: room air     Code Status:   Full Code      Mental Status:  Level of Consciousness: Alert (0)    Psych Assessment:        Vitals:  Patient Vitals for the past 24 hrs:   BP Temp Temp src Pulse Resp SpO2 Height Weight   11/22/24 1109 128/78 -- -- 55 14 97 % -- --   11/22/24 0954 131/78 -- -- 56 17 97 % -- --   11/22/24 0854 134/85 -- -- 59 20 97 % -- --   11/22/24 0809 (!) 153/79 -- -- 63 16 96 % -- --   11/22/24 0739 135/80 -- -- 69 16 99 % -- --   11/22/24 0648 130/77 -- -- 65 14 99 % -- --   11/22/24 0608 (!) 157/84 97.6 °F (36.4 °C) Oral 63 17 99 % 1.753 m (5' 9\") 88.5 kg (195 lb)        LDAs:   Peripheral IV 11/22/24 Left Antecubital (Active)   Site Assessment Clean, dry & intact 11/22/24 1153   Line Status Normal saline locked 11/22/24 1153   Phlebitis Assessment No symptoms 11/22/24 1153   Infiltration Assessment 0 11/22/24 1153   Dressing Status New dressing applied;Clean, dry & intact 11/22/24 1153   Dressing Type Transparent 11/22/24 1153       Ambulatory Status:  No data recorded    Diagnosis:  DISPOSITION Admitted 11/22/2024 07:38:18 AM   Final diagnoses:   Chest pain, unspecified type   Elevated troponin   Acute coronary syndrome (HCC)        Consults:  IP CONSULT TO CARDIOLOGY     Pain Score:  Pain Assessment  Pain Assessment: None - Denies Pain  Pain Level: 7  Patient's Stated Pain Goal: 3  Pain Location: Leg, Back, Chest  Pain Descriptors: Aching  Pain Type: Acute pain    C-SSRS:   Risk of Suicide: No Risk    Sepsis Screening:       [place] Fall Risk:       Swallow Screening        Preferred Language:   English      ALLERGIES     Latex    SURGICAL HISTORY       Past Surgical History:   Procedure Laterality Date    BACK SURGERY  2008    C1 oates

## 2024-11-22 NOTE — CONSULTS
Stomach Cancer Neg Hx     Colon Cancer Neg Hx      Social History     Socioeconomic History    Marital status:      Spouse name: Not on file    Number of children: 0    Years of education: 12    Highest education level: High school graduate   Occupational History    Not on file   Tobacco Use    Smoking status: Former     Current packs/day: 0.00     Average packs/day: 1.5 packs/day for 3.0 years (4.5 ttl pk-yrs)     Types: Cigarettes     Start date: 1976     Quit date: 10/12/1979     Years since quittin.1     Passive exposure: Current    Smokeless tobacco: Never   Vaping Use    Vaping status: Never Used   Substance and Sexual Activity    Alcohol use: No    Drug use: No    Sexual activity: Not Currently     Partners: Female   Other Topics Concern    Not on file   Social History Narrative    Not on file     Social Determinants of Health     Financial Resource Strain: Low Risk  (2024)    Overall Financial Resource Strain (CARDIA)     Difficulty of Paying Living Expenses: Not hard at all   Food Insecurity: No Food Insecurity (2024)    Hunger Vital Sign     Worried About Running Out of Food in the Last Year: Never true     Ran Out of Food in the Last Year: Never true   Transportation Needs: No Transportation Needs (2024)    PRAPARE - Transportation     Lack of Transportation (Medical): No     Lack of Transportation (Non-Medical): No   Physical Activity: Inactive (2024)    Exercise Vital Sign     Days of Exercise per Week: 0 days     Minutes of Exercise per Session: 0 min   Stress: Stress Concern Present (2024)    [employer] - Occupational Stress Questionnaire     Feeling of Stress : To some extent   Social Connections: Unknown (2024)    Social Connection and Isolation Panel [NHANES]     Frequency of Communication with Friends and Family: More than three times a week     Frequency of Social Gatherings with Friends and Family: More than three times a  week     Attends Tenriism Services: More than 4 times per year     Active Member of Clubs or Organizations: No     Attends Club or Organization Meetings: More than 4 times per year     Marital Status: Not on file   Intimate Partner Violence: Unknown (11/8/2024)    Received from [facility]    Humiliation, Afraid, Rape, and Kick questionnaire     Fear of Current or Ex-Partner: No     Emotionally Abused: Not on file     Physically Abused: Not on file     Sexually Abused: Not on file   Housing Stability: Low Risk  (5/7/2024)    Housing Stability Vital Sign     Unable to Pay for Housing in the Last Year: No     Number of Places Lived in the Last Year: 1     Unstable Housing in the Last Year: No     ROS:   Constitutional: Denies any recent wt change.  Eyes:  Denies any blurring or double vision, no glaucoma  Ears/Nose/Mouth/Throat:  Denies any chronic sinus/rhinitis, bleeding gums  Cardiovascular:  As described above.    Respiratory:  Denies any frequent cough, wheezing or coughing up blood  Genitourinary:  Denies difficulty with urination and kidney stones  Gastrointestinal:  Denies any chronic problems with abdominal pain, nausea, vomiting or diarrhea  Musculoskeletal:  Denies any joint pain, back pain, or difficulty walking  Integumentary:  Denies any rash  Neurological:  No numbness or tingling  Endocrine:  Denies any polydipsia.    Hematologic/Lymphatic:  Denies any hemorrhage or lymphatic drainage problems.  Labs:  CBC:   Recent Labs     11/22/24 0625   WBC 6.8   HGB 13.3*   HCT 39.7*   MCV 88.6        BMP:   Recent Labs     11/22/24 0625      K 3.8      CO2 26   BUN 17   CREATININE 1.1   MG 1.5*     Accucheck Glucoses: No results for input(s): \"POCGLU\" in the last 72 hours.  Cardiac Enzymes: No results for input(s): \"CKTOTAL\", \"CKMB\", \"CKMBINDEX\", \"TROPONINI\" in the last 72 hours.  PT/INR:   Recent Labs     11/22/24 0625   INR 0.97     APTT:   Recent Labs     11/22/24 0625

## 2024-11-22 NOTE — ED NOTES
Med list reviewed with patient after clarification with charge nurse. Pharmacy notified to verify medications prior to transport.

## 2024-11-22 NOTE — ED NOTES
Pt medicated per MAR at this time. Pt updated on POC, verbalized understanding. Call light in reach. Telemetry in place.

## 2024-11-22 NOTE — H&P
History & Physical  Internal Medicine Resident         Patient: Chris Estevez 69 y.o. male      : 1955  Date of Admission: 2024  Date of Service: Pt seen/examined on 24 and Admitted to Observation with expected LOS less than two midnights due to medical therapy.       ASSESSMENT AND PLAN  NSTEMI: Patient presented with left-sided chest pain that began on  around 12 PM, not improved with nitro.  Pain persisted throughout the night, became progressively worse, radiating to left arm and jaw.  Pain increased from 5 out of 10 to 8 out of 10 this morning.  Troponin 49, 41 - higher than previous baseline in the 20s.  EKG without ischemic changes. Most recent Echo 1/3/24 EF 55-60%, normal wall motion, normal diastolic function.   Repeat troponin x 2  Complete echo  Start on heparin drip  Loaded with  mg in the ED, continue ASA 81 mg daily starting   Continue Plavix  Continue home Lopressor 25 mg twice daily  Continue Crestor 40 mg daily  Keep n.p.o. until cardiology evaluation  Keep magnesium greater than 2, potassium greater than 4  Cardiology consulted for consideration of cardiac cath, appreciate recommendations  Hypomagnesemia: Mg 1.5. Give Mag sulfate 2 g. Repeat Mg two hours after infusion. Keep > 4.   CAD s/p PCI/GI RCA: Continue Plavix.   Insulin-dependent diabetes mellitus type 2: on lantus 20 units BID at home. Since patient is NPO, will start on lantus 10 units BID.     Chronic Conditions (reviewed and stable unless otherwise stated)   History of saddle PE on Xarelto (2018) - hold Xarelto while on heparin gtt  HTN  HLD  Insulin-dependent diabetes mellitus type 2 - on lantus 20 units BID at home. Since patient is NPO, will start on lantus 10 units BID.   Depression, anxiety  S/p esophageal dilation  Gastroparesis, GERD  Chiari malformation  Migraine  SHAHRAM, non-compliant with BiPAP  Chronic normocytic anemia  Former tobacco use      Data reviewed (unless otherwise discussed

## 2024-11-23 LAB
ACTIVATED CLOTTING TIME: 233 SECONDS (ref 1–150)
ANION GAP SERPL CALC-SCNC: 7 MEQ/L (ref 8–16)
APTT PPP: 54.9 SECONDS (ref 22–38)
BUN SERPL-MCNC: 16 MG/DL (ref 7–22)
CALCIUM SERPL-MCNC: 8.5 MG/DL (ref 8.5–10.5)
CHLORIDE SERPL-SCNC: 103 MEQ/L (ref 98–111)
CO2 SERPL-SCNC: 26 MEQ/L (ref 23–33)
CREAT SERPL-MCNC: 1.1 MG/DL (ref 0.4–1.2)
DEPRECATED RDW RBC AUTO: 43.3 FL (ref 35–45)
ECHO BSA: 2.07 M2
EKG ATRIAL RATE: 59 BPM
EKG ATRIAL RATE: 66 BPM
EKG ATRIAL RATE: 73 BPM
EKG P AXIS: 34 DEGREES
EKG P AXIS: 35 DEGREES
EKG P AXIS: 37 DEGREES
EKG P-R INTERVAL: 174 MS
EKG P-R INTERVAL: 176 MS
EKG P-R INTERVAL: 180 MS
EKG Q-T INTERVAL: 410 MS
EKG Q-T INTERVAL: 424 MS
EKG Q-T INTERVAL: 446 MS
EKG QRS DURATION: 110 MS
EKG QRS DURATION: 112 MS
EKG QRS DURATION: 112 MS
EKG QTC CALCULATION (BAZETT): 441 MS
EKG QTC CALCULATION (BAZETT): 444 MS
EKG QTC CALCULATION (BAZETT): 451 MS
EKG R AXIS: -46 DEGREES
EKG R AXIS: -50 DEGREES
EKG R AXIS: -50 DEGREES
EKG T AXIS: -13 DEGREES
EKG T AXIS: 1 DEGREES
EKG T AXIS: 3 DEGREES
EKG VENTRICULAR RATE: 59 BPM
EKG VENTRICULAR RATE: 66 BPM
EKG VENTRICULAR RATE: 73 BPM
ERYTHROCYTE [DISTWIDTH] IN BLOOD BY AUTOMATED COUNT: 13.1 % (ref 11.5–14.5)
GFR SERPL CREATININE-BSD FRML MDRD: 73 ML/MIN/1.73M2
GLUCOSE BLD STRIP.AUTO-MCNC: 131 MG/DL (ref 70–108)
GLUCOSE BLD STRIP.AUTO-MCNC: 160 MG/DL (ref 70–108)
GLUCOSE BLD STRIP.AUTO-MCNC: 170 MG/DL (ref 70–108)
GLUCOSE BLD STRIP.AUTO-MCNC: 178 MG/DL (ref 70–108)
GLUCOSE SERPL-MCNC: 117 MG/DL (ref 70–108)
HCT VFR BLD AUTO: 36.1 % (ref 42–52)
HGB BLD-MCNC: 12 GM/DL (ref 14–18)
MAGNESIUM SERPL-MCNC: 1.8 MG/DL (ref 1.6–2.4)
MCH RBC QN AUTO: 30 PG (ref 26–33)
MCHC RBC AUTO-ENTMCNC: 33.2 GM/DL (ref 32.2–35.5)
MCV RBC AUTO: 90.3 FL (ref 80–94)
PLATELET # BLD AUTO: 236 THOU/MM3 (ref 130–400)
PMV BLD AUTO: 9 FL (ref 9.4–12.4)
POTASSIUM SERPL-SCNC: 4.2 MEQ/L (ref 3.5–5.2)
RBC # BLD AUTO: 4 MILL/MM3 (ref 4.7–6.1)
SODIUM SERPL-SCNC: 136 MEQ/L (ref 135–145)
WBC # BLD AUTO: 7.7 THOU/MM3 (ref 4.8–10.8)

## 2024-11-23 PROCEDURE — C2623 CATH, TRANSLUMIN, DRUG-COAT: HCPCS | Performed by: INTERNAL MEDICINE

## 2024-11-23 PROCEDURE — 92920 PRQ TRLUML C ANGIOP 1ART&/BR: CPT | Performed by: INTERNAL MEDICINE

## 2024-11-23 PROCEDURE — 93010 ELECTROCARDIOGRAM REPORT: CPT | Performed by: INTERNAL MEDICINE

## 2024-11-23 PROCEDURE — 2580000003 HC RX 258: Performed by: INTERNAL MEDICINE

## 2024-11-23 PROCEDURE — 93458 L HRT ARTERY/VENTRICLE ANGIO: CPT | Performed by: INTERNAL MEDICINE

## 2024-11-23 PROCEDURE — 83735 ASSAY OF MAGNESIUM: CPT

## 2024-11-23 PROCEDURE — 2140000000 HC CCU INTERMEDIATE R&B

## 2024-11-23 PROCEDURE — 6360000004 HC RX CONTRAST MEDICATION: Performed by: INTERNAL MEDICINE

## 2024-11-23 PROCEDURE — 96366 THER/PROPH/DIAG IV INF ADDON: CPT

## 2024-11-23 PROCEDURE — 85730 THROMBOPLASTIN TIME PARTIAL: CPT

## 2024-11-23 PROCEDURE — C1887 CATHETER, GUIDING: HCPCS | Performed by: INTERNAL MEDICINE

## 2024-11-23 PROCEDURE — B2151ZZ FLUOROSCOPY OF LEFT HEART USING LOW OSMOLAR CONTRAST: ICD-10-PCS | Performed by: INTERNAL MEDICINE

## 2024-11-23 PROCEDURE — C1894 INTRO/SHEATH, NON-LASER: HCPCS | Performed by: INTERNAL MEDICINE

## 2024-11-23 PROCEDURE — 6370000000 HC RX 637 (ALT 250 FOR IP): Performed by: INTERNAL MEDICINE

## 2024-11-23 PROCEDURE — C9600 PERC DRUG-EL COR STENT SING: HCPCS | Performed by: INTERNAL MEDICINE

## 2024-11-23 PROCEDURE — 027034Z DILATION OF CORONARY ARTERY, ONE ARTERY WITH DRUG-ELUTING INTRALUMINAL DEVICE, PERCUTANEOUS APPROACH: ICD-10-PCS | Performed by: INTERNAL MEDICINE

## 2024-11-23 PROCEDURE — 2500000003 HC RX 250 WO HCPCS: Performed by: INTERNAL MEDICINE

## 2024-11-23 PROCEDURE — 6360000002 HC RX W HCPCS: Performed by: INTERNAL MEDICINE

## 2024-11-23 PROCEDURE — 6370000000 HC RX 637 (ALT 250 FOR IP)

## 2024-11-23 PROCEDURE — 93005 ELECTROCARDIOGRAM TRACING: CPT | Performed by: INTERNAL MEDICINE

## 2024-11-23 PROCEDURE — 85347 COAGULATION TIME ACTIVATED: CPT

## 2024-11-23 PROCEDURE — 36415 COLL VENOUS BLD VENIPUNCTURE: CPT

## 2024-11-23 PROCEDURE — B2111ZZ FLUOROSCOPY OF MULTIPLE CORONARY ARTERIES USING LOW OSMOLAR CONTRAST: ICD-10-PCS | Performed by: INTERNAL MEDICINE

## 2024-11-23 PROCEDURE — 82948 REAGENT STRIP/BLOOD GLUCOSE: CPT

## 2024-11-23 PROCEDURE — 99233 SBSQ HOSP IP/OBS HIGH 50: CPT | Performed by: STUDENT IN AN ORGANIZED HEALTH CARE EDUCATION/TRAINING PROGRAM

## 2024-11-23 PROCEDURE — 99152 MOD SED SAME PHYS/QHP 5/>YRS: CPT | Performed by: INTERNAL MEDICINE

## 2024-11-23 PROCEDURE — 6360000002 HC RX W HCPCS

## 2024-11-23 PROCEDURE — 92928 PRQ TCAT PLMT NTRAC ST 1 LES: CPT | Performed by: INTERNAL MEDICINE

## 2024-11-23 PROCEDURE — C1874 STENT, COATED/COV W/DEL SYS: HCPCS | Performed by: INTERNAL MEDICINE

## 2024-11-23 PROCEDURE — 80048 BASIC METABOLIC PNL TOTAL CA: CPT

## 2024-11-23 PROCEDURE — 2709999900 HC NON-CHARGEABLE SUPPLY: Performed by: INTERNAL MEDICINE

## 2024-11-23 PROCEDURE — 02703ZZ DILATION OF CORONARY ARTERY, ONE ARTERY, PERCUTANEOUS APPROACH: ICD-10-PCS | Performed by: INTERNAL MEDICINE

## 2024-11-23 PROCEDURE — C1725 CATH, TRANSLUMIN NON-LASER: HCPCS | Performed by: INTERNAL MEDICINE

## 2024-11-23 PROCEDURE — 85027 COMPLETE CBC AUTOMATED: CPT

## 2024-11-23 PROCEDURE — C1769 GUIDE WIRE: HCPCS | Performed by: INTERNAL MEDICINE

## 2024-11-23 PROCEDURE — 99153 MOD SED SAME PHYS/QHP EA: CPT | Performed by: INTERNAL MEDICINE

## 2024-11-23 PROCEDURE — 4A023N7 MEASUREMENT OF CARDIAC SAMPLING AND PRESSURE, LEFT HEART, PERCUTANEOUS APPROACH: ICD-10-PCS | Performed by: INTERNAL MEDICINE

## 2024-11-23 PROCEDURE — 93005 ELECTROCARDIOGRAM TRACING: CPT | Performed by: STUDENT IN AN ORGANIZED HEALTH CARE EDUCATION/TRAINING PROGRAM

## 2024-11-23 DEVICE — STENT CORONARY ONYX FRONTIER RX 3.5X30 MM ZOTAROLIMUS ELUT: Type: IMPLANTABLE DEVICE | Status: FUNCTIONAL

## 2024-11-23 RX ORDER — HEPARIN SODIUM 1000 [USP'U]/ML
INJECTION, SOLUTION INTRAVENOUS; SUBCUTANEOUS PRN
Status: DISCONTINUED | OUTPATIENT
Start: 2024-11-23 | End: 2024-11-23 | Stop reason: HOSPADM

## 2024-11-23 RX ORDER — SODIUM CHLORIDE 9 MG/ML
INJECTION, SOLUTION INTRAVENOUS CONTINUOUS
Status: DISCONTINUED | OUTPATIENT
Start: 2024-11-23 | End: 2024-11-24 | Stop reason: HOSPADM

## 2024-11-23 RX ORDER — ASPIRIN 81 MG/1
81 TABLET ORAL DAILY
Status: DISCONTINUED | OUTPATIENT
Start: 2024-11-24 | End: 2024-11-24 | Stop reason: HOSPADM

## 2024-11-23 RX ORDER — MIDAZOLAM HYDROCHLORIDE 1 MG/ML
INJECTION, SOLUTION INTRAMUSCULAR; INTRAVENOUS PRN
Status: DISCONTINUED | OUTPATIENT
Start: 2024-11-23 | End: 2024-11-23 | Stop reason: HOSPADM

## 2024-11-23 RX ORDER — SODIUM CHLORIDE 9 MG/ML
INJECTION, SOLUTION INTRAVENOUS PRN
Status: DISCONTINUED | OUTPATIENT
Start: 2024-11-23 | End: 2024-11-24 | Stop reason: HOSPADM

## 2024-11-23 RX ORDER — ACETAMINOPHEN 325 MG/1
650 TABLET ORAL EVERY 4 HOURS PRN
Status: DISCONTINUED | OUTPATIENT
Start: 2024-11-23 | End: 2024-11-24 | Stop reason: HOSPADM

## 2024-11-23 RX ORDER — IOPAMIDOL 755 MG/ML
INJECTION, SOLUTION INTRAVASCULAR PRN
Status: DISCONTINUED | OUTPATIENT
Start: 2024-11-23 | End: 2024-11-23 | Stop reason: HOSPADM

## 2024-11-23 RX ORDER — FENTANYL CITRATE 50 UG/ML
INJECTION, SOLUTION INTRAMUSCULAR; INTRAVENOUS PRN
Status: DISCONTINUED | OUTPATIENT
Start: 2024-11-23 | End: 2024-11-23 | Stop reason: HOSPADM

## 2024-11-23 RX ORDER — CLOPIDOGREL BISULFATE 75 MG/1
TABLET ORAL PRN
Status: DISCONTINUED | OUTPATIENT
Start: 2024-11-23 | End: 2024-11-23 | Stop reason: HOSPADM

## 2024-11-23 RX ORDER — LIDOCAINE HYDROCHLORIDE 20 MG/ML
INJECTION, SOLUTION EPIDURAL; INFILTRATION; INTRACAUDAL; PERINEURAL PRN
Status: DISCONTINUED | OUTPATIENT
Start: 2024-11-23 | End: 2024-11-23 | Stop reason: HOSPADM

## 2024-11-23 RX ORDER — PHENYLEPHRINE HCL IN 0.9% NACL 1 MG/10 ML
VIAL (ML) INTRAVENOUS PRN
Status: DISCONTINUED | OUTPATIENT
Start: 2024-11-23 | End: 2024-11-23 | Stop reason: HOSPADM

## 2024-11-23 RX ORDER — SODIUM CHLORIDE 0.9 % (FLUSH) 0.9 %
5-40 SYRINGE (ML) INJECTION PRN
Status: DISCONTINUED | OUTPATIENT
Start: 2024-11-23 | End: 2024-11-24 | Stop reason: HOSPADM

## 2024-11-23 RX ORDER — HYDROCODONE BITARTRATE AND ACETAMINOPHEN 5; 325 MG/1; MG/1
1 TABLET ORAL EVERY 4 HOURS PRN
Status: DISCONTINUED | OUTPATIENT
Start: 2024-11-23 | End: 2024-11-24 | Stop reason: HOSPADM

## 2024-11-23 RX ORDER — SODIUM CHLORIDE 0.9 % (FLUSH) 0.9 %
5-40 SYRINGE (ML) INJECTION EVERY 12 HOURS SCHEDULED
Status: DISCONTINUED | OUTPATIENT
Start: 2024-11-23 | End: 2024-11-24 | Stop reason: HOSPADM

## 2024-11-23 RX ADMIN — METOPROLOL TARTRATE 12.5 MG: 25 TABLET, FILM COATED ORAL at 20:12

## 2024-11-23 RX ADMIN — ROSUVASTATIN CALCIUM 40 MG: 20 TABLET, FILM COATED ORAL at 09:54

## 2024-11-23 RX ADMIN — ONDANSETRON 4 MG: 2 INJECTION INTRAMUSCULAR; INTRAVENOUS at 20:12

## 2024-11-23 RX ADMIN — HYDROCODONE BITARTRATE AND ACETAMINOPHEN 1 TABLET: 5; 325 TABLET ORAL at 20:12

## 2024-11-23 RX ADMIN — EZETIMIBE 10 MG: 10 TABLET ORAL at 10:13

## 2024-11-23 RX ADMIN — SODIUM CHLORIDE, PRESERVATIVE FREE 10 ML: 5 INJECTION INTRAVENOUS at 20:13

## 2024-11-23 RX ADMIN — INSULIN GLARGINE 10 UNITS: 100 INJECTION, SOLUTION SUBCUTANEOUS at 09:53

## 2024-11-23 RX ADMIN — SODIUM CHLORIDE: 9 INJECTION, SOLUTION INTRAVENOUS at 09:56

## 2024-11-23 RX ADMIN — ISOSORBIDE MONONITRATE 60 MG: 60 TABLET, EXTENDED RELEASE ORAL at 09:53

## 2024-11-23 RX ADMIN — INSULIN GLARGINE 10 UNITS: 100 INJECTION, SOLUTION SUBCUTANEOUS at 20:12

## 2024-11-23 RX ADMIN — METOPROLOL TARTRATE 12.5 MG: 25 TABLET, FILM COATED ORAL at 09:54

## 2024-11-23 ASSESSMENT — PAIN DESCRIPTION - ORIENTATION
ORIENTATION: MID
ORIENTATION: MID

## 2024-11-23 ASSESSMENT — PAIN SCALES - GENERAL
PAINLEVEL_OUTOF10: 1
PAINLEVEL_OUTOF10: 4
PAINLEVEL_OUTOF10: 4

## 2024-11-23 ASSESSMENT — PAIN DESCRIPTION - LOCATION
LOCATION: CHEST
LOCATION: CHEST

## 2024-11-23 ASSESSMENT — PAIN DESCRIPTION - DESCRIPTORS
DESCRIPTORS: PRESSURE
DESCRIPTORS: PRESSURE

## 2024-11-23 ASSESSMENT — PAIN - FUNCTIONAL ASSESSMENT: PAIN_FUNCTIONAL_ASSESSMENT: ACTIVITIES ARE NOT PREVENTED

## 2024-11-23 NOTE — PROGRESS NOTES
Heart attack teaching covered with patient and/or family or significant other:  Signs and symptoms of a heart attack.  When to call 911 and the importance of calling 911.  Personal risk factors and ways to lower their risk.  4.   Importance of quitting smoking if applicable.     Heart attack booklet given to the patient and/or family or significant other. Reviewed:  How to take Nitroglycerin.  The importance of participating in Cardiac Rehab and hours of operation.   Heart Healthy Diet.  Risk factor modification.(Overweight, Obesity, Diabetes, Hypertension, Smoking, High Cholesterol, Stress)  Discharge instructions for Cath/Intervention procedure site if applicable.          Stent card given to pt

## 2024-11-23 NOTE — PLAN OF CARE
Problem: Chronic Conditions and Co-morbidities  Goal: Patient's chronic conditions and co-morbidity symptoms are monitored and maintained or improved  Outcome: Progressing  Flowsheets (Taken 11/23/2024 0949)  Care Plan - Patient's Chronic Conditions and Co-Morbidity Symptoms are Monitored and Maintained or Improved: Monitor and assess patient's chronic conditions and comorbid symptoms for stability, deterioration, or improvement     Problem: Discharge Planning  Goal: Discharge to home or other facility with appropriate resources  Outcome: Progressing  Flowsheets (Taken 11/23/2024 0980)  Discharge to home or other facility with appropriate resources: Identify barriers to discharge with patient and caregiver     Problem: Pain  Goal: Verbalizes/displays adequate comfort level or baseline comfort level  Outcome: Progressing     Problem: Safety - Adult  Goal: Free from fall injury  Outcome: Progressing     Problem: ABCDS Injury Assessment  Goal: Absence of physical injury  Outcome: Progressing     Problem: Skin/Tissue Integrity  Goal: Absence of new skin breakdown  Description: 1.  Monitor for areas of redness and/or skin breakdown  2.  Assess vascular access sites hourly  3.  Every 4-6 hours minimum:  Change oxygen saturation probe site  4.  Every 4-6 hours:  If on nasal continuous positive airway pressure, respiratory therapy assess nares and determine need for appliance change or resting period.  Outcome: Progressing

## 2024-11-23 NOTE — PROGRESS NOTES
Hospitalist Progress Note    Patient:  Chris Estevez      Unit/Bed:3B-38/038-A    YOB: 1955    MRN: [id number]       Acct: [id number]     PCP: Kelvin Hardy DO    Date of Admission: 11/22/2024    Assessment/Plan:    NSTEMI: Presented with left-sided CP, radiating to left arm/jaw progressively worse and some improvement with nitro.  HST 49, 41.  EKG without ischemic changes. S/p  x 1 followed by ACS 81.  On heparin gtt.  Echo pending.  N.p.o., cardiology evaluated, plan for [facility].  Obstructive CAD: S/p PCI and stenting of RCA 2021.  On metoprolol, Imdur, Crestor, and Plavix.  HTN: Continue metoprolol 12.5 mg p.o. twice daily, Imdur 60 mg p.o. daily with holding parameters.  Monitor BP.  HLD: Continue Crestor 40 mg p.o. daily and Zetia 10 mg p.o. daily  History of PE: In 2018.  On Xarelto, held in setting of [facility], see above.  Currently on heparin gtt.  Transition to Xarelto postprocedure.  IDDMII: Last HgbA1c 7.9 7/2024.  Home antidiabetic regimen includes Lantus 20 units SQ twice daily, lispro 10 units 3 times daily and metformin.  Started on Lantus 10 units subcu twice daily.  Monitor blood glucose with daily BMP.  Adjust insulin accordingly.  Hypoglycemic protocols initiated.  SHAHRAM: On home CPAP  History of Chiari malformation: Noted  GERD: Continue pantoprazole 40 mg p.o. daily  Acute normocytic anemia: Unclear etiology, suspect hemodilution.  Baseline~ 14. No obvious bleeding.  On heparin gtt, no petechia/ecchymosis.  Monitor Hgb daily CBC.  Transfuse.  Received Hgb <7.0 or hemodynamically unstable.        Expected discharge date: TBD    Disposition:    [x] Home       [] TCU       [] Rehab       [] Psych       [] SNF       [] Long Term Care Facility       [] Other-    Chief Complaint: Left-sided chest pain    Hospital Course:   Per HPI, \"Chris Estevez is a 69 y.o. male with PMHx of saddle PE on Xarelto, HTN, HLD, IDDM2, depression, anxiety, s/p esophageal dilation, gastroparesis,

## 2024-11-23 NOTE — PROCEDURES
PROCEDURE NOTE  Date: 11/23/2024   Name: Chris Estevez  YOB: 1955    Procedures  12 lead EKG completed. Results handed to José Chavez CET

## 2024-11-23 NOTE — H&P
[facility]  Sedation/Analgesia History & Physical    Pt Name: Chris Estevez  Account number: [id number]  MRN: [id number]  YOB: 1955  Provider Performing Procedure: Caitlin Diaz MD MD  Referring Provider: Lior Richard DO   Primary Care Physician: Kelvin Hardy DO  Date: 11/23/2024    PRE-PROCEDURE    Code Status: FULL CODE  Brief History/Pre-Procedure Diagnosis:   NSTEMI  Consent: : I have discussed with the patient risks, benefits, and alternatives (and relevant risks, benefits, and side effects related to alternatives or not receiving care), and likelihood of the success.   The patient and/or representative appear to understand and agree to proceed.  The discussion encompasses risks, benefits, and side effects related to the alternatives and the risks related to not receiving the proposed care, treatment, and services.     The indication, risks and benefits of the procedure and possible therapeutic consequences and alternatives were discussed with the patient. The patient was given the opportunity to ask questions and to have them answered to his/her satisfaction. Risks of the procedure include but are not limited to the following: Bleeding, hematoma including retroperitoneal hemmorhage, infection, pain and discomfort, injury to the aorta and other blood vessels, rhythm disturbance, low blood pressure, myocardial infarction, stroke, kidney damage/failure, myocardial perforation, allergic reactions to sedatives/contrast material, loss of pulse/vascular compromise requiring surgery, aneurysm/pseudoaneurysm formation, possible loss of a limb/hand/leg, needing blood transfusion, requiring emergent open heart surgery or emergent coronary intervention, the need for intubation/respiratory support, the requirement for defibrillation/cardioversion, and death. Alternatives to and omission of the suggested procedure were discussed. The patient had no further questions and wished to  Juliano Chavez MD, Last Rate: 13.3 mL/hr at 11/23/24 0308, 15 Units/kg/hr at 11/23/24 0308    insulin glargine (LANTUS) injection vial 10 Units, 10 Units, SubCUTAneous, BID, Juliano Chavez MD, 10 Units at 11/22/24 2229    glucose chewable tablet 16 g, 4 tablet, Oral, PRN, Juliano Chavez MD    dextrose bolus 10% 125 mL, 125 mL, IntraVENous, PRN **OR** dextrose bolus 10% 250 mL, 250 mL, IntraVENous, PRN, Juliano Chavez MD    glucagon injection 1 mg, 1 mg, SubCUTAneous, PRN, Juliano Chavez MD    dextrose 10 % infusion, , IntraVENous, Continuous PRN, Juliano Chavez MD    cyclobenzaprine (FLEXERIL) tablet 5 mg, 5 mg, Oral, TID PRN, Juliano Chavez MD, 5 mg at 11/22/24 1431    clopidogrel (PLAVIX) tablet 75 mg, 75 mg, Oral, Daily, Juliano Chavez MD, 75 mg at 11/22/24 2022    metoprolol tartrate (LOPRESSOR) tablet 12.5 mg, 12.5 mg, Oral, BID, Juliano Chavez MD, 12.5 mg at 11/22/24 2022  Prior to Admission medications    Medication Sig Start Date End Date Taking? Authorizing Provider   metoprolol tartrate (LOPRESSOR) 25 MG tablet Take 1 tablet by mouth 2 times daily  Patient taking differently: Take 0.5 tablets by mouth 2 times daily 9/13/24  Yes Aminta Martinez MD   isosorbide mononitrate (IMDUR) 30 MG extended release tablet Take 2 tablets by mouth daily 9/13/24 6/10/25 Yes Aminta Martinez MD   XARELTO 15 MG TABS tablet Take 1 tablet by mouth once daily with breakfast 7/17/24  Yes Caitlin Diaz MD   rosuvastatin (CRESTOR) 40 MG tablet Take 1 tablet by mouth daily 5/28/24  Yes Caitlin Diaz MD   Vitamin D3 125 MCG (5000 UT) TABS tablet Take 1 tablet by mouth daily   Yes Provider, MD Stuart   metFORMIN (GLUCOPHAGE) 1000 MG tablet Take 1 tablet by mouth 2 times daily (with meals) 3/21/24  Yes Kelvin Hardy,    nitroGLYCERIN (NITROSTAT) 0.4 MG SL tablet Place 1 tablet under the tongue every 5 minutes as needed for Chest pain up to max of 3 total doses. If no relief after 1 dose, call 911. 1/5/24

## 2024-11-24 VITALS
WEIGHT: 195 LBS | HEIGHT: 69 IN | SYSTOLIC BLOOD PRESSURE: 145 MMHG | DIASTOLIC BLOOD PRESSURE: 76 MMHG | RESPIRATION RATE: 14 BRPM | TEMPERATURE: 98.2 F | BODY MASS INDEX: 28.88 KG/M2 | OXYGEN SATURATION: 99 % | HEART RATE: 62 BPM

## 2024-11-24 LAB
ANION GAP SERPL CALC-SCNC: 8 MEQ/L (ref 8–16)
BUN SERPL-MCNC: 12 MG/DL (ref 7–22)
CALCIUM SERPL-MCNC: 8.7 MG/DL (ref 8.5–10.5)
CHLORIDE SERPL-SCNC: 104 MEQ/L (ref 98–111)
CO2 SERPL-SCNC: 25 MEQ/L (ref 23–33)
CREAT SERPL-MCNC: 1.1 MG/DL (ref 0.4–1.2)
DEPRECATED RDW RBC AUTO: 41.9 FL (ref 35–45)
ERYTHROCYTE [DISTWIDTH] IN BLOOD BY AUTOMATED COUNT: 13 % (ref 11.5–14.5)
GFR SERPL CREATININE-BSD FRML MDRD: 73 ML/MIN/1.73M2
GLUCOSE BLD STRIP.AUTO-MCNC: 130 MG/DL (ref 70–108)
GLUCOSE SERPL-MCNC: 120 MG/DL (ref 70–108)
HCT VFR BLD AUTO: 37.1 % (ref 42–52)
HGB BLD-MCNC: 12.5 GM/DL (ref 14–18)
MCH RBC QN AUTO: 29.8 PG (ref 26–33)
MCHC RBC AUTO-ENTMCNC: 33.7 GM/DL (ref 32.2–35.5)
MCV RBC AUTO: 88.3 FL (ref 80–94)
PLATELET # BLD AUTO: 247 THOU/MM3 (ref 130–400)
PMV BLD AUTO: 8.7 FL (ref 9.4–12.4)
POTASSIUM SERPL-SCNC: 4.1 MEQ/L (ref 3.5–5.2)
RBC # BLD AUTO: 4.2 MILL/MM3 (ref 4.7–6.1)
SODIUM SERPL-SCNC: 137 MEQ/L (ref 135–145)
WBC # BLD AUTO: 9.7 THOU/MM3 (ref 4.8–10.8)

## 2024-11-24 PROCEDURE — 6370000000 HC RX 637 (ALT 250 FOR IP): Performed by: STUDENT IN AN ORGANIZED HEALTH CARE EDUCATION/TRAINING PROGRAM

## 2024-11-24 PROCEDURE — 80048 BASIC METABOLIC PNL TOTAL CA: CPT

## 2024-11-24 PROCEDURE — 2580000003 HC RX 258

## 2024-11-24 PROCEDURE — 2580000003 HC RX 258: Performed by: INTERNAL MEDICINE

## 2024-11-24 PROCEDURE — 85027 COMPLETE CBC AUTOMATED: CPT

## 2024-11-24 PROCEDURE — 6370000000 HC RX 637 (ALT 250 FOR IP): Performed by: INTERNAL MEDICINE

## 2024-11-24 PROCEDURE — 36415 COLL VENOUS BLD VENIPUNCTURE: CPT

## 2024-11-24 PROCEDURE — 6370000000 HC RX 637 (ALT 250 FOR IP)

## 2024-11-24 PROCEDURE — 82948 REAGENT STRIP/BLOOD GLUCOSE: CPT

## 2024-11-24 PROCEDURE — 99232 SBSQ HOSP IP/OBS MODERATE 35: CPT | Performed by: STUDENT IN AN ORGANIZED HEALTH CARE EDUCATION/TRAINING PROGRAM

## 2024-11-24 PROCEDURE — 99239 HOSP IP/OBS DSCHRG MGMT >30: CPT | Performed by: STUDENT IN AN ORGANIZED HEALTH CARE EDUCATION/TRAINING PROGRAM

## 2024-11-24 PROCEDURE — 6360000002 HC RX W HCPCS

## 2024-11-24 RX ORDER — ASPIRIN 81 MG/1
81 TABLET ORAL DAILY
Qty: 30 TABLET | Refills: 3 | Status: SHIPPED | OUTPATIENT
Start: 2024-11-25

## 2024-11-24 RX ADMIN — ROSUVASTATIN CALCIUM 40 MG: 20 TABLET, FILM COATED ORAL at 08:35

## 2024-11-24 RX ADMIN — INSULIN GLARGINE 10 UNITS: 100 INJECTION, SOLUTION SUBCUTANEOUS at 08:33

## 2024-11-24 RX ADMIN — CLOPIDOGREL BISULFATE 75 MG: 75 TABLET ORAL at 08:36

## 2024-11-24 RX ADMIN — EZETIMIBE 10 MG: 10 TABLET ORAL at 08:35

## 2024-11-24 RX ADMIN — ASPIRIN 81 MG: 81 TABLET, COATED ORAL at 08:35

## 2024-11-24 RX ADMIN — PANTOPRAZOLE SODIUM 40 MG: 40 TABLET, DELAYED RELEASE ORAL at 08:36

## 2024-11-24 RX ADMIN — METOPROLOL TARTRATE 12.5 MG: 25 TABLET, FILM COATED ORAL at 08:36

## 2024-11-24 RX ADMIN — ONDANSETRON 4 MG: 2 INJECTION INTRAMUSCULAR; INTRAVENOUS at 03:54

## 2024-11-24 RX ADMIN — SODIUM CHLORIDE, PRESERVATIVE FREE 10 ML: 5 INJECTION INTRAVENOUS at 08:32

## 2024-11-24 RX ADMIN — LIDOCAINE HYDROCHLORIDE: 20 SOLUTION ORAL at 08:41

## 2024-11-24 RX ADMIN — ISOSORBIDE MONONITRATE 60 MG: 60 TABLET, EXTENDED RELEASE ORAL at 08:37

## 2024-11-24 ASSESSMENT — PAIN SCALES - GENERAL: PAINLEVEL_OUTOF10: 0

## 2024-11-24 NOTE — DISCHARGE INSTRUCTIONS
F/u with Dr Diaz's office in 1-2 weeks.     Cont aspirin, plavix, xarelto x 1 month. After 1 month, stop aspirin and continue only plavix, xarelto.     Discharge Instructions for Radial Heart Catherization    1.  Take it easy for 3-4 days.  2.  No driving for 2 days.  3.  No lifting of 5 lbs or more for 5 days with the affected arm.  4.  Can shower after 24 hours.  5.  Remove arm board after 24 hours.  6.  Apply a band aid to the insertion site daily for 5 days.  May apply antibiotic ointment if desired, but not necessary.  Wash site daily with soap and water.  7.  No creams, ointments, or powders near the insertion site.   8.  No tub baths, swimming, hot tubs, or hand washing dishes for 1 week.  9.  Watch for signs of infection (redness, warmth, swelling, or pus drainage) or coolness of extremity and call physician if this occurs  10.  If bleeding occurs from insertion site, apply pressure and call 911.

## 2024-11-24 NOTE — PROCEDURES
PROCEDURE NOTE  Date: 11/23/2024   Name: Chris Estevez  YOB: 1955    Procedures    12 lead EKG completed. Results handed to Lang ABDULLAHI.

## 2024-11-24 NOTE — PROGRESS NOTES
Cardiology Progress Note      Patient:  Chris Estevez  YOB: 1955  MRN: [id number]   Acct: [id number]  Admit Date:  11/22/2024  Primary Cardiologist: Caitlin Diaz MD  Note per DR Irizarry:  \"CHIEF COMPLAINT: CP        HPI: This is a pleasant 69 y.o. male presents with chest pain. PMHx CAD, Type 2 DM, GERD, HLD, HTN, PE (2018), SHAHRAM and history of chiari malformation. Patient developed chest pain on 11/21 around 12 pm which did not improvement with nitro tablets. Pain continued throughout the night and worsened with radiation to left arm and jaw. Pain was 5/10 and now 8/10. Previous cath in 2021 required PCI and stenting of RCA. Notes episode of vomiting this morning. Nitro has not relieved pain and continues to have intermittent episodes.      Troponin-49->41 (previous 29->25)  EKG-no ischemic changes noted.  Echo: 01/2024 showed EF 55-60%. Normal diastolic function. Normal wall motion.   Previous in 2021 showed EF 60-65%.         All labs, EKG's, diagnostic testing and images as well as cardiac cath, stress testing were reviewed during this encounter\"    Subjective (Events in last 24 hours):   Pt awake, alert. NAD. Denies cp or sob, no swelling. D/w patient about importance of plavix and asa for new heart stents. D/w patient about cardiac rehab will reach out. Recommend this. They understand to monitor and watch cath site for any new or worsening pain/swelling/etc and return to ED if experiencing any. They will f/u in 1-2 weeks in the office per scheduling.     Right radial-no ecchymosis, nontender, no edema, NVI     Has hx of gastroparesis, having some stomach issues since syterday. Also does not do well with narcotics and got fentanyl yesterday.   Objective:   BP (!) 143/81   Pulse 65   Temp 98.3 °F (36.8 °C) (Oral)   Resp 15   Ht 1.753 m (5' 9\")   Wt 88.5 kg (195 lb)   SpO2 97%   BMI 28.80 kg/m²      Vss   TELEMETRY: nsr     Physical Exam:  General Appearance: alert and oriented to person,

## 2024-11-24 NOTE — DISCHARGE SUMMARY
Hospital Medicine Discharge Summary      Patient Identification:   Chris Estevez   : 1955  MRN: [id number]   Account: [id number]      Patient's PCP: Kelvin Hardy DO    Admit Date: 2024     Discharge Date:   2024    Admitting Physician: Lior Richard DO     Discharge Physician: Lior Richard DO     Discharge Diagnoses:  NSTEMI: Presented with left-sided CP, radiating to left arm/jaw progressively worse and some improvement with nitro.  HST 49, 41.  EKG without ischemic changes. S/p  x 1 followed by ASA 8 and heparin gtt.  Echo EF 55-30%, diastolic dysfunction 2024.  Cardiology consulted. LHC that showed severe mid LAD stenosis s/p PCI/GI, severe ISR of distal RCA s/p PTCA ([place] cutting ballon and agent DCBB 3.5 mm*12 mm) . Continue DAPT, BB and statin. Follow up with outpatient cardiology.   Obstructive CAD: S/p PCI and  stenting of RCA . Recurrent LHC for NSTEMI, see above resulted in PCI to LAD and PTCA to distal RCA .  On metoprolol, Imdur, Crestor, and Plavix.  HTN: Continue metoprolol 12.5 mg p.o. twice daily, Imdur 60 mg p.o. daily with holding parameters.  Monitor BP.  HLD: Continue Crestor 40 mg p.o. daily and Zetia 10 mg p.o. daily  History of PE: In .  On Xarelto, held in setting of [facility], see above.  Currently on heparin gtt.  Transition to Xarelto postprocedure.  IDDMII: Last HgbA1c 7.9 2024.  Home antidiabetic regimen includes Lantus 20 units SQ twice daily, lispro 10 units 3 times daily and metformin.  Started on Lantus 10 units subcu twice daily.  Monitor blood glucose with daily BMP.  Adjust insulin accordingly.  Hypoglycemic protocols initiated.  SHAHRAM: On home CPAP  History of Chiari malformation: Noted  GERD: Continue pantoprazole 40 mg p.o. daily  Acute normocytic anemia: Unclear etiology, suspect hemodilution.  Baseline~ 14. No obvious bleeding.  On heparin gtt, no petechia/ecchymosis.  Monitor Hgb daily CBC.  Transfuse.  Received Hgb <7.0

## 2024-11-25 ENCOUNTER — TELEPHONE (OUTPATIENT)
Dept: CARDIAC REHAB | Age: 69
End: 2024-11-25

## 2024-11-25 ENCOUNTER — CARE COORDINATION (OUTPATIENT)
Dept: CASE MANAGEMENT | Age: 69
End: 2024-11-25

## 2024-11-25 ENCOUNTER — TELEPHONE (OUTPATIENT)
Dept: FAMILY MEDICINE CLINIC | Age: 69
End: 2024-11-25

## 2024-11-25 DIAGNOSIS — I20.89 ANGINA OF EFFORT (HCC): Primary | ICD-10-CM

## 2024-11-25 DIAGNOSIS — Z98.61 POSTSURGICAL PERCUTANEOUS TRANSLUMINAL CORONARY ANGIOPLASTY STATUS: Primary | ICD-10-CM

## 2024-11-25 PROCEDURE — 1111F DSCHRG MED/CURRENT MED MERGE: CPT | Performed by: FAMILY MEDICINE

## 2024-11-25 NOTE — TELEPHONE ENCOUNTER
Inpatient Cardiac Rehabilitation Consult    Received consult for Phase II Cardiac Rehabilitation.  Patient needs cardiac rehab due to PCI on 11/23/24.  Importance of Cardiac Rehab discussed with patient. Pt currently in PT for back issues. Will call pt back in January to see if he has finished with PT.

## 2024-11-25 NOTE — CARE COORDINATION
Care Transitions Note    Initial Call - Call within 2 business days of discharge: Yes    Attempted to reach patient for transitions of care follow up. Unable to reach patient.    Outreach Attempts:   HIPAA compliant voicemail left for patient.     1st attempt to contact pt for initial care transition follow up.  Unable to reach pt.  Left message with contact information and request for call back.      Patient: Chris Estevez    Patient : 1955   MRN: [id number]    Reason for Admission: Chest pain  Discharge Date: 24  RURS: Readmission Risk Score: 10.7    Last Discharge Facility       Date Complaint Diagnosis Description Type Department Provider    24 Chest Pain Chest pain, unspecified type ... ED to Hosp-Admission (Discharged) (ADMITTED) [facility] Lior Richard, DO; Familia Greenwood DO; ...            Was this an external facility discharge? No    Follow Up Appointment:   Patient has hospital follow up appointment scheduled within 14 days of discharge.    Future Appointments         Provider Specialty Dept Phone    2024 8:30 AM Kelvin Hardy DO Family Medicine [phone]    2024 8:45 AM Mortimer, Connor, PA-C Cardiology [phone]    1/15/2025 7:45 AM Kelvin Hardy DO Family Medicine [phone]    2025 1:30 PM Caitlin Diaz MD Cardiology [phone]            Plan for follow-up on next business day.      Va Antoine RN        
12/2/24 and cardiology on 12/4/24.  No other questions, concerns or needs at this time.  He is agreeable to follow up calls.      Care Transition Nurse reviewed discharge instructions and medical action plan with patient. The patient was given an opportunity to ask questions; all questions answered at this time.. The patient verbalized understanding.   Were discharge instructions available to patient? Yes.   Reviewed appropriate site of care based on symptoms and resources available to patient including: PCP  Specialist  When to call 911. The patient agrees to contact the primary care provider and/or specialist office for questions related to their healthcare.      Advance Care Planning:   Does patient have an Advance Directive: reviewed and current.    Medication Reconciliation:  Medication reconciliation was performed with patient,1111F entered: yes.     Remote Patient Monitoring:  Offered patient enrollment in the Remote Patient Monitoring (RPM) program for in-home monitoring: did not discuss during initial call.    Assessments:  Care Transitions 24 Hour Call    Do you have a copy of your discharge instructions?: Yes  Do you have all of your prescriptions and are they filled?: Yes (Comment: picking up Aspirin today)  Have you been contacted by a [facility] Pharmacist?: No  Have you scheduled your follow up appointment?: Yes  How are you going to get to your appointment?: Car - drive self  Patient Home Equipment: BiPAP  Do you have support at home?: Partner/Spouse/SO  Do you feel like you have everything you need to keep you well at home?: Yes  Are you an active caregiver in your home?: No  Care Transitions Interventions     Transportation Support: Declined   Disease Specific Clinic: Completed      Disease Association: Completed                Follow Up Appointment:   Discussed follow up appointments. Patient has hospital follow up appointment scheduled within 14 days of discharge.   Future Appointments

## 2024-11-25 NOTE — TELEPHONE ENCOUNTER
For Pharmacy Admin Tracking Only  Program: [facility]  CPA in place:  No  Recommendation Provided To: Pharmacy: 1  Intervention Detail: Adherence Monitorin  Intervention Accepted By: Pharmacy: 1  Gap Closed?: Yes   Time Spent (min): 20

## 2024-11-25 NOTE — TELEPHONE ENCOUNTER
Care Transitions Initial Follow Up Call    Outreach made within 2 business days of discharge: Yes    Patient: Chris Estevez Patient : 1955   MRN: [id number]  Reason for Admission: 2024  Discharge Date: 24       Spoke with: patient    Discharge department/facility: [facility]    TCM Interactive Patient Contact:  Was patient able to fill all prescriptions: Yes  Was patient instructed to bring all medications to the follow-up visit: Yes  Is patient taking all medications as directed in the discharge summary? Yes  Does patient understand their discharge instructions: Yes  Does patient have questions or concerns that need addressed prior to 7-14 day follow up office visit: no    Additional needs identified to be addressed with provider  No needs identified             Scheduled appointment with PCP within 7-14 days    Follow Up  Future Appointments   Date Time Provider Department Center   1/15/2025  7:45 AM Kelvin Hardy DO [facility]   2025  1:30 PM Caitlin Diaz MD [facility] - Cecily ARZATE LPN

## 2024-11-26 NOTE — PROGRESS NOTES
with DR Diaz as scheduled or sooner if needed.    Discussed use, benefit, and side effects of prescribed medications. All patient questions answered. Pt voiced understanding. Instructed to continue current medications, diet and exercise. Continue risk factor modification and medical management. Patient agreed with treatment plan. Follow up as directed.    Electronically signedby Connor Mortimer, PA-C on 12/4/2024 at 9:03 AM

## 2024-12-02 ENCOUNTER — OFFICE VISIT (OUTPATIENT)
Dept: FAMILY MEDICINE CLINIC | Age: 69
End: 2024-12-02

## 2024-12-02 VITALS
DIASTOLIC BLOOD PRESSURE: 82 MMHG | SYSTOLIC BLOOD PRESSURE: 124 MMHG | BODY MASS INDEX: 30.52 KG/M2 | HEART RATE: 64 BPM | RESPIRATION RATE: 16 BRPM | WEIGHT: 206.7 LBS

## 2024-12-02 DIAGNOSIS — E11.69 TYPE 2 DIABETES MELLITUS WITH OTHER SPECIFIED COMPLICATION, WITH LONG-TERM CURRENT USE OF INSULIN (HCC): ICD-10-CM

## 2024-12-02 DIAGNOSIS — E78.2 MIXED HYPERLIPIDEMIA: ICD-10-CM

## 2024-12-02 DIAGNOSIS — Z79.4 TYPE 2 DIABETES MELLITUS WITH OTHER SPECIFIED COMPLICATION, WITH LONG-TERM CURRENT USE OF INSULIN (HCC): ICD-10-CM

## 2024-12-02 DIAGNOSIS — I10 PRIMARY HYPERTENSION: ICD-10-CM

## 2024-12-02 DIAGNOSIS — G47.33 OSA (OBSTRUCTIVE SLEEP APNEA): ICD-10-CM

## 2024-12-02 DIAGNOSIS — Z95.5 H/O HEART ARTERY STENT: ICD-10-CM

## 2024-12-02 DIAGNOSIS — I21.4 NSTEMI (NON-ST ELEVATED MYOCARDIAL INFARCTION) (HCC): Primary | ICD-10-CM

## 2024-12-02 DIAGNOSIS — I25.10 CORONARY ARTERY DISEASE INVOLVING NATIVE CORONARY ARTERY OF NATIVE HEART WITHOUT ANGINA PECTORIS: ICD-10-CM

## 2024-12-02 NOTE — PROGRESS NOTES
Wt Readings from Last 3 Encounters:   12/02/24 93.8 kg (206 lb 11.2 oz)   11/22/24 88.5 kg (195 lb)   08/27/24 91.7 kg (202 lb 1.6 oz)     BP Readings from Last 3 Encounters:   12/02/24 124/82   11/24/24 (!) 145/76   08/27/24 136/72        Patient was admitted to [facility] from 11/22/24 to 11/24/24 for NSTEMI.    Inpatient course: Discharge summary reviewed- see chart.    Current status: improved    Review of Systems:  A comprehensive review of systems was negative except for what was noted in the HPI.    Physical Exam:  General Appearance: alert and oriented to person, place and time, well developed and well- nourished, in no acute distress  Skin: warm and dry, no rash or erythema  Head: normocephalic and atraumatic  Eyes: pupils equal, round, and reactive to light, extraocular eye movements intact, conjunctivae normal  ENT: tympanic membrane, external ear and ear canal normal bilaterally, nose without deformity, nasal mucosa and turbinates normal without polyps  Neck: supple and non-tender without mass, no thyromegaly or thyroid nodules, no cervical lymphadenopathy  Pulmonary/Chest: clear to auscultation bilaterally- no wheezes, rales or rhonchi, normal air movement, no respiratory distress  Cardiovascular: normal rate, regular rhythm, normal S1 and S2, no murmurs, rubs, clicks, or gallops, distal pulses intact, no carotid bruits  Abdomen: soft, non-tender, non-distended, normal bowel sounds, no masses or organomegaly  Extremities: no cyanosis, clubbing or edema  Musculoskeletal: normal range of motion, no joint swelling, deformity or tenderness  Neurologic: reflexes normal and symmetric, no cranial nerve deficit, gait, coordination and speech normal    Initial post-discharge communication occurred between nurse and patient on 11/25/24- see documentation in chart: telephone encounter.    Assessment/Plan:  Chris \"Itz\" was seen today for follow-up from hospital.    Diagnoses and all orders

## 2024-12-03 ENCOUNTER — CARE COORDINATION (OUTPATIENT)
Dept: CASE MANAGEMENT | Age: 69
End: 2024-12-03

## 2024-12-03 NOTE — CARE COORDINATION
Care Transitions Note    Follow Up Call     Patient Current Location:  Home: [address] [place] [address] [place] [address]    Care Transition Nurse contacted the patient by telephone. Verified name and  as identifiers.    Additional needs identified to be addressed with provider   No needs identified                 Method of communication with provider: none.    Care Summary Note: Received incoming call from pt.  Itz states he is doing okay.  Reports he is back to work.  He is not overexerting himself and aware of his limits.  He is taking rest periods as needed.  Reports breathing is still labored at times.  May also have slight chest heaviness but no pain.  Denies distress.  States he feels may be d/t the food getting stuck in esophagus.  Was to get an EGD and colonoscopy but put on hold until talking to other providers.  Has a follow up with cardiology tomorrow.  He is monitoring his BP and BS.  Introduced RPM and benefits.  Pt declines enrollment and prefers to self monitor.  Advised to keep a log of his BP and BS and contact provider with abnormal readings.  Reports highest BS has been 148 and highest SBP has been 165.  Reports BP typically running 120/80.  Reports he is eating but does monitor intake.  Reports he has gastroparesis and vomits daily first thing in the morning.  States he is otherwise fine throughout the day.  Pt admits to not drinking enough fluids.  He is trying but only able to take small amounts at a time.  He then reports he is unsure of what he will do because he is suppose to start cardiac rehab but also regular therapy for his back.  He plans to discuss with his provider to see which one he should do first. He is aware of when to contact providers.  No questions or concerns at this time.      Plan of care updates since last contact:  Review of patient management of conditions/medications:         Advance Care Planning:   Does patient have an Advance Directive:  on file .    Medication

## 2024-12-04 ENCOUNTER — OFFICE VISIT (OUTPATIENT)
Dept: CARDIOLOGY CLINIC | Age: 69
End: 2024-12-04
Payer: MEDICARE

## 2024-12-04 ENCOUNTER — TELEPHONE (OUTPATIENT)
Dept: FAMILY MEDICINE CLINIC | Age: 69
End: 2024-12-04

## 2024-12-04 VITALS
SYSTOLIC BLOOD PRESSURE: 127 MMHG | HEART RATE: 62 BPM | BODY MASS INDEX: 29.62 KG/M2 | WEIGHT: 200 LBS | DIASTOLIC BLOOD PRESSURE: 73 MMHG | HEIGHT: 69 IN

## 2024-12-04 DIAGNOSIS — I25.10 CORONARY ARTERY DISEASE INVOLVING NATIVE CORONARY ARTERY OF NATIVE HEART WITHOUT ANGINA PECTORIS: ICD-10-CM

## 2024-12-04 DIAGNOSIS — Z79.4 TYPE 2 DIABETES MELLITUS WITH OTHER SPECIFIED COMPLICATION, WITH LONG-TERM CURRENT USE OF INSULIN (HCC): Primary | ICD-10-CM

## 2024-12-04 DIAGNOSIS — E11.69 TYPE 2 DIABETES MELLITUS WITH OTHER SPECIFIED COMPLICATION, WITH LONG-TERM CURRENT USE OF INSULIN (HCC): Primary | ICD-10-CM

## 2024-12-04 DIAGNOSIS — I10 PRIMARY HYPERTENSION: ICD-10-CM

## 2024-12-04 DIAGNOSIS — I21.4 NSTEMI (NON-ST ELEVATED MYOCARDIAL INFARCTION) (HCC): Primary | ICD-10-CM

## 2024-12-04 PROCEDURE — 99214 OFFICE O/P EST MOD 30 MIN: CPT | Performed by: STUDENT IN AN ORGANIZED HEALTH CARE EDUCATION/TRAINING PROGRAM

## 2024-12-04 PROCEDURE — 1123F ACP DISCUSS/DSCN MKR DOCD: CPT | Performed by: STUDENT IN AN ORGANIZED HEALTH CARE EDUCATION/TRAINING PROGRAM

## 2024-12-04 PROCEDURE — 3074F SYST BP LT 130 MM HG: CPT | Performed by: STUDENT IN AN ORGANIZED HEALTH CARE EDUCATION/TRAINING PROGRAM

## 2024-12-04 PROCEDURE — 3078F DIAST BP <80 MM HG: CPT | Performed by: STUDENT IN AN ORGANIZED HEALTH CARE EDUCATION/TRAINING PROGRAM

## 2024-12-04 PROCEDURE — 1159F MED LIST DOCD IN RCRD: CPT | Performed by: STUDENT IN AN ORGANIZED HEALTH CARE EDUCATION/TRAINING PROGRAM

## 2024-12-04 NOTE — TELEPHONE ENCOUNTER
Patient called and stated that he currently has the joseph 3 as that is what his insurance covers. He stated he has been through many sensors as they keep reading his blood sugar low. He stated that it alarms every half hour. He stated that one night he wasn't getting any rest.   He had to change the sensor 4 times in one day. He stated that he use to have dexcom and he didn't have this trouble. He stated that the insurance stated that with the joseph this tends to happen. Patient stated that he is done with the joseph. He stated that his blood sugar will read below 50 on the joseph and it will go off but he will do a finger stick and his blood sugar will be 112-120.   He stated that he did speak with his insurance and they told him to have his PCP order the newest Dexcom and see if we can get it covered.

## 2024-12-04 NOTE — PATIENT INSTRUCTIONS
You may receive a survey regarding the care you received during your visit. We encourage you to complete and return your survey, as your input is valuable to us. We hope you will choose us in the future for your healthcare needs. Thank you!    Your Medical Assistant today: MARGARITA Delgado  Thank you for coming to our office! It was a pleasure to serve you.

## 2024-12-05 ENCOUNTER — TELEPHONE (OUTPATIENT)
Dept: FAMILY MEDICINE CLINIC | Age: 69
End: 2024-12-05

## 2024-12-05 RX ORDER — ACYCLOVIR 400 MG/1
TABLET ORAL
Qty: 9 EACH | Refills: 3 | Status: SHIPPED | OUTPATIENT
Start: 2024-12-05

## 2024-12-05 RX ORDER — ACYCLOVIR 400 MG/1
TABLET ORAL
Qty: 1 EACH | Refills: 0 | Status: SHIPPED | OUTPATIENT
Start: 2024-12-05

## 2024-12-05 NOTE — TELEPHONE ENCOUNTER
Dexcom G7 Sensor Approved today by [employer] 2017  PA Case: [id number], Status: Approved, Coverage Starts on: 9/5/2024 12:00:00 AM, Coverage Ends on: 12/5/2025 12:00:00 AM.    DEXCOM G7    Available without authorization    Episode linked.

## 2024-12-06 ENCOUNTER — CARE COORDINATION (OUTPATIENT)
Dept: CARE COORDINATION | Age: 69
End: 2024-12-06

## 2024-12-06 ENCOUNTER — TELEPHONE (OUTPATIENT)
Dept: CARDIOLOGY CLINIC | Age: 69
End: 2024-12-06

## 2024-12-06 RX ORDER — EZETIMIBE 10 MG/1
10 TABLET ORAL DAILY
Qty: 30 TABLET | Refills: 3 | Status: SHIPPED | OUTPATIENT
Start: 2024-12-06

## 2024-12-06 NOTE — TELEPHONE ENCOUNTER
Chris Estevez called requesting a refill on the following medications:  Requested Prescriptions     Pending Prescriptions Disp Refills    ezetimibe (ZETIA) 10 MG tablet 30 tablet 3     Sig: Take 1 tablet by mouth daily     Pharmacy verified:[facility]   .      Date of last visit: 12/4/24   Date of next visit (if applicable): Visit date not found

## 2024-12-06 NOTE — TELEPHONE ENCOUNTER
Pt called.  He states he has been having palpitations and slight SOB with activity.  He was seen 12/4/24 and all was fine.  He just wanted to talk about it and see what he could do.     Advised to watch his diet and sodium intake.  If palpitations worsen to let us know.  We could possibly to some medication adjustments.      He states he is fine right now but wanted to know what to do if it worsens.     Pt voiced understanding.

## 2024-12-06 NOTE — CARE COORDINATION
Received message from Itz requesting return call.   Contacted Itz. Pt shared of his recent hospitalization and ongoing GI and back issues and work. Support and encouragement provided.  Back and GI concerns have somewhat been placed on hold d/t recent cardiac event.  Continues to be in communication with GI office and ortho.  CTN continues to f/u with pt at this time.

## 2024-12-10 ENCOUNTER — CARE COORDINATION (OUTPATIENT)
Dept: CASE MANAGEMENT | Age: 69
End: 2024-12-10

## 2024-12-10 NOTE — CARE COORDINATION
Care Transitions Note    Follow Up Call     Patient Current Location:  Home: [address] [place] [address] [place] [address]    Care Transition Nurse contacted the patient by telephone. Verified name and  as identifiers.    Additional needs identified to be addressed with provider   No needs identified                 Method of communication with provider: none.    Care Summary Note: Contacted pt for care transition follow up.  Spoke briefly with pt.  Itz states he is doing okay.  States he went back to work for a couple of days but is now taking some time off.  Unsure of when he will return but will be starting cardiac rehab on 24.  Reports BP has been running fair.  Last BP was approximately 142/72.  Reports breathing is still slightly labored in the morning but improves as the day goes on.  SpO2 98%.  Continues to have the chest heaviness which has not worsened.  Denies having distress.  No questions or concerns at this time.      Plan of care updates since last contact:  Review of patient management of conditions/medications:         Advance Care Planning:   Does patient have an Advance Directive: reviewed during previous call, see note. .    Medication Review:  No changes since last call.     Remote Patient Monitoring:  Offered patient enrollment in the Remote Patient Monitoring (RPM) program for in-home monitoring: previously declined.    Assessments:  Care Transitions Subsequent and Final Call    Subsequent and Final Calls  Do you have any ongoing symptoms?: Yes  Patient-reported symptoms: Shortness of Breath, Other  Have your medications changed?: No  Do you have any questions related to your medications?: No  Do you currently have any active services?: No  Do you have any needs or concerns that I can assist you with?: No  Care Transitions Interventions     Transportation Support: Declined   Disease Specific Clinic: Completed      Disease Association: Completed      Other Interventions:              Follow Up

## 2024-12-19 ENCOUNTER — HOSPITAL ENCOUNTER (OUTPATIENT)
Dept: CARDIAC REHAB | Age: 69
Setting detail: THERAPIES SERIES
Discharge: HOME | End: 2024-12-19
Payer: MEDICARE

## 2024-12-19 PROCEDURE — G0422 INTENS CARDIAC REHAB W/EXERC: HCPCS

## 2024-12-19 ASSESSMENT — PATIENT HEALTH QUESTIONNAIRE - PHQ9
6. FEELING BAD ABOUT YOURSELF - OR THAT YOU ARE A FAILURE OR HAVE LET YOURSELF OR YOUR FAMILY DOWN: NOT AT ALL
SUM OF ALL RESPONSES TO PHQ QUESTIONS 1-9: 17
SUM OF ALL RESPONSES TO PHQ9 QUESTIONS 1 & 2: 4
10. IF YOU CHECKED OFF ANY PROBLEMS, HOW DIFFICULT HAVE THESE PROBLEMS MADE IT FOR YOU TO DO YOUR WORK, TAKE CARE OF THINGS AT HOME, OR GET ALONG WITH OTHER PEOPLE: VERY DIFFICULT
4. FEELING TIRED OR HAVING LITTLE ENERGY: NEARLY EVERY DAY
SUM OF ALL RESPONSES TO PHQ QUESTIONS 1-9: 17
2. FEELING DOWN, DEPRESSED OR HOPELESS: SEVERAL DAYS
7. TROUBLE CONCENTRATING ON THINGS, SUCH AS READING THE NEWSPAPER OR WATCHING TELEVISION: SEVERAL DAYS
8. MOVING OR SPEAKING SO SLOWLY THAT OTHER PEOPLE COULD HAVE NOTICED. OR THE OPPOSITE, BEING SO FIGETY OR RESTLESS THAT YOU HAVE BEEN MOVING AROUND A LOT MORE THAN USUAL: NEARLY EVERY DAY
3. TROUBLE FALLING OR STAYING ASLEEP: NEARLY EVERY DAY
SUM OF ALL RESPONSES TO PHQ QUESTIONS 1-9: 17
1. LITTLE INTEREST OR PLEASURE IN DOING THINGS: NEARLY EVERY DAY
SUM OF ALL RESPONSES TO PHQ QUESTIONS 1-9: 17
9. THOUGHTS THAT YOU WOULD BE BETTER OFF DEAD, OR OF HURTING YOURSELF: NOT AT ALL
5. POOR APPETITE OR OVEREATING: NEARLY EVERY DAY

## 2024-12-19 NOTE — PLAN OF CARE
[facility] Cardiac [facility] Facility-Based Program  Individualized Cardiac Treatment Plan    [x] 72 Sessions   [] 36 Sessions  Patient Name:  Chris Estevez  :  1955  Age:  69 y.o.  MRN:  [id number]  Diagnosis: NSTEMI  Date of Event: 2024   Physician:  Emily  Next Office Visit:  2024  Date Entered Program: 2024  Risk Stratifications: [] Low [x] Intermediate [] High  Allergies:   Allergies   Allergen Reactions    Latex Rash       [x][facility] Resource Folder & Patient Guidebook, given and explained to Chris.    Individual Cardiac Treatment Plan -EXERCISE  INITIAL 30 DAY 60 DAY 90  DAY FINAL DAY   EXERCISE  ASSESSMENT/PLAN EXERCISE  REASSESSMENT EXERCISE   REASSESSMENT EXERCISE   REASSESSMENT EXERCISE   REASSESSMENT EXERCISE  DISCHARGE/FOLLOW-UP   Date: 2024 Date: Date: Date: Date: Date:   Session #1 Total Session #   First Exercise Session:   Total Session #  Total Session #  Total Session #  Total Session #   Last Exercise Session:     Stages of Change Stages of Change Stages of Change Stages of Change Stages of Change Stages of Change   [] Pre Contemplation  [] Contemplation  [x] Preparation  [] Action  [] Maintenance  [] Relapse [] Pre Contemplation  [] Contemplation  [] Preparation  [] Action  [] Maintenance  [] Relapse [] Pre Contemplation  [] Contemplation  [] Preparation  [] Action  [] Maintenance  [] Relapse [] Pre Contemplation  [] Contemplation  [] Preparation  [] Action  [] Maintenance  [] Relapse [] Pre Contemplation  [] Contemplation  [] Preparation  [] Action  [] Maintenance  [] Relapse [] Pre Contemplation  [] Contemplation  [] Preparation  [] Action  [] Maintenance  [] Relapse   EXERCISE ASSESSMENT EXERCISE ASSESSMENT EXERCISE ASSESSMENT EXERCISE ASSESSMENT EXERCISE ASSESSMENT EXERCISE ASSESSMENT   6 Min Walk Test  Distance walked:   0.09 miles  475.2 ft  1.68 METs  Max HR:82 BPM      RPE:  11    THR:  112-138  Rhythm:  NSR     6 Min Walk

## 2024-12-20 ENCOUNTER — CARE COORDINATION (OUTPATIENT)
Dept: CASE MANAGEMENT | Age: 69
End: 2024-12-20

## 2024-12-20 NOTE — CARE COORDINATION
[phone]    3/20/2025 8:00 AM STR CARDIAC EXERCISE RM Cardiac Rehabilitation [phone]    3/25/2025 8:00 AM STR CARDIAC EXERCISE RM Cardiac Rehabilitation [phone]    3/27/2025 8:00 AM STR CARDIAC EXERCISE RM Cardiac Rehabilitation [phone]    4/1/2025 8:00 AM STR CARDIAC EXERCISE RM Cardiac Rehabilitation [phone]    4/3/2025 8:00 AM STR CARDIAC EXERCISE RM Cardiac Rehabilitation [phone]    4/8/2025 8:00 AM STR CARDIAC EXERCISE RM Cardiac Rehabilitation [phone]    4/10/2025 8:00 AM STR CARDIAC EXERCISE RM Cardiac Rehabilitation [phone]    4/15/2025 8:00 AM STR CARDIAC EXERCISE RM Cardiac Rehabilitation [phone]    4/17/2025 8:00 AM STR CARDIAC EXERCISE RM Cardiac Rehabilitation [phone]    4/22/2025 8:00 AM STR CARDIAC EXERCISE RM Cardiac Rehabilitation [phone]    4/24/2025 8:00 AM STR CARDIAC EXERCISE RM Cardiac Rehabilitation [phone]    4/29/2025 8:00 AM STR CARDIAC EXERCISE RM Cardiac Rehabilitation [phone]    7/22/2025 11:45 AM Caitlin Diaz MD Cardiology [phone]            Patient has agreed to contact primary care provider and/or specialist for any further questions, concerns, or needs.    Va Antoine RN

## 2024-12-31 ENCOUNTER — APPOINTMENT (OUTPATIENT)
Dept: CARDIAC REHAB | Age: 69
End: 2024-12-31
Payer: MEDICARE

## 2025-01-07 ENCOUNTER — APPOINTMENT (OUTPATIENT)
Dept: CARDIAC REHAB | Age: 70
End: 2025-01-07
Payer: MEDICARE

## 2025-01-07 RX ORDER — EZETIMIBE 10 MG/1
10 TABLET ORAL DAILY
Qty: 90 TABLET | Refills: 1 | Status: SHIPPED | OUTPATIENT
Start: 2025-01-07

## 2025-01-07 RX ORDER — CLOPIDOGREL BISULFATE 75 MG/1
TABLET ORAL
Qty: 90 TABLET | Refills: 1 | Status: SHIPPED | OUTPATIENT
Start: 2025-01-07

## 2025-01-07 NOTE — TELEPHONE ENCOUNTER
Chris Estevez called requesting a refill on the following medications:  Requested Prescriptions     Pending Prescriptions Disp Refills    clopidogrel (PLAVIX) 75 MG tablet 90 tablet 3     Sig: TAKE 1 TABLET BY MOUTH ONE TIME A DAY    ezetimibe (ZETIA) 10 MG tablet 30 tablet 3     Sig: Take 1 tablet by mouth daily     Pharmacy verified:    [facility] [address] [place] [address] -  [phone] - F [phone]     Date of last visit:   Date of next visit (if applicable): Visit date not found      Pt completely out of medication

## 2025-01-08 ENCOUNTER — OFFICE VISIT (OUTPATIENT)
Dept: FAMILY MEDICINE CLINIC | Age: 70
End: 2025-01-08
Payer: MEDICARE

## 2025-01-08 ENCOUNTER — LAB (OUTPATIENT)
Dept: LAB | Age: 70
End: 2025-01-08

## 2025-01-08 ENCOUNTER — PATIENT MESSAGE (OUTPATIENT)
Dept: FAMILY MEDICINE CLINIC | Age: 70
End: 2025-01-08

## 2025-01-08 VITALS
HEART RATE: 76 BPM | DIASTOLIC BLOOD PRESSURE: 68 MMHG | HEIGHT: 69 IN | BODY MASS INDEX: 29.89 KG/M2 | WEIGHT: 201.8 LBS | SYSTOLIC BLOOD PRESSURE: 122 MMHG | RESPIRATION RATE: 16 BRPM

## 2025-01-08 DIAGNOSIS — G47.00 INSOMNIA, UNSPECIFIED TYPE: ICD-10-CM

## 2025-01-08 DIAGNOSIS — G62.9 PERIPHERAL POLYNEUROPATHY: ICD-10-CM

## 2025-01-08 DIAGNOSIS — R10.13 DYSPEPSIA: ICD-10-CM

## 2025-01-08 DIAGNOSIS — E78.00 PURE HYPERCHOLESTEROLEMIA: ICD-10-CM

## 2025-01-08 DIAGNOSIS — E11.69 TYPE 2 DIABETES MELLITUS WITH OTHER SPECIFIED COMPLICATION, WITH LONG-TERM CURRENT USE OF INSULIN (HCC): ICD-10-CM

## 2025-01-08 DIAGNOSIS — R79.89 ELEVATED LFTS: Primary | ICD-10-CM

## 2025-01-08 DIAGNOSIS — I10 PRIMARY HYPERTENSION: ICD-10-CM

## 2025-01-08 DIAGNOSIS — M25.50 POLYARTHRALGIA: ICD-10-CM

## 2025-01-08 DIAGNOSIS — Z79.4 TYPE 2 DIABETES MELLITUS WITH OTHER SPECIFIED COMPLICATION, WITH LONG-TERM CURRENT USE OF INSULIN (HCC): ICD-10-CM

## 2025-01-08 DIAGNOSIS — G47.33 OSA (OBSTRUCTIVE SLEEP APNEA): ICD-10-CM

## 2025-01-08 DIAGNOSIS — I11.0 HYPERTENSIVE HEART DISEASE WITH HEART FAILURE (HCC): ICD-10-CM

## 2025-01-08 DIAGNOSIS — M25.50 POLYARTHRALGIA: Primary | ICD-10-CM

## 2025-01-08 DIAGNOSIS — E78.2 MIXED HYPERLIPIDEMIA: ICD-10-CM

## 2025-01-08 DIAGNOSIS — I25.10 CORONARY ARTERY DISEASE INVOLVING NATIVE CORONARY ARTERY OF NATIVE HEART WITHOUT ANGINA PECTORIS: ICD-10-CM

## 2025-01-08 DIAGNOSIS — Z12.5 SCREENING FOR PROSTATE CANCER: ICD-10-CM

## 2025-01-08 LAB
ALBUMIN SERPL BCG-MCNC: 3.7 G/DL (ref 3.5–5.1)
ALP SERPL-CCNC: 179 U/L (ref 38–126)
ALT SERPL W/O P-5'-P-CCNC: 116 U/L (ref 11–66)
ANION GAP SERPL CALC-SCNC: 12 MEQ/L (ref 8–16)
AST SERPL-CCNC: 98 U/L (ref 5–40)
BASOPHILS ABSOLUTE: 0 THOU/MM3 (ref 0–0.1)
BASOPHILS NFR BLD AUTO: 0.5 %
BILIRUB SERPL-MCNC: 0.5 MG/DL (ref 0.3–1.2)
BUN SERPL-MCNC: 15 MG/DL (ref 7–22)
CALCIUM SERPL-MCNC: 9.4 MG/DL (ref 8.5–10.5)
CHLORIDE SERPL-SCNC: 100 MEQ/L (ref 98–111)
CHOLEST SERPL-MCNC: 111 MG/DL (ref 100–199)
CO2 SERPL-SCNC: 25 MEQ/L (ref 23–33)
CREAT SERPL-MCNC: 1.3 MG/DL (ref 0.4–1.2)
CREAT UR-MCNC: 254.7 MG/DL
CRP SERPL-MCNC: < 0.3 MG/DL (ref 0–1)
DEPRECATED MEAN GLUCOSE BLD GHB EST-ACNC: 147 MG/DL (ref 70–126)
DEPRECATED RDW RBC AUTO: 43 FL (ref 35–45)
EOSINOPHIL NFR BLD AUTO: 1.8 %
EOSINOPHILS ABSOLUTE: 0.1 THOU/MM3 (ref 0–0.4)
ERYTHROCYTE [DISTWIDTH] IN BLOOD BY AUTOMATED COUNT: 13.5 % (ref 11.5–14.5)
ERYTHROCYTE [SEDIMENTATION RATE] IN BLOOD BY WESTERGREN METHOD: 35 MM/HR (ref 0–10)
GFR SERPL CREATININE-BSD FRML MDRD: 60 ML/MIN/1.73M2
GLUCOSE SERPL-MCNC: 120 MG/DL (ref 70–108)
HBA1C MFR BLD HPLC: 6.9 % (ref 4.4–6.4)
HCT VFR BLD AUTO: 39.7 % (ref 42–52)
HDLC SERPL-MCNC: 55 MG/DL
HGB BLD-MCNC: 13.6 GM/DL (ref 14–18)
IMM GRANULOCYTES # BLD AUTO: 0.02 THOU/MM3 (ref 0–0.07)
IMM GRANULOCYTES NFR BLD AUTO: 0.3 %
LDLC SERPL CALC-MCNC: 40 MG/DL
LIPASE SERPL-CCNC: 91.1 U/L (ref 5.6–51.3)
LYMPHOCYTES ABSOLUTE: 1.6 THOU/MM3 (ref 1–4.8)
LYMPHOCYTES NFR BLD AUTO: 24.1 %
MCH RBC QN AUTO: 30 PG (ref 26–33)
MCHC RBC AUTO-ENTMCNC: 34.3 GM/DL (ref 32.2–35.5)
MCV RBC AUTO: 87.6 FL (ref 80–94)
MICROALBUMIN UR-MCNC: 317.85 MG/DL
MICROALBUMIN/CREAT RATIO PNL UR: 1248 MG/G (ref 0–30)
MONOCYTES ABSOLUTE: 0.7 THOU/MM3 (ref 0.4–1.3)
MONOCYTES NFR BLD AUTO: 10 %
NEUTROPHILS ABSOLUTE: 4.1 THOU/MM3 (ref 1.8–7.7)
NEUTROPHILS NFR BLD AUTO: 63.3 %
NRBC BLD AUTO-RTO: 0 /100 WBC
PLATELET # BLD AUTO: 280 THOU/MM3 (ref 130–400)
PMV BLD AUTO: 8.9 FL (ref 9.4–12.4)
POTASSIUM SERPL-SCNC: 4.5 MEQ/L (ref 3.5–5.2)
PROT SERPL-MCNC: 7.8 G/DL (ref 6.1–8)
PSA SERPL-MCNC: 0.49 NG/ML (ref 0–1)
RBC # BLD AUTO: 4.53 MILL/MM3 (ref 4.7–6.1)
RHEUMATOID FACT SERPL-ACNC: < 10 IU/ML (ref 0–13)
SODIUM SERPL-SCNC: 137 MEQ/L (ref 135–145)
TRIGL SERPL-MCNC: 82 MG/DL (ref 0–199)
TSH SERPL DL<=0.005 MIU/L-ACNC: 1.17 UIU/ML (ref 0.4–4.2)
URATE SERPL-MCNC: 5.1 MG/DL (ref 3.7–7)
WBC # BLD AUTO: 6.5 THOU/MM3 (ref 4.8–10.8)

## 2025-01-08 PROCEDURE — 99214 OFFICE O/P EST MOD 30 MIN: CPT | Performed by: FAMILY MEDICINE

## 2025-01-08 PROCEDURE — 3074F SYST BP LT 130 MM HG: CPT | Performed by: FAMILY MEDICINE

## 2025-01-08 PROCEDURE — G2211 COMPLEX E/M VISIT ADD ON: HCPCS | Performed by: FAMILY MEDICINE

## 2025-01-08 PROCEDURE — 1159F MED LIST DOCD IN RCRD: CPT | Performed by: FAMILY MEDICINE

## 2025-01-08 PROCEDURE — 3078F DIAST BP <80 MM HG: CPT | Performed by: FAMILY MEDICINE

## 2025-01-08 PROCEDURE — 1123F ACP DISCUSS/DSCN MKR DOCD: CPT | Performed by: FAMILY MEDICINE

## 2025-01-08 ASSESSMENT — PATIENT HEALTH QUESTIONNAIRE - PHQ9
3. TROUBLE FALLING OR STAYING ASLEEP: NEARLY EVERY DAY
SUM OF ALL RESPONSES TO PHQ QUESTIONS 1-9: 19
SUM OF ALL RESPONSES TO PHQ QUESTIONS 1-9: 19
SUM OF ALL RESPONSES TO PHQ9 QUESTIONS 1 & 2: 6
7. TROUBLE CONCENTRATING ON THINGS, SUCH AS READING THE NEWSPAPER OR WATCHING TELEVISION: MORE THAN HALF THE DAYS
SUM OF ALL RESPONSES TO PHQ QUESTIONS 1-9: 19
9. THOUGHTS THAT YOU WOULD BE BETTER OFF DEAD, OR OF HURTING YOURSELF: NOT AT ALL
10. IF YOU CHECKED OFF ANY PROBLEMS, HOW DIFFICULT HAVE THESE PROBLEMS MADE IT FOR YOU TO DO YOUR WORK, TAKE CARE OF THINGS AT HOME, OR GET ALONG WITH OTHER PEOPLE: EXTREMELY DIFFICULT
1. LITTLE INTEREST OR PLEASURE IN DOING THINGS: NEARLY EVERY DAY
5. POOR APPETITE OR OVEREATING: NEARLY EVERY DAY
8. MOVING OR SPEAKING SO SLOWLY THAT OTHER PEOPLE COULD HAVE NOTICED. OR THE OPPOSITE, BEING SO FIGETY OR RESTLESS THAT YOU HAVE BEEN MOVING AROUND A LOT MORE THAN USUAL: MORE THAN HALF THE DAYS
2. FEELING DOWN, DEPRESSED OR HOPELESS: NEARLY EVERY DAY
6. FEELING BAD ABOUT YOURSELF - OR THAT YOU ARE A FAILURE OR HAVE LET YOURSELF OR YOUR FAMILY DOWN: NOT AT ALL
4. FEELING TIRED OR HAVING LITTLE ENERGY: NEARLY EVERY DAY
SUM OF ALL RESPONSES TO PHQ QUESTIONS 1-9: 19

## 2025-01-08 ASSESSMENT — ENCOUNTER SYMPTOMS
GASTROINTESTINAL NEGATIVE: 1
BACK PAIN: 1
RESPIRATORY NEGATIVE: 1

## 2025-01-08 NOTE — PROGRESS NOTES
Chris Estevez (:  1955) is a 69 y.o. male,Established patient, here for evaluation of the following chief complaint(s):  6 Month Follow-Up, Diabetes, Discuss Medications (Statin medications), Pain (Progressive getting worse, all over pain //Had CT scan completed by orthopedic, has an MRI scheduled ), and Sleep Problem (Not sleeping well going on for a long time now )          Subjective   SUBJECTIVE/OBJECTIVE:  HPI  Chief Complaint   Patient presents with    6 Month Follow-Up    Diabetes    Discuss Medications     Statin medications    Pain     Progressive getting worse, all over pain     Had CT scan completed by orthopedic, has an MRI scheduled     Sleep Problem     Not sleeping well going on for a long time now      6 month eval.    Pt has several concerns today.    Has chronic low back and leg pain.  Seeing Dr. Davis for his back, recently had a CT and now a MRI is scheduled.  Several screws are loose and has severe DDD.      He is on Crestor, wonders if related.    Not sleeping well.  Was on Elavil for a while but no longer taking it.  Asks if it's ok to start.    BP Readings from Last 3 Encounters:   25 122/68   24 127/73   24 124/82     Wt Readings from Last 3 Encounters:   25 91.5 kg (201 lb 12.8 oz)   24 90.7 kg (200 lb)   24 93.8 kg (206 lb 11.2 oz)     Continues to follow closely with Cardio.  Patient Active Problem List   Diagnosis    Chest pain    Diabetes mellitus type 2, insulin dependent (HCC)    Gastroesophageal reflux disease    Back pain at L4-L5 level    Gastroparesis    Status post dilatation of esophageal stricture    History of colonic polyps    Medication monitoring encounter    Hyperlipidemia    SHAHRAM (obstructive sleep apnea)    Obesity (BMI 30-39.9)    Former tobacco use    Hypersomnia    Chiari malformation type I (HCC)    CSA (central sleep apnea)    Coronary artery disease involving native coronary artery of native heart with unstable angina

## 2025-01-09 ENCOUNTER — APPOINTMENT (OUTPATIENT)
Dept: CARDIAC REHAB | Age: 70
End: 2025-01-09
Payer: MEDICARE

## 2025-01-10 LAB
CYCLIC CITRULLINATED PEPTIDE ANTIBODY IGG: 2.2 U/ML (ref 0–7)
DSDNA IGG SER QL IA: NORMAL
NUCLEAR IGG SER QL IA: NORMAL

## 2025-01-13 NOTE — TELEPHONE ENCOUNTER
Chris is requesting a refill of their   Requested Prescriptions     Pending Prescriptions Disp Refills    rivaroxaban (XARELTO) 15 MG TABS tablet 90 tablet 3     Sig: Take 1 tablet by mouth daily (with breakfast)   . Please advise.      Last Appt:  Visit date not found  Next Appt:   Visit date not found  Preferred pharmacy:   [facility] [address] [place] [address] [phone] - F [phone]

## 2025-01-14 ENCOUNTER — APPOINTMENT (OUTPATIENT)
Dept: CARDIAC REHAB | Age: 70
End: 2025-01-14
Payer: MEDICARE

## 2025-01-16 ENCOUNTER — TRANSCRIBE ORDERS (OUTPATIENT)
Dept: SPEECH THERAPY | Age: 70
End: 2025-01-16

## 2025-01-16 ENCOUNTER — HOSPITAL ENCOUNTER (OUTPATIENT)
Dept: CARDIAC REHAB | Age: 70
Setting detail: THERAPIES SERIES
Discharge: HOME | End: 2025-01-16
Payer: MEDICARE

## 2025-01-16 DIAGNOSIS — R13.10 DYSPHAGIA, UNSPECIFIED TYPE: Primary | ICD-10-CM

## 2025-01-16 PROCEDURE — G0422 INTENS CARDIAC REHAB W/EXERC: HCPCS

## 2025-01-16 NOTE — PLAN OF CARE
[facility] Cardiac [facility] Facility-Based Program  Individualized Cardiac Treatment Plan    [x] 72 Sessions   [] 36 Sessions  Patient Name:  Chris Estevez  :  1955  Age:  69 y.o.  MRN:  [id number]  Diagnosis: NSTEMI  Date of Event: 2024   Physician:  Emily  Next Office Visit:  2024  Date Entered Program: 2024  Risk Stratifications: [] Low [x] Intermediate [] High  Allergies:   Allergies   Allergen Reactions    Latex Rash       [x][facility] Resource Folder & Patient Guidebook, given and explained to Chris.    Individual Cardiac Treatment Plan -EXERCISE  INITIAL 30 DAY 60 DAY 90  DAY FINAL DAY   EXERCISE  ASSESSMENT/PLAN EXERCISE  REASSESSMENT EXERCISE   REASSESSMENT EXERCISE   REASSESSMENT EXERCISE   REASSESSMENT EXERCISE  DISCHARGE/FOLLOW-UP   Date: 2024 Date:  25 Date: Date: Date: Date:   Session #1 Total Session # 2  First Exercise Session:  25 Total Session #  Total Session #  Total Session #  Total Session #   Last Exercise Session:     Stages of Change Stages of Change Stages of Change Stages of Change Stages of Change Stages of Change   [] Pre Contemplation  [] Contemplation  [x] Preparation  [] Action  [] Maintenance  [] Relapse [] Pre Contemplation  [] Contemplation  [x] Preparation  [] Action  [] Maintenance  [] Relapse [] Pre Contemplation  [] Contemplation  [] Preparation  [] Action  [] Maintenance  [] Relapse [] Pre Contemplation  [] Contemplation  [] Preparation  [] Action  [] Maintenance  [] Relapse [] Pre Contemplation  [] Contemplation  [] Preparation  [] Action  [] Maintenance  [] Relapse [] Pre Contemplation  [] Contemplation  [] Preparation  [] Action  [] Maintenance  [] Relapse   EXERCISE ASSESSMENT EXERCISE ASSESSMENT EXERCISE ASSESSMENT EXERCISE ASSESSMENT EXERCISE ASSESSMENT EXERCISE ASSESSMENT   6 Min Walk Test  Distance walked:   0.09 miles  475.2 ft  1.68 METs  Max HR:82 BPM      RPE:  11    THR:  112-138  Rhythm:

## 2025-01-21 ENCOUNTER — APPOINTMENT (OUTPATIENT)
Dept: CARDIAC REHAB | Age: 70
End: 2025-01-21
Payer: MEDICARE

## 2025-01-23 ENCOUNTER — APPOINTMENT (OUTPATIENT)
Dept: CARDIAC REHAB | Age: 70
End: 2025-01-23
Payer: MEDICARE

## 2025-01-28 ENCOUNTER — APPOINTMENT (OUTPATIENT)
Dept: CARDIAC REHAB | Age: 70
End: 2025-01-28
Payer: MEDICARE

## 2025-01-30 ENCOUNTER — HOSPITAL ENCOUNTER (OUTPATIENT)
Dept: CARDIAC REHAB | Age: 70
Setting detail: THERAPIES SERIES
Discharge: HOME | End: 2025-01-30
Payer: MEDICARE

## 2025-01-30 PROCEDURE — G0422 INTENS CARDIAC REHAB W/EXERC: HCPCS

## 2025-02-11 ENCOUNTER — TELEPHONE (OUTPATIENT)
Dept: FAMILY MEDICINE CLINIC | Age: 70
End: 2025-02-11

## 2025-02-11 NOTE — TELEPHONE ENCOUNTER
See 1/8/2025 Yunior doughertyg. Pt says he is just now reading it. He already took the Rosuvastatin this morning. But will stop taking it from now on. Pt will also go tonight to [facility] and have those tests done that were ordered 1/9/25.

## 2025-02-13 ENCOUNTER — LAB (OUTPATIENT)
Dept: LAB | Age: 70
End: 2025-02-13

## 2025-02-13 DIAGNOSIS — R79.89 ELEVATED LFTS: ICD-10-CM

## 2025-02-13 LAB
ALBUMIN SERPL BCG-MCNC: 3.8 G/DL (ref 3.5–5.1)
ALP SERPL-CCNC: 166 U/L (ref 38–126)
ALT SERPL W/O P-5'-P-CCNC: 57 U/L (ref 11–66)
AST SERPL-CCNC: 38 U/L (ref 5–40)
BILIRUB CONJ SERPL-MCNC: 0.2 MG/DL (ref 0–0.3)
BILIRUB SERPL-MCNC: 0.5 MG/DL (ref 0.3–1.2)
HAV IGM SER QL: NEGATIVE
HBV CORE IGM SERPL QL IA: NEGATIVE
HBV SURFACE AG SERPL QL IA: NEGATIVE
HCV IGG SERPL QL IA: NEGATIVE
PROT SERPL-MCNC: 7.3 G/DL (ref 6.1–8)

## 2025-02-15 LAB
CMV IGG SERPL IA-ACNC: > 10 U/ML
CMV IGM SERPL-ACNC: < 8 AU/ML
EPSTEIN-BARR VIRUS ANTIBODIES: NORMAL

## 2025-03-12 NOTE — PLAN OF CARE
for education.    All Exercise Education Completed  [] Yes      [] No   Exercise Education Recommended     Workshops  [x] Exercise Basics  [x] Balance Training & Fall Prevention  [x] Managing Heart Disease   [x] Yoga & Heart Health  Exercise Education Attended/Date Exercise Education Attended/Date Exercise Education Attended/Date All Sessions Completed?    [] Yes  [] No    If no, please explain:   *Goals*  *Goals* *Goals* *Goals* *Goals*   Exercise Goals   Exercise Goals   Exercise Goals  Exercise Goals  Exercise Goals   Increase 6 min walk distance by 10% @ discharge     Was 6 min walk goal achieved?  [] Yes      [] No  If no, please explain:   Goal Status   [] Initial   [x] In Progress   [] Met   Goal Status   [] Initial   [] In Progress   [] Met Goal Status   [] Initial   [] In Progress   [] Met Goal Status   [] Initial   [] In Progress   [] Met   Exercise goals:  Chris exercise goal is to attend exercise & education sessions 2 x/wk, and initiate/continue home aerobic exercise.  Chris would also like to  begin walking on days not in CR   Progress towards  goals:  Not here today, only here 1 time since last reassessment.      Progress towards goals:  Chris **     Progress towards goals:  Chris **      Progress/achievement of  goals:  Chris   [] achieved set goals  [] Has not achieved set goal  **     Individual Cardiac Treatment Plan - Nutrition  NUTRITION  ASSESSMENT/PLAN  NUTRITION   REASSESSMENT NUTRITION   REASSESSMENT NUTRITION  DISCHARGE/FOLLOW-UP NUTRITION  DISCHARGE/FOLLOW-UP   Stages of Change  Stages of Change Stages of Change Stages of Change Stages of Change   [] Pre Contemplation  [x] Contemplation  [] Preparation  [] Action  [] Maintenance  [] Relapse   [] Pre Contemplation  [] Contemplation  [] Preparation  [] Action  [] Maintenance  [] Relapse [] Pre Contemplation  [] Contemplation  [] Preparation  [] Action  [] Maintenance  [] Relapse [] Pre Contemplation  [] Contemplation  []

## 2025-03-13 ENCOUNTER — HOSPITAL ENCOUNTER (OUTPATIENT)
Dept: CARDIAC REHAB | Age: 70
Setting detail: THERAPIES SERIES
Discharge: HOME | End: 2025-03-13

## 2025-03-17 ENCOUNTER — APPOINTMENT (OUTPATIENT)
Dept: GENERAL RADIOLOGY | Age: 70
End: 2025-03-17
Payer: MEDICARE

## 2025-03-17 ENCOUNTER — APPOINTMENT (OUTPATIENT)
Dept: CT IMAGING | Age: 70
End: 2025-03-17
Payer: MEDICARE

## 2025-03-17 ENCOUNTER — HOSPITAL ENCOUNTER (EMERGENCY)
Age: 70
Discharge: HOME | End: 2025-03-17
Attending: EMERGENCY MEDICINE
Payer: MEDICARE

## 2025-03-17 VITALS
RESPIRATION RATE: 18 BRPM | DIASTOLIC BLOOD PRESSURE: 73 MMHG | HEART RATE: 57 BPM | SYSTOLIC BLOOD PRESSURE: 127 MMHG | TEMPERATURE: 97.6 F | OXYGEN SATURATION: 98 %

## 2025-03-17 DIAGNOSIS — M54.9 UPPER BACK PAIN: Primary | ICD-10-CM

## 2025-03-17 DIAGNOSIS — R07.89 CHEST PRESSURE: ICD-10-CM

## 2025-03-17 LAB
ALBUMIN SERPL BCG-MCNC: 3.1 G/DL (ref 3.4–4.9)
ALP SERPL-CCNC: 140 U/L (ref 40–129)
ALT SERPL W/O P-5'-P-CCNC: 107 U/L (ref 10–50)
ANION GAP SERPL CALC-SCNC: 5 MEQ/L (ref 8–16)
AST SERPL-CCNC: 83 U/L (ref 10–50)
BASOPHILS ABSOLUTE: 0 THOU/MM3 (ref 0–0.1)
BASOPHILS NFR BLD AUTO: 0.3 %
BILIRUB CONJ SERPL-MCNC: < 0.1 MG/DL (ref 0–0.2)
BILIRUB SERPL-MCNC: < 0.2 MG/DL (ref 0.3–1.2)
BUN SERPL-MCNC: 16 MG/DL (ref 8–23)
CALCIUM SERPL-MCNC: 8.8 MG/DL (ref 8.8–10.2)
CHLORIDE SERPL-SCNC: 106 MEQ/L (ref 98–111)
CO2 SERPL-SCNC: 26 MEQ/L (ref 22–29)
CREAT SERPL-MCNC: 1.2 MG/DL (ref 0.7–1.2)
DEPRECATED RDW RBC AUTO: 43.8 FL (ref 35–45)
EOSINOPHIL NFR BLD AUTO: 2.5 %
EOSINOPHILS ABSOLUTE: 0.2 THOU/MM3 (ref 0–0.4)
ERYTHROCYTE [DISTWIDTH] IN BLOOD BY AUTOMATED COUNT: 13.3 % (ref 11.5–14.5)
FLUAV RNA RESP QL NAA+PROBE: NOT DETECTED
FLUBV RNA RESP QL NAA+PROBE: NOT DETECTED
GFR SERPL CREATININE-BSD FRML MDRD: 66 ML/MIN/1.73M2
GLUCOSE SERPL-MCNC: 215 MG/DL (ref 74–109)
HCT VFR BLD AUTO: 39.8 % (ref 42–52)
HGB BLD-MCNC: 13.2 GM/DL (ref 14–18)
IMM GRANULOCYTES # BLD AUTO: 0.02 THOU/MM3 (ref 0–0.07)
IMM GRANULOCYTES NFR BLD AUTO: 0.3 %
LIPASE SERPL-CCNC: 64 U/L (ref 13–60)
LYMPHOCYTES ABSOLUTE: 1.7 THOU/MM3 (ref 1–4.8)
LYMPHOCYTES NFR BLD AUTO: 21.7 %
MCH RBC QN AUTO: 29.6 PG (ref 26–33)
MCHC RBC AUTO-ENTMCNC: 33.2 GM/DL (ref 32.2–35.5)
MCV RBC AUTO: 89.2 FL (ref 80–94)
MONOCYTES ABSOLUTE: 0.7 THOU/MM3 (ref 0.4–1.3)
MONOCYTES NFR BLD AUTO: 8.4 %
NEUTROPHILS ABSOLUTE: 5.3 THOU/MM3 (ref 1.8–7.7)
NEUTROPHILS NFR BLD AUTO: 66.8 %
NRBC BLD AUTO-RTO: 0 /100 WBC
OSMOLALITY SERPL CALC.SUM OF ELEC: 281.5 MOSMOL/KG (ref 275–300)
PLATELET # BLD AUTO: 242 THOU/MM3 (ref 130–400)
PMV BLD AUTO: 9.2 FL (ref 9.4–12.4)
POTASSIUM SERPL-SCNC: 4.7 MEQ/L (ref 3.5–5.2)
PROT SERPL-MCNC: 6.1 G/DL (ref 6.4–8.3)
RBC # BLD AUTO: 4.46 MILL/MM3 (ref 4.7–6.1)
SARS-COV-2 RNA RESP QL NAA+PROBE: NOT DETECTED
SODIUM SERPL-SCNC: 137 MEQ/L (ref 135–145)
TROPONIN, HIGH SENSITIVITY: 28 NG/L (ref 0–12)
TROPONIN, HIGH SENSITIVITY: 31 NG/L (ref 0–12)
WBC # BLD AUTO: 8 THOU/MM3 (ref 4.8–10.8)

## 2025-03-17 PROCEDURE — 93005 ELECTROCARDIOGRAM TRACING: CPT

## 2025-03-17 PROCEDURE — 71275 CT ANGIOGRAPHY CHEST: CPT

## 2025-03-17 PROCEDURE — 71046 X-RAY EXAM CHEST 2 VIEWS: CPT

## 2025-03-17 PROCEDURE — 80076 HEPATIC FUNCTION PANEL: CPT

## 2025-03-17 PROCEDURE — 80048 BASIC METABOLIC PNL TOTAL CA: CPT

## 2025-03-17 PROCEDURE — 6360000004 HC RX CONTRAST MEDICATION

## 2025-03-17 PROCEDURE — 85025 COMPLETE CBC W/AUTO DIFF WBC: CPT

## 2025-03-17 PROCEDURE — 99285 EMERGENCY DEPT VISIT HI MDM: CPT

## 2025-03-17 PROCEDURE — 36415 COLL VENOUS BLD VENIPUNCTURE: CPT

## 2025-03-17 PROCEDURE — 83690 ASSAY OF LIPASE: CPT

## 2025-03-17 PROCEDURE — 87636 SARSCOV2 & INF A&B AMP PRB: CPT

## 2025-03-17 PROCEDURE — 84484 ASSAY OF TROPONIN QUANT: CPT

## 2025-03-17 RX ORDER — IOPAMIDOL 755 MG/ML
80 INJECTION, SOLUTION INTRAVASCULAR
Status: COMPLETED | OUTPATIENT
Start: 2025-03-17 | End: 2025-03-17

## 2025-03-17 RX ADMIN — IOPAMIDOL 80 ML: 755 INJECTION, SOLUTION INTRAVENOUS at 12:03

## 2025-03-17 ASSESSMENT — ENCOUNTER SYMPTOMS
BACK PAIN: 1
COUGH: 0
WHEEZING: 0
RHINORRHEA: 0
BLOOD IN STOOL: 0
SORE THROAT: 0
CONSTIPATION: 0
DIARRHEA: 0
SHORTNESS OF BREATH: 1
NAUSEA: 1
VOMITING: 0
ABDOMINAL PAIN: 0
PHOTOPHOBIA: 0

## 2025-03-17 NOTE — DISCHARGE INSTRUCTIONS
You were seen in the emergency department today for back pain radiating towards your abdomen and chest pressure    Your EKG did not show any concerning findings, your cardiac markers did not show any significant change but your liver enzymes were elevated compared to previous labs    CTA of your chest did not show any PE or other abnormality    You should follow-up with your PCP and GI doctor to discuss elevated liver enzymes    You can discuss your back pain with [place] when you go for follow-up and at home you can continue topical lidocaine, Voltaren and IcyHot/Biofreeze as well as continuing your home gabapentin

## 2025-03-17 NOTE — ED PROVIDER NOTES
PATIENT NAME: Chris Estevez  MRN: [id number]  : 1955  PARR: 3/17/2025    I performed a history and physical examination of the patient and discussed management with the Resident. I reviewed the Resident's note and agree with the documented findings and plan of care. Any areas of disagreement are noted on the chart. I was personally present for the key portions of any procedures and have documented in the chart those procedures where I was not present during the key portions. I have reviewed the emergency nurses triage note and agree with the chief complaint, past medical history, past surgical history, allergies, medications, social and family history as documented unless otherwise noted below.    MEDICAL DEDISION MAKING (MDM)     Chris Estevez is a 69 y.o. male presents with upper back pain radiating to anterior chest over last 3 days.     Her PMH is remarkable for CAD, and PE, currently on Xarelto.     Mild SOB, with nausea, no vomiting.     Exam:AVSS. Nontoxic appearing. Heart: RRR, S1 and S2. Lungs CTAB. Soft abdomen w/o tenderness. Neurologically intact. No skin rashes.     Sinus brachycardia, VR 56/min,  ms, QRS duration 102 ms, QTc 428 ms, stable EKG when comared to old EKG of 2024.    ED work-up is reassuring. No clear etiology of his symptoms. Of note he is symptom free in ED. No evidence he has ACS, aortic dissection, PE, PNA, or acute abdomen.     Discharged with PCP follow-up in 2-3 days.     Vitals:    25 1107 25 1153 25 1245 25 1312   BP: 119/62 126/76 132/70 127/73   Pulse: 57 51 54 57   Resp: 21 12 11 18   Temp:       TempSrc:       SpO2: 97% 98% 99% 98%     Labs Reviewed   CBC WITH AUTO DIFFERENTIAL - Abnormal; Notable for the following components:       Result Value    RBC 4.46 (*)     Hemoglobin 13.2 (*)     Hematocrit 39.8 (*)     MPV 9.2 (*)     All other components within normal limits   BASIC METABOLIC PANEL W/ REFLEX TO MG FOR LOW K - Abnormal; Notable 
been dictated by use of voice recognition software and electronically transcribed, and parts may have been transcribed with the assistance of an ED scribe. The transcription may contain errors not detected in proofreading.  Please refer to my supervising physician's documentation if my documentation differs.    Electronically Signed: Elisha Krishnamurthy DO, 03/17/25, 3:21 PM

## 2025-03-17 NOTE — ED NOTES
Pt to ED for eval of generalized body aches. Pt states aches started on Saturday in his back, then went to his shoulders, and is now over his chest. Pt in stable condition

## 2025-03-18 LAB
EKG ATRIAL RATE: 56 BPM
EKG P AXIS: 42 DEGREES
EKG P-R INTERVAL: 176 MS
EKG Q-T INTERVAL: 444 MS
EKG QRS DURATION: 102 MS
EKG QTC CALCULATION (BAZETT): 428 MS
EKG R AXIS: -30 DEGREES
EKG T AXIS: 32 DEGREES
EKG VENTRICULAR RATE: 56 BPM

## 2025-03-18 PROCEDURE — 93010 ELECTROCARDIOGRAM REPORT: CPT | Performed by: INTERNAL MEDICINE

## 2025-03-19 ENCOUNTER — OFFICE VISIT (OUTPATIENT)
Dept: FAMILY MEDICINE CLINIC | Age: 70
End: 2025-03-19
Payer: MEDICARE

## 2025-03-19 VITALS
SYSTOLIC BLOOD PRESSURE: 138 MMHG | DIASTOLIC BLOOD PRESSURE: 62 MMHG | BODY MASS INDEX: 29.89 KG/M2 | HEART RATE: 72 BPM | WEIGHT: 202.4 LBS | RESPIRATION RATE: 16 BRPM

## 2025-03-19 DIAGNOSIS — Z79.4 TYPE 2 DIABETES MELLITUS WITH OTHER SPECIFIED COMPLICATION, WITH LONG-TERM CURRENT USE OF INSULIN: ICD-10-CM

## 2025-03-19 DIAGNOSIS — R07.89 ATYPICAL CHEST PAIN: Primary | ICD-10-CM

## 2025-03-19 DIAGNOSIS — E11.69 TYPE 2 DIABETES MELLITUS WITH OTHER SPECIFIED COMPLICATION, WITH LONG-TERM CURRENT USE OF INSULIN: ICD-10-CM

## 2025-03-19 DIAGNOSIS — M54.6 ACUTE THORACIC BACK PAIN, UNSPECIFIED BACK PAIN LATERALITY: ICD-10-CM

## 2025-03-19 DIAGNOSIS — G47.33 OSA (OBSTRUCTIVE SLEEP APNEA): ICD-10-CM

## 2025-03-19 DIAGNOSIS — I25.10 CORONARY ARTERY DISEASE INVOLVING NATIVE CORONARY ARTERY OF NATIVE HEART WITHOUT ANGINA PECTORIS: ICD-10-CM

## 2025-03-19 DIAGNOSIS — R79.89 ELEVATED LFTS: ICD-10-CM

## 2025-03-19 DIAGNOSIS — I10 PRIMARY HYPERTENSION: ICD-10-CM

## 2025-03-19 PROCEDURE — 1159F MED LIST DOCD IN RCRD: CPT | Performed by: FAMILY MEDICINE

## 2025-03-19 PROCEDURE — 3075F SYST BP GE 130 - 139MM HG: CPT | Performed by: FAMILY MEDICINE

## 2025-03-19 PROCEDURE — 99214 OFFICE O/P EST MOD 30 MIN: CPT | Performed by: FAMILY MEDICINE

## 2025-03-19 PROCEDURE — G2211 COMPLEX E/M VISIT ADD ON: HCPCS | Performed by: FAMILY MEDICINE

## 2025-03-19 PROCEDURE — 3078F DIAST BP <80 MM HG: CPT | Performed by: FAMILY MEDICINE

## 2025-03-19 PROCEDURE — 1123F ACP DISCUSS/DSCN MKR DOCD: CPT | Performed by: FAMILY MEDICINE

## 2025-03-19 PROCEDURE — 3044F HG A1C LEVEL LT 7.0%: CPT | Performed by: FAMILY MEDICINE

## 2025-03-19 RX ORDER — FAMOTIDINE 40 MG/1
TABLET, FILM COATED ORAL
COMMUNITY
Start: 2025-03-19

## 2025-03-19 RX ORDER — ACYCLOVIR 400 MG/1
TABLET ORAL
COMMUNITY

## 2025-03-19 ASSESSMENT — ENCOUNTER SYMPTOMS
GASTROINTESTINAL NEGATIVE: 1
RESPIRATORY NEGATIVE: 1

## 2025-03-19 NOTE — PROGRESS NOTES
Inject 20 Units into the skin in the morning and 20 Units before bedtime. 15 pen 1    pantoprazole (PROTONIX) 40 MG tablet Take 1 tablet by mouth every morning (before breakfast) (Patient taking differently: Take 1 tablet by mouth in the morning and at bedtime) 90 tablet 2     No current facility-administered medications for this visit.       Past Surgical History:   Procedure Laterality Date    BACK SURGERY  2008    C1 oates & decompression    BACK SURGERY  08/25/2020    [facility] Dr. Gibson, C5-7 cadaver patch    BRAIN SURGERY  2008    chiari    CARDIAC PROCEDURE N/A 01/04/2024    Left heart cath / coronary angiography performed by Tai Vargas MD at [facility] CARDIAC CATH LAB    CARDIAC PROCEDURE N/A 11/23/2024    Left heart cath / coronary angiography performed by Caitlin Diaz MD at [facility] CARDIAC CATH LAB    CARDIAC PROCEDURE N/A 11/23/2024    Percutaneous coronary intervention performed by Caitlin Diaz MD at [facility] CARDIAC CATH LAB    CARDIAC SURGERY  2007    heart cath    CHOLECYSTECTOMY  2009    COLONOSCOPY  2017    COLONOSCOPY N/A 03/12/2020    COLONOSCOPY POLYPECTOMY SNARE/COLD BIOPSY performed by Jasmin Saxena MD at [facility] Endoscopy    EGD  2022    Dr Saxena     ENDOSCOPY, COLON, DIAGNOSTIC  2007    FRACTURE SURGERY  1964    right elbow    IVC FILTER REMOVAL  03/15/2020    [facility]    NASAL SEPTUM SURGERY  2006    SHOULDER SURGERY Left 08/16/2018    TONSILLECTOMY  1964    TUMOR REMOVAL  03/2024    UPPER GASTROINTESTINAL ENDOSCOPY Left 03/12/2020    EGD ESOPHAGOGASTRODUODENOSCOPY DILATATION performed by Jasmin Saxena MD at [facility] Endoscopy    UPPER GASTROINTESTINAL ENDOSCOPY Left 03/12/2020    EGD BIOPSY performed by Jasmin Saxena MD at [facility] Endoscopy    UPPER GASTROINTESTINAL ENDOSCOPY N/A 10/10/2023    EGD POSS FOREIGN BODY REMOVAL performed by Jasmin Saxena MD at [facility] ENDOSCOPY    UPPER GASTROINTESTINAL ENDOSCOPY N/A 10/11/2023    EGD POSSIBLE FOREIGN BODY REMOVAL performed by Jasmin Saxena MD at

## 2025-03-21 ENCOUNTER — TELEPHONE (OUTPATIENT)
Dept: CARDIOLOGY CLINIC | Age: 70
End: 2025-03-21

## 2025-03-21 NOTE — TELEPHONE ENCOUNTER
Pre op Risk Assessment    Procedure EGD  Physician [facility] - Dr. Menon  Date of surgery/procedure TBD    Last OV 12-4-24 w/ James  Last Stress 10-15-21  Last Echo 11-22-24  Last Cath 11-21-24  Last Stent 11-21-24  Is patient on blood thinners Xarelto, Plavix  Hold Meds/how many days Xarelto - 7 days, Plavix - 1 day    F: [phone]

## 2025-03-24 ENCOUNTER — TRANSCRIBE ORDERS (OUTPATIENT)
Dept: ADMINISTRATIVE | Age: 70
End: 2025-03-24

## 2025-03-24 DIAGNOSIS — R10.11 RIGHT UPPER QUADRANT ABDOMINAL PAIN: ICD-10-CM

## 2025-03-24 DIAGNOSIS — R74.01 ELEVATED TRANSAMINASE LEVEL: Primary | ICD-10-CM

## 2025-03-24 NOTE — TELEPHONE ENCOUNTER
NSTEMI 11/2024  Severe mid LAD stenosis, s/p successful PCI/GI  Severe ISR of distal RCA, s/p successful PTCA (Bellona cutting balloon and then Agent DCB 3.5 mm*12mm were utilized)   Can not hold meds before one year post PCI

## 2025-03-25 NOTE — TELEPHONE ENCOUNTER
Called [facility] and left detailed message that patient was not clear for EGD.     Will fax this encounter to [facility]-

## 2025-03-31 ENCOUNTER — HOSPITAL ENCOUNTER (OUTPATIENT)
Dept: PHYSICAL THERAPY | Age: 70
Setting detail: THERAPIES SERIES
Discharge: HOME | End: 2025-03-31
Payer: MEDICARE

## 2025-03-31 PROCEDURE — 97162 PT EVAL MOD COMPLEX 30 MIN: CPT

## 2025-03-31 NOTE — PROGRESS NOTES
[facility]  PHYSICAL THERAPY  [x] EVALUATION  [] DAILY NOTE (LAND) [] DAILY NOTE (AQUATIC ) [] PROGRESS NOTE [] DISCHARGE NOTE    [x] OUTPATIENT REHABILITATION CENTER [facility]   [] [facility] CARE [place]    [] [facility]   [] [facility]    Date: 3/31/2025  Patient Name:  Chris Estevez  : 1955  MRN: [id number]  CSN: [id number]    Referring Practitioner Mahamed Gibson MD [id number]      Diagnosis  Diagnoses       M54.16 (ICD-10-CM) - Radiculopathy, lumbar region    M51.360 (ICD-10-CM) - Other intervertebral disc degeneration, lumbar region with discogenic back pain only    M48.062 (ICD-10-CM) - Spinal stenosis, lumbar region with neurogenic claudication           Treatment Diagnosis M54.59, G89.29  Chronic Lower Back Pain  R29.3 Abnormal Posture  R53.1 Weakness  M25.60 Stiffness of Unspecified Joint   Date of Evaluation 3/31/25   Additional Pertinent History Chris Estevez has a past medical history of Anxiety, CAD (coronary artery disease), Depression, Diabetes mellitus (HCC), Dilation of esophagus, Frequent headaches, Gastroparesis, GERD (gastroesophageal reflux disease), History of Chiari malformation, Hyperlipidemia, Hypertension, Kidney stone, Kidney stone, Migraine, Movement disorder, Pulmonary emboli (HCC), Shingles, Sleep apnea, and Tattoos.  he has a past surgical history that includes Tonsillectomy (); back surgery (); Colonoscopy (); Endoscopy, colon, diagnostic (); vascular surgery (); brain surgery (); Cholecystectomy (); fracture surgery (); Cardiac surgery (); shoulder surgery (Left, 2018); Nasal septum surgery (); Colonoscopy (N/A, 2020); Upper gastrointestinal endoscopy (Left, 2020); Upper gastrointestinal endoscopy (Left, 2020); back surgery (2020); IVC filter removal (03/15/2020); EGD (); Upper gastrointestinal endoscopy (N/A, 10/10/2023); Upper gastrointestinal endoscopy

## 2025-04-03 ENCOUNTER — TRANSCRIBE ORDERS (OUTPATIENT)
Dept: ADMINISTRATIVE | Age: 70
End: 2025-04-03

## 2025-04-03 DIAGNOSIS — R13.10 DYSPHAGIA, UNSPECIFIED TYPE: Primary | ICD-10-CM

## 2025-04-07 ENCOUNTER — HOSPITAL ENCOUNTER (OUTPATIENT)
Dept: PHYSICAL THERAPY | Age: 70
Setting detail: THERAPIES SERIES
Discharge: HOME | End: 2025-04-07
Payer: MEDICARE

## 2025-04-07 PROCEDURE — 97113 AQUATIC THERAPY/EXERCISES: CPT

## 2025-04-07 NOTE — PROGRESS NOTES
[facility]  PHYSICAL THERAPY  [] EVALUATION  [] DAILY NOTE (LAND) [x] DAILY NOTE (AQUATIC ) [] PROGRESS NOTE [] DISCHARGE NOTE    [x] OUTPATIENT REHABILITATION CENTER [facility]   [] [facility] CARE [place]    [] [facility]   [] [facility]    Date: 2025  Patient Name:  Chris Estevez  : 1955  MRN: [id number]  CSN: [id number]    Referring Practitioner Mahamed Gibson MD [id number]      Diagnosis  Diagnoses       M54.16 (ICD-10-CM) - Radiculopathy, lumbar region    M51.360 (ICD-10-CM) - Other intervertebral disc degeneration, lumbar region with discogenic back pain only    M48.062 (ICD-10-CM) - Spinal stenosis, lumbar region with neurogenic claudication           Treatment Diagnosis M54.59, G89.29  Chronic Lower Back Pain  R29.3 Abnormal Posture  R53.1 Weakness  M25.60 Stiffness of Unspecified Joint   Date of Evaluation 3/31/25   Additional Pertinent History Chris Estevez has a past medical history of Anxiety, CAD (coronary artery disease), Depression, Diabetes mellitus (HCC), Dilation of esophagus, Frequent headaches, Gastroparesis, GERD (gastroesophageal reflux disease), History of Chiari malformation, Hyperlipidemia, Hypertension, Kidney stone, Kidney stone, Migraine, Movement disorder, Pulmonary emboli (HCC), Shingles, Sleep apnea, and Tattoos.  he has a past surgical history that includes Tonsillectomy (); back surgery (); Colonoscopy (); Endoscopy, colon, diagnostic (); vascular surgery (); brain surgery (); Cholecystectomy (); fracture surgery (); Cardiac surgery (); shoulder surgery (Left, 2018); Nasal septum surgery (); Colonoscopy (N/A, 2020); Upper gastrointestinal endoscopy (Left, 2020); Upper gastrointestinal endoscopy (Left, 2020); back surgery (2020); IVC filter removal (03/15/2020); EGD (); Upper gastrointestinal endoscopy (N/A, 10/10/2023); Upper gastrointestinal endoscopy

## 2025-04-10 ENCOUNTER — HOSPITAL ENCOUNTER (OUTPATIENT)
Dept: ULTRASOUND IMAGING | Age: 70
Discharge: HOME | End: 2025-04-10
Payer: MEDICARE

## 2025-04-10 DIAGNOSIS — R10.11 RIGHT UPPER QUADRANT ABDOMINAL PAIN: ICD-10-CM

## 2025-04-10 DIAGNOSIS — R74.01 ELEVATED TRANSAMINASE LEVEL: ICD-10-CM

## 2025-04-10 PROCEDURE — 76705 ECHO EXAM OF ABDOMEN: CPT

## 2025-04-15 ENCOUNTER — TRANSCRIBE ORDERS (OUTPATIENT)
Dept: ADMINISTRATIVE | Age: 70
End: 2025-04-15

## 2025-04-15 DIAGNOSIS — R16.0 LIVER MASS: Primary | ICD-10-CM

## 2025-04-16 ENCOUNTER — APPOINTMENT (OUTPATIENT)
Dept: PHYSICAL THERAPY | Age: 70
End: 2025-04-16
Payer: MEDICARE

## 2025-04-18 ENCOUNTER — LAB (OUTPATIENT)
Dept: LAB | Age: 70
End: 2025-04-18

## 2025-04-18 LAB
ALBUMIN SERPL BCG-MCNC: 3.5 G/DL (ref 3.4–4.9)
ALP SERPL-CCNC: 156 U/L (ref 40–129)
ALT SERPL W/O P-5'-P-CCNC: 37 U/L (ref 10–50)
AST SERPL-CCNC: 42 U/L (ref 10–50)
BILIRUB CONJ SERPL-MCNC: < 0.1 MG/DL (ref 0–0.2)
BILIRUB SERPL-MCNC: 0.3 MG/DL (ref 0.3–1.2)
PROT SERPL-MCNC: 7.2 G/DL (ref 6.4–8.3)

## 2025-04-21 ENCOUNTER — APPOINTMENT (OUTPATIENT)
Dept: PHYSICAL THERAPY | Age: 70
End: 2025-04-21
Payer: MEDICARE

## 2025-04-24 ENCOUNTER — HOSPITAL ENCOUNTER (OUTPATIENT)
Dept: PHYSICAL THERAPY | Age: 70
Setting detail: THERAPIES SERIES
Discharge: HOME | End: 2025-04-24
Payer: MEDICARE

## 2025-04-24 PROCEDURE — 97113 AQUATIC THERAPY/EXERCISES: CPT

## 2025-04-24 NOTE — PROGRESS NOTES
[facility]  PHYSICAL THERAPY  [] EVALUATION  [] DAILY NOTE (LAND) [x] DAILY NOTE (AQUATIC ) [] PROGRESS NOTE [] DISCHARGE NOTE    [x] OUTPATIENT REHABILITATION CENTER [facility]   [] [facility] CARE [place]    [] [facility]   [facility]    Date: 2025  Patient Name:  Chris Estevez  : 1955  MRN: [id number]  CSN: [id number]    Referring Practitioner Mahamed Gibson MD [id number]      Diagnosis  Diagnoses       M54.16 (ICD-10-CM) - Radiculopathy, lumbar region    M51.360 (ICD-10-CM) - Other intervertebral disc degeneration, lumbar region with discogenic back pain only    M48.062 (ICD-10-CM) - Spinal stenosis, lumbar region with neurogenic claudication           Treatment Diagnosis M54.59, G89.29  Chronic Lower Back Pain  R29.3 Abnormal Posture  R53.1 Weakness  M25.60 Stiffness of Unspecified Joint   Date of Evaluation 3/31/25   Additional Pertinent History Chris Estevez has a past medical history of Anxiety, CAD (coronary artery disease), Depression, Diabetes mellitus (HCC), Dilation of esophagus, Frequent headaches, Gastroparesis, GERD (gastroesophageal reflux disease), History of Chiari malformation, Hyperlipidemia, Hypertension, Kidney stone, Kidney stone, Migraine, Movement disorder, Pulmonary emboli (HCC), Shingles, Sleep apnea, and Tattoos.  he has a past surgical history that includes Tonsillectomy (); back surgery (); Colonoscopy (); Endoscopy, colon, diagnostic (); vascular surgery (); brain surgery (); Cholecystectomy (); fracture surgery (); Cardiac surgery (); shoulder surgery (Left, 2018); Nasal septum surgery (); Colonoscopy (N/A, 2020); Upper gastrointestinal endoscopy (Left, 2020); Upper gastrointestinal endoscopy (Left, 2020); back surgery (2020); IVC filter removal (03/15/2020); EGD (); Upper gastrointestinal endoscopy (N/A, 10/10/2023); Upper gastrointestinal endoscopy

## 2025-04-28 ENCOUNTER — HOSPITAL ENCOUNTER (OUTPATIENT)
Dept: GENERAL RADIOLOGY | Age: 70
Discharge: HOME | End: 2025-04-28
Attending: INTERNAL MEDICINE
Payer: MEDICARE

## 2025-04-28 ENCOUNTER — APPOINTMENT (OUTPATIENT)
Dept: PHYSICAL THERAPY | Age: 70
End: 2025-04-28
Payer: MEDICARE

## 2025-04-28 DIAGNOSIS — R13.10 DYSPHAGIA, UNSPECIFIED TYPE: ICD-10-CM

## 2025-04-28 PROCEDURE — 74220 X-RAY XM ESOPHAGUS 1CNTRST: CPT

## 2025-04-28 PROCEDURE — 6370000000 HC RX 637 (ALT 250 FOR IP): Performed by: INTERNAL MEDICINE

## 2025-04-28 PROCEDURE — 2500000003 HC RX 250 WO HCPCS: Performed by: INTERNAL MEDICINE

## 2025-04-28 RX ADMIN — ANTACID/ANTIFLATULENT 1 EACH: 380; 550; 10; 10 GRANULE, EFFERVESCENT ORAL at 08:20

## 2025-04-28 RX ADMIN — BARIUM SULFATE 1 TABLET: 700 TABLET ORAL at 08:19

## 2025-04-28 RX ADMIN — BARIUM SULFATE 140 ML: 980 POWDER, FOR SUSPENSION ORAL at 08:20

## 2025-04-28 RX ADMIN — BARIUM SULFATE 50 ML: 0.6 SUSPENSION ORAL at 08:19

## 2025-05-05 ENCOUNTER — HOSPITAL ENCOUNTER (OUTPATIENT)
Dept: PHYSICAL THERAPY | Age: 70
Setting detail: THERAPIES SERIES
Discharge: HOME | End: 2025-05-05
Payer: MEDICARE

## 2025-05-05 PROCEDURE — 97113 AQUATIC THERAPY/EXERCISES: CPT

## 2025-05-05 NOTE — PROGRESS NOTES
[facility]  PHYSICAL THERAPY  [] EVALUATION  [] DAILY NOTE (LAND) [x] DAILY NOTE (AQUATIC ) [] PROGRESS NOTE [] DISCHARGE NOTE    [x] OUTPATIENT REHABILITATION CENTER [facility]   [] [facility] CARE [place]    [] [facility]   [] [facility]    Date: 2025  Patient Name:  Chris Estevez  : 1955  MRN: [id number]  CSN: [id number]    Referring Practitioner Mahamed Gibson MD [id number]      Diagnosis  Diagnoses       M54.16 (ICD-10-CM) - Radiculopathy, lumbar region    M51.360 (ICD-10-CM) - Other intervertebral disc degeneration, lumbar region with discogenic back pain only    M48.062 (ICD-10-CM) - Spinal stenosis, lumbar region with neurogenic claudication           Treatment Diagnosis M54.59, G89.29  Chronic Lower Back Pain  R29.3 Abnormal Posture  R53.1 Weakness  M25.60 Stiffness of Unspecified Joint   Date of Evaluation 3/31/25   Additional Pertinent History Chris Estevez has a past medical history of Anxiety, CAD (coronary artery disease), Depression, Diabetes mellitus (HCC), Dilation of esophagus, Frequent headaches, Gastroparesis, GERD (gastroesophageal reflux disease), History of Chiari malformation, Hyperlipidemia, Hypertension, Kidney stone, Kidney stone, Migraine, Movement disorder, Pulmonary emboli (HCC), Shingles, Sleep apnea, and Tattoos.  he has a past surgical history that includes Tonsillectomy (); back surgery (); Colonoscopy (); Endoscopy, colon, diagnostic (); vascular surgery (); brain surgery (); Cholecystectomy (); fracture surgery (); Cardiac surgery (); shoulder surgery (Left, 2018); Nasal septum surgery (); Colonoscopy (N/A, 2020); Upper gastrointestinal endoscopy (Left, 2020); Upper gastrointestinal endoscopy (Left, 2020); back surgery (2020); IVC filter removal (03/15/2020); EGD (); Upper gastrointestinal endoscopy (N/A, 10/10/2023); Upper gastrointestinal endoscopy

## 2025-05-09 ENCOUNTER — OFFICE VISIT (OUTPATIENT)
Dept: FAMILY MEDICINE CLINIC | Age: 70
End: 2025-05-09
Payer: MEDICARE

## 2025-05-09 VITALS
HEART RATE: 52 BPM | WEIGHT: 203.5 LBS | RESPIRATION RATE: 16 BRPM | DIASTOLIC BLOOD PRESSURE: 60 MMHG | BODY MASS INDEX: 30.05 KG/M2 | SYSTOLIC BLOOD PRESSURE: 128 MMHG

## 2025-05-09 DIAGNOSIS — T84.84XA PAINFUL ORTHOPAEDIC HARDWARE: ICD-10-CM

## 2025-05-09 DIAGNOSIS — G47.33 OSA (OBSTRUCTIVE SLEEP APNEA): ICD-10-CM

## 2025-05-09 DIAGNOSIS — M51.360 DEGENERATION OF INTERVERTEBRAL DISC OF LUMBAR REGION WITH DISCOGENIC BACK PAIN: ICD-10-CM

## 2025-05-09 DIAGNOSIS — G62.9 PERIPHERAL POLYNEUROPATHY: ICD-10-CM

## 2025-05-09 DIAGNOSIS — E11.69 TYPE 2 DIABETES MELLITUS WITH OTHER SPECIFIED COMPLICATION, WITH LONG-TERM CURRENT USE OF INSULIN (HCC): ICD-10-CM

## 2025-05-09 DIAGNOSIS — I25.10 CORONARY ARTERY DISEASE INVOLVING NATIVE CORONARY ARTERY OF NATIVE HEART WITHOUT ANGINA PECTORIS: ICD-10-CM

## 2025-05-09 DIAGNOSIS — M48.062 SPINAL STENOSIS OF LUMBAR REGION WITH NEUROGENIC CLAUDICATION: ICD-10-CM

## 2025-05-09 DIAGNOSIS — Z01.818 PRE-OP EVALUATION: Primary | ICD-10-CM

## 2025-05-09 DIAGNOSIS — I10 PRIMARY HYPERTENSION: ICD-10-CM

## 2025-05-09 DIAGNOSIS — Z79.4 TYPE 2 DIABETES MELLITUS WITH OTHER SPECIFIED COMPLICATION, WITH LONG-TERM CURRENT USE OF INSULIN (HCC): ICD-10-CM

## 2025-05-09 PROCEDURE — 3078F DIAST BP <80 MM HG: CPT | Performed by: FAMILY MEDICINE

## 2025-05-09 PROCEDURE — 99214 OFFICE O/P EST MOD 30 MIN: CPT | Performed by: FAMILY MEDICINE

## 2025-05-09 PROCEDURE — 3074F SYST BP LT 130 MM HG: CPT | Performed by: FAMILY MEDICINE

## 2025-05-09 PROCEDURE — 1123F ACP DISCUSS/DSCN MKR DOCD: CPT | Performed by: FAMILY MEDICINE

## 2025-05-09 PROCEDURE — 3044F HG A1C LEVEL LT 7.0%: CPT | Performed by: FAMILY MEDICINE

## 2025-05-09 NOTE — PROGRESS NOTES
pantoprazole (PROTONIX) 40 MG tablet Take 1 tablet by mouth every morning (before breakfast) (Patient taking differently: Take 1 tablet by mouth in the morning and at bedtime) 90 tablet 2     No current facility-administered medications for this visit.       Past Surgical History:   Procedure Laterality Date    BACK SURGERY  2008    C1 oates & decompression    BACK SURGERY  08/25/2020    [facility] Dr. Gibson, C5-7 cadaver patch    BRAIN SURGERY  2008    chiari    CARDIAC PROCEDURE N/A 01/04/2024    Left heart cath / coronary angiography performed by Tai Vargas MD at [facility] CARDIAC CATH LAB    CARDIAC PROCEDURE N/A 11/23/2024    Left heart cath / coronary angiography performed by Caitlin Diaz MD at [facility] CARDIAC CATH LAB    CARDIAC PROCEDURE N/A 11/23/2024    Percutaneous coronary intervention performed by Caitlin Diaz MD at [facility] CARDIAC CATH LAB    CARDIAC SURGERY  2007    heart cath    CHOLECYSTECTOMY  2009    COLONOSCOPY  2017    COLONOSCOPY N/A 03/12/2020    COLONOSCOPY POLYPECTOMY SNARE/COLD BIOPSY performed by Jasmin Saxena MD at [facility] Endoscopy    EGD  2022    Dr Saxena     ENDOSCOPY, COLON, DIAGNOSTIC  2007    FRACTURE SURGERY  1964    right elbow    IVC FILTER REMOVAL  03/15/2020    [facility]    NASAL SEPTUM SURGERY  2006    SHOULDER SURGERY Left 08/16/2018    TONSILLECTOMY  1964    TUMOR REMOVAL  03/2024    UPPER GASTROINTESTINAL ENDOSCOPY Left 03/12/2020    EGD ESOPHAGOGASTRODUODENOSCOPY DILATATION performed by Jasmin Saxena MD at [facility] Endoscopy    UPPER GASTROINTESTINAL ENDOSCOPY Left 03/12/2020    EGD BIOPSY performed by Jasmin Saxena MD at [facility] Endoscopy    UPPER GASTROINTESTINAL ENDOSCOPY N/A 10/10/2023    EGD POSS FOREIGN BODY REMOVAL performed by Jasmin Saxena MD at [facility] ENDOSCOPY    UPPER GASTROINTESTINAL ENDOSCOPY N/A 10/11/2023    EGD POSSIBLE FOREIGN BODY REMOVAL performed by Jasmin Saxena MD at [facility] ENDOSCOPY    US BIOPSY OF SALIVARY GLAND  02/14/2024    US BIOPSY OF SALIVARY

## 2025-05-12 ENCOUNTER — HOSPITAL ENCOUNTER (OUTPATIENT)
Dept: PHYSICAL THERAPY | Age: 70
Setting detail: THERAPIES SERIES
Discharge: HOME | End: 2025-05-12
Payer: MEDICARE

## 2025-05-12 PROCEDURE — 97113 AQUATIC THERAPY/EXERCISES: CPT

## 2025-05-12 NOTE — PROGRESS NOTES
repetitions. Patient tolerated well. Pain decreased to 5/10 after therapy session.       GOALS:  Patient Goal: to lessen pain     Short Term Goals: 4 weeks  Patient will report decrease in pain to 6-7/10 at most to allow ease of work tasks.  2. Patient will improve trunk extension AROM to WFLs to allow ease of standing and walking.  3. Patient will improve R knee strength to 5/5 to allow ease of walking and step negotiation.    Long Term Goals: 8 weeks  Patient will improve B hip strength to 4+/5 to allow ease of walking and lifting.  2. Patient will improve Modified Oswestry score from 25/50 to 15/50 to allow decrease in disability and improved functional mobility.  3. Patient will be independent with HEP in order to prevent re-injury and improve functional abilities.  4. Patient will be able to stand ~1.5 hours prior to sitting to improve ability to complete job tasks.     Patient Education:   [x]  HEP/Education Completed: monitor pain following aquatics  Medbridge Access Code:  []  No new Education completed  []  Reviewed Prior HEP      [x]  Patient verbalized and/or demonstrated understanding of education provided.  []  Patient unable to verbalize and/or demonstrate understanding of education provided.  Will continue education.  []  Barriers to learning:     PLAN:  Treatment Recommendations: Strengthening, Range of Motion, Balance Training, Endurance Training, Gait Training, Stair Training, Neuromuscular Re-education, Manual Therapy - Soft Tissue Mobilization, Manual Therapy - Joint Manipulation, Pain Management, Home Exercise Program, Patient Education, Integrative Dry Needling, Aquatics, and Modalities    []  Plan of care initiated.  Plan to see patient 2 times per week for 8 weeks to address the treatment planned outlined above.  [x]  Continue with current plan of care  []  Modify plan of care as follows:    []  Hold pending physician visit  []  Discharge    Time In 1555   Time Out 1635   Timed Code Minutes:

## 2025-05-12 NOTE — PROGRESS NOTES
[facility] PHYSICIANS [place] SPECIALTY  [facility] CARDIOLOGY  [address]  [address]  [place] [address]  Dept: [phone]  Dept Fax: [phone]  Loc: [phone]    Visit Date: 5/13/2025  Mr. Estevez is a 69 y.o. male who presented for:  CAD, H/o NSTEMI, PCI  HPI:   Chris Estevez is a pleasant 69 y.o. male who  has a past medical history of Anxiety, CAD (coronary artery disease), Depression, Diabetes mellitus (HCC), Dilation of esophagus, Frequent headaches, Gastroparesis, GERD (gastroesophageal reflux disease), History of Chiari malformation, Hyperlipidemia, Hypertension, Kidney stone, Kidney stone, Migraine, Movement disorder, Pulmonary emboli (HCC), Shingles, Sleep apnea, and Tattoos. He is on Xarelto for h/o PE. Has RCA PCI in 2021. On 11/2024, patient was admitted with NSTEMI, underwent PCI/GI of LAD and PTCA of RCA. Echo 11/2024 revealed a preserved EF. The patient reports occasional chest wall discomfort, he states it is stabbing, reports pain is from Fibromyalgia and is different when compared to prior CP before PCI and previous MI. Patient denies worsening dyspnea on exertion. No palpitations, dizziness, syncope.       Current Outpatient Medications:     Continuous Glucose  (DEXCOM G7 ) YULI, by Does not apply route, Disp: , Rfl:     famotidine (PEPCID) 40 MG tablet, , Disp: , Rfl:     rivaroxaban (XARELTO) 15 MG TABS tablet, Take 1 tablet by mouth daily (with breakfast), Disp: 28 tablet, Rfl: 0    rivaroxaban (XARELTO) 15 MG TABS tablet, Take 1 tablet by mouth daily (with breakfast), Disp: 90 tablet, Rfl: 3    clopidogrel (PLAVIX) 75 MG tablet, TAKE 1 TABLET BY MOUTH ONE TIME A DAY, Disp: 90 tablet, Rfl: 1    ezetimibe (ZETIA) 10 MG tablet, Take 1 tablet by mouth daily, Disp: 90 tablet, Rfl: 1    metoprolol tartrate (LOPRESSOR) 25 MG tablet, Take 1 tablet by mouth 2 times daily, Disp: 180 tablet, Rfl: 2    isosorbide mononitrate (IMDUR) 30 MG extended release

## 2025-05-13 ENCOUNTER — TELEPHONE (OUTPATIENT)
Dept: FAMILY MEDICINE CLINIC | Age: 70
End: 2025-05-13

## 2025-05-13 ENCOUNTER — OFFICE VISIT (OUTPATIENT)
Dept: CARDIOLOGY CLINIC | Age: 70
End: 2025-05-13
Payer: MEDICARE

## 2025-05-13 VITALS
DIASTOLIC BLOOD PRESSURE: 66 MMHG | HEART RATE: 54 BPM | SYSTOLIC BLOOD PRESSURE: 131 MMHG | WEIGHT: 202 LBS | BODY MASS INDEX: 29.92 KG/M2 | HEIGHT: 69 IN

## 2025-05-13 DIAGNOSIS — R07.9 CHEST PAIN IN ADULT: Primary | ICD-10-CM

## 2025-05-13 PROCEDURE — 1159F MED LIST DOCD IN RCRD: CPT | Performed by: INTERNAL MEDICINE

## 2025-05-13 PROCEDURE — 3075F SYST BP GE 130 - 139MM HG: CPT | Performed by: INTERNAL MEDICINE

## 2025-05-13 PROCEDURE — 3078F DIAST BP <80 MM HG: CPT | Performed by: INTERNAL MEDICINE

## 2025-05-13 PROCEDURE — 93000 ELECTROCARDIOGRAM COMPLETE: CPT | Performed by: INTERNAL MEDICINE

## 2025-05-13 PROCEDURE — 1123F ACP DISCUSS/DSCN MKR DOCD: CPT | Performed by: INTERNAL MEDICINE

## 2025-05-13 PROCEDURE — 99214 OFFICE O/P EST MOD 30 MIN: CPT | Performed by: INTERNAL MEDICINE

## 2025-05-13 RX ORDER — GABAPENTIN 300 MG/1
CAPSULE ORAL
COMMUNITY
Start: 2025-04-18

## 2025-05-13 NOTE — TELEPHONE ENCOUNTER
Nile with [facility]# [phone] calling to check on medical clearance on patient.  Aware was seen on 5/9/25 for clearance appt but note is not signed yet.  Will speak with provider and call Nile back with update.  Fax# for note is [phone]    Please advise

## 2025-05-13 NOTE — PATIENT INSTRUCTIONS
You may receive a survey regarding the care you received during your visit. We encourage you to complete and return your survey, as your input is valuable to us. We hope you will choose us in the future for your healthcare needs. Thank you!    Your Medical Assistant today: LAYO Arnett & REYES Adame  Thank you for coming to our office! It was a pleasure to serve you.

## 2025-05-13 NOTE — PROGRESS NOTES
Follow up  Needs cardiac clearance    EKG completed today    Denies all cardiac symptoms or concerns at this time.

## 2025-05-14 DIAGNOSIS — E11.69 TYPE 2 DIABETES MELLITUS WITH OTHER SPECIFIED COMPLICATION, WITH LONG-TERM CURRENT USE OF INSULIN (HCC): ICD-10-CM

## 2025-05-14 DIAGNOSIS — Z79.4 TYPE 2 DIABETES MELLITUS WITH OTHER SPECIFIED COMPLICATION, WITH LONG-TERM CURRENT USE OF INSULIN (HCC): ICD-10-CM

## 2025-05-14 RX ORDER — ACYCLOVIR 400 MG/1
TABLET ORAL
Qty: 9 EACH | Refills: 3 | Status: SHIPPED | OUTPATIENT
Start: 2025-05-14

## 2025-05-14 NOTE — TELEPHONE ENCOUNTER
Patient requesting refill of Dexcom G7 Sensor to [facility] [address]  He believes they do not have this in stock currently but will call them and see if they are on order.  He will have Rx transferred if needed.    Please refill if appropriate

## 2025-05-15 ENCOUNTER — HOSPITAL ENCOUNTER (OUTPATIENT)
Dept: MRI IMAGING | Age: 70
Discharge: HOME | End: 2025-05-15
Payer: MEDICARE

## 2025-05-15 DIAGNOSIS — R16.0 LIVER MASS: ICD-10-CM

## 2025-05-15 PROCEDURE — A9579 GAD-BASE MR CONTRAST NOS,1ML: HCPCS | Performed by: NURSE PRACTITIONER

## 2025-05-15 PROCEDURE — 6360000004 HC RX CONTRAST MEDICATION: Performed by: NURSE PRACTITIONER

## 2025-05-15 PROCEDURE — 74183 MRI ABD W/O CNTR FLWD CNTR: CPT

## 2025-05-15 RX ADMIN — GADOTERIDOL 20 ML: 279.3 INJECTION, SOLUTION INTRAVENOUS at 07:40

## 2025-05-19 ENCOUNTER — APPOINTMENT (OUTPATIENT)
Dept: PHYSICAL THERAPY | Age: 70
End: 2025-05-19
Payer: MEDICARE

## 2025-05-19 ENCOUNTER — TELEPHONE (OUTPATIENT)
Dept: FAMILY MEDICINE CLINIC | Age: 70
End: 2025-05-19

## 2025-05-19 NOTE — TELEPHONE ENCOUNTER
Hospitalized or in ED since last visit: No    Medication Changes: No    Upcoming/Recent Procedures: Yes     Dental extraction 9/28    Referral Expiration Date Approaching: No    BP Readings from Last 1 Encounters:   03/18/22 126/72       Patient comes to clinic for follow up anticoagulation visit.  Last INR on 9/15/22 was 1.4.  Dose increased but not followed.   Today's INR is 1.1 and is below goal range.    Current warfarin total weekly dose of 50 mg verified.  Informed the INR result is below therapeutic range and instructed to resume dose on file. Discussed dose and return date of 10/13/22 for next INR. See Anticoagulation flowsheet.    INR today is 1.1, below range and down from previous INR 1.4, in two weeks while on incorrect dose of 50 mg/last 7 days with two self-held doses. Dental extractions scheduled today at [facility] in [place], pt held doses 9/26 and 9/27 on her own. Pt advised to resume TWD of 70mg/wk and recheck INR in two weeks.     Dr. Martino is in the office today supervising the treatment.    Call your physician immediately if you notice any of the following symptoms of a blood clot:   · Sudden weakness in any limb  · Numbness or tingling anywhere  · Visual changes or loss of sight in either eye  · Sudden onset of slurred speech or inability to speak  · Dizziness or faintness  · New pain, swelling, redness or heat in any extremity  · New SOB or chest pain  Symptoms associated with blood clotting/low INR reviewed and verbalizes understanding.    Instructed to contact the clinic with any unusual bleeding or bruising, any changes in medications, diet, health status, lifestyle, or any other changes, questions or concerns. Verbalized understanding of all discussed.      Patient left message on [employer] on Friday 5-16-25 stating that [facility] did not have his RX for Dexcom G7 sensors. I left a message for the patient to let him know we had confirmation from the pharmacy on 5-14-25.

## 2025-05-20 ENCOUNTER — LAB (OUTPATIENT)
Dept: LAB | Age: 70
End: 2025-05-20

## 2025-05-20 LAB
AFP SERPL-MCNC: 246 UG/L
ALBUMIN SERPL BCG-MCNC: 3.5 G/DL (ref 3.4–4.9)
ALP SERPL-CCNC: 143 U/L (ref 40–129)
ALT SERPL W/O P-5'-P-CCNC: 64 U/L (ref 10–50)
AST SERPL-CCNC: 63 U/L (ref 10–50)
BILIRUB CONJ SERPL-MCNC: 0.2 MG/DL (ref 0–0.2)
BILIRUB SERPL-MCNC: 0.3 MG/DL (ref 0.3–1.2)
HAV IGM SER QL: NONREACTIVE
HBV CORE IGM SERPL QL IA: NONREACTIVE
HBV SURFACE AG SERPL QL IA: NONREACTIVE
HCV IGG SERPL QL IA: NONREACTIVE
PROT SERPL-MCNC: 6.7 G/DL (ref 6.4–8.3)

## 2025-05-22 LAB — ALKALINE PHOSPHATASE ISOENZYMES: NORMAL

## 2025-05-28 DIAGNOSIS — Z79.4 TYPE 2 DIABETES MELLITUS WITH OTHER SPECIFIED COMPLICATION, WITH LONG-TERM CURRENT USE OF INSULIN (HCC): ICD-10-CM

## 2025-05-28 DIAGNOSIS — E11.65 TYPE 2 DIABETES MELLITUS WITH HYPERGLYCEMIA, WITH LONG-TERM CURRENT USE OF INSULIN (HCC): ICD-10-CM

## 2025-05-28 DIAGNOSIS — Z79.4 TYPE 2 DIABETES MELLITUS WITH HYPERGLYCEMIA, WITH LONG-TERM CURRENT USE OF INSULIN (HCC): ICD-10-CM

## 2025-05-28 DIAGNOSIS — E11.69 TYPE 2 DIABETES MELLITUS WITH OTHER SPECIFIED COMPLICATION, WITH LONG-TERM CURRENT USE OF INSULIN (HCC): ICD-10-CM

## 2025-05-28 RX ORDER — INSULIN GLARGINE 100 [IU]/ML
20 INJECTION, SOLUTION SUBCUTANEOUS 2 TIMES DAILY
Qty: 15 ML | Refills: 11 | Status: SHIPPED | OUTPATIENT
Start: 2025-05-28

## 2025-05-28 NOTE — TELEPHONE ENCOUNTER
Pt called office requesting a refill of the Lantus and Metformin to [facility] [address]. If no call back he will check with them after noon.

## 2025-06-05 ENCOUNTER — TELEPHONE (OUTPATIENT)
Dept: CARDIOLOGY CLINIC | Age: 70
End: 2025-06-05

## 2025-06-05 NOTE — TELEPHONE ENCOUNTER
Pre op Risk Assessment    Procedure Liver biopsy   Physician Dr. Brothers at [place]  Date of surgery/procedure TBD    Last OV 5-13-25  Last Stress 10-15-21  Last Echo 11-22-24  Last Cath 11-23-24  Last Stent 11-23-24  Is patient on blood thinners Plavix and Xarelto   Hold Meds/how many days only asking to hold Xarelto   Please call patient back.

## 2025-06-10 ENCOUNTER — LAB (OUTPATIENT)
Dept: LAB | Age: 70
End: 2025-06-10

## 2025-06-10 LAB
BASOPHILS ABSOLUTE: 0 THOU/MM3 (ref 0–0.1)
BASOPHILS NFR BLD AUTO: 0.3 %
DEPRECATED RDW RBC AUTO: 44 FL (ref 35–45)
EOSINOPHIL NFR BLD AUTO: 1.5 %
EOSINOPHILS ABSOLUTE: 0.1 THOU/MM3 (ref 0–0.4)
ERYTHROCYTE [DISTWIDTH] IN BLOOD BY AUTOMATED COUNT: 13.2 % (ref 11.5–14.5)
HCT VFR BLD AUTO: 38.6 % (ref 42–52)
HGB BLD-MCNC: 12.7 GM/DL (ref 14–18)
IMM GRANULOCYTES # BLD AUTO: 0.01 THOU/MM3 (ref 0–0.07)
IMM GRANULOCYTES NFR BLD AUTO: 0.2 %
INR PPP: 1.44 (ref 0.85–1.13)
LYMPHOCYTES ABSOLUTE: 1.8 THOU/MM3 (ref 1–4.8)
LYMPHOCYTES NFR BLD AUTO: 29.9 %
MCH RBC QN AUTO: 29.7 PG (ref 26–33)
MCHC RBC AUTO-ENTMCNC: 32.9 GM/DL (ref 32.2–35.5)
MCV RBC AUTO: 90.4 FL (ref 80–94)
MONOCYTES ABSOLUTE: 0.6 THOU/MM3 (ref 0.4–1.3)
MONOCYTES NFR BLD AUTO: 9.5 %
NEUTROPHILS ABSOLUTE: 3.6 THOU/MM3 (ref 1.8–7.7)
NEUTROPHILS NFR BLD AUTO: 58.6 %
NRBC BLD AUTO-RTO: 0 /100 WBC
PLATELET # BLD AUTO: 247 THOU/MM3 (ref 130–400)
PMV BLD AUTO: 9.1 FL (ref 9.4–12.4)
PROTHROMBIN TIME: 16.5 SECONDS (ref 10–13.5)
RBC # BLD AUTO: 4.27 MILL/MM3 (ref 4.7–6.1)
WBC # BLD AUTO: 6.1 THOU/MM3 (ref 4.8–10.8)

## 2025-06-24 ENCOUNTER — LAB (OUTPATIENT)
Dept: LAB | Age: 70
End: 2025-06-24

## 2025-06-24 LAB
ALBUMIN SERPL BCG-MCNC: 3.5 G/DL (ref 3.4–4.9)
ALP SERPL-CCNC: 146 U/L (ref 40–129)
ALT SERPL W/O P-5'-P-CCNC: 28 U/L (ref 10–50)
AST SERPL-CCNC: 29 U/L (ref 10–50)
BILIRUB CONJ SERPL-MCNC: 0.2 MG/DL (ref 0–0.2)
BILIRUB SERPL-MCNC: 0.4 MG/DL (ref 0.3–1.2)
PROT SERPL-MCNC: 7.1 G/DL (ref 6.4–8.3)

## 2025-06-27 NOTE — CARE COORDINATION
Care Transitions Note    Follow Up Call     Attempted to reach patient for transitions of care follow up.  Unable to reach patient.      Outreach Attempts:   HIPAA compliant voicemail left for patient.     Care Summary Note: Attempted to contact pt for care transition follow up.  Unable to reach pt.  Left message with contact information and request for call back.      Follow Up Appointment:   Future Appointments         Provider Specialty Dept Phone    12/4/2024 8:45 AM Mortimer, Connor, PA-C Cardiology [phone]    12/5/2024 7:00 AM Patty Silva, PT Physical Therapy [phone]    1/15/2025 7:45 AM Kelvin Hardy, DO Family [facility] [phone]    7/16/2025 1:30 PM Caitlin Diaz MD Cardiology [phone]            Plan for follow-up call in 2-5 days based on severity of symptoms and risk factors. Plan for next call:  symptom management-chest pain, SOB, chest heaviness, s/s of infection at radial site, BS, urinary/bowels, any new or worsening symptoms, review PCP and cardiology appt      Va Antoine RN         27-Jun-2025 19:15

## 2025-07-02 ENCOUNTER — HOSPITAL ENCOUNTER (OUTPATIENT)
Dept: PHYSICAL THERAPY | Age: 70
Setting detail: THERAPIES SERIES
Discharge: HOME | End: 2025-07-02

## 2025-07-02 NOTE — DISCHARGE SUMMARY
[facility]  PHYSICAL THERAPY OUTPATIENT QUICK DISCHARGE NOTE  [place] [facility]    Date: 2025  Patient Name:  Chris Estevez  : 1955  MRN: [id number]  CSN: [id number]    Referring Practitioner Mahamed Gibson MD [id number]      Diagnosis Other low back pain  Abnormal posture  Weakness  Stiffness of unspecified joint, not elsewhere classified  Radiculopathy, lumbar region  Other intervertebral disc degeneration, lumbar region with discogenic back pain only  Spinal stenosis, lumbar region with neurogenic claudication  Other chronic pain     Patient is discharged from Physical Therapy services at this time.  See last note for details related to results of therapy and goal achievement.    Reason for discharge: Pt re-scheduled re-evaluation x2, last completed appointment was 25. Pt's insurance Auth  on 25. Discussed with Patient secondary to elapse in care, d/c current POC. If Pt wishing to re-initiate PT, Pt to reach out to his provider for a new therapy script. .      Rafa Monroe, PT

## 2025-07-08 ENCOUNTER — RESULTS FOLLOW-UP (OUTPATIENT)
Dept: FAMILY MEDICINE CLINIC | Age: 70
End: 2025-07-08

## 2025-07-08 ENCOUNTER — OFFICE VISIT (OUTPATIENT)
Dept: FAMILY MEDICINE CLINIC | Age: 70
End: 2025-07-08
Payer: MEDICARE

## 2025-07-08 ENCOUNTER — LAB (OUTPATIENT)
Dept: LAB | Age: 70
End: 2025-07-08

## 2025-07-08 VITALS
WEIGHT: 193.2 LBS | RESPIRATION RATE: 18 BRPM | HEIGHT: 69 IN | HEART RATE: 72 BPM | SYSTOLIC BLOOD PRESSURE: 128 MMHG | DIASTOLIC BLOOD PRESSURE: 70 MMHG | BODY MASS INDEX: 28.61 KG/M2

## 2025-07-08 DIAGNOSIS — Z79.4 TYPE 2 DIABETES MELLITUS WITH OTHER SPECIFIED COMPLICATION, WITH LONG-TERM CURRENT USE OF INSULIN (HCC): ICD-10-CM

## 2025-07-08 DIAGNOSIS — T84.84XA PAINFUL ORTHOPAEDIC HARDWARE: ICD-10-CM

## 2025-07-08 DIAGNOSIS — I10 PRIMARY HYPERTENSION: ICD-10-CM

## 2025-07-08 DIAGNOSIS — M51.360 DEGENERATION OF INTERVERTEBRAL DISC OF LUMBAR REGION WITH DISCOGENIC BACK PAIN: ICD-10-CM

## 2025-07-08 DIAGNOSIS — G47.33 OSA (OBSTRUCTIVE SLEEP APNEA): ICD-10-CM

## 2025-07-08 DIAGNOSIS — E11.69 TYPE 2 DIABETES MELLITUS WITH OTHER SPECIFIED COMPLICATION, WITH LONG-TERM CURRENT USE OF INSULIN (HCC): ICD-10-CM

## 2025-07-08 DIAGNOSIS — M48.062 SPINAL STENOSIS OF LUMBAR REGION WITH NEUROGENIC CLAUDICATION: ICD-10-CM

## 2025-07-08 DIAGNOSIS — G62.9 PERIPHERAL POLYNEUROPATHY: ICD-10-CM

## 2025-07-08 DIAGNOSIS — I25.10 CORONARY ARTERY DISEASE INVOLVING NATIVE CORONARY ARTERY OF NATIVE HEART WITHOUT ANGINA PECTORIS: ICD-10-CM

## 2025-07-08 DIAGNOSIS — Z00.00 MEDICARE ANNUAL WELLNESS VISIT, SUBSEQUENT: Primary | ICD-10-CM

## 2025-07-08 LAB
DEPRECATED MEAN GLUCOSE BLD GHB EST-ACNC: 174 MG/DL (ref 70–126)
HBA1C MFR BLD HPLC: 7.8 % (ref 4–6)

## 2025-07-08 PROCEDURE — 3074F SYST BP LT 130 MM HG: CPT | Performed by: FAMILY MEDICINE

## 2025-07-08 PROCEDURE — 3078F DIAST BP <80 MM HG: CPT | Performed by: FAMILY MEDICINE

## 2025-07-08 PROCEDURE — G0439 PPPS, SUBSEQ VISIT: HCPCS | Performed by: FAMILY MEDICINE

## 2025-07-08 PROCEDURE — 1123F ACP DISCUSS/DSCN MKR DOCD: CPT | Performed by: FAMILY MEDICINE

## 2025-07-08 PROCEDURE — 99214 OFFICE O/P EST MOD 30 MIN: CPT | Performed by: FAMILY MEDICINE

## 2025-07-08 PROCEDURE — 1159F MED LIST DOCD IN RCRD: CPT | Performed by: FAMILY MEDICINE

## 2025-07-08 PROCEDURE — 3044F HG A1C LEVEL LT 7.0%: CPT | Performed by: FAMILY MEDICINE

## 2025-07-08 ASSESSMENT — ENCOUNTER SYMPTOMS
RESPIRATORY NEGATIVE: 1
GASTROINTESTINAL NEGATIVE: 1

## 2025-07-08 ASSESSMENT — PATIENT HEALTH QUESTIONNAIRE - PHQ9
10. IF YOU CHECKED OFF ANY PROBLEMS, HOW DIFFICULT HAVE THESE PROBLEMS MADE IT FOR YOU TO DO YOUR WORK, TAKE CARE OF THINGS AT HOME, OR GET ALONG WITH OTHER PEOPLE: SOMEWHAT DIFFICULT
6. FEELING BAD ABOUT YOURSELF - OR THAT YOU ARE A FAILURE OR HAVE LET YOURSELF OR YOUR FAMILY DOWN: NOT AT ALL
9. THOUGHTS THAT YOU WOULD BE BETTER OFF DEAD, OR OF HURTING YOURSELF: NOT AT ALL
SUM OF ALL RESPONSES TO PHQ QUESTIONS 1-9: 18
8. MOVING OR SPEAKING SO SLOWLY THAT OTHER PEOPLE COULD HAVE NOTICED. OR THE OPPOSITE, BEING SO FIGETY OR RESTLESS THAT YOU HAVE BEEN MOVING AROUND A LOT MORE THAN USUAL: NOT AT ALL
3. TROUBLE FALLING OR STAYING ASLEEP: NEARLY EVERY DAY
4. FEELING TIRED OR HAVING LITTLE ENERGY: NEARLY EVERY DAY
SUM OF ALL RESPONSES TO PHQ QUESTIONS 1-9: 18
SUM OF ALL RESPONSES TO PHQ QUESTIONS 1-9: 18
2. FEELING DOWN, DEPRESSED OR HOPELESS: NEARLY EVERY DAY
SUM OF ALL RESPONSES TO PHQ QUESTIONS 1-9: 18
5. POOR APPETITE OR OVEREATING: NEARLY EVERY DAY
7. TROUBLE CONCENTRATING ON THINGS, SUCH AS READING THE NEWSPAPER OR WATCHING TELEVISION: NEARLY EVERY DAY
1. LITTLE INTEREST OR PLEASURE IN DOING THINGS: NEARLY EVERY DAY

## 2025-07-08 ASSESSMENT — LIFESTYLE VARIABLES
HOW OFTEN DO YOU HAVE A DRINK CONTAINING ALCOHOL: NEVER
HOW MANY STANDARD DRINKS CONTAINING ALCOHOL DO YOU HAVE ON A TYPICAL DAY: PATIENT DOES NOT DRINK

## 2025-07-08 NOTE — PROGRESS NOTES
tablet Take 1 tablet by mouth 2 times daily (with meals) Yes Kelvin Hardy DO   insulin glargine (LANTUS SOLOSTAR) 100 UNIT/ML injection pen Inject 20 Units into the skin 2 times daily Yes Kelvin Hardy DO   Continuous Glucose Sensor (DEXCOM G7 SENSOR) MISC As directed.  Dx: E11.9 Yes Kelvin Hardy DO   gabapentin (NEURONTIN) 300 MG capsule TAKE 1 CAPSULE BY MOUTH THREE TIMES DAILY AFTER FINISHING 100 MG SCRIPT Yes Stuart Warren MD   Continuous Glucose  (DEXCOM G7 ) YULI by Does not apply route Yes Stuart Warren MD   famotidine (PEPCID) 40 MG tablet  Yes Stuart Warren MD   rivaroxaban (XARELTO) 15 MG TABS tablet Take 1 tablet by mouth daily (with breakfast) Yes Caitlin Diaz MD   clopidogrel (PLAVIX) 75 MG tablet TAKE 1 TABLET BY MOUTH ONE TIME A DAY Yes Caitlin Diaz MD   ezetimibe (ZETIA) 10 MG tablet Take 1 tablet by mouth daily Yes Caitlin Diaz MD   metoprolol tartrate (LOPRESSOR) 25 MG tablet Take 1 tablet by mouth 2 times daily Yes Aminta Martinez MD   isosorbide mononitrate (IMDUR) 30 MG extended release tablet Take 2 tablets by mouth daily Yes Aminta Martinez MD   rosuvastatin (CRESTOR) 40 MG tablet Take 1 tablet by mouth daily Yes Caitlin Diaz MD   Vitamin D3 125 MCG (5000 UT) TABS tablet Take 1 tablet by mouth daily Yes Stuart Warren MD   nitroGLYCERIN (NITROSTAT) 0.4 MG SL tablet Place 1 tablet under the tongue every 5 minutes as needed for Chest pain up to max of 3 total doses. If no relief after 1 dose, call 911. Yes Gissel Fry PA-C   pantoprazole (PROTONIX) 40 MG tablet Take 1 tablet by mouth every morning (before breakfast) Yes Rufina Palumbo APRN - CNP       CareTeam (Including outside providers/suppliers regularly involved in providing care):   Patient Care Team:  Kelvin Hardy DO as PCP - General (Family Medicine)  Kelvin Hardy DO as PCP - Empaneled Provider  Colt Saucedo MD as

## 2025-07-08 NOTE — PATIENT INSTRUCTIONS
You may receive a survey about your visit with us today. The feedback from our patients helps us identify what is working well and where the service to all patients can be enhanced. Thank you!        Preventing Falls: Care Instructions  Injuries and health problems such as trouble walking or poor eyesight can increase your risk of falling. So can some medicines. But there are things you can do to help prevent falls. You can exercise to get stronger. You can also arrange your home to make it safer.    Talk to your doctor about the medicines you take. Ask if any of them increase the risk of falls and whether they can be changed or stopped.   Try to exercise regularly. It can help improve your strength and balance. This can help lower your risk of falling.         Practice fall safety and prevention.   Wear low-heeled shoes that fit well and give your feet good support. Talk to your doctor if you have foot problems that make this hard.  Carry a cellphone or wear a medical alert device that you can use to call for help.  Use stepladders instead of chairs to reach high objects. Don't climb if you're at risk for falls. Ask for help, if needed.  Wear the correct eyeglasses, if you need them.        Make your home safer.   Remove rugs, cords, clutter, and furniture from walkways.  Keep your house well lit. Use night-lights in hallways and bathrooms.  Install and use sturdy handrails on stairways.  Wear nonskid footwear, even inside. Don't walk barefoot or in socks without shoes.        Be safe outside.   Use handrails, curb cuts, and ramps whenever possible.  Keep your hands free by using a shoulder bag or backpack.  Try to walk in well-lit areas. Watch out for uneven ground, changes in pavement, and debris.  Be careful in the winter. Walk on the grass or gravel when sidewalks are slippery. Use de-icer on steps and walkways. Add non-slip devices to shoes.    Put grab bars and nonskid mats in your shower or tub and near the  [Mother] : mother

## 2025-07-24 ENCOUNTER — TELEPHONE (OUTPATIENT)
Dept: CARDIOLOGY CLINIC | Age: 70
End: 2025-07-24

## 2025-07-24 NOTE — TELEPHONE ENCOUNTER
Pre op Risk Assessment    Procedure Pre-Y90 and Y90  Physician Shannon @ [facility]  Date of surgery/procedure 8/19/25 for PreY90 and 8/27/25 for Y90    Last OV 5/13/25  Last Stress 10/15/21  Last Echo 1/4/24  Last Cath 11/4/24  Last Stent ?  Is patient on blood thinners xarelto  Hold Meds/how many days 2 days prior

## 2025-08-11 ENCOUNTER — LAB (OUTPATIENT)
Dept: LAB | Age: 70
End: 2025-08-11

## 2025-08-11 LAB
ALBUMIN SERPL BCG-MCNC: 3.6 G/DL (ref 3.4–4.9)
ALP SERPL-CCNC: 133 U/L (ref 40–129)
ALT SERPL W/O P-5'-P-CCNC: 49 U/L (ref 10–50)
ANION GAP SERPL CALC-SCNC: 7 MEQ/L (ref 8–16)
AST SERPL-CCNC: 36 U/L (ref 10–50)
BILIRUB SERPL-MCNC: 0.3 MG/DL (ref 0.3–1.2)
BUN SERPL-MCNC: 20 MG/DL (ref 8–23)
CALCIUM SERPL-MCNC: 9.3 MG/DL (ref 8.5–10.5)
CHLORIDE SERPL-SCNC: 102 MEQ/L (ref 98–111)
CO2 SERPL-SCNC: 27 MEQ/L (ref 22–29)
CREAT SERPL-MCNC: 1.3 MG/DL (ref 0.7–1.2)
DEPRECATED RDW RBC AUTO: 43.8 FL (ref 35–45)
ERYTHROCYTE [DISTWIDTH] IN BLOOD BY AUTOMATED COUNT: 13.3 % (ref 11.5–14.5)
GFR SERPL CREATININE-BSD FRML MDRD: 59 ML/MIN/1.73M2
GLUCOSE SERPL-MCNC: 143 MG/DL (ref 74–109)
HCT VFR BLD AUTO: 38.9 % (ref 42–52)
HGB BLD-MCNC: 12.7 GM/DL (ref 14–18)
INR PPP: 1.16 (ref 0.85–1.13)
MCH RBC QN AUTO: 29.5 PG (ref 26–33)
MCHC RBC AUTO-ENTMCNC: 32.6 GM/DL (ref 32.2–35.5)
MCV RBC AUTO: 90.3 FL (ref 80–94)
PLATELET # BLD AUTO: 222 THOU/MM3 (ref 130–400)
PMV BLD AUTO: 9.4 FL (ref 9.4–12.4)
POTASSIUM SERPL-SCNC: 4.3 MEQ/L (ref 3.5–5.2)
PROT SERPL-MCNC: 7.2 G/DL (ref 6.4–8.3)
PROTHROMBIN TIME: 13.5 SECONDS (ref 10–13.5)
RBC # BLD AUTO: 4.31 MILL/MM3 (ref 4.7–6.1)
SODIUM SERPL-SCNC: 136 MEQ/L (ref 135–145)
WBC # BLD AUTO: 5.8 THOU/MM3 (ref 4.8–10.8)

## 2025-08-14 RX ORDER — ROSUVASTATIN CALCIUM 40 MG/1
40 TABLET, COATED ORAL DAILY
Qty: 90 TABLET | Refills: 2 | Status: SHIPPED | OUTPATIENT
Start: 2025-08-14

## 2025-08-18 RX ORDER — CLOPIDOGREL BISULFATE 75 MG/1
75 TABLET ORAL DAILY
Qty: 90 TABLET | Refills: 3 | Status: SHIPPED | OUTPATIENT
Start: 2025-08-18

## (undated) DEVICE — BAND COMPR L24CM REG CLR PLAS HEMSTAT EXT HK AND LOOP RETEN

## (undated) DEVICE — SOLUTION IV IRRIG WATER 500ML POUR BRL ST 2F7113

## (undated) DEVICE — DRAPE KIT RAMAPR RADIATION SHIELD

## (undated) DEVICE — KIT ANGIO W/ AT P65 PREM HND CTRL FOR CNTRST DEL ANGIOTOUCH

## (undated) DEVICE — KIT MFLD ISOLATN NACL CNTRST PRT TBNG SPIK W/ PRSS TRNSDUC

## (undated) DEVICE — GUIDEWIRE VASC L260CM DIA0.035IN RAD 3MM J TIP L7CM PTFE

## (undated) DEVICE — CATH BLLN ANGIO 3X12MM SC EUPHORA RX

## (undated) DEVICE — CATHETER DIAG 6FR L100CM LUMN ID0.056IN JR4 CRV 0 SIDE H

## (undated) DEVICE — CATHETER GUID 6FR DIA0.071IN SHFT NYL STD L JR 4 CRV ENH

## (undated) DEVICE — Device: Brand: NOMOLINE™ LH ADULT NASAL CO2 CANNULA WITH O2 4M

## (undated) DEVICE — CATH BLLN ANGIO 3.50X20MM NC EUPHORIA RX

## (undated) DEVICE — TIBURON NEONATAL DRAPE: Brand: CONVERTORS

## (undated) DEVICE — CONMED SCOPE SAVER BITE BLOCK, 20X27 MM: Brand: SCOPE SAVER

## (undated) DEVICE — CATH BLLN ANGIO 3.50X8MM NC EUPHORIA RX

## (undated) DEVICE — CATHETER DIAG AD 6FR L100CM COR GRN HYDRPHLC NYL JR 4 W/O

## (undated) DEVICE — DEVICE INFL 20ML 30ATM POLYCARB BRL ABS PLUNG DGT DISPLAY

## (undated) DEVICE — KIT INF CTRL 2OZ LUB TBNG L12FT DBL END BRSH SYR OP4

## (undated) DEVICE — CATHETER COR DIAG JUDKINS L 3.5 CRV 6FR 100CM 0 SIDE H

## (undated) DEVICE — OFF - ST. RITAS VASC: Brand: MEDLINE INDUSTRIES, INC.

## (undated) DEVICE — PACLITAXEL-COATED BALLOON CATHETER: Brand: AGENT™

## (undated) DEVICE — GLOVE ORTHO 8   MSG9480

## (undated) DEVICE — RUNTHROUGH NS EXTRA FLOPPY PTCA GUIDEWIRE: Brand: RUNTHROUGH

## (undated) DEVICE — 4-PORT MANIFOLD: Brand: NEPTUNE 2

## (undated) DEVICE — SET LNR RED GRN W/ BASE CLEANASCOPE

## (undated) DEVICE — MICROSURGICAL DILATATION DEVICE: Brand: WOLVERINE CORONARY CUTTING BALLOON

## (undated) DEVICE — GLIDESHEATH SLENDER ACCESS KIT: Brand: GLIDESHEATH SLENDER

## (undated) DEVICE — VALVE HEMSTAS W/ GWIRE INSRTN TOOL GRDIAN II NC

## (undated) DEVICE — CATHETER GUID 6FR L100CM DIA0.071IN NYL SHFT EBU3.5

## (undated) DEVICE — BIOGUARD A/W CLEANING ADAPTER

## (undated) DEVICE — CARDIAC CATH LAB PACK LF

## (undated) DEVICE — GUIDE EXTENSION CATHETER: Brand: GUIDEZILLA™ II

## (undated) DEVICE — Device